# Patient Record
Sex: FEMALE | Race: WHITE | NOT HISPANIC OR LATINO | Employment: OTHER | URBAN - METROPOLITAN AREA
[De-identification: names, ages, dates, MRNs, and addresses within clinical notes are randomized per-mention and may not be internally consistent; named-entity substitution may affect disease eponyms.]

---

## 2019-07-08 ENCOUNTER — APPOINTMENT (OUTPATIENT)
Dept: CT IMAGING | Facility: HOSPITAL | Age: 54
End: 2019-07-08

## 2019-07-08 ENCOUNTER — HOSPITAL ENCOUNTER (EMERGENCY)
Facility: HOSPITAL | Age: 54
Discharge: HOME | End: 2019-07-08
Admitting: EMERGENCY MEDICINE

## 2019-07-08 VITALS
RESPIRATION RATE: 18 BRPM | HEART RATE: 72 BPM | TEMPERATURE: 97.9 F | DIASTOLIC BLOOD PRESSURE: 67 MMHG | BODY MASS INDEX: 53.92 KG/M2 | WEIGHT: 293 LBS | OXYGEN SATURATION: 100 % | SYSTOLIC BLOOD PRESSURE: 140 MMHG | HEIGHT: 62 IN

## 2019-07-08 DIAGNOSIS — N39.0 URINARY TRACT INFECTION WITHOUT HEMATURIA, SITE UNSPECIFIED: Primary | ICD-10-CM

## 2019-07-08 DIAGNOSIS — N28.9 ACUTE RENAL INSUFFICIENCY: ICD-10-CM

## 2019-07-08 DIAGNOSIS — K80.20 CALCULUS OF GALLBLADDER WITHOUT CHOLECYSTITIS WITHOUT OBSTRUCTION: ICD-10-CM

## 2019-07-08 DIAGNOSIS — E86.0 DEHYDRATION: ICD-10-CM

## 2019-07-08 LAB
ALBUMIN SERPL-MCNC: 3.2 G/DL (ref 3.5–4.8)
ALBUMIN/GLOB SERPL: 0.8 G/DL (ref 1–1.7)
ALP SERPL-CCNC: 83 U/L (ref 32–91)
ALT SERPL W P-5'-P-CCNC: 40 U/L (ref 14–54)
ANION GAP SERPL CALCULATED.3IONS-SCNC: 14.7 MMOL/L (ref 10–20)
AST SERPL-CCNC: 52 U/L (ref 15–41)
BACTERIA UR QL AUTO: ABNORMAL /HPF
BILIRUB SERPL-MCNC: 2.1 MG/DL (ref 0.3–1.2)
BILIRUB UR QL STRIP: ABNORMAL
BUN BLD-MCNC: 27 MG/DL (ref 8–20)
BUN/CREAT SERPL: 15 (ref 5.4–26.2)
CALCIUM SPEC-SCNC: 8.4 MG/DL (ref 8.9–10.3)
CHLORIDE SERPL-SCNC: 95 MMOL/L (ref 101–111)
CLARITY UR: ABNORMAL
CO2 SERPL-SCNC: 25 MMOL/L (ref 22–32)
COLOR UR: ABNORMAL
CREAT BLD-MCNC: 1.8 MG/DL (ref 0.4–1)
DEPRECATED RDW RBC AUTO: 52.9 FL (ref 37–54)
ERYTHROCYTE [DISTWIDTH] IN BLOOD BY AUTOMATED COUNT: 16.8 % (ref 12.3–15.4)
GFR SERPL CREATININE-BSD FRML MDRD: 30 ML/MIN/1.73
GIANT PLATELETS: ABNORMAL
GLOBULIN UR ELPH-MCNC: 4.2 GM/DL (ref 2.5–3.8)
GLUCOSE BLD-MCNC: 140 MG/DL (ref 65–99)
GLUCOSE UR STRIP-MCNC: NEGATIVE MG/DL
GRAN CASTS URNS QL MICRO: ABNORMAL /LPF
HCT VFR BLD AUTO: 27.7 % (ref 34–46.6)
HGB BLD-MCNC: 9.6 G/DL (ref 12–15.9)
HGB UR QL STRIP.AUTO: ABNORMAL
HYALINE CASTS UR QL AUTO: ABNORMAL /LPF
KETONES UR QL STRIP: ABNORMAL
LEUKOCYTE ESTERASE UR QL STRIP.AUTO: ABNORMAL
LIPASE SERPL-CCNC: 40 U/L (ref 22–51)
LYMPHOCYTES # BLD MANUAL: 1.04 10*3/MM3 (ref 0.7–3.1)
LYMPHOCYTES NFR BLD MANUAL: 28 % (ref 19.6–45.3)
LYMPHOCYTES NFR BLD MANUAL: 6 % (ref 5–12)
MCH RBC QN AUTO: 31.1 PG (ref 26.6–33)
MCHC RBC AUTO-ENTMCNC: 34.6 G/DL (ref 31.5–35.7)
MCV RBC AUTO: 90 FL (ref 79–97)
METAMYELOCYTES NFR BLD MANUAL: 2 % (ref 0–0)
MONOCYTES # BLD AUTO: 0.22 10*3/MM3 (ref 0.1–0.9)
MUCOUS THREADS URNS QL MICRO: ABNORMAL /HPF
MYELOCYTES NFR BLD MANUAL: 1 % (ref 0–0)
NEUTROPHILS # BLD AUTO: 2.33 10*3/MM3 (ref 1.7–7)
NEUTROPHILS NFR BLD MANUAL: 39 % (ref 42.7–76)
NEUTS BAND NFR BLD MANUAL: 24 % (ref 0–5)
NITRITE UR QL STRIP: POSITIVE
PH UR STRIP.AUTO: 5.5 [PH] (ref 5–8)
PLATELET # BLD AUTO: 140 10*3/MM3 (ref 140–450)
PMV BLD AUTO: 8.6 FL (ref 6–12)
POTASSIUM BLD-SCNC: 3.7 MMOL/L (ref 3.6–5.1)
PROT SERPL-MCNC: 7.4 G/DL (ref 6.1–7.9)
PROT UR QL STRIP: ABNORMAL
RBC # BLD AUTO: 3.08 10*6/MM3 (ref 3.77–5.28)
RBC # UR: ABNORMAL /HPF
RBC MORPH BLD: NORMAL
REF LAB TEST METHOD: ABNORMAL
SCAN SLIDE: NORMAL
SODIUM BLD-SCNC: 131 MMOL/L (ref 136–144)
SP GR UR STRIP: 1.02 (ref 1–1.03)
SQUAMOUS #/AREA URNS HPF: ABNORMAL /HPF
UROBILINOGEN UR QL STRIP: ABNORMAL
WBC MORPH BLD: NORMAL
WBC NRBC COR # BLD: 3.7 10*3/MM3 (ref 3.4–10.8)
WBC UR QL AUTO: ABNORMAL /HPF

## 2019-07-08 PROCEDURE — 25010000002 ONDANSETRON PER 1 MG: Performed by: NURSE PRACTITIONER

## 2019-07-08 PROCEDURE — 81001 URINALYSIS AUTO W/SCOPE: CPT | Performed by: NURSE PRACTITIONER

## 2019-07-08 PROCEDURE — 83690 ASSAY OF LIPASE: CPT | Performed by: NURSE PRACTITIONER

## 2019-07-08 PROCEDURE — 96374 THER/PROPH/DIAG INJ IV PUSH: CPT

## 2019-07-08 PROCEDURE — 80053 COMPREHEN METABOLIC PANEL: CPT | Performed by: NURSE PRACTITIONER

## 2019-07-08 PROCEDURE — 85007 BL SMEAR W/DIFF WBC COUNT: CPT | Performed by: NURSE PRACTITIONER

## 2019-07-08 PROCEDURE — 99284 EMERGENCY DEPT VISIT MOD MDM: CPT

## 2019-07-08 PROCEDURE — 87086 URINE CULTURE/COLONY COUNT: CPT | Performed by: NURSE PRACTITIONER

## 2019-07-08 PROCEDURE — 87186 SC STD MICRODIL/AGAR DIL: CPT | Performed by: NURSE PRACTITIONER

## 2019-07-08 PROCEDURE — 85025 COMPLETE CBC W/AUTO DIFF WBC: CPT | Performed by: NURSE PRACTITIONER

## 2019-07-08 PROCEDURE — 87088 URINE BACTERIA CULTURE: CPT | Performed by: NURSE PRACTITIONER

## 2019-07-08 PROCEDURE — 74176 CT ABD & PELVIS W/O CONTRAST: CPT

## 2019-07-08 RX ORDER — ONDANSETRON HYDROCHLORIDE 8 MG/1
8 TABLET, FILM COATED ORAL EVERY 8 HOURS PRN
Qty: 12 TABLET | Refills: 0 | Status: SHIPPED | OUTPATIENT
Start: 2019-07-08 | End: 2020-03-04

## 2019-07-08 RX ORDER — ONDANSETRON 2 MG/ML
4 INJECTION INTRAMUSCULAR; INTRAVENOUS ONCE
Status: COMPLETED | OUTPATIENT
Start: 2019-07-08 | End: 2019-07-08

## 2019-07-08 RX ORDER — NITROFURANTOIN 25; 75 MG/1; MG/1
100 CAPSULE ORAL 2 TIMES DAILY
Qty: 10 CAPSULE | Refills: 0 | Status: SHIPPED | OUTPATIENT
Start: 2019-07-08 | End: 2020-03-04

## 2019-07-08 RX ORDER — SODIUM CHLORIDE 0.9 % (FLUSH) 0.9 %
10 SYRINGE (ML) INJECTION AS NEEDED
Status: DISCONTINUED | OUTPATIENT
Start: 2019-07-08 | End: 2019-07-08 | Stop reason: HOSPADM

## 2019-07-08 RX ADMIN — ONDANSETRON 4 MG: 2 INJECTION INTRAMUSCULAR; INTRAVENOUS at 11:40

## 2019-07-08 RX ADMIN — SODIUM CHLORIDE 1000 ML: 900 INJECTION, SOLUTION INTRAVENOUS at 15:21

## 2019-07-08 RX ADMIN — SODIUM CHLORIDE 500 ML: 900 INJECTION, SOLUTION INTRAVENOUS at 11:40

## 2019-07-08 NOTE — ED NOTES
Lightheaded on and off, vomiting x 2-3 in the last 24 hrs. BM 2 days ago. Reports vomiting bile and not actual food.      Bindu Perez RN  07/08/19 1122

## 2019-07-08 NOTE — ED PROVIDER NOTES
Subjective   Chief complaint: vomiting      Context: patient is a 53 yof with s/o with c/o vomiting and epigastric pain. Occasionally feels light headed without unilateral focal deficits weakness confusion ataxia or lethargy. No bm x 2 days but reports decreased oral intake. Denies diarrhea, recent abx use, fever, recent travel or surgery.     Duration: 5 days    Timing: waxes and wanes    Severity: denies    Associated symptoms: denies          PCP:rex              Review of Systems   Constitutional: Negative.    HENT: Negative.    Respiratory: Negative.    Cardiovascular: Negative.    Gastrointestinal: Positive for nausea and vomiting.   Genitourinary: Negative.    Musculoskeletal: Negative.    Skin: Negative.    Neurological: Positive for light-headedness. Negative for dizziness, tremors, seizures, syncope, facial asymmetry, speech difficulty, weakness, numbness and headaches.   Hematological: Negative.        Past Medical History:   Diagnosis Date   • Hypertension        No Known Allergies    History reviewed. No pertinent surgical history.    History reviewed. No pertinent family history.    Social History     Socioeconomic History   • Marital status: Single     Spouse name: Not on file   • Number of children: Not on file   • Years of education: Not on file   • Highest education level: Not on file   Tobacco Use   • Smoking status: Never Smoker   Substance and Sexual Activity   • Alcohol use: Yes     Alcohol/week: 0.6 oz     Types: 1 Cans of beer per week   • Sexual activity: Yes     Partners: Male           Objective   Physical Exam    Vital signs and triage nurse note reviewed.  Morbidly obese.  Family at bedside constitutional: Awake, alert; well-developed and well-nourished. No acute distress is noted.   HEENT: Normocephalic, atraumatic; pupils are PERRL with intact EOM; oropharynx is pink and moist without exudate or erythema.   Neck: Supple, full range of motion without pain; no cervical  lymphadenopathy.   Cardiovascular: Regular rate and rhythm, normal S1-S2.   Pulmonary: Respiratory effort regular nonlabored, breath sounds clear to auscultation all fields.   Abdomen: Soft, nontender nondistended with normoactive bowel sounds; no rebound or guarding. Negative Cox McBurney.  No CVAT  Musculoskeletal: Independent range of motion of all extremities with no palpable tenderness or edema.   Neuro: Alert oriented x3, speech is clear and appropriate, GCS 15   Skin:  Fleshtone warm, dry, intact; no erythematous or petechial rash or lesion       Procedures           ED Course        Labs Reviewed   COMPREHENSIVE METABOLIC PANEL - Abnormal; Notable for the following components:       Result Value    Glucose 140 (*)     BUN 27 (*)     Creatinine 1.80 (*)     Sodium 131 (*)     Chloride 95 (*)     Calcium 8.4 (*)     Albumin 3.20 (*)     AST (SGOT) 52 (*)     Total Bilirubin 2.1 (*)     eGFR Non African Amer 30 (*)     Globulin 4.2 (*)     A/G Ratio 0.8 (*)     All other components within normal limits   URINALYSIS W/ CULTURE IF INDICATED - Abnormal; Notable for the following components:    Color, UA Orange (*)     Appearance, UA Turbid (*)     Ketones, UA Trace (*)     Bilirubin, UA Small (1+) (*)     Blood, UA Trace (*)     Protein,  mg/dL (2+) (*)     Leuk Esterase, UA Moderate (2+) (*)     Nitrite, UA Positive (*)     All other components within normal limits   CBC WITH AUTO DIFFERENTIAL - Abnormal; Notable for the following components:    RBC 3.08 (*)     Hemoglobin 9.6 (*)     Hematocrit 27.7 (*)     RDW 16.8 (*)     All other components within normal limits    Narrative:     The previously reported component NRBC is no longer being reported.   URINALYSIS, MICROSCOPIC ONLY - Abnormal; Notable for the following components:    RBC, UA 6-12 (*)     WBC, UA Too Numerous to Count (*)     Bacteria, UA 4+ (*)     Squamous Epithelial Cells, UA 13-20 (*)     All other components within normal limits    MANUAL DIFFERENTIAL - Abnormal; Notable for the following components:    Neutrophil % 39.0 (*)     Bands %  24.0 (*)     Metamyelocyte % 2.0 (*)     Myelocyte % 1.0 (*)     All other components within normal limits   LIPASE - Normal   URINE CULTURE   SCAN SLIDE   CBC AND DIFFERENTIAL    Narrative:     The following orders were created for panel order CBC & Differential.  Procedure                               Abnormality         Status                     ---------                               -----------         ------                     Scan Slide[[id number]]                                       Final result               CBC Auto Differential[[id number]]        Abnormal            Final result                 Please view results for these tests on the individual orders.     Medications   sodium chloride 0.9 % flush 10 mL (not administered)   sodium chloride 0.9 % bolus 1,000 mL (not administered)   sodium chloride 0.9 % bolus 500 mL (0 mL Intravenous Stopped 7/8/19 1516)   ondansetron (ZOFRAN) injection 4 mg (4 mg Intravenous Given 7/8/19 1140)     Ct Abdomen Pelvis Without Contrast    Result Date: 7/8/2019   1. Hepatic steatosis. 2. Moderate splenomegaly. 3. Small hiatal hernia. 4. Cholelithiasis without evidence of acute cholecystitis.  Electronically Signed By-Jon Lynch On:7/8/2019 2:26 PM This report was finalized on [id number] by  Jon Lynch, .              MDM  Number of Diagnoses or Management Options  Acute renal insufficiency:   Calculus of gallbladder without cholecystitis without obstruction:   Dehydration:   Urinary tract infection without hematuria, site unspecified:   Diagnosis management comments: Medical decision  Comorbidities: Obesity  Differentials: Dehydration electrolyte abnormalities UTI cholelithiasis only tried abnormalities  Radiology interpretation: ct  reviewed by me and interpreted by radiologist, cholelithiasis  Lab interpretation:  Labs viewed by me significant for,  David 2.1, creatinine 1.8, UTI    Patient had IV established was given fluid bolus Zofran.  Labs CT was obtained.  Patient has had no vomiting while in the ER.    i discussed findings with patient who voices understanding of discharge instructions, signs and symptoms requiring return to ED; discharged improved and in stable condition with follow up for re-evaluation.  Patient will be discharged home with Macrobid and Zofran       Amount and/or Complexity of Data Reviewed  Clinical lab tests: reviewed  Tests in the radiology section of CPT®: reviewed    Patient Progress  Patient progress: stable        Final diagnoses:   Urinary tract infection without hematuria, site unspecified   Dehydration   Calculus of gallbladder without cholecystitis without obstruction   Acute renal insufficiency            Ratna Pearson, APRN  07/08/19 1523

## 2019-07-08 NOTE — DISCHARGE INSTRUCTIONS
Avoid any greasy fried fatty spicy foods chocolate peppermint alcohol or dairy.  Small frequent meals.  Increase fluid intake.  Follow-up with your family doctor, will need labs rechecked.  Follow-up with surgeon for cholelithiasis.

## 2019-07-10 LAB — BACTERIA SPEC AEROBE CULT: ABNORMAL

## 2019-07-10 NOTE — PHARMACY RECOMMENDATION
Pt seen for s/sx of likely UTI. Given rx for macrobid at d/c. C/s report shows E.coli susceptible to macrobid. No further pharmacy intervention needed at this time. Esvin Joyner, Pharmacy Intern

## 2019-07-15 ENCOUNTER — OFFICE VISIT (OUTPATIENT)
Dept: SURGERY | Facility: CLINIC | Age: 54
End: 2019-07-15

## 2019-07-15 VITALS
TEMPERATURE: 97.2 F | WEIGHT: 293 LBS | HEART RATE: 63 BPM | HEIGHT: 62 IN | SYSTOLIC BLOOD PRESSURE: 116 MMHG | DIASTOLIC BLOOD PRESSURE: 61 MMHG | OXYGEN SATURATION: 97 % | BODY MASS INDEX: 53.92 KG/M2

## 2019-07-15 DIAGNOSIS — K44.9 GASTROESOPHAGEAL REFLUX DISEASE WITH HIATAL HERNIA: Primary | ICD-10-CM

## 2019-07-15 DIAGNOSIS — K80.20 GALLSTONES: ICD-10-CM

## 2019-07-15 DIAGNOSIS — K21.9 GASTROESOPHAGEAL REFLUX DISEASE WITH HIATAL HERNIA: Primary | ICD-10-CM

## 2019-07-15 DIAGNOSIS — E66.01 MORBID OBESITY WITH BMI OF 60.0-69.9, ADULT (HCC): ICD-10-CM

## 2019-07-15 PROBLEM — E55.9 VITAMIN D DEFICIENCY: Status: ACTIVE | Noted: 2019-07-15

## 2019-07-15 PROCEDURE — 99204 OFFICE O/P NEW MOD 45 MIN: CPT | Performed by: SURGERY

## 2019-07-15 RX ORDER — FERROUS SULFATE 325(65) MG
1 TABLET ORAL
Refills: 3 | Status: ON HOLD | COMMUNITY
Start: 2019-04-26 | End: 2021-05-06

## 2019-07-15 RX ORDER — CYCLOBENZAPRINE HCL 5 MG
TABLET ORAL
COMMUNITY
Start: 2019-07-12 | End: 2020-03-04

## 2019-07-15 RX ORDER — PANTOPRAZOLE SODIUM 40 MG/1
40 TABLET, DELAYED RELEASE ORAL DAILY
Qty: 30 TABLET | Refills: 1 | Status: SHIPPED | OUTPATIENT
Start: 2019-07-15 | End: 2019-07-17 | Stop reason: CLARIF

## 2019-07-15 RX ORDER — METOPROLOL TARTRATE 50 MG/1
50 TABLET, FILM COATED ORAL 2 TIMES DAILY
Refills: 11 | COMMUNITY
Start: 2019-04-27 | End: 2021-05-13 | Stop reason: HOSPADM

## 2019-07-15 RX ORDER — ERGOCALCIFEROL 1.25 MG/1
50000 CAPSULE ORAL
Refills: 3 | Status: ON HOLD | COMMUNITY
Start: 2019-05-03 | End: 2021-05-06

## 2019-07-15 RX ORDER — IRBESARTAN AND HYDROCHLOROTHIAZIDE 300; 12.5 MG/1; MG/1
1 TABLET, FILM COATED ORAL DAILY
Refills: 11 | Status: ON HOLD | COMMUNITY
Start: 2019-05-17 | End: 2021-05-06

## 2019-07-15 RX ORDER — ESOMEPRAZOLE MAGNESIUM 40 MG/1
40 CAPSULE, DELAYED RELEASE ORAL
COMMUNITY
Start: 2016-07-25 | End: 2019-07-15 | Stop reason: ALTCHOICE

## 2019-07-15 RX ORDER — VENLAFAXINE HYDROCHLORIDE 150 MG/1
150 CAPSULE, EXTENDED RELEASE ORAL DAILY
Refills: 3 | COMMUNITY
Start: 2019-04-27

## 2019-07-15 RX ORDER — AMLODIPINE BESYLATE 5 MG/1
5 TABLET ORAL DAILY
Refills: 11 | COMMUNITY
Start: 2019-04-27 | End: 2021-05-13 | Stop reason: HOSPADM

## 2019-07-15 NOTE — PROGRESS NOTES
GENERAL SURGERY CONSULTATION NOTE    Consult requested by: MD Micky    Patient Care Team:  eLsley Hernandez MD as PCP - General    Reason for consult: Gallstones    Subjective     Patient is a 53 y.o. female presented after she was seen in the emergency room for vomiting and epigastric abdominal pain on 7/8/2019.  The patient reports that she had epigastric abdominal pain, and substernal chest pain which radiated into her lower back which prompted her to go into the emergency room.  At that time she had decreased oral intake and had been vomiting and was feeling lightheaded.  A CT scan of the abdomen pelvis was performed in the emergency room and demonstrated hepatic steatosis, moderate splenomegaly, a small hiatal hernia, and cholelithiasis without evidence of acute cholecystitis.  Patient was found to have a UTI as well as dehydration and acute kidney injury.  She was started on antibiotics and discharged home with instructions to follow-up with a general surgeon for her gallstones.  Since that time the patient complains of persistent vomiting.  She states that she vomits 2-3 times every day, most commonly in the morning when she first wakes up.  She also complains of substernal burning chest pain, as well as feeling of food getting stuck at the bottom of her chest.  Her vomiting and abdominal pain do not seem to correlate with eating food, but do appear to change with positions.  She has previously taken Nexium, but stopped taking that as her insurance stopped covering it.    Review of Systems   Constitutional: Positive for appetite change. Negative for chills and fever.   HENT: Positive for sore throat. Negative for congestion.    Respiratory: Positive for shortness of breath. Negative for cough.    Cardiovascular: Positive for chest pain. Negative for palpitations.   Gastrointestinal: Positive for abdominal pain. Negative for constipation, diarrhea, nausea, vomiting and GERD.   Genitourinary: Negative  for difficulty urinating, dysuria and frequency.   Musculoskeletal: Negative for arthralgias and back pain.   Skin: Negative for rash and skin lesions.   Neurological: Negative for dizziness, seizures and memory problem.   Hematological: Negative for adenopathy. Does not bruise/bleed easily.   Psychiatric/Behavioral: Negative for sleep disturbance and depressed mood.        History  Past Medical History:   Diagnosis Date   • Hypertension      History reviewed. No pertinent surgical history.  Family History   Problem Relation Age of Onset   • Kidney disease Mother      Social History     Tobacco Use   • Smoking status: Never Smoker   • Smokeless tobacco: Never Used   Substance Use Topics   • Alcohol use: Yes     Alcohol/week: 0.6 oz     Types: 1 Cans of beer per week   • Drug use: Not on file       (Not in a hospital admission)  Allergies:  Patient has no known allergies.    Objective     Vital Signs  Temp:  [97.2 °F (36.2 °C)] 97.2 °F (36.2 °C)  Heart Rate:  [63] 63  BP: (116)/(61) 116/61    Physical Exam   Constitutional: She is oriented to person, place, and time. Vital signs are normal.  Non-toxic appearance. She does not have a sickly appearance.   Patient is morbidly obese with a BMI of 65.6   HENT:   Head: Normocephalic and atraumatic.   Eyes: Pupils are equal, round, and reactive to light.   Neck: Normal range of motion.   Cardiovascular: Normal rate and regular rhythm.   Pulmonary/Chest: Effort normal and breath sounds normal.   Abdominal: Soft. She exhibits no distension. There is no tenderness. No hernia.   Musculoskeletal: Normal range of motion. She exhibits no edema.   Lymphadenopathy:     She has no cervical adenopathy.     She has no axillary adenopathy.   Neurological: She is alert and oriented to person, place, and time.   Skin: Skin is warm and dry. No rash noted.   Psychiatric: She has a normal mood and affect.       Results Review:   Lab Results (last 24 hours)     ** No results found for the  last 24 hours. **        No radiology results for the last day      I reviewed the patient's new imaging results and agree with the interpretation.  I reviewed the patient's other test results and agree with the interpretation    Assessment/Plan     Active Problems:  1. Morbid obesity with BMI of 65  2. Gallstones  3. Hiatal hernia with gastroesophageal reflux disease.    With regards to the patient's symptoms, I am not entirely convinced that these are caused by the patient's gallbladder.  She has a known history of gastroesophageal reflux disease for which she was being treated with Nexium which has lapsed.  She also has evidence of hiatal hernia on CT scan.  Her symptoms are not typical of gallbladder dysfunction, but rather may be related to severe gastroesophageal reflux disease.  Her cough, nausea, vomiting first thing in the morning and recent sore throats certainly seem to appear that way.  Therefore, I would like for the patient to be evaluated by GI with a EGD to rule out peptic ulcer disease or other underlying gastroesophageal reflux.  I have started the patient on Protonix twice daily to see if this improves her symptoms as well.  I also discussed the patient her diagnosis of super morbid obesity.  The patient states that she has attempted weight watchers in the past and did have some success but the weight returned.  She went through the process of getting herself approved at [facility] for a lap band many years ago, but did not end up going through and having the procedure performed.  The patient is interested in potentially pursuing bariatric surgery.  If the patient is found to have a gastroesophageal reflux disease on EGD, bariatric surgery may improve her symptoms significantly.  Therefore I will refer the patient to Dr. Eboni Gilliam, bariatric surgeon at [facility] for potential evaluation for bariatric surgery.  Either myself or Dr. Gilliam could remove the patient's  gallbladder if it is felt as though her gallbladder is truly the problem.    I discussed the patients findings and my recommendations with the patient and her friend.     Yang Cameron MD  07/15/19  5:06 PM

## 2019-07-17 DIAGNOSIS — K44.9 GASTROESOPHAGEAL REFLUX DISEASE WITH HIATAL HERNIA: Primary | ICD-10-CM

## 2019-07-17 DIAGNOSIS — K21.9 GASTROESOPHAGEAL REFLUX DISEASE WITH HIATAL HERNIA: Primary | ICD-10-CM

## 2019-07-17 RX ORDER — OMEPRAZOLE 40 MG/1
40 CAPSULE, DELAYED RELEASE ORAL DAILY
Qty: 30 CAPSULE | Refills: 0 | Status: SHIPPED | OUTPATIENT
Start: 2019-07-17 | End: 2021-04-19

## 2019-09-04 DIAGNOSIS — K44.9 GASTROESOPHAGEAL REFLUX DISEASE WITH HIATAL HERNIA: ICD-10-CM

## 2019-09-04 DIAGNOSIS — K21.9 GASTROESOPHAGEAL REFLUX DISEASE WITH HIATAL HERNIA: ICD-10-CM

## 2019-09-09 RX ORDER — PANTOPRAZOLE SODIUM 40 MG/1
TABLET, DELAYED RELEASE ORAL
Qty: 30 TABLET | Refills: 1 | Status: SHIPPED | OUTPATIENT
Start: 2019-09-09 | End: 2019-10-02 | Stop reason: SDUPTHER

## 2019-10-02 DIAGNOSIS — K44.9 GASTROESOPHAGEAL REFLUX DISEASE WITH HIATAL HERNIA: ICD-10-CM

## 2019-10-02 DIAGNOSIS — K21.9 GASTROESOPHAGEAL REFLUX DISEASE WITH HIATAL HERNIA: ICD-10-CM

## 2019-10-03 RX ORDER — PANTOPRAZOLE SODIUM 40 MG/1
TABLET, DELAYED RELEASE ORAL
Qty: 30 TABLET | Refills: 1 | Status: SHIPPED | OUTPATIENT
Start: 2019-10-03 | End: 2019-10-29 | Stop reason: SDUPTHER

## 2019-10-29 DIAGNOSIS — K21.9 GASTROESOPHAGEAL REFLUX DISEASE WITH HIATAL HERNIA: ICD-10-CM

## 2019-10-29 DIAGNOSIS — K44.9 GASTROESOPHAGEAL REFLUX DISEASE WITH HIATAL HERNIA: ICD-10-CM

## 2019-10-31 RX ORDER — PANTOPRAZOLE SODIUM 40 MG/1
TABLET, DELAYED RELEASE ORAL
Qty: 30 TABLET | Refills: 1 | Status: SHIPPED | OUTPATIENT
Start: 2019-10-31 | End: 2019-11-26 | Stop reason: SDUPTHER

## 2019-11-26 DIAGNOSIS — K21.9 GASTROESOPHAGEAL REFLUX DISEASE WITH HIATAL HERNIA: ICD-10-CM

## 2019-11-26 DIAGNOSIS — K44.9 GASTROESOPHAGEAL REFLUX DISEASE WITH HIATAL HERNIA: ICD-10-CM

## 2019-11-27 RX ORDER — PANTOPRAZOLE SODIUM 40 MG/1
TABLET, DELAYED RELEASE ORAL
Qty: 30 TABLET | Refills: 1 | Status: SHIPPED | OUTPATIENT
Start: 2019-11-27 | End: 2019-12-24

## 2019-12-22 DIAGNOSIS — K21.9 GASTROESOPHAGEAL REFLUX DISEASE WITH HIATAL HERNIA: ICD-10-CM

## 2019-12-22 DIAGNOSIS — K44.9 GASTROESOPHAGEAL REFLUX DISEASE WITH HIATAL HERNIA: ICD-10-CM

## 2019-12-24 RX ORDER — PANTOPRAZOLE SODIUM 40 MG/1
TABLET, DELAYED RELEASE ORAL
Qty: 30 TABLET | Refills: 1 | Status: SHIPPED | OUTPATIENT
Start: 2019-12-24 | End: 2021-10-28

## 2020-01-18 DIAGNOSIS — K44.9 GASTROESOPHAGEAL REFLUX DISEASE WITH HIATAL HERNIA: ICD-10-CM

## 2020-01-18 DIAGNOSIS — K21.9 GASTROESOPHAGEAL REFLUX DISEASE WITH HIATAL HERNIA: ICD-10-CM

## 2020-01-24 RX ORDER — PANTOPRAZOLE SODIUM 40 MG/1
TABLET, DELAYED RELEASE ORAL
Qty: 30 TABLET | Refills: 1 | OUTPATIENT
Start: 2020-01-24

## 2020-05-29 DIAGNOSIS — K21.9 GASTROESOPHAGEAL REFLUX DISEASE WITH HIATAL HERNIA: ICD-10-CM

## 2020-05-29 DIAGNOSIS — K44.9 GASTROESOPHAGEAL REFLUX DISEASE WITH HIATAL HERNIA: ICD-10-CM

## 2020-06-01 RX ORDER — PANTOPRAZOLE SODIUM 40 MG/1
TABLET, DELAYED RELEASE ORAL
Qty: 30 TABLET | Refills: 1 | OUTPATIENT
Start: 2020-06-01

## 2021-04-18 ENCOUNTER — HOSPITAL ENCOUNTER (OUTPATIENT)
Facility: HOSPITAL | Age: 56
Discharge: HOME | End: 2021-04-19
Attending: EMERGENCY MEDICINE | Admitting: INTERNAL MEDICINE

## 2021-04-18 DIAGNOSIS — G93.89 BRAIN MASS: ICD-10-CM

## 2021-04-18 DIAGNOSIS — R56.9 SEIZURE: Primary | ICD-10-CM

## 2021-04-18 PROCEDURE — 99284 EMERGENCY DEPT VISIT MOD MDM: CPT

## 2021-04-19 ENCOUNTER — READMISSION MANAGEMENT (OUTPATIENT)
Dept: CALL CENTER | Facility: HOSPITAL | Age: 56
End: 2021-04-19

## 2021-04-19 ENCOUNTER — APPOINTMENT (OUTPATIENT)
Dept: CT IMAGING | Facility: HOSPITAL | Age: 56
End: 2021-04-19

## 2021-04-19 ENCOUNTER — APPOINTMENT (OUTPATIENT)
Dept: MRI IMAGING | Facility: HOSPITAL | Age: 56
End: 2021-04-19

## 2021-04-19 VITALS
RESPIRATION RATE: 20 BRPM | HEIGHT: 62 IN | TEMPERATURE: 97.9 F | DIASTOLIC BLOOD PRESSURE: 76 MMHG | HEART RATE: 79 BPM | OXYGEN SATURATION: 94 % | WEIGHT: 293 LBS | SYSTOLIC BLOOD PRESSURE: 129 MMHG | BODY MASS INDEX: 53.92 KG/M2

## 2021-04-19 PROBLEM — G93.89 BRAIN MASS: Status: ACTIVE | Noted: 2021-04-19

## 2021-04-19 LAB
ALBUMIN SERPL-MCNC: 3.8 G/DL (ref 3.5–5.2)
ALBUMIN/GLOB SERPL: 1.3 G/DL
ALP SERPL-CCNC: 81 U/L (ref 39–117)
ALT SERPL W P-5'-P-CCNC: 23 U/L (ref 1–33)
AMPHET+METHAMPHET UR QL: NEGATIVE
ANION GAP SERPL CALCULATED.3IONS-SCNC: 13 MMOL/L (ref 5–15)
AST SERPL-CCNC: 19 U/L (ref 1–32)
BARBITURATES UR QL SCN: NEGATIVE
BASOPHILS # BLD AUTO: 0 10*3/MM3 (ref 0–0.2)
BASOPHILS NFR BLD AUTO: 0.6 % (ref 0–1.5)
BENZODIAZ UR QL SCN: NEGATIVE
BILIRUB SERPL-MCNC: 0.5 MG/DL (ref 0–1.2)
BILIRUB UR QL STRIP: NEGATIVE
BUN SERPL-MCNC: 23 MG/DL (ref 6–20)
BUN/CREAT SERPL: 23.2 (ref 7–25)
CALCIUM SPEC-SCNC: 9.4 MG/DL (ref 8.6–10.5)
CANNABINOIDS SERPL QL: NEGATIVE
CHLORIDE SERPL-SCNC: 102 MMOL/L (ref 98–107)
CLARITY UR: CLEAR
CO2 SERPL-SCNC: 26 MMOL/L (ref 22–29)
COCAINE UR QL: NEGATIVE
COLOR UR: YELLOW
CREAT SERPL-MCNC: 0.99 MG/DL (ref 0.57–1)
DEPRECATED RDW RBC AUTO: 52.5 FL (ref 37–54)
EOSINOPHIL # BLD AUTO: 0.3 10*3/MM3 (ref 0–0.4)
EOSINOPHIL NFR BLD AUTO: 3.9 % (ref 0.3–6.2)
ERYTHROCYTE [DISTWIDTH] IN BLOOD BY AUTOMATED COUNT: 16.6 % (ref 12.3–15.4)
ETHANOL UR QL: <0.01 %
GFR SERPL CREATININE-BSD FRML MDRD: 59 ML/MIN/1.73
GLOBULIN UR ELPH-MCNC: 3 GM/DL
GLUCOSE SERPL-MCNC: 185 MG/DL (ref 65–99)
GLUCOSE UR STRIP-MCNC: NEGATIVE MG/DL
HCT VFR BLD AUTO: 30.1 % (ref 34–46.6)
HGB BLD-MCNC: 10.4 G/DL (ref 12–15.9)
HGB UR QL STRIP.AUTO: NEGATIVE
IRON 24H UR-MRATE: 51 MCG/DL (ref 37–145)
IRON SATN MFR SERPL: 19 % (ref 20–50)
KETONES UR QL STRIP: NEGATIVE
LEUKOCYTE ESTERASE UR QL STRIP.AUTO: NEGATIVE
LYMPHOCYTES # BLD AUTO: 1.6 10*3/MM3 (ref 0.7–3.1)
LYMPHOCYTES NFR BLD AUTO: 23.3 % (ref 19.6–45.3)
MAGNESIUM SERPL-MCNC: 1.6 MG/DL (ref 1.6–2.6)
MCH RBC QN AUTO: 30.8 PG (ref 26.6–33)
MCHC RBC AUTO-ENTMCNC: 34.5 G/DL (ref 31.5–35.7)
MCV RBC AUTO: 89.2 FL (ref 79–97)
METHADONE UR QL SCN: NEGATIVE
MONOCYTES # BLD AUTO: 0.5 10*3/MM3 (ref 0.1–0.9)
MONOCYTES NFR BLD AUTO: 6.8 % (ref 5–12)
NEUTROPHILS NFR BLD AUTO: 4.4 10*3/MM3 (ref 1.7–7)
NEUTROPHILS NFR BLD AUTO: 65.4 % (ref 42.7–76)
NITRITE UR QL STRIP: NEGATIVE
NRBC BLD AUTO-RTO: 0.1 /100 WBC (ref 0–0.2)
OPIATES UR QL: NEGATIVE
OXYCODONE UR QL SCN: NEGATIVE
PH UR STRIP.AUTO: 6 [PH] (ref 5–8)
PLATELET # BLD AUTO: 242 10*3/MM3 (ref 140–450)
PMV BLD AUTO: 6.7 FL (ref 6–12)
POTASSIUM SERPL-SCNC: 4 MMOL/L (ref 3.5–5.2)
POTASSIUM SERPL-SCNC: 4 MMOL/L (ref 3.5–5.2)
PROT SERPL-MCNC: 6.8 G/DL (ref 6–8.5)
PROT UR QL STRIP: NEGATIVE
RBC # BLD AUTO: 3.37 10*6/MM3 (ref 3.77–5.28)
SALICYLATES SERPL-MCNC: <0.3 MG/DL
SARS-COV-2 ORF1AB RESP QL NAA+PROBE: DETECTED
SODIUM SERPL-SCNC: 141 MMOL/L (ref 136–145)
SP GR UR STRIP: 1.02 (ref 1–1.03)
TIBC SERPL-MCNC: 274 MCG/DL (ref 298–536)
TRANSFERRIN SERPL-MCNC: 184 MG/DL (ref 200–360)
TROPONIN T SERPL-MCNC: <0.01 NG/ML (ref 0–0.03)
UROBILINOGEN UR QL STRIP: NORMAL
WBC # BLD AUTO: 6.7 10*3/MM3 (ref 3.4–10.8)
WHOLE BLOOD HOLD SPECIMEN: NORMAL

## 2021-04-19 PROCEDURE — 82077 ASSAY SPEC XCP UR&BREATH IA: CPT | Performed by: EMERGENCY MEDICINE

## 2021-04-19 PROCEDURE — 25010000002 LEVETIRACETAM IN NACL 0.82% 500 MG/100ML SOLUTION: Performed by: PHYSICIAN ASSISTANT

## 2021-04-19 PROCEDURE — A9579 GAD-BASE MR CONTRAST NOS,1ML: HCPCS | Performed by: INTERNAL MEDICINE

## 2021-04-19 PROCEDURE — 84132 ASSAY OF SERUM POTASSIUM: CPT | Performed by: PHYSICIAN ASSISTANT

## 2021-04-19 PROCEDURE — 25010000002 GADOTERIDOL PER 1 ML: Performed by: INTERNAL MEDICINE

## 2021-04-19 PROCEDURE — 81003 URINALYSIS AUTO W/O SCOPE: CPT | Performed by: EMERGENCY MEDICINE

## 2021-04-19 PROCEDURE — 80307 DRUG TEST PRSMV CHEM ANLYZR: CPT | Performed by: EMERGENCY MEDICINE

## 2021-04-19 PROCEDURE — 93005 ELECTROCARDIOGRAM TRACING: CPT | Performed by: EMERGENCY MEDICINE

## 2021-04-19 PROCEDURE — 83735 ASSAY OF MAGNESIUM: CPT | Performed by: EMERGENCY MEDICINE

## 2021-04-19 PROCEDURE — 99236 HOSP IP/OBS SAME DATE HI 85: CPT | Performed by: INTERNAL MEDICINE

## 2021-04-19 PROCEDURE — 96374 THER/PROPH/DIAG INJ IV PUSH: CPT

## 2021-04-19 PROCEDURE — 99221 1ST HOSP IP/OBS SF/LOW 40: CPT | Performed by: SURGERY

## 2021-04-19 PROCEDURE — 84484 ASSAY OF TROPONIN QUANT: CPT | Performed by: EMERGENCY MEDICINE

## 2021-04-19 PROCEDURE — 70553 MRI BRAIN STEM W/O & W/DYE: CPT

## 2021-04-19 PROCEDURE — 85025 COMPLETE CBC W/AUTO DIFF WBC: CPT | Performed by: EMERGENCY MEDICINE

## 2021-04-19 PROCEDURE — U0004 COV-19 TEST NON-CDC HGH THRU: HCPCS | Performed by: EMERGENCY MEDICINE

## 2021-04-19 PROCEDURE — 80179 DRUG ASSAY SALICYLATE: CPT | Performed by: EMERGENCY MEDICINE

## 2021-04-19 PROCEDURE — 0 LEVETIRACETAM IN NACL 0.75% 1000 MG/100ML SOLUTION: Performed by: EMERGENCY MEDICINE

## 2021-04-19 PROCEDURE — 80053 COMPREHEN METABOLIC PANEL: CPT | Performed by: EMERGENCY MEDICINE

## 2021-04-19 PROCEDURE — 83540 ASSAY OF IRON: CPT | Performed by: PHYSICIAN ASSISTANT

## 2021-04-19 PROCEDURE — 25010000003 LEVETIRACETAM IN NACL 0.75% 1000 MG/100ML SOLUTION: Performed by: EMERGENCY MEDICINE

## 2021-04-19 PROCEDURE — 84466 ASSAY OF TRANSFERRIN: CPT | Performed by: PHYSICIAN ASSISTANT

## 2021-04-19 PROCEDURE — 70450 CT HEAD/BRAIN W/O DYE: CPT

## 2021-04-19 RX ORDER — ACETAMINOPHEN 325 MG/1
650 TABLET ORAL EVERY 4 HOURS PRN
Status: DISCONTINUED | OUTPATIENT
Start: 2021-04-19 | End: 2021-04-19 | Stop reason: HOSPADM

## 2021-04-19 RX ORDER — DEXAMETHASONE 4 MG/1
4 TABLET ORAL 2 TIMES DAILY WITH MEALS
Qty: 28 TABLET | Refills: 0 | Status: SHIPPED | OUTPATIENT
Start: 2021-04-19 | End: 2021-05-03

## 2021-04-19 RX ORDER — MAGNESIUM SULFATE HEPTAHYDRATE 40 MG/ML
2 INJECTION, SOLUTION INTRAVENOUS AS NEEDED
Status: DISCONTINUED | OUTPATIENT
Start: 2021-04-19 | End: 2021-04-19 | Stop reason: HOSPADM

## 2021-04-19 RX ORDER — CALCIUM GLUCONATE 20 MG/ML
2 INJECTION, SOLUTION INTRAVENOUS AS NEEDED
Status: DISCONTINUED | OUTPATIENT
Start: 2021-04-19 | End: 2021-04-19 | Stop reason: HOSPADM

## 2021-04-19 RX ORDER — FERROUS SULFATE TAB EC 324 MG (65 MG FE EQUIVALENT) 324 (65 FE) MG
324 TABLET DELAYED RESPONSE ORAL
Status: DISCONTINUED | OUTPATIENT
Start: 2021-04-19 | End: 2021-04-19 | Stop reason: HOSPADM

## 2021-04-19 RX ORDER — LORAZEPAM 2 MG/ML
2 INJECTION INTRAMUSCULAR EVERY 4 HOURS PRN
Status: DISCONTINUED | OUTPATIENT
Start: 2021-04-19 | End: 2021-04-19 | Stop reason: HOSPADM

## 2021-04-19 RX ORDER — POTASSIUM CHLORIDE 20 MEQ/1
40 TABLET, EXTENDED RELEASE ORAL AS NEEDED
Status: DISCONTINUED | OUTPATIENT
Start: 2021-04-19 | End: 2021-04-19 | Stop reason: HOSPADM

## 2021-04-19 RX ORDER — AMLODIPINE BESYLATE 5 MG/1
5 TABLET ORAL DAILY
Status: DISCONTINUED | OUTPATIENT
Start: 2021-04-19 | End: 2021-04-19 | Stop reason: HOSPADM

## 2021-04-19 RX ORDER — SODIUM CHLORIDE 0.9 % (FLUSH) 0.9 %
10 SYRINGE (ML) INJECTION AS NEEDED
Status: DISCONTINUED | OUTPATIENT
Start: 2021-04-19 | End: 2021-04-19 | Stop reason: HOSPADM

## 2021-04-19 RX ORDER — MAGNESIUM SULFATE HEPTAHYDRATE 40 MG/ML
4 INJECTION, SOLUTION INTRAVENOUS AS NEEDED
Status: DISCONTINUED | OUTPATIENT
Start: 2021-04-19 | End: 2021-04-19 | Stop reason: HOSPADM

## 2021-04-19 RX ORDER — CALCIUM GLUCONATE 20 MG/ML
1 INJECTION, SOLUTION INTRAVENOUS AS NEEDED
Status: DISCONTINUED | OUTPATIENT
Start: 2021-04-19 | End: 2021-04-19 | Stop reason: HOSPADM

## 2021-04-19 RX ORDER — METOPROLOL TARTRATE 50 MG/1
50 TABLET, FILM COATED ORAL 2 TIMES DAILY
Status: DISCONTINUED | OUTPATIENT
Start: 2021-04-19 | End: 2021-04-19 | Stop reason: HOSPADM

## 2021-04-19 RX ORDER — CHOLECALCIFEROL (VITAMIN D3) 125 MCG
5 CAPSULE ORAL NIGHTLY PRN
Status: DISCONTINUED | OUTPATIENT
Start: 2021-04-19 | End: 2021-04-19 | Stop reason: HOSPADM

## 2021-04-19 RX ORDER — POTASSIUM CHLORIDE 1.5 G/1.77G
40 POWDER, FOR SOLUTION ORAL AS NEEDED
Status: DISCONTINUED | OUTPATIENT
Start: 2021-04-19 | End: 2021-04-19 | Stop reason: HOSPADM

## 2021-04-19 RX ORDER — LEVETIRACETAM 10 MG/ML
1000 INJECTION INTRAVASCULAR ONCE
Status: COMPLETED | OUTPATIENT
Start: 2021-04-19 | End: 2021-04-19

## 2021-04-19 RX ORDER — PANTOPRAZOLE SODIUM 40 MG/1
40 TABLET, DELAYED RELEASE ORAL
Status: DISCONTINUED | OUTPATIENT
Start: 2021-04-19 | End: 2021-04-19 | Stop reason: HOSPADM

## 2021-04-19 RX ORDER — LEVETIRACETAM 500 MG/1
500 TABLET ORAL 2 TIMES DAILY
Qty: 60 TABLET | Refills: 0 | Status: SHIPPED | OUTPATIENT
Start: 2021-04-19 | End: 2021-05-13 | Stop reason: HOSPADM

## 2021-04-19 RX ORDER — SODIUM CHLORIDE 0.9 % (FLUSH) 0.9 %
10 SYRINGE (ML) INJECTION EVERY 12 HOURS SCHEDULED
Status: DISCONTINUED | OUTPATIENT
Start: 2021-04-19 | End: 2021-04-19 | Stop reason: HOSPADM

## 2021-04-19 RX ORDER — ONDANSETRON 4 MG/1
4 TABLET, FILM COATED ORAL EVERY 6 HOURS PRN
Status: DISCONTINUED | OUTPATIENT
Start: 2021-04-19 | End: 2021-04-19 | Stop reason: HOSPADM

## 2021-04-19 RX ORDER — ACETAMINOPHEN 160 MG/5ML
650 SOLUTION ORAL EVERY 4 HOURS PRN
Status: DISCONTINUED | OUTPATIENT
Start: 2021-04-19 | End: 2021-04-19 | Stop reason: HOSPADM

## 2021-04-19 RX ORDER — VENLAFAXINE HYDROCHLORIDE 75 MG/1
75 CAPSULE, EXTENDED RELEASE ORAL DAILY
Status: DISCONTINUED | OUTPATIENT
Start: 2021-04-19 | End: 2021-04-19 | Stop reason: HOSPADM

## 2021-04-19 RX ORDER — ACETAMINOPHEN 650 MG/1
650 SUPPOSITORY RECTAL EVERY 4 HOURS PRN
Status: DISCONTINUED | OUTPATIENT
Start: 2021-04-19 | End: 2021-04-19 | Stop reason: HOSPADM

## 2021-04-19 RX ORDER — LEVETIRACETAM 5 MG/ML
500 INJECTION INTRAVASCULAR EVERY 12 HOURS SCHEDULED
Status: DISCONTINUED | OUTPATIENT
Start: 2021-04-19 | End: 2021-04-19 | Stop reason: HOSPADM

## 2021-04-19 RX ORDER — MELOXICAM 15 MG/1
15 TABLET ORAL DAILY
Status: ON HOLD | COMMUNITY
End: 2021-05-06

## 2021-04-19 RX ORDER — ONDANSETRON 2 MG/ML
4 INJECTION INTRAMUSCULAR; INTRAVENOUS EVERY 6 HOURS PRN
Status: DISCONTINUED | OUTPATIENT
Start: 2021-04-19 | End: 2021-04-19 | Stop reason: HOSPADM

## 2021-04-19 RX ORDER — NITROGLYCERIN 0.4 MG/1
0.4 TABLET SUBLINGUAL
Status: DISCONTINUED | OUTPATIENT
Start: 2021-04-19 | End: 2021-04-19 | Stop reason: HOSPADM

## 2021-04-19 RX ADMIN — HYDROCHLOROTHIAZIDE: 12.5 TABLET ORAL at 08:39

## 2021-04-19 RX ADMIN — GADOTERIDOL 20 ML: 279.3 INJECTION, SOLUTION INTRAVENOUS at 07:31

## 2021-04-19 RX ADMIN — VENLAFAXINE HYDROCHLORIDE 75 MG: 75 CAPSULE, EXTENDED RELEASE ORAL at 08:39

## 2021-04-19 RX ADMIN — PANTOPRAZOLE SODIUM 40 MG: 40 TABLET, DELAYED RELEASE ORAL at 08:39

## 2021-04-19 RX ADMIN — METOPROLOL TARTRATE 50 MG: 50 TABLET, FILM COATED ORAL at 08:39

## 2021-04-19 RX ADMIN — LEVETIRACETAM 1000 MG: 10 INJECTION INTRAVENOUS at 00:25

## 2021-04-19 RX ADMIN — FERROUS SULFATE TAB EC 324 MG (65 MG FE EQUIVALENT) 324 MG: 324 (65 FE) TABLET DELAYED RESPONSE at 08:39

## 2021-04-19 RX ADMIN — AMLODIPINE BESYLATE 5 MG: 5 TABLET ORAL at 08:39

## 2021-04-19 RX ADMIN — LEVETIRACETAM 500 MG: 5 INJECTION INTRAVENOUS at 08:45

## 2021-04-19 RX ADMIN — Medication 10 ML: at 08:40

## 2021-04-20 NOTE — OUTREACH NOTE
Prep Survey      Responses   [facility] facility patient discharged from?  [facility]   Is LACE score < 7 ?  Yes   Emergency Room discharge w/ pulse ox?  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  Other   Does the patient have Home health ordered?  No   Is there a DME ordered?  No   Prep survey completed?  Yes          Francine Valdez RN

## 2021-04-21 DIAGNOSIS — G93.89 BRAIN MASS: Primary | ICD-10-CM

## 2021-04-21 LAB — QT INTERVAL: 434 MS

## 2021-05-03 ENCOUNTER — PREP FOR SURGERY (OUTPATIENT)
Dept: OTHER | Facility: HOSPITAL | Age: 56
End: 2021-05-03

## 2021-05-03 ENCOUNTER — OFFICE VISIT (OUTPATIENT)
Dept: NEUROSURGERY | Facility: CLINIC | Age: 56
End: 2021-05-03

## 2021-05-03 VITALS
HEART RATE: 56 BPM | SYSTOLIC BLOOD PRESSURE: 136 MMHG | WEIGHT: 293 LBS | DIASTOLIC BLOOD PRESSURE: 72 MMHG | BODY MASS INDEX: 53.92 KG/M2 | HEIGHT: 62 IN

## 2021-05-03 DIAGNOSIS — D49.6 BRAIN TUMOR (HCC): Primary | ICD-10-CM

## 2021-05-03 DIAGNOSIS — C71.9 BRAIN TUMOR, ASTROCYTOMA (HCC): Primary | ICD-10-CM

## 2021-05-03 PROCEDURE — 99215 OFFICE O/P EST HI 40 MIN: CPT | Performed by: SURGERY

## 2021-05-03 NOTE — PROGRESS NOTES
"Subjective   History of Present Illness: Cindy Cortes is a 55 y.o. female [facility] for the first time on 5/3/2021.  She is seen in follow-up from hospital consultation.  In summary, patient is a 55 y.o. female admitted 4/18/2021 with history of recent onset of seizures.  Patient apparently in her usual state of health he had developed reports of seizure activity  evening before admission. Patient describes having a seizure-like episode 2 years ago where she became confused unaware with abnormal movement of her head.  Apparently this single event lasted seconds to a minute or 2 and afterwards she had residual cloudiness of thought and fatigue.  Apparently a similar episode occurred about 2 months ago.  Yesterday similar event occurred again where she became confuse had a \" weird look\", was unaware poorly responsive for some abnormal movement of her head.  Apparently disease episode occurred perhaps 7 times since last night and with her arrival to and at the ER, prior to her most recent admission..  Apparent abnormal behavioral activity stopped after the administration of Keppra.  Patient does not have a history of alcohol abuse  History of Present Illness    The following portions of the patient's history were reviewed and updated as appropriate: allergies, current medications, past family history, past medical history, past social history, past surgical history and problem list.    Review of Systems   Constitutional: Positive for fatigue.   HENT: Negative.    Eyes: Negative.    Respiratory: Negative.    Cardiovascular: Negative.    Gastrointestinal: Negative.    Musculoskeletal: Negative.    Neurological: Positive for seizures.       Objective     ./72 (BP Location: Left arm, Patient Position: Sitting, Cuff Size: Large Adult)   Pulse 56   Ht 157.5 cm (62\")   Wt (!) 158 kg (349 lb)   LMP  (LMP Unknown)   BMI 63.83 kg/m²    Body mass index is 63.83 kg/m².      Physical Exam  Neurologic " Exam    Physical Exam:                General Appearance:    Alert, cooperative, in no acute distress   Head:    Normocephalic, without obvious abnormality, atraumatic   Eyes:            Lids and lashes normal, PERRLA   Ears:    Ears appear intact with no abnormalities noted   Neck:   No adenopathy, supple, trachea midline   Back:     Range of motion normal   Lungs:     Respirations regular and unlabored   Chest Wall:    No abnormalities observed   Abdomen:     No masses, soft, non-tender, non-distended, no guarding   Extremities:   Moves all extremities well, no edema, no cyanosis, no   redness   Pulses:   Pulses palpable and equal bilaterally   Skin:   No bleeding, bruising or rash            Neurological examination:  Higher Integrative  Function:        Oriented to time, place and person. Normal registration, recall, attention span and concentration. Normal language including comprehension, spontaneous speech, repetition, reading, writing, naming and vocabulary. No neglect with normal visual-spatial function and construction. Normal fund of knowledge and higher integrative function.  CN II:      CN III IV VI:     Extraocular movements are full without nystagmus.   CN V:  Normal facial sensation and strength of muscles of mastication.  CN VII:            Facial movements are symmetric. No weakness.  CN VIII:                                   Auditory acuity is normal.  CN IX & X:                              Symmetric palatal movement.  CN XI: Sternocleidomastoid and trapezius are normal.  No weakness.  CN XII:                                    The tongue is midline.  No atrophy or fasciculations.  Motor: Normal muscle strength, bulk and tone in upper and lower extremities.  No fasciculations, rigidity, spasticity, or abnormal movements.  Reflexes:        1+ in the upper and lower extremities. Plantar responses are flexor.  Sensation:      Normal to light touch, pinprick, and no extinction on  DSS.          Assessment/Plan   Independent Review of Radiographic Studies:      I personally reviewed the images from the following studies.    MRI brain 4/19/2021:  MRI of the brain with and without contrast and CT of the head both obtained on 4/19/2021 have been reviewed.  Large nonenhancing axial/parenchymal lesion infiltrates the majority of the right frontal lobe measuring about 8 x 4 x 4 cm, with eccentric cystic component. Mass effect with 3 mm of shift.  The tumor mass is nonenhancing.     Both DTI imaging and functional MRI have been completed, with expected activations noted in precentral gyrus at the expected finger, tone and toe motor areas with which on the right that finger tapping activation was noted to be approximately 1 gyrus with from lesion.      Medical Decision Making:       EMR and imaging studies have been reviewed.  The patient been seen and examined.  Clinical summary and recommendations have been discussed with patient.       54-year-old female with new onset seizures.  By history the seizures semiology appears consistent with partial complex seizures, due to the presence of the large infiltrative brain mass right frontal region.  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  Surgical resection/excision will be required, with cortical and subcortical mapping and image guidance..  Due to the tumor mass in its vicinity with eloquent cortex, DTI imaging and functional MRI will be required for surgical planning.  Both DTI imaging and functional MRI have been completed, with expected activations noted in precentral gyrus at the expected finger, tone and toe motor areas with which on the right that finger tapping activation was noted to be approximately 1 gyrus with from lesion.    Proposed operation: Image guided craniotomy with right frontal lobe resection with cortical and subcortical mapping with intraoperative utilization of DTI and functional MRI.  Presumed diagnosis is  low-grade glioma.  Recommended for surgical gross total resection for best outcome.  Continued observation will result in worsening seizures and progression of tumor mass potential for transformation to high-grade.  If lower grade glioma potential for most effective surgical benefit would be now.  Risks include failure to cure, reoperation, brain damage, falling seizure, stroke, hemorrhage and infection and death.  Patient freely consents     Procedures     Image guided craniotomy with cortical and subcortical mapping and neuro monitoring, with stereotactic rection of tumor mass.      Surgery scheduled for Thursday 5/6/2021.    This patient was examined wearing appropriate personal protective equipment.            Jluis Felix Jr., MD FAANS, FACS, FICS    Jluis Felix MD  05/03/21  12:51 EDT

## 2021-05-04 ENCOUNTER — LAB (OUTPATIENT)
Dept: LAB | Facility: HOSPITAL | Age: 56
End: 2021-05-04

## 2021-05-04 DIAGNOSIS — D49.6 BRAIN TUMOR (HCC): ICD-10-CM

## 2021-05-04 LAB
ABO GROUP BLD: NORMAL
ANION GAP SERPL CALCULATED.3IONS-SCNC: 9.6 MMOL/L (ref 5–15)
APTT PPP: 20.7 SECONDS (ref 24–31)
BILIRUB UR QL STRIP: NEGATIVE
BLD GP AB SCN SERPL QL: NEGATIVE
BUN SERPL-MCNC: 39 MG/DL (ref 6–20)
BUN/CREAT SERPL: 44.3 (ref 7–25)
CALCIUM SPEC-SCNC: 9.2 MG/DL (ref 8.6–10.5)
CHLORIDE SERPL-SCNC: 95 MMOL/L (ref 98–107)
CLARITY UR: CLEAR
CO2 SERPL-SCNC: 25.4 MMOL/L (ref 22–29)
COLOR UR: YELLOW
CREAT SERPL-MCNC: 0.88 MG/DL (ref 0.57–1)
DEPRECATED RDW RBC AUTO: 47.4 FL (ref 37–54)
ERYTHROCYTE [DISTWIDTH] IN BLOOD BY AUTOMATED COUNT: 14.5 % (ref 12.3–15.4)
GFR SERPL CREATININE-BSD FRML MDRD: 67 ML/MIN/1.73
GLUCOSE SERPL-MCNC: 339 MG/DL (ref 65–99)
GLUCOSE UR STRIP-MCNC: ABNORMAL MG/DL
HCT VFR BLD AUTO: 37.9 % (ref 34–46.6)
HGB BLD-MCNC: 13 G/DL (ref 12–15.9)
HGB UR QL STRIP.AUTO: NEGATIVE
INR PPP: 0.96 (ref 0.93–1.1)
KETONES UR QL STRIP: NEGATIVE
LEUKOCYTE ESTERASE UR QL STRIP.AUTO: NEGATIVE
MCH RBC QN AUTO: 31 PG (ref 26.6–33)
MCHC RBC AUTO-ENTMCNC: 34.3 G/DL (ref 31.5–35.7)
MCV RBC AUTO: 90.2 FL (ref 79–97)
MRSA DNA SPEC QL NAA+PROBE: NORMAL
NITRITE UR QL STRIP: NEGATIVE
PH UR STRIP.AUTO: 6.5 [PH] (ref 5–8)
PLATELET # BLD AUTO: 219 10*3/MM3 (ref 140–450)
PMV BLD AUTO: 10 FL (ref 6–12)
POTASSIUM SERPL-SCNC: 4.6 MMOL/L (ref 3.5–5.2)
PROT UR QL STRIP: NEGATIVE
PROTHROMBIN TIME: 10.6 SECONDS (ref 9.6–11.7)
RBC # BLD AUTO: 4.2 10*6/MM3 (ref 3.77–5.28)
RH BLD: POSITIVE
SODIUM SERPL-SCNC: 130 MMOL/L (ref 136–145)
SP GR UR STRIP: 1.03 (ref 1–1.03)
T&S EXPIRATION DATE: NORMAL
UROBILINOGEN UR QL STRIP: ABNORMAL
WBC # BLD AUTO: 13.75 10*3/MM3 (ref 3.4–10.8)

## 2021-05-04 PROCEDURE — 85730 THROMBOPLASTIN TIME PARTIAL: CPT

## 2021-05-04 PROCEDURE — 86900 BLOOD TYPING SEROLOGIC ABO: CPT

## 2021-05-04 PROCEDURE — 86850 RBC ANTIBODY SCREEN: CPT

## 2021-05-04 PROCEDURE — 81003 URINALYSIS AUTO W/O SCOPE: CPT

## 2021-05-04 PROCEDURE — 86901 BLOOD TYPING SEROLOGIC RH(D): CPT

## 2021-05-04 PROCEDURE — 85027 COMPLETE CBC AUTOMATED: CPT

## 2021-05-04 PROCEDURE — 85610 PROTHROMBIN TIME: CPT

## 2021-05-04 PROCEDURE — 87641 MR-STAPH DNA AMP PROBE: CPT

## 2021-05-04 PROCEDURE — 80048 BASIC METABOLIC PNL TOTAL CA: CPT

## 2021-05-04 PROCEDURE — 36415 COLL VENOUS BLD VENIPUNCTURE: CPT

## 2021-05-05 ENCOUNTER — ANESTHESIA EVENT (OUTPATIENT)
Dept: PERIOP | Facility: HOSPITAL | Age: 56
End: 2021-05-05

## 2021-05-05 NOTE — ANESTHESIA PREPROCEDURE EVALUATION
Anesthesia Evaluation     Patient summary reviewed and Nursing notes reviewed   no history of anesthetic complications:  NPO Solid Status: > 8 hours  NPO Liquid Status: > 8 hours           Airway   Dental      Pulmonary    Cardiovascular     ECG reviewed  Patient on routine beta blocker    (+) hypertension, hyperlipidemia,       Neuro/Psych  (+) seizures, psychiatric history Depression,     GI/Hepatic/Renal/Endo    (+) morbid obesity, hiatal hernia, GERD,  diabetes mellitus,     Musculoskeletal     Abdominal    Substance History      OB/GYN          Other   arthritis, blood dyscrasia anemia,     ROS/Med Hx Other: Hyponatremia, hyperglycemia, pre-DM, brain tumor, low vit D, allergies                Anesthesia Plan    ASA 4     general   total IV anesthesia(Patient identified; pre-operative vital signs, all relevant labs/studies, complete medical/surgical/anesthetic history, full medication list, full allergy list, and NPO status obtained/reviewed; physical assessment performed; anesthetic options, side effects, potential complications, risks, and benefits discussed; questions answered; written anesthesia consent obtained; patient cleared for procedure; anesthesia machine and equipment checked and functioning)  intravenous induction     Anesthetic plan, all risks, benefits, and alternatives have been provided, discussed and informed consent has been obtained with: patient.    Plan discussed with CRNA and CAA.

## 2021-05-06 ENCOUNTER — ANESTHESIA (OUTPATIENT)
Dept: PERIOP | Facility: HOSPITAL | Age: 56
End: 2021-05-06

## 2021-05-06 ENCOUNTER — APPOINTMENT (OUTPATIENT)
Dept: GENERAL RADIOLOGY | Facility: HOSPITAL | Age: 56
End: 2021-05-06

## 2021-05-06 ENCOUNTER — HOSPITAL ENCOUNTER (INPATIENT)
Facility: HOSPITAL | Age: 56
LOS: 7 days | Discharge: HOME HEALTH | End: 2021-05-13
Attending: SURGERY | Admitting: STUDENT IN AN ORGANIZED HEALTH CARE EDUCATION/TRAINING PROGRAM

## 2021-05-06 ENCOUNTER — HOSPITAL ENCOUNTER (INPATIENT)
Dept: GENERAL RADIOLOGY | Facility: HOSPITAL | Age: 56
Discharge: HOME | End: 2021-05-06

## 2021-05-06 DIAGNOSIS — R52 PAIN: ICD-10-CM

## 2021-05-06 DIAGNOSIS — D49.6 BRAIN TUMOR (HCC): ICD-10-CM

## 2021-05-06 DIAGNOSIS — G93.89 BRAIN MASS: Primary | ICD-10-CM

## 2021-05-06 PROBLEM — R73.9 HYPERGLYCEMIA: Status: ACTIVE | Noted: 2021-05-06

## 2021-05-06 PROBLEM — K21.9 GASTROESOPHAGEAL REFLUX DISEASE WITH HIATAL HERNIA: Status: ACTIVE | Noted: 2021-05-06

## 2021-05-06 PROBLEM — E55.9 VITAMIN D DEFICIENCY: Status: ACTIVE | Noted: 2021-05-06

## 2021-05-06 LAB
ANION GAP SERPL CALCULATED.3IONS-SCNC: 11 MMOL/L (ref 5–15)
ANION GAP SERPL CALCULATED.3IONS-SCNC: 13 MMOL/L (ref 5–15)
BASE DEFICIT: ABNORMAL
BASE DEFICIT: ABNORMAL
BASE EXCESS BLDA CALC-SCNC: 0 MMOL/L (ref 0–3)
BASE EXCESS BLDA CALC-SCNC: 1 MMOL/L (ref 0–3)
BASOPHILS # BLD AUTO: 0 10*3/MM3 (ref 0–0.2)
BASOPHILS # BLD AUTO: 0 10*3/MM3 (ref 0–0.2)
BASOPHILS NFR BLD AUTO: 0 % (ref 0–1.5)
BASOPHILS NFR BLD AUTO: 0.2 % (ref 0–1.5)
BUN SERPL-MCNC: 24 MG/DL (ref 6–20)
BUN SERPL-MCNC: 26 MG/DL (ref 6–20)
BUN/CREAT SERPL: 23.5 (ref 7–25)
BUN/CREAT SERPL: 32.1 (ref 7–25)
CA-I BLDA-SCNC: 1.17 MMOL/L (ref 1.12–1.32)
CA-I BLDA-SCNC: 1.19 MMOL/L (ref 1.12–1.32)
CALCIUM SPEC-SCNC: 8.7 MG/DL (ref 8.6–10.5)
CALCIUM SPEC-SCNC: 9 MG/DL (ref 8.6–10.5)
CHLORIDE SERPL-SCNC: 99 MMOL/L (ref 98–107)
CHLORIDE SERPL-SCNC: 99 MMOL/L (ref 98–107)
CO2 BLDA-SCNC: 26 MMOL/L (ref 23–27)
CO2 BLDA-SCNC: 27 MMOL/L (ref 23–27)
CO2 SERPL-SCNC: 21 MMOL/L (ref 22–29)
CO2 SERPL-SCNC: 25 MMOL/L (ref 22–29)
CREAT SERPL-MCNC: 0.81 MG/DL (ref 0.57–1)
CREAT SERPL-MCNC: 1.02 MG/DL (ref 0.57–1)
DEPRECATED RDW RBC AUTO: 51.6 FL (ref 37–54)
DEPRECATED RDW RBC AUTO: 52.5 FL (ref 37–54)
EOSINOPHIL # BLD AUTO: 0 10*3/MM3 (ref 0–0.4)
EOSINOPHIL # BLD AUTO: 0 10*3/MM3 (ref 0–0.4)
EOSINOPHIL NFR BLD AUTO: 0.1 % (ref 0.3–6.2)
EOSINOPHIL NFR BLD AUTO: 0.1 % (ref 0.3–6.2)
ERYTHROCYTE [DISTWIDTH] IN BLOOD BY AUTOMATED COUNT: 16.2 % (ref 12.3–15.4)
ERYTHROCYTE [DISTWIDTH] IN BLOOD BY AUTOMATED COUNT: 16.4 % (ref 12.3–15.4)
GFR SERPL CREATININE-BSD FRML MDRD: 57 ML/MIN/1.73
GFR SERPL CREATININE-BSD FRML MDRD: 74 ML/MIN/1.73
GLUCOSE BLDC GLUCOMTR-MCNC: 137 MG/DL (ref 70–105)
GLUCOSE BLDC GLUCOMTR-MCNC: 256 MG/DL (ref 70–105)
GLUCOSE BLDC GLUCOMTR-MCNC: 270 MG/DL (ref 70–105)
GLUCOSE BLDC GLUCOMTR-MCNC: 273 MG/DL (ref 70–105)
GLUCOSE BLDC GLUCOMTR-MCNC: 294 MG/DL (ref 70–105)
GLUCOSE BLDC GLUCOMTR-MCNC: 317 MG/DL (ref 70–105)
GLUCOSE SERPL-MCNC: 270 MG/DL (ref 65–99)
GLUCOSE SERPL-MCNC: 313 MG/DL (ref 65–99)
HCO3 BLDA-SCNC: 24.5 MMOL/L (ref 22–26)
HCO3 BLDA-SCNC: 26 MMOL/L (ref 22–26)
HCT VFR BLD AUTO: 27.6 % (ref 34–46.6)
HCT VFR BLD AUTO: 31.9 % (ref 34–46.6)
HCT VFR BLDA CALC: 29 % (ref 38–51)
HCT VFR BLDA CALC: 33 % (ref 38–51)
HGB BLD-MCNC: 11 G/DL (ref 12–15.9)
HGB BLD-MCNC: 9.4 G/DL (ref 12–15.9)
HGB BLDA-MCNC: 11.2 G/DL (ref 12–17)
HGB BLDA-MCNC: 9.9 G/DL (ref 12–17)
LYMPHOCYTES # BLD AUTO: 0.5 10*3/MM3 (ref 0.7–3.1)
LYMPHOCYTES # BLD AUTO: 0.5 10*3/MM3 (ref 0.7–3.1)
LYMPHOCYTES NFR BLD AUTO: 4.2 % (ref 19.6–45.3)
LYMPHOCYTES NFR BLD AUTO: 5 % (ref 19.6–45.3)
MCH RBC QN AUTO: 30.5 PG (ref 26.6–33)
MCH RBC QN AUTO: 31.6 PG (ref 26.6–33)
MCHC RBC AUTO-ENTMCNC: 33.9 G/DL (ref 31.5–35.7)
MCHC RBC AUTO-ENTMCNC: 34.5 G/DL (ref 31.5–35.7)
MCV RBC AUTO: 90.2 FL (ref 79–97)
MCV RBC AUTO: 91.5 FL (ref 79–97)
MONOCYTES # BLD AUTO: 0.2 10*3/MM3 (ref 0.1–0.9)
MONOCYTES # BLD AUTO: 0.2 10*3/MM3 (ref 0.1–0.9)
MONOCYTES NFR BLD AUTO: 1.6 % (ref 5–12)
MONOCYTES NFR BLD AUTO: 1.9 % (ref 5–12)
NEUTROPHILS NFR BLD AUTO: 10.2 10*3/MM3 (ref 1.7–7)
NEUTROPHILS NFR BLD AUTO: 11.5 10*3/MM3 (ref 1.7–7)
NEUTROPHILS NFR BLD AUTO: 93 % (ref 42.7–76)
NEUTROPHILS NFR BLD AUTO: 93.9 % (ref 42.7–76)
NRBC BLD AUTO-RTO: 0 /100 WBC (ref 0–0.2)
NRBC BLD AUTO-RTO: 0.1 /100 WBC (ref 0–0.2)
PCO2 BLDA: 40.4 MM HG (ref 35–45)
PCO2 BLDA: 41 MM HG (ref 35–45)
PH BLDA: 7.39 PH UNITS (ref 7.35–7.45)
PH BLDA: 7.41 PH UNITS (ref 7.35–7.45)
PLATELET # BLD AUTO: 151 10*3/MM3 (ref 140–450)
PLATELET # BLD AUTO: 161 10*3/MM3 (ref 140–450)
PMV BLD AUTO: 7.2 FL (ref 6–12)
PMV BLD AUTO: 7.6 FL (ref 6–12)
PO2 BLDA: 274 MM HG (ref 80–105)
PO2 BLDA: 313 MM HG (ref 80–105)
POTASSIUM BLDA-SCNC: 3.6 MMOL/L (ref 3.5–4.9)
POTASSIUM BLDA-SCNC: 3.7 MMOL/L (ref 3.5–4.9)
POTASSIUM SERPL-SCNC: 3.8 MMOL/L (ref 3.5–5.2)
POTASSIUM SERPL-SCNC: 4.5 MMOL/L (ref 3.5–5.2)
RBC # BLD AUTO: 3.06 10*6/MM3 (ref 3.77–5.28)
RBC # BLD AUTO: 3.48 10*6/MM3 (ref 3.77–5.28)
SAO2 % BLDCOA: 100 % (ref 95–98)
SAO2 % BLDCOA: 100 % (ref 95–98)
SODIUM BLD-SCNC: 130 MMOL/L (ref 138–146)
SODIUM BLD-SCNC: 131 MMOL/L (ref 138–146)
SODIUM SERPL-SCNC: 133 MMOL/L (ref 136–145)
SODIUM SERPL-SCNC: 135 MMOL/L (ref 136–145)
WBC # BLD AUTO: 11 10*3/MM3 (ref 3.4–10.8)
WBC # BLD AUTO: 12.2 10*3/MM3 (ref 3.4–10.8)

## 2021-05-06 PROCEDURE — 00B00ZZ EXCISION OF BRAIN, OPEN APPROACH: ICD-10-PCS | Performed by: SURGERY

## 2021-05-06 PROCEDURE — 83036 HEMOGLOBIN GLYCOSYLATED A1C: CPT | Performed by: NURSE PRACTITIONER

## 2021-05-06 PROCEDURE — 25010000002 HEPARIN (PORCINE) 1000-0.9 UT/500ML-% SOLUTION: Performed by: ANESTHESIOLOGY

## 2021-05-06 PROCEDURE — 63710000001 INSULIN LISPRO (HUMAN) PER 5 UNITS: Performed by: ANESTHESIOLOGY

## 2021-05-06 PROCEDURE — 82330 ASSAY OF CALCIUM: CPT

## 2021-05-06 PROCEDURE — C1889 IMPLANT/INSERT DEVICE, NOC: HCPCS | Performed by: SURGERY

## 2021-05-06 PROCEDURE — 25010000002 DEXAMETHASONE PER 1 MG: Performed by: SURGERY

## 2021-05-06 PROCEDURE — 63710000001 INSULIN REGULAR HUMAN PER 5 UNITS: Performed by: ANESTHESIOLOGIST ASSISTANT

## 2021-05-06 PROCEDURE — 82803 BLOOD GASES ANY COMBINATION: CPT

## 2021-05-06 PROCEDURE — 25810000003 SODIUM CHLORIDE 0.9 % WITH KCL 20 MEQ 20-0.9 MEQ/L-% SOLUTION: Performed by: SURGERY

## 2021-05-06 PROCEDURE — 61781 SCAN PROC CRANIAL INTRA: CPT | Performed by: SURGERY

## 2021-05-06 PROCEDURE — 25010000002 ONDANSETRON PER 1 MG: Performed by: ANESTHESIOLOGY

## 2021-05-06 PROCEDURE — 25010000002 PHENYLEPHRINE 10 MG/ML SOLUTION 5 ML VIAL: Performed by: ANESTHESIOLOGIST ASSISTANT

## 2021-05-06 PROCEDURE — 25010000002 LEVETRIRACETAM PER 10 MG: Performed by: SURGERY

## 2021-05-06 PROCEDURE — 80048 BASIC METABOLIC PNL TOTAL CA: CPT | Performed by: SURGERY

## 2021-05-06 PROCEDURE — 25010000002 MORPHINE PER 10 MG: Performed by: SURGERY

## 2021-05-06 PROCEDURE — 84132 ASSAY OF SERUM POTASSIUM: CPT

## 2021-05-06 PROCEDURE — 25010000002 DEXAMETHASONE PER 1 MG: Performed by: ANESTHESIOLOGIST ASSISTANT

## 2021-05-06 PROCEDURE — 25010000002 VANCOMYCIN 1 G RECONSTITUTED SOLUTION 1 EACH VIAL: Performed by: SURGERY

## 2021-05-06 PROCEDURE — 70250 X-RAY EXAM OF SKULL: CPT

## 2021-05-06 PROCEDURE — 69990 MICROSURGERY ADD-ON: CPT | Performed by: SURGERY

## 2021-05-06 PROCEDURE — 61510 CRNEC TREPH EXC BRN TUM STTL: CPT | Performed by: SURGERY

## 2021-05-06 PROCEDURE — C1713 ANCHOR/SCREW BN/BN,TIS/BN: HCPCS | Performed by: SURGERY

## 2021-05-06 PROCEDURE — 63710000001 INSULIN LISPRO (HUMAN) PER 5 UNITS: Performed by: SURGERY

## 2021-05-06 PROCEDURE — 25010000003 LEVETIRACETAM IN NACL 0.54% 1500 MG/100ML SOLUTION: Performed by: ANESTHESIOLOGIST ASSISTANT

## 2021-05-06 PROCEDURE — 85025 COMPLETE CBC W/AUTO DIFF WBC: CPT | Performed by: SURGERY

## 2021-05-06 PROCEDURE — 25010000002 NEOSTIGMINE 5 MG/5ML SOLUTION: Performed by: ANESTHESIOLOGY

## 2021-05-06 PROCEDURE — 84295 ASSAY OF SERUM SODIUM: CPT

## 2021-05-06 PROCEDURE — P9041 ALBUMIN (HUMAN),5%, 50ML: HCPCS | Performed by: ANESTHESIOLOGIST ASSISTANT

## 2021-05-06 PROCEDURE — 25010000002 ALBUMIN HUMAN 5% PER 50 ML: Performed by: ANESTHESIOLOGIST ASSISTANT

## 2021-05-06 PROCEDURE — 80048 BASIC METABOLIC PNL TOTAL CA: CPT | Performed by: ANESTHESIOLOGY

## 2021-05-06 PROCEDURE — 88307 TISSUE EXAM BY PATHOLOGIST: CPT | Performed by: SURGERY

## 2021-05-06 PROCEDURE — 82962 GLUCOSE BLOOD TEST: CPT

## 2021-05-06 PROCEDURE — 25010000002 VANCOMYCIN 1 G RECONSTITUTED SOLUTION: Performed by: SURGERY

## 2021-05-06 PROCEDURE — 25010000002 FENTANYL CITRATE (PF) 100 MCG/2ML SOLUTION: Performed by: ANESTHESIOLOGIST ASSISTANT

## 2021-05-06 PROCEDURE — 82947 ASSAY GLUCOSE BLOOD QUANT: CPT

## 2021-05-06 PROCEDURE — 25010000002 PROPOFOL 200 MG/20ML EMULSION: Performed by: ANESTHESIOLOGIST ASSISTANT

## 2021-05-06 PROCEDURE — 85014 HEMATOCRIT: CPT

## 2021-05-06 PROCEDURE — 25010000002 HYDROMORPHONE PER 4 MG: Performed by: ANESTHESIOLOGIST ASSISTANT

## 2021-05-06 PROCEDURE — 88331 PATH CONSLTJ SURG 1 BLK 1SPC: CPT | Performed by: SURGERY

## 2021-05-06 PROCEDURE — 25010000002 ONDANSETRON PER 1 MG: Performed by: SURGERY

## 2021-05-06 PROCEDURE — 25010000002 PROPOFOL 10 MG/ML EMULSION: Performed by: ANESTHESIOLOGIST ASSISTANT

## 2021-05-06 DEVICE — GELFOAM PLUS IS AN IMPLANTABLE HEMOSTAT SUPPLIED AS A READY TO USE MEDICAL DEVICE KIT CONTAINING GELFOAM® STERILE SPONGE, THROMBIN HUMAN LYOPHILIZED POWDER, TWO 10 ML PREFILLED SALINE SYRINGES 0.9 PERCENT SODIUM CHLORIDE INJECTION USP, AND A VIAL ACCESS DEVICE
Type: IMPLANTABLE DEVICE | Site: CRANIAL | Status: FUNCTIONAL
Brand: GELFOAM PLUS

## 2021-05-06 DEVICE — DURAGEN® SUTURABLE DURAL REGENERATION MATRIX, 4 IN X 5 IN (10 CM X 12.5 CM)
Type: IMPLANTABLE DEVICE | Site: CRANIAL | Status: FUNCTIONAL
Brand: DURAGEN® SUTURABLE

## 2021-05-06 DEVICE — BURR HOLE COVER, WITH TAB, 10MM
Type: IMPLANTABLE DEVICE | Site: CRANIAL | Status: FUNCTIONAL
Brand: UNIVERSAL NEURO 3

## 2021-05-06 DEVICE — SCREW, AXS, SELF-DRILLING
Type: IMPLANTABLE DEVICE | Site: CRANIAL | Status: FUNCTIONAL
Brand: UNIVERSAL NEURO 3

## 2021-05-06 DEVICE — ABSORBABLE HEMOSTAT (OXIDIZED REGENERATED CELLULOSE, U.S.P.)
Type: IMPLANTABLE DEVICE | Site: CRANIAL | Status: FUNCTIONAL
Brand: SURGICEL

## 2021-05-06 DEVICE — HEMOST ABS SURGIFOAM SZ100 8X12 10MM: Type: IMPLANTABLE DEVICE | Site: CRANIAL | Status: FUNCTIONAL

## 2021-05-06 DEVICE — FLOSEAL HEMOSTATIC MATRIX, 10ML
Type: IMPLANTABLE DEVICE | Site: CRANIAL | Status: FUNCTIONAL
Brand: FLOSEAL HEMOSTATIC MATRIX

## 2021-05-06 RX ORDER — CEFAZOLIN SODIUM IN 0.9 % NACL 3 G/100 ML
3 INTRAVENOUS SOLUTION, PIGGYBACK (ML) INTRAVENOUS ONCE
Status: COMPLETED | OUTPATIENT
Start: 2021-05-06 | End: 2021-05-06

## 2021-05-06 RX ORDER — MORPHINE SULFATE 4 MG/ML
2 INJECTION, SOLUTION INTRAMUSCULAR; INTRAVENOUS EVERY 4 HOURS PRN
Status: DISCONTINUED | OUTPATIENT
Start: 2021-05-06 | End: 2021-05-13 | Stop reason: HOSPADM

## 2021-05-06 RX ORDER — HYDROMORPHONE HCL 110MG/55ML
0.5 PATIENT CONTROLLED ANALGESIA SYRINGE INTRAVENOUS
Status: DISCONTINUED | OUTPATIENT
Start: 2021-05-06 | End: 2021-05-06 | Stop reason: HOSPADM

## 2021-05-06 RX ORDER — HEPARIN SODIUM 200 [USP'U]/100ML
INJECTION, SOLUTION INTRAVENOUS
Status: COMPLETED | OUTPATIENT
Start: 2021-05-06 | End: 2021-05-06

## 2021-05-06 RX ORDER — LABETALOL HYDROCHLORIDE 5 MG/ML
5 INJECTION, SOLUTION INTRAVENOUS
Status: DISCONTINUED | OUTPATIENT
Start: 2021-05-06 | End: 2021-05-06 | Stop reason: HOSPADM

## 2021-05-06 RX ORDER — SODIUM CHLORIDE 0.9 % (FLUSH) 0.9 %
10 SYRINGE (ML) INJECTION AS NEEDED
Status: DISCONTINUED | OUTPATIENT
Start: 2021-05-06 | End: 2021-05-13 | Stop reason: HOSPADM

## 2021-05-06 RX ORDER — SODIUM CHLORIDE, SODIUM LACTATE, POTASSIUM CHLORIDE, CALCIUM CHLORIDE 600; 310; 30; 20 MG/100ML; MG/100ML; MG/100ML; MG/100ML
INJECTION, SOLUTION INTRAVENOUS CONTINUOUS PRN
Status: DISCONTINUED | OUTPATIENT
Start: 2021-05-06 | End: 2021-05-06 | Stop reason: SURG

## 2021-05-06 RX ORDER — INSULIN LISPRO 100 [IU]/ML
0-9 INJECTION, SOLUTION INTRAVENOUS; SUBCUTANEOUS AS NEEDED
Status: DISCONTINUED | OUTPATIENT
Start: 2021-05-06 | End: 2021-05-07

## 2021-05-06 RX ORDER — NICARDIPINE HYDROCHLORIDE 2.5 MG/ML
INJECTION INTRAVENOUS AS NEEDED
Status: DISCONTINUED | OUTPATIENT
Start: 2021-05-06 | End: 2021-05-06 | Stop reason: SURG

## 2021-05-06 RX ORDER — AMLODIPINE BESYLATE 5 MG/1
5 TABLET ORAL DAILY
Status: DISCONTINUED | OUTPATIENT
Start: 2021-05-07 | End: 2021-05-07

## 2021-05-06 RX ORDER — PHENYLEPHRINE HCL IN 0.9% NACL 1 MG/10 ML
SYRINGE (ML) INTRAVENOUS AS NEEDED
Status: DISCONTINUED | OUTPATIENT
Start: 2021-05-06 | End: 2021-05-06 | Stop reason: SURG

## 2021-05-06 RX ORDER — DEXTROSE MONOHYDRATE 25 G/50ML
25 INJECTION, SOLUTION INTRAVENOUS
Status: DISCONTINUED | OUTPATIENT
Start: 2021-05-06 | End: 2021-05-13 | Stop reason: HOSPADM

## 2021-05-06 RX ORDER — SODIUM CHLORIDE 9 MG/ML
INJECTION, SOLUTION INTRAVENOUS CONTINUOUS PRN
Status: DISCONTINUED | OUTPATIENT
Start: 2021-05-06 | End: 2021-05-06 | Stop reason: SURG

## 2021-05-06 RX ORDER — LEVETIRACETAM 15 MG/ML
1500 INJECTION INTRAVASCULAR ONCE
Status: COMPLETED | OUTPATIENT
Start: 2021-05-06 | End: 2021-05-06

## 2021-05-06 RX ORDER — IPRATROPIUM BROMIDE AND ALBUTEROL SULFATE 2.5; .5 MG/3ML; MG/3ML
3 SOLUTION RESPIRATORY (INHALATION) ONCE AS NEEDED
Status: DISCONTINUED | OUTPATIENT
Start: 2021-05-06 | End: 2021-05-06 | Stop reason: HOSPADM

## 2021-05-06 RX ORDER — FENTANYL CITRATE 50 UG/ML
INJECTION, SOLUTION INTRAMUSCULAR; INTRAVENOUS AS NEEDED
Status: DISCONTINUED | OUTPATIENT
Start: 2021-05-06 | End: 2021-05-06 | Stop reason: SURG

## 2021-05-06 RX ORDER — ALBUMIN, HUMAN INJ 5% 5 %
SOLUTION INTRAVENOUS CONTINUOUS PRN
Status: DISCONTINUED | OUTPATIENT
Start: 2021-05-06 | End: 2021-05-06 | Stop reason: SURG

## 2021-05-06 RX ORDER — PROPOFOL 10 MG/ML
INJECTION, EMULSION INTRAVENOUS AS NEEDED
Status: DISCONTINUED | OUTPATIENT
Start: 2021-05-06 | End: 2021-05-06 | Stop reason: SURG

## 2021-05-06 RX ORDER — FLUMAZENIL 0.1 MG/ML
0.1 INJECTION INTRAVENOUS AS NEEDED
Status: DISCONTINUED | OUTPATIENT
Start: 2021-05-06 | End: 2021-05-06 | Stop reason: HOSPADM

## 2021-05-06 RX ORDER — EPHEDRINE SULFATE 50 MG/ML
5 INJECTION, SOLUTION INTRAVENOUS ONCE AS NEEDED
Status: DISCONTINUED | OUTPATIENT
Start: 2021-05-06 | End: 2021-05-06 | Stop reason: HOSPADM

## 2021-05-06 RX ORDER — SODIUM CHLORIDE AND POTASSIUM CHLORIDE 150; 900 MG/100ML; MG/100ML
75 INJECTION, SOLUTION INTRAVENOUS CONTINUOUS
Status: DISCONTINUED | OUTPATIENT
Start: 2021-05-06 | End: 2021-05-13 | Stop reason: HOSPADM

## 2021-05-06 RX ORDER — DROPERIDOL 2.5 MG/ML
0.62 INJECTION, SOLUTION INTRAMUSCULAR; INTRAVENOUS ONCE AS NEEDED
Status: DISCONTINUED | OUTPATIENT
Start: 2021-05-06 | End: 2021-05-06 | Stop reason: HOSPADM

## 2021-05-06 RX ORDER — ONDANSETRON 2 MG/ML
4 INJECTION INTRAMUSCULAR; INTRAVENOUS EVERY 6 HOURS PRN
Status: DISCONTINUED | OUTPATIENT
Start: 2021-05-06 | End: 2021-05-13 | Stop reason: HOSPADM

## 2021-05-06 RX ORDER — MIDAZOLAM HYDROCHLORIDE 1 MG/ML
1 INJECTION INTRAMUSCULAR; INTRAVENOUS
Status: DISCONTINUED | OUTPATIENT
Start: 2021-05-06 | End: 2021-05-06 | Stop reason: HOSPADM

## 2021-05-06 RX ORDER — NEOSTIGMINE METHYLSULFATE 5 MG/5 ML
SYRINGE (ML) INTRAVENOUS AS NEEDED
Status: DISCONTINUED | OUTPATIENT
Start: 2021-05-06 | End: 2021-05-06 | Stop reason: SURG

## 2021-05-06 RX ORDER — PANTOPRAZOLE SODIUM 40 MG/1
40 TABLET, DELAYED RELEASE ORAL DAILY
Status: DISCONTINUED | OUTPATIENT
Start: 2021-05-07 | End: 2021-05-13 | Stop reason: HOSPADM

## 2021-05-06 RX ORDER — INSULIN LISPRO 100 [IU]/ML
0-7 INJECTION, SOLUTION INTRAVENOUS; SUBCUTANEOUS AS NEEDED
Status: DISCONTINUED | OUTPATIENT
Start: 2021-05-06 | End: 2021-05-06 | Stop reason: HOSPADM

## 2021-05-06 RX ORDER — NALOXONE HCL 0.4 MG/ML
0.4 VIAL (ML) INJECTION AS NEEDED
Status: DISCONTINUED | OUTPATIENT
Start: 2021-05-06 | End: 2021-05-06 | Stop reason: HOSPADM

## 2021-05-06 RX ORDER — ONDANSETRON 4 MG/1
4 TABLET, FILM COATED ORAL EVERY 6 HOURS PRN
Status: DISCONTINUED | OUTPATIENT
Start: 2021-05-06 | End: 2021-05-13 | Stop reason: HOSPADM

## 2021-05-06 RX ORDER — INSULIN LISPRO 100 [IU]/ML
0-9 INJECTION, SOLUTION INTRAVENOUS; SUBCUTANEOUS AS NEEDED
Status: DISCONTINUED | OUTPATIENT
Start: 2021-05-06 | End: 2021-05-06

## 2021-05-06 RX ORDER — SODIUM CHLORIDE, SODIUM LACTATE, POTASSIUM CHLORIDE, CALCIUM CHLORIDE 600; 310; 30; 20 MG/100ML; MG/100ML; MG/100ML; MG/100ML
100 INJECTION, SOLUTION INTRAVENOUS CONTINUOUS
Status: DISCONTINUED | OUTPATIENT
Start: 2021-05-06 | End: 2021-05-06

## 2021-05-06 RX ORDER — NICOTINE POLACRILEX 4 MG
15 LOZENGE BUCCAL
Status: DISCONTINUED | OUTPATIENT
Start: 2021-05-06 | End: 2021-05-10

## 2021-05-06 RX ORDER — DEXTROSE MONOHYDRATE 25 G/50ML
25 INJECTION, SOLUTION INTRAVENOUS
Status: DISCONTINUED | OUTPATIENT
Start: 2021-05-06 | End: 2021-05-06 | Stop reason: HOSPADM

## 2021-05-06 RX ORDER — ONDANSETRON 2 MG/ML
INJECTION INTRAMUSCULAR; INTRAVENOUS AS NEEDED
Status: DISCONTINUED | OUTPATIENT
Start: 2021-05-06 | End: 2021-05-06 | Stop reason: SURG

## 2021-05-06 RX ORDER — LIDOCAINE HYDROCHLORIDE AND EPINEPHRINE 10; 10 MG/ML; UG/ML
INJECTION, SOLUTION INFILTRATION; PERINEURAL AS NEEDED
Status: DISCONTINUED | OUTPATIENT
Start: 2021-05-06 | End: 2021-05-06 | Stop reason: HOSPADM

## 2021-05-06 RX ORDER — VANCOMYCIN HYDROCHLORIDE 1 G/20ML
INJECTION, POWDER, LYOPHILIZED, FOR SOLUTION INTRAVENOUS AS NEEDED
Status: DISCONTINUED | OUTPATIENT
Start: 2021-05-06 | End: 2021-05-06 | Stop reason: HOSPADM

## 2021-05-06 RX ORDER — ACETAMINOPHEN 650 MG/1
650 SUPPOSITORY RECTAL EVERY 4 HOURS PRN
Status: DISCONTINUED | OUTPATIENT
Start: 2021-05-06 | End: 2021-05-13 | Stop reason: HOSPADM

## 2021-05-06 RX ORDER — INSULIN LISPRO 100 [IU]/ML
0-9 INJECTION, SOLUTION INTRAVENOUS; SUBCUTANEOUS EVERY 6 HOURS SCHEDULED
Status: DISCONTINUED | OUTPATIENT
Start: 2021-05-07 | End: 2021-05-07

## 2021-05-06 RX ORDER — DEXAMETHASONE SODIUM PHOSPHATE 4 MG/ML
4 INJECTION, SOLUTION INTRA-ARTICULAR; INTRALESIONAL; INTRAMUSCULAR; INTRAVENOUS; SOFT TISSUE EVERY 6 HOURS
Status: DISCONTINUED | OUTPATIENT
Start: 2021-05-06 | End: 2021-05-07

## 2021-05-06 RX ORDER — MANNITOL 20 G/100ML
INJECTION, SOLUTION INTRAVENOUS CONTINUOUS PRN
Status: DISCONTINUED | OUTPATIENT
Start: 2021-05-06 | End: 2021-05-06 | Stop reason: SURG

## 2021-05-06 RX ORDER — MEPERIDINE HYDROCHLORIDE 25 MG/ML
12.5 INJECTION INTRAMUSCULAR; INTRAVENOUS; SUBCUTANEOUS
Status: DISCONTINUED | OUTPATIENT
Start: 2021-05-06 | End: 2021-05-06 | Stop reason: HOSPADM

## 2021-05-06 RX ORDER — HYDROCODONE BITARTRATE AND ACETAMINOPHEN 7.5; 325 MG/1; MG/1
1 TABLET ORAL EVERY 4 HOURS PRN
Status: DISCONTINUED | OUTPATIENT
Start: 2021-05-06 | End: 2021-05-13 | Stop reason: HOSPADM

## 2021-05-06 RX ORDER — ACETAMINOPHEN 325 MG/1
650 TABLET ORAL EVERY 4 HOURS PRN
Status: DISCONTINUED | OUTPATIENT
Start: 2021-05-06 | End: 2021-05-06 | Stop reason: SDUPTHER

## 2021-05-06 RX ORDER — MAGNESIUM HYDROXIDE 1200 MG/15ML
LIQUID ORAL AS NEEDED
Status: DISCONTINUED | OUTPATIENT
Start: 2021-05-06 | End: 2021-05-06 | Stop reason: HOSPADM

## 2021-05-06 RX ORDER — NITROGLYCERIN 0.4 MG/1
0.4 TABLET SUBLINGUAL
Status: DISCONTINUED | OUTPATIENT
Start: 2021-05-06 | End: 2021-05-13 | Stop reason: HOSPADM

## 2021-05-06 RX ORDER — GLYCOPYRROLATE 1 MG/5 ML
SYRINGE (ML) INTRAVENOUS AS NEEDED
Status: DISCONTINUED | OUTPATIENT
Start: 2021-05-06 | End: 2021-05-06 | Stop reason: SURG

## 2021-05-06 RX ORDER — EPHEDRINE SULFATE 50 MG/ML
INJECTION INTRAVENOUS AS NEEDED
Status: DISCONTINUED | OUTPATIENT
Start: 2021-05-06 | End: 2021-05-06 | Stop reason: SURG

## 2021-05-06 RX ORDER — IPRATROPIUM BROMIDE AND ALBUTEROL SULFATE 2.5; .5 MG/3ML; MG/3ML
3 SOLUTION RESPIRATORY (INHALATION)
Status: DISCONTINUED | OUTPATIENT
Start: 2021-05-06 | End: 2021-05-13 | Stop reason: HOSPADM

## 2021-05-06 RX ORDER — INSULIN LISPRO 100 [IU]/ML
0-7 INJECTION, SOLUTION INTRAVENOUS; SUBCUTANEOUS AS NEEDED
Status: DISCONTINUED | OUTPATIENT
Start: 2021-05-06 | End: 2021-05-06 | Stop reason: SDUPTHER

## 2021-05-06 RX ORDER — VENLAFAXINE HYDROCHLORIDE 75 MG/1
75 CAPSULE, EXTENDED RELEASE ORAL DAILY
Status: DISCONTINUED | OUTPATIENT
Start: 2021-05-07 | End: 2021-05-13 | Stop reason: HOSPADM

## 2021-05-06 RX ORDER — NICOTINE POLACRILEX 4 MG
15 LOZENGE BUCCAL
Status: DISCONTINUED | OUTPATIENT
Start: 2021-05-06 | End: 2021-05-06 | Stop reason: HOSPADM

## 2021-05-06 RX ORDER — INSULIN LISPRO 100 [IU]/ML
0-9 INJECTION, SOLUTION INTRAVENOUS; SUBCUTANEOUS
Status: DISCONTINUED | OUTPATIENT
Start: 2021-05-06 | End: 2021-05-06

## 2021-05-06 RX ORDER — NALOXONE HCL 0.4 MG/ML
0.4 VIAL (ML) INJECTION
Status: DISCONTINUED | OUTPATIENT
Start: 2021-05-06 | End: 2021-05-13 | Stop reason: HOSPADM

## 2021-05-06 RX ORDER — ACETAMINOPHEN 325 MG/1
650 TABLET ORAL ONCE AS NEEDED
Status: DISCONTINUED | OUTPATIENT
Start: 2021-05-06 | End: 2021-05-06 | Stop reason: HOSPADM

## 2021-05-06 RX ORDER — INSULIN LISPRO 100 [IU]/ML
0-7 INJECTION, SOLUTION INTRAVENOUS; SUBCUTANEOUS ONCE
Status: COMPLETED | OUTPATIENT
Start: 2021-05-06 | End: 2021-05-06

## 2021-05-06 RX ORDER — ROCURONIUM BROMIDE 10 MG/ML
INJECTION, SOLUTION INTRAVENOUS AS NEEDED
Status: DISCONTINUED | OUTPATIENT
Start: 2021-05-06 | End: 2021-05-06 | Stop reason: SURG

## 2021-05-06 RX ORDER — METOPROLOL TARTRATE 5 MG/5ML
5 INJECTION INTRAVENOUS EVERY 6 HOURS SCHEDULED
Status: DISCONTINUED | OUTPATIENT
Start: 2021-05-06 | End: 2021-05-08

## 2021-05-06 RX ORDER — SODIUM CHLORIDE 0.9 % (FLUSH) 0.9 %
10 SYRINGE (ML) INJECTION EVERY 12 HOURS SCHEDULED
Status: DISCONTINUED | OUTPATIENT
Start: 2021-05-06 | End: 2021-05-13 | Stop reason: HOSPADM

## 2021-05-06 RX ORDER — ALUMINA, MAGNESIA, AND SIMETHICONE 2400; 2400; 240 MG/30ML; MG/30ML; MG/30ML
15 SUSPENSION ORAL EVERY 6 HOURS PRN
Status: DISCONTINUED | OUTPATIENT
Start: 2021-05-06 | End: 2021-05-13 | Stop reason: HOSPADM

## 2021-05-06 RX ORDER — ACETAMINOPHEN 650 MG/1
650 SUPPOSITORY RECTAL ONCE AS NEEDED
Status: DISCONTINUED | OUTPATIENT
Start: 2021-05-06 | End: 2021-05-06 | Stop reason: HOSPADM

## 2021-05-06 RX ORDER — PHENYLEPHRINE HCL IN 0.9% NACL 0.5 MG/5ML
.5-3 SYRINGE (ML) INTRAVENOUS CONTINUOUS PRN
Status: DISCONTINUED | OUTPATIENT
Start: 2021-05-06 | End: 2021-05-06 | Stop reason: HOSPADM

## 2021-05-06 RX ORDER — ONDANSETRON 2 MG/ML
4 INJECTION INTRAMUSCULAR; INTRAVENOUS EVERY 6 HOURS PRN
Status: DISCONTINUED | OUTPATIENT
Start: 2021-05-06 | End: 2021-05-06 | Stop reason: SDUPTHER

## 2021-05-06 RX ORDER — DEXAMETHASONE SODIUM PHOSPHATE 4 MG/ML
INJECTION, SOLUTION INTRA-ARTICULAR; INTRALESIONAL; INTRAMUSCULAR; INTRAVENOUS; SOFT TISSUE AS NEEDED
Status: DISCONTINUED | OUTPATIENT
Start: 2021-05-06 | End: 2021-05-06 | Stop reason: SURG

## 2021-05-06 RX ORDER — DOCUSATE SODIUM 100 MG/1
100 CAPSULE, LIQUID FILLED ORAL 2 TIMES DAILY PRN
Status: DISCONTINUED | OUTPATIENT
Start: 2021-05-06 | End: 2021-05-13 | Stop reason: HOSPADM

## 2021-05-06 RX ORDER — DIPHENHYDRAMINE HYDROCHLORIDE 50 MG/ML
12.5 INJECTION INTRAMUSCULAR; INTRAVENOUS
Status: DISCONTINUED | OUTPATIENT
Start: 2021-05-06 | End: 2021-05-06 | Stop reason: HOSPADM

## 2021-05-06 RX ORDER — HYDROMORPHONE HCL 110MG/55ML
0.25 PATIENT CONTROLLED ANALGESIA SYRINGE INTRAVENOUS
Status: DISCONTINUED | OUTPATIENT
Start: 2021-05-06 | End: 2021-05-06 | Stop reason: HOSPADM

## 2021-05-06 RX ORDER — ACETAMINOPHEN 650 MG/1
650 SUPPOSITORY RECTAL EVERY 4 HOURS PRN
Status: DISCONTINUED | OUTPATIENT
Start: 2021-05-06 | End: 2021-05-06 | Stop reason: SDUPTHER

## 2021-05-06 RX ORDER — INSULIN LISPRO 100 [IU]/ML
0-7 INJECTION, SOLUTION INTRAVENOUS; SUBCUTANEOUS EVERY 6 HOURS SCHEDULED
Status: DISCONTINUED | OUTPATIENT
Start: 2021-05-07 | End: 2021-05-06 | Stop reason: SDUPTHER

## 2021-05-06 RX ORDER — ACETAMINOPHEN 325 MG/1
650 TABLET ORAL EVERY 4 HOURS PRN
Status: DISCONTINUED | OUTPATIENT
Start: 2021-05-06 | End: 2021-05-13 | Stop reason: HOSPADM

## 2021-05-06 RX ORDER — LEVETIRACETAM 10 MG/ML
1000 INJECTION INTRAVASCULAR ONCE
Status: DISCONTINUED | OUTPATIENT
Start: 2021-05-06 | End: 2021-05-06

## 2021-05-06 RX ADMIN — INSULIN HUMAN 6 UNITS: 100 INJECTION, SOLUTION PARENTERAL at 11:48

## 2021-05-06 RX ADMIN — NICARDIPINE HYDROCHLORIDE 0.25 MG: 2.5 INJECTION INTRAVENOUS at 17:31

## 2021-05-06 RX ADMIN — Medication 200 MCG: at 08:32

## 2021-05-06 RX ADMIN — ROCURONIUM BROMIDE 50 MG: 10 INJECTION INTRAVENOUS at 08:24

## 2021-05-06 RX ADMIN — PHENYLEPHRINE HYDROCHLORIDE 0.5 MCG/KG/MIN: 10 INJECTION INTRAVENOUS at 09:17

## 2021-05-06 RX ADMIN — SODIUM CHLORIDE, SODIUM LACTATE, POTASSIUM CHLORIDE, AND CALCIUM CHLORIDE: .6; .31; .03; .02 INJECTION, SOLUTION INTRAVENOUS at 13:15

## 2021-05-06 RX ADMIN — PROPOFOL 50 MG: 10 INJECTION, EMULSION INTRAVENOUS at 12:55

## 2021-05-06 RX ADMIN — CEFAZOLIN SODIUM 2 G: 1 INJECTION, POWDER, FOR SOLUTION INTRAMUSCULAR; INTRAVENOUS at 12:26

## 2021-05-06 RX ADMIN — INSULIN LISPRO 4 UNITS: 100 INJECTION, SOLUTION INTRAVENOUS; SUBCUTANEOUS at 07:20

## 2021-05-06 RX ADMIN — ONDANSETRON 4 MG: 2 INJECTION INTRAMUSCULAR; INTRAVENOUS at 16:17

## 2021-05-06 RX ADMIN — ALBUMIN HUMAN: 0.05 INJECTION, SOLUTION INTRAVENOUS at 14:42

## 2021-05-06 RX ADMIN — Medication 200 MCG: at 08:58

## 2021-05-06 RX ADMIN — LEVETIRACETAM 750 MG: 100 INJECTION, SOLUTION INTRAVENOUS at 22:35

## 2021-05-06 RX ADMIN — PROPOFOL 120 MCG/KG/MIN: 10 INJECTION, EMULSION INTRAVENOUS at 11:10

## 2021-05-06 RX ADMIN — SODIUM CHLORIDE 10 MG/HR: 9 INJECTION, SOLUTION INTRAVENOUS at 21:36

## 2021-05-06 RX ADMIN — PROPOFOL 50 MG: 10 INJECTION, EMULSION INTRAVENOUS at 09:49

## 2021-05-06 RX ADMIN — SODIUM CHLORIDE 5 MG/HR: 9 INJECTION, SOLUTION INTRAVENOUS at 17:53

## 2021-05-06 RX ADMIN — HYDROMORPHONE HYDROCHLORIDE 0.5 MG: 2 INJECTION, SOLUTION INTRAMUSCULAR; INTRAVENOUS; SUBCUTANEOUS at 17:48

## 2021-05-06 RX ADMIN — EPHEDRINE SULFATE 15 MG: 50 INJECTION INTRAVENOUS at 09:09

## 2021-05-06 RX ADMIN — PROPOFOL 200 MG: 10 INJECTION, EMULSION INTRAVENOUS at 08:24

## 2021-05-06 RX ADMIN — MORPHINE SULFATE 2 MG: 4 INJECTION INTRAVENOUS at 19:29

## 2021-05-06 RX ADMIN — SODIUM CHLORIDE 15 MG/HR: 9 INJECTION, SOLUTION INTRAVENOUS at 23:53

## 2021-05-06 RX ADMIN — NICARDIPINE HYDROCHLORIDE 0.25 MG: 2.5 INJECTION INTRAVENOUS at 17:24

## 2021-05-06 RX ADMIN — SODIUM CHLORIDE, SODIUM LACTATE, POTASSIUM CHLORIDE, AND CALCIUM CHLORIDE: .6; .31; .03; .02 INJECTION, SOLUTION INTRAVENOUS at 13:07

## 2021-05-06 RX ADMIN — INSULIN HUMAN 10 UNITS: 100 INJECTION, SOLUTION PARENTERAL at 15:29

## 2021-05-06 RX ADMIN — FENTANYL CITRATE 100 MCG: 50 INJECTION, SOLUTION INTRAMUSCULAR; INTRAVENOUS at 08:22

## 2021-05-06 RX ADMIN — PROPOFOL 110 MCG/KG/MIN: 10 INJECTION, EMULSION INTRAVENOUS at 15:30

## 2021-05-06 RX ADMIN — NICARDIPINE HYDROCHLORIDE 1 MG: 2.5 INJECTION INTRAVENOUS at 16:53

## 2021-05-06 RX ADMIN — PROPOFOL 120 MCG/KG/MIN: 10 INJECTION, EMULSION INTRAVENOUS at 14:00

## 2021-05-06 RX ADMIN — DEXAMETHASONE SODIUM PHOSPHATE 4 MG: 4 INJECTION, SOLUTION INTRAMUSCULAR; INTRAVENOUS at 20:35

## 2021-05-06 RX ADMIN — NICARDIPINE HYDROCHLORIDE 1 MG: 2.5 INJECTION INTRAVENOUS at 16:50

## 2021-05-06 RX ADMIN — Medication 5 MG: at 16:39

## 2021-05-06 RX ADMIN — HEPARIN SODIUM IN SODIUM CHLORIDE 1000 UNITS: 200 INJECTION INTRAVENOUS at 09:36

## 2021-05-06 RX ADMIN — PROPOFOL 100 MG: 10 INJECTION, EMULSION INTRAVENOUS at 09:19

## 2021-05-06 RX ADMIN — REMIFENTANIL HYDROCHLORIDE 0.16 MCG/KG/MIN: 1 INJECTION, POWDER, LYOPHILIZED, FOR SOLUTION INTRAVENOUS at 08:24

## 2021-05-06 RX ADMIN — CEFAZOLIN 3 G: 1 INJECTION, POWDER, FOR SOLUTION INTRAMUSCULAR; INTRAVENOUS; PARENTERAL at 08:26

## 2021-05-06 RX ADMIN — Medication 200 MCG: at 09:04

## 2021-05-06 RX ADMIN — Medication 200 MCG: at 08:37

## 2021-05-06 RX ADMIN — ALBUMIN HUMAN: 0.05 INJECTION, SOLUTION INTRAVENOUS at 14:08

## 2021-05-06 RX ADMIN — MANNITOL: 20 INJECTION, SOLUTION INTRAVENOUS at 12:08

## 2021-05-06 RX ADMIN — SODIUM CHLORIDE: 0.9 INJECTION, SOLUTION INTRAVENOUS at 09:15

## 2021-05-06 RX ADMIN — METOPROLOL TARTRATE 5 MG: 5 INJECTION INTRAVENOUS at 22:34

## 2021-05-06 RX ADMIN — Medication 200 MCG: at 08:43

## 2021-05-06 RX ADMIN — PROPOFOL 120 MCG/KG/MIN: 10 INJECTION, EMULSION INTRAVENOUS at 08:24

## 2021-05-06 RX ADMIN — SODIUM CHLORIDE, SODIUM LACTATE, POTASSIUM CHLORIDE, AND CALCIUM CHLORIDE: .6; .31; .03; .02 INJECTION, SOLUTION INTRAVENOUS at 08:16

## 2021-05-06 RX ADMIN — CEFAZOLIN SODIUM 2 G: 1 INJECTION, POWDER, FOR SOLUTION INTRAMUSCULAR; INTRAVENOUS at 16:34

## 2021-05-06 RX ADMIN — Medication 0.6 MG: at 16:39

## 2021-05-06 RX ADMIN — NICARDIPINE HYDROCHLORIDE 0.25 MG: 2.5 INJECTION INTRAVENOUS at 17:42

## 2021-05-06 RX ADMIN — MORPHINE SULFATE 2 MG: 4 INJECTION INTRAVENOUS at 23:40

## 2021-05-06 RX ADMIN — DEXAMETHASONE SODIUM PHOSPHATE 10 MG: 4 INJECTION, SOLUTION INTRAMUSCULAR; INTRAVENOUS at 09:41

## 2021-05-06 RX ADMIN — LEVETIRACETAM 1500 MG: 15 INJECTION INTRAVENOUS at 10:12

## 2021-05-06 RX ADMIN — ONDANSETRON 4 MG: 2 INJECTION INTRAMUSCULAR; INTRAVENOUS at 19:29

## 2021-05-06 RX ADMIN — INSULIN LISPRO 5 UNITS: 100 INJECTION, SOLUTION INTRAVENOUS; SUBCUTANEOUS at 20:41

## 2021-05-06 RX ADMIN — Medication 200 MCG: at 08:48

## 2021-05-06 RX ADMIN — EPHEDRINE SULFATE 10 MG: 50 INJECTION INTRAVENOUS at 09:05

## 2021-05-06 RX ADMIN — INSULIN HUMAN 10 UNITS: 100 INJECTION, SOLUTION PARENTERAL at 13:25

## 2021-05-06 RX ADMIN — EPHEDRINE SULFATE 15 MG: 50 INJECTION INTRAVENOUS at 08:58

## 2021-05-06 RX ADMIN — SODIUM CHLORIDE, PRESERVATIVE FREE 10 ML: 5 INJECTION INTRAVENOUS at 21:37

## 2021-05-06 RX ADMIN — HYDROCODONE BITARTRATE AND ACETAMINOPHEN 1 TABLET: 7.5; 325 TABLET ORAL at 20:35

## 2021-05-06 RX ADMIN — POTASSIUM CHLORIDE AND SODIUM CHLORIDE 75 ML/HR: 900; 150 INJECTION, SOLUTION INTRAVENOUS at 20:34

## 2021-05-06 RX ADMIN — Medication 200 MCG: at 08:28

## 2021-05-06 NOTE — ANESTHESIA POSTPROCEDURE EVALUATION
Patient: Cindy Cortes    Procedure Summary     Date: 05/06/21 Room / Location: [facility] OR  / [facility] MAIN OR    Anesthesia Start: 0816 Anesthesia Stop: 1704    Procedure: CRANIOTOMY FOR TUMOR RESECTION WITH STEREOTACTIC (Right Head) Diagnosis:       Brain tumor (CMS/HCC)      (Brain tumor (CMS/HCC) [D49.6])    Surgeons: Jluis Felix MD Provider: New Grimaldo MD    Anesthesia Type: general ASA Status: 4          Anesthesia Type: general    Vitals  Vitals Value Taken Time   /63 05/06/21 1707   Temp     Pulse 75 05/06/21 1708   Resp     SpO2 94 % 05/06/21 1708   Vitals shown include unvalidated device data.        Post Anesthesia Care and Evaluation    Patient location during evaluation: PACU  Patient participation: complete - patient participated  Level of consciousness: awake  Pain scale: See nurse's notes for pain score.  Pain management: adequate  Airway patency: patent  Anesthetic complications: No anesthetic complications  PONV Status: none  Cardiovascular status: acceptable  Respiratory status: acceptable  Hydration status: acceptable    Comments: Patient seen and examined postoperatively; vital signs stable; SpO2 greater than or equal to 90%; cardiopulmonary status stable; nausea/vomiting adequately controlled; pain adequately controlled; no apparent anesthesia complications; patient discharged from anesthesia care when discharge criteria were met

## 2021-05-06 NOTE — ANESTHESIA PROCEDURE NOTES
Arterial Line      Patient reassessed immediately prior to procedure    Patient location during procedure: OR   Line placed for hemodynamic monitoring and ABGs/Labs/ISTAT.  Performed By   Anesthesiologist: New Grimaldo MD  Preanesthetic Checklist  Completed: patient identified, IV checked, site marked, risks and benefits discussed, surgical consent, monitors and equipment checked, pre-op evaluation and timeout performed  Arterial Line Prep   Sterile Tech: cap, gloves and mask  Prep: ChloraPrep  Patient monitoring: blood pressure monitoring, continuous pulse oximetry and EKG  Arterial Line Procedure   Laterality:left  Location:  radial artery  Catheter size: 22 G   Guidance: ultrasound guided, landmark technique and palpation technique  Successful placement: yes  Heparin (Porcine) in NaCl 1000-0.9 UT/500ML-% for arterial line pressure bag, 1,000 Units  Post Assessment   Dressing Type: occlusive dressing applied, secured with tape and wrist guard applied.   Complications no  Circ/Move/Sens Assessment: unchanged.   Patient Tolerance: patient tolerated the procedure well with no apparent complications  Additional Notes  Pre-procedure:  Patient identified; pre-procedure vital signs, all relevant labs/studies, complete medical/surgical/anesthetic history, full medication list, full allergy list, and NPO status obtained/reviewed; physical assessment performed; anesthetic options, side effects, potential complications, risks, and benefits discussed; questions answered; written anesthesia procedure consent obtained; patient cleared for procedure; IV access in situ    Procedure:  Arterial line placed after induction of general anesthesia; ASA monitor placed; patient positioned; hand hygiene performed; sterile technique maintained throughout the procedure; sterile prep and drape applied; insertion site determined by anatomical landmarks, palpation, and ultrasound imaging; live ultrasound guidance throughout the  procedure; target artery identified on live ultrasound; target artery is patent with flow; micro puncture needle placed into the skin and advanced into the target artery with correct placement confirmed by return of arterial blood; realtime needle entry into the target artery witnessed on live ultrasound; wire slide into the target artery; needle removed; arterial catheter threaded into the target artery over the wire; wire removed; correct catheter placement confirmed by transducing systemic arterial blood pressure; catheter secured with occlusive dressing and tape    Post-procedure:  Arterial catheter placed successfully; no apparent complications; vital signs stable throughout; minimal estimated blood loss

## 2021-05-06 NOTE — ANESTHESIA PROCEDURE NOTES
Airway  Date/Time: 5/6/2021 8:26 AM  Airway not difficult    General Information and Staff    Patient location during procedure: OR  Anesthesiologist: New Grimaldo MD  CRNA: Luke Lam AA    Indications and Patient Condition  Indications for airway management: airway protection    Preoxygenated: yes  Mask difficulty assessment: 1 - vent by mask    Final Airway Details  Final airway type: endotracheal airway      Successful airway: ETT  Cuffed: yes   Successful intubation technique: direct laryngoscopy  Endotracheal tube insertion site: oral  Blade: Flaco  Blade size: 3  ETT size (mm): 7.5  Cormack-Lehane Classification: grade I - full view of glottis  Placement verified by: chest auscultation and capnometry   Measured from: gums  ETT/EBT to gums (cm): 22  Number of attempts at approach: 1  Assessment: lips, teeth, and gum same as pre-op and atraumatic intubation

## 2021-05-07 ENCOUNTER — APPOINTMENT (OUTPATIENT)
Dept: MRI IMAGING | Facility: HOSPITAL | Age: 56
End: 2021-05-07

## 2021-05-07 LAB
ABO GROUP BLD: NORMAL
ALBUMIN SERPL-MCNC: 2.8 G/DL (ref 3.5–5.2)
ALBUMIN/GLOB SERPL: 1.9 G/DL
ALP SERPL-CCNC: 81 U/L (ref 39–117)
ALT SERPL W P-5'-P-CCNC: 166 U/L (ref 1–33)
ANION GAP SERPL CALCULATED.3IONS-SCNC: 15 MMOL/L (ref 5–15)
AST SERPL-CCNC: 92 U/L (ref 1–32)
BASOPHILS # BLD AUTO: 0 10*3/MM3 (ref 0–0.2)
BASOPHILS NFR BLD AUTO: 0 % (ref 0–1.5)
BILIRUB SERPL-MCNC: 0.4 MG/DL (ref 0–1.2)
BLD GP AB SCN SERPL QL: NEGATIVE
BUN SERPL-MCNC: 18 MG/DL (ref 6–20)
BUN/CREAT SERPL: 26.5 (ref 7–25)
CALCIUM SPEC-SCNC: 7 MG/DL (ref 8.6–10.5)
CHLORIDE SERPL-SCNC: 108 MMOL/L (ref 98–107)
CO2 SERPL-SCNC: 20 MMOL/L (ref 22–29)
CREAT SERPL-MCNC: 0.68 MG/DL (ref 0.57–1)
DEPRECATED RDW RBC AUTO: 51.2 FL (ref 37–54)
EOSINOPHIL # BLD AUTO: 0 10*3/MM3 (ref 0–0.4)
EOSINOPHIL NFR BLD AUTO: 0 % (ref 0.3–6.2)
ERYTHROCYTE [DISTWIDTH] IN BLOOD BY AUTOMATED COUNT: 16.1 % (ref 12.3–15.4)
GFR SERPL CREATININE-BSD FRML MDRD: 91 ML/MIN/1.73
GLOBULIN UR ELPH-MCNC: 1.5 GM/DL
GLUCOSE BLDC GLUCOMTR-MCNC: 173 MG/DL (ref 70–105)
GLUCOSE BLDC GLUCOMTR-MCNC: 234 MG/DL (ref 70–105)
GLUCOSE BLDC GLUCOMTR-MCNC: 239 MG/DL (ref 70–105)
GLUCOSE BLDC GLUCOMTR-MCNC: 240 MG/DL (ref 70–105)
GLUCOSE BLDC GLUCOMTR-MCNC: 241 MG/DL (ref 70–105)
GLUCOSE BLDC GLUCOMTR-MCNC: 270 MG/DL (ref 70–105)
GLUCOSE BLDC GLUCOMTR-MCNC: 295 MG/DL (ref 70–105)
GLUCOSE BLDC GLUCOMTR-MCNC: 304 MG/DL (ref 70–105)
GLUCOSE BLDC GLUCOMTR-MCNC: 315 MG/DL (ref 70–105)
GLUCOSE BLDC GLUCOMTR-MCNC: 373 MG/DL (ref 70–105)
GLUCOSE BLDC GLUCOMTR-MCNC: 397 MG/DL (ref 70–105)
GLUCOSE BLDC GLUCOMTR-MCNC: 434 MG/DL (ref 70–105)
GLUCOSE SERPL-MCNC: 262 MG/DL (ref 65–99)
HBA1C MFR BLD: 7.5 % (ref 3.5–5.6)
HCT VFR BLD AUTO: 21.5 % (ref 34–46.6)
HCT VFR BLD AUTO: 28.7 % (ref 34–46.6)
HGB BLD-MCNC: 7.5 G/DL (ref 12–15.9)
HGB BLD-MCNC: 9.8 G/DL (ref 12–15.9)
LYMPHOCYTES # BLD AUTO: 0.4 10*3/MM3 (ref 0.7–3.1)
LYMPHOCYTES NFR BLD AUTO: 5 % (ref 19.6–45.3)
MAGNESIUM SERPL-MCNC: 1.3 MG/DL (ref 1.6–2.6)
MCH RBC QN AUTO: 31.5 PG (ref 26.6–33)
MCHC RBC AUTO-ENTMCNC: 34.8 G/DL (ref 31.5–35.7)
MCV RBC AUTO: 90.5 FL (ref 79–97)
MONOCYTES # BLD AUTO: 0.2 10*3/MM3 (ref 0.1–0.9)
MONOCYTES NFR BLD AUTO: 1.9 % (ref 5–12)
NEUTROPHILS NFR BLD AUTO: 7.9 10*3/MM3 (ref 1.7–7)
NEUTROPHILS NFR BLD AUTO: 93.1 % (ref 42.7–76)
NRBC BLD AUTO-RTO: 0 /100 WBC (ref 0–0.2)
PHOSPHATE SERPL-MCNC: 11.1 MG/DL (ref 2.5–4.5)
PLATELET # BLD AUTO: 112 10*3/MM3 (ref 140–450)
PMV BLD AUTO: 6.9 FL (ref 6–12)
POTASSIUM SERPL-SCNC: 3.7 MMOL/L (ref 3.5–5.2)
PROT SERPL-MCNC: 4.3 G/DL (ref 6–8.5)
RBC # BLD AUTO: 2.38 10*6/MM3 (ref 3.77–5.28)
RH BLD: POSITIVE
SODIUM SERPL-SCNC: 143 MMOL/L (ref 136–145)
T&S EXPIRATION DATE: NORMAL
WBC # BLD AUTO: 8.5 10*3/MM3 (ref 3.4–10.8)

## 2021-05-07 PROCEDURE — A9579 GAD-BASE MR CONTRAST NOS,1ML: HCPCS | Performed by: INTERNAL MEDICINE

## 2021-05-07 PROCEDURE — 25010000002 HYDRALAZINE PER 20 MG: Performed by: NURSE PRACTITIONER

## 2021-05-07 PROCEDURE — 25010000002 MORPHINE PER 10 MG: Performed by: INTERNAL MEDICINE

## 2021-05-07 PROCEDURE — 25810000003 SODIUM CHLORIDE 0.9 % WITH KCL 20 MEQ 20-0.9 MEQ/L-% SOLUTION: Performed by: SURGERY

## 2021-05-07 PROCEDURE — 82962 GLUCOSE BLOOD TEST: CPT

## 2021-05-07 PROCEDURE — 86850 RBC ANTIBODY SCREEN: CPT | Performed by: SURGERY

## 2021-05-07 PROCEDURE — 85014 HEMATOCRIT: CPT | Performed by: SURGERY

## 2021-05-07 PROCEDURE — P9016 RBC LEUKOCYTES REDUCED: HCPCS

## 2021-05-07 PROCEDURE — 86923 COMPATIBILITY TEST ELECTRIC: CPT

## 2021-05-07 PROCEDURE — 86900 BLOOD TYPING SEROLOGIC ABO: CPT | Performed by: SURGERY

## 2021-05-07 PROCEDURE — 83735 ASSAY OF MAGNESIUM: CPT | Performed by: NURSE PRACTITIONER

## 2021-05-07 PROCEDURE — 86901 BLOOD TYPING SEROLOGIC RH(D): CPT | Performed by: SURGERY

## 2021-05-07 PROCEDURE — 80053 COMPREHEN METABOLIC PANEL: CPT | Performed by: NURSE PRACTITIONER

## 2021-05-07 PROCEDURE — 25010000002 DEXAMETHASONE PER 1 MG: Performed by: SURGERY

## 2021-05-07 PROCEDURE — 70553 MRI BRAIN STEM W/O & W/DYE: CPT

## 2021-05-07 PROCEDURE — 84100 ASSAY OF PHOSPHORUS: CPT | Performed by: NURSE PRACTITIONER

## 2021-05-07 PROCEDURE — 85018 HEMOGLOBIN: CPT | Performed by: SURGERY

## 2021-05-07 PROCEDURE — 63710000001 INSULIN LISPRO (HUMAN) PER 5 UNITS: Performed by: SURGERY

## 2021-05-07 PROCEDURE — 25010000002 MORPHINE PER 10 MG: Performed by: SURGERY

## 2021-05-07 PROCEDURE — 25010000002 CEFAZOLIN PER 500 MG: Performed by: SURGERY

## 2021-05-07 PROCEDURE — 86900 BLOOD TYPING SEROLOGIC ABO: CPT

## 2021-05-07 PROCEDURE — 63710000001 INSULIN LISPRO (HUMAN) PER 5 UNITS: Performed by: INTERNAL MEDICINE

## 2021-05-07 PROCEDURE — 36430 TRANSFUSION BLD/BLD COMPNT: CPT

## 2021-05-07 PROCEDURE — 25010000002 GADOTERIDOL PER 1 ML: Performed by: INTERNAL MEDICINE

## 2021-05-07 PROCEDURE — 63710000001 INSULIN REGULAR HUMAN PER 5 UNITS: Performed by: NURSE PRACTITIONER

## 2021-05-07 PROCEDURE — 99024 POSTOP FOLLOW-UP VISIT: CPT | Performed by: SURGERY

## 2021-05-07 PROCEDURE — 85025 COMPLETE CBC W/AUTO DIFF WBC: CPT | Performed by: SURGERY

## 2021-05-07 PROCEDURE — 63710000001 DEXAMETHASONE PER 0.25 MG: Performed by: SURGERY

## 2021-05-07 PROCEDURE — 25010000002 MAGNESIUM SULFATE 2 GM/50ML SOLUTION: Performed by: NURSE PRACTITIONER

## 2021-05-07 RX ORDER — POTASSIUM CHLORIDE 20 MEQ/1
40 TABLET, EXTENDED RELEASE ORAL AS NEEDED
Status: DISCONTINUED | OUTPATIENT
Start: 2021-05-07 | End: 2021-05-13 | Stop reason: HOSPADM

## 2021-05-07 RX ORDER — DEXTROSE MONOHYDRATE 25 G/50ML
25-50 INJECTION, SOLUTION INTRAVENOUS
Status: DISCONTINUED | OUTPATIENT
Start: 2021-05-07 | End: 2021-05-07 | Stop reason: SDUPTHER

## 2021-05-07 RX ORDER — LABETALOL HYDROCHLORIDE 5 MG/ML
INJECTION, SOLUTION INTRAVENOUS
Status: COMPLETED
Start: 2021-05-07 | End: 2021-05-07

## 2021-05-07 RX ORDER — POTASSIUM CHLORIDE 7.45 MG/ML
10 INJECTION INTRAVENOUS
Status: DISCONTINUED | OUTPATIENT
Start: 2021-05-07 | End: 2021-05-13 | Stop reason: HOSPADM

## 2021-05-07 RX ORDER — MAGNESIUM SULFATE HEPTAHYDRATE 40 MG/ML
2 INJECTION, SOLUTION INTRAVENOUS AS NEEDED
Status: DISCONTINUED | OUTPATIENT
Start: 2021-05-07 | End: 2021-05-13 | Stop reason: HOSPADM

## 2021-05-07 RX ORDER — AMLODIPINE BESYLATE 5 MG/1
10 TABLET ORAL DAILY
Status: DISCONTINUED | OUTPATIENT
Start: 2021-05-08 | End: 2021-05-13 | Stop reason: HOSPADM

## 2021-05-07 RX ORDER — MORPHINE SULFATE 4 MG/ML
2 INJECTION, SOLUTION INTRAMUSCULAR; INTRAVENOUS ONCE
Status: COMPLETED | OUTPATIENT
Start: 2021-05-07 | End: 2021-05-07

## 2021-05-07 RX ORDER — DEXAMETHASONE 4 MG/1
4 TABLET ORAL EVERY 6 HOURS
Status: DISCONTINUED | OUTPATIENT
Start: 2021-05-07 | End: 2021-05-12

## 2021-05-07 RX ORDER — HYDRALAZINE HYDROCHLORIDE 20 MG/ML
10 INJECTION INTRAMUSCULAR; INTRAVENOUS EVERY 6 HOURS PRN
Status: DISCONTINUED | OUTPATIENT
Start: 2021-05-07 | End: 2021-05-13 | Stop reason: HOSPADM

## 2021-05-07 RX ORDER — INSULIN LISPRO 100 [IU]/ML
0-24 INJECTION, SOLUTION INTRAVENOUS; SUBCUTANEOUS AS NEEDED
Status: DISCONTINUED | OUTPATIENT
Start: 2021-05-07 | End: 2021-05-07

## 2021-05-07 RX ORDER — POTASSIUM CHLORIDE 1.5 G/1.77G
40 POWDER, FOR SOLUTION ORAL AS NEEDED
Status: DISCONTINUED | OUTPATIENT
Start: 2021-05-07 | End: 2021-05-13 | Stop reason: HOSPADM

## 2021-05-07 RX ORDER — INSULIN LISPRO 100 [IU]/ML
0-24 INJECTION, SOLUTION INTRAVENOUS; SUBCUTANEOUS
Status: DISCONTINUED | OUTPATIENT
Start: 2021-05-07 | End: 2021-05-07

## 2021-05-07 RX ORDER — LABETALOL HYDROCHLORIDE 5 MG/ML
10 INJECTION, SOLUTION INTRAVENOUS ONCE
Status: COMPLETED | OUTPATIENT
Start: 2021-05-07 | End: 2021-05-07

## 2021-05-07 RX ORDER — AMLODIPINE BESYLATE 5 MG/1
5 TABLET ORAL ONCE
Status: COMPLETED | OUTPATIENT
Start: 2021-05-07 | End: 2021-05-07

## 2021-05-07 RX ORDER — DEXTROSE MONOHYDRATE 25 G/50ML
25 INJECTION, SOLUTION INTRAVENOUS
Status: DISCONTINUED | OUTPATIENT
Start: 2021-05-07 | End: 2021-05-07

## 2021-05-07 RX ORDER — MAGNESIUM SULFATE 1 G/100ML
1 INJECTION INTRAVENOUS AS NEEDED
Status: DISCONTINUED | OUTPATIENT
Start: 2021-05-07 | End: 2021-05-13 | Stop reason: HOSPADM

## 2021-05-07 RX ADMIN — LABETALOL 20 MG/4 ML (5 MG/ML) INTRAVENOUS SYRINGE 10 MG: at 06:26

## 2021-05-07 RX ADMIN — SODIUM CHLORIDE 15 MG/HR: 0.9 INJECTION, SOLUTION INTRAVENOUS at 12:42

## 2021-05-07 RX ADMIN — MORPHINE SULFATE 2 MG: 4 INJECTION INTRAVENOUS at 03:21

## 2021-05-07 RX ADMIN — PANTOPRAZOLE SODIUM 40 MG: 40 TABLET, DELAYED RELEASE ORAL at 08:06

## 2021-05-07 RX ADMIN — AMLODIPINE BESYLATE 5 MG: 5 TABLET ORAL at 11:27

## 2021-05-07 RX ADMIN — SODIUM CHLORIDE 15 MG/HR: 0.9 INJECTION, SOLUTION INTRAVENOUS at 15:30

## 2021-05-07 RX ADMIN — MORPHINE SULFATE 2 MG: 4 INJECTION INTRAVENOUS at 15:12

## 2021-05-07 RX ADMIN — DEXAMETHASONE SODIUM PHOSPHATE 4 MG: 4 INJECTION, SOLUTION INTRAMUSCULAR; INTRAVENOUS at 02:19

## 2021-05-07 RX ADMIN — MORPHINE SULFATE 2 MG: 4 INJECTION INTRAVENOUS at 11:27

## 2021-05-07 RX ADMIN — DEXAMETHASONE 4 MG: 4 TABLET ORAL at 08:06

## 2021-05-07 RX ADMIN — HYDRALAZINE HYDROCHLORIDE 10 MG: 20 INJECTION INTRAMUSCULAR; INTRAVENOUS at 02:19

## 2021-05-07 RX ADMIN — METOPROLOL TARTRATE 5 MG: 5 INJECTION INTRAVENOUS at 18:50

## 2021-05-07 RX ADMIN — CEFAZOLIN SODIUM 2 G: 10 INJECTION, POWDER, FOR SOLUTION INTRAVENOUS at 07:58

## 2021-05-07 RX ADMIN — INSULIN LISPRO 7 UNITS: 100 INJECTION, SOLUTION INTRAVENOUS; SUBCUTANEOUS at 01:08

## 2021-05-07 RX ADMIN — METOPROLOL TARTRATE 5 MG: 5 INJECTION INTRAVENOUS at 05:43

## 2021-05-07 RX ADMIN — METOPROLOL TARTRATE 5 MG: 5 INJECTION INTRAVENOUS at 11:27

## 2021-05-07 RX ADMIN — HYDRALAZINE HYDROCHLORIDE 10 MG: 20 INJECTION INTRAMUSCULAR; INTRAVENOUS at 08:41

## 2021-05-07 RX ADMIN — CEFAZOLIN SODIUM 2 G: 10 INJECTION, POWDER, FOR SOLUTION INTRAVENOUS at 15:12

## 2021-05-07 RX ADMIN — MAGNESIUM SULFATE HEPTAHYDRATE 2 G: 40 INJECTION, SOLUTION INTRAVENOUS at 07:58

## 2021-05-07 RX ADMIN — DEXAMETHASONE 4 MG: 4 TABLET ORAL at 20:04

## 2021-05-07 RX ADMIN — GADOTERIDOL 20 ML: 279.3 INJECTION, SOLUTION INTRAVENOUS at 17:35

## 2021-05-07 RX ADMIN — SODIUM CHLORIDE, PRESERVATIVE FREE 10 ML: 5 INJECTION INTRAVENOUS at 08:06

## 2021-05-07 RX ADMIN — SODIUM CHLORIDE 15 MG/HR: 0.9 INJECTION, SOLUTION INTRAVENOUS at 00:54

## 2021-05-07 RX ADMIN — SODIUM CHLORIDE 15 MG/HR: 0.9 INJECTION, SOLUTION INTRAVENOUS at 19:59

## 2021-05-07 RX ADMIN — INSULIN LISPRO 6 UNITS: 100 INJECTION, SOLUTION INTRAVENOUS; SUBCUTANEOUS at 05:43

## 2021-05-07 RX ADMIN — SODIUM CHLORIDE 4 UNITS/HR: 9 INJECTION, SOLUTION INTRAVENOUS at 12:07

## 2021-05-07 RX ADMIN — LEVETIRACETAM 750 MG: 500 TABLET ORAL at 20:04

## 2021-05-07 RX ADMIN — SODIUM CHLORIDE 15 MG/HR: 0.9 INJECTION, SOLUTION INTRAVENOUS at 08:39

## 2021-05-07 RX ADMIN — VENLAFAXINE HYDROCHLORIDE 75 MG: 75 CAPSULE, EXTENDED RELEASE ORAL at 08:05

## 2021-05-07 RX ADMIN — INSULIN LISPRO 24 UNITS: 100 INJECTION, SOLUTION INTRAVENOUS; SUBCUTANEOUS at 11:39

## 2021-05-07 RX ADMIN — LABETALOL HYDROCHLORIDE 10 MG: 5 INJECTION, SOLUTION INTRAVENOUS at 06:26

## 2021-05-07 RX ADMIN — POTASSIUM CHLORIDE AND SODIUM CHLORIDE 75 ML/HR: 900; 150 INJECTION, SOLUTION INTRAVENOUS at 11:40

## 2021-05-07 RX ADMIN — DEXAMETHASONE 4 MG: 4 TABLET ORAL at 15:12

## 2021-05-07 RX ADMIN — SODIUM CHLORIDE, PRESERVATIVE FREE 10 ML: 5 INJECTION INTRAVENOUS at 20:04

## 2021-05-07 RX ADMIN — AMLODIPINE BESYLATE 5 MG: 5 TABLET ORAL at 08:05

## 2021-05-07 RX ADMIN — CEFAZOLIN SODIUM 2 G: 10 INJECTION, POWDER, FOR SOLUTION INTRAVENOUS at 00:55

## 2021-05-07 RX ADMIN — LEVETIRACETAM 750 MG: 500 TABLET ORAL at 08:05

## 2021-05-08 LAB
ALBUMIN SERPL-MCNC: 3.4 G/DL (ref 3.5–5.2)
ALBUMIN/GLOB SERPL: 1.5 G/DL
ALP SERPL-CCNC: 77 U/L (ref 39–117)
ALT SERPL W P-5'-P-CCNC: 97 U/L (ref 1–33)
ANION GAP SERPL CALCULATED.3IONS-SCNC: 9 MMOL/L (ref 5–15)
AST SERPL-CCNC: 32 U/L (ref 1–32)
BASOPHILS # BLD AUTO: 0 10*3/MM3 (ref 0–0.2)
BASOPHILS NFR BLD AUTO: 0.3 % (ref 0–1.5)
BH BB BLOOD EXPIRATION DATE: NORMAL
BH BB BLOOD TYPE BARCODE: 5100
BH BB DISPENSE STATUS: NORMAL
BH BB PRODUCT CODE: NORMAL
BH BB UNIT NUMBER: NORMAL
BILIRUB SERPL-MCNC: 0.5 MG/DL (ref 0–1.2)
BUN SERPL-MCNC: 20 MG/DL (ref 6–20)
BUN/CREAT SERPL: 28.2 (ref 7–25)
CALCIUM SPEC-SCNC: 8.7 MG/DL (ref 8.6–10.5)
CHLORIDE SERPL-SCNC: 106 MMOL/L (ref 98–107)
CO2 SERPL-SCNC: 23 MMOL/L (ref 22–29)
CREAT SERPL-MCNC: 0.71 MG/DL (ref 0.57–1)
CROSSMATCH INTERPRETATION: NORMAL
DEPRECATED RDW RBC AUTO: 52.9 FL (ref 37–54)
EOSINOPHIL # BLD AUTO: 0 10*3/MM3 (ref 0–0.4)
EOSINOPHIL NFR BLD AUTO: 0.4 % (ref 0.3–6.2)
ERYTHROCYTE [DISTWIDTH] IN BLOOD BY AUTOMATED COUNT: 16.5 % (ref 12.3–15.4)
GFR SERPL CREATININE-BSD FRML MDRD: 86 ML/MIN/1.73
GLOBULIN UR ELPH-MCNC: 2.3 GM/DL
GLUCOSE BLDC GLUCOMTR-MCNC: 106 MG/DL (ref 70–105)
GLUCOSE BLDC GLUCOMTR-MCNC: 113 MG/DL (ref 70–105)
GLUCOSE BLDC GLUCOMTR-MCNC: 116 MG/DL (ref 70–105)
GLUCOSE BLDC GLUCOMTR-MCNC: 124 MG/DL (ref 70–105)
GLUCOSE BLDC GLUCOMTR-MCNC: 135 MG/DL (ref 70–105)
GLUCOSE BLDC GLUCOMTR-MCNC: 142 MG/DL (ref 70–105)
GLUCOSE BLDC GLUCOMTR-MCNC: 149 MG/DL (ref 70–105)
GLUCOSE BLDC GLUCOMTR-MCNC: 150 MG/DL (ref 70–105)
GLUCOSE BLDC GLUCOMTR-MCNC: 159 MG/DL (ref 70–105)
GLUCOSE BLDC GLUCOMTR-MCNC: 192 MG/DL (ref 70–105)
GLUCOSE BLDC GLUCOMTR-MCNC: 215 MG/DL (ref 70–105)
GLUCOSE BLDC GLUCOMTR-MCNC: 243 MG/DL (ref 70–105)
GLUCOSE BLDC GLUCOMTR-MCNC: 252 MG/DL (ref 70–105)
GLUCOSE BLDC GLUCOMTR-MCNC: 255 MG/DL (ref 70–105)
GLUCOSE BLDC GLUCOMTR-MCNC: 256 MG/DL (ref 70–105)
GLUCOSE BLDC GLUCOMTR-MCNC: 282 MG/DL (ref 70–105)
GLUCOSE BLDC GLUCOMTR-MCNC: 348 MG/DL (ref 70–105)
GLUCOSE BLDC GLUCOMTR-MCNC: 356 MG/DL (ref 70–105)
GLUCOSE BLDC GLUCOMTR-MCNC: 80 MG/DL (ref 70–105)
GLUCOSE BLDC GLUCOMTR-MCNC: 88 MG/DL (ref 70–105)
GLUCOSE SERPL-MCNC: 139 MG/DL (ref 65–99)
HCT VFR BLD AUTO: 29.3 % (ref 34–46.6)
HGB BLD-MCNC: 10.1 G/DL (ref 12–15.9)
LYMPHOCYTES # BLD AUTO: 0.6 10*3/MM3 (ref 0.7–3.1)
LYMPHOCYTES NFR BLD AUTO: 5.6 % (ref 19.6–45.3)
MAGNESIUM SERPL-MCNC: 2 MG/DL (ref 1.6–2.6)
MCH RBC QN AUTO: 31.5 PG (ref 26.6–33)
MCHC RBC AUTO-ENTMCNC: 34.6 G/DL (ref 31.5–35.7)
MCV RBC AUTO: 91.2 FL (ref 79–97)
MONOCYTES # BLD AUTO: 0.1 10*3/MM3 (ref 0.1–0.9)
MONOCYTES NFR BLD AUTO: 1.4 % (ref 5–12)
NEUTROPHILS NFR BLD AUTO: 9.8 10*3/MM3 (ref 1.7–7)
NEUTROPHILS NFR BLD AUTO: 92.3 % (ref 42.7–76)
NRBC BLD AUTO-RTO: 0.1 /100 WBC (ref 0–0.2)
PHOSPHATE SERPL-MCNC: 2.5 MG/DL (ref 2.5–4.5)
PLATELET # BLD AUTO: 150 10*3/MM3 (ref 140–450)
PMV BLD AUTO: 7.7 FL (ref 6–12)
POTASSIUM SERPL-SCNC: 4.5 MMOL/L (ref 3.5–5.2)
PROT SERPL-MCNC: 5.7 G/DL (ref 6–8.5)
RBC # BLD AUTO: 3.21 10*6/MM3 (ref 3.77–5.28)
SODIUM SERPL-SCNC: 138 MMOL/L (ref 136–145)
UNIT  ABO: NORMAL
UNIT  RH: NORMAL
WBC # BLD AUTO: 10.6 10*3/MM3 (ref 3.4–10.8)

## 2021-05-08 PROCEDURE — 25810000003 SODIUM CHLORIDE 0.9 % WITH KCL 20 MEQ 20-0.9 MEQ/L-% SOLUTION: Performed by: SURGERY

## 2021-05-08 PROCEDURE — 99024 POSTOP FOLLOW-UP VISIT: CPT | Performed by: SURGERY

## 2021-05-08 PROCEDURE — 85025 COMPLETE CBC W/AUTO DIFF WBC: CPT | Performed by: SURGERY

## 2021-05-08 PROCEDURE — 25010000002 CEFAZOLIN PER 500 MG: Performed by: SURGERY

## 2021-05-08 PROCEDURE — 83735 ASSAY OF MAGNESIUM: CPT | Performed by: NURSE PRACTITIONER

## 2021-05-08 PROCEDURE — 63710000001 DEXAMETHASONE PER 0.25 MG: Performed by: SURGERY

## 2021-05-08 PROCEDURE — 97162 PT EVAL MOD COMPLEX 30 MIN: CPT

## 2021-05-08 PROCEDURE — 82962 GLUCOSE BLOOD TEST: CPT

## 2021-05-08 PROCEDURE — 84100 ASSAY OF PHOSPHORUS: CPT | Performed by: NURSE PRACTITIONER

## 2021-05-08 PROCEDURE — 63710000001 INSULIN REGULAR HUMAN PER 5 UNITS: Performed by: NURSE PRACTITIONER

## 2021-05-08 PROCEDURE — 80053 COMPREHEN METABOLIC PANEL: CPT | Performed by: NURSE PRACTITIONER

## 2021-05-08 RX ORDER — METOPROLOL TARTRATE 5 MG/5ML
5 INJECTION INTRAVENOUS EVERY 6 HOURS PRN
Status: DISCONTINUED | OUTPATIENT
Start: 2021-05-08 | End: 2021-05-13 | Stop reason: HOSPADM

## 2021-05-08 RX ORDER — ACETAZOLAMIDE 250 MG/1
125 TABLET ORAL 3 TIMES DAILY
Status: DISCONTINUED | OUTPATIENT
Start: 2021-05-08 | End: 2021-05-13 | Stop reason: HOSPADM

## 2021-05-08 RX ADMIN — CEFAZOLIN SODIUM 2 G: 10 INJECTION, POWDER, FOR SOLUTION INTRAVENOUS at 23:12

## 2021-05-08 RX ADMIN — SODIUM CHLORIDE 7.5 MG/HR: 0.9 INJECTION, SOLUTION INTRAVENOUS at 11:07

## 2021-05-08 RX ADMIN — METOPROLOL TARTRATE 5 MG: 5 INJECTION INTRAVENOUS at 00:01

## 2021-05-08 RX ADMIN — ACETAMINOPHEN 650 MG: 325 TABLET, FILM COATED ORAL at 05:47

## 2021-05-08 RX ADMIN — PANTOPRAZOLE SODIUM 40 MG: 40 TABLET, DELAYED RELEASE ORAL at 08:08

## 2021-05-08 RX ADMIN — AMLODIPINE BESYLATE 10 MG: 5 TABLET ORAL at 08:08

## 2021-05-08 RX ADMIN — CEFAZOLIN SODIUM 2 G: 10 INJECTION, POWDER, FOR SOLUTION INTRAVENOUS at 16:01

## 2021-05-08 RX ADMIN — SODIUM CHLORIDE 11 MG/HR: 0.9 INJECTION, SOLUTION INTRAVENOUS at 06:03

## 2021-05-08 RX ADMIN — METOPROLOL TARTRATE 5 MG: 5 INJECTION INTRAVENOUS at 12:07

## 2021-05-08 RX ADMIN — DEXAMETHASONE 4 MG: 4 TABLET ORAL at 08:08

## 2021-05-08 RX ADMIN — DEXAMETHASONE 4 MG: 4 TABLET ORAL at 03:26

## 2021-05-08 RX ADMIN — SODIUM CHLORIDE 15 MG/HR: 0.9 INJECTION, SOLUTION INTRAVENOUS at 03:24

## 2021-05-08 RX ADMIN — LEVETIRACETAM 750 MG: 500 TABLET ORAL at 21:19

## 2021-05-08 RX ADMIN — SODIUM CHLORIDE 15 MG/HR: 0.9 INJECTION, SOLUTION INTRAVENOUS at 21:17

## 2021-05-08 RX ADMIN — SODIUM CHLORIDE 15 MG/HR: 0.9 INJECTION, SOLUTION INTRAVENOUS at 00:01

## 2021-05-08 RX ADMIN — SODIUM CHLORIDE 4 UNITS/HR: 9 INJECTION, SOLUTION INTRAVENOUS at 01:29

## 2021-05-08 RX ADMIN — SODIUM CHLORIDE, PRESERVATIVE FREE 10 ML: 5 INJECTION INTRAVENOUS at 21:19

## 2021-05-08 RX ADMIN — METOPROLOL TARTRATE 12.5 MG: 25 TABLET, FILM COATED ORAL at 23:12

## 2021-05-08 RX ADMIN — ACETAZOLAMIDE 125 MG: 250 TABLET ORAL at 17:24

## 2021-05-08 RX ADMIN — ACETAMINOPHEN 650 MG: 325 TABLET, FILM COATED ORAL at 21:20

## 2021-05-08 RX ADMIN — CEFAZOLIN SODIUM 2 G: 10 INJECTION, POWDER, FOR SOLUTION INTRAVENOUS at 00:01

## 2021-05-08 RX ADMIN — LEVETIRACETAM 750 MG: 500 TABLET ORAL at 08:08

## 2021-05-08 RX ADMIN — DEXAMETHASONE 4 MG: 4 TABLET ORAL at 23:09

## 2021-05-08 RX ADMIN — POTASSIUM CHLORIDE AND SODIUM CHLORIDE 75 ML/HR: 900; 150 INJECTION, SOLUTION INTRAVENOUS at 01:29

## 2021-05-08 RX ADMIN — SODIUM CHLORIDE 11 UNITS/HR: 9 INJECTION, SOLUTION INTRAVENOUS at 16:13

## 2021-05-08 RX ADMIN — SODIUM CHLORIDE, PRESERVATIVE FREE 10 ML: 5 INJECTION INTRAVENOUS at 08:09

## 2021-05-08 RX ADMIN — ACETAZOLAMIDE 125 MG: 250 TABLET ORAL at 21:18

## 2021-05-08 RX ADMIN — VENLAFAXINE HYDROCHLORIDE 75 MG: 75 CAPSULE, EXTENDED RELEASE ORAL at 08:08

## 2021-05-08 RX ADMIN — CEFAZOLIN SODIUM 2 G: 10 INJECTION, POWDER, FOR SOLUTION INTRAVENOUS at 08:08

## 2021-05-08 RX ADMIN — SODIUM CHLORIDE 13.5 MG/HR: 0.9 INJECTION, SOLUTION INTRAVENOUS at 17:20

## 2021-05-08 RX ADMIN — HYDROCODONE BITARTRATE AND ACETAMINOPHEN 1 TABLET: 7.5; 325 TABLET ORAL at 16:01

## 2021-05-08 RX ADMIN — METOPROLOL TARTRATE 5 MG: 5 INJECTION INTRAVENOUS at 05:39

## 2021-05-08 RX ADMIN — DEXAMETHASONE 4 MG: 4 TABLET ORAL at 14:18

## 2021-05-09 LAB
ALBUMIN SERPL-MCNC: 3.3 G/DL (ref 3.5–5.2)
ALBUMIN/GLOB SERPL: 1.4 G/DL
ALP SERPL-CCNC: 77 U/L (ref 39–117)
ALT SERPL W P-5'-P-CCNC: 65 U/L (ref 1–33)
ANION GAP SERPL CALCULATED.3IONS-SCNC: 10 MMOL/L (ref 5–15)
ANION GAP SERPL CALCULATED.3IONS-SCNC: 11 MMOL/L (ref 5–15)
AST SERPL-CCNC: 21 U/L (ref 1–32)
BASOPHILS # BLD AUTO: 0 10*3/MM3 (ref 0–0.2)
BASOPHILS NFR BLD AUTO: 0.2 % (ref 0–1.5)
BILIRUB SERPL-MCNC: 0.3 MG/DL (ref 0–1.2)
BUN SERPL-MCNC: 28 MG/DL (ref 6–20)
BUN SERPL-MCNC: 29 MG/DL (ref 6–20)
BUN/CREAT SERPL: 33.7 (ref 7–25)
BUN/CREAT SERPL: 36.3 (ref 7–25)
CALCIUM SPEC-SCNC: 9.1 MG/DL (ref 8.6–10.5)
CALCIUM SPEC-SCNC: 9.4 MG/DL (ref 8.6–10.5)
CHLORIDE SERPL-SCNC: 105 MMOL/L (ref 98–107)
CHLORIDE SERPL-SCNC: 106 MMOL/L (ref 98–107)
CO2 SERPL-SCNC: 22 MMOL/L (ref 22–29)
CO2 SERPL-SCNC: 22 MMOL/L (ref 22–29)
CREAT SERPL-MCNC: 0.8 MG/DL (ref 0.57–1)
CREAT SERPL-MCNC: 0.83 MG/DL (ref 0.57–1)
DEPRECATED RDW RBC AUTO: 51.2 FL (ref 37–54)
EOSINOPHIL # BLD AUTO: 0 10*3/MM3 (ref 0–0.4)
EOSINOPHIL NFR BLD AUTO: 0 % (ref 0.3–6.2)
ERYTHROCYTE [DISTWIDTH] IN BLOOD BY AUTOMATED COUNT: 16.4 % (ref 12.3–15.4)
GFR SERPL CREATININE-BSD FRML MDRD: 72 ML/MIN/1.73
GFR SERPL CREATININE-BSD FRML MDRD: 75 ML/MIN/1.73
GLOBULIN UR ELPH-MCNC: 2.3 GM/DL
GLUCOSE BLDC GLUCOMTR-MCNC: 108 MG/DL (ref 70–105)
GLUCOSE BLDC GLUCOMTR-MCNC: 131 MG/DL (ref 70–105)
GLUCOSE BLDC GLUCOMTR-MCNC: 147 MG/DL (ref 70–105)
GLUCOSE BLDC GLUCOMTR-MCNC: 182 MG/DL (ref 70–105)
GLUCOSE BLDC GLUCOMTR-MCNC: 206 MG/DL (ref 70–105)
GLUCOSE BLDC GLUCOMTR-MCNC: 233 MG/DL (ref 70–105)
GLUCOSE BLDC GLUCOMTR-MCNC: 250 MG/DL (ref 70–105)
GLUCOSE BLDC GLUCOMTR-MCNC: 287 MG/DL (ref 70–105)
GLUCOSE BLDC GLUCOMTR-MCNC: 299 MG/DL (ref 70–105)
GLUCOSE BLDC GLUCOMTR-MCNC: 320 MG/DL (ref 70–105)
GLUCOSE BLDC GLUCOMTR-MCNC: 336 MG/DL (ref 70–105)
GLUCOSE BLDC GLUCOMTR-MCNC: 347 MG/DL (ref 70–105)
GLUCOSE BLDC GLUCOMTR-MCNC: 362 MG/DL (ref 70–105)
GLUCOSE BLDC GLUCOMTR-MCNC: 61 MG/DL (ref 70–105)
GLUCOSE BLDC GLUCOMTR-MCNC: 81 MG/DL (ref 70–105)
GLUCOSE SERPL-MCNC: 127 MG/DL (ref 65–99)
GLUCOSE SERPL-MCNC: 136 MG/DL (ref 65–99)
HCT VFR BLD AUTO: 29.4 % (ref 34–46.6)
HGB BLD-MCNC: 10 G/DL (ref 12–15.9)
LYMPHOCYTES # BLD AUTO: 0.5 10*3/MM3 (ref 0.7–3.1)
LYMPHOCYTES NFR BLD AUTO: 4.4 % (ref 19.6–45.3)
MAGNESIUM SERPL-MCNC: 2 MG/DL (ref 1.6–2.6)
MCH RBC QN AUTO: 30.6 PG (ref 26.6–33)
MCHC RBC AUTO-ENTMCNC: 33.9 G/DL (ref 31.5–35.7)
MCV RBC AUTO: 90.1 FL (ref 79–97)
MONOCYTES # BLD AUTO: 0.1 10*3/MM3 (ref 0.1–0.9)
MONOCYTES NFR BLD AUTO: 0.9 % (ref 5–12)
NEUTROPHILS NFR BLD AUTO: 11.4 10*3/MM3 (ref 1.7–7)
NEUTROPHILS NFR BLD AUTO: 94.5 % (ref 42.7–76)
NRBC BLD AUTO-RTO: 0.1 /100 WBC (ref 0–0.2)
PHOSPHATE SERPL-MCNC: 2.8 MG/DL (ref 2.5–4.5)
PLATELET # BLD AUTO: 153 10*3/MM3 (ref 140–450)
PMV BLD AUTO: 7.6 FL (ref 6–12)
POTASSIUM SERPL-SCNC: 4.4 MMOL/L (ref 3.5–5.2)
POTASSIUM SERPL-SCNC: 4.5 MMOL/L (ref 3.5–5.2)
PROT SERPL-MCNC: 5.6 G/DL (ref 6–8.5)
RBC # BLD AUTO: 3.26 10*6/MM3 (ref 3.77–5.28)
SODIUM SERPL-SCNC: 138 MMOL/L (ref 136–145)
SODIUM SERPL-SCNC: 138 MMOL/L (ref 136–145)
WBC # BLD AUTO: 12.1 10*3/MM3 (ref 3.4–10.8)

## 2021-05-09 PROCEDURE — 63710000001 INSULIN LISPRO (HUMAN) PER 5 UNITS: Performed by: NURSE PRACTITIONER

## 2021-05-09 PROCEDURE — 84100 ASSAY OF PHOSPHORUS: CPT | Performed by: NURSE PRACTITIONER

## 2021-05-09 PROCEDURE — 63710000001 INSULIN REGULAR HUMAN PER 5 UNITS: Performed by: NURSE PRACTITIONER

## 2021-05-09 PROCEDURE — 99024 POSTOP FOLLOW-UP VISIT: CPT | Performed by: SURGERY

## 2021-05-09 PROCEDURE — 25010000002 CEFAZOLIN PER 500 MG: Performed by: SURGERY

## 2021-05-09 PROCEDURE — 82962 GLUCOSE BLOOD TEST: CPT

## 2021-05-09 PROCEDURE — 80048 BASIC METABOLIC PNL TOTAL CA: CPT | Performed by: NURSE PRACTITIONER

## 2021-05-09 PROCEDURE — 25010000002 HYDRALAZINE PER 20 MG: Performed by: NURSE PRACTITIONER

## 2021-05-09 PROCEDURE — 85025 COMPLETE CBC W/AUTO DIFF WBC: CPT | Performed by: SURGERY

## 2021-05-09 PROCEDURE — 80053 COMPREHEN METABOLIC PANEL: CPT | Performed by: NURSE PRACTITIONER

## 2021-05-09 PROCEDURE — 83735 ASSAY OF MAGNESIUM: CPT | Performed by: NURSE PRACTITIONER

## 2021-05-09 PROCEDURE — 63710000001 DEXAMETHASONE PER 0.25 MG: Performed by: SURGERY

## 2021-05-09 RX ORDER — INSULIN LISPRO 100 [IU]/ML
0-9 INJECTION, SOLUTION INTRAVENOUS; SUBCUTANEOUS AS NEEDED
Status: DISCONTINUED | OUTPATIENT
Start: 2021-05-09 | End: 2021-05-09

## 2021-05-09 RX ORDER — INSULIN LISPRO 100 [IU]/ML
0-9 INJECTION, SOLUTION INTRAVENOUS; SUBCUTANEOUS
Status: DISCONTINUED | OUTPATIENT
Start: 2021-05-09 | End: 2021-05-09

## 2021-05-09 RX ADMIN — CEFAZOLIN SODIUM 2 G: 10 INJECTION, POWDER, FOR SOLUTION INTRAVENOUS at 16:29

## 2021-05-09 RX ADMIN — HYDROCODONE BITARTRATE AND ACETAMINOPHEN 1 TABLET: 7.5; 325 TABLET ORAL at 07:47

## 2021-05-09 RX ADMIN — DEXAMETHASONE 4 MG: 4 TABLET ORAL at 03:24

## 2021-05-09 RX ADMIN — SODIUM CHLORIDE, PRESERVATIVE FREE 10 ML: 5 INJECTION INTRAVENOUS at 08:37

## 2021-05-09 RX ADMIN — HYDRALAZINE HYDROCHLORIDE 10 MG: 20 INJECTION INTRAMUSCULAR; INTRAVENOUS at 02:20

## 2021-05-09 RX ADMIN — DEXAMETHASONE 4 MG: 4 TABLET ORAL at 14:53

## 2021-05-09 RX ADMIN — SODIUM CHLORIDE 20 UNITS/HR: 9 INJECTION, SOLUTION INTRAVENOUS at 03:24

## 2021-05-09 RX ADMIN — DEXTROSE MONOHYDRATE 12.5 G: 500 INJECTION PARENTERAL at 07:47

## 2021-05-09 RX ADMIN — LEVETIRACETAM 750 MG: 500 TABLET ORAL at 22:05

## 2021-05-09 RX ADMIN — DEXAMETHASONE 4 MG: 4 TABLET ORAL at 22:05

## 2021-05-09 RX ADMIN — CEFAZOLIN SODIUM 2 G: 10 INJECTION, POWDER, FOR SOLUTION INTRAVENOUS at 07:48

## 2021-05-09 RX ADMIN — INSULIN LISPRO 4 UNITS: 100 INJECTION, SOLUTION INTRAVENOUS; SUBCUTANEOUS at 12:16

## 2021-05-09 RX ADMIN — METOPROLOL TARTRATE 5 MG: 5 INJECTION INTRAVENOUS at 03:54

## 2021-05-09 RX ADMIN — AMLODIPINE BESYLATE 10 MG: 5 TABLET ORAL at 08:37

## 2021-05-09 RX ADMIN — HYDROCODONE BITARTRATE AND ACETAMINOPHEN 1 TABLET: 7.5; 325 TABLET ORAL at 16:29

## 2021-05-09 RX ADMIN — VENLAFAXINE HYDROCHLORIDE 75 MG: 75 CAPSULE, EXTENDED RELEASE ORAL at 08:37

## 2021-05-09 RX ADMIN — ACETAZOLAMIDE 125 MG: 250 TABLET ORAL at 08:37

## 2021-05-09 RX ADMIN — DEXAMETHASONE 4 MG: 4 TABLET ORAL at 08:37

## 2021-05-09 RX ADMIN — LEVETIRACETAM 750 MG: 500 TABLET ORAL at 08:37

## 2021-05-09 RX ADMIN — SODIUM CHLORIDE, PRESERVATIVE FREE 10 ML: 5 INJECTION INTRAVENOUS at 22:06

## 2021-05-09 RX ADMIN — METOPROLOL TARTRATE 12.5 MG: 25 TABLET, FILM COATED ORAL at 14:53

## 2021-05-09 RX ADMIN — ACETAZOLAMIDE 125 MG: 250 TABLET ORAL at 16:28

## 2021-05-09 RX ADMIN — METOPROLOL TARTRATE 12.5 MG: 25 TABLET, FILM COATED ORAL at 22:07

## 2021-05-09 RX ADMIN — ACETAZOLAMIDE 125 MG: 250 TABLET ORAL at 22:05

## 2021-05-09 RX ADMIN — HYDROCODONE BITARTRATE AND ACETAMINOPHEN 1 TABLET: 7.5; 325 TABLET ORAL at 22:54

## 2021-05-09 RX ADMIN — PANTOPRAZOLE SODIUM 40 MG: 40 TABLET, DELAYED RELEASE ORAL at 08:37

## 2021-05-10 LAB
ALBUMIN SERPL-MCNC: 3.2 G/DL (ref 3.5–5.2)
ALBUMIN/GLOB SERPL: 1.5 G/DL
ALP SERPL-CCNC: 70 U/L (ref 39–117)
ALT SERPL W P-5'-P-CCNC: 43 U/L (ref 1–33)
ANION GAP SERPL CALCULATED.3IONS-SCNC: 10 MMOL/L (ref 5–15)
ANISOCYTOSIS BLD QL: ABNORMAL
AST SERPL-CCNC: 19 U/L (ref 1–32)
BILIRUB SERPL-MCNC: 0.3 MG/DL (ref 0–1.2)
BUN SERPL-MCNC: 36 MG/DL (ref 6–20)
BUN/CREAT SERPL: 42.4 (ref 7–25)
CALCIUM SPEC-SCNC: 9 MG/DL (ref 8.6–10.5)
CHLORIDE SERPL-SCNC: 103 MMOL/L (ref 98–107)
CO2 SERPL-SCNC: 22 MMOL/L (ref 22–29)
CREAT SERPL-MCNC: 0.85 MG/DL (ref 0.57–1)
DEPRECATED RDW RBC AUTO: 51.6 FL (ref 37–54)
ERYTHROCYTE [DISTWIDTH] IN BLOOD BY AUTOMATED COUNT: 16.2 % (ref 12.3–15.4)
GFR SERPL CREATININE-BSD FRML MDRD: 70 ML/MIN/1.73
GLOBULIN UR ELPH-MCNC: 2.1 GM/DL
GLUCOSE BLDC GLUCOMTR-MCNC: 112 MG/DL (ref 70–105)
GLUCOSE BLDC GLUCOMTR-MCNC: 128 MG/DL (ref 70–105)
GLUCOSE BLDC GLUCOMTR-MCNC: 131 MG/DL (ref 70–105)
GLUCOSE BLDC GLUCOMTR-MCNC: 144 MG/DL (ref 70–105)
GLUCOSE BLDC GLUCOMTR-MCNC: 151 MG/DL (ref 70–105)
GLUCOSE BLDC GLUCOMTR-MCNC: 169 MG/DL (ref 70–105)
GLUCOSE BLDC GLUCOMTR-MCNC: 181 MG/DL (ref 70–105)
GLUCOSE BLDC GLUCOMTR-MCNC: 183 MG/DL (ref 70–105)
GLUCOSE BLDC GLUCOMTR-MCNC: 191 MG/DL (ref 70–105)
GLUCOSE BLDC GLUCOMTR-MCNC: 372 MG/DL (ref 70–105)
GLUCOSE BLDC GLUCOMTR-MCNC: 390 MG/DL (ref 70–105)
GLUCOSE BLDC GLUCOMTR-MCNC: 79 MG/DL (ref 70–105)
GLUCOSE BLDC GLUCOMTR-MCNC: 85 MG/DL (ref 70–105)
GLUCOSE SERPL-MCNC: 183 MG/DL (ref 65–99)
HCT VFR BLD AUTO: 30.8 % (ref 34–46.6)
HGB BLD-MCNC: 10.4 G/DL (ref 12–15.9)
LYMPHOCYTES # BLD MANUAL: 0.7 10*3/MM3 (ref 0.7–3.1)
LYMPHOCYTES NFR BLD MANUAL: 2 % (ref 5–12)
LYMPHOCYTES NFR BLD MANUAL: 8 % (ref 19.6–45.3)
MAGNESIUM SERPL-MCNC: 1.9 MG/DL (ref 1.6–2.6)
MCH RBC QN AUTO: 30.5 PG (ref 26.6–33)
MCHC RBC AUTO-ENTMCNC: 33.9 G/DL (ref 31.5–35.7)
MCV RBC AUTO: 89.9 FL (ref 79–97)
MONOCYTES # BLD AUTO: 0.18 10*3/MM3 (ref 0.1–0.9)
NEUTROPHILS # BLD AUTO: 7.92 10*3/MM3 (ref 1.7–7)
NEUTROPHILS NFR BLD MANUAL: 86 % (ref 42.7–76)
NEUTS BAND NFR BLD MANUAL: 4 % (ref 0–5)
PHOSPHATE SERPL-MCNC: 3.5 MG/DL (ref 2.5–4.5)
PLATELET # BLD AUTO: 74 10*3/MM3 (ref 140–450)
PMV BLD AUTO: 7.7 FL (ref 6–12)
POTASSIUM SERPL-SCNC: 4.7 MMOL/L (ref 3.5–5.2)
PROT SERPL-MCNC: 5.3 G/DL (ref 6–8.5)
RBC # BLD AUTO: 3.42 10*6/MM3 (ref 3.77–5.28)
SCAN SLIDE: NORMAL
SMALL PLATELETS BLD QL SMEAR: ABNORMAL
SODIUM SERPL-SCNC: 135 MMOL/L (ref 136–145)
WBC # BLD AUTO: 8.8 10*3/MM3 (ref 3.4–10.8)
WBC MORPH BLD: NORMAL

## 2021-05-10 PROCEDURE — 83735 ASSAY OF MAGNESIUM: CPT | Performed by: NURSE PRACTITIONER

## 2021-05-10 PROCEDURE — 85025 COMPLETE CBC W/AUTO DIFF WBC: CPT | Performed by: SURGERY

## 2021-05-10 PROCEDURE — 63710000001 INSULIN REGULAR HUMAN PER 5 UNITS: Performed by: NURSE PRACTITIONER

## 2021-05-10 PROCEDURE — 99222 1ST HOSP IP/OBS MODERATE 55: CPT | Performed by: INTERNAL MEDICINE

## 2021-05-10 PROCEDURE — 25010000002 CEFAZOLIN PER 500 MG: Performed by: SURGERY

## 2021-05-10 PROCEDURE — 84100 ASSAY OF PHOSPHORUS: CPT | Performed by: NURSE PRACTITIONER

## 2021-05-10 PROCEDURE — 97530 THERAPEUTIC ACTIVITIES: CPT

## 2021-05-10 PROCEDURE — 82962 GLUCOSE BLOOD TEST: CPT

## 2021-05-10 PROCEDURE — 85007 BL SMEAR W/DIFF WBC COUNT: CPT | Performed by: SURGERY

## 2021-05-10 PROCEDURE — 80053 COMPREHEN METABOLIC PANEL: CPT | Performed by: NURSE PRACTITIONER

## 2021-05-10 PROCEDURE — 63710000001 DEXAMETHASONE PER 0.25 MG: Performed by: SURGERY

## 2021-05-10 PROCEDURE — 97116 GAIT TRAINING THERAPY: CPT

## 2021-05-10 PROCEDURE — 63710000001 INSULIN LISPRO (HUMAN) PER 5 UNITS: Performed by: INTERNAL MEDICINE

## 2021-05-10 PROCEDURE — 63710000001 INSULIN GLARGINE PER 5 UNITS: Performed by: INTERNAL MEDICINE

## 2021-05-10 PROCEDURE — 63710000001 INSULIN LISPRO (HUMAN) PER 5 UNITS: Performed by: STUDENT IN AN ORGANIZED HEALTH CARE EDUCATION/TRAINING PROGRAM

## 2021-05-10 RX ORDER — INSULIN LISPRO 100 [IU]/ML
0-14 INJECTION, SOLUTION INTRAVENOUS; SUBCUTANEOUS
Status: DISCONTINUED | OUTPATIENT
Start: 2021-05-10 | End: 2021-05-10

## 2021-05-10 RX ORDER — INSULIN GLARGINE 100 [IU]/ML
20 INJECTION, SOLUTION SUBCUTANEOUS DAILY
Status: DISCONTINUED | OUTPATIENT
Start: 2021-05-10 | End: 2021-05-11

## 2021-05-10 RX ORDER — INSULIN LISPRO 100 [IU]/ML
0-14 INJECTION, SOLUTION INTRAVENOUS; SUBCUTANEOUS
Status: DISCONTINUED | OUTPATIENT
Start: 2021-05-10 | End: 2021-05-13 | Stop reason: HOSPADM

## 2021-05-10 RX ORDER — NICOTINE POLACRILEX 4 MG
15 LOZENGE BUCCAL
Status: DISCONTINUED | OUTPATIENT
Start: 2021-05-10 | End: 2021-05-13 | Stop reason: HOSPADM

## 2021-05-10 RX ORDER — INSULIN LISPRO 100 [IU]/ML
0-14 INJECTION, SOLUTION INTRAVENOUS; SUBCUTANEOUS AS NEEDED
Status: DISCONTINUED | OUTPATIENT
Start: 2021-05-10 | End: 2021-05-10

## 2021-05-10 RX ORDER — DEXTROSE MONOHYDRATE 25 G/50ML
25 INJECTION, SOLUTION INTRAVENOUS
Status: DISCONTINUED | OUTPATIENT
Start: 2021-05-10 | End: 2021-05-13 | Stop reason: HOSPADM

## 2021-05-10 RX ADMIN — DEXAMETHASONE 4 MG: 4 TABLET ORAL at 08:02

## 2021-05-10 RX ADMIN — METOPROLOL TARTRATE 12.5 MG: 25 TABLET, FILM COATED ORAL at 13:37

## 2021-05-10 RX ADMIN — CEFAZOLIN SODIUM 2 G: 10 INJECTION, POWDER, FOR SOLUTION INTRAVENOUS at 00:16

## 2021-05-10 RX ADMIN — INSULIN LISPRO 12 UNITS: 100 INJECTION, SOLUTION INTRAVENOUS; SUBCUTANEOUS at 18:29

## 2021-05-10 RX ADMIN — DEXAMETHASONE 4 MG: 4 TABLET ORAL at 16:02

## 2021-05-10 RX ADMIN — METOPROLOL TARTRATE 5 MG: 5 INJECTION INTRAVENOUS at 02:33

## 2021-05-10 RX ADMIN — ACETAZOLAMIDE 125 MG: 250 TABLET ORAL at 16:02

## 2021-05-10 RX ADMIN — SODIUM CHLORIDE, PRESERVATIVE FREE 10 ML: 5 INJECTION INTRAVENOUS at 21:35

## 2021-05-10 RX ADMIN — HYDROCODONE BITARTRATE AND ACETAMINOPHEN 1 TABLET: 7.5; 325 TABLET ORAL at 13:37

## 2021-05-10 RX ADMIN — VENLAFAXINE HYDROCHLORIDE 75 MG: 75 CAPSULE, EXTENDED RELEASE ORAL at 08:02

## 2021-05-10 RX ADMIN — SODIUM CHLORIDE 7 UNITS/HR: 9 INJECTION, SOLUTION INTRAVENOUS at 00:13

## 2021-05-10 RX ADMIN — PANTOPRAZOLE SODIUM 40 MG: 40 TABLET, DELAYED RELEASE ORAL at 08:02

## 2021-05-10 RX ADMIN — ACETAZOLAMIDE 125 MG: 250 TABLET ORAL at 08:01

## 2021-05-10 RX ADMIN — ACETAZOLAMIDE 125 MG: 250 TABLET ORAL at 21:36

## 2021-05-10 RX ADMIN — INSULIN LISPRO 10 UNITS: 100 INJECTION, SOLUTION INTRAVENOUS; SUBCUTANEOUS at 21:34

## 2021-05-10 RX ADMIN — CEFAZOLIN SODIUM 2 G: 10 INJECTION, POWDER, FOR SOLUTION INTRAVENOUS at 16:02

## 2021-05-10 RX ADMIN — INSULIN GLARGINE 20 UNITS: 100 INJECTION, SOLUTION SUBCUTANEOUS at 10:00

## 2021-05-10 RX ADMIN — CEFAZOLIN SODIUM 2 G: 10 INJECTION, POWDER, FOR SOLUTION INTRAVENOUS at 07:55

## 2021-05-10 RX ADMIN — METOPROLOL TARTRATE 12.5 MG: 25 TABLET, FILM COATED ORAL at 06:40

## 2021-05-10 RX ADMIN — LEVETIRACETAM 750 MG: 500 TABLET ORAL at 21:34

## 2021-05-10 RX ADMIN — AMLODIPINE BESYLATE 10 MG: 5 TABLET ORAL at 08:02

## 2021-05-10 RX ADMIN — LEVETIRACETAM 750 MG: 500 TABLET ORAL at 08:02

## 2021-05-10 RX ADMIN — SODIUM CHLORIDE, PRESERVATIVE FREE 10 ML: 5 INJECTION INTRAVENOUS at 08:02

## 2021-05-10 RX ADMIN — DEXAMETHASONE 4 MG: 4 TABLET ORAL at 21:35

## 2021-05-10 RX ADMIN — METOPROLOL TARTRATE 12.5 MG: 25 TABLET, FILM COATED ORAL at 21:34

## 2021-05-10 RX ADMIN — SODIUM CHLORIDE 5 MG/HR: 0.9 INJECTION, SOLUTION INTRAVENOUS at 04:07

## 2021-05-10 RX ADMIN — DEXAMETHASONE 4 MG: 4 TABLET ORAL at 03:36

## 2021-05-11 LAB
ALBUMIN SERPL-MCNC: 3.3 G/DL (ref 3.5–5.2)
ALBUMIN/GLOB SERPL: 1.7 G/DL
ALP SERPL-CCNC: 69 U/L (ref 39–117)
ALT SERPL W P-5'-P-CCNC: 37 U/L (ref 1–33)
ANION GAP SERPL CALCULATED.3IONS-SCNC: 9 MMOL/L (ref 5–15)
ANISOCYTOSIS BLD QL: ABNORMAL
AST SERPL-CCNC: 18 U/L (ref 1–32)
BILIRUB SERPL-MCNC: 0.3 MG/DL (ref 0–1.2)
BUN SERPL-MCNC: 35 MG/DL (ref 6–20)
BUN/CREAT SERPL: 42.2 (ref 7–25)
CALCIUM SPEC-SCNC: 9.4 MG/DL (ref 8.6–10.5)
CHLORIDE SERPL-SCNC: 102 MMOL/L (ref 98–107)
CO2 SERPL-SCNC: 22 MMOL/L (ref 22–29)
CREAT SERPL-MCNC: 0.83 MG/DL (ref 0.57–1)
DEPRECATED RDW RBC AUTO: 50.8 FL (ref 37–54)
ERYTHROCYTE [DISTWIDTH] IN BLOOD BY AUTOMATED COUNT: 16.1 % (ref 12.3–15.4)
GFR SERPL CREATININE-BSD FRML MDRD: 72 ML/MIN/1.73
GLOBULIN UR ELPH-MCNC: 2 GM/DL
GLUCOSE BLDC GLUCOMTR-MCNC: 262 MG/DL (ref 70–105)
GLUCOSE BLDC GLUCOMTR-MCNC: 290 MG/DL (ref 70–105)
GLUCOSE BLDC GLUCOMTR-MCNC: 315 MG/DL (ref 70–105)
GLUCOSE BLDC GLUCOMTR-MCNC: 325 MG/DL (ref 70–105)
GLUCOSE BLDC GLUCOMTR-MCNC: 329 MG/DL (ref 70–105)
GLUCOSE BLDC GLUCOMTR-MCNC: 342 MG/DL (ref 70–105)
GLUCOSE SERPL-MCNC: 305 MG/DL (ref 65–99)
HCT VFR BLD AUTO: 30.3 % (ref 34–46.6)
HGB BLD-MCNC: 10.3 G/DL (ref 12–15.9)
LYMPHOCYTES # BLD MANUAL: 0.43 10*3/MM3 (ref 0.7–3.1)
LYMPHOCYTES NFR BLD MANUAL: 2 % (ref 5–12)
LYMPHOCYTES NFR BLD MANUAL: 5 % (ref 19.6–45.3)
MAGNESIUM SERPL-MCNC: 2 MG/DL (ref 1.6–2.6)
MCH RBC QN AUTO: 30.9 PG (ref 26.6–33)
MCHC RBC AUTO-ENTMCNC: 34 G/DL (ref 31.5–35.7)
MCV RBC AUTO: 90.9 FL (ref 79–97)
MONOCYTES # BLD AUTO: 0.17 10*3/MM3 (ref 0.1–0.9)
NEUTROPHILS # BLD AUTO: 8 10*3/MM3 (ref 1.7–7)
NEUTROPHILS NFR BLD MANUAL: 90 % (ref 42.7–76)
NEUTS BAND NFR BLD MANUAL: 3 % (ref 0–5)
PHOSPHATE SERPL-MCNC: 3.6 MG/DL (ref 2.5–4.5)
PLAT MORPH BLD: NORMAL
PLATELET # BLD AUTO: 166 10*3/MM3 (ref 140–450)
PMV BLD AUTO: 7.5 FL (ref 6–12)
POLYCHROMASIA BLD QL SMEAR: ABNORMAL
POTASSIUM SERPL-SCNC: 4.6 MMOL/L (ref 3.5–5.2)
PROT SERPL-MCNC: 5.3 G/DL (ref 6–8.5)
RBC # BLD AUTO: 3.34 10*6/MM3 (ref 3.77–5.28)
SCAN SLIDE: NORMAL
SODIUM SERPL-SCNC: 133 MMOL/L (ref 136–145)
WBC # BLD AUTO: 8.6 10*3/MM3 (ref 3.4–10.8)
WBC MORPH BLD: NORMAL

## 2021-05-11 PROCEDURE — 97530 THERAPEUTIC ACTIVITIES: CPT

## 2021-05-11 PROCEDURE — 84100 ASSAY OF PHOSPHORUS: CPT | Performed by: NURSE PRACTITIONER

## 2021-05-11 PROCEDURE — 25010000002 CEFAZOLIN PER 500 MG: Performed by: SURGERY

## 2021-05-11 PROCEDURE — 63710000001 INSULIN GLARGINE PER 5 UNITS: Performed by: NURSE PRACTITIONER

## 2021-05-11 PROCEDURE — 85025 COMPLETE CBC W/AUTO DIFF WBC: CPT | Performed by: SURGERY

## 2021-05-11 PROCEDURE — 99223 1ST HOSP IP/OBS HIGH 75: CPT | Performed by: NURSE PRACTITIONER

## 2021-05-11 PROCEDURE — 83735 ASSAY OF MAGNESIUM: CPT | Performed by: NURSE PRACTITIONER

## 2021-05-11 PROCEDURE — 63710000001 INSULIN GLARGINE PER 5 UNITS: Performed by: INTERNAL MEDICINE

## 2021-05-11 PROCEDURE — 63710000001 DEXAMETHASONE PER 0.25 MG: Performed by: SURGERY

## 2021-05-11 PROCEDURE — 97116 GAIT TRAINING THERAPY: CPT

## 2021-05-11 PROCEDURE — 82962 GLUCOSE BLOOD TEST: CPT

## 2021-05-11 PROCEDURE — 99231 SBSQ HOSP IP/OBS SF/LOW 25: CPT | Performed by: INTERNAL MEDICINE

## 2021-05-11 PROCEDURE — 63710000001 INSULIN REGULAR HUMAN PER 5 UNITS: Performed by: INTERNAL MEDICINE

## 2021-05-11 PROCEDURE — 25010000002 HYDRALAZINE PER 20 MG: Performed by: NURSE PRACTITIONER

## 2021-05-11 PROCEDURE — 85007 BL SMEAR W/DIFF WBC COUNT: CPT | Performed by: SURGERY

## 2021-05-11 PROCEDURE — 80053 COMPREHEN METABOLIC PANEL: CPT | Performed by: NURSE PRACTITIONER

## 2021-05-11 PROCEDURE — 63710000001 INSULIN LISPRO (HUMAN) PER 5 UNITS: Performed by: STUDENT IN AN ORGANIZED HEALTH CARE EDUCATION/TRAINING PROGRAM

## 2021-05-11 RX ORDER — INSULIN GLARGINE 100 [IU]/ML
15 INJECTION, SOLUTION SUBCUTANEOUS EVERY 12 HOURS SCHEDULED
Status: DISCONTINUED | OUTPATIENT
Start: 2021-05-11 | End: 2021-05-13 | Stop reason: HOSPADM

## 2021-05-11 RX ADMIN — CEFAZOLIN SODIUM 2 G: 10 INJECTION, POWDER, FOR SOLUTION INTRAVENOUS at 08:38

## 2021-05-11 RX ADMIN — ACETAZOLAMIDE 125 MG: 250 TABLET ORAL at 08:37

## 2021-05-11 RX ADMIN — SODIUM CHLORIDE, PRESERVATIVE FREE 10 ML: 5 INJECTION INTRAVENOUS at 20:41

## 2021-05-11 RX ADMIN — METOPROLOL TARTRATE 5 MG: 5 INJECTION INTRAVENOUS at 00:00

## 2021-05-11 RX ADMIN — DEXAMETHASONE 4 MG: 4 TABLET ORAL at 08:37

## 2021-05-11 RX ADMIN — ACETAZOLAMIDE 125 MG: 250 TABLET ORAL at 15:03

## 2021-05-11 RX ADMIN — DEXAMETHASONE 4 MG: 4 TABLET ORAL at 15:03

## 2021-05-11 RX ADMIN — PANTOPRAZOLE SODIUM 40 MG: 40 TABLET, DELAYED RELEASE ORAL at 08:39

## 2021-05-11 RX ADMIN — DEXAMETHASONE 4 MG: 4 TABLET ORAL at 21:21

## 2021-05-11 RX ADMIN — METOPROLOL TARTRATE 12.5 MG: 25 TABLET, FILM COATED ORAL at 21:21

## 2021-05-11 RX ADMIN — INSULIN LISPRO 12 UNITS: 100 INJECTION, SOLUTION INTRAVENOUS; SUBCUTANEOUS at 08:38

## 2021-05-11 RX ADMIN — INSULIN LISPRO 10 UNITS: 100 INJECTION, SOLUTION INTRAVENOUS; SUBCUTANEOUS at 18:02

## 2021-05-11 RX ADMIN — INSULIN GLARGINE 15 UNITS: 100 INJECTION, SOLUTION SUBCUTANEOUS at 22:30

## 2021-05-11 RX ADMIN — DEXAMETHASONE 4 MG: 4 TABLET ORAL at 03:38

## 2021-05-11 RX ADMIN — METOPROLOL TARTRATE 12.5 MG: 25 TABLET, FILM COATED ORAL at 15:03

## 2021-05-11 RX ADMIN — AMLODIPINE BESYLATE 10 MG: 5 TABLET ORAL at 08:38

## 2021-05-11 RX ADMIN — INSULIN HUMAN 4 UNITS: 100 INJECTION, SOLUTION PARENTERAL at 15:03

## 2021-05-11 RX ADMIN — LEVETIRACETAM 750 MG: 500 TABLET ORAL at 08:37

## 2021-05-11 RX ADMIN — INSULIN LISPRO 12 UNITS: 100 INJECTION, SOLUTION INTRAVENOUS; SUBCUTANEOUS at 12:09

## 2021-05-11 RX ADMIN — INSULIN HUMAN 4 UNITS: 100 INJECTION, SOLUTION PARENTERAL at 20:41

## 2021-05-11 RX ADMIN — LEVETIRACETAM 750 MG: 500 TABLET ORAL at 20:41

## 2021-05-11 RX ADMIN — METOPROLOL TARTRATE 5 MG: 5 INJECTION INTRAVENOUS at 06:43

## 2021-05-11 RX ADMIN — SODIUM CHLORIDE, PRESERVATIVE FREE 10 ML: 5 INJECTION INTRAVENOUS at 08:38

## 2021-05-11 RX ADMIN — ACETAZOLAMIDE 125 MG: 250 TABLET ORAL at 20:44

## 2021-05-11 RX ADMIN — INSULIN GLARGINE 20 UNITS: 100 INJECTION, SOLUTION SUBCUTANEOUS at 08:40

## 2021-05-11 RX ADMIN — CEFAZOLIN SODIUM 2 G: 10 INJECTION, POWDER, FOR SOLUTION INTRAVENOUS at 00:00

## 2021-05-11 RX ADMIN — HYDRALAZINE HYDROCHLORIDE 10 MG: 20 INJECTION INTRAMUSCULAR; INTRAVENOUS at 01:57

## 2021-05-11 RX ADMIN — VENLAFAXINE HYDROCHLORIDE 75 MG: 75 CAPSULE, EXTENDED RELEASE ORAL at 08:38

## 2021-05-11 RX ADMIN — HYDROCODONE BITARTRATE AND ACETAMINOPHEN 1 TABLET: 7.5; 325 TABLET ORAL at 12:11

## 2021-05-11 RX ADMIN — METOPROLOL TARTRATE 12.5 MG: 25 TABLET, FILM COATED ORAL at 06:00

## 2021-05-11 RX ADMIN — INSULIN LISPRO 10 UNITS: 100 INJECTION, SOLUTION INTRAVENOUS; SUBCUTANEOUS at 20:40

## 2021-05-11 RX ADMIN — HYDROCODONE BITARTRATE AND ACETAMINOPHEN 1 TABLET: 7.5; 325 TABLET ORAL at 22:42

## 2021-05-12 LAB
ALBUMIN SERPL-MCNC: 3.2 G/DL (ref 3.5–5.2)
ALBUMIN/GLOB SERPL: 1.3 G/DL
ALP SERPL-CCNC: 70 U/L (ref 39–117)
ALT SERPL W P-5'-P-CCNC: 44 U/L (ref 1–33)
ANION GAP SERPL CALCULATED.3IONS-SCNC: 11 MMOL/L (ref 5–15)
ANISOCYTOSIS BLD QL: ABNORMAL
AST SERPL-CCNC: 28 U/L (ref 1–32)
BILIRUB SERPL-MCNC: 0.4 MG/DL (ref 0–1.2)
BUN SERPL-MCNC: 33 MG/DL (ref 6–20)
BUN/CREAT SERPL: 40.7 (ref 7–25)
CALCIUM SPEC-SCNC: 9.2 MG/DL (ref 8.6–10.5)
CHLORIDE SERPL-SCNC: 103 MMOL/L (ref 98–107)
CO2 SERPL-SCNC: 19 MMOL/L (ref 22–29)
CREAT SERPL-MCNC: 0.81 MG/DL (ref 0.57–1)
DEPRECATED RDW RBC AUTO: 51.6 FL (ref 37–54)
ERYTHROCYTE [DISTWIDTH] IN BLOOD BY AUTOMATED COUNT: 16.2 % (ref 12.3–15.4)
GFR SERPL CREATININE-BSD FRML MDRD: 74 ML/MIN/1.73
GLOBULIN UR ELPH-MCNC: 2.4 GM/DL
GLUCOSE BLDC GLUCOMTR-MCNC: 178 MG/DL (ref 70–105)
GLUCOSE BLDC GLUCOMTR-MCNC: 178 MG/DL (ref 70–105)
GLUCOSE BLDC GLUCOMTR-MCNC: 217 MG/DL (ref 70–105)
GLUCOSE BLDC GLUCOMTR-MCNC: 281 MG/DL (ref 70–105)
GLUCOSE BLDC GLUCOMTR-MCNC: 315 MG/DL (ref 70–105)
GLUCOSE SERPL-MCNC: 185 MG/DL (ref 65–99)
HCT VFR BLD AUTO: 29.8 % (ref 34–46.6)
HGB BLD-MCNC: 10.2 G/DL (ref 12–15.9)
LYMPHOCYTES # BLD MANUAL: 0.99 10*3/MM3 (ref 0.7–3.1)
LYMPHOCYTES NFR BLD MANUAL: 10 % (ref 19.6–45.3)
LYMPHOCYTES NFR BLD MANUAL: 2 % (ref 5–12)
MAGNESIUM SERPL-MCNC: 2 MG/DL (ref 1.6–2.6)
MCH RBC QN AUTO: 31 PG (ref 26.6–33)
MCHC RBC AUTO-ENTMCNC: 34.3 G/DL (ref 31.5–35.7)
MCV RBC AUTO: 90.3 FL (ref 79–97)
METAMYELOCYTES NFR BLD MANUAL: 5 % (ref 0–0)
MONOCYTES # BLD AUTO: 0.2 10*3/MM3 (ref 0.1–0.9)
MYELOCYTES NFR BLD MANUAL: 2 % (ref 0–0)
NEUTROPHILS # BLD AUTO: 8.02 10*3/MM3 (ref 1.7–7)
NEUTROPHILS NFR BLD MANUAL: 66 % (ref 42.7–76)
NEUTS BAND NFR BLD MANUAL: 15 % (ref 0–5)
PHOSPHATE SERPL-MCNC: 3.5 MG/DL (ref 2.5–4.5)
PLAT MORPH BLD: NORMAL
PLATELET # BLD AUTO: 188 10*3/MM3 (ref 140–450)
PMV BLD AUTO: 7.2 FL (ref 6–12)
POTASSIUM SERPL-SCNC: 4.7 MMOL/L (ref 3.5–5.2)
PROT SERPL-MCNC: 5.6 G/DL (ref 6–8.5)
RBC # BLD AUTO: 3.3 10*6/MM3 (ref 3.77–5.28)
SCAN SLIDE: NORMAL
SODIUM SERPL-SCNC: 133 MMOL/L (ref 136–145)
WBC # BLD AUTO: 9.9 10*3/MM3 (ref 3.4–10.8)
WBC MORPH BLD: NORMAL

## 2021-05-12 PROCEDURE — 83735 ASSAY OF MAGNESIUM: CPT | Performed by: NURSE PRACTITIONER

## 2021-05-12 PROCEDURE — 63710000001 INSULIN REGULAR HUMAN PER 5 UNITS: Performed by: INTERNAL MEDICINE

## 2021-05-12 PROCEDURE — G0108 DIAB MANAGE TRN  PER INDIV: HCPCS

## 2021-05-12 PROCEDURE — 99233 SBSQ HOSP IP/OBS HIGH 50: CPT | Performed by: STUDENT IN AN ORGANIZED HEALTH CARE EDUCATION/TRAINING PROGRAM

## 2021-05-12 PROCEDURE — 85007 BL SMEAR W/DIFF WBC COUNT: CPT | Performed by: SURGERY

## 2021-05-12 PROCEDURE — 84100 ASSAY OF PHOSPHORUS: CPT | Performed by: NURSE PRACTITIONER

## 2021-05-12 PROCEDURE — 63710000001 INSULIN GLARGINE PER 5 UNITS: Performed by: NURSE PRACTITIONER

## 2021-05-12 PROCEDURE — 63710000001 DEXAMETHASONE PER 0.25 MG: Performed by: SURGERY

## 2021-05-12 PROCEDURE — 63710000001 INSULIN LISPRO (HUMAN) PER 5 UNITS: Performed by: STUDENT IN AN ORGANIZED HEALTH CARE EDUCATION/TRAINING PROGRAM

## 2021-05-12 PROCEDURE — 99231 SBSQ HOSP IP/OBS SF/LOW 25: CPT | Performed by: INTERNAL MEDICINE

## 2021-05-12 PROCEDURE — 25010000002 HYDRALAZINE PER 20 MG: Performed by: NURSE PRACTITIONER

## 2021-05-12 PROCEDURE — 85025 COMPLETE CBC W/AUTO DIFF WBC: CPT | Performed by: SURGERY

## 2021-05-12 PROCEDURE — 99024 POSTOP FOLLOW-UP VISIT: CPT | Performed by: SURGERY

## 2021-05-12 PROCEDURE — 80053 COMPREHEN METABOLIC PANEL: CPT | Performed by: NURSE PRACTITIONER

## 2021-05-12 PROCEDURE — 82962 GLUCOSE BLOOD TEST: CPT

## 2021-05-12 RX ORDER — DEXAMETHASONE 0.5 MG/1
2 TABLET ORAL EVERY 6 HOURS
Status: DISCONTINUED | OUTPATIENT
Start: 2021-05-12 | End: 2021-05-13 | Stop reason: HOSPADM

## 2021-05-12 RX ADMIN — HYDROCODONE BITARTRATE AND ACETAMINOPHEN 1 TABLET: 7.5; 325 TABLET ORAL at 14:42

## 2021-05-12 RX ADMIN — INSULIN HUMAN 4 UNITS: 100 INJECTION, SOLUTION PARENTERAL at 04:14

## 2021-05-12 RX ADMIN — INSULIN HUMAN 4 UNITS: 100 INJECTION, SOLUTION PARENTERAL at 08:32

## 2021-05-12 RX ADMIN — INSULIN LISPRO 8 UNITS: 100 INJECTION, SOLUTION INTRAVENOUS; SUBCUTANEOUS at 22:18

## 2021-05-12 RX ADMIN — DEXAMETHASONE 4 MG: 4 TABLET ORAL at 09:28

## 2021-05-12 RX ADMIN — ACETAZOLAMIDE 125 MG: 250 TABLET ORAL at 09:29

## 2021-05-12 RX ADMIN — HYDROCODONE BITARTRATE AND ACETAMINOPHEN 1 TABLET: 7.5; 325 TABLET ORAL at 08:33

## 2021-05-12 RX ADMIN — LEVETIRACETAM 750 MG: 500 TABLET ORAL at 22:16

## 2021-05-12 RX ADMIN — INSULIN GLARGINE 15 UNITS: 100 INJECTION, SOLUTION SUBCUTANEOUS at 22:18

## 2021-05-12 RX ADMIN — INSULIN GLARGINE 15 UNITS: 100 INJECTION, SOLUTION SUBCUTANEOUS at 08:42

## 2021-05-12 RX ADMIN — HYDRALAZINE HYDROCHLORIDE 10 MG: 20 INJECTION INTRAMUSCULAR; INTRAVENOUS at 18:17

## 2021-05-12 RX ADMIN — INSULIN LISPRO 5 UNITS: 100 INJECTION, SOLUTION INTRAVENOUS; SUBCUTANEOUS at 08:32

## 2021-05-12 RX ADMIN — METOPROLOL TARTRATE 12.5 MG: 25 TABLET, FILM COATED ORAL at 22:17

## 2021-05-12 RX ADMIN — DEXAMETHASONE 2 MG: 0.5 TABLET ORAL at 14:43

## 2021-05-12 RX ADMIN — INSULIN HUMAN 4 UNITS: 100 INJECTION, SOLUTION PARENTERAL at 22:18

## 2021-05-12 RX ADMIN — VENLAFAXINE HYDROCHLORIDE 75 MG: 75 CAPSULE, EXTENDED RELEASE ORAL at 08:33

## 2021-05-12 RX ADMIN — INSULIN HUMAN 4 UNITS: 100 INJECTION, SOLUTION PARENTERAL at 14:42

## 2021-05-12 RX ADMIN — SODIUM CHLORIDE, PRESERVATIVE FREE 10 ML: 5 INJECTION INTRAVENOUS at 22:21

## 2021-05-12 RX ADMIN — INSULIN LISPRO 3 UNITS: 100 INJECTION, SOLUTION INTRAVENOUS; SUBCUTANEOUS at 12:14

## 2021-05-12 RX ADMIN — ACETAZOLAMIDE 125 MG: 250 TABLET ORAL at 15:24

## 2021-05-12 RX ADMIN — DEXAMETHASONE 2 MG: 0.5 TABLET ORAL at 22:17

## 2021-05-12 RX ADMIN — INSULIN LISPRO 3 UNITS: 100 INJECTION, SOLUTION INTRAVENOUS; SUBCUTANEOUS at 18:05

## 2021-05-12 RX ADMIN — LEVETIRACETAM 750 MG: 500 TABLET ORAL at 08:33

## 2021-05-12 RX ADMIN — AMLODIPINE BESYLATE 10 MG: 5 TABLET ORAL at 08:33

## 2021-05-12 RX ADMIN — PANTOPRAZOLE SODIUM 40 MG: 40 TABLET, DELAYED RELEASE ORAL at 08:33

## 2021-05-12 RX ADMIN — DEXAMETHASONE 4 MG: 4 TABLET ORAL at 04:14

## 2021-05-12 RX ADMIN — METOPROLOL TARTRATE 12.5 MG: 25 TABLET, FILM COATED ORAL at 14:43

## 2021-05-12 RX ADMIN — HYDROCODONE BITARTRATE AND ACETAMINOPHEN 1 TABLET: 7.5; 325 TABLET ORAL at 22:18

## 2021-05-12 RX ADMIN — SODIUM CHLORIDE, PRESERVATIVE FREE 10 ML: 5 INJECTION INTRAVENOUS at 09:29

## 2021-05-12 RX ADMIN — ACETAZOLAMIDE 125 MG: 250 TABLET ORAL at 22:17

## 2021-05-13 ENCOUNTER — READMISSION MANAGEMENT (OUTPATIENT)
Dept: CALL CENTER | Facility: HOSPITAL | Age: 56
End: 2021-05-13

## 2021-05-13 VITALS
OXYGEN SATURATION: 99 % | BODY MASS INDEX: 53.92 KG/M2 | HEART RATE: 64 BPM | TEMPERATURE: 97.6 F | DIASTOLIC BLOOD PRESSURE: 57 MMHG | WEIGHT: 293 LBS | HEIGHT: 62 IN | SYSTOLIC BLOOD PRESSURE: 137 MMHG | RESPIRATION RATE: 14 BRPM

## 2021-05-13 LAB
ALBUMIN SERPL-MCNC: 3 G/DL (ref 3.5–5.2)
ALBUMIN/GLOB SERPL: 1.2 G/DL
ALP SERPL-CCNC: 67 U/L (ref 39–117)
ALT SERPL W P-5'-P-CCNC: 42 U/L (ref 1–33)
ANION GAP SERPL CALCULATED.3IONS-SCNC: 11 MMOL/L (ref 5–15)
ANISOCYTOSIS BLD QL: ABNORMAL
AST SERPL-CCNC: 22 U/L (ref 1–32)
BILIRUB SERPL-MCNC: 0.3 MG/DL (ref 0–1.2)
BUN SERPL-MCNC: 34 MG/DL (ref 6–20)
BUN/CREAT SERPL: 41.5 (ref 7–25)
CALCIUM SPEC-SCNC: 9.4 MG/DL (ref 8.6–10.5)
CHLORIDE SERPL-SCNC: 104 MMOL/L (ref 98–107)
CO2 SERPL-SCNC: 19 MMOL/L (ref 22–29)
CREAT SERPL-MCNC: 0.82 MG/DL (ref 0.57–1)
DEPRECATED RDW RBC AUTO: 52.9 FL (ref 37–54)
ERYTHROCYTE [DISTWIDTH] IN BLOOD BY AUTOMATED COUNT: 16.2 % (ref 12.3–15.4)
GFR SERPL CREATININE-BSD FRML MDRD: 73 ML/MIN/1.73
GLOBULIN UR ELPH-MCNC: 2.5 GM/DL
GLUCOSE BLDC GLUCOMTR-MCNC: 205 MG/DL (ref 70–105)
GLUCOSE BLDC GLUCOMTR-MCNC: 264 MG/DL (ref 70–105)
GLUCOSE BLDC GLUCOMTR-MCNC: 281 MG/DL (ref 70–105)
GLUCOSE SERPL-MCNC: 250 MG/DL (ref 65–99)
HCT VFR BLD AUTO: 33 % (ref 34–46.6)
HGB BLD-MCNC: 10.9 G/DL (ref 12–15.9)
LYMPHOCYTES # BLD MANUAL: 0.87 10*3/MM3 (ref 0.7–3.1)
LYMPHOCYTES NFR BLD MANUAL: 5 % (ref 5–12)
LYMPHOCYTES NFR BLD MANUAL: 7 % (ref 19.6–45.3)
MAGNESIUM SERPL-MCNC: 1.9 MG/DL (ref 1.6–2.6)
MCH RBC QN AUTO: 30.4 PG (ref 26.6–33)
MCHC RBC AUTO-ENTMCNC: 32.9 G/DL (ref 31.5–35.7)
MCV RBC AUTO: 92.3 FL (ref 79–97)
MONOCYTES # BLD AUTO: 0.62 10*3/MM3 (ref 0.1–0.9)
MYELOCYTES NFR BLD MANUAL: 2 % (ref 0–0)
NEUTROPHILS # BLD AUTO: 10.66 10*3/MM3 (ref 1.7–7)
NEUTROPHILS NFR BLD MANUAL: 85 % (ref 42.7–76)
NEUTS BAND NFR BLD MANUAL: 1 % (ref 0–5)
PHOSPHATE SERPL-MCNC: 3 MG/DL (ref 2.5–4.5)
PLAT MORPH BLD: NORMAL
PLATELET # BLD AUTO: 181 10*3/MM3 (ref 140–450)
PMV BLD AUTO: 7.4 FL (ref 6–12)
POTASSIUM SERPL-SCNC: 4.6 MMOL/L (ref 3.5–5.2)
PROT SERPL-MCNC: 5.5 G/DL (ref 6–8.5)
RBC # BLD AUTO: 3.58 10*6/MM3 (ref 3.77–5.28)
SCAN SLIDE: NORMAL
SODIUM SERPL-SCNC: 134 MMOL/L (ref 136–145)
WBC # BLD AUTO: 12.4 10*3/MM3 (ref 3.4–10.8)
WBC MORPH BLD: NORMAL

## 2021-05-13 PROCEDURE — 83735 ASSAY OF MAGNESIUM: CPT | Performed by: NURSE PRACTITIONER

## 2021-05-13 PROCEDURE — 63710000001 INSULIN GLARGINE PER 5 UNITS: Performed by: NURSE PRACTITIONER

## 2021-05-13 PROCEDURE — 63710000001 INSULIN LISPRO (HUMAN) PER 5 UNITS: Performed by: INTERNAL MEDICINE

## 2021-05-13 PROCEDURE — 97116 GAIT TRAINING THERAPY: CPT

## 2021-05-13 PROCEDURE — 85025 COMPLETE CBC W/AUTO DIFF WBC: CPT | Performed by: SURGERY

## 2021-05-13 PROCEDURE — 63710000001 INSULIN LISPRO (HUMAN) PER 5 UNITS: Performed by: STUDENT IN AN ORGANIZED HEALTH CARE EDUCATION/TRAINING PROGRAM

## 2021-05-13 PROCEDURE — 99239 HOSP IP/OBS DSCHRG MGMT >30: CPT | Performed by: STUDENT IN AN ORGANIZED HEALTH CARE EDUCATION/TRAINING PROGRAM

## 2021-05-13 PROCEDURE — 80053 COMPREHEN METABOLIC PANEL: CPT | Performed by: NURSE PRACTITIONER

## 2021-05-13 PROCEDURE — 99231 SBSQ HOSP IP/OBS SF/LOW 25: CPT | Performed by: INTERNAL MEDICINE

## 2021-05-13 PROCEDURE — 82962 GLUCOSE BLOOD TEST: CPT

## 2021-05-13 PROCEDURE — 84100 ASSAY OF PHOSPHORUS: CPT | Performed by: NURSE PRACTITIONER

## 2021-05-13 PROCEDURE — 63710000001 INSULIN REGULAR HUMAN PER 5 UNITS: Performed by: INTERNAL MEDICINE

## 2021-05-13 PROCEDURE — 85007 BL SMEAR W/DIFF WBC COUNT: CPT | Performed by: SURGERY

## 2021-05-13 RX ORDER — BLOOD SUGAR DIAGNOSTIC
1 STRIP MISCELLANEOUS
Qty: 200 EACH | Refills: 1 | Status: ON HOLD | OUTPATIENT
Start: 2021-05-13 | End: 2022-05-31

## 2021-05-13 RX ORDER — LANCETS 30 GAUGE
1 EACH MISCELLANEOUS
Qty: 200 EACH | Refills: 1 | Status: SHIPPED | OUTPATIENT
Start: 2021-05-13 | End: 2021-12-07

## 2021-05-13 RX ORDER — ISOPROPYL ALCOHOL 700 MG/ML
1 CLOTH TOPICAL
Qty: 200 EACH | Refills: 1 | Status: SHIPPED | OUTPATIENT
Start: 2021-05-13 | End: 2021-12-07

## 2021-05-13 RX ORDER — INSULIN ASPART 100 [IU]/ML
5 INJECTION, SOLUTION INTRAVENOUS; SUBCUTANEOUS
Qty: 2 PEN | Refills: 2 | Status: SHIPPED | OUTPATIENT
Start: 2021-05-13 | End: 2021-12-07

## 2021-05-13 RX ORDER — INSULIN DETEMIR 100 [IU]/ML
15 INJECTION, SOLUTION SUBCUTANEOUS 2 TIMES DAILY
Qty: 3 PEN | Refills: 2 | Status: SHIPPED | OUTPATIENT
Start: 2021-05-13 | End: 2021-12-07

## 2021-05-13 RX ORDER — LEVETIRACETAM 750 MG/1
750 TABLET ORAL 2 TIMES DAILY
Qty: 60 TABLET | Refills: 2 | Status: SHIPPED | OUTPATIENT
Start: 2021-05-13

## 2021-05-13 RX ORDER — DEXAMETHASONE 2 MG/1
TABLET ORAL
Qty: 15 TABLET | Refills: 0 | Status: SHIPPED | OUTPATIENT
Start: 2021-05-13 | End: 2021-05-23

## 2021-05-13 RX ORDER — AMLODIPINE BESYLATE 10 MG/1
10 TABLET ORAL DAILY
Qty: 30 TABLET | Refills: 2 | Status: SHIPPED | OUTPATIENT
Start: 2021-05-14 | End: 2022-06-04 | Stop reason: HOSPADM

## 2021-05-13 RX ORDER — INSULIN LISPRO 100 [IU]/ML
4 INJECTION, SOLUTION INTRAVENOUS; SUBCUTANEOUS
Status: DISCONTINUED | OUTPATIENT
Start: 2021-05-13 | End: 2021-05-13 | Stop reason: HOSPADM

## 2021-05-13 RX ORDER — ACETAZOLAMIDE 125 MG/1
125 TABLET ORAL 3 TIMES DAILY
Qty: 90 TABLET | Refills: 2 | Status: SHIPPED | OUTPATIENT
Start: 2021-05-13 | End: 2021-09-07

## 2021-05-13 RX ADMIN — SODIUM CHLORIDE, PRESERVATIVE FREE 10 ML: 5 INJECTION INTRAVENOUS at 08:54

## 2021-05-13 RX ADMIN — INSULIN LISPRO 4 UNITS: 100 INJECTION, SOLUTION INTRAVENOUS; SUBCUTANEOUS at 12:09

## 2021-05-13 RX ADMIN — INSULIN LISPRO 8 UNITS: 100 INJECTION, SOLUTION INTRAVENOUS; SUBCUTANEOUS at 12:08

## 2021-05-13 RX ADMIN — INSULIN LISPRO 3 UNITS: 100 INJECTION, SOLUTION INTRAVENOUS; SUBCUTANEOUS at 08:54

## 2021-05-13 RX ADMIN — INSULIN HUMAN 4 UNITS: 100 INJECTION, SOLUTION PARENTERAL at 03:36

## 2021-05-13 RX ADMIN — INSULIN HUMAN 4 UNITS: 100 INJECTION, SOLUTION PARENTERAL at 08:53

## 2021-05-13 RX ADMIN — METOPROLOL TARTRATE 12.5 MG: 25 TABLET, FILM COATED ORAL at 08:53

## 2021-05-13 RX ADMIN — INSULIN GLARGINE 15 UNITS: 100 INJECTION, SOLUTION SUBCUTANEOUS at 08:54

## 2021-05-13 RX ADMIN — LEVETIRACETAM 750 MG: 500 TABLET ORAL at 08:52

## 2021-05-13 RX ADMIN — PANTOPRAZOLE SODIUM 40 MG: 40 TABLET, DELAYED RELEASE ORAL at 08:53

## 2021-05-13 RX ADMIN — VENLAFAXINE HYDROCHLORIDE 75 MG: 75 CAPSULE, EXTENDED RELEASE ORAL at 08:53

## 2021-05-13 RX ADMIN — DEXAMETHASONE 2 MG: 0.5 TABLET ORAL at 03:36

## 2021-05-13 RX ADMIN — AMLODIPINE BESYLATE 10 MG: 5 TABLET ORAL at 08:53

## 2021-05-13 RX ADMIN — ACETAZOLAMIDE 125 MG: 250 TABLET ORAL at 08:53

## 2021-05-13 NOTE — OUTREACH NOTE
Prep Survey      Responses   [facility] patient [facility]?  [facility]   Is LACE score < 7 ?  No   Emergency Room discharge w/ pulse ox?  No   Eligibility  Readm Mgmt   Discharge diagnosis  CRANIOTOMY FOR TUMOR  covid   Does the patient have one of the following disease processes/diagnoses(primary or secondary)?  COVID-19   Does the patient have Home health ordered?  Yes   What is the Home health agency?   [facility]   Is there a DME ordered?  No   Prep survey completed?  Yes          Yesica Peterson RN

## 2021-05-14 ENCOUNTER — READMISSION MANAGEMENT (OUTPATIENT)
Dept: CALL CENTER | Facility: HOSPITAL | Age: 56
End: 2021-05-14

## 2021-05-14 NOTE — OUTREACH NOTE
COVID-19 Week 1 Survey      Responses   [facility] patient discharged from?  [facility]   Does the patient have one of the following disease processes/diagnoses(primary or secondary)?  COVID-19   COVID-19 underlying condition?  Surgery   Call Number  Call 1   Week 1 Call successful?  Yes   Call start time  0816   Call end time  0835   Is patient permission given to speak with other caregiver?  Yes   List who call center can speak with  Kvng-s/o or Lili Cook-sister   Meds reviewed with patient/caregiver?  Yes   Is the patient having any side effects they believe may be caused by any medication additions or changes?  No   Does the patient have all medications ordered at discharge?  No   What is keeping the patient from filling the prescriptions?  -- [States pharmacy did not have dexamethasone or diamox yesterday, but will  today.]   Nursing Interventions  No intervention needed   Is the patient taking all medications as directed (includes completed medication regime)?  No   What is preventing the patient from taking all medications as directed?  Other   Nursing Interventions  Nurse provided patient education   Medication comments  States will  dexamethasone and diamox this morning.   Does the patient have a primary care provider?   Yes   Does the patient have an appointment with their PCP or specialist within 7 days of discharge?  No   What is preventing the patient from scheduling follow up appointments within 7 days of discharge?  Haven't had time   Nursing Interventions  Advised patient to make appointment, Educated patient on importance of making appointment   Has the patient kept scheduled appointments due by today?  N/A   Has home health visited the patient within 72 hours of discharge?  No   Home health interventions  Called Home Health agency   Home health comments  Call to [place] at [facility]-states a nurse will be visiting today.   Psychosocial issues?  No  "  Psychosocial comments  S/o and sister are helping her.   Did the patient receive a copy of their discharge instructions?  Yes   Did the patient receive a copy of COVID-19 specific instructions?  Yes   Nursing interventions  Reviewed instructions with patient   What is the patient's perception of their health status since discharge?  Improving   Does the patient have any of the following symptoms?  None [S/o states covid is an \"old infection from February\"-has no s/s-has had both covid vaccines.]   Nursing Interventions  Nurse provided patient education   Pulse Ox monitoring  None   Is the patient/caregiver able to teach back steps to recovery at home?  Set small, achievable goals for return to baseline health, Eat a well-balance diet, Rest and rebuild strength, gradually increase activity   If the patient is a current smoker, are they able to teach back resources for cessation?  Not a smoker   Is the patient/caregiver able to teach back the hierarchy of who to call/visit for symptoms/problems? PCP, Specialist, Home health nurse, Urgent Care, ED, 911  Yes   Is the patient /caregiver able to teach back basic post-op care?  Practice 'cough and deep breath', Drive as instructed by MD in discharge instructions, No tub bath, swimming, or hot tub until instructed by MD, Do not remove steri-strips, Take showers only when approved by MD-sponge bathe until then, Keep incision areas clean,dry and protected, Lifting as instructed by MD in discharge instructions   Is the patient/caregiver able to teach back signs and symptoms of incisional infection?  Increased redness, swelling or pain at the incisonal site, Incisional warmth, Fever, Increased drainage or bleeding, Pus or odor from incision   COVID-19 call completed?  Yes   Wrap up additional comments  S/O states patient has had some pain in incisional area during the night and this morning. States thought patient had pain medication while in hospital, but none ordered for " home. Advised to notify surgeon immediately for evaluation. States believes patient is overall slightly better-walking without difficulty. States no covid s/s-positive covid test from past infection 02/21. States has not checked BS yet today. Denies any needs at this time. States will contact surgeon regarding pain.          Blaire Cantu RN

## 2021-05-15 ENCOUNTER — READMISSION MANAGEMENT (OUTPATIENT)
Dept: CALL CENTER | Facility: HOSPITAL | Age: 56
End: 2021-05-15

## 2021-05-15 NOTE — OUTREACH NOTE
COVID-19 Week 1 Survey      Responses   [facility] patient discharged from?  [facility]   Does the patient have one of the following disease processes/diagnoses(primary or secondary)?  COVID-19   COVID-19 underlying condition?  Surgery   Call Number  Call 2   Week 1 Call successful?  Yes   Call start time  1546   Call end time  1555   Discharge diagnosis  CRANIOTOMY FOR TUMOR,   covid   Meds reviewed with patient/caregiver?  Yes   Is the patient having any side effects they believe may be caused by any medication additions or changes?  No   Does the patient have all medications ordered at discharge?  Yes   Is the patient taking all medications as directed (includes completed medication regime)?  Yes   Does the patient have a primary care provider?   Yes   Does the patient have an appointment with their PCP or specialist within 7 days of discharge?  Greater than 7 days   What is preventing the patient from scheduling follow up appointments within 7 days of discharge?  Earlier appointment not available   Nursing Interventions  Verified appointment date/time/provider   Has the patient kept scheduled appointments due by today?  N/A   Comments  pt getting  on 5/22/21, is hopeful facial swelling and bruising will resolve   Did the patient receive a copy of their discharge instructions?  Yes   Did the patient receive a copy of COVID-19 specific instructions?  Yes   Nursing interventions  Reviewed instructions with patient   What is the patient's perception of their health status since discharge?  Improving   Does the patient have any of the following symptoms?  None   Nursing Interventions  Nurse provided patient education   Pulse Ox monitoring  None   Is the patient/caregiver able to teach back steps to recovery at home?  Set small, achievable goals for return to baseline health, Rest and rebuild strength, gradually increase activity, Eat a well-balance diet   Is the patient/caregiver able to teach back the  hierarchy of who to call/visit for symptoms/problems? PCP, Specialist, Home health nurse, Urgent Care, ED, 911  Yes   Is the patient /caregiver able to teach back basic post-op care?  Drive as instructed by MD in discharge instructions, Take showers only when approved by MD-sponge bathe until then, No tub bath, swimming, or hot tub until instructed by MD, Keep incision areas clean,dry and protected, Do not remove steri-strips, Lifting as instructed by MD in discharge instructions, Continue use of incentive spirometry at least 1 week post discharge, Practice 'cough and deep breath' [states still has some facial swelling and bruising]   Is the patient/caregiver able to teach back signs and symptoms of incisional infection?  Increased redness, swelling or pain at the incisonal site, Incisional warmth, Fever, Increased drainage or bleeding, Pus or odor from incision   COVID-19 call completed?  Yes          Felicita Mojica RN

## 2021-05-17 ENCOUNTER — READMISSION MANAGEMENT (OUTPATIENT)
Dept: CALL CENTER | Facility: HOSPITAL | Age: 56
End: 2021-05-17

## 2021-05-17 NOTE — OUTREACH NOTE
COVID-19 Week 1 Survey      Responses   [facility] patient discharged from?  [facility]   Does the patient have one of the following disease processes/diagnoses(primary or secondary)?  COVID-19   COVID-19 underlying condition?  Surgery   Call Number  Call 3   Week 1 Call successful?  Yes   Call start time  0814   Call end time  0825   Meds reviewed with patient/caregiver?  Yes   Is the patient having any side effects they believe may be caused by any medication additions or changes?  No   Does the patient have all medications ordered at discharge?  Yes   Is the patient taking all medications as directed (includes completed medication regime)?  Yes   Comments regarding appointments  Surgeon appt 06/07/21-states will check back with PCP this week.   Does the patient have a primary care provider?   Yes   Has the patient kept scheduled appointments due by today?  N/A   Has home health visited the patient within 72 hours of discharge?  Yes   Psychosocial issues?  No   Psychosocial comments  Lives with brother and sister as well as future .   Did the patient receive a copy of their discharge instructions?  Yes   Did the patient receive a copy of COVID-19 specific instructions?  Yes   Nursing interventions  Reviewed instructions with patient   What is the patient's perception of their health status since discharge?  Improving   Does the patient have any of the following symptoms?  None   Nursing Interventions  Nurse provided patient education   Pulse Ox monitoring  None   Is the patient/caregiver able to teach back the hierarchy of who to call/visit for symptoms/problems? PCP, Specialist, Home health nurse, Urgent Care, ED, 911  Yes   COVID-19 call completed?  Yes   Wrap up additional comments  Patient states is improving. States still bruising around both eyes from surgery-slight swelling. Denies any s/s of infection. Reports  this morning. States still has occasional headaches. States has hx of urine  leakage, and worse since taking diuretic. States will discuss with PCP-discussed kegel exercises and pelvic floor trainers. Denies any needs today. Looking forward to her wedding on 05/23/21, and planning trip to [place].           Blaire Cantu RN

## 2021-05-18 ENCOUNTER — HOSPITAL ENCOUNTER (EMERGENCY)
Facility: HOSPITAL | Age: 56
Discharge: HOME | End: 2021-05-18
Admitting: EMERGENCY MEDICINE

## 2021-05-18 VITALS
BODY MASS INDEX: 53.92 KG/M2 | RESPIRATION RATE: 16 BRPM | DIASTOLIC BLOOD PRESSURE: 78 MMHG | SYSTOLIC BLOOD PRESSURE: 126 MMHG | TEMPERATURE: 97.8 F | HEIGHT: 62 IN | OXYGEN SATURATION: 100 % | WEIGHT: 293 LBS | HEART RATE: 62 BPM

## 2021-05-18 DIAGNOSIS — N39.0 URINARY TRACT INFECTION WITHOUT HEMATURIA, SITE UNSPECIFIED: Primary | ICD-10-CM

## 2021-05-18 DIAGNOSIS — R32 URINARY INCONTINENCE, UNSPECIFIED TYPE: ICD-10-CM

## 2021-05-18 LAB
BACTERIA UR QL AUTO: ABNORMAL /HPF
BILIRUB UR QL STRIP: NEGATIVE
CLARITY UR: CLEAR
COLOR UR: YELLOW
GLUCOSE UR STRIP-MCNC: ABNORMAL MG/DL
HGB UR QL STRIP.AUTO: ABNORMAL
HYALINE CASTS UR QL AUTO: ABNORMAL /LPF
KETONES UR QL STRIP: NEGATIVE
LEUKOCYTE ESTERASE UR QL STRIP.AUTO: ABNORMAL
NITRITE UR QL STRIP: NEGATIVE
PH UR STRIP.AUTO: 7 [PH] (ref 5–8)
PROT UR QL STRIP: ABNORMAL
RBC # UR: ABNORMAL /HPF
REF LAB TEST METHOD: ABNORMAL
SP GR UR STRIP: >=1.03 (ref 1–1.03)
SQUAMOUS #/AREA URNS HPF: ABNORMAL /HPF
UROBILINOGEN UR QL STRIP: ABNORMAL
WBC UR QL AUTO: ABNORMAL /HPF

## 2021-05-18 PROCEDURE — 81001 URINALYSIS AUTO W/SCOPE: CPT | Performed by: NURSE PRACTITIONER

## 2021-05-18 PROCEDURE — 99283 EMERGENCY DEPT VISIT LOW MDM: CPT

## 2021-05-18 RX ORDER — CEFDINIR 300 MG/1
300 CAPSULE ORAL 2 TIMES DAILY
Qty: 14 CAPSULE | Refills: 0 | Status: SHIPPED | OUTPATIENT
Start: 2021-05-18 | End: 2021-06-10

## 2021-05-19 ENCOUNTER — TELEPHONE (OUTPATIENT)
Dept: NEUROSURGERY | Facility: CLINIC | Age: 56
End: 2021-05-19

## 2021-05-21 ENCOUNTER — READMISSION MANAGEMENT (OUTPATIENT)
Dept: CALL CENTER | Facility: HOSPITAL | Age: 56
End: 2021-05-21

## 2021-05-21 NOTE — OUTREACH NOTE
COVID-19 Week 2 Survey      Responses   [facility] patient discharged from?  [facility]   Does the patient have one of the following disease processes/diagnoses(primary or secondary)?  COVID-19   COVID-19 underlying condition?  Surgery   Call Number  Call 1   COVID-19 Week 2: Call 1 attempt successful?  Yes   Call start time  1133   Call end time  1137   Discharge diagnosis  CRANIOTOMY FOR TUMOR,   covid   Person spoke with today (if not patient) and relationship  Tere-sister    Meds reviewed with patient/caregiver?  Yes   Is the patient taking all medications as directed (includes completed medication regime)?  Yes   Does the patient have a primary care provider?   Yes   Comments regarding PCP  5/20/20   Has the patient kept scheduled appointments due by today?  Yes   Psychosocial comments  Pt is getting  on 5/22/21   What is the patient's perception of their health status since discharge?  Improving   Does the patient have any of the following symptoms?  None   Nursing Interventions  Nurse provided patient education   Pulse Ox monitoring  None   Is the patient/caregiver able to teach back steps to recovery at home?  Rest and rebuild strength, gradually increase activity, Eat a well-balance diet   Is the patient/caregiver able to teach back signs and symptoms of incisional infection?  Fever   COVID-19 call completed?  Yes          Estephania Lopez RN

## 2021-05-28 LAB
LAB AP CASE REPORT: NORMAL
LAB AP DIAGNOSIS COMMENT: NORMAL
LAB AP OUTSIDE REPORT, ADDENDUM: NORMAL
Lab: NORMAL
PATH REPORT.FINAL DX SPEC: NORMAL
PATH REPORT.GROSS SPEC: NORMAL

## 2021-06-03 ENCOUNTER — READMISSION MANAGEMENT (OUTPATIENT)
Dept: CALL CENTER | Facility: HOSPITAL | Age: 56
End: 2021-06-03

## 2021-06-03 NOTE — OUTREACH NOTE
"COVID-19 Week 3 Survey      Responses   [facility] patient discharged from?  [facility]   Does the patient have one of the following disease processes/diagnoses(primary or secondary)?  COVID-19   COVID-19 underlying condition?  Surgery   Call Number  Call 1   COVID-19 Week 3: Call 1 attempt successful?  Yes   Call start time  1236   Call end time  1238   Discharge diagnosis  CRANIOTOMY FOR TUMOR,   covid   Meds reviewed with patient/caregiver?  Yes   Is the patient taking all medications as directed (includes completed medication regime)?  Yes   Comments regarding appointments  Appt with surgeon is on 6/7/21   Has the patient kept scheduled appointments due by today?  Yes   What is the patient's perception of their health status since discharge?  Improving   Does the patient have any of the following symptoms?  None   Pulse Ox monitoring  None   Is the patient/caregiver able to teach back steps to recovery at home?  Rest and rebuild strength, gradually increase activity, Eat a well-balance diet   COVID-19 call completed?  Yes   Revoked  No further contact(revokes)-requires comment   Is the patient interested in additional calls from an ambulatory ?  NOTE:  applies to high risk patients requiring additional follow-up.  No   Graduated/Revoked comments  Pt states, \"I think I'm doing ok\" when asked if another call would be helpful.           Estephania Lopez RN  "

## 2021-06-07 ENCOUNTER — OFFICE VISIT (OUTPATIENT)
Dept: NEUROSURGERY | Facility: CLINIC | Age: 56
End: 2021-06-07

## 2021-06-07 ENCOUNTER — TELEPHONE (OUTPATIENT)
Dept: NEUROSURGERY | Facility: CLINIC | Age: 56
End: 2021-06-07

## 2021-06-07 VITALS
TEMPERATURE: 98 F | HEART RATE: 60 BPM | DIASTOLIC BLOOD PRESSURE: 76 MMHG | BODY MASS INDEX: 53.92 KG/M2 | SYSTOLIC BLOOD PRESSURE: 121 MMHG | HEIGHT: 62 IN | WEIGHT: 293 LBS

## 2021-06-07 DIAGNOSIS — C71.9 OLIGODENDROGLIOMA (HCC): Primary | ICD-10-CM

## 2021-06-07 PROCEDURE — 99024 POSTOP FOLLOW-UP VISIT: CPT | Performed by: NEUROLOGICAL SURGERY

## 2021-06-07 RX ORDER — ISOPROPYL ALCOHOL 0.7 ML/1
1 SWAB TOPICAL 4 TIMES DAILY
Status: ON HOLD | COMMUNITY
Start: 2021-05-13 | End: 2022-05-31

## 2021-06-07 RX ORDER — OXYBUTYNIN CHLORIDE 5 MG/1
5 TABLET, EXTENDED RELEASE ORAL DAILY
COMMUNITY
Start: 2021-05-20 | End: 2021-12-07

## 2021-06-07 NOTE — PROGRESS NOTES
"Subjective   Patient ID: Cindy Cortes is a 55 y.o. female is here today for 5 week POST OP follow-up. Patient had a CRANIOTOMY FOR TUMOR RESECTION WITH STEREOTACTIC on 05.06.2021 with Dr. Jluis Felix. Patient had an MRI Brain on 06.05.2021.    Today patient states she does get mild HA's, and bladder incontinence. Patient states that she has L leg weakness, N.T. Patient also states B/L feet swelling    Patient, Provider, and MA are all wearing a mask in our office today.     History of Present Illness    This patient had a frontal craniotomy on 6 May.  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  Her postoperative MRI done the next day showed reduced midline shift with no obvious enhancement.    The following portions of the patient's history were reviewed and updated as appropriate: allergies, current medications, past family history, past medical history, past social history, past surgical history and problem list.    Review of Systems   Constitutional: Negative for chills and fever.   HENT: Negative for congestion.    Eyes: Negative for photophobia and visual disturbance.   Genitourinary: Negative for dysuria.   Musculoskeletal: Negative for back pain, neck pain and neck stiffness.   Neurological: Positive for weakness, numbness and headaches. Negative for dizziness and light-headedness.        L leg N/T, weakness       I have reviewed the review of systems as documented by my MA.      Objective     Vitals:    06/07/21 1207   BP: 121/76   Cuff Size: Adult   Pulse: 60   Temp: 98 °F (36.7 °C)   Weight: (!) 157 kg (346 lb 12.5 oz)   Height: 157.5 cm (62\")     Body mass index is 63.43 kg/m².      Physical Exam  Neurological:      Mental Status: She is alert and oriented to person, place, and time.       Neurologic Exam     Mental Status   Oriented to person, place, and time.     Her incision is well-healed.      Assessment/Plan   Independent Review of Radiographic Studies:      I personally reviewed the " images from the following studies.    I reviewed her postoperative MRI which shows no evidence of residual tumor.    Medical Decision Making:      I told the patient and her  that from my point of view I thought she was doing well.  I told her that we would scan her again at the beginning of August which is about 3 months after her last scan.  In the meantime we will get her into see both radiation and medical oncology since the tumor was grade 2.  Diagnoses and all orders for this visit:    1. Oligodendroglioma (CMS/HCC) (Primary)  -     Ambulatory Referral to Oncology  -     Ambulatory Referral to Radiation Oncology  -     MRI Brain With & Without Contrast; Future      Return in about 2 months (around 8/12/2021).

## 2021-06-07 NOTE — TELEPHONE ENCOUNTER
My understanding from the chart is that she had a seizure.  Therefore by law she cannot drive for 90 days.  She can start washing her hair now but do not rub the incision hard.  I would wait another month before getting into a pool.  I do not think the bladder incontinence has anything to do with the brain tumor.  I would tend to check in with her PCP but if she would prefer we can certainly get her in a referral to a urologist.

## 2021-06-07 NOTE — TELEPHONE ENCOUNTER
"S/W Mrs. Cortes and informed her that per Dr. More     \"My understanding from the chart is that she had a seizure.  Therefore by law she cannot drive for 90 days.  She can start washing her hair now but do not rub the incision hard.  I would wait another month before getting into a pool.  I do not think the bladder incontinence has anything to do with the brain tumor.  I would tend to check in with her PCP but if she would prefer we can certainly get her in a referral to a urologist\"  "

## 2021-06-07 NOTE — TELEPHONE ENCOUNTER
PATIENT HAD AN POST OP APPT TODAY WITH DR PATE AND HAS ADDITIONAL QUESTIONS SHE NEEDS ANSWERED. PLEASE GIVE HER A CALL TO ADDRESS HER CONCERNS    [phone]

## 2021-06-09 ENCOUNTER — TELEPHONE (OUTPATIENT)
Dept: RADIATION ONCOLOGY | Facility: HOSPITAL | Age: 56
End: 2021-06-09

## 2021-06-10 ENCOUNTER — CONSULT (OUTPATIENT)
Dept: RADIATION ONCOLOGY | Facility: HOSPITAL | Age: 56
End: 2021-06-10

## 2021-06-10 VITALS
WEIGHT: 293 LBS | SYSTOLIC BLOOD PRESSURE: 142 MMHG | RESPIRATION RATE: 18 BRPM | HEART RATE: 66 BPM | TEMPERATURE: 98.2 F | BODY MASS INDEX: 53.92 KG/M2 | OXYGEN SATURATION: 99 % | HEIGHT: 62 IN | DIASTOLIC BLOOD PRESSURE: 85 MMHG

## 2021-06-10 DIAGNOSIS — C71.9 OLIGODENDROGLIOMA (HCC): Primary | ICD-10-CM

## 2021-06-10 PROCEDURE — 99205 OFFICE O/P NEW HI 60 MIN: CPT | Performed by: RADIOLOGY

## 2021-06-10 RX ORDER — DEXAMETHASONE 2 MG/1
2 TABLET ORAL
COMMUNITY
End: 2021-07-07

## 2021-06-10 NOTE — PROGRESS NOTES
"Radiation Oncology Consult Note    Name: Cindy Cortes  YOB: 1965  MRN #: [id number]  Date of Service: 6/10/2021  Referring Provider: Jessee More MD  [address] [place] [address]  Primary Care Provider: Annamarie Monroy APRN      DIAGNOSIS: WHO grade II, oligodendroglioma, IDH2 mutant, 1p19Q codeleted CNS malignancy  Encounter Diagnosis   Name Primary?   • Oligodendroglioma (CMS/HCC) Yes       REASON FOR CONSULTATION/CHIEF COMPLAINT:  \"brain tumor\"  I was asked to see the patient at the request of the referring provider noted below for advice and recommendations regarding this diagnosis and the role of radiation therapy.                              REQUESTING PHYSICIAN:  Jessee More Md  [address] [place] [address]    RECORDS OBTAINED:  Records of the patients history including those obtained from the referring provider were reviewed and summarized in detail.    HISTORY OF PRESENT ILLNESS:  Cindy Cortes is a 55 y.o. female right handed patient, who presented after confusion and then falling out of the bed.  Prior to this, she had a few episodes of confusion.  She was trying to get out of bed to urinate and she couldn't figure out how to get up and then fell to the floor.  She was reported to have \"seizure like movements\".  In the ED, she had about 8 \"blank/absence like\" seizures; fell on 04/18/2021 and then had progressive symptoms as noted above.  She experience seizure like movements and work-up was started.      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  IU noted that the mitotic rate is low, < 1/10 HPFs, the Ki-67 index is low at 3%.    She is on Keppra and Decadron at this time.  She does note spontaneous \"bladder release\", urinary incontinence multiple times a day; wears depends.  She has talked to Annamarie Monroy and is increasing her oxybutynin.    She is on steroids and it appears she can " be weaned off.  She has appt tomorrow.    The following portions of the patient's history were reviewed and updated as appropriate: allergies, current medications, past family history, past medical history, past social history, past surgical history and problem list. Reviewed with the patient and remain unchanged.    PAST MEDICAL HISTORY:  she  has a past medical history of Arthritis, GERD (gastroesophageal reflux disease), Hypertension, Oligodendroglioma (CMS/[facility]), Seizure (CMS/[facility]), and Type 2 diabetes mellitus with hyperglycemia, without long-term current use of insulin (CMS/[facility]) (5/12/2021).  MEDICATIONS:   Current Outpatient Medications:   •  acetaZOLAMIDE (DIAMOX) 125 MG tablet, Take 1 tablet by mouth 3 (Three) Times a Day., Disp: 90 tablet, Rfl: 2  •  Alcohol Swabs (Alcohol Wipes) 70 % pads, Apply 1 each topically to the appropriate area as directed 4 (Four) Times a Day., Disp: , Rfl:   •  amLODIPine (NORVASC) 10 MG tablet, Take 1 tablet by mouth Daily., Disp: 30 tablet, Rfl: 2  •  glucose blood (OneTouch Verio) test strip, 1 each by Other route 4 (Four) Times a Day Before Meals & at Bedtime. Use as instructed, Disp: 200 each, Rfl: 1  •  insulin aspart (NovoLOG FlexPen) 100 UNIT/ML solution pen-injector sc pen, Inject 5 Units under the skin into the appropriate area as directed 3 (Three) Times a Day With Meals. Inject 1u lispro SC for every 50 over 150, Disp: 2 pen, Rfl: 2  •  insulin detemir (Levemir FlexTouch) 100 UNIT/ML injection, Inject 15 Units under the skin into the appropriate area as directed 2 (Two) Times a Day., Disp: 3 pen, Rfl: 2  •  Insulin Pen Needle 32G X 4 MM misc, 1 each 5 (Five) Times a Day., Disp: 200 each, Rfl: 1  •  Isopropyl Alcohol (Alcohol Wipes) 70 % misc, Apply 1 each topically 4 (Four) Times a Day Before Meals & at Bedtime., Disp: 200 each, Rfl: 1  •  Lancets (OneTouch Delica Plus Usxezx20J) misc, 1 each 4 (Four) Times a Day Before Meals & at Bedtime., Disp: 200 each, Rfl: 1  •   "levETIRAcetam (KEPPRA) 750 MG tablet, Take 1 tablet by mouth 2 (Two) Times a Day., Disp: 60 tablet, Rfl: 2  •  metoprolol tartrate (LOPRESSOR) 25 MG tablet, Take 0.5 tablets by mouth Every 8 (Eight) Hours., Disp: 45 tablet, Rfl: 2  •  oxybutynin XL (DITROPAN-XL) 5 MG 24 hr tablet, Take 5 mg by mouth Daily., Disp: , Rfl:   •  pantoprazole (PROTONIX) 40 MG EC tablet, TAKE 1 TABLET BY MOUTH EVERY DAY (Patient taking differently: Take 40 mg by mouth Daily. Take DOS), Disp: 30 tablet, Rfl: 1  •  venlafaxine XR (EFFEXOR-XR) 75 MG 24 hr capsule, Take 75 mg by mouth Daily. Take DOS, Disp: , Rfl: 3  ALLERGIES: No Known Allergies  PAST SURGICAL HISTORY: she has a past surgical history that includes Craniotomy for Tumor (Right, 2021).  PREVIOUS RADIOTHERAPY OR CHEMOTHERAPY: no; no previous cancer diagnosis.  No connective tissue disorders  FAMILY HISTORY: her family history includes Breast cancer in her cousin, maternal aunt, and niece; Colon cancer in her maternal aunt; Kidney disease in her mother; Prostate cancer in her brother.  SOCIAL HISTORY: she  reports that she has never smoked. She has never used smokeless tobacco. She reports previous alcohol use of about 1.0 standard drinks of alcohol per week. She reports that she does not use drugs. Lives in [place], [place].  Had worked at [employer] until 2021 with presentation.  PAIN AND PAIN MANAGEMENT: no pain.  Vitals:    06/10/21 1445   BP: 142/85   Pulse: 66   Resp: 18   Temp: 98.2 °F (36.8 °C)   TempSrc: Oral   SpO2: 99%   Weight: (!) 158 kg (349 lb)   Height: 157.5 cm (62\")   PainSc: 0-No pain     NUTRITIONAL STATUS:  no issues  KPS: 70-80  ECO       PHQ-9 Total Score:negative distress screening tool     Review of Systems:    General: No fevers, chills, weight change, or drenching night sweats. Skin: No rashes or jaundice.  HEENT: No change in vision or hearing, no headaches.  Neck: No dysphagia or masses.  Heme/Lymph: No easy bruising or bleeding.  Respiratory " "System: No shortness of breath or cough.  Cardiovascular: No chest pain, palpitations, or dyspnea on exertion.  - Pacemaker. GI: No nausea, vomiting, diarrhea, melena, or hematochezia.  : No dysuria or hematuria.  Endocrine: No heat or cold intolerance. Musculoskeletal: No myalgias or arthralgias.  Neuro: as noted above, slight left sided LE weakness, no numbness, syncope, or any further seizures since procedure. Psych: No mood changes or depression. Ext: Denies swelling.        Objective     Vitals:  Vitals:    06/10/21 1445   BP: 142/85   Pulse: 66   Resp: 18   Temp: 98.2 °F (36.8 °C)   TempSrc: Oral   SpO2: 99%   Weight: (!) 158 kg (349 lb)   Height: 157.5 cm (62\")   PainSc: 0-No pain         PHYSICAL EXAM:  GENERAL: in no apparent distress, sitting comfortably in room.    HEENT: normocephalic, atraumatic. Pupils are equal, round, reactive to light. Sclera anicteric. Conjunctiva not injected. Oropharynx without erythema, ulcerations or thrush.   NECK: Supple with no masses. Well healed surgical   LYMPHATIC: no cervical, supraclavicular or axillary adenopathy appreciated bilaterally.   CARDIOVASCULAR: S1 & S2 detected; no murmurs, rubs or gallops.  CHEST: clear to auscultation bilaterally; no wheezes, crackles or rubs. Work of breathing normal.  ABDOMEN: bowel sounds present. Abdomen is soft, nontender, nondistended.   MUSCULOSKELETAL: no tenderness to palpation along the spine or scapulae. Normal range of motion.  EXTREMITIES: no clubbing, cyanosis, edema.  SKIN: no erythema, rashes, ulcerations noted.   NEUROLOGIC: cranial nerves II-XII grossly intact bilaterally. No focal neurologic deficits.  PSYCHIATRIC:  alert, aware, and appropriate.    PERTINENT IMAGING/PATHOLOGY/LABS (Medical Decision Making):     COORDINATION OF CARE: A copy of this note is sent to the referring provider.    PATHOLOGY (Reviewed): Reviewed, discussed IU review and IDH/1p19Q status with patient.    IMAGING (Reviewed): Reviewed pre and " post-op MRIs    LABS (Reviewed):  Hematology WBC   Date Value Ref Range Status   05/13/2021 12.40 (H) 3.40 - 10.80 10*3/mm3 Final   03/31/2020 5.63 4.5 - 11.0 10*3/uL Final     RBC   Date Value Ref Range Status   05/13/2021 3.58 (L) 3.77 - 5.28 10*6/mm3 Final   03/31/2020 3.86 (L) 4.0 - 5.2 10*6/uL Final     Hemoglobin   Date Value Ref Range Status   05/13/2021 10.9 (L) 12.0 - 15.9 g/dL Final   03/31/2020 11.5 (L) 12.0 - 16.0 g/dL Final     Hematocrit   Date Value Ref Range Status   05/13/2021 33.0 (L) 34.0 - 46.6 % Final   03/31/2020 36.8 36.0 - 46.0 % Final     Platelets   Date Value Ref Range Status   05/13/2021 181 140 - 450 10*3/mm3 Final   03/31/2020 266 140 - 440 10*3/uL Final      Chemistry   Lab Results   Component Value Date    GLUCOSE 250 (H) 05/13/2021    BUN 34 (H) 05/13/2021    CREATININE 0.82 05/13/2021    EGFRIFNONA 73 05/13/2021    BCR 41.5 (H) 05/13/2021    K 4.6 05/13/2021    CO2 19.0 (L) 05/13/2021    CALCIUM 9.4 05/13/2021    ALBUMIN 3.00 (L) 05/13/2021    LABIL2 1.4 03/30/2021    AST 22 05/13/2021    ALT 42 (H) 05/13/2021       Assessment/Plan     ASSESSMENT AND PLAN:  1. Oligodendroglioma (CMS/HCC)        WHO grade II, oligodendroglioma, IDH2 mutant, 1p19Q codeleted CNS    -GTR noted  -High risk finding of age > 40  -appears size may have been greater than 4 cm total.  Will need path reviewed at tumor board and size discussion there.    KPS is improving. No further seizures.    Patient has seen [facility] neurosurgery over the interval and reviewed here for discussion with myself and seeing Dr. Workman tomorrow.    I did discuss the NCCN Guidelines with her and presented her to radiation oncology peer review meeting.  She would be candidate for category I  Recommendation of XRT + PCV chemo or observation (category 2a).    She does have some favor characteristics of low mitoses/ki-67.  I will have her evaluated by med/onc and then shared discussion and decision making. Will need tumor board input.  XRT  and logistics of therapy discussed, including acute XRT toxicities and potential late toxicities of therapy.  She does seem motivated to be aggressive at this point but wants to bring her significant other with her to the appt tomorrow with med/onc to help her in this decision making process.    All questions were answered.    This assessment comes from my review of the imaging, pathology, physician notes and other pertinent information as mentioned.    DISPOSITION: pending med/onc eval and tumor board discussion.          TIME SPENT WITH PATIENT:   I spent 65 minutes .         CC: Jessee More MD Golden, Laura, APRN Amotji Gill, MD John A Cox, MD  6/10/2021  2:54 PM EDT

## 2021-06-10 NOTE — PROGRESS NOTES
HEMATOLOGY ONCOLOGY OUTPATIENT CONSULTATION       Patient name: Cindy Cortes  : 1965  MRN: [id number]  Primary Care Physician: Annamarie Monroy APRN  Referring Physician: Jessee More MD  Reason For Consult:     Chief Complaint   Patient presents with   • Appointment     Oligodendroglioma         HPI:   History of Present Illness:  Cindy Cortes is 55 y.o. female who presented to our office on 06/10/21 for consultation regarding Oligodendroglioma    Patient is a 55-year-old female who was referred to us for treatment options regarding recent diagnosis of grade 2 oligodendroglioma.  This was IDH [id number] mutated, 1P 19 Q codeleted.  Patient initially presented with seizures and a CT head was obtained that showed vasogenic edema and a right frontal lobe mass MRI was obtained after this.  2021 -MRI of the brain shows 2.1 x 4.4 x 3.3 cm right frontal lobe mass with eccentric cystic or necrotic component with surrounding vasogenic edema and a focal 3 mm right to left midline shift.  Patient was started on Keppra, steroids plan was made for elective craniotomy and surgical resection.    2021 craniotomy of the right frontal lobe, microscopic resection of tumor mass.  Pathology results oligodendroglioma WHO grade 2, IDH1 R132H mutation by immunohistochemistry, 1p19q codeletion by FISH.    2021 -status post resection of the right frontal lobe mass.  Expected postop blood product. resolution of midline shift.    Postop patient started on taper of dexamethasone, symptomatic improvement afterwards no significant neurological deficit.      Subjective:  • 06/10/21.  Patient presents for initial consultation.  She complains of some left lower extremity numbness intermittently no other major symptoms she has had polyuria and was started on oxybutynin which helped her symptoms.    The following portions of the patient's history were reviewed and updated as appropriate: allergies,  current medications, past family history, past medical history, past social history, past surgical history and problem list.    Past Medical History:   Diagnosis Date   • Arthritis    • GERD (gastroesophageal reflux disease)    • Hypertension    • Oligodendroglioma (CMS/HCC)    • Seizure (CMS/HCC)    • Type 2 diabetes mellitus with hyperglycemia, without long-term current use of insulin (CMS/HCC) 5/12/2021       Past Surgical History:   Procedure Laterality Date   • CRANIOTOMY FOR TUMOR Right 5/6/2021    Procedure: CRANIOTOMY FOR TUMOR RESECTION WITH STEREOTACTIC;  Surgeon: Jluis Felix MD;  Location: [facility] OR;  Service: Neurosurgery;  Laterality: Right;         Current Outpatient Medications:   •  acetaZOLAMIDE (DIAMOX) 125 MG tablet, Take 1 tablet by mouth 3 (Three) Times a Day., Disp: 90 tablet, Rfl: 2  •  Alcohol Swabs (Alcohol Wipes) 70 % pads, Apply 1 each topically to the appropriate area as directed 4 (Four) Times a Day., Disp: , Rfl:   •  amLODIPine (NORVASC) 10 MG tablet, Take 1 tablet by mouth Daily., Disp: 30 tablet, Rfl: 2  •  dexamethasone (DECADRON) 2 MG tablet, Take 2 mg by mouth Daily With Breakfast., Disp: , Rfl:   •  glucose blood (OneTouch Verio) test strip, 1 each by Other route 4 (Four) Times a Day Before Meals & at Bedtime. Use as instructed, Disp: 200 each, Rfl: 1  •  insulin aspart (NovoLOG FlexPen) 100 UNIT/ML solution pen-injector sc pen, Inject 5 Units under the skin into the appropriate area as directed 3 (Three) Times a Day With Meals. Inject 1u lispro SC for every 50 over 150, Disp: 2 pen, Rfl: 2  •  insulin detemir (Levemir FlexTouch) 100 UNIT/ML injection, Inject 15 Units under the skin into the appropriate area as directed 2 (Two) Times a Day., Disp: 3 pen, Rfl: 2  •  Insulin Pen Needle 32G X 4 MM misc, 1 each 5 (Five) Times a Day., Disp: 200 each, Rfl: 1  •  Isopropyl Alcohol (Alcohol Wipes) 70 % misc, Apply 1 each topically 4 (Four) Times a Day Before Meals & at  Bedtime., Disp: 200 each, Rfl: 1  •  Lancets (OneTouch Delica Plus [id number]) misc, 1 each 4 (Four) Times a Day Before Meals & at Bedtime., Disp: 200 each, Rfl: 1  •  levETIRAcetam (KEPPRA) 750 MG tablet, Take 1 tablet by mouth 2 (Two) Times a Day., Disp: 60 tablet, Rfl: 2  •  metoprolol tartrate (LOPRESSOR) 25 MG tablet, Take 0.5 tablets by mouth Every 8 (Eight) Hours., Disp: 45 tablet, Rfl: 2  •  oxybutynin XL (DITROPAN-XL) 5 MG 24 hr tablet, Take 5 mg by mouth Daily., Disp: , Rfl:   •  pantoprazole (PROTONIX) 40 MG EC tablet, TAKE 1 TABLET BY MOUTH EVERY DAY (Patient taking differently: Take 40 mg by mouth Daily. Take DOS), Disp: 30 tablet, Rfl: 1  •  venlafaxine XR (EFFEXOR-XR) 75 MG 24 hr capsule, Take 75 mg by mouth Daily. Take DOS, Disp: , Rfl: 3    Current outpatient and discharge medications have been reconciled for the patient.  Reviewed by: Emilie Workman MD    No Known Allergies    Family History   Problem Relation Age of Onset   • Kidney disease Mother    • Prostate cancer Brother    • Breast cancer Maternal Aunt    • Colon cancer Maternal Aunt    • Breast cancer Niece    • Breast cancer Cousin        Cancer-related family history includes Breast cancer in her cousin, maternal aunt, and niece; Colon cancer in her maternal aunt; Prostate cancer in her brother.    Social History     Tobacco Use   • Smoking status: Never Smoker   • Smokeless tobacco: Never Used   Vaping Use   • Vaping Use: Never used   Substance Use Topics   • Alcohol use: Not Currently     Alcohol/week: 1.0 standard drinks     Types: 1 Cans of beer per week     Comment: socially   • Drug use: Never     Social History     Social History Narrative   • Not on file        ROS:     Review of Systems   Constitutional: Negative for fatigue and fever.   HENT: Negative for congestion and nosebleeds.    Eyes: Negative for pain.   Respiratory: Negative for cough and shortness of breath.    Cardiovascular: Negative for chest pain.   Gastrointestinal:  "Negative for abdominal pain, blood in stool, diarrhea, nausea and vomiting.   Endocrine: Negative for cold intolerance and heat intolerance.   Genitourinary: Negative for difficulty urinating.   Musculoskeletal: Negative for arthralgias.   Skin: Negative for rash.   Neurological: Positive for weakness. Negative for dizziness, seizures and headaches.   Hematological: Does not bruise/bleed easily.   Psychiatric/Behavioral: Negative for behavioral problems.       Objective:    Vitals:    06/11/21 1040   BP: 150/80   Pulse: 82   Resp: 18   Temp: 97.8 °F (36.6 °C)   Weight: (!) 159 kg (351 lb 6.4 oz)   Height: 157.5 cm (62\")   PainSc: 0-No pain     Body mass index is 64.27 kg/m².  ECOG  (0) Fully active, able to carry on all predisease performance without restriction    Physical Exam:     Physical Exam  Constitutional:       Appearance: Normal appearance.   HENT:      Head: Normocephalic and atraumatic.   Eyes:      Pupils: Pupils are equal, round, and reactive to light.   Cardiovascular:      Rate and Rhythm: Normal rate and regular rhythm.      Pulses: Normal pulses.      Heart sounds: No murmur heard.     Pulmonary:      Effort: Pulmonary effort is normal.      Breath sounds: Normal breath sounds.   Abdominal:      General: There is no distension.      Palpations: Abdomen is soft. There is no mass.      Tenderness: There is no abdominal tenderness.   Musculoskeletal:         General: Normal range of motion.      Cervical back: Normal range of motion.   Skin:     General: Skin is warm.   Neurological:      General: No focal deficit present.      Mental Status: She is alert.   Psychiatric:         Mood and Affect: Mood normal.           Lab Results - Last 18 Months   Lab Units 06/11/21  1037 05/13/21  0306 05/12/21  0907   WBC 10*3/mm3 5.40 12.40* 9.90   HEMOGLOBIN g/dL 10.8* 10.9* 10.2*   HEMATOCRIT % 33.0* 33.0* 29.8*   PLATELETS 10*3/mm3 185 181 188   MCV fL 92.2 92.3 90.3     Lab Results - Last 18 Months   Lab Units " 05/13/21  0306 05/12/21  0316 05/11/21  0634   SODIUM mmol/L 134* 133* 133*   POTASSIUM mmol/L 4.6 4.7 4.6   CHLORIDE mmol/L 104 103 102   CO2 mmol/L 19.0* 19.0* 22.0   BUN mg/dL 34* 33* 35*   CREATININE mg/dL 0.82 0.81 0.83   CALCIUM mg/dL 9.4 9.2 9.4   BILIRUBIN mg/dL 0.3 0.4 0.3   ALK PHOS U/L 67 70 69   ALT (SGPT) U/L 42* 44* 37*   AST (SGOT) U/L 22 28 18   GLUCOSE mg/dL 250* 185* 305*       Lab Results   Component Value Date    GLUCOSE 250 (H) 05/13/2021    BUN 34 (H) 05/13/2021    CREATININE 0.82 05/13/2021    EGFRIFNONA 73 05/13/2021    BCR 41.5 (H) 05/13/2021    K 4.6 05/13/2021    CO2 19.0 (L) 05/13/2021    CALCIUM 9.4 05/13/2021    ALBUMIN 3.00 (L) 05/13/2021    LABIL2 1.4 03/30/2021    AST 22 05/13/2021    ALT 42 (H) 05/13/2021       Lab Results - Last 18 Months   Lab Units 05/04/21  0922   INR  0.96   APTT seconds 20.7*       Lab Results   Component Value Date    IRON 51 04/19/2021    TIBC 274 (L) 04/19/2021    FERRITIN 145.0 01/04/2018       Lab Results   Component Value Date    WUQOEUCO07 340 01/04/2018       Lab Results   Component Value Date    PTT 20.7 (L) 05/04/2021    INR 0.96 05/04/2021     CT Head Without Contrast    Result Date: 4/19/2021  1. An area of vasogenic edema in the right frontal lobe measures approximately 6.3 x 3.1 x 4.0 cm. This is likely secondary to an underlying neoplastic mass or possibly infectious/inflammatory process. MRI of the brain without and with contrast is recommended. 2. No acute hemorrhage. No herniation. Jose Neely M.D. Neuroradiologist Electronically signed by:  Jose Neely M.D.  4/18/2021 11:55 PM    MRI Brain With & Without Contrast    Result Date: 5/8/2021  1. Status post resection of right frontal lobe mass. There is expected postoperative blood products and susceptibility within the resection cavity. There is been resolution of previously demonstrated midline shift. 2. New postoperative left hemispheric subdural hematoma measuring only 3 mm in  "thickness. No resultant mass effect or midline shift. 3. No definite pathologic intracranial contrast enhancement. Electronically Signed: Efrain Neely MD 5/8/2021 8:41 EDT    MRI Brain With & Without Contrast    Result Date: 4/19/2021   1. 2.1 x 4.4 x 3.3 cm high right frontal lobe mass with eccentric cystic or necrotic component, with surrounding vasogenic edema, and focal 3 mm right to left midline shift. Neurosurgical consultation is advised.   Electronically Signed By-Fern Sin MD On:4/19/2021 8:11 AM This report was finalized on [id number] by  Fern Sin MD.    XR Skull Lateral    Result Date: 5/6/2021  IMPRESSION :  1. No retained radiopaque foreign body conforming to the appearance of a surgical needle is seen within the included field-of-view, with limitations as described above. I notified the operating room earlier today, prior to the time of this dictation.  Electronically Signed By-Fern Sin MD On:5/6/2021 4:59 PM This report was finalized on [id number] by  Fern Sin MD.    Pathology results  Outside Report, Addendum   Integrated Diagnosis: Oligodendroglioma WHO Grade II  IDH1 R132H mutation by Immunohistochemistry; 1p19q co-deletion by FISH  See attached report from [facility]   Addendum electronically signed by Cecilia Chaudhary on 5/28/2021 at 0824   Final Diagnosis   Specimen #1 (\"Brain tumor right frontal lobe,\" biopsy):    Diffuse glioma (WHO grade II)    See comment     Specimen #2 (\"Brain tumor right frontal lobe,\" biopsy):    Diffuse glioma (WHO grade II)    See comment     Specimen #3 (Brain tumor right frontal lobe, resection):    Diffuse glioma, IDH-mutant (WHO grade II)    See attached report from [facility]         Assessment/Plan     Patient is a 55-year-old female with recently diagnosed oligodendroglioma grade 2 status post gross total resection    Oligodendroglioma  I had an extensive " discussion with the patient about treatment options, prognosis.  I discussed with her that there are findings from RTOG 9802 clinical trial which showed survival advantage with radiation followed by chemotherapy after surgical resection of grade 2 oligodendrogliomas.  Chemotherapy with the PCV regimen in this trial conferred a survival advantage over radiotherapy alone with a median overall survival of 13.3 versus 7.8 years.  In subset analysis benefit was more significant  Histologic oligodendroglioma is as compared to other low-grade gliomas.  Molecular analysis post talk also showed survival advantage in molecularly confirmed IDH mutant, 1p19q codeleted oligodendrogliomas.    PCV can be a toxic regimen however survival advantage is only been shown in randomized control trial using this particular regimen.  The other option would be temozolomide which has advantages over PCV with ease of administration, better tolerance and efficacy in combination with radiation therapy and other types of CNS tumors and gliomas.    The other option I discussed with her is clinical trial with a CODEL study which is comparing radiation alone versus radiation with Temodar versus radiation with PCV in this patient population.  Patient however is not very interested in enrolling in a clinical trial.  She is wanting to pursue the most aggressive treatment option for her to reduce the chances of recurrence of her tumor.    Lastly also discussed with her the option of surveillance and observation with serial MRIs however also discussed that in above studies the progression free survival is around 50% for 5 years.     Based on her above discussion patient decided to pursue aggressive treatment with radiation followed by chemotherapy.  We would use the RTOG 9802 regimen with radiation followed by chemotherapy with PCV.  We have clear evidence that vincristine does not cross the blood-brain barrier significantly and hence if there is  toxicity we can choose to omit the drug.  We will discuss her case further at tumor board conference.  We will see her back for initiation of chemo after she completes radiation.        Thank you very much for providing the opportunity to participate in this patient’s care. Please do not hesitate to call if there are any other questions    I have reviewed and confirmed the accuracy of the patient's history: Chief complaint, HPI, ROS, Subjective and Past Family Social History as entered by the MA/LPN/RN.  Time spent on encounter: 70 minutes   Emilie Workman MD 06/11/21

## 2021-06-11 ENCOUNTER — TELEPHONE (OUTPATIENT)
Dept: DIABETES SERVICES | Facility: HOSPITAL | Age: 56
End: 2021-06-11

## 2021-06-11 ENCOUNTER — HOSPITAL ENCOUNTER (OUTPATIENT)
Dept: RADIATION ONCOLOGY | Facility: HOSPITAL | Age: 56
Setting detail: RADIATION/ONCOLOGY SERIES
End: 2021-06-11

## 2021-06-11 ENCOUNTER — CONSULT (OUTPATIENT)
Dept: ONCOLOGY | Facility: CLINIC | Age: 56
End: 2021-06-11

## 2021-06-11 ENCOUNTER — LAB (OUTPATIENT)
Dept: LAB | Facility: HOSPITAL | Age: 56
End: 2021-06-11

## 2021-06-11 VITALS
TEMPERATURE: 97.8 F | HEIGHT: 62 IN | RESPIRATION RATE: 18 BRPM | SYSTOLIC BLOOD PRESSURE: 150 MMHG | DIASTOLIC BLOOD PRESSURE: 80 MMHG | WEIGHT: 293 LBS | HEART RATE: 82 BPM | BODY MASS INDEX: 53.92 KG/M2

## 2021-06-11 DIAGNOSIS — C71.9 OLIGODENDROGLIOMA (HCC): Primary | ICD-10-CM

## 2021-06-11 LAB
BASOPHILS # BLD AUTO: 0.03 10*3/MM3 (ref 0–0.2)
BASOPHILS NFR BLD AUTO: 0.6 % (ref 0–1.5)
DEPRECATED RDW RBC AUTO: 54.1 FL (ref 37–54)
EOSINOPHIL # BLD AUTO: 0.05 10*3/MM3 (ref 0–0.4)
EOSINOPHIL NFR BLD AUTO: 0.9 % (ref 0.3–6.2)
ERYTHROCYTE [DISTWIDTH] IN BLOOD BY AUTOMATED COUNT: 16.9 % (ref 12.3–15.4)
HCT VFR BLD AUTO: 33 % (ref 34–46.6)
HGB BLD-MCNC: 10.8 G/DL (ref 12–15.9)
LYMPHOCYTES # BLD AUTO: 1.52 10*3/MM3 (ref 0.7–3.1)
LYMPHOCYTES NFR BLD AUTO: 28.1 % (ref 19.6–45.3)
MCH RBC QN AUTO: 30.2 PG (ref 26.6–33)
MCHC RBC AUTO-ENTMCNC: 32.7 G/DL (ref 31.5–35.7)
MCV RBC AUTO: 92.2 FL (ref 79–97)
MONOCYTES # BLD AUTO: 0.52 10*3/MM3 (ref 0.1–0.9)
MONOCYTES NFR BLD AUTO: 9.6 % (ref 5–12)
NEUTROPHILS NFR BLD AUTO: 3.28 10*3/MM3 (ref 1.7–7)
NEUTROPHILS NFR BLD AUTO: 60.8 % (ref 42.7–76)
PLATELET # BLD AUTO: 185 10*3/MM3 (ref 140–450)
PMV BLD AUTO: 9.4 FL (ref 6–12)
RBC # BLD AUTO: 3.58 10*6/MM3 (ref 3.77–5.28)
WBC # BLD AUTO: 5.4 10*3/MM3 (ref 3.4–10.8)

## 2021-06-11 PROCEDURE — 85025 COMPLETE CBC W/AUTO DIFF WBC: CPT | Performed by: INTERNAL MEDICINE

## 2021-06-11 PROCEDURE — 99205 OFFICE O/P NEW HI 60 MIN: CPT | Performed by: INTERNAL MEDICINE

## 2021-06-11 NOTE — TELEPHONE ENCOUNTER
Follow-up call to patient to see how doing with checking blood sugars and giving insulin shots.  Patient stated that she hasn't been checking her blood sugars like she is supposed to and that one day that she checked it was high.  Patient stated that her sister has diabetes and helps her out when needed. Patient stated she feels comfortable giving her insulin shots. Patient has no further questions or concerns related to diabetes at this time.

## 2021-06-15 ENCOUNTER — MDT ASSESSMENT (OUTPATIENT)
Dept: RADIATION ONCOLOGY | Facility: HOSPITAL | Age: 56
End: 2021-06-15

## 2021-06-16 ENCOUNTER — HOSPITAL ENCOUNTER (OUTPATIENT)
Dept: RADIATION ONCOLOGY | Facility: HOSPITAL | Age: 56
Discharge: HOME | End: 2021-06-16

## 2021-06-16 ENCOUNTER — TRANSCRIBE ORDERS (OUTPATIENT)
Dept: RESPIRATORY THERAPY | Facility: HOSPITAL | Age: 56
End: 2021-06-16

## 2021-06-16 DIAGNOSIS — Z01.818 OTHER SPECIFIED PRE-OPERATIVE EXAMINATION: Primary | ICD-10-CM

## 2021-06-16 PROCEDURE — 77334 RADIATION TREATMENT AID(S): CPT | Performed by: RADIOLOGY

## 2021-06-17 ENCOUNTER — TELEPHONE (OUTPATIENT)
Dept: ONCOLOGY | Facility: HOSPITAL | Age: 56
End: 2021-06-17

## 2021-06-17 NOTE — TELEPHONE ENCOUNTER
Distress Screening   Patient Name: Cindy Cortes  YOB: 1965  MRN #: [id number]    Patient completed distress screenin/10 and 21   Distress Level: 10 both days  Problems indicated: physical complaints - both days     OSW called and spoke with Cindy who is alert and oriented to person, place and time. She is pleasant. She spoke about the distress since her diagnosis and feels she has good support both in emotional and practical support. She said her  of four weeks, Kvng is supportive as is her brother-in-law and sister with whom they live. She said she has been off work since  and her brother-in-law and sister have helped with basic needs. She does have access to ST / LT disability through her employer. She has insurance that she carries, and has been able to pay most of her medical bills as they have come in. Discussed with her the financial assistance application and she is open to completing this. The application was mailed to her and she is agreeable to meet with OSW on  when she comes in for her next appointment.     Referrals: Financial assistance     Electronically signed by:   Sheila Venegas LCSW, OSW-C  21, 15:51 EDT

## 2021-06-18 PROCEDURE — 77263 THER RADIOLOGY TX PLNG CPLX: CPT | Performed by: RADIOLOGY

## 2021-06-21 ENCOUNTER — OFFICE VISIT (OUTPATIENT)
Dept: NEUROSURGERY | Facility: CLINIC | Age: 56
End: 2021-06-21

## 2021-06-21 VITALS
HEIGHT: 62 IN | DIASTOLIC BLOOD PRESSURE: 83 MMHG | BODY MASS INDEX: 53.92 KG/M2 | SYSTOLIC BLOOD PRESSURE: 138 MMHG | HEART RATE: 71 BPM | WEIGHT: 293 LBS

## 2021-06-21 DIAGNOSIS — C71.9 OLIGODENDROGLIOMA (HCC): Primary | ICD-10-CM

## 2021-06-21 PROCEDURE — 99024 POSTOP FOLLOW-UP VISIT: CPT | Performed by: SPECIALIST

## 2021-06-24 PROCEDURE — 77301 RADIOTHERAPY DOSE PLAN IMRT: CPT | Performed by: RADIOLOGY

## 2021-06-24 PROCEDURE — 77300 RADIATION THERAPY DOSE PLAN: CPT | Performed by: RADIOLOGY

## 2021-06-24 PROCEDURE — 77338 DESIGN MLC DEVICE FOR IMRT: CPT | Performed by: RADIOLOGY

## 2021-06-28 ENCOUNTER — HOSPITAL ENCOUNTER (OUTPATIENT)
Dept: RADIATION ONCOLOGY | Facility: HOSPITAL | Age: 56
Discharge: HOME | End: 2021-06-28

## 2021-06-28 PROCEDURE — 77014 CHG CT GUIDANCE RADIATION THERAPY FLDS PLACEMENT: CPT | Performed by: RADIOLOGY

## 2021-06-28 PROCEDURE — 77386: CPT | Performed by: RADIOLOGY

## 2021-06-29 ENCOUNTER — HOSPITAL ENCOUNTER (OUTPATIENT)
Dept: RADIATION ONCOLOGY | Facility: HOSPITAL | Age: 56
Discharge: HOME | End: 2021-06-29

## 2021-06-29 PROCEDURE — 77014 CHG CT GUIDANCE RADIATION THERAPY FLDS PLACEMENT: CPT | Performed by: RADIOLOGY

## 2021-06-29 PROCEDURE — 77386: CPT | Performed by: RADIOLOGY

## 2021-06-30 ENCOUNTER — LAB (OUTPATIENT)
Dept: LAB | Facility: HOSPITAL | Age: 56
End: 2021-06-30

## 2021-06-30 ENCOUNTER — HOSPITAL ENCOUNTER (OUTPATIENT)
Dept: RADIATION ONCOLOGY | Facility: HOSPITAL | Age: 56
Discharge: HOME | End: 2021-06-30

## 2021-06-30 ENCOUNTER — RADIATION ONCOLOGY WEEKLY ASSESSMENT (OUTPATIENT)
Dept: RADIATION ONCOLOGY | Facility: HOSPITAL | Age: 56
End: 2021-06-30

## 2021-06-30 VITALS
SYSTOLIC BLOOD PRESSURE: 167 MMHG | HEART RATE: 90 BPM | OXYGEN SATURATION: 98 % | BODY MASS INDEX: 53.92 KG/M2 | RESPIRATION RATE: 20 BRPM | TEMPERATURE: 98.2 F | WEIGHT: 293 LBS | DIASTOLIC BLOOD PRESSURE: 106 MMHG | HEIGHT: 62 IN

## 2021-06-30 DIAGNOSIS — C71.9 OLIGODENDROGLIOMA (HCC): Primary | ICD-10-CM

## 2021-06-30 DIAGNOSIS — Z01.818 OTHER SPECIFIED PRE-OPERATIVE EXAMINATION: ICD-10-CM

## 2021-06-30 LAB — SARS-COV-2 ORF1AB RESP QL NAA+PROBE: NOT DETECTED

## 2021-06-30 PROCEDURE — C9803 HOPD COVID-19 SPEC COLLECT: HCPCS

## 2021-06-30 PROCEDURE — 77386: CPT | Performed by: RADIOLOGY

## 2021-06-30 PROCEDURE — 77427 RADIATION TX MANAGEMENT X5: CPT | Performed by: RADIOLOGY

## 2021-06-30 PROCEDURE — U0004 COV-19 TEST NON-CDC HGH THRU: HCPCS

## 2021-06-30 PROCEDURE — 77014 CHG CT GUIDANCE RADIATION THERAPY FLDS PLACEMENT: CPT | Performed by: RADIOLOGY

## 2021-06-30 PROCEDURE — 77336 RADIATION PHYSICS CONSULT: CPT | Performed by: RADIOLOGY

## 2021-06-30 NOTE — PROGRESS NOTES
"Patient Name: Cindy Cortes Date: 2021       : 1965        MRN #: [id number] Diagnosis:   Encounter Diagnosis   Name Primary?   • Oligodendroglioma (CMS/HCC) Yes                 RADIATION ON TREATMENT VISIT NOTE - BRAIN    Treatment Summary    Treatment Site Ref. ID Energy Dose/Fx (cGy) #Fx Dose Correction (cGy) Total Dose (cGy) Start Date End Date Elapsed Days   RtFrontalLobe RtFrontalLobe 6X 180 3 / 30 0 540 2021 2     WHO grade II, oligodendroglioma, IDH2 mutant, 1p19Q codeleted CNS malignancy  General:     Plan: Radiation followed by PCV; intent is adjuvant       Review of Systems      [x] No new complaints [] Headache   []AM [] PM [] Seizure activity  [] Memory loss [] Gait disturbance [] Nausea  [] Vomiting [] Speech changes [] Visual changes  [] Weakness [] Skin itching [] Hair loss  [] Skin soreness with pain  [] Fatigue,  severity: ----------------  [] Pain,  severity: ----------------, location:   Steroid regimen:   Anti-seizure regimen:   Pain regimen:    Skin regimen: NONE     Comments/Notes:  Doing well  No issues with therapy to date    KPS: 80%       Vital Signs: BP (!) 167/106   Pulse 90   Temp 98.2 °F (36.8 °C) (Oral)   Resp 20   Ht 157.5 cm (62\")   Wt (!) 159 kg (351 lb)   LMP  (LMP Unknown)   SpO2 98%   BMI 64.20 kg/m²     Weight:   Wt Readings from Last 3 Encounters:   21 (!) 159 kg (351 lb)   21 (!) 159 kg (351 lb)   21 (!) 159 kg (351 lb 6.4 oz)       Medication:   Current Outpatient Medications:   •  acetaZOLAMIDE (DIAMOX) 125 MG tablet, Take 1 tablet by mouth 3 (Three) Times a Day., Disp: 90 tablet, Rfl: 2  •  Alcohol Swabs (Alcohol Wipes) 70 % pads, Apply 1 each topically to the appropriate area as directed 4 (Four) Times a Day., Disp: , Rfl:   •  amLODIPine (NORVASC) 10 MG tablet, Take 1 tablet by mouth Daily., Disp: 30 tablet, Rfl: 2  •  dexamethasone (DECADRON) 2 MG tablet, Take 2 mg by mouth Daily With Breakfast., Disp: , Rfl:   •  " glucose blood (OneTouch Verio) test strip, 1 each by Other route 4 (Four) Times a Day Before Meals & at Bedtime. Use as instructed, Disp: 200 each, Rfl: 1  •  insulin aspart (NovoLOG FlexPen) 100 UNIT/ML solution pen-injector sc pen, Inject 5 Units under the skin into the appropriate area as directed 3 (Three) Times a Day With Meals. Inject 1u lispro SC for every 50 over 150, Disp: 2 pen, Rfl: 2  •  insulin detemir (Levemir FlexTouch) 100 UNIT/ML injection, Inject 15 Units under the skin into the appropriate area as directed 2 (Two) Times a Day., Disp: 3 pen, Rfl: 2  •  Insulin Pen Needle 32G X 4 MM misc, 1 each 5 (Five) Times a Day., Disp: 200 each, Rfl: 1  •  Isopropyl Alcohol (Alcohol Wipes) 70 % misc, Apply 1 each topically 4 (Four) Times a Day Before Meals & at Bedtime., Disp: 200 each, Rfl: 1  •  Lancets (OneTouch Delica Plus [id number]) misc, 1 each 4 (Four) Times a Day Before Meals & at Bedtime., Disp: 200 each, Rfl: 1  •  levETIRAcetam (KEPPRA) 750 MG tablet, Take 1 tablet by mouth 2 (Two) Times a Day., Disp: 60 tablet, Rfl: 2  •  metoprolol tartrate (LOPRESSOR) 25 MG tablet, Take 0.5 tablets by mouth Every 8 (Eight) Hours., Disp: 45 tablet, Rfl: 2  •  oxybutynin XL (DITROPAN-XL) 5 MG 24 hr tablet, Take 5 mg by mouth Daily., Disp: , Rfl:   •  pantoprazole (PROTONIX) 40 MG EC tablet, TAKE 1 TABLET BY MOUTH EVERY DAY (Patient taking differently: Take 40 mg by mouth Daily. Take DOS), Disp: 30 tablet, Rfl: 1  •  venlafaxine XR (EFFEXOR-XR) 75 MG 24 hr capsule, Take 75 mg by mouth Daily. Take DOS, Disp: , Rfl: 3       LABS (Reviewed):  Hematology WBC   Date Value Ref Range Status   06/11/2021 5.40 3.40 - 10.80 10*3/mm3 Final   03/31/2020 5.63 4.5 - 11.0 10*3/uL Final     RBC   Date Value Ref Range Status   06/11/2021 3.58 (L) 3.77 - 5.28 10*6/mm3 Final   03/31/2020 3.86 (L) 4.0 - 5.2 10*6/uL Final     Hemoglobin   Date Value Ref Range Status   06/11/2021 10.8 (L) 12.0 - 15.9 g/dL Final   03/31/2020 11.5 (L) 12.0  - 16.0 g/dL Final     Hematocrit   Date Value Ref Range Status   06/11/2021 33.0 (L) 34.0 - 46.6 % Final   03/31/2020 36.8 36.0 - 46.0 % Final     Platelets   Date Value Ref Range Status   06/11/2021 185 140 - 450 10*3/mm3 Final   03/31/2020 266 140 - 440 10*3/uL Final      Chemistry   Lab Results   Component Value Date    GLUCOSE 250 (H) 05/13/2021    BUN 34 (H) 05/13/2021    CREATININE 0.82 05/13/2021    EGFRIFNONA 73 05/13/2021    BCR 41.5 (H) 05/13/2021    K 4.6 05/13/2021    CO2 19.0 (L) 05/13/2021    CALCIUM 9.4 05/13/2021    ALBUMIN 3.00 (L) 05/13/2021    LABIL2 1.4 03/30/2021    AST 22 05/13/2021    ALT 42 (H) 05/13/2021         Physical Exam:         Neurology: [x] CNII-XII grossly intact    [x] Strength:intact     [] Reflexes:     [] Encephalopathy:     [] Memory impairment:     [] Neuropathy: motor/sensory:   Auditory/Ear: [] Otitis, external ear (non-infectious):   Ocular/Visual: [] PERRLA/EOMI:   Skin:  stGstrstastdstest:st st1st Comments/Notes: ctab  RRR    [x] Review of labs, images, dosimetry, dose delivered, & treatment parameters.    Comments:     [x] Patient treatment setup reviewed.    Comments:     Recommendations: continue XRT and supportive measures  No acute issues; doing well.    [x] Continue RT  [] Change RT Plan [] Hold RT, length:        Approved Electronically By:  Yang Cruz MD, 6/30/2021, 12:57 EDT          Confidentiality of this medical record shall be maintained except when use or disclosure is required or permitted by law, regulation or written authorization by the patient.

## 2021-07-01 ENCOUNTER — HOSPITAL ENCOUNTER (OUTPATIENT)
Dept: RADIATION ONCOLOGY | Facility: HOSPITAL | Age: 56
Setting detail: RADIATION/ONCOLOGY SERIES
End: 2021-07-01

## 2021-07-01 ENCOUNTER — HOSPITAL ENCOUNTER (OUTPATIENT)
Dept: RADIATION ONCOLOGY | Facility: HOSPITAL | Age: 56
Discharge: HOME | End: 2021-07-01

## 2021-07-01 PROCEDURE — 77014 CHG CT GUIDANCE RADIATION THERAPY FLDS PLACEMENT: CPT | Performed by: RADIOLOGY

## 2021-07-01 PROCEDURE — 77386: CPT | Performed by: RADIOLOGY

## 2021-07-02 ENCOUNTER — HOSPITAL ENCOUNTER (OUTPATIENT)
Dept: RADIATION ONCOLOGY | Facility: HOSPITAL | Age: 56
Discharge: HOME | End: 2021-07-02

## 2021-07-02 ENCOUNTER — HOSPITAL ENCOUNTER (OUTPATIENT)
Dept: RESPIRATORY THERAPY | Facility: HOSPITAL | Age: 56
Discharge: HOME | End: 2021-07-02
Admitting: INTERNAL MEDICINE

## 2021-07-02 DIAGNOSIS — C71.9 OLIGODENDROGLIOMA (HCC): ICD-10-CM

## 2021-07-02 PROCEDURE — 94729 DIFFUSING CAPACITY: CPT

## 2021-07-02 PROCEDURE — 94010 BREATHING CAPACITY TEST: CPT

## 2021-07-02 PROCEDURE — 94726 PLETHYSMOGRAPHY LUNG VOLUMES: CPT

## 2021-07-02 PROCEDURE — 77386: CPT | Performed by: RADIOLOGY

## 2021-07-02 PROCEDURE — 77014 CHG CT GUIDANCE RADIATION THERAPY FLDS PLACEMENT: CPT | Performed by: RADIOLOGY

## 2021-07-06 ENCOUNTER — HOSPITAL ENCOUNTER (OUTPATIENT)
Dept: RADIATION ONCOLOGY | Facility: HOSPITAL | Age: 56
Discharge: HOME | End: 2021-07-06

## 2021-07-06 PROCEDURE — 77014 CHG CT GUIDANCE RADIATION THERAPY FLDS PLACEMENT: CPT | Performed by: RADIOLOGY

## 2021-07-06 PROCEDURE — 77386: CPT | Performed by: RADIOLOGY

## 2021-07-07 ENCOUNTER — RADIATION ONCOLOGY WEEKLY ASSESSMENT (OUTPATIENT)
Dept: RADIATION ONCOLOGY | Facility: HOSPITAL | Age: 56
End: 2021-07-07

## 2021-07-07 ENCOUNTER — HOSPITAL ENCOUNTER (OUTPATIENT)
Dept: RADIATION ONCOLOGY | Facility: HOSPITAL | Age: 56
Discharge: HOME | End: 2021-07-07

## 2021-07-07 VITALS
WEIGHT: 293 LBS | BODY MASS INDEX: 53.92 KG/M2 | OXYGEN SATURATION: 97 % | HEIGHT: 62 IN | DIASTOLIC BLOOD PRESSURE: 79 MMHG | TEMPERATURE: 97.9 F | HEART RATE: 84 BPM | SYSTOLIC BLOOD PRESSURE: 143 MMHG

## 2021-07-07 DIAGNOSIS — C71.9 OLIGODENDROGLIOMA (HCC): Primary | ICD-10-CM

## 2021-07-07 PROCEDURE — 77014 CHG CT GUIDANCE RADIATION THERAPY FLDS PLACEMENT: CPT | Performed by: RADIOLOGY

## 2021-07-07 PROCEDURE — 77386: CPT | Performed by: RADIOLOGY

## 2021-07-07 NOTE — PROGRESS NOTES
"Patient Name: Cindy Cortes Date: 2021       : 1965        MRN #: [id number] Diagnosis:   Encounter Diagnosis   Name Primary?   • Oligodendroglioma (CMS/HCC) Yes                  RADIATION ON TREATMENT VISIT NOTE - BRAIN    Treatment Summary    Treatment Site Ref. ID Energy Dose/Fx (cGy) #Fx Dose Correction (cGy) Total Dose (cGy) Start Date End Date Elapsed Days   RtFrontalLobe RtFrontalLobe 6X 180  0 1,260 2021 9     WHO grade II, oligodendroglioma, IDH2 mutant, 1p19Q codeleted CNS malignancy    Plan: Adjuvant radiation followed by PCV; adjuvant therapy as part of curative intent    General:           Review of Systems      [] No new complaints [x] Headache   []AM [] PM [] Seizure activity  [] Memory loss [] Gait disturbance [x] Nausea  [] Vomiting [] Speech changes [] Visual changes  [] Weakness [] Skin itching [] Hair loss  [x] Skin soreness with pain  [x] Fatigue,  severity: 1 Relief w/ Rest  [x] Pain,  severity: 2, location: Scalp  Steroid regimen:  Anti-seizure regimen:  Keppra 750 mg BID   Pain regimen:    Skin regimen: Other: OTC shea butter cream    Comments/Notes:  Headaches are mild-ibuprofen resolves  Scalp tenderness since surgery but now a bit more tender as noted. Ibuprofen helps as well.    KPS: 80%       Vital Signs: /79   Pulse 84   Temp 97.9 °F (36.6 °C)   Ht 157.5 cm (62\")   Wt (!) 156 kg (345 lb)   LMP  (LMP Unknown)   SpO2 97%   BMI 63.10 kg/m²     Weight:   Wt Readings from Last 3 Encounters:   21 (!) 156 kg (345 lb)   21 (!) 159 kg (351 lb)   21 (!) 159 kg (351 lb)       Medication:   Current Outpatient Medications:   •  acetaZOLAMIDE (DIAMOX) 125 MG tablet, Take 1 tablet by mouth 3 (Three) Times a Day., Disp: 90 tablet, Rfl: 2  •  Alcohol Swabs (Alcohol Wipes) 70 % pads, Apply 1 each topically to the appropriate area as directed 4 (Four) Times a Day., Disp: , Rfl:   •  amLODIPine (NORVASC) 10 MG tablet, Take 1 tablet by mouth " Daily., Disp: 30 tablet, Rfl: 2  •  glucose blood (OneTouch Verio) test strip, 1 each by Other route 4 (Four) Times a Day Before Meals & at Bedtime. Use as instructed, Disp: 200 each, Rfl: 1  •  insulin aspart (NovoLOG FlexPen) 100 UNIT/ML solution pen-injector sc pen, Inject 5 Units under the skin into the appropriate area as directed 3 (Three) Times a Day With Meals. Inject 1u lispro SC for every 50 over 150, Disp: 2 pen, Rfl: 2  •  insulin detemir (Levemir FlexTouch) 100 UNIT/ML injection, Inject 15 Units under the skin into the appropriate area as directed 2 (Two) Times a Day., Disp: 3 pen, Rfl: 2  •  Insulin Pen Needle 32G X 4 MM misc, 1 each 5 (Five) Times a Day., Disp: 200 each, Rfl: 1  •  Isopropyl Alcohol (Alcohol Wipes) 70 % misc, Apply 1 each topically 4 (Four) Times a Day Before Meals & at Bedtime., Disp: 200 each, Rfl: 1  •  Lancets (OneTouch Delica Plus [id number]) misc, 1 each 4 (Four) Times a Day Before Meals & at Bedtime., Disp: 200 each, Rfl: 1  •  levETIRAcetam (KEPPRA) 750 MG tablet, Take 1 tablet by mouth 2 (Two) Times a Day., Disp: 60 tablet, Rfl: 2  •  metoprolol tartrate (LOPRESSOR) 25 MG tablet, Take 0.5 tablets by mouth Every 8 (Eight) Hours., Disp: 45 tablet, Rfl: 2  •  oxybutynin XL (DITROPAN-XL) 5 MG 24 hr tablet, Take 5 mg by mouth Daily., Disp: , Rfl:   •  pantoprazole (PROTONIX) 40 MG EC tablet, TAKE 1 TABLET BY MOUTH EVERY DAY (Patient taking differently: Take 40 mg by mouth Daily. Take DOS), Disp: 30 tablet, Rfl: 1  •  venlafaxine XR (EFFEXOR-XR) 75 MG 24 hr capsule, Take 75 mg by mouth Daily. Take DOS, Disp: , Rfl: 3       LABS (Reviewed):  Hematology WBC   Date Value Ref Range Status   06/11/2021 5.40 3.40 - 10.80 10*3/mm3 Final   03/31/2020 5.63 4.5 - 11.0 10*3/uL Final     RBC   Date Value Ref Range Status   06/11/2021 3.58 (L) 3.77 - 5.28 10*6/mm3 Final   03/31/2020 3.86 (L) 4.0 - 5.2 10*6/uL Final     Hemoglobin   Date Value Ref Range Status   06/11/2021 10.8 (L) 12.0 - 15.9  g/dL Final   03/31/2020 11.5 (L) 12.0 - 16.0 g/dL Final     Hematocrit   Date Value Ref Range Status   06/11/2021 33.0 (L) 34.0 - 46.6 % Final   03/31/2020 36.8 36.0 - 46.0 % Final     Platelets   Date Value Ref Range Status   06/11/2021 185 140 - 450 10*3/mm3 Final   03/31/2020 266 140 - 440 10*3/uL Final      Chemistry   Lab Results   Component Value Date    GLUCOSE 250 (H) 05/13/2021    BUN 34 (H) 05/13/2021    CREATININE 0.82 05/13/2021    EGFRIFNONA 73 05/13/2021    BCR 41.5 (H) 05/13/2021    K 4.6 05/13/2021    CO2 19.0 (L) 05/13/2021    CALCIUM 9.4 05/13/2021    ALBUMIN 3.00 (L) 05/13/2021    LABIL2 1.4 03/30/2021    AST 22 05/13/2021    ALT 42 (H) 05/13/2021         Physical Exam:         Neurology: [x] CNII-XII grossly intact    [x] Strength:     [] Reflexes:     [] Encephalopathy:     [] Memory impairment:     [] Neuropathy: motor/sensory:   Auditory/Ear: [] Otitis, external ear (non-infectious):   Ocular/Visual: [] PERRLA/EOMI:   Skin:  stGstrstastdstest:st st1st Comments/Notes: CTAb  RRR    [x] Review of labs, images, dosimetry, dose delivered, & treatment parameters.    Comments:     [x] Patient treatment setup reviewed.    Comments:     Recommendations: doing well  Monitoring headaches  No role for steroids      [x] Continue RT  [] Change RT Plan [] Hold RT, length:        Approved Electronically By:  Yang Cruz MD, 7/7/2021, 15:27 EDT          Confidentiality of this medical record shall be maintained except when use or disclosure is required or permitted by law, regulation or written authorization by the patient.

## 2021-07-08 ENCOUNTER — HOSPITAL ENCOUNTER (OUTPATIENT)
Dept: RADIATION ONCOLOGY | Facility: HOSPITAL | Age: 56
Discharge: HOME | End: 2021-07-08

## 2021-07-08 PROCEDURE — 77336 RADIATION PHYSICS CONSULT: CPT | Performed by: RADIOLOGY

## 2021-07-08 PROCEDURE — 77386: CPT | Performed by: RADIOLOGY

## 2021-07-08 PROCEDURE — 77014 CHG CT GUIDANCE RADIATION THERAPY FLDS PLACEMENT: CPT | Performed by: RADIOLOGY

## 2021-07-09 ENCOUNTER — HOSPITAL ENCOUNTER (OUTPATIENT)
Dept: RADIATION ONCOLOGY | Facility: HOSPITAL | Age: 56
Discharge: HOME | End: 2021-07-09

## 2021-07-09 PROCEDURE — 77386: CPT | Performed by: RADIOLOGY

## 2021-07-09 PROCEDURE — 77014 CHG CT GUIDANCE RADIATION THERAPY FLDS PLACEMENT: CPT | Performed by: RADIOLOGY

## 2021-07-12 ENCOUNTER — HOSPITAL ENCOUNTER (OUTPATIENT)
Dept: RADIATION ONCOLOGY | Facility: HOSPITAL | Age: 56
Discharge: HOME | End: 2021-07-12

## 2021-07-12 PROCEDURE — 77014 CHG CT GUIDANCE RADIATION THERAPY FLDS PLACEMENT: CPT | Performed by: RADIOLOGY

## 2021-07-12 PROCEDURE — 77386: CPT | Performed by: RADIOLOGY

## 2021-07-12 PROCEDURE — 77427 RADIATION TX MANAGEMENT X5: CPT | Performed by: RADIOLOGY

## 2021-07-13 ENCOUNTER — HOSPITAL ENCOUNTER (OUTPATIENT)
Dept: RADIATION ONCOLOGY | Facility: HOSPITAL | Age: 56
Discharge: HOME | End: 2021-07-13

## 2021-07-13 DIAGNOSIS — D49.6 BRAIN TUMOR (HCC): Primary | ICD-10-CM

## 2021-07-13 PROCEDURE — 77014 CHG CT GUIDANCE RADIATION THERAPY FLDS PLACEMENT: CPT | Performed by: RADIOLOGY

## 2021-07-13 PROCEDURE — 77386: CPT | Performed by: RADIOLOGY

## 2021-07-13 PROCEDURE — 77427 RADIATION TX MANAGEMENT X5: CPT | Performed by: RADIOLOGY

## 2021-07-13 RX ORDER — ONDANSETRON HYDROCHLORIDE 8 MG/1
8 TABLET, FILM COATED ORAL EVERY 8 HOURS PRN
Qty: 30 TABLET | Refills: 3 | Status: SHIPPED | OUTPATIENT
Start: 2021-07-13 | End: 2021-08-23 | Stop reason: SDUPTHER

## 2021-07-13 NOTE — PROGRESS NOTES
Radiation Oncology Nurse Note    Called Zofran prescription into patient's pharmacy. Spoke with Razia and provided call back number.    Usha Vargas RN, BSN  Radiation Oncology Department  [facility]  [phone]  Office  [phone]  Fax

## 2021-07-14 ENCOUNTER — HOSPITAL ENCOUNTER (OUTPATIENT)
Dept: RADIATION ONCOLOGY | Facility: HOSPITAL | Age: 56
Discharge: HOME | End: 2021-07-14

## 2021-07-14 ENCOUNTER — RADIATION ONCOLOGY WEEKLY ASSESSMENT (OUTPATIENT)
Dept: RADIATION ONCOLOGY | Facility: HOSPITAL | Age: 56
End: 2021-07-14

## 2021-07-14 VITALS
RESPIRATION RATE: 20 BRPM | SYSTOLIC BLOOD PRESSURE: 130 MMHG | HEART RATE: 81 BPM | BODY MASS INDEX: 62.19 KG/M2 | TEMPERATURE: 98.1 F | WEIGHT: 293 LBS | OXYGEN SATURATION: 98 % | DIASTOLIC BLOOD PRESSURE: 79 MMHG

## 2021-07-14 DIAGNOSIS — C71.9 OLIGODENDROGLIOMA (HCC): Primary | ICD-10-CM

## 2021-07-14 PROCEDURE — FACE2FACE: Performed by: RADIOLOGY

## 2021-07-14 PROCEDURE — 77014 CHG CT GUIDANCE RADIATION THERAPY FLDS PLACEMENT: CPT | Performed by: RADIOLOGY

## 2021-07-14 PROCEDURE — 77386: CPT | Performed by: RADIOLOGY

## 2021-07-14 NOTE — PROGRESS NOTES
Patient Name: Cindy Cortes Date: 2021       : 1965        MRN #: [id number] Diagnosis:   Encounter Diagnosis   Name Primary?   • Oligodendroglioma (CMS/HCC) Yes             RADIATION ON TREATMENT VISIT NOTE - BRAIN    Treatment Summary    Treatment Site Ref. ID Energy Dose/Fx (cGy) #Fx Dose Correction (cGy) Total Dose (cGy) Start Date End Date Elapsed Days   RtFrontalLobe RtFrontalLobe 6X 180  0 2,160 2021 16     WHO grade II, oligodendroglioma, IDH2 mutant, 1p19Q codeleted CNS malignancy     Plan: Adjuvant radiation followed by PCV; adjuvant therapy as part of curative intent    General:           Review of Systems    [] No new complaints [] Headache   []AM [] PM [] Seizure activity  [] Memory loss [] Gait disturbance [x] Nausea  [] Vomiting [] Speech changes [] Visual changes  [] Weakness [] Skin itching [x] Hair loss  [] Skin soreness with pain  [x] Fatigue,  severity: 1 Relief w/ Rest  [] Pain,  severity: ----------------, location:   Steroid regimen:   Anti-seizure regimen:  Keppra 750mg BID  Pain regimen:   Ibuprofen PRN    Skin regimen: NONE     Comments/Notes:  Patient is no longer complaining of nausea since starting Zofran 8mg yesterday.  Feeling better    KPS: 80%       Vital Signs: /79   Pulse 81   Temp 98.1 °F (36.7 °C) (Oral)   Resp 20   Wt (!) 154 kg (340 lb)   LMP  (LMP Unknown)   SpO2 98%   BMI 62.19 kg/m²     Weight:   Wt Readings from Last 3 Encounters:   21 (!) 154 kg (340 lb)   21 (!) 156 kg (345 lb)   21 (!) 159 kg (351 lb)       Medication:   Current Outpatient Medications:   •  acetaZOLAMIDE (DIAMOX) 125 MG tablet, Take 1 tablet by mouth 3 (Three) Times a Day., Disp: 90 tablet, Rfl: 2  •  Alcohol Swabs (Alcohol Wipes) 70 % pads, Apply 1 each topically to the appropriate area as directed 4 (Four) Times a Day., Disp: , Rfl:   •  amLODIPine (NORVASC) 10 MG tablet, Take 1 tablet by mouth Daily., Disp: 30 tablet, Rfl: 2  •   glucose blood (OneTouch Verio) test strip, 1 each by Other route 4 (Four) Times a Day Before Meals & at Bedtime. Use as instructed, Disp: 200 each, Rfl: 1  •  insulin aspart (NovoLOG FlexPen) 100 UNIT/ML solution pen-injector sc pen, Inject 5 Units under the skin into the appropriate area as directed 3 (Three) Times a Day With Meals. Inject 1u lispro SC for every 50 over 150, Disp: 2 pen, Rfl: 2  •  insulin detemir (Levemir FlexTouch) 100 UNIT/ML injection, Inject 15 Units under the skin into the appropriate area as directed 2 (Two) Times a Day., Disp: 3 pen, Rfl: 2  •  Insulin Pen Needle 32G X 4 MM misc, 1 each 5 (Five) Times a Day., Disp: 200 each, Rfl: 1  •  Isopropyl Alcohol (Alcohol Wipes) 70 % misc, Apply 1 each topically 4 (Four) Times a Day Before Meals & at Bedtime., Disp: 200 each, Rfl: 1  •  Lancets (OneTouch Delica Plus [id number]) misc, 1 each 4 (Four) Times a Day Before Meals & at Bedtime., Disp: 200 each, Rfl: 1  •  levETIRAcetam (KEPPRA) 750 MG tablet, Take 1 tablet by mouth 2 (Two) Times a Day., Disp: 60 tablet, Rfl: 2  •  metoprolol tartrate (LOPRESSOR) 25 MG tablet, Take 0.5 tablets by mouth Every 8 (Eight) Hours., Disp: 45 tablet, Rfl: 2  •  ondansetron (Zofran) 8 MG tablet, Take 1 tablet by mouth Every 8 (Eight) Hours As Needed for Nausea or Vomiting., Disp: 30 tablet, Rfl: 3  •  oxybutynin XL (DITROPAN-XL) 5 MG 24 hr tablet, Take 5 mg by mouth Daily., Disp: , Rfl:   •  pantoprazole (PROTONIX) 40 MG EC tablet, TAKE 1 TABLET BY MOUTH EVERY DAY (Patient taking differently: Take 40 mg by mouth Daily. Take DOS), Disp: 30 tablet, Rfl: 1  •  venlafaxine XR (EFFEXOR-XR) 75 MG 24 hr capsule, Take 75 mg by mouth Daily. Take DOS, Disp: , Rfl: 3       LABS (Reviewed):  Hematology WBC   Date Value Ref Range Status   06/11/2021 5.40 3.40 - 10.80 10*3/mm3 Final   03/31/2020 5.63 4.5 - 11.0 10*3/uL Final     RBC   Date Value Ref Range Status   06/11/2021 3.58 (L) 3.77 - 5.28 10*6/mm3 Final   03/31/2020 3.86 (L)  4.0 - 5.2 10*6/uL Final     Hemoglobin   Date Value Ref Range Status   06/11/2021 10.8 (L) 12.0 - 15.9 g/dL Final   03/31/2020 11.5 (L) 12.0 - 16.0 g/dL Final     Hematocrit   Date Value Ref Range Status   06/11/2021 33.0 (L) 34.0 - 46.6 % Final   03/31/2020 36.8 36.0 - 46.0 % Final     Platelets   Date Value Ref Range Status   06/11/2021 185 140 - 450 10*3/mm3 Final   03/31/2020 266 140 - 440 10*3/uL Final      Chemistry   Lab Results   Component Value Date    GLUCOSE 250 (H) 05/13/2021    BUN 34 (H) 05/13/2021    CREATININE 0.82 05/13/2021    EGFRIFNONA 73 05/13/2021    BCR 41.5 (H) 05/13/2021    K 4.6 05/13/2021    CO2 19.0 (L) 05/13/2021    CALCIUM 9.4 05/13/2021    ALBUMIN 3.00 (L) 05/13/2021    LABIL2 1.4 03/30/2021    AST 22 05/13/2021    ALT 42 (H) 05/13/2021         Physical Exam:         Neurology: [x] CNII-XII grossly intact    [x] Strength:  intact    [] Reflexes:     [] Encephalopathy:     [] Memory impairment:     [] Neuropathy: motor/sensory:   Auditory/Ear: [] Otitis, external ear (non-infectious):   Ocular/Visual: [] PERRLA/EOMI:   Skin:  stGstrstastdstest:st st1st Comments/Notes:     [x] Review of labs, images, dosimetry, dose delivered, & treatment parameters.    Comments:     [x] Patient treatment setup reviewed.    Comments:     Recommendations: doing well  Nausea meds have helped  No role for steroids at this time.    [x] Continue RT  [] Change RT Plan [] Hold RT, length:        Approved Electronically By:  Yang Cruz MD, 7/14/2021, 15:45 EDT          Confidentiality of this medical record shall be maintained except when use or disclosure is required or permitted by law, regulation or written authorization by the patient.

## 2021-07-14 NOTE — PROGRESS NOTES
HEMATOLOGY ONCOLOGY OUTPATIENT FOLLOW UP      Patient name: Cindy Cortes  : 1965  MRN: [id number]  Primary Care Physician: Annamarie Monroy APRN  Referring Physician: Annamarie Monroy APRN  Reason For Consult:     Chief Complaint   Patient presents with   • Follow-up     Oligodendroglioma         HPI:   History of Present Illness:  Cindy Cortes is 55 y.o. female who presented to our office on 06/10/21 for consultation regarding Oligodendroglioma    Patient is a 55-year-old female who was referred to us for treatment options regarding recent diagnosis of grade 2 oligodendroglioma.  This was IDH [id number] mutated, 1P 19 Q codeleted.  Patient initially presented with seizures and a CT head was obtained that showed vasogenic edema and a right frontal lobe mass MRI was obtained after this.  2021 -MRI of the brain shows 2.1 x 4.4 x 3.3 cm right frontal lobe mass with eccentric cystic or necrotic component with surrounding vasogenic edema and a focal 3 mm right to left midline shift.  Patient was started on Keppra, steroids plan was made for elective craniotomy and surgical resection.    2021 craniotomy of the right frontal lobe, microscopic resection of tumor mass.  Pathology results oligodendroglioma WHO grade 2, IDH1 R132H mutation by immunohistochemistry, 1p19q codeletion by FISH.    2021 -status post resection of the right frontal lobe mass.  Expected postop blood product. resolution of midline shift.    2021 - Started radiation adjuvantly.    Subjective:  06/10/21.  Patient continues to get radiation treatment, she is tolerating it well, plan for 16 more treatments. She has had some nausea controlled by zofran. She has a sore spot on her right temple area.    The following portions of the patient's history were reviewed and updated as appropriate: allergies, current medications, past family history, past medical history, past social history, past surgical history and  problem list.    Past Medical History:   Diagnosis Date   • Arthritis    • GERD (gastroesophageal reflux disease)    • Hypertension    • Oligodendroglioma (CMS/HCC)    • Seizure (CMS/HCC)    • Type 2 diabetes mellitus with hyperglycemia, without long-term current use of insulin (CMS/HCC) 5/12/2021       Past Surgical History:   Procedure Laterality Date   • CRANIOTOMY FOR TUMOR Right 5/6/2021    Procedure: CRANIOTOMY FOR TUMOR RESECTION WITH STEREOTACTIC;  Surgeon: Jluis Felix MD;  Location: [facility] MAIN OR;  Service: Neurosurgery;  Laterality: Right;         Current Outpatient Medications:   •  acetaZOLAMIDE (DIAMOX) 125 MG tablet, Take 1 tablet by mouth 3 (Three) Times a Day., Disp: 90 tablet, Rfl: 2  •  Alcohol Swabs (Alcohol Wipes) 70 % pads, Apply 1 each topically to the appropriate area as directed 4 (Four) Times a Day., Disp: , Rfl:   •  amLODIPine (NORVASC) 10 MG tablet, Take 1 tablet by mouth Daily., Disp: 30 tablet, Rfl: 2  •  glucose blood (OneTouch Verio) test strip, 1 each by Other route 4 (Four) Times a Day Before Meals & at Bedtime. Use as instructed, Disp: 200 each, Rfl: 1  •  insulin aspart (NovoLOG FlexPen) 100 UNIT/ML solution pen-injector sc pen, Inject 5 Units under the skin into the appropriate area as directed 3 (Three) Times a Day With Meals. Inject 1u lispro SC for every 50 over 150, Disp: 2 pen, Rfl: 2  •  insulin detemir (Levemir FlexTouch) 100 UNIT/ML injection, Inject 15 Units under the skin into the appropriate area as directed 2 (Two) Times a Day., Disp: 3 pen, Rfl: 2  •  Insulin Pen Needle 32G X 4 MM misc, 1 each 5 (Five) Times a Day., Disp: 200 each, Rfl: 1  •  Isopropyl Alcohol (Alcohol Wipes) 70 % misc, Apply 1 each topically 4 (Four) Times a Day Before Meals & at Bedtime., Disp: 200 each, Rfl: 1  •  Lancets (OneTouch Delica Plus [id number]) misc, 1 each 4 (Four) Times a Day Before Meals & at Bedtime., Disp: 200 each, Rfl: 1  •  levETIRAcetam (KEPPRA) 750 MG tablet, Take 1  tablet by mouth 2 (Two) Times a Day., Disp: 60 tablet, Rfl: 2  •  metoprolol tartrate (LOPRESSOR) 25 MG tablet, Take 0.5 tablets by mouth Every 8 (Eight) Hours., Disp: 45 tablet, Rfl: 2  •  ondansetron (Zofran) 8 MG tablet, Take 1 tablet by mouth Every 8 (Eight) Hours As Needed for Nausea or Vomiting., Disp: 30 tablet, Rfl: 3  •  oxybutynin XL (DITROPAN-XL) 5 MG 24 hr tablet, Take 5 mg by mouth Daily., Disp: , Rfl:   •  pantoprazole (PROTONIX) 40 MG EC tablet, TAKE 1 TABLET BY MOUTH EVERY DAY (Patient taking differently: Take 40 mg by mouth Daily. Take DOS), Disp: 30 tablet, Rfl: 1  •  venlafaxine XR (EFFEXOR-XR) 75 MG 24 hr capsule, Take 75 mg by mouth Daily. Take DOS, Disp: , Rfl: 3    Current outpatient and discharge medications have been reconciled for the patient.  Reviewed by: Emilie Workman MD    No Known Allergies    Family History   Problem Relation Age of Onset   • Kidney disease Mother    • Prostate cancer Brother    • Breast cancer Maternal Aunt    • Colon cancer Maternal Aunt    • Breast cancer Niece    • Breast cancer Cousin        Cancer-related family history includes Breast cancer in her cousin, maternal aunt, and niece; Colon cancer in her maternal aunt; Prostate cancer in her brother.    Social History     Tobacco Use   • Smoking status: Never Smoker   • Smokeless tobacco: Never Used   Vaping Use   • Vaping Use: Never used   Substance Use Topics   • Alcohol use: Not Currently     Alcohol/week: 1.0 standard drinks     Types: 1 Cans of beer per week     Comment: socially   • Drug use: Never     Social History     Social History Narrative   • Not on file        ROS:     Review of Systems   Constitutional: Negative for fatigue and fever.   HENT: Negative for congestion and nosebleeds.    Eyes: Negative for pain.   Respiratory: Negative for cough and shortness of breath.    Cardiovascular: Negative for chest pain.   Gastrointestinal: Negative for abdominal pain, blood in stool, diarrhea, nausea and  "vomiting.   Endocrine: Negative for cold intolerance and heat intolerance.   Genitourinary: Negative for difficulty urinating.   Musculoskeletal: Negative for arthralgias.   Skin: Negative for rash.   Neurological: Positive for weakness. Negative for dizziness, seizures and headaches.   Hematological: Does not bruise/bleed easily.   Psychiatric/Behavioral: Negative for behavioral problems.       Objective:    Vitals:    07/16/21 1043   BP: 122/69   Pulse: 92   Resp: 18   Temp: 97.6 °F (36.4 °C)   Weight: (!) 155 kg (341 lb 3.2 oz)   Height: 157.5 cm (62\")   PainSc: 0-No pain     Body mass index is 62.41 kg/m².  ECOG  (0) Fully active, able to carry on all predisease performance without restriction    Physical Exam:     Physical Exam  Constitutional:       Appearance: Normal appearance.   HENT:      Head: Normocephalic and atraumatic.   Eyes:      Pupils: Pupils are equal, round, and reactive to light.   Cardiovascular:      Rate and Rhythm: Normal rate and regular rhythm.      Pulses: Normal pulses.      Heart sounds: No murmur heard.     Pulmonary:      Effort: Pulmonary effort is normal.      Breath sounds: Normal breath sounds.   Abdominal:      General: There is no distension.      Palpations: Abdomen is soft. There is no mass.      Tenderness: There is no abdominal tenderness.   Musculoskeletal:         General: Normal range of motion.      Cervical back: Normal range of motion.   Skin:     General: Skin is warm.   Neurological:      General: No focal deficit present.      Mental Status: She is alert.   Psychiatric:         Mood and Affect: Mood normal.           Lab Results - Last 18 Months   Lab Units 07/16/21  1040 06/11/21  1037 05/13/21  0306   WBC 10*3/mm3 6.25 5.40 12.40*   HEMOGLOBIN g/dL 9.6* 10.8* 10.9*   HEMATOCRIT % 31.2* 33.0* 33.0*   PLATELETS 10*3/mm3 271 185 181   MCV fL 94.0 92.2 92.3     Lab Results - Last 18 Months   Lab Units 05/13/21  0306 05/12/21  0316 05/11/21  0634   SODIUM mmol/L 134* " 133* 133*   POTASSIUM mmol/L 4.6 4.7 4.6   CHLORIDE mmol/L 104 103 102   CO2 mmol/L 19.0* 19.0* 22.0   BUN mg/dL 34* 33* 35*   CREATININE mg/dL 0.82 0.81 0.83   CALCIUM mg/dL 9.4 9.2 9.4   BILIRUBIN mg/dL 0.3 0.4 0.3   ALK PHOS U/L 67 70 69   ALT (SGPT) U/L 42* 44* 37*   AST (SGOT) U/L 22 28 18   GLUCOSE mg/dL 250* 185* 305*       Lab Results   Component Value Date    GLUCOSE 250 (H) 05/13/2021    BUN 34 (H) 05/13/2021    CREATININE 0.82 05/13/2021    EGFRIFNONA 73 05/13/2021    BCR 41.5 (H) 05/13/2021    K 4.6 05/13/2021    CO2 19.0 (L) 05/13/2021    CALCIUM 9.4 05/13/2021    ALBUMIN 3.00 (L) 05/13/2021    LABIL2 1.4 03/30/2021    AST 22 05/13/2021    ALT 42 (H) 05/13/2021       Lab Results - Last 18 Months   Lab Units 05/04/21  0922   INR  0.96   APTT seconds 20.7*       Lab Results   Component Value Date    IRON 51 04/19/2021    TIBC 274 (L) 04/19/2021    FERRITIN 145.0 01/04/2018       Lab Results   Component Value Date    RGNGOCPR51 340 01/04/2018       Lab Results   Component Value Date    PTT 20.7 (L) 05/04/2021    INR 0.96 05/04/2021     CT Head Without Contrast    Result Date: 4/19/2021  1. An area of vasogenic edema in the right frontal lobe measures approximately 6.3 x 3.1 x 4.0 cm. This is likely secondary to an underlying neoplastic mass or possibly infectious/inflammatory process. MRI of the brain without and with contrast is recommended. 2. No acute hemorrhage. No herniation. Jose Neely M.D. Neuroradiologist Electronically signed by:  Jose Neely M.D.  4/18/2021 11:55 PM    MRI Brain With & Without Contrast    Result Date: 5/8/2021  1. Status post resection of right frontal lobe mass. There is expected postoperative blood products and susceptibility within the resection cavity. There is been resolution of previously demonstrated midline shift. 2. New postoperative left hemispheric subdural hematoma measuring only 3 mm in thickness. No resultant mass effect or midline shift. 3. No definite  "pathologic intracranial contrast enhancement. Electronically Signed: Efrain Neely MD 5/8/2021 8:41 EDT    MRI Brain With & Without Contrast    Result Date: 4/19/2021   1. 2.1 x 4.4 x 3.3 cm high right frontal lobe mass with eccentric cystic or necrotic component, with surrounding vasogenic edema, and focal 3 mm right to left midline shift. Neurosurgical consultation is advised.   Electronically Signed By-Fern Sin MD On:4/19/2021 8:11 AM This report was finalized on [id number] by  Fern Sin MD.    XR Skull Lateral    Result Date: 5/6/2021  IMPRESSION :  1. No retained radiopaque foreign body conforming to the appearance of a surgical needle is seen within the included field-of-view, with limitations as described above. I notified the operating room earlier today, prior to the time of this dictation.  Electronically Signed By-Fern Sin MD On:5/6/2021 4:59 PM This report was finalized on [id number] by  Fern Sin MD.    Pathology results  Outside Report, Addendum   Integrated Diagnosis: Oligodendroglioma WHO Grade II  IDH1 R132H mutation by Immunohistochemistry; 1p19q co-deletion by FISH  See attached report from [facility]   Addendum electronically signed by Cecilia Chaudhary on 5/28/2021 at 0824   Final Diagnosis   Specimen #1 (\"Brain tumor right frontal lobe,\" biopsy):    Diffuse glioma (WHO grade II)    See comment     Specimen #2 (\"Brain tumor right frontal lobe,\" biopsy):    Diffuse glioma (WHO grade II)    See comment     Specimen #3 (Brain tumor right frontal lobe, resection):    Diffuse glioma, IDH-mutant (WHO grade II)    See attached report from [facility]         Assessment/Plan     Patient is a 55-year-old female with recently diagnosed oligodendroglioma grade 2 status post gross total resection    Oligodendroglioma  Previously I had an extensive discussion with the patient about treatment options, " prognosis.  I discussed with her that there are findings from RTOG 9802 clinical trial which showed survival advantage with radiation followed by chemotherapy after surgical resection of grade 2 oligodendrogliomas.  Chemotherapy with the PCV regimen in this trial conferred a survival advantage over radiotherapy alone with a median overall survival of 13.3 versus 7.8 years.  In subset analysis benefit was more significant  Histologic oligodendroglioma is as compared to other low-grade gliomas.  Molecular analysis post talk also showed survival advantage in molecularly confirmed IDH mutant, 1p19q codeleted oligodendrogliomas.    PCV can be a toxic regimen however survival advantage is only been shown in randomized control trial using this particular regimen.  The other option would be temozolomide which has advantages over PCV with ease of administration, better tolerance and efficacy in combination with radiation therapy and other types of CNS tumors and gliomas.    The other option I discussed with her is clinical trial with a CODEL study which is comparing radiation alone versus radiation with Temodar versus radiation with PCV in this patient population.  Patient however is not very interested in enrolling in a clinical trial.  She is wanting to pursue the most aggressive treatment option for her to reduce the chances of recurrence of her tumor.    Lastly also discussed with her the option of surveillance and observation with serial MRIs however also discussed that in above studies the progression free survival is around 50% for 5 years.     Based on her above discussion patient decided to pursue aggressive treatment with radiation followed by chemotherapy.  We would use the RTOG 9802 regimen with radiation followed by chemotherapy with PCV.  We have clear evidence that vincristine does not cross the blood-brain barrier significantly and hence if there is toxicity we can choose to omit the drug.  Case was discussed  with Dr. Cruz and started sequential radiation and chemo    Tolerating radiation well with some nausea controlled with zofran  Plan on starting PCV regimen after radiation completion    nausea  Continue zofran as needed    Anemia  Check iron studies , vitamin B12, folic acid.      F/u to start treatment.      Thank you very much for providing the opportunity to participate in this patient’s care. Please do not hesitate to call if there are any other questions    I have reviewed and confirmed the accuracy of the patient's history: Chief complaint, HPI, ROS, Subjective and Past Family Social History as entered by the MA/LPN/RN.     Emilie Workman MD 07/16/21

## 2021-07-15 ENCOUNTER — HOSPITAL ENCOUNTER (OUTPATIENT)
Dept: RADIATION ONCOLOGY | Facility: HOSPITAL | Age: 56
Discharge: HOME | End: 2021-07-15

## 2021-07-15 PROCEDURE — 77386: CPT | Performed by: RADIOLOGY

## 2021-07-15 PROCEDURE — 77014 CHG CT GUIDANCE RADIATION THERAPY FLDS PLACEMENT: CPT | Performed by: RADIOLOGY

## 2021-07-15 PROCEDURE — 77336 RADIATION PHYSICS CONSULT: CPT | Performed by: RADIOLOGY

## 2021-07-16 ENCOUNTER — HOSPITAL ENCOUNTER (OUTPATIENT)
Dept: RADIATION ONCOLOGY | Facility: HOSPITAL | Age: 56
Discharge: HOME | End: 2021-07-16

## 2021-07-16 ENCOUNTER — OFFICE VISIT (OUTPATIENT)
Dept: ONCOLOGY | Facility: CLINIC | Age: 56
End: 2021-07-16

## 2021-07-16 ENCOUNTER — APPOINTMENT (OUTPATIENT)
Dept: LAB | Facility: HOSPITAL | Age: 56
End: 2021-07-16

## 2021-07-16 VITALS
SYSTOLIC BLOOD PRESSURE: 122 MMHG | TEMPERATURE: 97.6 F | BODY MASS INDEX: 53.92 KG/M2 | RESPIRATION RATE: 18 BRPM | WEIGHT: 293 LBS | DIASTOLIC BLOOD PRESSURE: 69 MMHG | HEIGHT: 62 IN | HEART RATE: 92 BPM

## 2021-07-16 DIAGNOSIS — C71.9 OLIGODENDROGLIOMA (HCC): Primary | ICD-10-CM

## 2021-07-16 LAB
BASOPHILS # BLD AUTO: 0.02 10*3/MM3 (ref 0–0.2)
BASOPHILS NFR BLD AUTO: 0.3 % (ref 0–1.5)
DEPRECATED RDW RBC AUTO: 52.3 FL (ref 37–54)
EOSINOPHIL # BLD AUTO: 0.25 10*3/MM3 (ref 0–0.4)
EOSINOPHIL NFR BLD AUTO: 4 % (ref 0.3–6.2)
ERYTHROCYTE [DISTWIDTH] IN BLOOD BY AUTOMATED COUNT: 16.2 % (ref 12.3–15.4)
FERRITIN SERPL-MCNC: 464.1 NG/ML (ref 13–150)
FOLATE SERPL-MCNC: 2.15 NG/ML (ref 4.78–24.2)
HCT VFR BLD AUTO: 31.2 % (ref 34–46.6)
HGB BLD-MCNC: 9.6 G/DL (ref 12–15.9)
IRON 24H UR-MRATE: 75 MCG/DL (ref 37–145)
IRON SATN MFR SERPL: 25 % (ref 20–50)
LYMPHOCYTES # BLD AUTO: 0.88 10*3/MM3 (ref 0.7–3.1)
LYMPHOCYTES NFR BLD AUTO: 14.1 % (ref 19.6–45.3)
MCH RBC QN AUTO: 28.9 PG (ref 26.6–33)
MCHC RBC AUTO-ENTMCNC: 30.8 G/DL (ref 31.5–35.7)
MCV RBC AUTO: 94 FL (ref 79–97)
MONOCYTES # BLD AUTO: 0.33 10*3/MM3 (ref 0.1–0.9)
MONOCYTES NFR BLD AUTO: 5.3 % (ref 5–12)
NEUTROPHILS NFR BLD AUTO: 4.77 10*3/MM3 (ref 1.7–7)
NEUTROPHILS NFR BLD AUTO: 76.3 % (ref 42.7–76)
PLATELET # BLD AUTO: 271 10*3/MM3 (ref 140–450)
PMV BLD AUTO: 8.1 FL (ref 6–12)
RBC # BLD AUTO: 3.32 10*6/MM3 (ref 3.77–5.28)
TIBC SERPL-MCNC: 299 MCG/DL (ref 298–536)
TRANSFERRIN SERPL-MCNC: 201 MG/DL (ref 200–360)
VIT B12 BLD-MCNC: 365 PG/ML (ref 211–946)
WBC # BLD AUTO: 6.25 10*3/MM3 (ref 3.4–10.8)

## 2021-07-16 PROCEDURE — 77386: CPT | Performed by: RADIOLOGY

## 2021-07-16 PROCEDURE — 83540 ASSAY OF IRON: CPT | Performed by: INTERNAL MEDICINE

## 2021-07-16 PROCEDURE — 85025 COMPLETE CBC W/AUTO DIFF WBC: CPT | Performed by: INTERNAL MEDICINE

## 2021-07-16 PROCEDURE — 36415 COLL VENOUS BLD VENIPUNCTURE: CPT | Performed by: INTERNAL MEDICINE

## 2021-07-16 PROCEDURE — 77014 CHG CT GUIDANCE RADIATION THERAPY FLDS PLACEMENT: CPT | Performed by: RADIOLOGY

## 2021-07-16 PROCEDURE — 84466 ASSAY OF TRANSFERRIN: CPT | Performed by: INTERNAL MEDICINE

## 2021-07-16 PROCEDURE — 99214 OFFICE O/P EST MOD 30 MIN: CPT | Performed by: INTERNAL MEDICINE

## 2021-07-16 PROCEDURE — 82746 ASSAY OF FOLIC ACID SERUM: CPT | Performed by: INTERNAL MEDICINE

## 2021-07-16 PROCEDURE — 82607 VITAMIN B-12: CPT | Performed by: INTERNAL MEDICINE

## 2021-07-16 PROCEDURE — 82728 ASSAY OF FERRITIN: CPT | Performed by: INTERNAL MEDICINE

## 2021-07-19 ENCOUNTER — HOSPITAL ENCOUNTER (OUTPATIENT)
Dept: RADIATION ONCOLOGY | Facility: HOSPITAL | Age: 56
Discharge: HOME | End: 2021-07-19

## 2021-07-19 PROCEDURE — 77014 CHG CT GUIDANCE RADIATION THERAPY FLDS PLACEMENT: CPT | Performed by: RADIOLOGY

## 2021-07-19 PROCEDURE — FACE2FACE: Performed by: RADIOLOGY

## 2021-07-19 PROCEDURE — 77386: CPT | Performed by: RADIOLOGY

## 2021-07-19 RX ORDER — FOLIC ACID 1 MG/1
TABLET ORAL
Qty: 120 TABLET | Refills: 0 | Status: SHIPPED | OUTPATIENT
Start: 2021-07-19 | End: 2021-08-13

## 2021-07-19 NOTE — TELEPHONE ENCOUNTER
Contacted patient to let her know that we will be sending 4mg of daily Folic Acid. Verified pharmacy and she had no questions.

## 2021-07-20 ENCOUNTER — HOSPITAL ENCOUNTER (OUTPATIENT)
Dept: RADIATION ONCOLOGY | Facility: HOSPITAL | Age: 56
Discharge: HOME | End: 2021-07-20

## 2021-07-20 PROCEDURE — 77014 CHG CT GUIDANCE RADIATION THERAPY FLDS PLACEMENT: CPT | Performed by: RADIOLOGY

## 2021-07-20 PROCEDURE — 77386: CPT | Performed by: RADIOLOGY

## 2021-07-21 ENCOUNTER — HOSPITAL ENCOUNTER (OUTPATIENT)
Dept: RADIATION ONCOLOGY | Facility: HOSPITAL | Age: 56
Discharge: HOME | End: 2021-07-21

## 2021-07-21 ENCOUNTER — RADIATION ONCOLOGY WEEKLY ASSESSMENT (OUTPATIENT)
Dept: RADIATION ONCOLOGY | Facility: HOSPITAL | Age: 56
End: 2021-07-21

## 2021-07-21 VITALS
TEMPERATURE: 98.3 F | RESPIRATION RATE: 18 BRPM | DIASTOLIC BLOOD PRESSURE: 84 MMHG | SYSTOLIC BLOOD PRESSURE: 162 MMHG | HEART RATE: 106 BPM | OXYGEN SATURATION: 99 % | WEIGHT: 293 LBS | HEIGHT: 62 IN | BODY MASS INDEX: 53.92 KG/M2

## 2021-07-21 DIAGNOSIS — C71.9 OLIGODENDROGLIOMA (HCC): Primary | ICD-10-CM

## 2021-07-21 PROCEDURE — 77014 CHG CT GUIDANCE RADIATION THERAPY FLDS PLACEMENT: CPT | Performed by: RADIOLOGY

## 2021-07-21 PROCEDURE — 77427 RADIATION TX MANAGEMENT X5: CPT | Performed by: RADIOLOGY

## 2021-07-21 PROCEDURE — 77386: CPT | Performed by: RADIOLOGY

## 2021-07-21 NOTE — PROGRESS NOTES
"Patient Name: Cindy Cortes Order Date: 2021       : 1965 Physician: No primary care provider on file.       MRN #: [id number] Diagnosis:   Encounter Diagnosis   Name Primary?   • Oligodendroglioma (CMS/HCC) Yes                     RADIATION ON TREATMENT VISIT NOTE - BRAIN    Treatment Summary    [] Concurrent Chemo   Regimen:         General:           Review of Systems      [] No new complaints [] Headache   []AM [] PM [] Seizure activity  [] Memory loss [] Gait disturbance [] Nausea  [] Vomiting [] Speech changes [] Visual changes  [] Weakness [] Skin itching [x] Hair loss  [] Skin soreness with pain  [x] Fatigue,  severity: 1 Relief w/ Rest  [x] Pain,  severity: 5, location: Head  Steroid regimen:   Anti-seizure regimen:   Pain regimen:    Skin regimen: NONE     Comments/Notes:      KPS: Choose%       Vital Signs: /84   Pulse 106   Temp 98.3 °F (36.8 °C) (Oral)   Resp 18   Ht 157.5 cm (62\")   Wt (!) 154 kg (339 lb)   LMP  (LMP Unknown)   SpO2 99%   BMI 62.00 kg/m²     Weight:   Wt Readings from Last 3 Encounters:   21 (!) 154 kg (339 lb)   21 (!) 155 kg (341 lb 3.2 oz)   21 (!) 154 kg (340 lb)       Medication:   Current Outpatient Medications:   •  acetaZOLAMIDE (DIAMOX) 125 MG tablet, Take 1 tablet by mouth 3 (Three) Times a Day., Disp: 90 tablet, Rfl: 2  •  Alcohol Swabs (Alcohol Wipes) 70 % pads, Apply 1 each topically to the appropriate area as directed 4 (Four) Times a Day., Disp: , Rfl:   •  amLODIPine (NORVASC) 10 MG tablet, Take 1 tablet by mouth Daily., Disp: 30 tablet, Rfl: 2  •  folic acid (FOLVITE) 1 MG tablet, Take two tablets po Qam, then two tablets po Qpm. Four tablets daily., Disp: 120 tablet, Rfl: 0  •  glucose blood (OneTouch Verio) test strip, 1 each by Other route 4 (Four) Times a Day Before Meals & at Bedtime. Use as instructed, Disp: 200 each, Rfl: 1  •  insulin aspart (NovoLOG FlexPen) 100 UNIT/ML solution pen-injector sc pen, Inject 5 Units " under the skin into the appropriate area as directed 3 (Three) Times a Day With Meals. Inject 1u lispro SC for every 50 over 150, Disp: 2 pen, Rfl: 2  •  insulin detemir (Levemir FlexTouch) 100 UNIT/ML injection, Inject 15 Units under the skin into the appropriate area as directed 2 (Two) Times a Day., Disp: 3 pen, Rfl: 2  •  Insulin Pen Needle 32G X 4 MM misc, 1 each 5 (Five) Times a Day., Disp: 200 each, Rfl: 1  •  Isopropyl Alcohol (Alcohol Wipes) 70 % misc, Apply 1 each topically 4 (Four) Times a Day Before Meals & at Bedtime., Disp: 200 each, Rfl: 1  •  Lancets (OneTouch Delica Plus [id number]) misc, 1 each 4 (Four) Times a Day Before Meals & at Bedtime., Disp: 200 each, Rfl: 1  •  levETIRAcetam (KEPPRA) 750 MG tablet, Take 1 tablet by mouth 2 (Two) Times a Day., Disp: 60 tablet, Rfl: 2  •  metoprolol tartrate (LOPRESSOR) 25 MG tablet, Take 0.5 tablets by mouth Every 8 (Eight) Hours., Disp: 45 tablet, Rfl: 2  •  ondansetron (Zofran) 8 MG tablet, Take 1 tablet by mouth Every 8 (Eight) Hours As Needed for Nausea or Vomiting., Disp: 30 tablet, Rfl: 3  •  oxybutynin XL (DITROPAN-XL) 5 MG 24 hr tablet, Take 5 mg by mouth Daily., Disp: , Rfl:   •  pantoprazole (PROTONIX) 40 MG EC tablet, TAKE 1 TABLET BY MOUTH EVERY DAY (Patient taking differently: Take 40 mg by mouth Daily. Take DOS), Disp: 30 tablet, Rfl: 1  •  venlafaxine XR (EFFEXOR-XR) 75 MG 24 hr capsule, Take 75 mg by mouth Daily. Take DOS, Disp: , Rfl: 3       LABS (Reviewed):  Hematology WBC   Date Value Ref Range Status   07/16/2021 6.25 3.40 - 10.80 10*3/mm3 Final   03/31/2020 5.63 4.5 - 11.0 10*3/uL Final     RBC   Date Value Ref Range Status   07/16/2021 3.32 (L) 3.77 - 5.28 10*6/mm3 Final   03/31/2020 3.86 (L) 4.0 - 5.2 10*6/uL Final     Hemoglobin   Date Value Ref Range Status   07/16/2021 9.6 (L) 12.0 - 15.9 g/dL Final   03/31/2020 11.5 (L) 12.0 - 16.0 g/dL Final     Hematocrit   Date Value Ref Range Status   07/16/2021 31.2 (L) 34.0 - 46.6 % Final    03/31/2020 36.8 36.0 - 46.0 % Final     Platelets   Date Value Ref Range Status   07/16/2021 271 140 - 450 10*3/mm3 Final   03/31/2020 266 140 - 440 10*3/uL Final      Chemistry   Lab Results   Component Value Date    GLUCOSE 250 (H) 05/13/2021    BUN 34 (H) 05/13/2021    CREATININE 0.82 05/13/2021    EGFRIFNONA 73 05/13/2021    BCR 41.5 (H) 05/13/2021    K 4.6 05/13/2021    CO2 19.0 (L) 05/13/2021    CALCIUM 9.4 05/13/2021    ALBUMIN 3.00 (L) 05/13/2021    LABIL2 1.4 03/30/2021    AST 22 05/13/2021    ALT 42 (H) 05/13/2021         Physical Exam:         Neurology: [] CNII-XII grossly intact    [] Strength:     [] Reflexes:     [] Encephalopathy:     [] Memory impairment:     [] Neuropathy: motor/sensory:   Auditory/Ear: [] Otitis, external ear (non-infectious):   Ocular/Visual: [] PERRLA/EOMI:   Skin:  Grade: SELECT    Comments/Notes:     [] Review of labs, images, dosimetry, dose delivered, & treatment parameters.    Comments:     [] Patient treatment setup reviewed.    Comments:     Recommendations:    [] Continue RT  [] Change RT Plan [] Hold RT, length:        Approved Electronically By:  Yang Cruz MD, 7/21/2021, 15:38 EDT          Confidentiality of this medical record shall be maintained except when use or disclosure is required or permitted by law, regulation or written authorization by the patient.

## 2021-07-22 ENCOUNTER — HOSPITAL ENCOUNTER (OUTPATIENT)
Dept: RADIATION ONCOLOGY | Facility: HOSPITAL | Age: 56
Discharge: HOME | End: 2021-07-22

## 2021-07-22 PROCEDURE — 77386: CPT | Performed by: RADIOLOGY

## 2021-07-22 PROCEDURE — 77014 CHG CT GUIDANCE RADIATION THERAPY FLDS PLACEMENT: CPT | Performed by: RADIOLOGY

## 2021-07-23 ENCOUNTER — HOSPITAL ENCOUNTER (OUTPATIENT)
Dept: RADIATION ONCOLOGY | Facility: HOSPITAL | Age: 56
Discharge: HOME | End: 2021-07-23

## 2021-07-23 PROCEDURE — 77014 CHG CT GUIDANCE RADIATION THERAPY FLDS PLACEMENT: CPT | Performed by: RADIOLOGY

## 2021-07-23 PROCEDURE — 77336 RADIATION PHYSICS CONSULT: CPT | Performed by: RADIOLOGY

## 2021-07-23 PROCEDURE — 77386: CPT | Performed by: RADIOLOGY

## 2021-07-26 ENCOUNTER — HOSPITAL ENCOUNTER (OUTPATIENT)
Dept: RADIATION ONCOLOGY | Facility: HOSPITAL | Age: 56
Discharge: HOME | End: 2021-07-26

## 2021-07-26 PROCEDURE — 77014 CHG CT GUIDANCE RADIATION THERAPY FLDS PLACEMENT: CPT | Performed by: RADIOLOGY

## 2021-07-26 PROCEDURE — 77386: CPT | Performed by: RADIOLOGY

## 2021-07-27 PROCEDURE — 77386: CPT | Performed by: RADIOLOGY

## 2021-07-27 PROCEDURE — 77014 CHG CT GUIDANCE RADIATION THERAPY FLDS PLACEMENT: CPT | Performed by: RADIOLOGY

## 2021-07-28 PROCEDURE — 77386: CPT | Performed by: RADIOLOGY

## 2021-07-28 PROCEDURE — 77014 CHG CT GUIDANCE RADIATION THERAPY FLDS PLACEMENT: CPT | Performed by: RADIOLOGY

## 2021-07-29 PROCEDURE — 77014 CHG CT GUIDANCE RADIATION THERAPY FLDS PLACEMENT: CPT | Performed by: RADIOLOGY

## 2021-07-29 PROCEDURE — 77336 RADIATION PHYSICS CONSULT: CPT | Performed by: RADIOLOGY

## 2021-07-29 PROCEDURE — 77386: CPT | Performed by: RADIOLOGY

## 2021-07-30 ENCOUNTER — HOSPITAL ENCOUNTER (OUTPATIENT)
Dept: RADIATION ONCOLOGY | Facility: HOSPITAL | Age: 56
Discharge: HOME | End: 2021-07-30

## 2021-07-30 PROCEDURE — 77014 CHG CT GUIDANCE RADIATION THERAPY FLDS PLACEMENT: CPT | Performed by: RADIOLOGY

## 2021-07-30 PROCEDURE — 77386: CPT | Performed by: RADIOLOGY

## 2021-08-02 ENCOUNTER — HOSPITAL ENCOUNTER (OUTPATIENT)
Dept: RADIATION ONCOLOGY | Facility: HOSPITAL | Age: 56
Setting detail: RADIATION/ONCOLOGY SERIES
End: 2021-08-02

## 2021-08-02 ENCOUNTER — HOSPITAL ENCOUNTER (OUTPATIENT)
Dept: RADIATION ONCOLOGY | Facility: HOSPITAL | Age: 56
Discharge: HOME | End: 2021-08-02

## 2021-08-02 PROCEDURE — 77014 CHG CT GUIDANCE RADIATION THERAPY FLDS PLACEMENT: CPT | Performed by: RADIOLOGY

## 2021-08-02 PROCEDURE — 77386: CPT | Performed by: RADIOLOGY

## 2021-08-02 PROCEDURE — 77427 RADIATION TX MANAGEMENT X5: CPT | Performed by: RADIOLOGY

## 2021-08-03 ENCOUNTER — HOSPITAL ENCOUNTER (OUTPATIENT)
Dept: RADIATION ONCOLOGY | Facility: HOSPITAL | Age: 56
Discharge: HOME | End: 2021-08-03

## 2021-08-03 PROCEDURE — 77014 CHG CT GUIDANCE RADIATION THERAPY FLDS PLACEMENT: CPT | Performed by: RADIOLOGY

## 2021-08-03 PROCEDURE — 77386: CPT | Performed by: RADIOLOGY

## 2021-08-04 ENCOUNTER — HOSPITAL ENCOUNTER (OUTPATIENT)
Dept: RADIATION ONCOLOGY | Facility: HOSPITAL | Age: 56
Discharge: HOME | End: 2021-08-04

## 2021-08-04 ENCOUNTER — RADIATION ONCOLOGY WEEKLY ASSESSMENT (OUTPATIENT)
Dept: RADIATION ONCOLOGY | Facility: HOSPITAL | Age: 56
End: 2021-08-04

## 2021-08-04 DIAGNOSIS — C71.9 OLIGODENDROGLIOMA (HCC): Primary | ICD-10-CM

## 2021-08-04 DIAGNOSIS — D49.6 BRAIN TUMOR (HCC): ICD-10-CM

## 2021-08-04 PROCEDURE — FACE2FACE: Performed by: RADIOLOGY

## 2021-08-04 PROCEDURE — 77014 CHG CT GUIDANCE RADIATION THERAPY FLDS PLACEMENT: CPT | Performed by: RADIOLOGY

## 2021-08-04 PROCEDURE — 77386: CPT | Performed by: RADIOLOGY

## 2021-08-04 NOTE — PROGRESS NOTES
Patient Name: Cindy Cortes Date: 2021       : 1965        MRN #: [id number] Diagnosis:   Encounter Diagnoses   Name Primary?   • Oligodendroglioma (CMS/HCC) Yes   • Brain tumor (CMS/HCC)                      RADIATION ON TREATMENT VISIT NOTE - BRAIN    Treatment Summary     Treatment Site Ref. ID Energy Dose/Fx (cGy) #Fx Dose Correction (cGy) Total Dose (cGy) Start Date End Date Elapsed Days   RtFrontalLobe RtFrontalLobe 6X 180  0 4,860 2021 37     WHO grade II, oligodendroglioma, IDH2 mutant, 1p19Q codeleted CNS malignancy     Plan: Adjuvant radiation followed by PCV; adjuvant therapy as part of curative intent   General:           Review of Systems      [x] No new complaints [] Headache   []AM [] PM [] Seizure activity  [] Memory loss [] Gait disturbance [] Nausea  [] Vomiting [] Speech changes [] Visual changes  [] Weakness [] Skin itching [] Hair loss  [] Skin soreness with pain  [x] Fatigue,  severity: 1 Relief w/ Rest  [] Pain,  severity: ----------------, location:   Steroid regimen:   Anti-seizure regimen:   Pain regimen:    Skin regimen: NONE     Comments/Notes:  Dizziness with laying down for treatment; not experiencing otherwise  No Headaches  She thinks off Diamox--will confirm    KPS: 80%       Vital Signs: LMP  (LMP Unknown)     Weight:   Wt Readings from Last 3 Encounters:   21 (!) 159 kg (351 lb)   21 (!) 154 kg (339 lb)   21 (!) 155 kg (341 lb 3.2 oz)       Medication:   Current Outpatient Medications:   •  acetaZOLAMIDE (DIAMOX) 125 MG tablet, Take 1 tablet by mouth 3 (Three) Times a Day., Disp: 90 tablet, Rfl: 2  •  Alcohol Swabs (Alcohol Wipes) 70 % pads, Apply 1 each topically to the appropriate area as directed 4 (Four) Times a Day., Disp: , Rfl:   •  amLODIPine (NORVASC) 10 MG tablet, Take 1 tablet by mouth Daily., Disp: 30 tablet, Rfl: 2  •  amLODIPine (NORVASC) 5 MG tablet, Take 5 mg by mouth Daily., Disp: , Rfl:   •  folic acid (FOLVITE)  1 MG tablet, Take two tablets po Qam, then two tablets po Qpm. Four tablets daily., Disp: 120 tablet, Rfl: 0  •  glucose blood (OneTouch Verio) test strip, 1 each by Other route 4 (Four) Times a Day Before Meals & at Bedtime. Use as instructed, Disp: 200 each, Rfl: 1  •  insulin aspart (NovoLOG FlexPen) 100 UNIT/ML solution pen-injector sc pen, Inject 5 Units under the skin into the appropriate area as directed 3 (Three) Times a Day With Meals. Inject 1u lispro SC for every 50 over 150, Disp: 2 pen, Rfl: 2  •  insulin detemir (Levemir FlexTouch) 100 UNIT/ML injection, Inject 15 Units under the skin into the appropriate area as directed 2 (Two) Times a Day., Disp: 3 pen, Rfl: 2  •  Insulin Pen Needle 32G X 4 MM misc, 1 each 5 (Five) Times a Day., Disp: 200 each, Rfl: 1  •  Isopropyl Alcohol (Alcohol Wipes) 70 % misc, Apply 1 each topically 4 (Four) Times a Day Before Meals & at Bedtime., Disp: 200 each, Rfl: 1  •  Lancets (OneTouch Delica Plus [id number]) misc, 1 each 4 (Four) Times a Day Before Meals & at Bedtime., Disp: 200 each, Rfl: 1  •  levETIRAcetam (KEPPRA) 750 MG tablet, Take 1 tablet by mouth 2 (Two) Times a Day., Disp: 60 tablet, Rfl: 2  •  metoprolol tartrate (LOPRESSOR) 25 MG tablet, Take 0.5 tablets by mouth Every 8 (Eight) Hours., Disp: 45 tablet, Rfl: 2  •  ondansetron (Zofran) 8 MG tablet, Take 1 tablet by mouth Every 8 (Eight) Hours As Needed for Nausea or Vomiting., Disp: 30 tablet, Rfl: 3  •  oxybutynin XL (DITROPAN-XL) 5 MG 24 hr tablet, Take 5 mg by mouth Daily., Disp: , Rfl:   •  pantoprazole (PROTONIX) 40 MG EC tablet, TAKE 1 TABLET BY MOUTH EVERY DAY (Patient taking differently: Take 40 mg by mouth Daily. Take DOS), Disp: 30 tablet, Rfl: 1  •  venlafaxine XR (EFFEXOR-XR) 75 MG 24 hr capsule, Take 75 mg by mouth Daily. Take DOS, Disp: , Rfl: 3       LABS (Reviewed):  Hematology WBC   Date Value Ref Range Status   07/16/2021 6.25 3.40 - 10.80 10*3/mm3 Final   03/31/2020 5.63 4.5 - 11.0 10*3/uL  Final     RBC   Date Value Ref Range Status   07/16/2021 3.32 (L) 3.77 - 5.28 10*6/mm3 Final   03/31/2020 3.86 (L) 4.0 - 5.2 10*6/uL Final     Hemoglobin   Date Value Ref Range Status   07/16/2021 9.6 (L) 12.0 - 15.9 g/dL Final   03/31/2020 11.5 (L) 12.0 - 16.0 g/dL Final     Hematocrit   Date Value Ref Range Status   07/16/2021 31.2 (L) 34.0 - 46.6 % Final   03/31/2020 36.8 36.0 - 46.0 % Final     Platelets   Date Value Ref Range Status   07/16/2021 271 140 - 450 10*3/mm3 Final   03/31/2020 266 140 - 440 10*3/uL Final      Chemistry   Lab Results   Component Value Date    GLUCOSE 250 (H) 05/13/2021    BUN 34 (H) 05/13/2021    CREATININE 0.82 05/13/2021    EGFRIFNONA 73 05/13/2021    BCR 41.5 (H) 05/13/2021    K 4.6 05/13/2021    CO2 19.0 (L) 05/13/2021    CALCIUM 9.4 05/13/2021    ALBUMIN 3.00 (L) 05/13/2021    LABIL2 1.4 03/30/2021    AST 22 05/13/2021    ALT 42 (H) 05/13/2021         Physical Exam:         Neurology: [x] CNII-XII grossly intact    [x] Strength: intact    [] Reflexes:     [] Encephalopathy:     [] Memory impairment:     [] Neuropathy: motor/sensory:   Auditory/Ear: [] Otitis, external ear (non-infectious):   Ocular/Visual: [] PERRLA/EOMI:   Skin:  Grade: 1very faint scalp changes    Comments/Notes: previous vertigo history; states laying back for treatment she gets dizzy but no other symptoms.    [x] Review of labs, images, dosimetry, dose delivered, & treatment parameters.    Comments:     [x] Patient treatment setup reviewed.    Comments:     Recommendations: Patient thinks off diamox, she is going to confirm tomorrow  Going to continue XRT and supportive measures    [] Continue RT  [] Change RT Plan [] Hold RT, length:        Approved Electronically By:  Yang Cruz MD, 8/4/2021, 21:29 EDT          Confidentiality of this medical record shall be maintained except when use or disclosure is required or permitted by law, regulation or written authorization by the patient.

## 2021-08-05 PROCEDURE — 77336 RADIATION PHYSICS CONSULT: CPT | Performed by: RADIOLOGY

## 2021-08-05 PROCEDURE — 77386: CPT | Performed by: RADIOLOGY

## 2021-08-05 PROCEDURE — 77014 CHG CT GUIDANCE RADIATION THERAPY FLDS PLACEMENT: CPT | Performed by: RADIOLOGY

## 2021-08-06 ENCOUNTER — HOSPITAL ENCOUNTER (OUTPATIENT)
Dept: MRI IMAGING | Facility: HOSPITAL | Age: 56
Discharge: HOME | End: 2021-08-06
Admitting: NEUROLOGICAL SURGERY

## 2021-08-06 DIAGNOSIS — C71.9 OLIGODENDROGLIOMA (HCC): ICD-10-CM

## 2021-08-06 LAB — CREAT BLDA-MCNC: 1.1 MG/DL (ref 0.6–1.3)

## 2021-08-06 PROCEDURE — 25010000002 GADOTERIDOL PER 1 ML: Performed by: NEUROLOGICAL SURGERY

## 2021-08-06 PROCEDURE — A9579 GAD-BASE MR CONTRAST NOS,1ML: HCPCS | Performed by: NEUROLOGICAL SURGERY

## 2021-08-06 PROCEDURE — 82565 ASSAY OF CREATININE: CPT

## 2021-08-06 PROCEDURE — 77014 CHG CT GUIDANCE RADIATION THERAPY FLDS PLACEMENT: CPT | Performed by: RADIOLOGY

## 2021-08-06 PROCEDURE — 70553 MRI BRAIN STEM W/O & W/DYE: CPT

## 2021-08-06 PROCEDURE — 77386: CPT | Performed by: RADIOLOGY

## 2021-08-06 RX ADMIN — GADOTERIDOL 20 ML: 279.3 INJECTION, SOLUTION INTRAVENOUS at 13:05

## 2021-08-09 ENCOUNTER — HOSPITAL ENCOUNTER (OUTPATIENT)
Dept: RADIATION ONCOLOGY | Facility: HOSPITAL | Age: 56
Discharge: HOME | End: 2021-08-09

## 2021-08-09 ENCOUNTER — RADIATION ONCOLOGY WEEKLY ASSESSMENT (OUTPATIENT)
Dept: RADIATION ONCOLOGY | Facility: HOSPITAL | Age: 56
End: 2021-08-09

## 2021-08-09 DIAGNOSIS — C71.9 OLIGODENDROGLIOMA (HCC): Primary | ICD-10-CM

## 2021-08-09 DIAGNOSIS — D49.6 BRAIN TUMOR (HCC): ICD-10-CM

## 2021-08-09 PROCEDURE — 77014 CHG CT GUIDANCE RADIATION THERAPY FLDS PLACEMENT: CPT | Performed by: RADIOLOGY

## 2021-08-09 PROCEDURE — 77427 RADIATION TX MANAGEMENT X5: CPT | Performed by: RADIOLOGY

## 2021-08-09 PROCEDURE — FACE2FACE: Performed by: RADIOLOGY

## 2021-08-09 PROCEDURE — 77386: CPT | Performed by: RADIOLOGY

## 2021-08-09 NOTE — PROGRESS NOTES
RADIATION THERAPY DISCHARGE INSTRUCTIONS    Cindy Cortes completed 30/30 radiation treatments for treatment of oligodendroglioma. The following instructions were provided to the patient and/or family in their printed after visit summary (AVS) as well as discussed in-person with the radiation oncology nurse. The patient and/or family member had the opportunity to ask questions and verbalized their questions were adequately answered. Patient is scheduled for one-month follow-up appointment with Dr. Yang Cruz on 9/7/2021 at 11:30PM.     DIET  [x]  Eat a regular, well balanced diet that is high in protein such as meat, eggs, cheese, and nut butters  [x]  Drink 48 to 64 ounces of fluid daily  []  Drink lots of fluids to help decrease thick oral secretions  [x]  Use nutritional supplements if you are not able to eat full meals  [x]  Monitor your weight and report continued weight loss to your doctor    MEDICATIONS  [x]  Use Tylenol as needed to decrease discomfort and irritation to treatment area  []  Use Tylenol as needed to decrease breast discomfort and irritation due to swelling and skin reaction  []  Take pain medications only as prescribed  []  Take a laxative or stool softener as needed to prevent constipation due to pain medications  []  Use a synthetic saliva product (such as Salivart®, Glandosane®, or MouthKote®) as needed to alleviate dry mouth or throat  []  Use MuGard® or other oral pain relief medication before meals and before taking medications as needed to ease the discomfort of swallowing  []  Use an over-the-counter decongestant (such as Sudafed®) if your ears feel plugged  []  Use Imodium (up to 8 pills per day) as needed for loose stools    SKIN CARE  [x]  Wash treated skin gently with your hands using a mild, non-drying soap such as Dove® or Aveeno® until skin returns to normal  [x]  Pat skin dry - do not rub  [x]  Keep treated skin moist with frequent applications of Aquaphor® or unscented  lotion (such as Eucerin)®  [x]  Always protect your treated skin when outdoors by wearing protective clothing and applying sunscreen SPF 15 or higher at least 30 minutes before going outdoors and reapply frequently  []  Resume use of deodorant to the armpit of the affected arm when skin reactions improve    ORAL CARE  []  Continue oral rinses as directed until mouth soreness goes away  []  Avoid using commercial mouthwash that contains alcohol  []  Rinse mouth with salt and soda solution: 2 teaspoons salt and 2 teaspoons baking soda dissolved in 8 ounces warm water  []  Perform oral care twice daily and after each meal using a soft toothbrush  []  Continue regular visits to your dentist and follow guidelines to care for your teeth    ACTIVITY  [x]  Fatigue is a normal side effect of radiation therapy and should improve over time  [x]  Alternate rest and activity  [x]  Exercise such as walking may help to improve your fatigue    FOLLOW-UP  []  Continue follow-up appointments with all other doctors as necessary  []  Continue to have regular mammograms as ordered by your physician  []  Ensure you have a PSA level drawn at [facility] laboratory (no appointment necessary) at least two days prior to your one month follow-up appointment with Dr. Cruz  []  Call your radiation oncology doctor if you are concerned with any side effects you are experiencing: [phone]  []  Call your radiation oncology doctor if you are concerned with any side effects you are experiencing, such as increased shortness of breath, fevers or chills, night sweats, increased coughing or new cough, blood in your sputum, or difficulty swallowing: [phone]    SPECIAL INSTRUCTIONS  []  Continue all range of motion and mobility exercise as instructed (if applicable)  []  Never allow blood draws or blood pressures to be taken on your affected arm unless in an emergency situation (if applicable)  []  Practice careful nail care and avoid  open skin to your affected arm and hand  []  Call your physician if you notice swelling of your affected arm or hand that is unusual or doesn't go away  []  Do not stop taking your steroid medication suddenly; your medical or radiation oncologist will slowly decrease your dose to prevent any adverse effects.  []  Do not drive until your doctor has said it's okay to do so  []  Reintroduce fiber into your diet slowly so you will know which foods cause loose stools or cramps  []  Use sitz baths as directed  []  Side effects should subside in a couple weeks; tell your doctor if you have increased burning, frequency, or blood in your urine or stool  []  Notify your medical or radiation oncologist if you notice any white patches inside your mouth or on your tongue, with or without foul taste. This could be a yeast infection and prompt treatment is important.  _______________________________________________________________________    Completed by: Usha Vargas RN BSN, Radiation Oncology Nurse on 08/09/2021  at 11:47 EDT

## 2021-08-09 NOTE — PATIENT INSTRUCTIONS
RADIATION THERAPY DISCHARGE INSTRUCTIONS  Brain    CONGRATULATIONS! You completed 29 radiation treatments for treatment of your brain cancer. These discharge instructions are important for you to follow until your one-month follow up appointment with Dr. Yang Cruz. Please make sure to review these instructions and call the Radiation Oncology Department if you have any questions or concerns with symptoms you may experience. Thank you for trusting us with your cancer treatment!    DIET  • Eat a regular, well balanced diet that is high in protein such as meat, eggs, cheese, and nut butters  • Drink 48 to 64 ounces of fluid daily  • Use nutritional supplements if you are not able to eat full meals  • Monitor your weight and report continued weight loss to your doctor    MEDICATIONS  • Use Tylenol as needed to decrease discomfort and irritation to treatment area  • Use an over-the-counter decongestant (such as Sudafed®) if your ears feel plugged    SKIN CARE  • Wash treated skin gently with your hands using a mild, non-drying soap such as Dove® or Aveeno® until skin returns to normal  • Pat skin dry - do not rub  • Keep treated skin moist with frequent applications of Aquaphor® or unscented lotion (such as Eucerin)®  • Always protect your treated skin when outdoors by wearing protective clothing and applying sunscreen SPF 15 or higher at least 30 minutes before going outdoors and reapply frequently    ACTIVITY  Fatigue is a normal side effect of radiation therapy and should improve over time  • Alternate rest and activity  • Exercise such as walking may help to improve your fatigue    FOLLOW-UP  • Continue follow-up appointments with all other doctors as necessary  • Call your radiation oncology doctor if you are concerned with any side effects you are experiencing: [phone]    SPECIAL INSTRUCTIONS  • Do not stop taking your steroid medication suddenly; your medical or radiation oncologist will slowly decrease  your dose to prevent any adverse effects.  • Do not drive until your doctor has said it's okay to do so    _______________________________________________________________________    Completed by: Usha Vargas RN BSN, Radiation Oncology Nurse on 08/09/2021  at 11:33 EDT

## 2021-08-11 ENCOUNTER — TELEPHONE (OUTPATIENT)
Dept: ONCOLOGY | Facility: CLINIC | Age: 56
End: 2021-08-11

## 2021-08-11 NOTE — TELEPHONE ENCOUNTER
[facility]LL[facility] CALLING FROM [facility],      FOR LONG TERM DISABILITY       CALLING TO REQUEST MEDICAL RECORDS    BEST CALLBACK NUMBER [phone]        [facility] LINE [phone]    [facility] FAX: [phone]      OFFERED TO WARM TRANSFER [facility] WILL CALL TO CHECK STATUS AFTER SENDS FAX.

## 2021-08-12 NOTE — PROGRESS NOTES
HEMATOLOGY ONCOLOGY OUTPATIENT FOLLOW UP      Patient name: Cindy Cortes  : 1965  MRN: [id number]  Primary Care Physician: Annamarie Monroy APRN  Referring Physician: Annamarie Monroy APRN  Reason For Consult:     Chief Complaint   Patient presents with   • Follow-up     Oligodendroglioma         HPI:   History of Present Illness:  Cindy Cortes is 55 y.o. female who presented to our office on 06/10/21 for consultation regarding Oligodendroglioma    Patient is a 55-year-old female who was referred to us for treatment options regarding recent diagnosis of grade 2 oligodendroglioma.  This was IDH [id number] mutated, 1P 19 Q codeleted.  Patient initially presented with seizures and a CT head was obtained that showed vasogenic edema and a right frontal lobe mass MRI was obtained after this.  2021 -MRI of the brain shows 2.1 x 4.4 x 3.3 cm right frontal lobe mass with eccentric cystic or necrotic component with surrounding vasogenic edema and a focal 3 mm right to left midline shift.  Patient was started on Keppra, steroids plan was made for elective craniotomy and surgical resection.    2021 craniotomy of the right frontal lobe, microscopic resection of tumor mass.  Pathology results oligodendroglioma WHO grade 2, IDH1 R132H mutation by immunohistochemistry, 1p19q codeletion by FISH.    2021 -status post resection of the right frontal lobe mass.  Expected postop blood product. resolution of midline shift.    2021 - Started radiation adjuvantly.    2021 - last day of radiation.      Subjective:  06/10/21. Patient completed treatment with radiation. Has had ongoing tenderness of the scalp. Complains of increasingly feeling cold. Has fatigue.    The following portions of the patient's history were reviewed and updated as appropriate: allergies, current medications, past family history, past medical history, past social history, past surgical history and problem  list.    Past Medical History:   Diagnosis Date   • Arthritis    • GERD (gastroesophageal reflux disease)    • Hypertension    • Oligodendroglioma (CMS/HCC)    • Seizure (CMS/HCC)    • Type 2 diabetes mellitus with hyperglycemia, without long-term current use of insulin (CMS/HCC) 5/12/2021       Past Surgical History:   Procedure Laterality Date   • CRANIOTOMY FOR TUMOR Right 5/6/2021    Procedure: CRANIOTOMY FOR TUMOR RESECTION WITH STEREOTACTIC;  Surgeon: Jluis Felix MD;  Location: [facility];  Service: Neurosurgery;  Laterality: Right;         Current Outpatient Medications:   •  acetaZOLAMIDE (DIAMOX) 125 MG tablet, Take 1 tablet by mouth 3 (Three) Times a Day., Disp: 90 tablet, Rfl: 2  •  Alcohol Swabs (Alcohol Wipes) 70 % pads, Apply 1 each topically to the appropriate area as directed 4 (Four) Times a Day., Disp: , Rfl:   •  amLODIPine (NORVASC) 10 MG tablet, Take 1 tablet by mouth Daily., Disp: 30 tablet, Rfl: 2  •  ferrous sulfate 325 (65 FE) MG tablet, Take 1 tablet by mouth Daily With Breakfast., Disp: , Rfl:   •  folic acid (FOLVITE) 1 MG tablet, TAKE TWO TABLETS BY MOUTH EVERY MORNING, THEN TWO TABLETS EVERY EVENING, Disp: 120 tablet, Rfl: 0  •  glucose blood (OneTouch Verio) test strip, 1 each by Other route 4 (Four) Times a Day Before Meals & at Bedtime. Use as instructed, Disp: 200 each, Rfl: 1  •  levETIRAcetam (KEPPRA) 750 MG tablet, Take 1 tablet by mouth 2 (Two) Times a Day., Disp: 60 tablet, Rfl: 2  •  metoprolol tartrate (LOPRESSOR) 25 MG tablet, Take 0.5 tablets by mouth Every 8 (Eight) Hours., Disp: 45 tablet, Rfl: 2  •  ondansetron (Zofran) 8 MG tablet, Take 1 tablet by mouth Every 8 (Eight) Hours As Needed for Nausea or Vomiting., Disp: 30 tablet, Rfl: 3  •  oxybutynin XL (DITROPAN-XL) 5 MG 24 hr tablet, Take 5 mg by mouth Daily., Disp: , Rfl:   •  pantoprazole (PROTONIX) 40 MG EC tablet, TAKE 1 TABLET BY MOUTH EVERY DAY (Patient taking differently: Take 40 mg by mouth Daily. Take  DOS), Disp: 30 tablet, Rfl: 1  •  venlafaxine XR (EFFEXOR-XR) 75 MG 24 hr capsule, Take 75 mg by mouth Daily. Take DOS, Disp: , Rfl: 3  •  insulin aspart (NovoLOG FlexPen) 100 UNIT/ML solution pen-injector sc pen, Inject 5 Units under the skin into the appropriate area as directed 3 (Three) Times a Day With Meals. Inject 1u lispro SC for every 50 over 150, Disp: 2 pen, Rfl: 2  •  insulin detemir (Levemir FlexTouch) 100 UNIT/ML injection, Inject 15 Units under the skin into the appropriate area as directed 2 (Two) Times a Day., Disp: 3 pen, Rfl: 2  •  Insulin Pen Needle 32G X 4 MM misc, 1 each 5 (Five) Times a Day., Disp: 200 each, Rfl: 1  •  Isopropyl Alcohol (Alcohol Wipes) 70 % misc, Apply 1 each topically 4 (Four) Times a Day Before Meals & at Bedtime., Disp: 200 each, Rfl: 1  •  Lancets (OneTouch Delica Plus Apdeib19O) misc, 1 each 4 (Four) Times a Day Before Meals & at Bedtime., Disp: 200 each, Rfl: 1    Current outpatient and discharge medications have been reconciled for the patient.  Reviewed by: Emilie Workman MD    No Known Allergies    Family History   Problem Relation Age of Onset   • Kidney disease Mother    • Prostate cancer Brother    • Breast cancer Maternal Aunt    • Colon cancer Maternal Aunt    • Breast cancer Niece    • Breast cancer Cousin        Cancer-related family history includes Breast cancer in her cousin, maternal aunt, and niece; Colon cancer in her maternal aunt; Prostate cancer in her brother.    Social History     Tobacco Use   • Smoking status: Never Smoker   • Smokeless tobacco: Never Used   Vaping Use   • Vaping Use: Never used   Substance Use Topics   • Alcohol use: Not Currently     Alcohol/week: 1.0 standard drinks     Types: 1 Cans of beer per week     Comment: socially   • Drug use: Never     Social History     Social History Narrative   • Not on file        ROS:     Review of Systems   Constitutional: Positive for fatigue. Negative for fever.   HENT: Negative for congestion  "and nosebleeds.    Eyes: Negative for pain.   Respiratory: Negative for cough and shortness of breath.    Cardiovascular: Negative for chest pain.   Gastrointestinal: Negative for abdominal pain, blood in stool, diarrhea, nausea and vomiting.   Endocrine: Negative for cold intolerance and heat intolerance.   Genitourinary: Negative for difficulty urinating.   Musculoskeletal: Negative for arthralgias.   Skin: Negative for rash.   Neurological: Positive for weakness. Negative for dizziness, seizures and headaches.   Hematological: Does not bruise/bleed easily.   Psychiatric/Behavioral: Negative for behavioral problems.   no change in review of systems.    Objective:    Vitals:    08/17/21 1004   BP: 139/74   Pulse: 70   Resp: 16   Temp: 97.6 °F (36.4 °C)   Weight: (!) 155 kg (342 lb 9.6 oz)   Height: 157.5 cm (62\")   PainSc: 0-No pain     Body mass index is 62.66 kg/m².  ECOG  (0) Fully active, able to carry on all predisease performance without restriction    Physical Exam:     Physical Exam  Constitutional:       Appearance: Normal appearance. She is obese.   HENT:      Head: Normocephalic and atraumatic.      Comments: Radiation changes scalp, surgical scar  Eyes:      Pupils: Pupils are equal, round, and reactive to light.   Cardiovascular:      Rate and Rhythm: Normal rate and regular rhythm.      Pulses: Normal pulses.      Heart sounds: No murmur heard.     Pulmonary:      Effort: Pulmonary effort is normal.      Breath sounds: Normal breath sounds.   Abdominal:      General: There is no distension.      Palpations: Abdomen is soft. There is no mass.      Tenderness: There is no abdominal tenderness.   Musculoskeletal:         General: Normal range of motion.      Cervical back: Normal range of motion.   Skin:     General: Skin is warm.   Neurological:      General: No focal deficit present.      Mental Status: She is alert.   Psychiatric:         Mood and Affect: Mood normal.           Lab Results - Last 18 " Months   Lab Units 08/17/21  0958 07/16/21  1040 06/11/21  1037   WBC 10*3/mm3 7.06 6.25 5.40   HEMOGLOBIN g/dL 10.9* 9.6* 10.8*   HEMATOCRIT % 34.9 31.2* 33.0*   PLATELETS 10*3/mm3 237 271 185   MCV fL 94.1 94.0 92.2     Lab Results - Last 18 Months   Lab Units 08/06/21  1142 05/13/21  0306 05/12/21  0316 05/11/21  0634 05/11/21  0634   SODIUM mmol/L  --  134* 133*  --  133*   POTASSIUM mmol/L  --  4.6 4.7  --  4.6   CHLORIDE mmol/L  --  104 103  --  102   CO2 mmol/L  --  19.0* 19.0*  --  22.0   BUN mg/dL  --  34* 33*  --  35*   CREATININE mg/dL 1.10 0.82 0.81   < > 0.83   CALCIUM mg/dL  --  9.4 9.2  --  9.4   BILIRUBIN mg/dL  --  0.3 0.4  --  0.3   ALK PHOS U/L  --  67 70  --  69   ALT (SGPT) U/L  --  42* 44*  --  37*   AST (SGOT) U/L  --  22 28  --  18   GLUCOSE mg/dL  --  250* 185*  --  305*    < > = values in this interval not displayed.       Lab Results   Component Value Date    GLUCOSE 250 (H) 05/13/2021    BUN 34 (H) 05/13/2021    CREATININE 1.10 08/06/2021    EGFRIFNONA 73 05/13/2021    BCR 41.5 (H) 05/13/2021    K 4.6 05/13/2021    CO2 19.0 (L) 05/13/2021    CALCIUM 9.4 05/13/2021    ALBUMIN 3.00 (L) 05/13/2021    LABIL2 1.4 03/30/2021    AST 22 05/13/2021    ALT 42 (H) 05/13/2021       Lab Results - Last 18 Months   Lab Units 05/04/21  0922   INR  0.96   APTT seconds 20.7*       Lab Results   Component Value Date    IRON 75 07/16/2021    TIBC 299 07/16/2021    FERRITIN 464.10 (H) 07/16/2021       Lab Results   Component Value Date    EIVAIXGO00 365 07/16/2021       Lab Results   Component Value Date    PTT 20.7 (L) 05/04/2021    INR 0.96 05/04/2021     MRI Brain With & Without Contrast    Result Date: 8/6/2021  1.Post-surgical changes along right frontal lobe. There is a rind of FLAIR hyperintensity involving the medial and posterior right frontal lobe likely representing residual tumor. There is new white matter FLAIR hyperintensity involving the anterior right frontal lobe that is more nonspecific, and  "could represent post-operative changes. 2.Mucosal disease within maxillary sinuses. Electronically Signed: Naveed Hubbard MD 8/6/2021 14:36 EDT    Pathology results  Outside Report, Addendum   Integrated Diagnosis: Oligodendroglioma WHO Grade II  IDH1 R132H mutation by Immunohistochemistry; 1p19q co-deletion by FISH  See attached report from [facility]   Addendum electronically signed by Cecilia Chaudhary on 5/28/2021 at 0824   Final Diagnosis   Specimen #1 (\"Brain tumor right frontal lobe,\" biopsy):    Diffuse glioma (WHO grade II)    See comment     Specimen #2 (\"Brain tumor right frontal lobe,\" biopsy):    Diffuse glioma (WHO grade II)    See comment     Specimen #3 (Brain tumor right frontal lobe, resection):    Diffuse glioma, IDH-mutant (WHO grade II)    See attached report from [facility]         Assessment/Plan     Patient is a 55-year-old female with recently diagnosed oligodendroglioma grade 2 status post gross total resection    Oligodendroglioma  Previously I had an extensive discussion with the patient about treatment options, prognosis.  I discussed with her that there are findings from RTOG 9802 clinical trial which showed survival advantage with radiation followed by chemotherapy after surgical resection of grade 2 oligodendrogliomas.  Chemotherapy with the PCV regimen in this trial conferred a survival advantage over radiotherapy alone with a median overall survival of 13.3 versus 7.8 years.  In subset analysis benefit was more significant  Histologic oligodendroglioma is as compared to other low-grade gliomas.  Molecular analysis post talk also showed survival advantage in molecularly confirmed IDH mutant, 1p19q codeleted oligodendrogliomas.    PCV can be a toxic regimen however survival advantage is only been shown in randomized control trial using this particular regimen.  The other option would be temozolomide which has " advantages over PCV with ease of administration, better tolerance and efficacy in combination with radiation therapy and other types of CNS tumors and gliomas.    The other option I had discussed with her was clinical trial with a CODEL study which is comparing radiation alone versus radiation with Temodar versus radiation with PCV in this patient population.  Patient however is not very interested in enrolling in a clinical trial.  She is wanting to pursue the most aggressive treatment option for her to reduce the chances of recurrence of her tumor.    Lastly also discussed with her the option of surveillance and observation with serial MRIs however also discussed that in above studies the progression free survival is around 50% for 5 years.     Based on her above discussion patient decided to pursue aggressive treatment with radiation followed by chemotherapy.  We would use the RTOG 9802 regimen with radiation followed by chemotherapy with PCV.  We have evidence that vincristine does not cross the blood-brain barrier significantly and hence if there is toxicity we can choose to omit the drug.  Case was discussed with Dr. Cruz and started sequential radiation and chemo.    Patient has since completed radiation treatment with only some side effects of scalp tenderness, fatigue no other major side effect.    Discussed chemotherapy with PCV regimen discussed in detail side effects associate with this regimen.  In particular discussed neuropathy with vincristine, discussed stomatitis, CNS effects with procarbazine and lomustine, discussed potential hepatic and renal side effects, risk of hematological issues down the line which could be permanent and sometimes can lead to myelodysplastic syndrome or leukemia in years down th eline.  Discussed myelosuppression and infection risk, bleeding risk.  Patient will have a chemo teach session to go over side effects in detail.    nausea  Continue zofran as needed      Anemia  Check iron studies ,   Previous B12 low normal  Low folic acid previously, on supplementation.      F/u to start treatment.    Thank you very much for providing the opportunity to participate in this patient’s care. Please do not hesitate to call if there are any other questions    I have reviewed and confirmed the accuracy of the patient's history: Chief complaint, HPI, ROS, Subjective and Past Family Social History as entered by the MA/WILLIAMSN/RN.     Emilie Workman MD 08/17/21

## 2021-08-13 RX ORDER — FOLIC ACID 1 MG/1
TABLET ORAL
Qty: 120 TABLET | Refills: 0 | Status: SHIPPED | OUTPATIENT
Start: 2021-08-13 | End: 2021-09-29 | Stop reason: SDUPTHER

## 2021-08-17 ENCOUNTER — OFFICE VISIT (OUTPATIENT)
Dept: ONCOLOGY | Facility: CLINIC | Age: 56
End: 2021-08-17

## 2021-08-17 ENCOUNTER — APPOINTMENT (OUTPATIENT)
Dept: LAB | Facility: HOSPITAL | Age: 56
End: 2021-08-17

## 2021-08-17 VITALS
HEIGHT: 62 IN | HEART RATE: 70 BPM | TEMPERATURE: 97.6 F | BODY MASS INDEX: 53.92 KG/M2 | DIASTOLIC BLOOD PRESSURE: 74 MMHG | SYSTOLIC BLOOD PRESSURE: 139 MMHG | RESPIRATION RATE: 16 BRPM | WEIGHT: 293 LBS

## 2021-08-17 DIAGNOSIS — C71.9 OLIGODENDROGLIOMA (HCC): Primary | ICD-10-CM

## 2021-08-17 LAB
BASOPHILS # BLD AUTO: 0.04 10*3/MM3 (ref 0–0.2)
BASOPHILS NFR BLD AUTO: 0.6 % (ref 0–1.5)
DEPRECATED RDW RBC AUTO: 53.6 FL (ref 37–54)
EOSINOPHIL # BLD AUTO: 0.26 10*3/MM3 (ref 0–0.4)
EOSINOPHIL NFR BLD AUTO: 3.7 % (ref 0.3–6.2)
ERYTHROCYTE [DISTWIDTH] IN BLOOD BY AUTOMATED COUNT: 16.3 % (ref 12.3–15.4)
FERRITIN SERPL-MCNC: 348.1 NG/ML (ref 13–150)
HCT VFR BLD AUTO: 34.9 % (ref 34–46.6)
HGB BLD-MCNC: 10.9 G/DL (ref 12–15.9)
IRON 24H UR-MRATE: 66 MCG/DL (ref 37–145)
IRON SATN MFR SERPL: 21 % (ref 20–50)
LYMPHOCYTES # BLD AUTO: 1.12 10*3/MM3 (ref 0.7–3.1)
LYMPHOCYTES NFR BLD AUTO: 15.9 % (ref 19.6–45.3)
MCH RBC QN AUTO: 29.4 PG (ref 26.6–33)
MCHC RBC AUTO-ENTMCNC: 31.2 G/DL (ref 31.5–35.7)
MCV RBC AUTO: 94.1 FL (ref 79–97)
MONOCYTES # BLD AUTO: 0.5 10*3/MM3 (ref 0.1–0.9)
MONOCYTES NFR BLD AUTO: 7.1 % (ref 5–12)
NEUTROPHILS NFR BLD AUTO: 5.14 10*3/MM3 (ref 1.7–7)
NEUTROPHILS NFR BLD AUTO: 72.7 % (ref 42.7–76)
PLATELET # BLD AUTO: 237 10*3/MM3 (ref 140–450)
PMV BLD AUTO: 8.3 FL (ref 6–12)
RBC # BLD AUTO: 3.71 10*6/MM3 (ref 3.77–5.28)
TIBC SERPL-MCNC: 308 MCG/DL (ref 298–536)
TRANSFERRIN SERPL-MCNC: 207 MG/DL (ref 200–360)
WBC # BLD AUTO: 7.06 10*3/MM3 (ref 3.4–10.8)

## 2021-08-17 PROCEDURE — 82728 ASSAY OF FERRITIN: CPT | Performed by: INTERNAL MEDICINE

## 2021-08-17 PROCEDURE — 83540 ASSAY OF IRON: CPT | Performed by: INTERNAL MEDICINE

## 2021-08-17 PROCEDURE — 36415 COLL VENOUS BLD VENIPUNCTURE: CPT | Performed by: INTERNAL MEDICINE

## 2021-08-17 PROCEDURE — 85025 COMPLETE CBC W/AUTO DIFF WBC: CPT | Performed by: INTERNAL MEDICINE

## 2021-08-17 PROCEDURE — 99214 OFFICE O/P EST MOD 30 MIN: CPT | Performed by: INTERNAL MEDICINE

## 2021-08-17 PROCEDURE — 84466 ASSAY OF TRANSFERRIN: CPT | Performed by: INTERNAL MEDICINE

## 2021-08-17 RX ORDER — FERROUS SULFATE 325(65) MG
1 TABLET ORAL WEEKLY
COMMUNITY
Start: 2021-07-23

## 2021-08-17 NOTE — PROGRESS NOTES
Patient Name: Cindy Cortes   Date: 2021  : 1965       MRN: [id number]     Referring Physician: MD Emilie Yeboah MD  DX:   Encounter Diagnoses   Name Primary?   • Oligodendroglioma (CMS/HCC) Yes   • Brain tumor (CMS/HCC)       COMPLETION NOTE    Primary Radiation Oncologist: Yang Cruz MD    PRIMARY SITE AND HISTOPATHOLOGY:   Oligodendroglioma IDH2 mutant, 1p19Q codeleted CNS maligancy      STAGE:WHO grade II      SIGNIFICANT LAB AND X-RAY FINDINGS:Path report 2021---WHO grade II, oligodendroglioma, IDH2 mutant, 1p19Q co-deletion by FISH.  IU noted that the mitotic rate is low, < 1/10 HPFs, the Ki-67 index is low at 3%.  GTR noted  -High risk finding of age > 40  -appears size may have been greater than 4 cm total.    Case discussed at tumor board and adjuvant XRT followed by chemotherapy recommended.      PLAN OF TREATMENT: Adjuvant as part of curative intent therapy.   Treatment Site Ref. ID Energy Dose/Fx (cGy) #Fx Dose Correction (cGy) Total Dose (cGy) Start Date End Date Elapsed Days   RtFrontalLobe RtFrontalLobe 6X 180  0 5,400 2021 42        DATE STARTED: 2021   DATE COMPLETED:  2021      TOTAL DOSE: 54 Gy   DOSE/FRACTION: 1.8 Gy per fraction  ENERGY: 6X   STATUS OF TUMOR/PATIENT AT COMPLETION OF RADIOTHERAPY: No unexpected treatment breaks or toxicites      TOLERANCE: Grade I fatigue, radiation hyperpigmentation and grade I dermatitis. No CNS side effects noted.      DISPOSITION:   FU with Dr. Workman on 2021 to discuss adjuvant chemotherapy.      Current Outpatient Medications:   •  acetaZOLAMIDE (DIAMOX) 125 MG tablet, Take 1 tablet by mouth 3 (Three) Times a Day., Disp: 90 tablet, Rfl: 2  •  Alcohol Swabs (Alcohol Wipes) 70 % pads, Apply 1 each topically to the appropriate area as directed 4 (Four) Times a Day., Disp: , Rfl:   •  amLODIPine (NORVASC) 10 MG tablet, Take 1 tablet by mouth Daily., Disp: 30 tablet, Rfl: 2  •   ferrous sulfate 325 (65 FE) MG tablet, Take 1 tablet by mouth Daily With Breakfast., Disp: , Rfl:   •  folic acid (FOLVITE) 1 MG tablet, TAKE TWO TABLETS BY MOUTH EVERY MORNING, THEN TWO TABLETS EVERY EVENING, Disp: 120 tablet, Rfl: 0  •  glucose blood (OneTouch Verio) test strip, 1 each by Other route 4 (Four) Times a Day Before Meals & at Bedtime. Use as instructed, Disp: 200 each, Rfl: 1  •  insulin aspart (NovoLOG FlexPen) 100 UNIT/ML solution pen-injector sc pen, Inject 5 Units under the skin into the appropriate area as directed 3 (Three) Times a Day With Meals. Inject 1u lispro SC for every 50 over 150, Disp: 2 pen, Rfl: 2  •  insulin detemir (Levemir FlexTouch) 100 UNIT/ML injection, Inject 15 Units under the skin into the appropriate area as directed 2 (Two) Times a Day., Disp: 3 pen, Rfl: 2  •  Insulin Pen Needle 32G X 4 MM misc, 1 each 5 (Five) Times a Day., Disp: 200 each, Rfl: 1  •  Isopropyl Alcohol (Alcohol Wipes) 70 % misc, Apply 1 each topically 4 (Four) Times a Day Before Meals & at Bedtime., Disp: 200 each, Rfl: 1  •  Lancets (OneTouch Delica Plus [id number]) misc, 1 each 4 (Four) Times a Day Before Meals & at Bedtime., Disp: 200 each, Rfl: 1  •  levETIRAcetam (KEPPRA) 750 MG tablet, Take 1 tablet by mouth 2 (Two) Times a Day., Disp: 60 tablet, Rfl: 2  •  metoprolol tartrate (LOPRESSOR) 25 MG tablet, Take 0.5 tablets by mouth Every 8 (Eight) Hours., Disp: 45 tablet, Rfl: 2  •  ondansetron (Zofran) 8 MG tablet, Take 1 tablet by mouth Every 8 (Eight) Hours As Needed for Nausea or Vomiting., Disp: 30 tablet, Rfl: 3  •  oxybutynin XL (DITROPAN-XL) 5 MG 24 hr tablet, Take 5 mg by mouth Daily., Disp: , Rfl:   •  pantoprazole (PROTONIX) 40 MG EC tablet, TAKE 1 TABLET BY MOUTH EVERY DAY (Patient taking differently: Take 40 mg by mouth Daily. Take DOS), Disp: 30 tablet, Rfl: 1  •  venlafaxine XR (EFFEXOR-XR) 75 MG 24 hr capsule, Take 75 mg by mouth Daily. Take DOS, Disp: , Rfl: 3    KPS: 80:  Normal  activity with effort; some signs or symptoms    REVIEW OF SYSTEMS:         cc: MD Javier Sahu MD     This document was entered by: Yang Cruz MD     Approved Electronically By:  Yang Cruz MD, 8/17/2021, 10:40 EDT                              Confidentiality of this medical record shall be maintained except when use or disclosure is required or permitted by law, regulation or written authorization by the patient.

## 2021-08-18 ENCOUNTER — MEDICATION THERAPY MANAGEMENT (OUTPATIENT)
Dept: PHARMACY | Facility: HOSPITAL | Age: 56
End: 2021-08-18

## 2021-08-20 LAB
Lab: NORMAL
METHYLMALONATE SERPL-SCNC: 328 NMOL/L (ref 0–378)

## 2021-08-23 ENCOUNTER — TELEPHONE (OUTPATIENT)
Dept: ONCOLOGY | Facility: HOSPITAL | Age: 56
End: 2021-08-23

## 2021-08-23 ENCOUNTER — OFFICE VISIT (OUTPATIENT)
Dept: ONCOLOGY | Facility: CLINIC | Age: 56
End: 2021-08-23

## 2021-08-23 ENCOUNTER — DOCUMENTATION (OUTPATIENT)
Dept: ONCOLOGY | Facility: HOSPITAL | Age: 56
End: 2021-08-23

## 2021-08-23 VITALS
RESPIRATION RATE: 22 BRPM | BODY MASS INDEX: 53.92 KG/M2 | SYSTOLIC BLOOD PRESSURE: 117 MMHG | HEIGHT: 62 IN | TEMPERATURE: 96.1 F | WEIGHT: 293 LBS | DIASTOLIC BLOOD PRESSURE: 71 MMHG | HEART RATE: 72 BPM

## 2021-08-23 DIAGNOSIS — D49.6 BRAIN TUMOR (HCC): ICD-10-CM

## 2021-08-23 DIAGNOSIS — C71.9 OLIGODENDROGLIOMA (HCC): Primary | ICD-10-CM

## 2021-08-23 PROCEDURE — 99215 OFFICE O/P EST HI 40 MIN: CPT | Performed by: INTERNAL MEDICINE

## 2021-08-23 RX ORDER — ONDANSETRON HYDROCHLORIDE 8 MG/1
8 TABLET, FILM COATED ORAL EVERY 8 HOURS PRN
Qty: 30 TABLET | Refills: 3 | Status: SHIPPED | OUTPATIENT
Start: 2021-08-23

## 2021-08-23 NOTE — PROGRESS NOTES
Education for Administration of Chemotherapy and/or Biotherapy     NAME: Cindy Cortes  : 1965  MRN: [id number]  DATE OF SERVICE: 2021  REASON FOR VISIT: PATIENT EDUCATION    Mrs. Cindy Cortes is here today for education on her upcoming chemotherapy and/or biotherapy recommended for treatment of her disease. The patient was accompanied by her self.     I reviewed treatment options, obtained signed consent, and answered any questions she had regarding the administration of Procarbazine, Lomustine, and Vincristine.     Cindy Cortes has already consulted Dr.Amitjo Workman for the treatment of brain cancer. The patient's oncologist has discussed the same treatment options with the patient and answered her questions prior to today's visit on 2021.   TREATMENT GOALS:  The goal of the treatment is to:    [x] Curative intent - intent is cure; cure implies patient survival will not be restricted by current cancer diagnosis   [] Control  - intent is to extend survival but not long enough to meet definition of cure for patient with that diagnosis   [] Palliative - means treatment given in a non-curative setting to optimize symptom control, improve quality of life, and improve survival    This treatment has been explained to Cindy Cortes. Alternative methods of treatment, if any, have been explained to Cindy Cortes, as have the benefits and risks of each. Based on the physician's explanation of the benefits and risks of this treatment and any alternatives available, the patient agrees the potential benefits outweighs the potential risks involved. I have explained to the patient the most likely complications which might occur from this treatment. The patient understands along with the treatment additional medications may be necessary to lessen the side effects.     SIDE EFFECTS:  Possible side effects may include but are not limited to, any of the following, or a combination of the  following:    [x]  Abdominal pain  [x]  Hypersensitivity reaction [x]  Rash   [x]  Allergic Reaction [x]  Hypertension [x]  Secondary malignancies   [x]  Anemia [x]  Hypertensive crisis  [x]  Sexual side effects    [x]  Anxiety [x]  Hypertriglyceridemia [x]  Shortness of breath   [x]  Back pain [x]  Hypoalbuminemia [x]  Skin changes   [x]  Body pain [x]  Hyponatremia  [x]  Somnolence   [x]  Blood clots (DVT/PE) [x]  Immune-mediated reaction [x]  Sore throat   [x]  Bleeding [x]  Infection  [x]  Swelling   [x]  Bone pain [x]  Infusion reaction  [x]  Taste changes   [x]  Bruising [x]  Injection site reaction  [x]  Temperature sensitivity   [x]  Cardiovascular events  [x]  Injection site ulceration [x]  Thrombocytopenia   [x]  Central neurotoxicity [x]  Insomnia [x]  Thyroid changes   [x]  Chest pain [x]  Itching [x]  Tinnitus   [x]  Chills [x]  Joint pain [x]  Upper respiratory tract infection    [x]  Confusion [x]  Kidney damage [x]  Visual changes   [x]  Congestive heart failure [x]  Leukopenia [x]  Vitlligo   [x]  Constipation [x]  LFT imbalances [x]  Vomiting   [x]  Cough [x]  Liver damage [x]  Watery eyes   [x]  Depression [x]  Loss of appetite [x]  Weakness   [x]  Diarrhea [x]  Low blood pressure [x]  Weight gain   [x]  Dizziness [x]  Lung damage [x]  Weight loss   [x]  Dry skin [x]  Menopausal symptoms [x]  Wound healing complication   [x]  Ecchymosis [x]  Menstrual irregularities []  Other   [x]  Electrolyte imbalances [x]  Metallic taste  []  Other   [x]  Elevated LDH [x]  Mood changes []  Other   [x]  Eye irritation [x]  Mouth sores []  Other   [x]  Fatigue [x]  Muscle aches  []  Other   [x]  Fertility effects  [x]  Nephrotic syndrome []  Other   [x]  Fevers [x]  Nail changes []  Other   [x]  Fistula formation [x]  Nausea  []  Other   [x]  Flu-like symptoms [x]  Neck pain  []  Other   [x]  Fluid retention [x]  Neutropenia []  Other   [x]  Forgetfulness [x]  Nosebleeds []  Other   [x]  Gastrointestinal  perforation [x]  Pain in arms/legs []  Other   [x]  Hand foot syndrome [x]  Pericardial effusion  []  Other   [x]  Hair loss/discoloration [x]  Peripheral neuropathy []  Other   [x]  Headaches [x]  Petechiae []  Other   [x]  Hearing loss/change [x]  Pharyngitis  []  Other   [x]  Heart damage [x]  Photosensitivity  []  Other   [x]  Hematuria [x]  Pleural effusion  []  Other   [x]  Hemorrhage [x]  Proteinuria  []  Other     VASCULAR ACCESS:  The patient was educated on the possible need for vascular access/port placement.  The patient was advised although uncommon, leakage of an infused medication from the vein or venous access device (port) may lead to skin breakdown and/or other tissue damage.  The patient was advised she may have pain, bleeding, and/or bruising from the insertion of a needle in their vein or venous access device (port).  The patient was further advised despite proper technique, infection with redness and irritation may rarely occur at the site where the needle was inserted.  The patient was advised if complications occur, additional medical treatment is available.    BLOOD COUNT MONITORING:  While receiving treatment, it has been explained to the patient blood counts will be monitored.  This may include but is not limited to a complete blood count (CBC). The patient may develop neutropenia, anemia, or thrombocytopenia. This has been explained and a handout was provided to the patient.     NUTRITION:   It was explained to the patient about nutrition and its importance while undergoing chemotherapy and/or biotherapy. Certain medications will be prescribed during the treatment which may change the way foods taste or smell. These changes may cause poor or no appetite. The patient was advised food is fuel for the body, and if it does not get the fuel it needs, she may become malnourished, which can lead to severe fatigue. It was discussed with the patient about calories and how to add high-calorie foods  to her diet.  Protein was also mentioned in regards to how it will help make new cells for the body. Information was given to Cindy Cortes regarding good protein sources.   It was also discussed with the patient the importance of  eating and drinking every 2-3 hours while awake. We discussed fluid intake of at least 6 to 8 ounce glasses of liquids per day to stay hydrated. Examples are listed below:   Water  Juice (fruit or vegetable)  Soda Sport Drinks Soup   Milk  Ensure, Boost, Glucerna Ice Cream Popsicles Jello   Milkshakes Pudding  Gatorade Sherbert Yogurt     REPRODUCTION:  Reproductive risks were discussed, including appropriate use of birth control, and protection during sexual relations. The risks of becoming pregnant while receiving chemotherapy and/or biotherapy were reviewed for females.  Males were instructed to use appropriate birth control to prevent conception during treatment.  We also discussed the importance of using reliable barrier methods while participating in intimate activities as this may expose their partners to a potentially harmful drug. The importance of pregnancy prevention was emphasized due to risks of increased chance of birth defects and miscarriages.     Cindysam Cortes was provided handouts on:   1. Home instructions  2. Complete blood counts and terminology  3. Nutrition during cancer therapy   4. Fluids and dehydration  5. Mouth care  6. Cancer-related fatigue  7. Management of constipation    8. Management of diarrhea   9. Handouts from [employer] on specific drugs   10. Handouts from [employer] on specific drugs if applicable   11. A signed copy of chemotherapy/biotherapy consent     TOPICS EDUCATION PROVIDED EDUCATION REINFORCED COMMENTS   ANEMIA:  role of RBC, cause, s/s, ways to manage, role of transfusion [x] [x]    THROMBOCYTOPENIA:  role of platelet, cause, s/s, ways to prevent bleeding, things to avoid, when to seek help [x] [x]    NEUTROPENIA:  role of WBC,  cause, infection precautions, s/s of infection, when to call MD [x] [x]    NUTRITION & APPETITE CHANGES:  importance of maintaining healthy diet & weight, ways to manage to improve intake, dietary consult, exercise regimen [x] [x]    DIARRHEA:  causes, s/s of dehydration, ways to manage, dietary changes, when to call MD [x] [x]    CONSTIPATION:  causes, ways to manage, dietary changes, when to call MD [x] [x]    NAUSEA & VOMITING:  causes, use of antiemetics, dietary changes, when to call MD [x] [x]    MOUTH SORES:  causes, oral care, ways to manage [x] [x]    ALOPECIA:  causes, ways to manage, resources [x] [x]    INFERTILITY & SEXUALITY:  causes, fertility preservation options, sexuality changes, ways to manage, importance of birth control [x] [x]    NERVOUS SYSTEM CHANGES:  causes, s/s, neuropathies, cognitive changes, ways to manage [x] [x]    PAIN:  causes, ways to manage [x] [x]    SKIN & NAIL CHANGES:  cause, s/s, ways to manage [x] [x]    ORGAN TOXICITIES:  cause, s/s, need for diagnostic tests, labs, when to notify MD [x] [x]    SURVIVORSHIP:  distress, distress assessment, secondary malignancies, early/late effects, follow-up, social issues, social support [x] [x]    HOME CARE:  use of spill kits, storing of PO chemo, how to manage bodily fluids [x] [x]    MISCELLANEOUS:  drug interactions, administration, vesicants  [x] [x]      PAST MEDICAL HISTORY:  Past Medical History:   Diagnosis Date   • Arthritis    • GERD (gastroesophageal reflux disease)    • Hypertension    • Oligodendroglioma (CMS/HCC)    • Seizure (CMS/HCC)    • Type 2 diabetes mellitus with hyperglycemia, without long-term current use of insulin (CMS/HCC) 5/12/2021       PAST SURGICAL HISTORY:  Past Surgical History:   Procedure Laterality Date   • CRANIOTOMY FOR TUMOR Right 5/6/2021    Procedure: CRANIOTOMY FOR TUMOR RESECTION WITH STEREOTACTIC;  Surgeon: Jluis Felix MD;  Location: [facility] MAIN OR;  Service: Neurosurgery;  Laterality:  Right;       CURRENT MEDICATIONS:    Current Outpatient Medications:   •  acetaZOLAMIDE (DIAMOX) 125 MG tablet, Take 1 tablet by mouth 3 (Three) Times a Day., Disp: 90 tablet, Rfl: 2  •  Alcohol Swabs (Alcohol Wipes) 70 % pads, Apply 1 each topically to the appropriate area as directed 4 (Four) Times a Day., Disp: , Rfl:   •  amLODIPine (NORVASC) 10 MG tablet, Take 1 tablet by mouth Daily., Disp: 30 tablet, Rfl: 2  •  ferrous sulfate 325 (65 FE) MG tablet, Take 1 tablet by mouth Daily With Breakfast., Disp: , Rfl:   •  folic acid (FOLVITE) 1 MG tablet, TAKE TWO TABLETS BY MOUTH EVERY MORNING, THEN TWO TABLETS EVERY EVENING, Disp: 120 tablet, Rfl: 0  •  glucose blood (OneTouch Verio) test strip, 1 each by Other route 4 (Four) Times a Day Before Meals & at Bedtime. Use as instructed, Disp: 200 each, Rfl: 1  •  insulin aspart (NovoLOG FlexPen) 100 UNIT/ML solution pen-injector sc pen, Inject 5 Units under the skin into the appropriate area as directed 3 (Three) Times a Day With Meals. Inject 1u lispro SC for every 50 over 150, Disp: 2 pen, Rfl: 2  •  insulin detemir (Levemir FlexTouch) 100 UNIT/ML injection, Inject 15 Units under the skin into the appropriate area as directed 2 (Two) Times a Day., Disp: 3 pen, Rfl: 2  •  Insulin Pen Needle 32G X 4 MM misc, 1 each 5 (Five) Times a Day., Disp: 200 each, Rfl: 1  •  Isopropyl Alcohol (Alcohol Wipes) 70 % misc, Apply 1 each topically 4 (Four) Times a Day Before Meals & at Bedtime., Disp: 200 each, Rfl: 1  •  Lancets (OneTouch Delica Plus [id number]) misc, 1 each 4 (Four) Times a Day Before Meals & at Bedtime., Disp: 200 each, Rfl: 1  •  levETIRAcetam (KEPPRA) 750 MG tablet, Take 1 tablet by mouth 2 (Two) Times a Day., Disp: 60 tablet, Rfl: 2  •  metoprolol tartrate (LOPRESSOR) 25 MG tablet, Take 0.5 tablets by mouth Every 8 (Eight) Hours., Disp: 45 tablet, Rfl: 2  •  ondansetron (Zofran) 8 MG tablet, Take 1 tablet by mouth Every 8 (Eight) Hours As Needed for Nausea or  Vomiting., Disp: 30 tablet, Rfl: 3  •  oxybutynin XL (DITROPAN-XL) 5 MG 24 hr tablet, Take 5 mg by mouth Daily., Disp: , Rfl:   •  pantoprazole (PROTONIX) 40 MG EC tablet, TAKE 1 TABLET BY MOUTH EVERY DAY (Patient taking differently: Take 40 mg by mouth Daily. Take DOS), Disp: 30 tablet, Rfl: 1  •  venlafaxine XR (EFFEXOR-XR) 75 MG 24 hr capsule, Take 75 mg by mouth Daily. Take DOS, Disp: , Rfl: 3    ALLERGIES:  No Known Allergies    FAMILY HISTORY:  Family History   Problem Relation Age of Onset   • Kidney disease Mother    • Prostate cancer Brother    • Breast cancer Maternal Aunt    • Colon cancer Maternal Aunt    • Breast cancer Niece    • Breast cancer Cousin        ONCOLOGIC FAMILY HISTORY:  Cancer-related family history includes Breast cancer in her cousin, maternal aunt, and niece; Colon cancer in her maternal aunt; Prostate cancer in her brother.    SOCIAL HISTORY:  Social History     Tobacco Use   • Smoking status: Never Smoker   • Smokeless tobacco: Never Used   Vaping Use   • Vaping Use: Never used   Substance Use Topics   • Alcohol use: Not Currently     Alcohol/week: 1.0 standard drinks     Types: 1 Cans of beer per week     Comment: socially   • Drug use: Never       HPI, ROS and PFSH have been reviewed and confirmed on 8/23/2021.     REVIEW OF SYSTEMS:  Review of Systems   Constitutional: Negative for activity change, appetite change, chills, diaphoresis, fatigue, fever and unexpected weight change.   HENT: Negative for congestion, dental problem, ear discharge, ear pain, facial swelling, hearing loss, mouth sores, nosebleeds, sore throat, tinnitus, trouble swallowing and voice change.    Eyes: Negative for photophobia and visual disturbance.   Respiratory: Negative for cough, choking, chest tightness, shortness of breath and wheezing.    Cardiovascular: Negative for chest pain, palpitations and leg swelling.   Gastrointestinal: Negative for abdominal distention, abdominal pain, constipation,  diarrhea, nausea and vomiting.   Endocrine: Negative.    Genitourinary: Negative for decreased urine volume, difficulty urinating, dysuria, flank pain, frequency, hematuria and urgency.   Musculoskeletal: Negative for back pain, gait problem, joint swelling, myalgias, neck pain and neck stiffness.   Skin: Negative for color change, rash and wound.   Neurological: Positive for weakness. Negative for dizziness, tremors, syncope, speech difficulty, numbness and headaches.   Hematological: Negative.    Psychiatric/Behavioral: Negative.        PHYSICAL EXAMINATION:  Physical Exam  Vitals and nursing note reviewed. Exam conducted with a chaperone present.   Constitutional:       General: She is not in acute distress.     Appearance: She is obese. She is not ill-appearing, toxic-appearing or diaphoretic.   HENT:      Head: Normocephalic and atraumatic.      Right Ear: Tympanic membrane, ear canal and external ear normal.      Left Ear: Tympanic membrane, ear canal and external ear normal.      Nose: Nose normal. No congestion or rhinorrhea.      Mouth/Throat:      Mouth: Mucous membranes are moist.      Pharynx: Oropharynx is clear.   Eyes:      General:         Right eye: No discharge.         Left eye: No discharge.      Extraocular Movements: Extraocular movements intact.      Conjunctiva/sclera: Conjunctivae normal.      Pupils: Pupils are equal, round, and reactive to light.   Neck:      Vascular: No carotid bruit.   Cardiovascular:      Rate and Rhythm: Normal rate and regular rhythm.      Pulses: Normal pulses.      Heart sounds: Normal heart sounds. No murmur heard.   No friction rub. No gallop.    Pulmonary:      Effort: Pulmonary effort is normal. No respiratory distress.      Breath sounds: Normal breath sounds. No stridor. No wheezing, rhonchi or rales.   Chest:      Chest wall: No tenderness.   Abdominal:      General: Abdomen is flat. Bowel sounds are normal. There is no distension.      Palpations: Abdomen  is soft. There is no mass.      Tenderness: There is no abdominal tenderness. There is no guarding or rebound.      Hernia: No hernia is present.   Genitourinary:     Rectum: Normal.   Musculoskeletal:         General: No swelling, tenderness or signs of injury. Normal range of motion.      Cervical back: Normal range of motion and neck supple. No rigidity or tenderness.      Right lower leg: Edema present.      Left lower leg: Edema present.   Lymphadenopathy:      Cervical: No cervical adenopathy.   Skin:     General: Skin is warm and dry.      Capillary Refill: Capillary refill takes less than 2 seconds.      Coloration: Skin is not jaundiced.      Findings: No erythema or rash.   Neurological:      General: No focal deficit present.      Mental Status: She is alert and oriented to person, place, and time. Mental status is at baseline.      Cranial Nerves: No cranial nerve deficit.      Sensory: No sensory deficit.      Motor: No weakness.      Coordination: Coordination normal.      Gait: Gait normal.      Deep Tendon Reflexes: Reflexes normal.   Psychiatric:         Mood and Affect: Mood normal.         Behavior: Behavior normal.         Thought Content: Thought content normal.         Judgment: Judgment normal.         LABS:  WBC   Date Value Ref Range Status   08/17/2021 7.06 3.40 - 10.80 10*3/mm3 Final   03/31/2020 5.63 4.5 - 11.0 10*3/uL Final     RBC   Date Value Ref Range Status   08/17/2021 3.71 (L) 3.77 - 5.28 10*6/mm3 Final   03/31/2020 3.86 (L) 4.0 - 5.2 10*6/uL Final     Hemoglobin   Date Value Ref Range Status   08/17/2021 10.9 (L) 12.0 - 15.9 g/dL Final   03/31/2020 11.5 (L) 12.0 - 16.0 g/dL Final     Hematocrit   Date Value Ref Range Status   08/17/2021 34.9 34.0 - 46.6 % Final   03/31/2020 36.8 36.0 - 46.0 % Final     MCV   Date Value Ref Range Status   08/17/2021 94.1 79.0 - 97.0 fL Final   03/31/2020 95.3 80.0 - 100.0 fL Final     MCH   Date Value Ref Range Status   08/17/2021 29.4 26.6 - 33.0  pg Final   03/31/2020 29.8 26.0 - 34.0 pg Final     MCHC   Date Value Ref Range Status   08/17/2021 31.2 (L) 31.5 - 35.7 g/dL Final   03/31/2020 31.3 31.0 - 37.0 g/dL Final     RDW   Date Value Ref Range Status   08/17/2021 16.3 (H) 12.3 - 15.4 % Final   03/31/2020 15.1 12.0 - 16.8 % Final     RDW-SD   Date Value Ref Range Status   08/17/2021 53.6 37.0 - 54.0 fl Final     MPV   Date Value Ref Range Status   08/17/2021 8.3 6.0 - 12.0 fL Final   03/31/2020 9.7 6.7 - 10.8 fL Final     Platelets   Date Value Ref Range Status   08/17/2021 237 140 - 450 10*3/mm3 Final   03/31/2020 266 140 - 440 10*3/uL Final     Neutrophil Rel %   Date Value Ref Range Status   03/31/2020 65.8 45 - 80 % Final     Neutrophil %   Date Value Ref Range Status   08/17/2021 72.7 42.7 - 76.0 % Final     Lymphocyte Rel %   Date Value Ref Range Status   03/31/2020 24.9 15 - 50 % Final     Lymphocyte %   Date Value Ref Range Status   08/17/2021 15.9 (L) 19.6 - 45.3 % Final     Monocyte Rel %   Date Value Ref Range Status   03/31/2020 5.7 0 - 15 % Final     Monocyte %   Date Value Ref Range Status   08/17/2021 7.1 5.0 - 12.0 % Final     Eosinophil %   Date Value Ref Range Status   08/17/2021 3.7 0.3 - 6.2 % Final   03/31/2020 3.0 0 - 7 % Final     Basophil Rel %   Date Value Ref Range Status   03/31/2020 0.2 0 - 2 % Final     Basophil %   Date Value Ref Range Status   08/17/2021 0.6 0.0 - 1.5 % Final     Immature Grans %   Date Value Ref Range Status   03/31/2020 0.4 (H) 0 % Final     Neutrophils Absolute   Date Value Ref Range Status   03/31/2020 3.71 2.0 - 8.8 10*3/uL Final     Neutrophils, Absolute   Date Value Ref Range Status   08/17/2021 5.14 1.70 - 7.00 10*3/mm3 Final     Lymphocytes Absolute   Date Value Ref Range Status   03/31/2020 1.40 0.7 - 5.5 10*3/uL Final     Lymphocytes, Absolute   Date Value Ref Range Status   08/17/2021 1.12 0.70 - 3.10 10*3/mm3 Final     Monocytes Absolute   Date Value Ref Range Status   03/31/2020 0.32 0.0 - 1.7  10*3/uL Final     Monocytes, Absolute   Date Value Ref Range Status   08/17/2021 0.50 0.10 - 0.90 10*3/mm3 Final     Eosinophils Absolute   Date Value Ref Range Status   03/31/2020 0.17 0.0 - 0.8 10*3/uL Final     Eosinophils, Absolute   Date Value Ref Range Status   08/17/2021 0.26 0.00 - 0.40 10*3/mm3 Final     Basophils Absolute   Date Value Ref Range Status   03/31/2020 0.01 0.0 - 0.2 10*3/uL Final     Basophils, Absolute   Date Value Ref Range Status   08/17/2021 0.04 0.00 - 0.20 10*3/mm3 Final     Immature Grans, Absolute   Date Value Ref Range Status   03/31/2020 0.02 <1 10*3/uL Final     nRBC   Date Value Ref Range Status   05/09/2021 0.1 0.0 - 0.2 /100 WBC Final     Lab Results   Component Value Date    GLUCOSE 250 (H) 05/13/2021    BUN 34 (H) 05/13/2021    CREATININE 1.10 08/06/2021    EGFRIFNONA 73 05/13/2021    BCR 41.5 (H) 05/13/2021    K 4.6 05/13/2021    CO2 19.0 (L) 05/13/2021    CALCIUM 9.4 05/13/2021    ALBUMIN 3.00 (L) 05/13/2021    LABIL2 1.4 03/30/2021    AST 22 05/13/2021    ALT 42 (H) 05/13/2021       Assessment/Plan   There are no diagnoses linked to this encounter.  ASSESSMENT   1. Encounter for medication management and education of chemotherapy/biotherapy    2. Encounter for Vincristine teaching and education  3. Encounter for Procarbazine teaching and education  4. Encounter for Lomustine teaching and education    PLAN  • Start 9/2/21 Day 1: Lomustine.  Start day for Vincristine and Procarbazine Day 8: 9/9/21.    • Notified , financial counselor, and dietician patient starting new treatment   • RTC 2 weeks after starting chemotherapy with Dr.Amitjo Workman    I have reviewed labs results, imaging, vitals, and medications with the patient today.    A total of 40 minutes were spent with the patient, with greater then 50% of time spent in education and counseling.     Electronically signed by BJ Archuleta, 08/23/21, 12:38 PM EDT.  .

## 2021-08-23 NOTE — TELEPHONE ENCOUNTER
Received patient call asking about her oral chemo I advised that the pharmacists are working on that and will call her when it is available as it will be shipped to our office. She VU

## 2021-08-23 NOTE — PROGRESS NOTES
Case Management/ Note    Patient Name: Cindy Cortes  YOB: 1965  MRN #: [id number]    OSW met with patient who is pleasant, alert and oriented to person, place and time. She has a history of depression which began when she became menopausal. The medication the doctor has her on is helpful. She denies SI / HI.     She is  to her  of four months, Kvng. They live with her sister and brother (not brother-in-law as stated in 6/17/21 note). Basic needs are met. She is on ST Disability and will proceed with LT disability. She has not yet filed for SSD and will let OSW know if she needs assistance with this. She has good support from friends and family. She is able to do her own ADLs / IADLs. She denies any falls. She has her advanced directives and was asked to bring them in so we can have them on file. Her sister is named as her durable POA and this was done prior to her marriage. She is open to her  speaking for her as well as her sister.  Discussed the importance of speaking with her siblings and  about her healthcare preferences and spoke with her about ACP. She is open to having an ACP meeting.     She was given a blanket, port pillow, ACS PHM, Oncology, community and transportation resources. She was told of OSW, dietician, financial counselor and massage therapy services. OSW will remain available.     Electronically signed by:   Sheila Venegas LCSW, TING  08/23/21, 13:13 EDT

## 2021-08-24 ENCOUNTER — TELEPHONE (OUTPATIENT)
Dept: ONCOLOGY | Facility: CLINIC | Age: 56
End: 2021-08-24

## 2021-08-24 NOTE — TELEPHONE ENCOUNTER
LAILA WITH [facility] [place]       CALLING TO CHECK ON SOME RECORDS REQUEST FAXED OVER      [facility] [place] LINE [phone]    AD[facility] OF [phone]        WARM TRANSFERRED WITH VLAD IN [place] TO FURTHER ASSIST.

## 2021-08-25 ENCOUNTER — OFFICE VISIT (OUTPATIENT)
Dept: NEUROSURGERY | Facility: CLINIC | Age: 56
End: 2021-08-25

## 2021-08-25 VITALS
WEIGHT: 293 LBS | DIASTOLIC BLOOD PRESSURE: 83 MMHG | OXYGEN SATURATION: 96 % | SYSTOLIC BLOOD PRESSURE: 165 MMHG | HEART RATE: 59 BPM | BODY MASS INDEX: 53.92 KG/M2 | HEIGHT: 62 IN | RESPIRATION RATE: 18 BRPM

## 2021-08-25 DIAGNOSIS — C71.9 OLIGODENDROGLIOMA (HCC): Primary | ICD-10-CM

## 2021-08-25 PROCEDURE — 99213 OFFICE O/P EST LOW 20 MIN: CPT | Performed by: SPECIALIST

## 2021-08-25 NOTE — PROGRESS NOTES
Subjective   History of Present Illness: Cindy Cortes is a 55 y.o. female who is seen for follow-up today of a grade 2 oligodendroglioma.    History Of Present Illness: Alok had undergone a right frontal craniotomy for resection of an oligodendroglioma with pathologic grade of tumor.  She had a seizure back in April.  Her surgery however was in May.  She has been very successful with this and that she has no neurologic deficits currently.  She has not had any further seizures and has continued on her Keppra at this point.  She has undergone radiation therapy with whole brain radiation.  She is scheduled to start chemotherapy next week.    Recent MRI scan of the brain shows postoperative changes.  There is a slight area of hyperintensity on flair images some uptake of dye along the resection bed.  None these most likely represent postoperative changes although it may represent some residual tumor.      The following portions of the patient's history were reviewed and updated as appropriate: allergies, current medications, past family history, past medical history, past social history, past surgical history, and problem list.    Past Medical History:   Diagnosis Date   • Arthritis    • GERD (gastroesophageal reflux disease)    • Hypertension    • Oligodendroglioma (CMS/HCC)    • Seizure (CMS/HCC)    • Type 2 diabetes mellitus with hyperglycemia, without long-term current use of insulin (CMS/HCC) 5/12/2021        Past Surgical History:   Procedure Laterality Date   • CRANIOTOMY FOR TUMOR Right 5/6/2021    Procedure: CRANIOTOMY FOR TUMOR RESECTION WITH STEREOTACTIC;  Surgeon: Jluis Felix MD;  Location: [facility];  Service: Neurosurgery;  Laterality: Right;          Current Outpatient Medications:   •  acetaZOLAMIDE (DIAMOX) 125 MG tablet, Take 1 tablet by mouth 3 (Three) Times a Day., Disp: 90 tablet, Rfl: 2  •  Alcohol Swabs (Alcohol Wipes) 70 % pads, Apply 1 each topically to the appropriate area as  directed 4 (Four) Times a Day., Disp: , Rfl:   •  amLODIPine (NORVASC) 10 MG tablet, Take 1 tablet by mouth Daily., Disp: 30 tablet, Rfl: 2  •  ferrous sulfate 325 (65 FE) MG tablet, Take 1 tablet by mouth Daily With Breakfast., Disp: , Rfl:   •  folic acid (FOLVITE) 1 MG tablet, TAKE TWO TABLETS BY MOUTH EVERY MORNING, THEN TWO TABLETS EVERY EVENING, Disp: 120 tablet, Rfl: 0  •  glucose blood (OneTouch Verio) test strip, 1 each by Other route 4 (Four) Times a Day Before Meals & at Bedtime. Use as instructed, Disp: 200 each, Rfl: 1  •  insulin aspart (NovoLOG FlexPen) 100 UNIT/ML solution pen-injector sc pen, Inject 5 Units under the skin into the appropriate area as directed 3 (Three) Times a Day With Meals. Inject 1u lispro SC for every 50 over 150, Disp: 2 pen, Rfl: 2  •  insulin detemir (Levemir FlexTouch) 100 UNIT/ML injection, Inject 15 Units under the skin into the appropriate area as directed 2 (Two) Times a Day., Disp: 3 pen, Rfl: 2  •  Insulin Pen Needle 32G X 4 MM misc, 1 each 5 (Five) Times a Day., Disp: 200 each, Rfl: 1  •  Isopropyl Alcohol (Alcohol Wipes) 70 % misc, Apply 1 each topically 4 (Four) Times a Day Before Meals & at Bedtime., Disp: 200 each, Rfl: 1  •  Lancets (OneTouch Delica Plus [id number]) misc, 1 each 4 (Four) Times a Day Before Meals & at Bedtime., Disp: 200 each, Rfl: 1  •  levETIRAcetam (KEPPRA) 750 MG tablet, Take 1 tablet by mouth 2 (Two) Times a Day., Disp: 60 tablet, Rfl: 2  •  metoprolol tartrate (LOPRESSOR) 25 MG tablet, Take 0.5 tablets by mouth Every 8 (Eight) Hours., Disp: 45 tablet, Rfl: 2  •  ondansetron (Zofran) 8 MG tablet, Take 1 tablet by mouth Every 8 (Eight) Hours As Needed for Nausea or Vomiting., Disp: 30 tablet, Rfl: 3  •  oxybutynin XL (DITROPAN-XL) 5 MG 24 hr tablet, Take 5 mg by mouth Daily., Disp: , Rfl:   •  pantoprazole (PROTONIX) 40 MG EC tablet, TAKE 1 TABLET BY MOUTH EVERY DAY (Patient taking differently: Take 40 mg by mouth Daily. Take DOS), Disp: 30  "tablet, Rfl: 1  •  venlafaxine XR (EFFEXOR-XR) 75 MG 24 hr capsule, Take 75 mg by mouth Daily. Take DOS, Disp: , Rfl: 3     Social History     Socioeconomic History   • Marital status: Single     Spouse name: Not on file   • Number of children: Not on file   • Years of education: Not on file   • Highest education level: Not on file   Tobacco Use   • Smoking status: Never Smoker   • Smokeless tobacco: Never Used   Vaping Use   • Vaping Use: Never used   Substance and Sexual Activity   • Alcohol use: Not Currently     Alcohol/week: 1.0 standard drinks     Types: 1 Cans of beer per week     Comment: socially   • Drug use: Never   • Sexual activity: Yes     Partners: Male        Family History   Problem Relation Age of Onset   • Kidney disease Mother    • Prostate cancer Brother    • Breast cancer Maternal Aunt    • Colon cancer Maternal Aunt    • Breast cancer Niece    • Breast cancer Cousin         Review of Systems otherwise noncontributory.  Objective     Vitals:    08/25/21 1346   BP: 165/83   BP Location: Right arm   Patient Position: Sitting   Cuff Size: Large Adult   Pulse: 59   Resp: 18   SpO2: 96%   Weight: (!) 154 kg (339 lb 12.8 oz)   Height: 157.5 cm (62\")     Body mass index is 62.15 kg/m².      Physical Exam  Neurologic Exam  Patient is alert and oriented all modalities.  Her speech is clear and appropriate.  Her incision is intact.  Her neck is supple.    Cranial nerves II through XII are grossly intact.  She does not have any frontal release signs of.  Upper extremity reflexes are 1/1.  Knee and ankle jerks are absent.    Her motor strength is 5/5 in the upper lower extremities.  She has no pronator drift and finger-nose testing and heel-knee-shin testing is normal.  Her gait is somewhat wide-based primarily because of her body habitus.    Assessment/Plan   Independent Review of Radiographic Studies:      I personally reviewed the images from the following studies.    MRI scan of the brain done in early " August    Medical Decision Making:      Alok is doing well at this time.  I recommended she continue follow-up with us and the oncology physicians.  She will return in 3 months for follow-up.  She also knows to call if she has any questions or problems.    She is still on Keppra and will continue on this at this time.  We did discuss stopping this however the medicine does not bother her and she is at high risk for seizure activity therefore I recommended she stay on it at this point.  Diagnoses and all orders for this visit:    1. Oligodendroglioma (CMS/HCC) (Primary)      Return in about 3 months (around 11/25/2021) for Recheck.

## 2021-08-27 ENCOUNTER — HOSPITAL ENCOUNTER (OUTPATIENT)
Dept: INTERVENTIONAL RADIOLOGY/VASCULAR | Facility: HOSPITAL | Age: 56
Discharge: HOME | End: 2021-08-27
Admitting: INTERNAL MEDICINE

## 2021-08-27 VITALS
HEART RATE: 59 BPM | RESPIRATION RATE: 17 BRPM | BODY MASS INDEX: 53.92 KG/M2 | OXYGEN SATURATION: 97 % | WEIGHT: 293 LBS | SYSTOLIC BLOOD PRESSURE: 177 MMHG | HEIGHT: 62 IN | TEMPERATURE: 98.4 F | DIASTOLIC BLOOD PRESSURE: 86 MMHG

## 2021-08-27 DIAGNOSIS — C71.9 OLIGODENDROGLIOMA (HCC): ICD-10-CM

## 2021-08-27 LAB
APTT PPP: 25.6 SECONDS (ref 24–31)
BASOPHILS # BLD AUTO: 0 10*3/MM3 (ref 0–0.2)
BASOPHILS NFR BLD AUTO: 0.5 % (ref 0–1.5)
DEPRECATED RDW RBC AUTO: 51.2 FL (ref 37–54)
EOSINOPHIL # BLD AUTO: 0.2 10*3/MM3 (ref 0–0.4)
EOSINOPHIL NFR BLD AUTO: 3 % (ref 0.3–6.2)
ERYTHROCYTE [DISTWIDTH] IN BLOOD BY AUTOMATED COUNT: 17 % (ref 12.3–15.4)
HCT VFR BLD AUTO: 33.6 % (ref 34–46.6)
HGB BLD-MCNC: 11.3 G/DL (ref 12–15.9)
INR PPP: 0.96 (ref 0.93–1.1)
LYMPHOCYTES # BLD AUTO: 0.9 10*3/MM3 (ref 0.7–3.1)
LYMPHOCYTES NFR BLD AUTO: 14.3 % (ref 19.6–45.3)
MCH RBC QN AUTO: 28.6 PG (ref 26.6–33)
MCHC RBC AUTO-ENTMCNC: 33.6 G/DL (ref 31.5–35.7)
MCV RBC AUTO: 85.2 FL (ref 79–97)
MONOCYTES # BLD AUTO: 0.3 10*3/MM3 (ref 0.1–0.9)
MONOCYTES NFR BLD AUTO: 4.6 % (ref 5–12)
MRSA DNA SPEC QL NAA+PROBE: NORMAL
NEUTROPHILS NFR BLD AUTO: 5.2 10*3/MM3 (ref 1.7–7)
NEUTROPHILS NFR BLD AUTO: 77.6 % (ref 42.7–76)
NRBC BLD AUTO-RTO: 0.1 /100 WBC (ref 0–0.2)
PLATELET # BLD AUTO: 259 10*3/MM3 (ref 140–450)
PMV BLD AUTO: 6.5 FL (ref 6–12)
PROTHROMBIN TIME: 10.7 SECONDS (ref 9.6–11.7)
RBC # BLD AUTO: 3.94 10*6/MM3 (ref 3.77–5.28)
WBC # BLD AUTO: 6.6 10*3/MM3 (ref 3.4–10.8)

## 2021-08-27 PROCEDURE — 25010000002 ONDANSETRON PER 1 MG: Performed by: RADIOLOGY

## 2021-08-27 PROCEDURE — 36561 INSERT TUNNELED CV CATH: CPT

## 2021-08-27 PROCEDURE — 99153 MOD SED SAME PHYS/QHP EA: CPT

## 2021-08-27 PROCEDURE — C1788 PORT, INDWELLING, IMP: HCPCS

## 2021-08-27 PROCEDURE — C1894 INTRO/SHEATH, NON-LASER: HCPCS

## 2021-08-27 PROCEDURE — 77001 FLUOROGUIDE FOR VEIN DEVICE: CPT

## 2021-08-27 PROCEDURE — 25010000002 MIDAZOLAM PER 1 MG: Performed by: RADIOLOGY

## 2021-08-27 PROCEDURE — 25010000002 HEPARIN LOCK FLUSH PER 10 UNITS: Performed by: RADIOLOGY

## 2021-08-27 PROCEDURE — 85730 THROMBOPLASTIN TIME PARTIAL: CPT | Performed by: RADIOLOGY

## 2021-08-27 PROCEDURE — 76937 US GUIDE VASCULAR ACCESS: CPT

## 2021-08-27 PROCEDURE — 87641 MR-STAPH DNA AMP PROBE: CPT | Performed by: INTERNAL MEDICINE

## 2021-08-27 PROCEDURE — 99152 MOD SED SAME PHYS/QHP 5/>YRS: CPT

## 2021-08-27 PROCEDURE — 85610 PROTHROMBIN TIME: CPT | Performed by: RADIOLOGY

## 2021-08-27 PROCEDURE — 85025 COMPLETE CBC W/AUTO DIFF WBC: CPT | Performed by: RADIOLOGY

## 2021-08-27 PROCEDURE — 25010000002 FENTANYL CITRATE (PF) 50 MCG/ML SOLUTION: Performed by: RADIOLOGY

## 2021-08-27 RX ORDER — SODIUM CHLORIDE 9 MG/ML
50 INJECTION, SOLUTION INTRAVENOUS CONTINUOUS
Status: DISPENSED | OUTPATIENT
Start: 2021-08-27 | End: 2021-08-27

## 2021-08-27 RX ORDER — ONDANSETRON 2 MG/ML
INJECTION INTRAMUSCULAR; INTRAVENOUS
Status: COMPLETED | OUTPATIENT
Start: 2021-08-27 | End: 2021-08-27

## 2021-08-27 RX ORDER — SODIUM CHLORIDE 0.9 % (FLUSH) 0.9 %
10 SYRINGE (ML) INJECTION AS NEEDED
Status: DISCONTINUED | OUTPATIENT
Start: 2021-08-27 | End: 2021-08-28 | Stop reason: HOSPADM

## 2021-08-27 RX ORDER — FENTANYL CITRATE 50 UG/ML
INJECTION, SOLUTION INTRAMUSCULAR; INTRAVENOUS
Status: COMPLETED | OUTPATIENT
Start: 2021-08-27 | End: 2021-08-27

## 2021-08-27 RX ORDER — LIDOCAINE HYDROCHLORIDE AND EPINEPHRINE 10; 10 MG/ML; UG/ML
INJECTION, SOLUTION INFILTRATION; PERINEURAL
Status: COMPLETED | OUTPATIENT
Start: 2021-08-27 | End: 2021-08-27

## 2021-08-27 RX ORDER — LIDOCAINE HYDROCHLORIDE 10 MG/ML
INJECTION, SOLUTION EPIDURAL; INFILTRATION; INTRACAUDAL; PERINEURAL
Status: COMPLETED | OUTPATIENT
Start: 2021-08-27 | End: 2021-08-27

## 2021-08-27 RX ORDER — HEPARIN SODIUM (PORCINE) LOCK FLUSH IV SOLN 100 UNIT/ML 100 UNIT/ML
SOLUTION INTRAVENOUS
Status: COMPLETED | OUTPATIENT
Start: 2021-08-27 | End: 2021-08-27

## 2021-08-27 RX ORDER — MIDAZOLAM HYDROCHLORIDE 1 MG/ML
INJECTION INTRAMUSCULAR; INTRAVENOUS
Status: COMPLETED | OUTPATIENT
Start: 2021-08-27 | End: 2021-08-27

## 2021-08-27 RX ADMIN — SODIUM CHLORIDE 50 ML/HR: 0.9 INJECTION, SOLUTION INTRAVENOUS at 08:35

## 2021-08-27 RX ADMIN — FENTANYL CITRATE 50 MCG: 50 INJECTION, SOLUTION INTRAMUSCULAR; INTRAVENOUS at 10:27

## 2021-08-27 RX ADMIN — Medication 10 ML: at 08:35

## 2021-08-27 RX ADMIN — CEFAZOLIN SODIUM 2 G: 1 INJECTION, POWDER, FOR SOLUTION INTRAMUSCULAR; INTRAVENOUS at 09:46

## 2021-08-27 RX ADMIN — FENTANYL CITRATE 50 MCG: 50 INJECTION, SOLUTION INTRAMUSCULAR; INTRAVENOUS at 10:32

## 2021-08-27 RX ADMIN — MIDAZOLAM 0.5 MG: 1 INJECTION INTRAMUSCULAR; INTRAVENOUS at 10:13

## 2021-08-27 RX ADMIN — HEPARIN SODIUM (PORCINE) LOCK FLUSH IV SOLN 100 UNIT/ML 500 UNITS: 100 SOLUTION at 10:55

## 2021-08-27 RX ADMIN — LIDOCAINE HYDROCHLORIDE 5 ML: 10 INJECTION, SOLUTION EPIDURAL; INFILTRATION; INTRACAUDAL; PERINEURAL at 10:24

## 2021-08-27 RX ADMIN — ONDANSETRON 4 MG: 2 INJECTION INTRAMUSCULAR; INTRAVENOUS at 09:55

## 2021-08-27 RX ADMIN — FENTANYL CITRATE 50 MCG: 50 INJECTION, SOLUTION INTRAMUSCULAR; INTRAVENOUS at 10:11

## 2021-08-27 RX ADMIN — MIDAZOLAM 1 MG: 1 INJECTION INTRAMUSCULAR; INTRAVENOUS at 10:21

## 2021-08-27 RX ADMIN — LIDOCAINE HYDROCHLORIDE,EPINEPHRINE BITARTRATE 10 ML: 10; .01 INJECTION, SOLUTION INFILTRATION; PERINEURAL at 10:28

## 2021-08-27 RX ADMIN — MIDAZOLAM 0.5 MG: 1 INJECTION INTRAMUSCULAR; INTRAVENOUS at 10:11

## 2021-08-27 RX ADMIN — FENTANYL CITRATE 50 MCG: 50 INJECTION, SOLUTION INTRAMUSCULAR; INTRAVENOUS at 10:13

## 2021-08-27 NOTE — DISCHARGE INSTRUCTIONS
You may shower tomorrow but do not scrub neck puncture site or right chest incision site. Do not pick at medical glue on both those sites; let it fall off at will. (will usually take a couple weeks). Watch for signs and symptoms of infection (see below instructions) and see your physician if any arise. No heavy lifting greater than 10 lbs until incision is completely healed. No strenuous activity that will put any stress on incision until it is fully healed. Otherwise, activity as tolerated. Follow up with your physician as usual. If port is not being used regularly, it will need to be flushed and heparinized every 30 days at your physician's office in order to keep it in working order. Any questions, feel free to call the Interventional Radiology dept at [phone].      Implanted Port Insertion, Care After  This sheet gives you information about how to care for yourself after your procedure. Your health care provider may also give you more specific instructions. If you have problems or questions, contact your health care provider.  What can I expect after the procedure?  After the procedure, it is common to have:  · Discomfort at the port insertion site.  · Bruising on the skin over the port. This should improve over 3-4 days.  Follow these instructions at home:  Port care  · After your port is placed, you will get a 's information card. The card has information about your port. Keep this card with you at all times.  · Take care of the port as told by your health care provider. Ask your health care provider if you or a family member can get training for taking care of the port at home. A home health care nurse may also take care of the port.  · Make sure to remember what type of port you have.  Incision care         · Follow instructions from your health care provider about how to take care of your port insertion site. Make sure you:  ? Wash your hands with soap and water before and after you  change your bandage (dressing). If soap and water are not available, use hand .  ? Change your dressing as told by your health care provider.  ? Leave stitches (sutures), skin glue, or adhesive strips in place. These skin closures may need to stay in place for 2 weeks or longer. If adhesive strip edges start to loosen and curl up, you may trim the loose edges. Do not remove adhesive strips completely unless your health care provider tells you to do that.  · Check your port insertion site every day for signs of infection. Check for:  ? Redness, swelling, or pain.  ? Fluid or blood.  ? Warmth.  ? Pus or a bad smell.  Activity  · Return to your normal activities as told by your health care provider. Ask your health care provider what activities are safe for you.  · Do not lift anything that is heavier than 10 lb (4.5 kg), or the limit that you are told, until your health care provider says that it is safe.  General instructions  · Take over-the-counter and prescription medicines only as told by your health care provider.  · Do not take baths, swim, or use a hot tub until your health care provider approves. Ask your health care provider if you may take showers. You may only be allowed to take sponge baths.  · Do not drive for 24 hours if you were given a sedative during your procedure.  · Wear a medical alert bracelet in case of an emergency. This will tell any health care providers that you have a port.  · Keep all follow-up visits as told by your health care provider. This is important.  Contact a health care provider if:  · You cannot flush your port with saline as directed, or you cannot draw blood from the port.  · You have a fever or chills.  · You have redness, swelling, or pain around your port insertion site.  · You have fluid or blood coming from your port insertion site.  · Your port insertion site feels warm to the touch.  · You have pus or a bad smell coming from the port insertion site.  Get help  right away if:  · You have chest pain or shortness of breath.  · You have bleeding from your port that you cannot control.  Summary  · Take care of the port as told by your health care provider. Keep the 's information card with you at all times.  · Change your dressing as told by your health care provider.  · Contact a health care provider if you have a fever or chills or if you have redness, swelling, or pain around your port insertion site.  · Keep all follow-up visits as told by your health care provider.  This information is not intended to replace advice given to you by your health care provider. Make sure you discuss any questions you have with your health care provider.  Document Revised: 07/16/2019 Document Reviewed: 07/16/2019  [employer] Patient Education © 2021 [employer]  Implanted Port Home Guide  An implanted port is a device that is placed under the skin. It is usually placed in the chest. The device can be used to give IV medicine, to take blood, or for dialysis. You may have an implanted port if:  · You need IV medicine that would be irritating to the small veins in your hands or arms.  · You need IV medicines, such as antibiotics, for a long period of time.  · You need IV nutrition for a long period of time.  · You need dialysis.  Having a port means that your health care provider will not need to use the veins in your arms for these procedures. You may have fewer limitations when using a port than you would if you used other types of long-term IVs, and you will likely be able to return to normal activities after your incision heals.  An implanted port has two main parts:  · [place]. The reservoir is the part where a needle is inserted to give medicines or draw blood. The reservoir is round. After it is placed, it appears as a small, raised area under your skin.  · Catheter. The catheter is a thin, flexible tube that connects the reservoir to a vein. Medicine that is inserted into  the reservoir goes into the catheter and then into the vein.  How is my port accessed?  To access your port:  · A numbing cream may be placed on the skin over the port site.  · Your health care provider will put on a mask and sterile gloves.  · The skin over your port will be cleaned carefully with a germ-killing soap and allowed to dry.  · Your health care provider will gently pinch the port and insert a needle into it.  · Your health care provider will check for a blood return to make sure the port is in the vein and is not clogged.  · If your port needs to remain accessed to get medicine continuously (constant infusion), your health care provider will place a clear bandage (dressing) over the needle site. The dressing and needle will need to be changed every week, or as told by your health care provider.  What is flushing?  Flushing helps keep the port from getting clogged. Follow instructions from your health care provider about how and when to flush the port. Ports are usually flushed with saline solution or a medicine called heparin. The need for flushing will depend on how the port is used:  · If the port is only used from time to time to give medicines or draw blood, the port may need to be flushed:  ? Before and after medicines have been given.  ? Before and after blood has been drawn.  ? As part of routine maintenance. Flushing may be recommended every 4-6 weeks.  · If a constant infusion is running, the port may not need to be flushed.  · Throw away any syringes in a disposal container that is meant for sharp items (sharps container). You can buy a sharps container from a pharmacy, or you can make one by using an empty hard plastic bottle with a cover.  How long will my port stay implanted?  The port can stay in for as long as your health care provider thinks it is needed. When it is time for the port to come out, a surgery will be done to remove it. The surgery will be similar to the procedure that was  done to put the port in.  Follow these instructions at home:    · Flush your port as told by your health care provider.  · If you need an infusion over several days, follow instructions from your health care provider about how to take care of your port site. Make sure you:  ? Wash your hands with soap and water before you change your dressing. If soap and water are not available, use alcohol-based hand .  ? Change your dressing as told by your health care provider.  ? Place any used dressings or infusion bags into a plastic bag. Throw that bag in the trash.  ? Keep the dressing that covers the needle clean and dry. Do not get it wet.  ? Do not use scissors or sharp objects near the tube.  ? Keep the tube clamped, unless it is being used.  · Check your port site every day for signs of infection. Check for:  ? Redness, swelling, or pain.  ? Fluid or blood.  ? Pus or a bad smell.  · Protect the skin around the port site.  ? Avoid wearing bra straps that rub or irritate the site.  ? Protect the skin around your port from seat belts. Place a soft pad over your chest if needed.  · Bathe or shower as told by your health care provider. The site may get wet as long as you are not actively receiving an infusion.  · Return to your normal activities as told by your health care provider. Ask your health care provider what activities are safe for you.  · Carry a medical alert card or wear a medical alert bracelet at all times. This will let health care providers know that you have an implanted port in case of an emergency.  Get help right away if:  · You have redness, swelling, or pain at the port site.  · You have fluid or blood coming from your port site.  · You have pus or a bad smell coming from the port site.  · You have a fever.  Summary  · Implanted ports are usually placed in the chest for long-term IV access.  · Follow instructions from your health care provider about flushing the port and changing bandages  (dressings).  · Take care of the area around your port by avoiding clothing that puts pressure on the area, and by watching for signs of infection.  · Protect the skin around your port from seat belts. Place a soft pad over your chest if needed.  · Get help right away if you have a fever or you have redness, swelling, pain, drainage, or a bad smell at the port site.  This information is not intended to replace advice given to you by your health care provider. Make sure you discuss any questions you have with your health care provider.  Document Revised: 04/10/2020 Document Reviewed: 01/20/2018  [employer] Patient Education © 2020 [employer]

## 2021-08-27 NOTE — NURSING NOTE
"Pt was educated on discharge instructions and provided paper copy to take home with her today. Pt voiced understanding of all instructions. Pt is alert and oriented x3. Dermabond to right neck and right chest remains dry and intact. Pt changed back into her clothing independently. Pt left IR recovery area via wheelchair and was taken to her brother's vehicle, who will be providing her transportation home today. Pt discharged home in stable condition on room air. Pt denies any pain at procedure site and reports just \"a bit of discomfort \"at neck site.   "

## 2021-08-30 ENCOUNTER — HOSPITAL ENCOUNTER (OUTPATIENT)
Dept: INFUSION THERAPY | Facility: HOSPITAL | Age: 56
Setting detail: INFUSION SERIES
Discharge: HOME | End: 2021-08-30

## 2021-08-30 ENCOUNTER — APPOINTMENT (OUTPATIENT)
Dept: ONCOLOGY | Facility: HOSPITAL | Age: 56
End: 2021-08-30

## 2021-09-01 ENCOUNTER — MEDICATION THERAPY MANAGEMENT (OUTPATIENT)
Dept: ONCOLOGY | Facility: HOSPITAL | Age: 56
End: 2021-09-01

## 2021-09-01 ENCOUNTER — APPOINTMENT (OUTPATIENT)
Dept: INFUSION THERAPY | Facility: HOSPITAL | Age: 56
End: 2021-09-01

## 2021-09-01 DIAGNOSIS — C71.9 OLIGODENDROGLIOMA (HCC): ICD-10-CM

## 2021-09-01 DIAGNOSIS — C71.1 OLIGOASTROCYTOMA OF FRONTAL LOBE (HCC): Primary | ICD-10-CM

## 2021-09-01 RX ORDER — SODIUM CHLORIDE 9 MG/ML
250 INJECTION, SOLUTION INTRAVENOUS ONCE
Status: CANCELLED | OUTPATIENT
Start: 2021-09-16

## 2021-09-01 RX ORDER — SODIUM CHLORIDE 9 MG/ML
250 INJECTION, SOLUTION INTRAVENOUS ONCE
Status: CANCELLED | OUTPATIENT
Start: 2021-10-07

## 2021-09-01 RX ORDER — ONDANSETRON HYDROCHLORIDE 8 MG/1
8 TABLET, FILM COATED ORAL 3 TIMES DAILY PRN
Qty: 30 TABLET | Refills: 5 | Status: SHIPPED | OUTPATIENT
Start: 2021-09-01 | End: 2021-09-07

## 2021-09-01 NOTE — PROGRESS NOTES
MTM Note: PCV start      Called Cindy and let her know that her medication has not arrived to start tomorrow.  Dr. Workman is aware and said that she could start next week.  Pharmacy will plan to meet with her next Wednesday when she sees Dr. Workman and will call her with update once available.  Thanks,    Chapis Goff, PharmD

## 2021-09-02 ENCOUNTER — HOSPITAL ENCOUNTER (OUTPATIENT)
Dept: RADIATION ONCOLOGY | Facility: HOSPITAL | Age: 56
Setting detail: RADIATION/ONCOLOGY SERIES
End: 2021-09-02

## 2021-09-02 DIAGNOSIS — C71.1 OLIGOASTROCYTOMA OF FRONTAL LOBE (HCC): Primary | ICD-10-CM

## 2021-09-02 DIAGNOSIS — C71.9 OLIGODENDROGLIOMA (HCC): ICD-10-CM

## 2021-09-03 ENCOUNTER — HOSPITAL ENCOUNTER (OUTPATIENT)
Dept: INTERVENTIONAL RADIOLOGY/VASCULAR | Facility: HOSPITAL | Age: 56
Discharge: HOME | End: 2021-09-03
Admitting: RADIOLOGY

## 2021-09-03 ENCOUNTER — APPOINTMENT (OUTPATIENT)
Dept: INFUSION THERAPY | Facility: HOSPITAL | Age: 56
End: 2021-09-03

## 2021-09-03 DIAGNOSIS — Z45.2 ENCOUNTER FOR CARE RELATED TO PORT-A-CATH: ICD-10-CM

## 2021-09-03 PROCEDURE — G0463 HOSPITAL OUTPT CLINIC VISIT: HCPCS

## 2021-09-03 NOTE — NURSING NOTE
Port site inspected.  Site is free of redness, swelling, discharge.  Patient reports some discomfort at neck puncture when she turns her head.  This nurse believes that skin is being pulled tight from dermabond and that it should resolve as dermabond breaks down.Patient denies pain, irritation or other complication.  This nurse reinforced importance of not picking or scratching at incision or puncture, and to report pain, swelling, discharge, etc.

## 2021-09-07 ENCOUNTER — OFFICE VISIT (OUTPATIENT)
Dept: RADIATION ONCOLOGY | Facility: HOSPITAL | Age: 56
End: 2021-09-07

## 2021-09-07 VITALS
SYSTOLIC BLOOD PRESSURE: 153 MMHG | HEART RATE: 60 BPM | BODY MASS INDEX: 53.92 KG/M2 | WEIGHT: 293 LBS | OXYGEN SATURATION: 99 % | DIASTOLIC BLOOD PRESSURE: 77 MMHG | HEIGHT: 62 IN | RESPIRATION RATE: 18 BRPM | TEMPERATURE: 97.5 F

## 2021-09-07 DIAGNOSIS — C71.9 OLIGODENDROGLIOMA (HCC): Primary | ICD-10-CM

## 2021-09-07 DIAGNOSIS — D49.6 BRAIN TUMOR (HCC): ICD-10-CM

## 2021-09-07 PROCEDURE — FACE2FACE: Performed by: RADIOLOGY

## 2021-09-07 PROCEDURE — G0463 HOSPITAL OUTPT CLINIC VISIT: HCPCS

## 2021-09-07 RX ORDER — LOMUSTINE 100 MG/1
CAPSULE, GELATIN COATED ORAL
COMMUNITY
Start: 2021-09-02 | End: 2021-11-23 | Stop reason: DRUGHIGH

## 2021-09-07 RX ORDER — LOMUSTINE 40 MG/1
CAPSULE, GELATIN COATED ORAL
COMMUNITY
Start: 2021-09-02 | End: 2021-11-23 | Stop reason: DRUGHIGH

## 2021-09-07 NOTE — PROGRESS NOTES
FOLLOW-UP NOTE    Name: Cindy Cortes  YOB: 1965  MRN #: [id number]  Date of Service: 9/16/2021  Primary Care Provider: Annamarie Monroy APRN    DIAGNOSIS: Oligodendroglioma IDH2 mutant, 1p19Q codeleted CNS malignancy, WHO grade II  1. Oligodendroglioma (CMS/HCC)    2. Brain tumor (CMS/HCC)      REASON FOR VISIT: 1 month f/u    RADIATION TREATMENT COURSE: IMRT/IGRT, 54 Gy in 30 fractions, completed 08/09/2021.    HISTORY OF PRESENT ILLNESS: The patient is a 56 y.o. year old female who is now one month removed from XRT and is scheduled to start chemo on 09/09/2021 (Gleostine/Lomustine); f/u with Dr. Workman on 09/08/2021.  Last MRI brain was on 08/06/2021.    She has continued to improve over the month and denies any headaches or acute issues.    The following portions of the patient's history were reviewed and updated as appropriate: allergies, current medications, past family history, past medical history, past social history, past surgical history and problem list. Reviewed with the patient and remain unchanged.    PAST MEDICAL HISTORY:  she  has a past medical history of Arthritis, GERD (gastroesophageal reflux disease), Hypertension, Oligodendroglioma (CMS/HCC), Seizure (CMS/HCC), and Type 2 diabetes mellitus with hyperglycemia, without long-term current use of insulin (CMS/HCC) (5/12/2021).  MEDICATIONS:   Current Outpatient Medications:   •  Alcohol Swabs (Alcohol Wipes) 70 % pads, Apply 1 each topically to the appropriate area as directed 4 (Four) Times a Day., Disp: , Rfl:   •  amLODIPine (NORVASC) 10 MG tablet, Take 1 tablet by mouth Daily., Disp: 30 tablet, Rfl: 2  •  ferrous sulfate 325 (65 FE) MG tablet, Take 1 tablet by mouth Daily With Breakfast., Disp: , Rfl:   •  folic acid (FOLVITE) 1 MG tablet, TAKE TWO TABLETS BY MOUTH EVERY MORNING, THEN TWO TABLETS EVERY EVENING, Disp: 120 tablet, Rfl: 0  •  Gleostine 100 MG chemo capsule, , Disp: , Rfl:   •  Gleostine 40 MG chemo capsule, , Disp: ,  Rfl:   •  glucose blood (OneTouch Verio) test strip, 1 each by Other route 4 (Four) Times a Day Before Meals & at Bedtime. Use as instructed, Disp: 200 each, Rfl: 1  •  insulin aspart (NovoLOG FlexPen) 100 UNIT/ML solution pen-injector sc pen, Inject 5 Units under the skin into the appropriate area as directed 3 (Three) Times a Day With Meals. Inject 1u lispro SC for every 50 over 150, Disp: 2 pen, Rfl: 2  •  insulin detemir (Levemir FlexTouch) 100 UNIT/ML injection, Inject 15 Units under the skin into the appropriate area as directed 2 (Two) Times a Day., Disp: 3 pen, Rfl: 2  •  Insulin Pen Needle 32G X 4 MM misc, 1 each 5 (Five) Times a Day., Disp: 200 each, Rfl: 1  •  Isopropyl Alcohol (Alcohol Wipes) 70 % misc, Apply 1 each topically 4 (Four) Times a Day Before Meals & at Bedtime., Disp: 200 each, Rfl: 1  •  Lancets (OneTouch Delica Plus [id number]) misc, 1 each 4 (Four) Times a Day Before Meals & at Bedtime., Disp: 200 each, Rfl: 1  •  levETIRAcetam (KEPPRA) 750 MG tablet, Take 1 tablet by mouth 2 (Two) Times a Day., Disp: 60 tablet, Rfl: 2  •  metoprolol tartrate (LOPRESSOR) 25 MG tablet, Take 0.5 tablets by mouth Every 8 (Eight) Hours., Disp: 45 tablet, Rfl: 2  •  ondansetron (Zofran) 8 MG tablet, Take 1 tablet by mouth Every 8 (Eight) Hours As Needed for Nausea or Vomiting., Disp: 30 tablet, Rfl: 3  •  oxybutynin XL (DITROPAN-XL) 5 MG 24 hr tablet, Take 5 mg by mouth Daily., Disp: , Rfl:   •  pantoprazole (PROTONIX) 40 MG EC tablet, TAKE 1 TABLET BY MOUTH EVERY DAY (Patient taking differently: Take 40 mg by mouth Daily. Take DOS), Disp: 30 tablet, Rfl: 1  •  venlafaxine XR (EFFEXOR-XR) 75 MG 24 hr capsule, Take 75 mg by mouth Daily. Take DOS, Disp: , Rfl: 3  •  lidocaine-prilocaine (EMLA) 2.5-2.5 % cream, Apply  topically to the appropriate area as directed Every 2 (Two) Hours As Needed for Mild Pain ., Disp: 5 g, Rfl: 0  •  procarbazine (MATULANE) 50 MG chemo capsule, Take 3 capsules by mouth Every Night for  "14 days. Take on days 8 - 21 only., Disp: 42 capsule, Rfl: 3  No current facility-administered medications for this visit.  ALLERGIES: No Known Allergies  PAST SURGICAL HISTORY: she has a past surgical history that includes Craniotomy for Tumor (Right, 5/6/2021).  PREVIOUS RADIOTHERAPY OR CHEMOTHERAPY: yes  FAMILY HISTORY: her family history includes Breast cancer in her cousin, maternal aunt, and niece; Colon cancer in her maternal aunt; Kidney disease in her mother; Prostate cancer in her brother.  SOCIAL HISTORY: she  reports that she has never smoked. She has never used smokeless tobacco. She reports previous alcohol use of about 1.0 standard drinks of alcohol per week. She reports that she does not use drugs.  PAIN AND PAIN MANAGEMENT: no pain.  Vitals:    09/07/21 1138   BP: 153/77   Pulse: 60   Resp: 18   Temp: 97.5 °F (36.4 °C)   TempSrc: Oral   SpO2: 99%   Weight: (!) 156 kg (343 lb)   Height: 157.5 cm (62\")   PainSc: 0-No pain     NUTRITIONAL STATUS:   no issues  KPS: 80:  Normal activity with effort; some signs or symptoms      Review of Systems:   General: No fevers, chills, weight change, or drenching night sweats. Skin: No rashes or jaundice.  HEENT: No change in vision or hearing, no headaches.  Neck: No dysphagia or masses.  Heme/Lymph: No easy bruising or bleeding.  Respiratory System: No shortness of breath or cough.  Cardiovascular: No chest pain, palpitations, or dyspnea on exertion.  - Pacemaker. GI: No nausea, vomiting, diarrhea, melena, or hematochezia.  : No dysuria or hematuria.  Endocrine: No heat or cold intolerance. Musculoskeletal: No myalgias or arthralgias.  Neuro: No weakness, numbness, syncope, or seizures. Psych: No mood changes or depression. Ext: Denies swelling.        Objective     Vitals:  Vitals:    09/07/21 1138   BP: 153/77   Pulse: 60   Resp: 18   Temp: 97.5 °F (36.4 °C)   TempSrc: Oral   SpO2: 99%   Weight: (!) 156 kg (343 lb)   Height: 157.5 cm (62\")   PainSc: 0-No pain "       PHYSICAL EXAM:  GENERAL: in no apparent distress, sitting comfortably in room.    HEENT: normocephalic, atraumatic. Pupils are equal, round, reactive to light. Sclera anicteric. Conjunctiva not injected. Oropharynx without erythema, ulcerations or thrush.   NECK: Supple with no masses.  LYMPHATIC: no cervical, supraclavicular or axillary adenopathy appreciated bilaterally.   CARDIOVASCULAR: S1 & S2 detected; no murmurs, rubs or gallops.  CHEST: clear to auscultation bilaterally; no wheezes, crackles or rubs. Work of breathing normal.  ABDOMEN: bowel sounds present. Abdomen is soft, nontender, nondistended.   MUSCULOSKELETAL: no tenderness to palpation along the spine or scapulae. Normal range of motion.  EXTREMITIES: no clubbing, cyanosis, edema.  SKIN: no erythema, rashes, ulcerations noted.   NEUROLOGIC: cranial nerves II-XII grossly intact bilaterally. No focal neurologic deficits.  PSYCHIATRIC:  alert, aware, and appropriate.      PERTINENT IMAGING/PATHOLOGY/LABS (Medical Decision Making):     COORDINATION OF CARE: A copy of this note is sent to the referring provider.    PATHOLOGY (Reviewed):     IMAGING (Reviewed):     LABS (Reviewed):  Hematology WBC   Date Value Ref Range Status   09/16/2021 5.67 3.40 - 10.80 10*3/mm3 Final   03/31/2020 5.63 4.5 - 11.0 10*3/uL Final     RBC   Date Value Ref Range Status   09/16/2021 3.40 (L) 3.77 - 5.28 10*6/mm3 Final   03/31/2020 3.86 (L) 4.0 - 5.2 10*6/uL Final     Hemoglobin   Date Value Ref Range Status   09/16/2021 9.9 (L) 12.0 - 15.9 g/dL Final   03/31/2020 11.5 (L) 12.0 - 16.0 g/dL Final     Hematocrit   Date Value Ref Range Status   09/16/2021 31.3 (L) 34.0 - 46.6 % Final   03/31/2020 36.8 36.0 - 46.0 % Final     Platelets   Date Value Ref Range Status   09/16/2021 237 140 - 450 10*3/mm3 Final   03/31/2020 266 140 - 440 10*3/uL Final      Chemistry   Lab Results   Component Value Date    GLUCOSE 171 (H) 09/16/2021    BUN 18 09/16/2021    CREATININE 0.89  09/16/2021    EGFRIFNONA 66 09/16/2021    BCR 20.2 09/16/2021    K 3.9 09/16/2021    CO2 26.0 09/16/2021    CALCIUM 8.8 09/16/2021    ALBUMIN 3.70 09/16/2021    LABIL2 1.4 03/30/2021    AST 15 09/16/2021    ALT 15 09/16/2021         Assessment/Plan     ASSESSMENT AND PLAN:    1. Oligodendroglioma (CMS/HCC)    2. Brain tumor (CMS/HCC)       -Starting adjuvant chemotherapy.  Interval improvement, resolution of XRT toxicities.    This assessment comes from my review of the imaging, pathology, physician notes and other pertinent information as mentioned.    DISPOSITION: 3 month f/u here; imaging per Dr. Workman.        CC: BJ Rolon MD John A Cox, MD  9/16/2021  11:56 AM EDT

## 2021-09-07 NOTE — PROGRESS NOTES
HEMATOLOGY ONCOLOGY OUTPATIENT FOLLOW UP      Patient name: Cindy Cortes  : 1965  MRN: [id number]  Primary Care Physician: Annamarie Monroy APRN  Referring Physician: Annamarie Monroy APRN  Reason For Consult:     Chief Complaint   Patient presents with   • Follow-up     Oligodendroglioma         HPI:   History of Present Illness:  Cindy Cortes is 56 y.o. female who presented to our office on 06/10/21 for consultation regarding Oligodendroglioma    Patient is a 55-year-old female who was referred to us for treatment options regarding recent diagnosis of grade 2 oligodendroglioma.  This was IDH [id number] mutated, 1P 19 Q codeleted.  Patient initially presented with seizures and a CT head was obtained that showed vasogenic edema and a right frontal lobe mass MRI was obtained after this.  2021 -MRI of the brain shows 2.1 x 4.4 x 3.3 cm right frontal lobe mass with eccentric cystic or necrotic component with surrounding vasogenic edema and a focal 3 mm right to left midline shift.  Patient was started on Keppra, steroids plan was made for elective craniotomy and surgical resection.    2021 craniotomy of the right frontal lobe, microscopic resection of tumor mass.  Pathology results oligodendroglioma WHO grade 2, IDH1 R132H mutation by immunohistochemistry, 1p19q codeletion by FISH.    2021 -status post resection of the right frontal lobe mass.  Expected postop blood product. resolution of midline shift.    2021 - Started radiation adjuvantly.    2021 - last day of radiation.          Subjective:  06/10/21. Patient is here to start adjuvant PCV    The following portions of the patient's history were reviewed and updated as appropriate: allergies, current medications, past family history, past medical history, past social history, past surgical history and problem list.    Past Medical History:   Diagnosis Date   • Arthritis    • GERD (gastroesophageal reflux disease)     • Hypertension    • Oligodendroglioma (CMS/HCC)    • Seizure (CMS/HCC)    • Type 2 diabetes mellitus with hyperglycemia, without long-term current use of insulin (CMS/HCC) 5/12/2021       Past Surgical History:   Procedure Laterality Date   • CRANIOTOMY FOR TUMOR Right 5/6/2021    Procedure: CRANIOTOMY FOR TUMOR RESECTION WITH STEREOTACTIC;  Surgeon: Jluis Felix MD;  Location: [facility] MAIN OR;  Service: Neurosurgery;  Laterality: Right;         Current Outpatient Medications:   •  Alcohol Swabs (Alcohol Wipes) 70 % pads, Apply 1 each topically to the appropriate area as directed 4 (Four) Times a Day., Disp: , Rfl:   •  amLODIPine (NORVASC) 10 MG tablet, Take 1 tablet by mouth Daily., Disp: 30 tablet, Rfl: 2  •  ferrous sulfate 325 (65 FE) MG tablet, Take 1 tablet by mouth Daily With Breakfast., Disp: , Rfl:   •  folic acid (FOLVITE) 1 MG tablet, TAKE TWO TABLETS BY MOUTH EVERY MORNING, THEN TWO TABLETS EVERY EVENING, Disp: 120 tablet, Rfl: 0  •  Gleostine 100 MG chemo capsule, , Disp: , Rfl:   •  Gleostine 40 MG chemo capsule, , Disp: , Rfl:   •  glucose blood (OneTouch Verio) test strip, 1 each by Other route 4 (Four) Times a Day Before Meals & at Bedtime. Use as instructed, Disp: 200 each, Rfl: 1  •  insulin aspart (NovoLOG FlexPen) 100 UNIT/ML solution pen-injector sc pen, Inject 5 Units under the skin into the appropriate area as directed 3 (Three) Times a Day With Meals. Inject 1u lispro SC for every 50 over 150, Disp: 2 pen, Rfl: 2  •  insulin detemir (Levemir FlexTouch) 100 UNIT/ML injection, Inject 15 Units under the skin into the appropriate area as directed 2 (Two) Times a Day., Disp: 3 pen, Rfl: 2  •  Insulin Pen Needle 32G X 4 MM misc, 1 each 5 (Five) Times a Day., Disp: 200 each, Rfl: 1  •  Isopropyl Alcohol (Alcohol Wipes) 70 % misc, Apply 1 each topically 4 (Four) Times a Day Before Meals & at Bedtime., Disp: 200 each, Rfl: 1  •  Lancets (OneTouch Delica Plus [id number]) misc, 1 each 4 (Four)  Times a Day Before Meals & at Bedtime., Disp: 200 each, Rfl: 1  •  levETIRAcetam (KEPPRA) 750 MG tablet, Take 1 tablet by mouth 2 (Two) Times a Day., Disp: 60 tablet, Rfl: 2  •  metoprolol tartrate (LOPRESSOR) 25 MG tablet, Take 0.5 tablets by mouth Every 8 (Eight) Hours., Disp: 45 tablet, Rfl: 2  •  ondansetron (Zofran) 8 MG tablet, Take 1 tablet by mouth Every 8 (Eight) Hours As Needed for Nausea or Vomiting., Disp: 30 tablet, Rfl: 3  •  oxybutynin XL (DITROPAN-XL) 5 MG 24 hr tablet, Take 5 mg by mouth Daily., Disp: , Rfl:   •  pantoprazole (PROTONIX) 40 MG EC tablet, TAKE 1 TABLET BY MOUTH EVERY DAY (Patient taking differently: Take 40 mg by mouth Daily. Take DOS), Disp: 30 tablet, Rfl: 1  •  [START ON 9/9/2021] procarbazine (MATULANE) 50 MG chemo capsule, Take 3 capsules by mouth Every Night for 14 days. Take on days 8 - 21 only., Disp: 42 capsule, Rfl: 3  •  venlafaxine XR (EFFEXOR-XR) 75 MG 24 hr capsule, Take 75 mg by mouth Daily. Take DOS, Disp: , Rfl: 3    Current outpatient and discharge medications have been reconciled for the patient.  Reviewed by: Emilie Workman MD    No Known Allergies    Family History   Problem Relation Age of Onset   • Kidney disease Mother    • Prostate cancer Brother    • Breast cancer Maternal Aunt    • Colon cancer Maternal Aunt    • Breast cancer Niece    • Breast cancer Cousin        Cancer-related family history includes Breast cancer in her cousin, maternal aunt, and niece; Colon cancer in her maternal aunt; Prostate cancer in her brother.    Social History     Tobacco Use   • Smoking status: Never Smoker   • Smokeless tobacco: Never Used   Vaping Use   • Vaping Use: Never used   Substance Use Topics   • Alcohol use: Not Currently     Alcohol/week: 1.0 standard drinks     Types: 1 Cans of beer per week     Comment: socially   • Drug use: Never     Social History     Social History Narrative   • Not on file        ROS:     Review of Systems   Constitutional: Positive for  "fatigue. Negative for fever.   HENT: Negative for congestion and nosebleeds.    Eyes: Negative for pain and discharge.   Respiratory: Negative for cough and shortness of breath.    Cardiovascular: Negative for chest pain.   Gastrointestinal: Negative for abdominal pain, blood in stool, diarrhea, nausea and vomiting.   Endocrine: Negative for cold intolerance and heat intolerance.   Genitourinary: Negative for difficulty urinating.   Musculoskeletal: Negative for arthralgias.   Skin: Negative for rash.   Neurological: Positive for weakness. Negative for dizziness, seizures and headaches.   Hematological: Does not bruise/bleed easily.   Psychiatric/Behavioral: Negative for behavioral problems.   no change in review of systems.    Objective:    Vitals:    09/08/21 1126   BP: 139/73   Pulse: 78   Resp: 18   Temp: 96.9 °F (36.1 °C)   Weight: (!) 154 kg (339 lb)   Height: 157.5 cm (62\")   PainSc: 0-No pain     Body mass index is 62 kg/m².  ECOG  (0) Fully active, able to carry on all predisease performance without restriction    Physical Exam:     Physical Exam  Constitutional:       Appearance: Normal appearance. She is obese.   HENT:      Head: Normocephalic and atraumatic.      Comments: Radiation changes scalp, surgical scar  Eyes:      Pupils: Pupils are equal, round, and reactive to light.   Cardiovascular:      Rate and Rhythm: Normal rate and regular rhythm.      Pulses: Normal pulses.      Heart sounds: No murmur heard.     Pulmonary:      Effort: Pulmonary effort is normal.      Breath sounds: Normal breath sounds.   Abdominal:      General: There is no distension.      Palpations: Abdomen is soft. There is no mass.      Tenderness: There is no abdominal tenderness.   Musculoskeletal:         General: Normal range of motion.      Cervical back: Normal range of motion and neck supple.   Skin:     General: Skin is warm.   Neurological:      General: No focal deficit present.      Mental Status: She is alert. "   Psychiatric:         Mood and Affect: Mood normal.           Lab Results - Last 18 Months   Lab Units 09/08/21  1119 08/27/21  0738 08/17/21  0958   WBC 10*3/mm3 7.39 6.60 7.06   HEMOGLOBIN g/dL 11.2* 11.3* 10.9*   HEMATOCRIT % 34.9 33.6* 34.9   PLATELETS 10*3/mm3 239 259 237   MCV fL 90.9 85.2 94.1     Lab Results - Last 18 Months   Lab Units 08/06/21  1142 05/13/21  0306 05/12/21  0316 05/11/21  0634 05/11/21  0634   SODIUM mmol/L  --  134* 133*  --  133*   POTASSIUM mmol/L  --  4.6 4.7  --  4.6   CHLORIDE mmol/L  --  104 103  --  102   CO2 mmol/L  --  19.0* 19.0*  --  22.0   BUN mg/dL  --  34* 33*  --  35*   CREATININE mg/dL 1.10 0.82 0.81   < > 0.83   CALCIUM mg/dL  --  9.4 9.2  --  9.4   BILIRUBIN mg/dL  --  0.3 0.4  --  0.3   ALK PHOS U/L  --  67 70  --  69   ALT (SGPT) U/L  --  42* 44*  --  37*   AST (SGOT) U/L  --  22 28  --  18   GLUCOSE mg/dL  --  250* 185*  --  305*    < > = values in this interval not displayed.       Lab Results   Component Value Date    GLUCOSE 250 (H) 05/13/2021    BUN 34 (H) 05/13/2021    CREATININE 1.10 08/06/2021    EGFRIFNONA 73 05/13/2021    BCR 41.5 (H) 05/13/2021    K 4.6 05/13/2021    CO2 19.0 (L) 05/13/2021    CALCIUM 9.4 05/13/2021    ALBUMIN 3.00 (L) 05/13/2021    LABIL2 1.4 03/30/2021    AST 22 05/13/2021    ALT 42 (H) 05/13/2021       Lab Results - Last 18 Months   Lab Units 08/27/21  0738 05/04/21  0922   INR  0.96 0.96   APTT seconds 25.6 20.7*       Lab Results   Component Value Date    IRON 66 08/17/2021    TIBC 308 08/17/2021    FERRITIN 348.10 (H) 08/17/2021       Lab Results   Component Value Date    IYLMEHOZ96 365 07/16/2021       Lab Results   Component Value Date    PTT 25.6 08/27/2021    INR 0.96 08/27/2021     MRI Brain With & Without Contrast    Result Date: 8/6/2021  1.Post-surgical changes along right frontal lobe. There is a rind of FLAIR hyperintensity involving the medial and posterior right frontal lobe likely representing residual tumor. There is new  "white matter FLAIR hyperintensity involving the anterior right frontal lobe that is more nonspecific, and could represent post-operative changes. 2.Mucosal disease within maxillary sinuses. Electronically Signed: Naveed Hubbard MD 8/6/2021 14:36 EDT    IR Port Placement    Result Date: 8/27/2021  Technically successful placement, 8 Setswana single lumen power injectable Olvzaf-d-Hmlg, using the right internal jugular vein approach as well as both sonographic and fluoroscopic control.  Electronically Signed By-Onur Ellis MD On:8/27/2021 3:52 PM This report was finalized on [id number] by  Onur Ellis MD.    Pathology results  Outside Report, Addendum   Integrated Diagnosis: Oligodendroglioma WHO Grade II  IDH1 R132H mutation by Immunohistochemistry; 1p19q co-deletion by FISH  See attached report from [facility]   Addendum electronically signed by Cecilia Chaudhary on 5/28/2021 at 0824   Final Diagnosis   Specimen #1 (\"Brain tumor right frontal lobe,\" biopsy):    Diffuse glioma (WHO grade II)    See comment     Specimen #2 (\"Brain tumor right frontal lobe,\" biopsy):    Diffuse glioma (WHO grade II)    See comment     Specimen #3 (Brain tumor right frontal lobe, resection):    Diffuse glioma, IDH-mutant (WHO grade II)    See attached report from [facility]         Assessment/Plan     Patient is a 55-year-old female with recently diagnosed oligodendroglioma grade 2 status post gross total resection    Oligodendroglioma  Previously I had an extensive discussion with the patient about treatment options, prognosis.  We had an extensive discussion at that time with several options.  This is summarized here below    I discussed with her that there are findings from RTOG 9802 clinical trial which showed survival advantage with radiation followed by chemotherapy after surgical resection of grade 2 oligodendrogliomas.  Chemotherapy with the PCV " regimen in this trial conferred a survival advantage over radiotherapy alone with a median overall survival of 13.3 versus 7.8 years.  In subset analysis benefit was more significant  Histologic oligodendroglioma is as compared to other low-grade gliomas.  Molecular analysis post talk also showed survival advantage in molecularly confirmed IDH mutant, 1p19q codeleted oligodendrogliomas.    PCV can be a toxic regimen however survival advantage is only been shown in randomized control trial using this particular regimen.  The other option would be temozolomide which has advantages over PCV with ease of administration, better tolerance and efficacy in combination with radiation therapy and other types of CNS tumors and gliomas.    The other option I had discussed with her was clinical trial with a CODEL study which is comparing radiation alone versus radiation with Temodar versus radiation with PCV in this patient population.  Patient however is not very interested in enrolling in a clinical trial.  She is wanting to pursue the most aggressive treatment option for her to reduce the chances of recurrence of her tumor.    Lastly also discussed with her the option of surveillance and observation with serial MRIs however also discussed that in above studies the progression free survival is around 50% for 5 years.     Based on her above discussion patient decided to pursue aggressive treatment with radiation followed by chemotherapy.  We would use the RTOG 9802 regimen with radiation followed by chemotherapy with PCV.  We have evidence that vincristine does not cross the blood-brain barrier significantly and hence if there is toxicity we can choose to omit the drug.  Case was discussed with Dr. Cruz and started sequential radiation and chemo.    Based on above patient decided to proceed with adjuvant PCV.  She is here for her first dose of oral lomustine.  She will get vincristine IV in a week  I will see her back in 3  weeks.  Labs okay to pursue treatment.    nausea  Continue zofran as needed     Anemia  Previous B12 low normal  Low folic acid previously, on supplementation.  Continue to monitor.      Thank you very much for providing the opportunity to participate in this patient’s care. Please do not hesitate to call if there are any other questions    I have reviewed and confirmed the accuracy of the patient's history: Chief complaint, HPI, ROS, Subjective and Past Family Social History as entered by the MA/LPN/RN.     Emilie Workman MD 09/08/21

## 2021-09-08 ENCOUNTER — SPECIALTY PHARMACY (OUTPATIENT)
Dept: PHARMACY | Facility: HOSPITAL | Age: 56
End: 2021-09-08

## 2021-09-08 ENCOUNTER — OFFICE VISIT (OUTPATIENT)
Dept: ONCOLOGY | Facility: CLINIC | Age: 56
End: 2021-09-08

## 2021-09-08 ENCOUNTER — LAB (OUTPATIENT)
Dept: LAB | Facility: HOSPITAL | Age: 56
End: 2021-09-08

## 2021-09-08 VITALS
HEART RATE: 78 BPM | SYSTOLIC BLOOD PRESSURE: 139 MMHG | BODY MASS INDEX: 53.92 KG/M2 | DIASTOLIC BLOOD PRESSURE: 73 MMHG | HEIGHT: 62 IN | WEIGHT: 293 LBS | RESPIRATION RATE: 18 BRPM | TEMPERATURE: 96.9 F

## 2021-09-08 DIAGNOSIS — C71.1 OLIGOASTROCYTOMA OF FRONTAL LOBE (HCC): ICD-10-CM

## 2021-09-08 DIAGNOSIS — C71.1 OLIGOASTROCYTOMA OF FRONTAL LOBE (HCC): Primary | ICD-10-CM

## 2021-09-08 DIAGNOSIS — C71.9 OLIGODENDROGLIOMA (HCC): ICD-10-CM

## 2021-09-08 LAB
ALBUMIN SERPL-MCNC: 4 G/DL (ref 3.5–5.2)
ALBUMIN/GLOB SERPL: 1.3 G/DL
ALP SERPL-CCNC: 102 U/L (ref 39–117)
ALT SERPL W P-5'-P-CCNC: 20 U/L (ref 1–33)
ANION GAP SERPL CALCULATED.3IONS-SCNC: 10 MMOL/L (ref 5–15)
AST SERPL-CCNC: 19 U/L (ref 1–32)
BASOPHILS # BLD AUTO: 0.02 10*3/MM3 (ref 0–0.2)
BASOPHILS NFR BLD AUTO: 0.3 % (ref 0–1.5)
BILIRUB SERPL-MCNC: 0.6 MG/DL (ref 0–1.2)
BUN SERPL-MCNC: 12 MG/DL (ref 6–20)
BUN/CREAT SERPL: 13 (ref 7–25)
CALCIUM SPEC-SCNC: 9.1 MG/DL (ref 8.6–10.5)
CHLORIDE SERPL-SCNC: 102 MMOL/L (ref 98–107)
CO2 SERPL-SCNC: 30 MMOL/L (ref 22–29)
CREAT SERPL-MCNC: 0.92 MG/DL (ref 0.57–1)
DEPRECATED RDW RBC AUTO: 50.7 FL (ref 37–54)
EOSINOPHIL # BLD AUTO: 0.19 10*3/MM3 (ref 0–0.4)
EOSINOPHIL NFR BLD AUTO: 2.6 % (ref 0.3–6.2)
ERYTHROCYTE [DISTWIDTH] IN BLOOD BY AUTOMATED COUNT: 15.6 % (ref 12.3–15.4)
GFR SERPL CREATININE-BSD FRML MDRD: 64 ML/MIN/1.73
GLOBULIN UR ELPH-MCNC: 3 GM/DL
GLUCOSE SERPL-MCNC: 126 MG/DL (ref 65–99)
HCT VFR BLD AUTO: 34.9 % (ref 34–46.6)
HGB BLD-MCNC: 11.2 G/DL (ref 12–15.9)
LYMPHOCYTES # BLD AUTO: 1.07 10*3/MM3 (ref 0.7–3.1)
LYMPHOCYTES NFR BLD AUTO: 14.5 % (ref 19.6–45.3)
MCH RBC QN AUTO: 29.2 PG (ref 26.6–33)
MCHC RBC AUTO-ENTMCNC: 32.1 G/DL (ref 31.5–35.7)
MCV RBC AUTO: 90.9 FL (ref 79–97)
MONOCYTES # BLD AUTO: 0.37 10*3/MM3 (ref 0.1–0.9)
MONOCYTES NFR BLD AUTO: 5 % (ref 5–12)
NEUTROPHILS NFR BLD AUTO: 5.74 10*3/MM3 (ref 1.7–7)
NEUTROPHILS NFR BLD AUTO: 77.6 % (ref 42.7–76)
PLATELET # BLD AUTO: 239 10*3/MM3 (ref 140–450)
PMV BLD AUTO: 8.3 FL (ref 6–12)
POTASSIUM SERPL-SCNC: 4.6 MMOL/L (ref 3.5–5.2)
PROT SERPL-MCNC: 7 G/DL (ref 6–8.5)
RBC # BLD AUTO: 3.84 10*6/MM3 (ref 3.77–5.28)
SODIUM SERPL-SCNC: 142 MMOL/L (ref 136–145)
WBC # BLD AUTO: 7.39 10*3/MM3 (ref 3.4–10.8)

## 2021-09-08 PROCEDURE — 36415 COLL VENOUS BLD VENIPUNCTURE: CPT

## 2021-09-08 PROCEDURE — 99214 OFFICE O/P EST MOD 30 MIN: CPT | Performed by: INTERNAL MEDICINE

## 2021-09-08 PROCEDURE — 80053 COMPREHEN METABOLIC PANEL: CPT

## 2021-09-08 PROCEDURE — 85025 COMPLETE CBC W/AUTO DIFF WBC: CPT

## 2021-09-08 RX ORDER — LIDOCAINE AND PRILOCAINE 25; 25 MG/G; MG/G
CREAM TOPICAL
Qty: 5 G | Refills: 0 | Status: SHIPPED | OUTPATIENT
Start: 2021-09-08 | End: 2022-01-06 | Stop reason: SDUPTHER

## 2021-09-08 NOTE — PROGRESS NOTES
Oral Chemotherapy Teaching      Patient Name/:  Cindy Cortes   1965  Oral Chemotherapy Regimen: PCV  Lomustine & Procarbazine  Date Started Medication: 2021    Initial Teaching Follow Up Comments     Safety     Storage instructions (away from children; away from heat/cold, sunlight, or moisture), handling - use of gloves (caregivers), washing hands after touching pills, managing waste     “How are you storing your medications?”, reminders on storage, proper handling (caregivers using gloves, washing hands, away from children, managing waste, etc.), disposal of medication with D/C or dosage change    Will store in original containers - she is the only one to handle her medicines.  Will return unused medications here.     Adherence      patient and/or caregiver on how to take medication, take with/without food, assess their adherence potential, stress importance of adherence, ways to manage adherence (pill boxes, phone reminders, calendars), what to do if miss a dose   “How are you taking your medication?” “How are you remembering to take your medication?”, “How many doses have you missed?”, determine reasons for non-adherence (not remembering, side effects, etc), ways to improve, overadherence? Remind patient of ways to improve/maintain adherence   Advised to take only one Lomustine dose per 42 day cycle.  Will take 100 mg + 4 x 40 mg for her dose.  Procarbazine to start on day 8 and to take 3 capsules by mouth at night on days 8-21 only.  I gave her a calendar with all this information.     Side Effects/Adverse Reactions      patient on potential side effects, s/s, ways to manage, when to call MD/seek help     Determine if patient experiencing side effects, ways to manage  Side effects reviewed for low white & red blood cell counbts, low platelets counts.     Miscellaneous     Food interactions, DDIs, financial issues Determine if patient started any new medications since being placed on  oral chemo (analyze for DDI) Went over the potential for Monoamine oxidase inhibitor interaction with procarbazine.  She was advised to avoid wine, ripe cheese, yogurt, banana.  She stated that avoiance of these foods would not cause a problem     Additional Notes:  Will get infusions of Vincristine on day 8 and 21 of a 42 day cycle  Sean Lechuga, OpalD BCPS

## 2021-09-08 NOTE — ADDENDUM NOTE
Addended by: UNIQUE MARRERO on: 9/8/2021 03:11 PM     Modules accepted: Orders    
anxiety producing

## 2021-09-09 ENCOUNTER — HOSPITAL ENCOUNTER (OUTPATIENT)
Dept: ONCOLOGY | Facility: HOSPITAL | Age: 56
Setting detail: INFUSION SERIES
End: 2021-09-09

## 2021-09-16 ENCOUNTER — HOSPITAL ENCOUNTER (OUTPATIENT)
Dept: ONCOLOGY | Facility: HOSPITAL | Age: 56
Setting detail: INFUSION SERIES
Discharge: HOME | End: 2021-09-16

## 2021-09-16 ENCOUNTER — MEDICATION THERAPY MANAGEMENT (OUTPATIENT)
Dept: PHARMACY | Facility: HOSPITAL | Age: 56
End: 2021-09-16

## 2021-09-16 VITALS
HEART RATE: 60 BPM | WEIGHT: 293 LBS | TEMPERATURE: 96.8 F | RESPIRATION RATE: 18 BRPM | HEIGHT: 62 IN | DIASTOLIC BLOOD PRESSURE: 81 MMHG | SYSTOLIC BLOOD PRESSURE: 159 MMHG | BODY MASS INDEX: 53.92 KG/M2

## 2021-09-16 DIAGNOSIS — C71.9 OLIGODENDROGLIOMA (HCC): Primary | ICD-10-CM

## 2021-09-16 DIAGNOSIS — C71.1 OLIGOASTROCYTOMA OF FRONTAL LOBE (HCC): ICD-10-CM

## 2021-09-16 LAB
ALBUMIN SERPL-MCNC: 3.7 G/DL (ref 3.5–5.2)
ALBUMIN/GLOB SERPL: 1.3 G/DL
ALP SERPL-CCNC: 99 U/L (ref 39–117)
ALT SERPL W P-5'-P-CCNC: 15 U/L (ref 1–33)
ANION GAP SERPL CALCULATED.3IONS-SCNC: 10 MMOL/L (ref 5–15)
AST SERPL-CCNC: 15 U/L (ref 1–32)
BASOPHILS # BLD AUTO: 0.02 10*3/MM3 (ref 0–0.2)
BASOPHILS NFR BLD AUTO: 0.4 % (ref 0–1.5)
BILIRUB SERPL-MCNC: 0.4 MG/DL (ref 0–1.2)
BUN SERPL-MCNC: 18 MG/DL (ref 6–20)
BUN/CREAT SERPL: 20.2 (ref 7–25)
CALCIUM SPEC-SCNC: 8.8 MG/DL (ref 8.6–10.5)
CHLORIDE SERPL-SCNC: 105 MMOL/L (ref 98–107)
CO2 SERPL-SCNC: 26 MMOL/L (ref 22–29)
CREAT SERPL-MCNC: 0.89 MG/DL (ref 0.57–1)
DEPRECATED RDW RBC AUTO: 50.6 FL (ref 37–54)
EOSINOPHIL # BLD AUTO: 0.1 10*3/MM3 (ref 0–0.4)
EOSINOPHIL NFR BLD AUTO: 1.8 % (ref 0.3–6.2)
ERYTHROCYTE [DISTWIDTH] IN BLOOD BY AUTOMATED COUNT: 16 % (ref 12.3–15.4)
GFR SERPL CREATININE-BSD FRML MDRD: 66 ML/MIN/1.73
GLOBULIN UR ELPH-MCNC: 2.9 GM/DL
GLUCOSE SERPL-MCNC: 171 MG/DL (ref 65–99)
HCT VFR BLD AUTO: 31.3 % (ref 34–46.6)
HGB BLD-MCNC: 9.9 G/DL (ref 12–15.9)
LYMPHOCYTES # BLD AUTO: 0.77 10*3/MM3 (ref 0.7–3.1)
LYMPHOCYTES NFR BLD AUTO: 13.6 % (ref 19.6–45.3)
MCH RBC QN AUTO: 29.1 PG (ref 26.6–33)
MCHC RBC AUTO-ENTMCNC: 31.6 G/DL (ref 31.5–35.7)
MCV RBC AUTO: 92.1 FL (ref 79–97)
MONOCYTES # BLD AUTO: 0.21 10*3/MM3 (ref 0.1–0.9)
MONOCYTES NFR BLD AUTO: 3.7 % (ref 5–12)
NEUTROPHILS NFR BLD AUTO: 4.57 10*3/MM3 (ref 1.7–7)
NEUTROPHILS NFR BLD AUTO: 80.5 % (ref 42.7–76)
PLATELET # BLD AUTO: 237 10*3/MM3 (ref 140–450)
PMV BLD AUTO: 8.9 FL (ref 6–12)
POTASSIUM SERPL-SCNC: 3.9 MMOL/L (ref 3.5–5.2)
PROT SERPL-MCNC: 6.6 G/DL (ref 6–8.5)
RBC # BLD AUTO: 3.4 10*6/MM3 (ref 3.77–5.28)
SODIUM SERPL-SCNC: 141 MMOL/L (ref 136–145)
WBC # BLD AUTO: 5.67 10*3/MM3 (ref 3.4–10.8)

## 2021-09-16 PROCEDURE — 80053 COMPREHEN METABOLIC PANEL: CPT | Performed by: INTERNAL MEDICINE

## 2021-09-16 PROCEDURE — 36591 DRAW BLOOD OFF VENOUS DEVICE: CPT

## 2021-09-16 PROCEDURE — 85025 COMPLETE CBC W/AUTO DIFF WBC: CPT | Performed by: INTERNAL MEDICINE

## 2021-09-16 PROCEDURE — 25010000002 VINCRISTINE PER 1 MG: Performed by: INTERNAL MEDICINE

## 2021-09-16 PROCEDURE — 96413 CHEMO IV INFUSION 1 HR: CPT

## 2021-09-16 RX ORDER — SODIUM CHLORIDE 9 MG/ML
250 INJECTION, SOLUTION INTRAVENOUS ONCE
Status: COMPLETED | OUTPATIENT
Start: 2021-09-16 | End: 2021-09-16

## 2021-09-16 RX ADMIN — VINCRISTINE SULFATE 2 MG: 1 INJECTION, SOLUTION INTRAVENOUS at 10:43

## 2021-09-16 RX ADMIN — SODIUM CHLORIDE 250 ML: 9 INJECTION, SOLUTION INTRAVENOUS at 10:43

## 2021-09-16 NOTE — PROGRESS NOTES
MT review: Lomustine / Procarbazine      9/16/2021  Day 8   WBC 3.40 - 10.80 10*3/mm3 5.67   Neutrophils Absolute 1.70 - 7.00 10*3/mm3 4.57   Hemoglobin 12.0 - 15.9 g/dL 9.9 (A)   Hematocrit 34.0 - 46.6 % 31.3 (A)   Platelets 140 - 450 10*3/mm3 237   I met with Cindy today prior to her first infusion of Vincristine.  She took Lomustine x 1 last week as directed; she reports insomnia after taking, but I do not see that listed as a know adverse effect.  She will start Procarbazine tonight and is on time according to her calendar.  Will follow up with her next week to assess Procarbazine tolerance.  Sean Lechuga, OpalD BCPS

## 2021-09-23 ENCOUNTER — MEDICATION THERAPY MANAGEMENT (OUTPATIENT)
Dept: PHARMACY | Facility: HOSPITAL | Age: 56
End: 2021-09-23

## 2021-09-23 NOTE — PROGRESS NOTES
MTM Note: PCV    Called Cindy to check adherence and side effects.  She is currently taking her procarbazine portion of her cycle.  She is utilizing ondansetron q8h and is not having any nausea while using medication.  She otherwise feels okay.  She has not missed any doses.  She is currently having sinus pressure and said radiology advised her to use pseudoephedrine for relief and she was interested if there was DDI with her cancer meds.  No interaction noted in Lexicomp, however, patient is on several BP meds.  She does have a BP monitor at home but rarely uses it, she said.  Advised her to check BP after dose to ensure her BP is not elevated, patient said she would do this if she needs it.  She had no other concerns at this time.  Pharmacy will continue to follow.  Thanks,    Chapis Goff, PharmD

## 2021-09-27 NOTE — PROGRESS NOTES
HEMATOLOGY ONCOLOGY OUTPATIENT FOLLOW UP      Patient name: Cindy Cortes  : 1965  MRN: [id number]  Primary Care Physician: Annamarie Monroy APRN  Referring Physician: Annamarie Monroy APRN  Reason For Consult:     Chief Complaint   Patient presents with   • Follow-up     Oligoastrocytoma of frontal lobe         HPI:   History of Present Illness:  Cindy Cortes is 56 y.o. female who presented to our office on 06/10/21 for consultation regarding Oligodendroglioma    Patient is a 55-year-old female who was referred to us for treatment options regarding recent diagnosis of grade 2 oligodendroglioma.  This was IDH [id number] mutated, 1P 19 Q codeleted.  Patient initially presented with seizures and a CT head was obtained that showed vasogenic edema and a right frontal lobe mass MRI was obtained after this.  2021 -MRI of the brain shows 2.1 x 4.4 x 3.3 cm right frontal lobe mass with eccentric cystic or necrotic component with surrounding vasogenic edema and a focal 3 mm right to left midline shift.  Patient was started on Keppra, steroids plan was made for elective craniotomy and surgical resection.    2021 craniotomy of the right frontal lobe, microscopic resection of tumor mass.  Pathology results oligodendroglioma WHO grade 2, IDH1 R132H mutation by immunohistochemistry, 1p19q codeletion by FISH.    2021 -status post resection of the right frontal lobe mass.  Expected postop blood product. resolution of midline shift.    2021 - Started radiation adjuvantly.    2021 - last day of radiation.    2021 - C1 D8 with vincristin started procarbazine      Subjective:  06/10/21. Patient currently on treatment tolerating well. Has some dry mouth.    The following portions of the patient's history were reviewed and updated as appropriate: allergies, current medications, past family history, past medical history, past social history, past surgical history and problem  list.    Past Medical History:   Diagnosis Date   • Arthritis    • GERD (gastroesophageal reflux disease)    • Hypertension    • Oligodendroglioma (CMS/HCC)    • Seizure (CMS/HCC)    • Type 2 diabetes mellitus with hyperglycemia, without long-term current use of insulin (CMS/HCC) 5/12/2021       Past Surgical History:   Procedure Laterality Date   • CRANIOTOMY FOR TUMOR Right 5/6/2021    Procedure: CRANIOTOMY FOR TUMOR RESECTION WITH STEREOTACTIC;  Surgeon: Jluis Felix MD;  Location: [facility] MAIN OR;  Service: Neurosurgery;  Laterality: Right;         Current Outpatient Medications:   •  Alcohol Swabs (Alcohol Wipes) 70 % pads, Apply 1 each topically to the appropriate area as directed 4 (Four) Times a Day., Disp: , Rfl:   •  amLODIPine (NORVASC) 10 MG tablet, Take 1 tablet by mouth Daily., Disp: 30 tablet, Rfl: 2  •  ferrous sulfate 325 (65 FE) MG tablet, Take 1 tablet by mouth Daily With Breakfast., Disp: , Rfl:   •  folic acid (FOLVITE) 1 MG tablet, TAKE TWO TABLETS BY MOUTH EVERY MORNING, THEN TWO TABLETS EVERY EVENING, Disp: 120 tablet, Rfl: 2  •  Gleostine 100 MG chemo capsule, , Disp: , Rfl:   •  Gleostine 40 MG chemo capsule, , Disp: , Rfl:   •  glucose blood (OneTouch Verio) test strip, 1 each by Other route 4 (Four) Times a Day Before Meals & at Bedtime. Use as instructed, Disp: 200 each, Rfl: 1  •  insulin aspart (NovoLOG FlexPen) 100 UNIT/ML solution pen-injector sc pen, Inject 5 Units under the skin into the appropriate area as directed 3 (Three) Times a Day With Meals. Inject 1u lispro SC for every 50 over 150, Disp: 2 pen, Rfl: 2  •  insulin detemir (Levemir FlexTouch) 100 UNIT/ML injection, Inject 15 Units under the skin into the appropriate area as directed 2 (Two) Times a Day., Disp: 3 pen, Rfl: 2  •  Insulin Pen Needle 32G X 4 MM misc, 1 each 5 (Five) Times a Day., Disp: 200 each, Rfl: 1  •  Isopropyl Alcohol (Alcohol Wipes) 70 % misc, Apply 1 each topically 4 (Four) Times a Day Before Meals  & at Bedtime., Disp: 200 each, Rfl: 1  •  Lancets (OneTouch Delica Plus [id number]) misc, 1 each 4 (Four) Times a Day Before Meals & at Bedtime., Disp: 200 each, Rfl: 1  •  levETIRAcetam (KEPPRA) 750 MG tablet, Take 1 tablet by mouth 2 (Two) Times a Day., Disp: 60 tablet, Rfl: 2  •  lidocaine-prilocaine (EMLA) 2.5-2.5 % cream, Apply  topically to the appropriate area as directed Every 2 (Two) Hours As Needed for Mild Pain ., Disp: 5 g, Rfl: 0  •  metoprolol tartrate (LOPRESSOR) 25 MG tablet, Take 0.5 tablets by mouth Every 8 (Eight) Hours., Disp: 45 tablet, Rfl: 2  •  ondansetron (Zofran) 8 MG tablet, Take 1 tablet by mouth Every 8 (Eight) Hours As Needed for Nausea or Vomiting., Disp: 30 tablet, Rfl: 3  •  oxybutynin XL (DITROPAN-XL) 5 MG 24 hr tablet, Take 5 mg by mouth Daily., Disp: , Rfl:   •  pantoprazole (PROTONIX) 40 MG EC tablet, TAKE 1 TABLET BY MOUTH EVERY DAY (Patient taking differently: Take 40 mg by mouth Daily. Take DOS), Disp: 30 tablet, Rfl: 1  •  venlafaxine XR (EFFEXOR-XR) 75 MG 24 hr capsule, Take 75 mg by mouth Daily. Take DOS, Disp: , Rfl: 3  •  vitamin D (ERGOCALCIFEROL) 1.25 MG (02734 UT) capsule capsule, Take 50,000 Units by mouth Every 7 (Seven) Days.  , Disp: , Rfl:     Current outpatient and discharge medications have been reconciled for the patient.  Reviewed by: Emilie Workman MD    No Known Allergies    Family History   Problem Relation Age of Onset   • Kidney disease Mother    • Prostate cancer Brother    • Breast cancer Maternal Aunt    • Colon cancer Maternal Aunt    • Breast cancer Niece    • Breast cancer Cousin        Cancer-related family history includes Breast cancer in her cousin, maternal aunt, and niece; Colon cancer in her maternal aunt; Prostate cancer in her brother.    Social History     Tobacco Use   • Smoking status: Never Smoker   • Smokeless tobacco: Never Used   Vaping Use   • Vaping Use: Never used   Substance Use Topics   • Alcohol use: Not Currently      "Alcohol/week: 1.0 standard drinks     Types: 1 Cans of beer per week     Comment: socially   • Drug use: Never     Social History     Social History Narrative   • Not on file            Objective:    Vitals:    09/29/21 1104   BP: 110/68   Pulse: 72   Resp: 18   Temp: 97.3 °F (36.3 °C)   Weight: (!) 154 kg (340 lb)   Height: 157.5 cm (62\")   PainSc: 0-No pain     Body mass index is 62.19 kg/m².  ECOG  (0) Fully active, able to carry on all predisease performance without restriction    Physical Exam:     Physical Exam  Constitutional:       Appearance: Normal appearance. She is obese.   HENT:      Head: Normocephalic and atraumatic.      Comments: Radiation changes scalp, surgical scar  Eyes:      Pupils: Pupils are equal, round, and reactive to light.   Cardiovascular:      Rate and Rhythm: Normal rate and regular rhythm.      Pulses: Normal pulses.      Heart sounds: No murmur heard.     Pulmonary:      Effort: Pulmonary effort is normal.      Breath sounds: Normal breath sounds.   Abdominal:      General: There is no distension.      Palpations: Abdomen is soft. There is no mass.      Tenderness: There is no abdominal tenderness.   Musculoskeletal:         General: Normal range of motion.      Cervical back: Normal range of motion and neck supple.   Skin:     General: Skin is warm.   Neurological:      General: No focal deficit present.      Mental Status: She is alert.   Psychiatric:         Mood and Affect: Mood normal.           Lab Results - Last 18 Months   Lab Units 09/29/21  1059 09/16/21  0935 09/08/21  1119   WBC 10*3/mm3 4.92 5.67 7.39   HEMOGLOBIN g/dL 10.2* 9.9* 11.2*   HEMATOCRIT % 32.1* 31.3* 34.9   PLATELETS 10*3/mm3 67* 237 239   MCV fL 93.3 92.1 90.9     Lab Results - Last 18 Months   Lab Units 09/16/21  0935 09/08/21  1119 08/06/21  1142 05/13/21  0306   SODIUM mmol/L 141 142  --  134*   POTASSIUM mmol/L 3.9 4.6  --  4.6   CHLORIDE mmol/L 105 102  --  104   CO2 mmol/L 26.0 30.0*  --  19.0*   BUN " mg/dL 18 12  --  34*   CREATININE mg/dL 0.89 0.92 1.10 0.82   CALCIUM mg/dL 8.8 9.1  --  9.4   BILIRUBIN mg/dL 0.4 0.6  --  0.3   ALK PHOS U/L 99 102  --  67   ALT (SGPT) U/L 15 20  --  42*   AST (SGOT) U/L 15 19  --  22   GLUCOSE mg/dL 171* 126*  --  250*       Lab Results   Component Value Date    GLUCOSE 171 (H) 09/16/2021    BUN 18 09/16/2021    CREATININE 0.89 09/16/2021    EGFRIFNONA 66 09/16/2021    BCR 20.2 09/16/2021    K 3.9 09/16/2021    CO2 26.0 09/16/2021    CALCIUM 8.8 09/16/2021    ALBUMIN 3.70 09/16/2021    LABIL2 1.4 03/30/2021    AST 15 09/16/2021    ALT 15 09/16/2021       Lab Results - Last 18 Months   Lab Units 08/27/21  0738 05/04/21  0922   INR  0.96 0.96   APTT seconds 25.6 20.7*       Lab Results   Component Value Date    IRON 66 08/17/2021    TIBC 308 08/17/2021    FERRITIN 348.10 (H) 08/17/2021       Lab Results   Component Value Date    MOXBJFNP80 365 07/16/2021       Lab Results   Component Value Date    PTT 25.6 08/27/2021    INR 0.96 08/27/2021     MRI Brain With & Without Contrast    Result Date: 8/6/2021  1.Post-surgical changes along right frontal lobe. There is a rind of FLAIR hyperintensity involving the medial and posterior right frontal lobe likely representing residual tumor. There is new white matter FLAIR hyperintensity involving the anterior right frontal lobe that is more nonspecific, and could represent post-operative changes. 2.Mucosal disease within maxillary sinuses. Electronically Signed: Naveed Hubbard MD 8/6/2021 14:36 EDT    IR Port Placement    Result Date: 8/27/2021  Technically successful placement, 8 [place] single lumen power injectable [id number], using the right internal jugular vein approach as well as both sonographic and fluoroscopic control.  Electronically Signed By-Onur Ellis MD On:8/27/2021 3:52 PM This report was finalized on [id number] by  Onur Ellis MD.    Pathology results  Outside Report, Addendum   Integrated Diagnosis:  "Oligodendroglioma WHO Grade II  IDH1 R132H mutation by Immunohistochemistry; 1p19q co-deletion by FISH  See attached report from [facility]   Addendum electronically signed by Cecilia Chaudhary on 5/28/2021 at 0824   Final Diagnosis   Specimen #1 (\"Brain tumor right frontal lobe,\" biopsy):    Diffuse glioma (WHO grade II)    See comment     Specimen #2 (\"Brain tumor right frontal lobe,\" biopsy):    Diffuse glioma (WHO grade II)    See comment     Specimen #3 (Brain tumor right frontal lobe, resection):    Diffuse glioma, IDH-mutant (WHO grade II)    See attached report from [facility]         Assessment/Plan     Patient is a 55-year-old female with recently diagnosed oligodendroglioma grade 2 status post gross total resection    Oligodendroglioma  Previously I had an extensive discussion with the patient about treatment options, prognosis.  We had an extensive discussion at that time with several options.  This is summarized here below    I previously discussed with her that there are findings from RTOG 9802 clinical trial which showed survival advantage with radiation followed by chemotherapy after surgical resection of grade 2 oligodendrogliomas.  Chemotherapy with the PCV regimen in this trial conferred a survival advantage over radiotherapy alone with a median overall survival of 13.3 versus 7.8 years.  In subset analysis benefit was more significant  Histologic oligodendroglioma is as compared to other low-grade gliomas.  Molecular analysis post talk also showed survival advantage in molecularly confirmed IDH mutant, 1p19q codeleted oligodendrogliomas.    PCV can be a toxic regimen however survival advantage is only been shown in randomized control trial using this particular regimen.  The other option would be temozolomide which has advantages over PCV with ease of administration, better tolerance and efficacy in combination with radiation " therapy and other types of CNS tumors and gliomas.    The other option I had discussed with her was clinical trial with a CODEL study which is comparing radiation alone versus radiation with Temodar versus radiation with PCV in this patient population.  Patient however is not very interested in enrolling in a clinical trial.  She is wanting to pursue the most aggressive treatment option for her to reduce the chances of recurrence of her tumor.    Lastly also discussed with her the option of surveillance and observation with serial MRIs however also discussed that in above studies the progression free survival is around 50% for 5 years.     Based on her above discussion patient decided to pursue aggressive treatment with radiation followed by chemotherapy.  We would use the RTOG 9802 regimen with radiation followed by chemotherapy with PCV.  We have evidence that vincristine does not cross the blood-brain barrier significantly and hence if there is toxicity we can choose to omit the drug.  Case was discussed with Dr. Cruz and started sequential radiation and chemo.    Based on above patient decided to proceed with adjuvant PCV.  She is continuing treatment with no major toxicity. Dry mouth could be related to treatment.  Platelet count is down to 67 no bleeding concerns. To complete procarbazine tomorrow for this cycle, will get vincristine in a week.    nausea  Continue zofran as needed     Anemia  Previous B12 low normal  Low folic acid previously, on supplementation.  Continue to monitor.      Thank you very much for providing the opportunity to participate in this patient’s care. Please do not hesitate to call if there are any other questions    I have reviewed and confirmed the accuracy of the patient's history: Chief complaint, HPI, ROS, Subjective and Past Family Social History as entered by the MA/LPN/RN.     Emilie Workman MD 09/29/21

## 2021-09-29 ENCOUNTER — MEDICATION THERAPY MANAGEMENT (OUTPATIENT)
Dept: PHARMACY | Facility: HOSPITAL | Age: 56
End: 2021-09-29

## 2021-09-29 ENCOUNTER — APPOINTMENT (OUTPATIENT)
Dept: LAB | Facility: HOSPITAL | Age: 56
End: 2021-09-29

## 2021-09-29 ENCOUNTER — OFFICE VISIT (OUTPATIENT)
Dept: ONCOLOGY | Facility: CLINIC | Age: 56
End: 2021-09-29

## 2021-09-29 VITALS
HEART RATE: 72 BPM | RESPIRATION RATE: 18 BRPM | TEMPERATURE: 97.3 F | HEIGHT: 62 IN | DIASTOLIC BLOOD PRESSURE: 68 MMHG | WEIGHT: 293 LBS | BODY MASS INDEX: 53.92 KG/M2 | SYSTOLIC BLOOD PRESSURE: 110 MMHG

## 2021-09-29 DIAGNOSIS — C71.9 OLIGODENDROGLIOMA (HCC): Primary | ICD-10-CM

## 2021-09-29 DIAGNOSIS — C71.1 OLIGOASTROCYTOMA OF FRONTAL LOBE (HCC): ICD-10-CM

## 2021-09-29 LAB
BASOPHILS # BLD AUTO: 0.02 10*3/MM3 (ref 0–0.2)
BASOPHILS NFR BLD AUTO: 0.4 % (ref 0–1.5)
DEPRECATED RDW RBC AUTO: 56.8 FL (ref 37–54)
EOSINOPHIL # BLD AUTO: 0.05 10*3/MM3 (ref 0–0.4)
EOSINOPHIL NFR BLD AUTO: 1 % (ref 0.3–6.2)
ERYTHROCYTE [DISTWIDTH] IN BLOOD BY AUTOMATED COUNT: 18 % (ref 12.3–15.4)
HCT VFR BLD AUTO: 32.1 % (ref 34–46.6)
HGB BLD-MCNC: 10.2 G/DL (ref 12–15.9)
LYMPHOCYTES # BLD AUTO: 1.07 10*3/MM3 (ref 0.7–3.1)
LYMPHOCYTES NFR BLD AUTO: 21.7 % (ref 19.6–45.3)
MCH RBC QN AUTO: 29.7 PG (ref 26.6–33)
MCHC RBC AUTO-ENTMCNC: 31.8 G/DL (ref 31.5–35.7)
MCV RBC AUTO: 93.3 FL (ref 79–97)
MONOCYTES # BLD AUTO: 0.42 10*3/MM3 (ref 0.1–0.9)
MONOCYTES NFR BLD AUTO: 8.5 % (ref 5–12)
NEUTROPHILS NFR BLD AUTO: 3.36 10*3/MM3 (ref 1.7–7)
NEUTROPHILS NFR BLD AUTO: 68.4 % (ref 42.7–76)
PLATELET # BLD AUTO: 67 10*3/MM3 (ref 140–450)
PMV BLD AUTO: 8.8 FL (ref 6–12)
RBC # BLD AUTO: 3.44 10*6/MM3 (ref 3.77–5.28)
WBC # BLD AUTO: 4.92 10*3/MM3 (ref 3.4–10.8)

## 2021-09-29 PROCEDURE — 85025 COMPLETE CBC W/AUTO DIFF WBC: CPT | Performed by: INTERNAL MEDICINE

## 2021-09-29 PROCEDURE — 36415 COLL VENOUS BLD VENIPUNCTURE: CPT | Performed by: INTERNAL MEDICINE

## 2021-09-29 PROCEDURE — 99214 OFFICE O/P EST MOD 30 MIN: CPT | Performed by: INTERNAL MEDICINE

## 2021-09-29 RX ORDER — ERGOCALCIFEROL 1.25 MG/1
50000 CAPSULE ORAL
Status: ON HOLD | COMMUNITY
Start: 2021-09-13 | End: 2022-05-31

## 2021-09-29 RX ORDER — FOLIC ACID 1 MG/1
TABLET ORAL
Qty: 120 TABLET | Refills: 2 | Status: SHIPPED | OUTPATIENT
Start: 2021-09-29 | End: 2021-12-15

## 2021-09-29 RX ORDER — FOLIC ACID 1 MG/1
TABLET ORAL
Qty: 120 TABLET | Refills: 2 | Status: SHIPPED | OUTPATIENT
Start: 2021-09-29 | End: 2021-09-29 | Stop reason: SDUPTHER

## 2021-09-29 NOTE — PROGRESS NOTES
MTM Note: PCV    Met with Cindy in the office prior to appointment with Dr. Workman. She reports compliance with the procarbazine, and was taking it in the evening.  She thought she was getting her vincristine today but it is not due until day 29, so reviewed treatment dates with her.  She said she does have the calendar provided at initial patient education but has not utilized it because she had so many papers to look through.  She will get it out to help with confusion on when she is treated with what medication.  Patient reports that she is having some dry mouth and a productive cough.  No fevers and cough is producing clear phlegm only.  She also has clear drainage from her nose.  She is having some dry skin on her forehead and upper arms.  Overall, she reports feeling okay.  Labs today as follow:        9/29/2021   WBC 3.40 - 10.80 10*3/mm3 4.92   Neutrophils Absolute 1.70 - 7.00 10*3/mm3 3.36   Hemoglobin 12.0 - 15.9 g/dL 10.2 (A)   Hematocrit 34.0 - 46.6 % 32.1 (A)   Platelets 140 - 450 10*3/mm3 67 (A)     Will monitor cbc. Pharmacy will continue to follow.  Thanks,    Chapis Goff, PharmD

## 2021-10-07 ENCOUNTER — HOSPITAL ENCOUNTER (OUTPATIENT)
Dept: ONCOLOGY | Facility: HOSPITAL | Age: 56
Setting detail: INFUSION SERIES
Discharge: HOME | End: 2021-10-07

## 2021-10-07 VITALS
SYSTOLIC BLOOD PRESSURE: 154 MMHG | RESPIRATION RATE: 16 BRPM | DIASTOLIC BLOOD PRESSURE: 67 MMHG | OXYGEN SATURATION: 97 % | WEIGHT: 293 LBS | BODY MASS INDEX: 61.02 KG/M2 | TEMPERATURE: 97.1 F | HEART RATE: 58 BPM

## 2021-10-07 DIAGNOSIS — Z95.828 PORT-A-CATH IN PLACE: ICD-10-CM

## 2021-10-07 DIAGNOSIS — C71.1 OLIGOASTROCYTOMA OF FRONTAL LOBE (HCC): ICD-10-CM

## 2021-10-07 DIAGNOSIS — C71.9 OLIGODENDROGLIOMA (HCC): Primary | ICD-10-CM

## 2021-10-07 LAB
ALBUMIN SERPL-MCNC: 3.8 G/DL (ref 3.5–5.2)
ALBUMIN/GLOB SERPL: 1.4 G/DL
ALP SERPL-CCNC: 100 U/L (ref 39–117)
ALT SERPL W P-5'-P-CCNC: 13 U/L (ref 1–33)
ANION GAP SERPL CALCULATED.3IONS-SCNC: 12 MMOL/L (ref 5–15)
AST SERPL-CCNC: 17 U/L (ref 1–32)
BASOPHILS # BLD AUTO: 0.01 10*3/MM3 (ref 0–0.2)
BASOPHILS NFR BLD AUTO: 0.2 % (ref 0–1.5)
BILIRUB SERPL-MCNC: 0.6 MG/DL (ref 0–1.2)
BUN SERPL-MCNC: 16 MG/DL (ref 6–20)
BUN/CREAT SERPL: 16.7 (ref 7–25)
CALCIUM SPEC-SCNC: 8.9 MG/DL (ref 8.6–10.5)
CHLORIDE SERPL-SCNC: 103 MMOL/L (ref 98–107)
CO2 SERPL-SCNC: 27 MMOL/L (ref 22–29)
CREAT SERPL-MCNC: 0.96 MG/DL (ref 0.57–1)
DEPRECATED RDW RBC AUTO: 63.9 FL (ref 37–54)
EOSINOPHIL # BLD AUTO: 0.07 10*3/MM3 (ref 0–0.4)
EOSINOPHIL NFR BLD AUTO: 1.5 % (ref 0.3–6.2)
ERYTHROCYTE [DISTWIDTH] IN BLOOD BY AUTOMATED COUNT: 19.2 % (ref 12.3–15.4)
GFR SERPL CREATININE-BSD FRML MDRD: 61 ML/MIN/1.73
GLOBULIN UR ELPH-MCNC: 2.7 GM/DL
GLUCOSE SERPL-MCNC: 144 MG/DL (ref 65–99)
HCT VFR BLD AUTO: 28.7 % (ref 34–46.6)
HGB BLD-MCNC: 9.2 G/DL (ref 12–15.9)
LYMPHOCYTES # BLD AUTO: 0.71 10*3/MM3 (ref 0.7–3.1)
LYMPHOCYTES NFR BLD AUTO: 15.3 % (ref 19.6–45.3)
MCH RBC QN AUTO: 30.5 PG (ref 26.6–33)
MCHC RBC AUTO-ENTMCNC: 32.1 G/DL (ref 31.5–35.7)
MCV RBC AUTO: 95 FL (ref 79–97)
MONOCYTES # BLD AUTO: 0.16 10*3/MM3 (ref 0.1–0.9)
MONOCYTES NFR BLD AUTO: 3.4 % (ref 5–12)
NEUTROPHILS NFR BLD AUTO: 3.7 10*3/MM3 (ref 1.7–7)
NEUTROPHILS NFR BLD AUTO: 79.6 % (ref 42.7–76)
PLATELET # BLD AUTO: 89 10*3/MM3 (ref 140–450)
PMV BLD AUTO: 9.1 FL (ref 6–12)
POTASSIUM SERPL-SCNC: 4.3 MMOL/L (ref 3.5–5.2)
PROT SERPL-MCNC: 6.5 G/DL (ref 6–8.5)
RBC # BLD AUTO: 3.02 10*6/MM3 (ref 3.77–5.28)
SODIUM SERPL-SCNC: 142 MMOL/L (ref 136–145)
WBC # BLD AUTO: 4.65 10*3/MM3 (ref 3.4–10.8)

## 2021-10-07 PROCEDURE — 96413 CHEMO IV INFUSION 1 HR: CPT

## 2021-10-07 PROCEDURE — 25010000002 VINCRISTINE PER 1 MG: Performed by: INTERNAL MEDICINE

## 2021-10-07 PROCEDURE — 96409 CHEMO IV PUSH SNGL DRUG: CPT

## 2021-10-07 PROCEDURE — 80053 COMPREHEN METABOLIC PANEL: CPT | Performed by: INTERNAL MEDICINE

## 2021-10-07 PROCEDURE — 36591 DRAW BLOOD OFF VENOUS DEVICE: CPT

## 2021-10-07 PROCEDURE — 85025 COMPLETE CBC W/AUTO DIFF WBC: CPT | Performed by: INTERNAL MEDICINE

## 2021-10-07 PROCEDURE — 25010000002 HEPARIN LOCK FLUSH PER 10 UNITS: Performed by: INTERNAL MEDICINE

## 2021-10-07 RX ORDER — SODIUM CHLORIDE 0.9 % (FLUSH) 0.9 %
10 SYRINGE (ML) INJECTION AS NEEDED
Status: CANCELLED | OUTPATIENT
Start: 2021-10-07

## 2021-10-07 RX ORDER — HEPARIN SODIUM (PORCINE) LOCK FLUSH IV SOLN 100 UNIT/ML 100 UNIT/ML
500 SOLUTION INTRAVENOUS AS NEEDED
Status: CANCELLED | OUTPATIENT
Start: 2021-10-07

## 2021-10-07 RX ORDER — SODIUM CHLORIDE 9 MG/ML
250 INJECTION, SOLUTION INTRAVENOUS ONCE
Status: COMPLETED | OUTPATIENT
Start: 2021-10-07 | End: 2021-10-07

## 2021-10-07 RX ORDER — SODIUM CHLORIDE 0.9 % (FLUSH) 0.9 %
10 SYRINGE (ML) INJECTION AS NEEDED
Status: DISCONTINUED | OUTPATIENT
Start: 2021-10-07 | End: 2021-10-08 | Stop reason: HOSPADM

## 2021-10-07 RX ORDER — HEPARIN SODIUM (PORCINE) LOCK FLUSH IV SOLN 100 UNIT/ML 100 UNIT/ML
500 SOLUTION INTRAVENOUS AS NEEDED
Status: DISCONTINUED | OUTPATIENT
Start: 2021-10-07 | End: 2021-10-08 | Stop reason: HOSPADM

## 2021-10-07 RX ADMIN — VINCRISTINE SULFATE 2 MG: 1 INJECTION, SOLUTION INTRAVENOUS at 09:55

## 2021-10-07 RX ADMIN — HEPARIN 500 UNITS: 100 SYRINGE at 10:08

## 2021-10-07 RX ADMIN — SODIUM CHLORIDE 250 ML: 9 INJECTION, SOLUTION INTRAVENOUS at 09:55

## 2021-10-07 RX ADMIN — Medication 10 ML: at 10:08

## 2021-10-07 NOTE — PROGRESS NOTES
Patient came in today for vincristine without complaints. Dr. Workman states okay to treat with platelets of 89. There was some confusion on the treatment plan, clarified with Luke in pharmacy that Vincristine should be given on days 8 and 29 of the cycle. Day one is just the start of oral. Patient denies further needs today, next appts given.

## 2021-10-08 ENCOUNTER — MEDICATION THERAPY MANAGEMENT (OUTPATIENT)
Dept: PHARMACY | Facility: HOSPITAL | Age: 56
End: 2021-10-08

## 2021-10-08 NOTE — PROGRESS NOTES
MTM Note: PCV        10/7/2021  Day 29   WBC 3.40 - 10.80 10*3/mm3 4.65   Neutrophils Absolute 1.70 - 7.00 10*3/mm3 3.70   Hemoglobin 12.0 - 15.9 g/dL 9.2 (A)   Hematocrit 34.0 - 46.6 % 28.7 (A)   Platelets 140 - 450 10*3/mm3 89 (A)   Creatinine 0.57 - 1.00 mg/dL 0.96   eGFR Non African Am >60 mL/min/1.73 61   BUN 6 - 20 mg/dL 16   Sodium 136 - 145 mmol/L 142   Potassium 3.5 - 5.2 mmol/L 4.3   Glucose 65 - 99 mg/dL 144 (A)   Calcium 8.6 - 10.5 mg/dL 8.9   Albumin 3.50 - 5.20 g/dL 3.80   Total Protein 6.0 - 8.5 g/dL 6.5   AST (SGOT) 1 - 32 U/L 17   ALT (SGPT) 1 - 33 U/L 13   Alkaline Phosphatase 39 - 117 U/L 100   Total Bilirubin 0.0 - 1.2 mg/dL 0.6     Pharmacy will continue to follow.  Thanks,    Chapis Goff, PharmD

## 2021-10-11 DIAGNOSIS — C71.9 OLIGODENDROGLIOMA (HCC): ICD-10-CM

## 2021-10-11 DIAGNOSIS — C71.1 OLIGOASTROCYTOMA OF FRONTAL LOBE (HCC): Primary | ICD-10-CM

## 2021-10-11 RX ORDER — SODIUM CHLORIDE 9 MG/ML
250 INJECTION, SOLUTION INTRAVENOUS ONCE
Status: CANCELLED | OUTPATIENT
Start: 2021-12-06

## 2021-10-11 RX ORDER — SODIUM CHLORIDE 9 MG/ML
250 INJECTION, SOLUTION INTRAVENOUS ONCE
Status: CANCELLED | OUTPATIENT
Start: 2021-10-28

## 2021-10-12 DIAGNOSIS — C71.9 OLIGODENDROGLIOMA (HCC): ICD-10-CM

## 2021-10-12 DIAGNOSIS — C71.1 OLIGOASTROCYTOMA OF FRONTAL LOBE (HCC): Primary | ICD-10-CM

## 2021-10-13 DIAGNOSIS — C71.9 OLIGODENDROGLIOMA (HCC): ICD-10-CM

## 2021-10-13 DIAGNOSIS — C71.1 OLIGOASTROCYTOMA OF FRONTAL LOBE (HCC): Primary | ICD-10-CM

## 2021-10-26 NOTE — PROGRESS NOTES
HEMATOLOGY ONCOLOGY OUTPATIENT FOLLOW UP      Patient name: Cindy Cortes  : 1965  MRN: [id number]  Primary Care Physician: Annamarie Monroy APRN  Referring Physician: Annamarie Monroy APRN  Reason For Consult:     Chief Complaint   Patient presents with   • Follow-up     Oligodendroglioma         HPI:   History of Present Illness:  Cindy Cortes is 56 y.o. female who presented to our office on 06/10/21 for consultation regarding Oligodendroglioma    Patient is a 55-year-old female who was referred to us for treatment options regarding recent diagnosis of grade 2 oligodendroglioma.  This was IDH [id number] mutated, 1P 19 Q codeleted.  Patient initially presented with seizures and a CT head was obtained that showed vasogenic edema and a right frontal lobe mass MRI was obtained after this.  2021 -MRI of the brain shows 2.1 x 4.4 x 3.3 cm right frontal lobe mass with eccentric cystic or necrotic component with surrounding vasogenic edema and a focal 3 mm right to left midline shift.  Patient was started on Keppra, steroids plan was made for elective craniotomy and surgical resection.    2021 craniotomy of the right frontal lobe, microscopic resection of tumor mass.  Pathology results oligodendroglioma WHO grade 2, IDH1 R132H mutation by immunohistochemistry, 1p19q codeletion by FISH.    2021 -status post resection of the right frontal lobe mass.  Expected postop blood product. resolution of midline shift.    2021 - Started radiation adjuvantly.    2021 - last day of radiation.    2021 - C1 D8 with vincristin started procarbazine  10/7/2021 - C1 D29 Vincristine    10/21/2021 - C2D1 PCV      Subjective:    Patient took her first dose of the second cycle 1 week ago.  She will get her vincristine tomorrow.  Tolerating treatment well except for some myelosuppression.    The following portions of the patient's history were reviewed and updated as appropriate:  allergies, current medications, past family history, past medical history, past social history, past surgical history and problem list.    Past Medical History:   Diagnosis Date   • Arthritis    • GERD (gastroesophageal reflux disease)    • Hypertension    • Oligodendroglioma (HCC)    • Seizure (HCC)    • Type 2 diabetes mellitus with hyperglycemia, without long-term current use of insulin (HCC) 5/12/2021       Past Surgical History:   Procedure Laterality Date   • CRANIOTOMY FOR TUMOR Right 5/6/2021    Procedure: CRANIOTOMY FOR TUMOR RESECTION WITH STEREOTACTIC;  Surgeon: Jluis Felix MD;  Location: [facility] OR;  Service: Neurosurgery;  Laterality: Right;         Current Outpatient Medications:   •  Alcohol Swabs (Alcohol Wipes) 70 % pads, Apply 1 each topically to the appropriate area as directed 4 (Four) Times a Day., Disp: , Rfl:   •  amLODIPine (NORVASC) 10 MG tablet, Take 1 tablet by mouth Daily., Disp: 30 tablet, Rfl: 2  •  ferrous sulfate 325 (65 FE) MG tablet, Take 1 tablet by mouth Daily With Breakfast., Disp: , Rfl:   •  folic acid (FOLVITE) 1 MG tablet, TAKE TWO TABLETS BY MOUTH EVERY MORNING, THEN TWO TABLETS EVERY EVENING, Disp: 120 tablet, Rfl: 2  •  Gleostine 100 MG chemo capsule, , Disp: , Rfl:   •  Gleostine 40 MG chemo capsule, , Disp: , Rfl:   •  glucose blood (OneTouch Verio) test strip, 1 each by Other route 4 (Four) Times a Day Before Meals & at Bedtime. Use as instructed, Disp: 200 each, Rfl: 1  •  insulin aspart (NovoLOG FlexPen) 100 UNIT/ML solution pen-injector sc pen, Inject 5 Units under the skin into the appropriate area as directed 3 (Three) Times a Day With Meals. Inject 1u lispro SC for every 50 over 150, Disp: 2 pen, Rfl: 2  •  insulin detemir (Levemir FlexTouch) 100 UNIT/ML injection, Inject 15 Units under the skin into the appropriate area as directed 2 (Two) Times a Day., Disp: 3 pen, Rfl: 2  •  Insulin Pen Needle 32G X 4 MM misc, 1 each 5 (Five) Times a Day., Disp: 200  each, Rfl: 1  •  Isopropyl Alcohol (Alcohol Wipes) 70 % misc, Apply 1 each topically 4 (Four) Times a Day Before Meals & at Bedtime., Disp: 200 each, Rfl: 1  •  Lancets (OneTouch Delica Plus [id number]) misc, 1 each 4 (Four) Times a Day Before Meals & at Bedtime., Disp: 200 each, Rfl: 1  •  levETIRAcetam (KEPPRA) 750 MG tablet, Take 1 tablet by mouth 2 (Two) Times a Day., Disp: 60 tablet, Rfl: 2  •  lidocaine-prilocaine (EMLA) 2.5-2.5 % cream, Apply  topically to the appropriate area as directed Every 2 (Two) Hours As Needed for Mild Pain ., Disp: 5 g, Rfl: 0  •  metoprolol tartrate (LOPRESSOR) 25 MG tablet, Take 0.5 tablets by mouth Every 8 (Eight) Hours., Disp: 45 tablet, Rfl: 2  •  ondansetron (Zofran) 8 MG tablet, Take 1 tablet by mouth Every 8 (Eight) Hours As Needed for Nausea or Vomiting., Disp: 30 tablet, Rfl: 3  •  oxybutynin (DITROPAN) 5 MG tablet, , Disp: , Rfl:   •  oxybutynin XL (DITROPAN-XL) 5 MG 24 hr tablet, Take 5 mg by mouth Daily., Disp: , Rfl:   •  pantoprazole (PROTONIX) 40 MG EC tablet, TAKE 1 TABLET BY MOUTH EVERY DAY (Patient taking differently: Take 40 mg by mouth Daily. Take DOS), Disp: 30 tablet, Rfl: 1  •  procarbazine (MATULANE) 50 MG chemo capsule, Take 3 capsules by mouth Every Night for 14 days. Take on days 8 through 21 each cycle., Disp: 42 capsule, Rfl: 2  •  venlafaxine XR (EFFEXOR-XR) 75 MG 24 hr capsule, Take 75 mg by mouth Daily. Take DOS, Disp: , Rfl: 3  •  vitamin D (ERGOCALCIFEROL) 1.25 MG (89781 UT) capsule capsule, Take 50,000 Units by mouth Every 7 (Seven) Days.  , Disp: , Rfl:     Current outpatient and discharge medications have been reconciled for the patient.  Reviewed by: Emilie Workman MD    No Known Allergies    Family History   Problem Relation Age of Onset   • Kidney disease Mother    • Prostate cancer Brother    • Breast cancer Maternal Aunt    • Colon cancer Maternal Aunt    • Breast cancer Niece    • Breast cancer Cousin        Cancer-related family history  includes Breast cancer in her cousin, maternal aunt, and niece; Colon cancer in her maternal aunt; Prostate cancer in her brother.    Social History     Tobacco Use   • Smoking status: Never Smoker   • Smokeless tobacco: Never Used   Vaping Use   • Vaping Use: Never used   Substance Use Topics   • Alcohol use: Not Currently     Alcohol/week: 1.0 standard drink     Types: 1 Cans of beer per week     Comment: socially   • Drug use: Never     Social History     Social History Narrative   • Not on file            Objective:    Vitals:    10/27/21 1120   BP: 112/57   Pulse: 79   Temp: 97.1 °F (36.2 °C)   Weight: (!) 152 kg (334 lb 6.4 oz)   PainSc: 0-No pain     Body mass index is 61.16 kg/m².  ECOG  (0) Fully active, able to carry on all predisease performance without restriction    Physical Exam:     Physical Exam  Constitutional:       Appearance: Normal appearance. She is obese.   HENT:      Head: Normocephalic and atraumatic.      Comments: Radiation changes scalp, surgical scar  Eyes:      Pupils: Pupils are equal, round, and reactive to light.   Cardiovascular:      Rate and Rhythm: Normal rate and regular rhythm.      Pulses: Normal pulses.      Heart sounds: No murmur heard.      Pulmonary:      Effort: Pulmonary effort is normal.      Breath sounds: Normal breath sounds.   Abdominal:      General: There is no distension.      Palpations: Abdomen is soft. There is no mass.      Tenderness: There is no abdominal tenderness.   Musculoskeletal:         General: Normal range of motion.      Cervical back: Normal range of motion.   Skin:     General: Skin is warm.   Neurological:      General: No focal deficit present.      Mental Status: She is alert.   Psychiatric:         Mood and Affect: Mood normal.           Lab Results - Last 18 Months   Lab Units 10/27/21  1116 10/07/21  0910 09/29/21  1059   WBC 10*3/mm3 5.24 4.65 4.92   HEMOGLOBIN g/dL 8.3* 9.2* 10.2*   HEMATOCRIT % 26.2* 28.7* 32.1*   PLATELETS 10*3/mm3  214 89* 67*   MCV fL 97.0 95.0 93.3     Lab Results - Last 18 Months   Lab Units 10/07/21  0910 09/16/21  0935 09/08/21  1119   SODIUM mmol/L 142 141 142   POTASSIUM mmol/L 4.3 3.9 4.6   CHLORIDE mmol/L 103 105 102   CO2 mmol/L 27.0 26.0 30.0*   BUN mg/dL 16 18 12   CREATININE mg/dL 0.96 0.89 0.92   CALCIUM mg/dL 8.9 8.8 9.1   BILIRUBIN mg/dL 0.6 0.4 0.6   ALK PHOS U/L 100 99 102   ALT (SGPT) U/L 13 15 20   AST (SGOT) U/L 17 15 19   GLUCOSE mg/dL 144* 171* 126*       Lab Results   Component Value Date    GLUCOSE 144 (H) 10/07/2021    BUN 16 10/07/2021    CREATININE 0.96 10/07/2021    EGFRIFNONA 61 10/07/2021    BCR 16.7 10/07/2021    K 4.3 10/07/2021    CO2 27.0 10/07/2021    CALCIUM 8.9 10/07/2021    ALBUMIN 3.80 10/07/2021    LABIL2 1.4 03/30/2021    AST 17 10/07/2021    ALT 13 10/07/2021       Lab Results - Last 18 Months   Lab Units 08/27/21  0738 05/04/21  0922   INR  0.96 0.96   APTT seconds 25.6 20.7*       Lab Results   Component Value Date    IRON 66 08/17/2021    TIBC 308 08/17/2021    FERRITIN 348.10 (H) 08/17/2021       Lab Results   Component Value Date    GGSHMFLX22 365 07/16/2021       Lab Results   Component Value Date    PTT 25.6 08/27/2021    INR 0.96 08/27/2021     MRI Brain With & Without Contrast    Result Date: 8/6/2021  1.Post-surgical changes along right frontal lobe. There is a rind of FLAIR hyperintensity involving the medial and posterior right frontal lobe likely representing residual tumor. There is new white matter FLAIR hyperintensity involving the anterior right frontal lobe that is more nonspecific, and could represent post-operative changes. 2.Mucosal disease within maxillary sinuses. Electronically Signed: Naveed Hubbard MD 8/6/2021 14:36 EDT    IR Port Placement    Result Date: 8/27/2021  Technically successful placement, 8 Croatian single lumen power injectable [id number], using the right internal jugular vein approach as well as both sonographic and fluoroscopic control.   "Electronically Signed By-Onur Ellis MD On:8/27/2021 3:52 PM This report was finalized on [id number] by  Onur Ellis MD.    Pathology results  Outside Report, Addendum   Integrated Diagnosis: Oligodendroglioma WHO Grade II  IDH1 R132H mutation by Immunohistochemistry; 1p19q co-deletion by FISH  See attached report from [facility]   Addendum electronically signed by Cecilia Chaudhary on 5/28/2021 at 0824   Final Diagnosis   Specimen #1 (\"Brain tumor right frontal lobe,\" biopsy):    Diffuse glioma (WHO grade II)    See comment     Specimen #2 (\"Brain tumor right frontal lobe,\" biopsy):    Diffuse glioma (WHO grade II)    See comment     Specimen #3 (Brain tumor right frontal lobe, resection):    Diffuse glioma, IDH-mutant (WHO grade II)    See attached report from [facility]         Assessment/Plan     Patient is a 55-year-old female with recently diagnosed oligodendroglioma grade 2 status post gross total resection    Oligodendroglioma  Previously I had an extensive discussion with the patient about treatment options, prognosis.  We had an extensive discussion at that time with several options.  This is summarized here below    I previously discussed with her that there are findings from RTOG 9802 clinical trial which showed survival advantage with radiation followed by chemotherapy after surgical resection of grade 2 oligodendrogliomas.  Chemotherapy with the PCV regimen in this trial conferred a survival advantage over radiotherapy alone with a median overall survival of 13.3 versus 7.8 years.  In subset analysis benefit was more significant  Histologic oligodendroglioma is as compared to other low-grade gliomas.  Molecular analysis post talk also showed survival advantage in molecularly confirmed IDH mutant, 1p19q codeleted oligodendrogliomas.    PCV can be a toxic regimen however survival advantage is only been shown in randomized " control trial using this particular regimen.  The other option would be temozolomide which has advantages over PCV with ease of administration, better tolerance and efficacy in combination with radiation therapy and other types of CNS tumors and gliomas.    The other option I had discussed with her was clinical trial with a CODEL study which is comparing radiation alone versus radiation with Temodar versus radiation with PCV in this patient population.  Patient however is not very interested in enrolling in a clinical trial.  She is wanting to pursue the most aggressive treatment option for her to reduce the chances of recurrence of her tumor.    Lastly also discussed with her the option of surveillance and observation with serial MRIs however also discussed that in above studies the progression free survival is around 50% for 5 years.     Based on her above discussion patient decided to pursue aggressive treatment with radiation followed by chemotherapy.  We would use the RTOG 9802 regimen with radiation followed by chemotherapy with PCV.  We have evidence that vincristine does not cross the blood-brain barrier significantly and hence if there is toxicity we can choose to omit the drug.  Case was discussed with Dr. Cruz and started sequential radiation and chemo.    Based on above patient decided to proceed with adjuvant PCV.  She is continuing treatment with no major toxicity. Dry mouth could be related to treatment.  This is overall improved.    Patient has now started back on 2.  Tolerating it well except for myelosuppression we will continue monitor.    nausea  Continue zofran as needed this is stable.    Anemia  With worsening anemia this could be related to chemotherapy however I would like to check repeat iron studies, vitamin B12 and folic acid levels.    Follow-up in a month the interim we will follow her CBC for myelosuppression.    I have reviewed and confirmed the accuracy of the patient's history: Chief  complaint, HPI, ROS, Subjective and Past Family Social History as entered by the MA/LPN/RN.     Emilie Workman MD 10/27/21

## 2021-10-27 ENCOUNTER — LAB (OUTPATIENT)
Dept: LAB | Facility: HOSPITAL | Age: 56
End: 2021-10-27

## 2021-10-27 ENCOUNTER — OFFICE VISIT (OUTPATIENT)
Dept: ONCOLOGY | Facility: CLINIC | Age: 56
End: 2021-10-27

## 2021-10-27 VITALS
WEIGHT: 293 LBS | DIASTOLIC BLOOD PRESSURE: 57 MMHG | BODY MASS INDEX: 61.16 KG/M2 | HEART RATE: 79 BPM | TEMPERATURE: 97.1 F | SYSTOLIC BLOOD PRESSURE: 112 MMHG

## 2021-10-27 DIAGNOSIS — C71.1 OLIGOASTROCYTOMA OF FRONTAL LOBE (HCC): Primary | ICD-10-CM

## 2021-10-27 DIAGNOSIS — C71.9 OLIGODENDROGLIOMA (HCC): Primary | ICD-10-CM

## 2021-10-27 LAB
ALBUMIN SERPL-MCNC: 3.9 G/DL (ref 3.5–5.2)
ALBUMIN/GLOB SERPL: 1.4 G/DL
ALP SERPL-CCNC: 90 U/L (ref 39–117)
ALT SERPL W P-5'-P-CCNC: 16 U/L (ref 1–33)
ANION GAP SERPL CALCULATED.3IONS-SCNC: 11 MMOL/L (ref 5–15)
AST SERPL-CCNC: 21 U/L (ref 1–32)
BASOPHILS # BLD AUTO: 0.02 10*3/MM3 (ref 0–0.2)
BASOPHILS NFR BLD AUTO: 0.4 % (ref 0–1.5)
BILIRUB SERPL-MCNC: 0.7 MG/DL (ref 0–1.2)
BUN SERPL-MCNC: 16 MG/DL (ref 6–20)
BUN/CREAT SERPL: 18.2 (ref 7–25)
CALCIUM SPEC-SCNC: 9 MG/DL (ref 8.6–10.5)
CHLORIDE SERPL-SCNC: 102 MMOL/L (ref 98–107)
CO2 SERPL-SCNC: 27 MMOL/L (ref 22–29)
CREAT SERPL-MCNC: 0.88 MG/DL (ref 0.57–1)
DEPRECATED RDW RBC AUTO: 66.8 FL (ref 37–54)
EOSINOPHIL # BLD AUTO: 0.02 10*3/MM3 (ref 0–0.4)
EOSINOPHIL NFR BLD AUTO: 0.4 % (ref 0.3–6.2)
ERYTHROCYTE [DISTWIDTH] IN BLOOD BY AUTOMATED COUNT: 20.8 % (ref 12.3–15.4)
FERRITIN SERPL-MCNC: 672 NG/ML (ref 13–150)
GFR SERPL CREATININE-BSD FRML MDRD: 67 ML/MIN/1.73
GLOBULIN UR ELPH-MCNC: 2.7 GM/DL
GLUCOSE SERPL-MCNC: 134 MG/DL (ref 65–99)
HCT VFR BLD AUTO: 26.2 % (ref 34–46.6)
HGB BLD-MCNC: 8.3 G/DL (ref 12–15.9)
HOLD SPECIMEN: NORMAL
IRON 24H UR-MRATE: 140 MCG/DL (ref 37–145)
IRON SATN MFR SERPL: 42 % (ref 20–50)
LYMPHOCYTES # BLD AUTO: 0.74 10*3/MM3 (ref 0.7–3.1)
LYMPHOCYTES NFR BLD AUTO: 14.1 % (ref 19.6–45.3)
MCH RBC QN AUTO: 30.7 PG (ref 26.6–33)
MCHC RBC AUTO-ENTMCNC: 31.7 G/DL (ref 31.5–35.7)
MCV RBC AUTO: 97 FL (ref 79–97)
MONOCYTES # BLD AUTO: 0.21 10*3/MM3 (ref 0.1–0.9)
MONOCYTES NFR BLD AUTO: 4 % (ref 5–12)
NEUTROPHILS NFR BLD AUTO: 4.25 10*3/MM3 (ref 1.7–7)
NEUTROPHILS NFR BLD AUTO: 81.1 % (ref 42.7–76)
PLATELET # BLD AUTO: 214 10*3/MM3 (ref 140–450)
PMV BLD AUTO: 8.2 FL (ref 6–12)
POTASSIUM SERPL-SCNC: 4.6 MMOL/L (ref 3.5–5.2)
PROT SERPL-MCNC: 6.6 G/DL (ref 6–8.5)
RBC # BLD AUTO: 2.7 10*6/MM3 (ref 3.77–5.28)
SODIUM SERPL-SCNC: 140 MMOL/L (ref 136–145)
TIBC SERPL-MCNC: 335 MCG/DL (ref 298–536)
TRANSFERRIN SERPL-MCNC: 225 MG/DL (ref 200–360)
WBC # BLD AUTO: 5.24 10*3/MM3 (ref 3.4–10.8)

## 2021-10-27 PROCEDURE — 84466 ASSAY OF TRANSFERRIN: CPT | Performed by: INTERNAL MEDICINE

## 2021-10-27 PROCEDURE — 82746 ASSAY OF FOLIC ACID SERUM: CPT | Performed by: INTERNAL MEDICINE

## 2021-10-27 PROCEDURE — 83540 ASSAY OF IRON: CPT | Performed by: INTERNAL MEDICINE

## 2021-10-27 PROCEDURE — 36415 COLL VENOUS BLD VENIPUNCTURE: CPT

## 2021-10-27 PROCEDURE — 99214 OFFICE O/P EST MOD 30 MIN: CPT | Performed by: INTERNAL MEDICINE

## 2021-10-27 PROCEDURE — 80053 COMPREHEN METABOLIC PANEL: CPT

## 2021-10-27 PROCEDURE — 82728 ASSAY OF FERRITIN: CPT | Performed by: INTERNAL MEDICINE

## 2021-10-27 PROCEDURE — 85025 COMPLETE CBC W/AUTO DIFF WBC: CPT

## 2021-10-27 PROCEDURE — 82607 VITAMIN B-12: CPT | Performed by: INTERNAL MEDICINE

## 2021-10-27 RX ORDER — OXYBUTYNIN CHLORIDE 5 MG/1
15 TABLET ORAL DAILY
COMMUNITY
Start: 2021-10-05

## 2021-10-28 ENCOUNTER — MEDICATION THERAPY MANAGEMENT (OUTPATIENT)
Dept: PHARMACY | Facility: HOSPITAL | Age: 56
End: 2021-10-28

## 2021-10-28 ENCOUNTER — HOSPITAL ENCOUNTER (OUTPATIENT)
Dept: ONCOLOGY | Facility: HOSPITAL | Age: 56
Setting detail: INFUSION SERIES
Discharge: HOME | End: 2021-10-28

## 2021-10-28 VITALS
SYSTOLIC BLOOD PRESSURE: 161 MMHG | RESPIRATION RATE: 18 BRPM | WEIGHT: 293 LBS | HEART RATE: 121 BPM | TEMPERATURE: 96.9 F | BODY MASS INDEX: 53.92 KG/M2 | DIASTOLIC BLOOD PRESSURE: 76 MMHG | HEIGHT: 62 IN

## 2021-10-28 DIAGNOSIS — C71.9 OLIGODENDROGLIOMA (HCC): ICD-10-CM

## 2021-10-28 DIAGNOSIS — C71.1 OLIGOASTROCYTOMA OF FRONTAL LOBE (HCC): Primary | ICD-10-CM

## 2021-10-28 DIAGNOSIS — Z95.828 PORT-A-CATH IN PLACE: ICD-10-CM

## 2021-10-28 LAB
FOLATE SERPL-MCNC: >20 NG/ML (ref 4.78–24.2)
VIT B12 BLD-MCNC: 347 PG/ML (ref 211–946)

## 2021-10-28 PROCEDURE — 96409 CHEMO IV PUSH SNGL DRUG: CPT

## 2021-10-28 PROCEDURE — 96413 CHEMO IV INFUSION 1 HR: CPT

## 2021-10-28 PROCEDURE — 25010000002 VINCRISTINE PER 1 MG: Performed by: INTERNAL MEDICINE

## 2021-10-28 PROCEDURE — 25010000002 HEPARIN LOCK FLUSH PER 10 UNITS: Performed by: INTERNAL MEDICINE

## 2021-10-28 RX ORDER — SODIUM CHLORIDE 9 MG/ML
250 INJECTION, SOLUTION INTRAVENOUS ONCE
Status: COMPLETED | OUTPATIENT
Start: 2021-10-28 | End: 2021-10-28

## 2021-10-28 RX ORDER — HEPARIN SODIUM (PORCINE) LOCK FLUSH IV SOLN 100 UNIT/ML 100 UNIT/ML
500 SOLUTION INTRAVENOUS AS NEEDED
Status: CANCELLED | OUTPATIENT
Start: 2021-10-28

## 2021-10-28 RX ORDER — HEPARIN SODIUM (PORCINE) LOCK FLUSH IV SOLN 100 UNIT/ML 100 UNIT/ML
500 SOLUTION INTRAVENOUS AS NEEDED
Status: DISCONTINUED | OUTPATIENT
Start: 2021-10-28 | End: 2021-10-29 | Stop reason: HOSPADM

## 2021-10-28 RX ORDER — SODIUM CHLORIDE 0.9 % (FLUSH) 0.9 %
10 SYRINGE (ML) INJECTION AS NEEDED
Status: DISCONTINUED | OUTPATIENT
Start: 2021-10-28 | End: 2021-10-29 | Stop reason: HOSPADM

## 2021-10-28 RX ORDER — SODIUM CHLORIDE 0.9 % (FLUSH) 0.9 %
10 SYRINGE (ML) INJECTION AS NEEDED
Status: CANCELLED | OUTPATIENT
Start: 2021-10-28

## 2021-10-28 RX ADMIN — Medication 10 ML: at 09:54

## 2021-10-28 RX ADMIN — SODIUM CHLORIDE 250 ML: 9 INJECTION, SOLUTION INTRAVENOUS at 09:39

## 2021-10-28 RX ADMIN — HEPARIN 500 UNITS: 100 SYRINGE at 09:54

## 2021-10-28 RX ADMIN — VINCRISTINE SULFATE 2 MG: 1 INJECTION, SOLUTION INTRAVENOUS at 09:39

## 2021-10-28 NOTE — PROGRESS NOTES
Patient here for Vincristine, patient has no complaints at this time. Patient saw MD on 10/27/21 no labs needed today.

## 2021-10-28 NOTE — PROGRESS NOTES
MTM Note PCV        10/27/2021   WBC 3.40 - 10.80 10*3/mm3 5.24   Neutrophils Absolute 1.70 - 7.00 10*3/mm3 4.25   Hemoglobin 12.0 - 15.9 g/dL 8.3 (A)   Hematocrit 34.0 - 46.6 % 26.2 (A)   Platelets 140 - 450 10*3/mm3 214   Creatinine 0.57 - 1.00 mg/dL 0.88   eGFR Non African Am >60 mL/min/1.73 67   BUN 6 - 20 mg/dL 16   Sodium 136 - 145 mmol/L 140   Potassium 3.5 - 5.2 mmol/L 4.6   Glucose 65 - 99 mg/dL 134 (A)   Calcium 8.6 - 10.5 mg/dL 9.0   Albumin 3.50 - 5.20 g/dL 3.90   Total Protein 6.0 - 8.5 g/dL 6.6   AST (SGOT) 1 - 32 U/L 21   ALT (SGPT) 1 - 33 U/L 16   Alkaline Phosphatase 39 - 117 U/L 90   Total Bilirubin 0.0 - 1.2 mg/dL 0.7     Per MD dictation 10/27, continue treatment. Patient is on day 8 of cycle 2 today and starts procarbazine.  Pharmacy to follow.  Thanks,    Chapis Goff, PharmD

## 2021-10-29 ENCOUNTER — TELEPHONE (OUTPATIENT)
Dept: ONCOLOGY | Facility: CLINIC | Age: 56
End: 2021-10-29

## 2021-10-29 NOTE — TELEPHONE ENCOUNTER
Pt called in reporting vomiting, chills, stomach pain and headaches. The pt stated she left as if she had a fever but did not check. I called the pt back after speaking with Dr. Workman and let her know to hold her chemo pills until she felt better and if she started to feel worse to go to the ER. The pt verbalized understanding and had no other questions.

## 2021-11-02 ENCOUNTER — MEDICATION THERAPY MANAGEMENT (OUTPATIENT)
Dept: PHARMACY | Facility: HOSPITAL | Age: 56
End: 2021-11-02

## 2021-11-02 NOTE — PROGRESS NOTES
MTM note: Procarbazine  I called Cindy today to see how she was feeling.  She took Procarbazine and Vincristine as directed on 10/28.  She became very ill with nausea / vomiting the next morning and missed the next 4 doses of Procarbazine.  Today she feels better and will restart the  Procarbazine.  Sean Lechuga, PharmD BCPS

## 2021-11-13 ENCOUNTER — APPOINTMENT (OUTPATIENT)
Dept: GENERAL RADIOLOGY | Facility: HOSPITAL | Age: 56
End: 2021-11-13

## 2021-11-13 ENCOUNTER — APPOINTMENT (OUTPATIENT)
Dept: CT IMAGING | Facility: HOSPITAL | Age: 56
End: 2021-11-13

## 2021-11-13 ENCOUNTER — HOSPITAL ENCOUNTER (OUTPATIENT)
Facility: HOSPITAL | Age: 56
Setting detail: OBSERVATION
Discharge: HOME | End: 2021-11-15
Attending: STUDENT IN AN ORGANIZED HEALTH CARE EDUCATION/TRAINING PROGRAM | Admitting: HOSPITALIST

## 2021-11-13 DIAGNOSIS — R05.9 COUGH: ICD-10-CM

## 2021-11-13 DIAGNOSIS — D64.9 SYMPTOMATIC ANEMIA: ICD-10-CM

## 2021-11-13 DIAGNOSIS — R19.5 GUAIAC POSITIVE STOOLS: ICD-10-CM

## 2021-11-13 DIAGNOSIS — R06.02 SHORTNESS OF BREATH: Primary | ICD-10-CM

## 2021-11-13 LAB
ALBUMIN SERPL-MCNC: 3.6 G/DL (ref 3.5–5.2)
ALBUMIN/GLOB SERPL: 1.4 G/DL
ALP SERPL-CCNC: 80 U/L (ref 39–117)
ALT SERPL W P-5'-P-CCNC: 15 U/L (ref 1–33)
ANION GAP SERPL CALCULATED.3IONS-SCNC: 13 MMOL/L (ref 5–15)
ANISOCYTOSIS BLD QL: ABNORMAL
AST SERPL-CCNC: 20 U/L (ref 1–32)
B PARAPERT DNA SPEC QL NAA+PROBE: NOT DETECTED
B PERT DNA SPEC QL NAA+PROBE: NOT DETECTED
BILIRUB SERPL-MCNC: 0.6 MG/DL (ref 0–1.2)
BUN SERPL-MCNC: 13 MG/DL (ref 6–20)
BUN/CREAT SERPL: 12.6 (ref 7–25)
C PNEUM DNA NPH QL NAA+NON-PROBE: NOT DETECTED
CALCIUM SPEC-SCNC: 8.3 MG/DL (ref 8.6–10.5)
CHLORIDE SERPL-SCNC: 104 MMOL/L (ref 98–107)
CO2 SERPL-SCNC: 24 MMOL/L (ref 22–29)
CREAT SERPL-MCNC: 1.03 MG/DL (ref 0.57–1)
DEPRECATED RDW RBC AUTO: 91 FL (ref 37–54)
ERYTHROCYTE [DISTWIDTH] IN BLOOD BY AUTOMATED COUNT: 27.6 % (ref 12.3–15.4)
FLUAV SUBTYP SPEC NAA+PROBE: NOT DETECTED
FLUBV RNA ISLT QL NAA+PROBE: NOT DETECTED
GFR SERPL CREATININE-BSD FRML MDRD: 56 ML/MIN/1.73
GLOBULIN UR ELPH-MCNC: 2.5 GM/DL
GLUCOSE SERPL-MCNC: 111 MG/DL (ref 65–99)
HADV DNA SPEC NAA+PROBE: NOT DETECTED
HCOV 229E RNA SPEC QL NAA+PROBE: NOT DETECTED
HCOV HKU1 RNA SPEC QL NAA+PROBE: NOT DETECTED
HCOV NL63 RNA SPEC QL NAA+PROBE: NOT DETECTED
HCOV OC43 RNA SPEC QL NAA+PROBE: NOT DETECTED
HCT VFR BLD AUTO: 16.5 % (ref 34–46.6)
HGB BLD-MCNC: 5.6 G/DL (ref 12–15.9)
HMPV RNA NPH QL NAA+NON-PROBE: NOT DETECTED
HPIV1 RNA SPEC QL NAA+PROBE: NOT DETECTED
HPIV2 RNA SPEC QL NAA+PROBE: NOT DETECTED
HPIV3 RNA NPH QL NAA+PROBE: NOT DETECTED
HPIV4 P GENE NPH QL NAA+PROBE: NOT DETECTED
LYMPHOCYTES # BLD MANUAL: 0.53 10*3/MM3 (ref 0.7–3.1)
LYMPHOCYTES NFR BLD MANUAL: 11 % (ref 5–12)
LYMPHOCYTES NFR BLD MANUAL: 15 % (ref 19.6–45.3)
M PNEUMO IGG SER IA-ACNC: NOT DETECTED
MACROCYTES BLD QL SMEAR: ABNORMAL
MCH RBC QN AUTO: 34.1 PG (ref 26.6–33)
MCHC RBC AUTO-ENTMCNC: 34.1 G/DL (ref 31.5–35.7)
MCV RBC AUTO: 100 FL (ref 79–97)
MONOCYTES # BLD AUTO: 0.39 10*3/MM3 (ref 0.1–0.9)
MYELOCYTES NFR BLD MANUAL: 1 % (ref 0–0)
NEUTROPHILS # BLD AUTO: 2.56 10*3/MM3 (ref 1.7–7)
NEUTROPHILS NFR BLD MANUAL: 56 % (ref 42.7–76)
NEUTS BAND NFR BLD MANUAL: 17 % (ref 0–5)
NT-PROBNP SERPL-MCNC: 355 PG/ML (ref 0–900)
PLATELET # BLD AUTO: 26 10*3/MM3 (ref 140–450)
PMV BLD AUTO: 7.5 FL (ref 6–12)
POIKILOCYTOSIS BLD QL SMEAR: ABNORMAL
POTASSIUM SERPL-SCNC: 4.1 MMOL/L (ref 3.5–5.2)
PROT SERPL-MCNC: 6.1 G/DL (ref 6–8.5)
RBC # BLD AUTO: 1.65 10*6/MM3 (ref 3.77–5.28)
RHINOVIRUS RNA SPEC NAA+PROBE: DETECTED
RSV RNA NPH QL NAA+NON-PROBE: NOT DETECTED
SARS-COV-2 RNA NPH QL NAA+NON-PROBE: NOT DETECTED
SCAN SLIDE: NORMAL
SMALL PLATELETS BLD QL SMEAR: ABNORMAL
SODIUM SERPL-SCNC: 141 MMOL/L (ref 136–145)
TROPONIN T SERPL-MCNC: <0.01 NG/ML (ref 0–0.03)
WBC # BLD AUTO: 3.5 10*3/MM3 (ref 3.4–10.8)
WBC MORPH BLD: NORMAL

## 2021-11-13 PROCEDURE — 86900 BLOOD TYPING SEROLOGIC ABO: CPT

## 2021-11-13 PROCEDURE — 71275 CT ANGIOGRAPHY CHEST: CPT

## 2021-11-13 PROCEDURE — 86850 RBC ANTIBODY SCREEN: CPT | Performed by: PHYSICIAN ASSISTANT

## 2021-11-13 PROCEDURE — 83880 ASSAY OF NATRIURETIC PEPTIDE: CPT | Performed by: PHYSICIAN ASSISTANT

## 2021-11-13 PROCEDURE — 71045 X-RAY EXAM CHEST 1 VIEW: CPT

## 2021-11-13 PROCEDURE — 80053 COMPREHEN METABOLIC PANEL: CPT | Performed by: PHYSICIAN ASSISTANT

## 2021-11-13 PROCEDURE — 93005 ELECTROCARDIOGRAM TRACING: CPT

## 2021-11-13 PROCEDURE — 84484 ASSAY OF TROPONIN QUANT: CPT | Performed by: PHYSICIAN ASSISTANT

## 2021-11-13 PROCEDURE — 86901 BLOOD TYPING SEROLOGIC RH(D): CPT | Performed by: PHYSICIAN ASSISTANT

## 2021-11-13 PROCEDURE — 86922 COMPATIBILITY TEST ANTIGLOB: CPT

## 2021-11-13 PROCEDURE — 85025 COMPLETE CBC W/AUTO DIFF WBC: CPT | Performed by: PHYSICIAN ASSISTANT

## 2021-11-13 PROCEDURE — 0 IOPAMIDOL PER 1 ML: Performed by: PHYSICIAN ASSISTANT

## 2021-11-13 PROCEDURE — 0202U NFCT DS 22 TRGT SARS-COV-2: CPT | Performed by: PHYSICIAN ASSISTANT

## 2021-11-13 PROCEDURE — 93005 ELECTROCARDIOGRAM TRACING: CPT | Performed by: STUDENT IN AN ORGANIZED HEALTH CARE EDUCATION/TRAINING PROGRAM

## 2021-11-13 PROCEDURE — 86901 BLOOD TYPING SEROLOGIC RH(D): CPT

## 2021-11-13 PROCEDURE — 86905 BLOOD TYPING RBC ANTIGENS: CPT | Performed by: PHYSICIAN ASSISTANT

## 2021-11-13 PROCEDURE — 36430 TRANSFUSION BLD/BLD COMPNT: CPT

## 2021-11-13 PROCEDURE — 85007 BL SMEAR W/DIFF WBC COUNT: CPT | Performed by: PHYSICIAN ASSISTANT

## 2021-11-13 PROCEDURE — 86870 RBC ANTIBODY IDENTIFICATION: CPT | Performed by: PHYSICIAN ASSISTANT

## 2021-11-13 PROCEDURE — 86900 BLOOD TYPING SEROLOGIC ABO: CPT | Performed by: PHYSICIAN ASSISTANT

## 2021-11-13 PROCEDURE — P9100 PATHOGEN TEST FOR PLATELETS: HCPCS

## 2021-11-13 PROCEDURE — 36415 COLL VENOUS BLD VENIPUNCTURE: CPT | Performed by: PHYSICIAN ASSISTANT

## 2021-11-13 PROCEDURE — P9035 PLATELET PHERES LEUKOREDUCED: HCPCS

## 2021-11-13 PROCEDURE — 99285 EMERGENCY DEPT VISIT HI MDM: CPT

## 2021-11-13 RX ORDER — SODIUM CHLORIDE 0.9 % (FLUSH) 0.9 %
10 SYRINGE (ML) INJECTION AS NEEDED
Status: DISCONTINUED | OUTPATIENT
Start: 2021-11-13 | End: 2021-11-15 | Stop reason: HOSPADM

## 2021-11-13 RX ORDER — DIPHENHYDRAMINE HCL 25 MG
25 CAPSULE ORAL ONCE
Status: COMPLETED | OUTPATIENT
Start: 2021-11-13 | End: 2021-11-14

## 2021-11-13 RX ORDER — ACETAMINOPHEN 650 MG/1
650 SUPPOSITORY RECTAL ONCE
Status: COMPLETED | OUTPATIENT
Start: 2021-11-13 | End: 2021-11-14

## 2021-11-13 RX ORDER — DIPHENHYDRAMINE HYDROCHLORIDE 50 MG/ML
25 INJECTION INTRAMUSCULAR; INTRAVENOUS ONCE
Status: COMPLETED | OUTPATIENT
Start: 2021-11-13 | End: 2021-11-14

## 2021-11-13 RX ORDER — ACETAMINOPHEN 160 MG/5ML
650 SOLUTION ORAL ONCE
Status: COMPLETED | OUTPATIENT
Start: 2021-11-13 | End: 2021-11-14

## 2021-11-13 RX ORDER — ACETAMINOPHEN 325 MG/1
650 TABLET ORAL ONCE
Status: COMPLETED | OUTPATIENT
Start: 2021-11-13 | End: 2021-11-14

## 2021-11-13 RX ADMIN — IOPAMIDOL 100 ML: 755 INJECTION, SOLUTION INTRAVENOUS at 22:58

## 2021-11-14 PROBLEM — D69.59 CHEMOTHERAPY-INDUCED THROMBOCYTOPENIA: Status: ACTIVE | Noted: 2021-11-14

## 2021-11-14 LAB
ABO GROUP BLD: NORMAL
ANION GAP SERPL CALCULATED.3IONS-SCNC: 10 MMOL/L (ref 5–15)
ANISOCYTOSIS BLD QL: NORMAL
ANTI-FYA: NORMAL
BASOPHILS # BLD AUTO: 0 10*3/MM3 (ref 0–0.2)
BASOPHILS # BLD AUTO: 0 10*3/MM3 (ref 0–0.2)
BASOPHILS NFR BLD AUTO: 0.3 % (ref 0–1.5)
BASOPHILS NFR BLD AUTO: 0.4 % (ref 0–1.5)
BLD GP AB SCN SERPL QL: POSITIVE
BUN SERPL-MCNC: 10 MG/DL (ref 6–20)
BUN/CREAT SERPL: 10.6 (ref 7–25)
CALCIUM SPEC-SCNC: 8.2 MG/DL (ref 8.6–10.5)
CHLORIDE SERPL-SCNC: 105 MMOL/L (ref 98–107)
CO2 SERPL-SCNC: 26 MMOL/L (ref 22–29)
CREAT SERPL-MCNC: 0.94 MG/DL (ref 0.57–1)
DAT POLY-SP REAG RBC QL: NEGATIVE
DEPRECATED RDW RBC AUTO: 63 FL (ref 37–54)
DEPRECATED RDW RBC AUTO: 75.7 FL (ref 37–54)
EOSINOPHIL # BLD AUTO: 0 10*3/MM3 (ref 0–0.4)
EOSINOPHIL # BLD AUTO: 0 10*3/MM3 (ref 0–0.4)
EOSINOPHIL NFR BLD AUTO: 0.2 % (ref 0.3–6.2)
EOSINOPHIL NFR BLD AUTO: 0.4 % (ref 0.3–6.2)
ERYTHROCYTE [DISTWIDTH] IN BLOOD BY AUTOMATED COUNT: 20 % (ref 12.3–15.4)
ERYTHROCYTE [DISTWIDTH] IN BLOOD BY AUTOMATED COUNT: 23.5 % (ref 12.3–15.4)
GFR SERPL CREATININE-BSD FRML MDRD: 62 ML/MIN/1.73
GLUCOSE BLDC GLUCOMTR-MCNC: 116 MG/DL (ref 70–105)
GLUCOSE BLDC GLUCOMTR-MCNC: 121 MG/DL (ref 70–105)
GLUCOSE BLDC GLUCOMTR-MCNC: 152 MG/DL (ref 70–105)
GLUCOSE BLDC GLUCOMTR-MCNC: 87 MG/DL (ref 70–105)
GLUCOSE SERPL-MCNC: 108 MG/DL (ref 65–99)
HCT VFR BLD AUTO: 19.7 % (ref 34–46.6)
HCT VFR BLD AUTO: 23.2 % (ref 34–46.6)
HCT VFR BLD AUTO: 24.2 % (ref 34–46.6)
HEMOCCULT STL QL IA: NEGATIVE
HGB BLD-MCNC: 6.9 G/DL (ref 12–15.9)
HGB BLD-MCNC: 8.1 G/DL (ref 12–15.9)
HGB BLD-MCNC: 8.5 G/DL (ref 12–15.9)
LDH SERPL-CCNC: 266 U/L (ref 135–214)
LYMPHOCYTES # BLD AUTO: 0.5 10*3/MM3 (ref 0.7–3.1)
LYMPHOCYTES # BLD AUTO: 0.5 10*3/MM3 (ref 0.7–3.1)
LYMPHOCYTES NFR BLD AUTO: 14.1 % (ref 19.6–45.3)
LYMPHOCYTES NFR BLD AUTO: 14.8 % (ref 19.6–45.3)
MCH RBC QN AUTO: 32.5 PG (ref 26.6–33)
MCH RBC QN AUTO: 33.2 PG (ref 26.6–33)
MCHC RBC AUTO-ENTMCNC: 34.7 G/DL (ref 31.5–35.7)
MCHC RBC AUTO-ENTMCNC: 35.1 G/DL (ref 31.5–35.7)
MCV RBC AUTO: 93.7 FL (ref 79–97)
MCV RBC AUTO: 94.8 FL (ref 79–97)
MONOCYTES # BLD AUTO: 0.2 10*3/MM3 (ref 0.1–0.9)
MONOCYTES # BLD AUTO: 0.3 10*3/MM3 (ref 0.1–0.9)
MONOCYTES NFR BLD AUTO: 7 % (ref 5–12)
MONOCYTES NFR BLD AUTO: 7.6 % (ref 5–12)
NEUTROPHILS NFR BLD AUTO: 2.6 10*3/MM3 (ref 1.7–7)
NEUTROPHILS NFR BLD AUTO: 2.9 10*3/MM3 (ref 1.7–7)
NEUTROPHILS NFR BLD AUTO: 77.6 % (ref 42.7–76)
NEUTROPHILS NFR BLD AUTO: 77.6 % (ref 42.7–76)
NRBC BLD AUTO-RTO: 0.1 /100 WBC (ref 0–0.2)
NRBC BLD AUTO-RTO: 0.1 /100 WBC (ref 0–0.2)
PLATELET # BLD AUTO: 37 10*3/MM3 (ref 140–450)
PLATELET # BLD AUTO: 39 10*3/MM3 (ref 140–450)
PMV BLD AUTO: 7.4 FL (ref 6–12)
PMV BLD AUTO: 7.5 FL (ref 6–12)
POTASSIUM SERPL-SCNC: 4.3 MMOL/L (ref 3.5–5.2)
RBC # BLD AUTO: 2.08 10*6/MM3 (ref 3.77–5.28)
RBC # BLD AUTO: 2.48 10*6/MM3 (ref 3.77–5.28)
RETICS # AUTO: 0.1 10*6/MM3 (ref 0.02–0.13)
RETICS/RBC NFR AUTO: 4.02 % (ref 0.7–1.9)
RH BLD: POSITIVE
SMALL PLATELETS BLD QL SMEAR: NORMAL
SODIUM SERPL-SCNC: 141 MMOL/L (ref 136–145)
T&S EXPIRATION DATE: NORMAL
WBC # BLD AUTO: 3.4 10*3/MM3 (ref 3.4–10.8)
WBC # BLD AUTO: 3.7 10*3/MM3 (ref 3.4–10.8)
WBC MORPH BLD: NORMAL

## 2021-11-14 PROCEDURE — 85025 COMPLETE CBC W/AUTO DIFF WBC: CPT | Performed by: STUDENT IN AN ORGANIZED HEALTH CARE EDUCATION/TRAINING PROGRAM

## 2021-11-14 PROCEDURE — 86902 BLOOD TYPE ANTIGEN DONOR EA: CPT

## 2021-11-14 PROCEDURE — 83615 LACTATE (LD) (LDH) ENZYME: CPT | Performed by: INTERNAL MEDICINE

## 2021-11-14 PROCEDURE — G0378 HOSPITAL OBSERVATION PER HR: HCPCS

## 2021-11-14 PROCEDURE — P9016 RBC LEUKOCYTES REDUCED: HCPCS

## 2021-11-14 PROCEDURE — 82274 ASSAY TEST FOR BLOOD FECAL: CPT | Performed by: INTERNAL MEDICINE

## 2021-11-14 PROCEDURE — 80048 BASIC METABOLIC PNL TOTAL CA: CPT | Performed by: NURSE PRACTITIONER

## 2021-11-14 PROCEDURE — 36430 TRANSFUSION BLD/BLD COMPNT: CPT

## 2021-11-14 PROCEDURE — 85045 AUTOMATED RETICULOCYTE COUNT: CPT | Performed by: INTERNAL MEDICINE

## 2021-11-14 PROCEDURE — 85025 COMPLETE CBC W/AUTO DIFF WBC: CPT | Performed by: NURSE PRACTITIONER

## 2021-11-14 PROCEDURE — 85018 HEMOGLOBIN: CPT | Performed by: NURSE PRACTITIONER

## 2021-11-14 PROCEDURE — 86880 COOMBS TEST DIRECT: CPT | Performed by: INTERNAL MEDICINE

## 2021-11-14 PROCEDURE — 85014 HEMATOCRIT: CPT | Performed by: NURSE PRACTITIONER

## 2021-11-14 PROCEDURE — 82962 GLUCOSE BLOOD TEST: CPT

## 2021-11-14 PROCEDURE — 63710000001 DIPHENHYDRAMINE PER 50 MG: Performed by: PHYSICIAN ASSISTANT

## 2021-11-14 PROCEDURE — 85007 BL SMEAR W/DIFF WBC COUNT: CPT | Performed by: STUDENT IN AN ORGANIZED HEALTH CARE EDUCATION/TRAINING PROGRAM

## 2021-11-14 PROCEDURE — 99215 OFFICE O/P EST HI 40 MIN: CPT | Performed by: INTERNAL MEDICINE

## 2021-11-14 PROCEDURE — 86900 BLOOD TYPING SEROLOGIC ABO: CPT

## 2021-11-14 PROCEDURE — 99220 PR INITIAL OBSERVATION CARE/DAY 70 MINUTES: CPT | Performed by: STUDENT IN AN ORGANIZED HEALTH CARE EDUCATION/TRAINING PROGRAM

## 2021-11-14 RX ORDER — NITROGLYCERIN 0.4 MG/1
0.4 TABLET SUBLINGUAL
Status: DISCONTINUED | OUTPATIENT
Start: 2021-11-14 | End: 2021-11-15 | Stop reason: HOSPADM

## 2021-11-14 RX ORDER — OXYBUTYNIN CHLORIDE 5 MG/1
5 TABLET, EXTENDED RELEASE ORAL DAILY
Status: DISCONTINUED | OUTPATIENT
Start: 2021-11-14 | End: 2021-11-15 | Stop reason: HOSPADM

## 2021-11-14 RX ORDER — INSULIN LISPRO 100 [IU]/ML
0-9 INJECTION, SOLUTION INTRAVENOUS; SUBCUTANEOUS
Status: DISCONTINUED | OUTPATIENT
Start: 2021-11-14 | End: 2021-11-15 | Stop reason: HOSPADM

## 2021-11-14 RX ORDER — DEXTROSE MONOHYDRATE 25 G/50ML
25 INJECTION, SOLUTION INTRAVENOUS
Status: DISCONTINUED | OUTPATIENT
Start: 2021-11-14 | End: 2021-11-15 | Stop reason: HOSPADM

## 2021-11-14 RX ORDER — VENLAFAXINE HYDROCHLORIDE 75 MG/1
75 CAPSULE, EXTENDED RELEASE ORAL DAILY
Status: DISCONTINUED | OUTPATIENT
Start: 2021-11-14 | End: 2021-11-15 | Stop reason: HOSPADM

## 2021-11-14 RX ORDER — NICOTINE POLACRILEX 4 MG
15 LOZENGE BUCCAL
Status: DISCONTINUED | OUTPATIENT
Start: 2021-11-14 | End: 2021-11-15 | Stop reason: HOSPADM

## 2021-11-14 RX ORDER — CHOLECALCIFEROL (VITAMIN D3) 125 MCG
5 CAPSULE ORAL NIGHTLY PRN
Status: DISCONTINUED | OUTPATIENT
Start: 2021-11-14 | End: 2021-11-15 | Stop reason: HOSPADM

## 2021-11-14 RX ORDER — ALUMINA, MAGNESIA, AND SIMETHICONE 2400; 2400; 240 MG/30ML; MG/30ML; MG/30ML
15 SUSPENSION ORAL EVERY 6 HOURS PRN
Status: DISCONTINUED | OUTPATIENT
Start: 2021-11-14 | End: 2021-11-15 | Stop reason: HOSPADM

## 2021-11-14 RX ORDER — INSULIN LISPRO 100 [IU]/ML
0-9 INJECTION, SOLUTION INTRAVENOUS; SUBCUTANEOUS AS NEEDED
Status: DISCONTINUED | OUTPATIENT
Start: 2021-11-14 | End: 2021-11-15 | Stop reason: HOSPADM

## 2021-11-14 RX ORDER — ACETAMINOPHEN 160 MG/5ML
650 SOLUTION ORAL EVERY 4 HOURS PRN
Status: DISCONTINUED | OUTPATIENT
Start: 2021-11-14 | End: 2021-11-15 | Stop reason: HOSPADM

## 2021-11-14 RX ORDER — ONDANSETRON 4 MG/1
4 TABLET, FILM COATED ORAL EVERY 6 HOURS PRN
Status: DISCONTINUED | OUTPATIENT
Start: 2021-11-14 | End: 2021-11-15 | Stop reason: HOSPADM

## 2021-11-14 RX ORDER — ONDANSETRON 2 MG/ML
4 INJECTION INTRAMUSCULAR; INTRAVENOUS EVERY 6 HOURS PRN
Status: DISCONTINUED | OUTPATIENT
Start: 2021-11-14 | End: 2021-11-15 | Stop reason: HOSPADM

## 2021-11-14 RX ORDER — SODIUM CHLORIDE 0.9 % (FLUSH) 0.9 %
10 SYRINGE (ML) INJECTION EVERY 12 HOURS SCHEDULED
Status: DISCONTINUED | OUTPATIENT
Start: 2021-11-14 | End: 2021-11-15 | Stop reason: HOSPADM

## 2021-11-14 RX ORDER — PANTOPRAZOLE SODIUM 40 MG/1
40 TABLET, DELAYED RELEASE ORAL
Status: DISCONTINUED | OUTPATIENT
Start: 2021-11-15 | End: 2021-11-15 | Stop reason: HOSPADM

## 2021-11-14 RX ORDER — SODIUM CHLORIDE 0.9 % (FLUSH) 0.9 %
10 SYRINGE (ML) INJECTION AS NEEDED
Status: DISCONTINUED | OUTPATIENT
Start: 2021-11-14 | End: 2021-11-15 | Stop reason: HOSPADM

## 2021-11-14 RX ORDER — PANTOPRAZOLE SODIUM 40 MG/10ML
40 INJECTION, POWDER, LYOPHILIZED, FOR SOLUTION INTRAVENOUS EVERY 12 HOURS SCHEDULED
Status: DISCONTINUED | OUTPATIENT
Start: 2021-11-14 | End: 2021-11-14

## 2021-11-14 RX ORDER — MAGNESIUM SULFATE HEPTAHYDRATE 40 MG/ML
2 INJECTION, SOLUTION INTRAVENOUS AS NEEDED
Status: DISCONTINUED | OUTPATIENT
Start: 2021-11-14 | End: 2021-11-15 | Stop reason: HOSPADM

## 2021-11-14 RX ORDER — MAGNESIUM SULFATE HEPTAHYDRATE 40 MG/ML
4 INJECTION, SOLUTION INTRAVENOUS AS NEEDED
Status: DISCONTINUED | OUTPATIENT
Start: 2021-11-14 | End: 2021-11-15 | Stop reason: HOSPADM

## 2021-11-14 RX ORDER — ONDANSETRON 4 MG/1
8 TABLET, FILM COATED ORAL EVERY 8 HOURS PRN
Status: DISCONTINUED | OUTPATIENT
Start: 2021-11-14 | End: 2021-11-15 | Stop reason: HOSPADM

## 2021-11-14 RX ORDER — POTASSIUM CHLORIDE 7.45 MG/ML
10 INJECTION INTRAVENOUS
Status: DISCONTINUED | OUTPATIENT
Start: 2021-11-14 | End: 2021-11-15 | Stop reason: HOSPADM

## 2021-11-14 RX ORDER — FOLIC ACID 1 MG/1
1 TABLET ORAL DAILY
Status: DISCONTINUED | OUTPATIENT
Start: 2021-11-14 | End: 2021-11-15 | Stop reason: HOSPADM

## 2021-11-14 RX ORDER — ACETAMINOPHEN 325 MG/1
650 TABLET ORAL EVERY 4 HOURS PRN
Status: DISCONTINUED | OUTPATIENT
Start: 2021-11-14 | End: 2021-11-15 | Stop reason: HOSPADM

## 2021-11-14 RX ORDER — ACETAMINOPHEN 650 MG/1
650 SUPPOSITORY RECTAL EVERY 4 HOURS PRN
Status: DISCONTINUED | OUTPATIENT
Start: 2021-11-14 | End: 2021-11-15 | Stop reason: HOSPADM

## 2021-11-14 RX ORDER — OLANZAPINE 10 MG/2ML
1 INJECTION, POWDER, LYOPHILIZED, FOR SOLUTION INTRAMUSCULAR AS NEEDED
Status: DISCONTINUED | OUTPATIENT
Start: 2021-11-14 | End: 2021-11-15 | Stop reason: HOSPADM

## 2021-11-14 RX ORDER — POLYETHYLENE GLYCOL 3350 17 G/17G
17 POWDER, FOR SOLUTION ORAL DAILY
Status: DISCONTINUED | OUTPATIENT
Start: 2021-11-14 | End: 2021-11-15 | Stop reason: HOSPADM

## 2021-11-14 RX ORDER — AMLODIPINE BESYLATE 5 MG/1
10 TABLET ORAL DAILY
Status: DISCONTINUED | OUTPATIENT
Start: 2021-11-14 | End: 2021-11-15 | Stop reason: HOSPADM

## 2021-11-14 RX ORDER — DOCUSATE SODIUM 100 MG/1
100 CAPSULE, LIQUID FILLED ORAL NIGHTLY
Status: DISCONTINUED | OUTPATIENT
Start: 2021-11-14 | End: 2021-11-15 | Stop reason: HOSPADM

## 2021-11-14 RX ADMIN — SODIUM CHLORIDE, PRESERVATIVE FREE 10 ML: 5 INJECTION INTRAVENOUS at 10:31

## 2021-11-14 RX ADMIN — METOPROLOL TARTRATE 12.5 MG: 25 TABLET, FILM COATED ORAL at 22:09

## 2021-11-14 RX ADMIN — SODIUM CHLORIDE, PRESERVATIVE FREE 10 ML: 5 INJECTION INTRAVENOUS at 22:12

## 2021-11-14 RX ADMIN — DOCUSATE SODIUM 100 MG: 100 CAPSULE, LIQUID FILLED ORAL at 22:09

## 2021-11-14 RX ADMIN — AMLODIPINE BESYLATE 10 MG: 5 TABLET ORAL at 10:31

## 2021-11-14 RX ADMIN — DIPHENHYDRAMINE HYDROCHLORIDE 25 MG: 25 CAPSULE ORAL at 01:20

## 2021-11-14 RX ADMIN — POLYETHYLENE GLYCOL 3350 17 G: 17 POWDER, FOR SOLUTION ORAL at 14:35

## 2021-11-14 RX ADMIN — LEVETIRACETAM 750 MG: 500 TABLET, FILM COATED ORAL at 10:30

## 2021-11-14 RX ADMIN — VENLAFAXINE HYDROCHLORIDE 75 MG: 75 CAPSULE, EXTENDED RELEASE ORAL at 10:30

## 2021-11-14 RX ADMIN — METOPROLOL TARTRATE 12.5 MG: 25 TABLET, FILM COATED ORAL at 14:36

## 2021-11-14 RX ADMIN — ACETAMINOPHEN 650 MG: 160 SUSPENSION ORAL at 01:20

## 2021-11-14 RX ADMIN — FOLIC ACID 1 MG: 1 TABLET ORAL at 10:30

## 2021-11-14 RX ADMIN — OXYBUTYNIN CHLORIDE 5 MG: 5 TABLET, EXTENDED RELEASE ORAL at 10:30

## 2021-11-14 RX ADMIN — LEVETIRACETAM 750 MG: 500 TABLET, FILM COATED ORAL at 22:09

## 2021-11-14 NOTE — H&P
[facility]      Patient Name: Cindy Cortes  : 1965  MRN: [id number]  Primary Care Physician:  Annamarie Monroy APRN  Date of admission: 2021      Subjective      Chief Complaint: Dyspnea    History of Present Illness:  Cindy Cortes is a 56 y.o. female w/PMH of HTN, GERD, seizures, DM, brain CA 2021, depression, dyslipidemia who presents to [facility] ED due to dyspnea, patient suddenly finished radiation and is currently undergoing chemotherapy. Patient states dyspnea has progressively worsened over the last 5 days, dyspnea is worse with exertion and relieved with rest. Patient admits to cough, fatigue, weakness. Patient denies melena, hematuria, chest pain, vomiting, diarrhea. As dyspnea did not improve she decided to go to [facility] ED.      Upon arrival to the ED vitals temp 97.7, HR 87, RR 16, /75, O2 sat 96% on room air.  Labs notable for troponin less than 0.010, proBNP 355, glucose 111, , K4.1, CO2 24, BUN 13, creatinine 1.03, ALT 15, AST 20, WBC 3.5, Hgb 5.6, platelets 26, neutrophils 56.  Positive for rhinovirus.  EKG shows sinus rhythm or ectopic atrial rhythm rate 79, probable anterior infarct, age indeterminate, nonspecific T abnormalities lateral leads.  Chest x-ray shows no acute cardiopulmonary finding.  CT PE negative, cholelithiasis.  Patient treated in the ED with 2 units PRBCs, 1 unit platelets.      Review of Systems   Constitutional: Positive for malaise/fatigue. Negative for chills and fever.   HENT: Negative.  Negative for congestion.    Eyes: Negative.  Negative for blurred vision and visual disturbance.   Cardiovascular: Positive for dyspnea on exertion. Negative for chest pain and orthopnea.   Respiratory: Positive for cough, shortness of breath and sleep disturbances due to breathing.    Endocrine: Negative.    Hematologic/Lymphatic: Negative.    Skin: Negative.    Musculoskeletal: Positive for  myalgias. Negative for falls.   Gastrointestinal: Negative.  Negative for bloating, abdominal pain, nausea and vomiting.   Genitourinary: Negative.  Negative for dysuria and pelvic pain.   Neurological: Positive for weakness. Negative for difficulty with concentration and light-headedness.   Psychiatric/Behavioral: Negative.    Allergic/Immunologic: Negative.    All other systems reviewed and are negative.       Personal History     Past Medical History:   Diagnosis Date   • Arthritis    • GERD (gastroesophageal reflux disease)    • Hypertension    • Oligodendroglioma (HCC)    • Seizure (HCC)    • Type 2 diabetes mellitus with hyperglycemia, without long-term current use of insulin (HCC) 5/12/2021       Past Surgical History:   Procedure Laterality Date   • CRANIOTOMY FOR TUMOR Right 5/6/2021    Procedure: CRANIOTOMY FOR TUMOR RESECTION WITH STEREOTACTIC;  Surgeon: Jluis Felix MD;  Location: [facility];  Service: Neurosurgery;  Laterality: Right;       Family History: family history includes Breast cancer in her cousin, maternal aunt, and niece; Colon cancer in her maternal aunt; Kidney disease in her mother; Prostate cancer in her brother. Otherwise pertinent FHx was reviewed and not pertinent to current issue.    Social History:  reports that she has never smoked. She has never used smokeless tobacco. She reports previous alcohol use of about 1.0 standard drink of alcohol per week. She reports that she does not use drugs.    Home Medications:  Prior to Admission Medications     Prescriptions Last Dose Informant Patient Reported? Taking?    Alcohol Swabs (Alcohol Wipes) 70 % pads   Yes No    Apply 1 each topically to the appropriate area as directed 4 (Four) Times a Day.    amLODIPine (NORVASC) 10 MG tablet   No No    Take 1 tablet by mouth Daily.    ferrous sulfate 325 (65 FE) MG tablet   Yes No    Take 1 tablet by mouth Daily With Breakfast.    folic acid (FOLVITE) 1 MG tablet   No No    TAKE TWO  TABLETS BY MOUTH EVERY MORNING, THEN TWO TABLETS EVERY EVENING    Gleostine 100 MG chemo capsule   Yes No    Gleostine 40 MG chemo capsule   Yes No    glucose blood (OneTouch Verio) test strip   No No    1 each by Other route 4 (Four) Times a Day Before Meals & at Bedtime. Use as instructed    insulin aspart (NovoLOG FlexPen) 100 UNIT/ML solution pen-injector sc pen   No No    Inject 5 Units under the skin into the appropriate area as directed 3 (Three) Times a Day With Meals. Inject 1u lispro SC for every 50 over 150    insulin detemir (Levemir FlexTouch) 100 UNIT/ML injection   No No    Inject 15 Units under the skin into the appropriate area as directed 2 (Two) Times a Day.    Insulin Pen Needle 32G X 4 MM misc   No No    1 each 5 (Five) Times a Day.    Isopropyl Alcohol (Alcohol Wipes) 70 % misc   No No    Apply 1 each topically 4 (Four) Times a Day Before Meals & at Bedtime.    Lancets (OneTouch Delica Plus [id number]) misc   No No    1 each 4 (Four) Times a Day Before Meals & at Bedtime.    levETIRAcetam (KEPPRA) 750 MG tablet   No No    Take 1 tablet by mouth 2 (Two) Times a Day.    lidocaine-prilocaine (EMLA) 2.5-2.5 % cream   No No    Apply  topically to the appropriate area as directed Every 2 (Two) Hours As Needed for Mild Pain .    metoprolol tartrate (LOPRESSOR) 25 MG tablet   No No    Take 0.5 tablets by mouth Every 8 (Eight) Hours.    ondansetron (Zofran) 8 MG tablet   No No    Take 1 tablet by mouth Every 8 (Eight) Hours As Needed for Nausea or Vomiting.    oxybutynin (DITROPAN) 5 MG tablet   Yes No    oxybutynin XL (DITROPAN-XL) 5 MG 24 hr tablet   Yes No    Take 5 mg by mouth Daily.    venlafaxine XR (EFFEXOR-XR) 75 MG 24 hr capsule   Yes No    Take 75 mg by mouth Daily. Take DOS    vitamin D (ERGOCALCIFEROL) 1.25 MG (37615 UT) capsule capsule   Yes No    Take 50,000 Units by mouth Every 7 (Seven) Days.              Allergies:  No Known Allergies    Objective      Vitals:   Temp:  [97.4 °F (36.3  °C)-98.6 °F (37 °C)] 97.8 °F (36.6 °C)  Heart Rate:  [73-95] 76  Resp:  [15-21] 17  BP: (111-161)/(47-75) 111/47    Physical Exam  Vitals and nursing note reviewed.   Constitutional:       Appearance: She is obese. She is ill-appearing.   HENT:      Right Ear: External ear normal.      Left Ear: External ear normal.      Nose: Nose normal.      Mouth/Throat:      Mouth: Mucous membranes are moist.   Eyes:      Pupils: Pupils are equal, round, and reactive to light.   Cardiovascular:      Rate and Rhythm: Normal rate and regular rhythm.      Pulses: Normal pulses.      Heart sounds: Normal heart sounds.   Pulmonary:      Effort: Pulmonary effort is normal.      Breath sounds: Decreased air movement present. Examination of the right-lower field reveals decreased breath sounds. Examination of the left-lower field reveals decreased breath sounds. Decreased breath sounds present.   Abdominal:      General: Abdomen is protuberant. Bowel sounds are decreased.      Palpations: Abdomen is soft.   Musculoskeletal:         General: Normal range of motion.      Cervical back: Normal range of motion.   Skin:     General: Skin is dry.      Capillary Refill: Capillary refill takes less than 2 seconds.      Coloration: Skin is pale and sallow.      Findings: Bruising, ecchymosis and petechiae present.   Neurological:      General: No focal deficit present.      Mental Status: She is alert and oriented to person, place, and time. Mental status is at baseline.   Psychiatric:         Attention and Perception: Attention and perception normal.         Mood and Affect: Mood and affect normal.         Speech: Speech normal.         Behavior: Behavior normal. Behavior is cooperative.         Thought Content: Thought content normal.         Cognition and Memory: Cognition and memory normal.         Judgment: Judgment normal.          Result Review    Result Review:  I have personally reviewed the results from the time of this admission to  11/14/2021 07:02 EST and agree with these findings:  [x]  Laboratory  [x]  Microbiology  [x]  Radiology  [x]  EKG/Telemetry   [x]  Cardiology/Vascular   []  Pathology  []  Old records  []  Other:        Assessment/Plan        Active Hospital Problems:  Active Hospital Problems    Diagnosis    • Symptomatic anemia    • Chemotherapy-induced thrombocytopenia    • Oligoastrocytoma of frontal lobe (HCC)    • Type 2 diabetes mellitus with hyperglycemia, without long-term current use of insulin (HCC)    • Seizure (HCC)    • Essential hypertension    • Gastroesophageal reflux disease with hiatal hernia    • Morbid obesity with BMI of 60.0-69.9, adult (HCC)    • Dyslipidemia    • Depression      Symptomatic anemia:   -Heme positive on exam in the ED, probable reaction due to thrombocytopenia  -Clear liquid diabetic diet until GI bleed is ruled out  -Oncology consulted will see in a.m.  -SCDs for VTE prophylaxis  -Trend H&H  -Continuous cardiac monitoring    Oligoastrocytoma of frontal lobe:  -Received 29 radiation treatments, last treatment August 2021  -Currently undergoing second round of chemotherapy  -Oncology consulted will see in a.m.    Chemotherapy induced thrombocytopenia:  -Upon arrival to the ED platelets 26, 1 unit transfused  -Monitor CBC    DM type II:  -Sliding scale insulin  -Hypoglycemia protocol  -Monitor Accu-Cheks  -Consistent carbohydrate diet  -Hold oral diabetic medication during admission for potential procedures    Seizure disorder:  -Home medication Keppra  -Seizure precautions    GERD:  -IV Protonix 40 mg every 12 hours    Dyslipidemia:  -Encourage lifestyle modifications  -Encourage dietary modifications    Essential hypertension:  -Encourage lifestyle modifications  -Low-sodium diet  -Home medication amlodipine, metoprolol    Depression:  -Home medication Effexor      DVT prophylaxis:  Mechanical DVT prophylaxis orders are present.      Home medications verified by nursing and/or pharmacy prior  to provider review      CODE STATUS:    Code Status (Patient has no pulse and is not breathing): CPR (Attempt to Resuscitate)  Medical Interventions (Patient has pulse or is breathing): Full Support    Admission Status:  I believe this patient meets observation status.      I discussed the patient's findings and my recommendations with the patient.      The patient was interviewed wearing appropriate personal protective equipment.      Signature: Electronically signed by BJ Davis, 11/14/21, 7:06 AM EST.

## 2021-11-14 NOTE — CONSULTS
Hematology/Oncology Inpatient Consultation    Patient name: Cindy Cortes  : 1965  MRN: [id number]  Referring Provider: Dr. Pereira  Reason for Consultation: Anemia and thrombocytopenia.      Chief complaint: Dyspnea of minimal exertion    History of present illness:    Cindy Cortes is a 56 y.o. female who presented to [facility] on 2021 with complaints of dyspnea that resulted from walking short distances that started shortly before the admission.   Ms. Cortes has a history of oligodendroglioma. She has undergone excision and this was followed by radiation. Early in 2021 she started adjuvant chemotherapy with PCV. As of last evaluation she seemed to tolerate the chemotherapy with myelosuppression as an expected effect.       21  Hematology/Oncology was consulted for anemia and thrombocytopenia that were identified at the time of her initial visit to the emergency room on 21. She has been feeling reasonably well until the beginning of her present illness. Since the time of her original diagnosis she has been without seizures. She has not had any headache and the surgical incision has healed completely. She eats well and has generally been free of nausea or vomiting. She denies chest pain or cough. No expectoration. She has not had any oral ulceration and denies dysphagia or odynophagia. No abdominal pain or diarrhea. She is free of dysuria. No melena, hematochezia or hematuria. She has remained free of skin rash.     He/She  has a past medical history of Arthritis, GERD (gastroesophageal reflux disease), Hypertension, Oligodendroglioma (HCC), Seizure (HCC), and Type 2 diabetes mellitus with hyperglycemia, without long-term current use of insulin (HCC) (2021).    PCP: Annamarie Monroy APRN    History:  Past Medical History:   Diagnosis Date   • Arthritis    • GERD (gastroesophageal reflux disease)    • Hypertension    • Oligodendroglioma (HCC)    •  Seizure (HCC)    • Type 2 diabetes mellitus with hyperglycemia, without long-term current use of insulin (HCC) 5/12/2021   ,   Past Surgical History:   Procedure Laterality Date   • CRANIOTOMY FOR TUMOR Right 5/6/2021    Procedure: CRANIOTOMY FOR TUMOR RESECTION WITH STEREOTACTIC;  Surgeon: Jluis Felix MD;  Location: [facility] MAIN OR;  Service: Neurosurgery;  Laterality: Right;   ,   Family History   Problem Relation Age of Onset   • Kidney disease Mother    • Prostate cancer Brother    • Breast cancer Maternal Aunt    • Colon cancer Maternal Aunt    • Breast cancer Niece    • Breast cancer Cousin    ,   Social History     Tobacco Use   • Smoking status: Never Smoker   • Smokeless tobacco: Never Used   Vaping Use   • Vaping Use: Never used   Substance Use Topics   • Alcohol use: Not Currently     Alcohol/week: 1.0 standard drink     Types: 1 Cans of beer per week     Comment: socially   • Drug use: Never   ,   Medications Prior to Admission   Medication Sig Dispense Refill Last Dose   • Alcohol Swabs (Alcohol Wipes) 70 % pads Apply 1 each topically to the appropriate area as directed 4 (Four) Times a Day.      • amLODIPine (NORVASC) 10 MG tablet Take 1 tablet by mouth Daily. 30 tablet 2    • ferrous sulfate 325 (65 FE) MG tablet Take 1 tablet by mouth Daily With Breakfast.      • folic acid (FOLVITE) 1 MG tablet TAKE TWO TABLETS BY MOUTH EVERY MORNING, THEN TWO TABLETS EVERY EVENING 120 tablet 2    • Gleostine 100 MG chemo capsule       • Gleostine 40 MG chemo capsule       • glucose blood (OneTouch Verio) test strip 1 each by Other route 4 (Four) Times a Day Before Meals & at Bedtime. Use as instructed 200 each 1    • insulin aspart (NovoLOG FlexPen) 100 UNIT/ML solution pen-injector sc pen Inject 5 Units under the skin into the appropriate area as directed 3 (Three) Times a Day With Meals. Inject 1u lispro SC for every 50 over 150 2 pen 2    • insulin detemir (Levemir FlexTouch) 100 UNIT/ML injection Inject 15  Units under the skin into the appropriate area as directed 2 (Two) Times a Day. 3 pen 2    • Insulin Pen Needle 32G X 4 MM misc 1 each 5 (Five) Times a Day. 200 each 1    • Isopropyl Alcohol (Alcohol Wipes) 70 % misc Apply 1 each topically 4 (Four) Times a Day Before Meals & at Bedtime. 200 each 1    • Lancets (OneTouch Delica Plus [id number]) misc 1 each 4 (Four) Times a Day Before Meals & at Bedtime. 200 each 1    • levETIRAcetam (KEPPRA) 750 MG tablet Take 1 tablet by mouth 2 (Two) Times a Day. 60 tablet 2    • lidocaine-prilocaine (EMLA) 2.5-2.5 % cream Apply  topically to the appropriate area as directed Every 2 (Two) Hours As Needed for Mild Pain . 5 g 0    • metoprolol tartrate (LOPRESSOR) 25 MG tablet Take 0.5 tablets by mouth Every 8 (Eight) Hours. 45 tablet 2    • ondansetron (Zofran) 8 MG tablet Take 1 tablet by mouth Every 8 (Eight) Hours As Needed for Nausea or Vomiting. 30 tablet 3    • oxybutynin (DITROPAN) 5 MG tablet       • oxybutynin XL (DITROPAN-XL) 5 MG 24 hr tablet Take 5 mg by mouth Daily.      • venlafaxine XR (EFFEXOR-XR) 75 MG 24 hr capsule Take 75 mg by mouth Daily. Take DOS  3    • vitamin D (ERGOCALCIFEROL) 1.25 MG (09474 UT) capsule capsule Take 50,000 Units by mouth Every 7 (Seven) Days.        , Scheduled Meds:  amLODIPine, 10 mg, Oral, Daily  folic acid, 1 mg, Oral, Daily  insulin lispro, 0-9 Units, Subcutaneous, TID AC  levETIRAcetam, 750 mg, Oral, BID  metoprolol tartrate, 12.5 mg, Oral, Q8H  oxybutynin XL, 5 mg, Oral, Daily  pantoprazole, 40 mg, Intravenous, Q12H  sodium chloride, 10 mL, Intravenous, Q12H  venlafaxine XR, 75 mg, Oral, Daily    , Continuous Infusions:   , PRN Meds:  •  acetaminophen **OR** acetaminophen **OR** acetaminophen  •  aluminum-magnesium hydroxide-simethicone  •  dextrose  •  dextrose  •  glucagon (human recombinant)  •  insulin lispro **AND** insulin lispro  •  magnesium sulfate **OR** magnesium sulfate **OR** magnesium sulfate  •  melatonin  •   nitroglycerin  •  ondansetron **OR** ondansetron  •  ondansetron  •  potassium chloride  •  sodium chloride  •  sodium chloride   Allergies:  Patient has no known allergies.    ROS:  Review of Systems   Constitutional: Negative for activity change, appetite change, chills, diaphoresis, fatigue, fever and unexpected weight change.   HENT: Negative for congestion, dental problem, drooling, ear discharge, ear pain, facial swelling, hearing loss, mouth sores, nosebleeds, postnasal drip, rhinorrhea, sinus pressure, sinus pain, sneezing, sore throat, tinnitus, trouble swallowing and voice change.    Eyes: Negative for photophobia, pain, discharge, redness, itching and visual disturbance.   Respiratory: Positive for shortness of breath (Exclusively with exertion but only minimal exertion resulted in significant discomfort. ). Negative for apnea, cough, choking, chest tightness, wheezing and stridor.    Cardiovascular: Negative for chest pain, palpitations and leg swelling.   Gastrointestinal: Negative for abdominal distention, abdominal pain, anal bleeding, blood in stool, constipation, diarrhea, nausea, rectal pain and vomiting.   Endocrine: Negative for cold intolerance, heat intolerance, polydipsia and polyuria.   Genitourinary: Negative for decreased urine volume, difficulty urinating, dysuria, flank pain, frequency, genital sores, hematuria and urgency.   Musculoskeletal: Negative for arthralgias, back pain, gait problem, joint swelling, myalgias, neck pain and neck stiffness.   Skin: Negative for color change, pallor and rash.   Neurological: Negative for dizziness, tremors, seizures, syncope, facial asymmetry, speech difficulty, weakness, light-headedness, numbness and headaches.   Hematological: Negative for adenopathy. Does not bruise/bleed easily.   Psychiatric/Behavioral: Negative for agitation, behavioral problems, confusion, decreased concentration, hallucinations, self-injury, sleep disturbance and suicidal  "ideas. The patient is not nervous/anxious.       Objective   Vital Signs:   /49   Pulse 76   Temp 98.1 °F (36.7 °C) (Oral)   Resp 15   Ht 157.5 cm (62\")   Wt (!) 157 kg (346 lb 12.5 oz)   LMP  (LMP Unknown)   SpO2 95%   Breastfeeding No   BMI 63.43 kg/m²     Physical Exam: (performed by MD)  Physical Exam  Constitutional:       General: She is not in acute distress.     Appearance: Normal appearance. She is not ill-appearing, toxic-appearing or diaphoretic.   HENT:      Head: Normocephalic and atraumatic.      Right Ear: External ear normal.      Left Ear: External ear normal.      Nose: Nose normal.      Mouth/Throat:      Mouth: Mucous membranes are moist.      Pharynx: Oropharynx is clear. No oropharyngeal exudate or posterior oropharyngeal erythema.   Eyes:      General: No scleral icterus.        Right eye: No discharge.         Left eye: No discharge.      Conjunctiva/sclera: Conjunctivae normal.      Pupils: Pupils are equal, round, and reactive to light.   Cardiovascular:      Rate and Rhythm: Normal rate and regular rhythm.      Pulses: Normal pulses.      Heart sounds: No murmur heard.  No friction rub. No gallop.    Pulmonary:      Effort: No respiratory distress.      Breath sounds: No stridor. No wheezing, rhonchi or rales.   Chest:      Chest wall: No mass, lacerations, deformity, swelling or tenderness.   Breasts:      Right: Absent. No swelling, bleeding, inverted nipple, mass, nipple discharge, skin change, tenderness, axillary adenopathy or supraclavicular adenopathy.      Left: Absent. No swelling, bleeding, inverted nipple, mass, nipple discharge, skin change, tenderness, axillary adenopathy or supraclavicular adenopathy.       Abdominal:      General: Abdomen is flat. Bowel sounds are normal. There is no distension.      Palpations: Abdomen is soft. There is no mass.      Tenderness: There is no abdominal tenderness. There is no right CVA tenderness, left CVA tenderness, guarding " or rebound.      Hernia: No hernia is present.   Musculoskeletal:         General: No swelling, tenderness, deformity or signs of injury.      Cervical back: No rigidity.      Right lower leg: No edema.      Left lower leg: No edema.   Lymphadenopathy:      Cervical: No cervical adenopathy.      Upper Body:      Right upper body: No supraclavicular, axillary or pectoral adenopathy.      Left upper body: No supraclavicular, axillary or pectoral adenopathy.   Skin:     Coloration: Skin is not jaundiced.      Findings: No bruising, lesion or rash.   Neurological:      General: No focal deficit present.      Mental Status: She is alert and oriented to person, place, and time.      Cranial Nerves: No cranial nerve deficit.      Motor: No weakness.      Gait: Gait normal.   Psychiatric:         Mood and Affect: Mood normal.         Behavior: Behavior normal.         Thought Content: Thought content normal.         Judgment: Judgment normal.       Results Review:  Lab Results (last 48 hours)     Procedure Component Value Units Date/Time    POC Glucose Once [[id number]]  (Abnormal) Collected: 11/14/21 0757    Specimen: Blood Updated: 11/14/21 0757     Glucose 116 mg/dL      Comment: Serial Number: [id number]:  [id number]       Basic Metabolic Panel [[id number]]  (Abnormal) Collected: 11/14/21 0717    Specimen: Blood Updated: 11/14/21 0745     Glucose 108 mg/dL      BUN 10 mg/dL      Creatinine 0.94 mg/dL      Sodium 141 mmol/L      Potassium 4.3 mmol/L      Chloride 105 mmol/L      CO2 26.0 mmol/L      Calcium 8.2 mg/dL      eGFR Non African Amer 62 mL/min/1.73      BUN/Creatinine Ratio 10.6     Anion Gap 10.0 mmol/L     Narrative:      GFR Normal >60  Chronic Kidney Disease <60  Kidney Failure <15      CBC Auto Differential [[id number]]  (Abnormal) Collected: 11/14/21 0717    Specimen: Blood Updated: 11/14/21 0735     WBC 3.40 10*3/mm3      RBC 2.08 10*6/mm3      Hemoglobin 6.9 g/dL      Hematocrit 19.7 %      MCV 94.8  fL      Comment: Result checked         MCH 33.2 pg      MCHC 35.1 g/dL      RDW 23.5 %      RDW-SD 75.7 fl      MPV 7.4 fL      Platelets 37 10*3/mm3      Comment: Parameter reviewed in the past 30 days on 11.13.2021.         Neutrophil % 77.6 %      Lymphocyte % 14.8 %      Monocyte % 7.0 %      Eosinophil % 0.2 %      Basophil % 0.4 %      Neutrophils, Absolute 2.60 10*3/mm3      Lymphocytes, Absolute 0.50 10*3/mm3      Monocytes, Absolute 0.20 10*3/mm3      Eosinophils, Absolute 0.00 10*3/mm3      Basophils, Absolute 0.00 10*3/mm3      nRBC 0.1 /100 WBC     Respiratory Panel PCR w/COVID-19(SARS-CoV-2) JONELLE/PAUL/SARITHA/PAD/COR/MAD/BELINDA In-House, NP Swab in UTM/VTM, 3-4 HR TAT - Swab, Nasopharynx [[id number]]  (Abnormal) Collected: 11/13/21 2043    Specimen: Swab from Nasopharynx Updated: 11/13/21 2205     ADENOVIRUS, PCR Not Detected     Coronavirus 229E Not Detected     Coronavirus HKU1 Not Detected     Coronavirus NL63 Not Detected     Coronavirus OC43 Not Detected     COVID19 Not Detected     Human Metapneumovirus Not Detected     Human Rhinovirus/Enterovirus Detected     Influenza A PCR Not Detected     Influenza B PCR Not Detected     Parainfluenza Virus 1 Not Detected     Parainfluenza Virus 2 Not Detected     Parainfluenza Virus 3 Not Detected     Parainfluenza Virus 4 Not Detected     RSV, PCR Not Detected     Bordetella pertussis pcr Not Detected     Bordetella parapertussis PCR Not Detected     Chlamydophila pneumoniae PCR Not Detected     Mycoplasma pneumo by PCR Not Detected    Narrative:      In the setting of a positive respiratory panel with a viral infection PLUS a negative procalcitonin without other underlying concern for bacterial infection, consider observing off antibiotics or discontinuation of antibiotics and continue supportive care. If the respiratory panel is positive for atypical bacterial infection (Bordetella pertussis, Chlamydophila pneumoniae, or Mycoplasma pneumoniae), consider antibiotic  de-escalation to target atypical bacterial infection.    Manual Differential [[id number]]  (Abnormal) Collected: 11/13/21 2058    Specimen: Blood Updated: 11/13/21 2204     Neutrophil % 56.0 %      Lymphocyte % 15.0 %      Monocyte % 11.0 %      Bands %  17.0 %      Myelocyte % 1.0 %      Neutrophils Absolute 2.56 10*3/mm3      Lymphocytes Absolute 0.53 10*3/mm3      Monocytes Absolute 0.39 10*3/mm3      Anisocytosis Slight/1+     Macrocytes Slight/1+     Poikilocytes Slight/1+     WBC Morphology Normal     Platelet Estimate Decreased    CBC & Differential [[id number]]  (Abnormal) Collected: 11/13/21 2058    Specimen: Blood Updated: 11/13/21 2204    Narrative:      The following orders were created for panel order CBC & Differential.  Procedure                               Abnormality         Status                     ---------                               -----------         ------                     CBC Auto Differential[[id number]]        Abnormal            Final result               Scan Slide[[id number]]                                       Final result                 Please view results for these tests on the individual orders.    CBC Auto Differential [[id number]]  (Abnormal) Collected: 11/13/21 2058    Specimen: Blood Updated: 11/13/21 2204     WBC 3.50 10*3/mm3      RBC 1.65 10*6/mm3      Hemoglobin 5.6 g/dL      Hematocrit 16.5 %      .0 fL      MCH 34.1 pg      MCHC 34.1 g/dL      RDW 27.6 %      RDW-SD 91.0 fl      MPV 7.5 fL      Platelets 26 10*3/mm3     Narrative:      The previously reported component NRBC is no longer being reported. Previous result was 0.2 /100 WBC (Reference Range: 0.0-0.2 /100 WBC) on 11/13/2021 at 2123 EST.    Scan Slide [[id number]] Collected: 11/13/21 2058    Specimen: Blood Updated: 11/13/21 2204     Scan Slide --     Comment: See Manual Differential Results       Comprehensive Metabolic Panel [[id number]]  (Abnormal) Collected: 11/13/21 2058    Specimen: Blood  Updated: 11/13/21 2131     Glucose 111 mg/dL      BUN 13 mg/dL      Creatinine 1.03 mg/dL      Sodium 141 mmol/L      Potassium 4.1 mmol/L      Chloride 104 mmol/L      CO2 24.0 mmol/L      Calcium 8.3 mg/dL      Total Protein 6.1 g/dL      Albumin 3.60 g/dL      ALT (SGPT) 15 U/L      AST (SGOT) 20 U/L      Alkaline Phosphatase 80 U/L      Total Bilirubin 0.6 mg/dL      eGFR Non African Amer 56 mL/min/1.73      Globulin 2.5 gm/dL      A/G Ratio 1.4 g/dL      BUN/Creatinine Ratio 12.6     Anion Gap 13.0 mmol/L     Narrative:      GFR Normal >60  Chronic Kidney Disease <60  Kidney Failure <15      Troponin [[id number]]  (Normal) Collected: 11/13/21 2058    Specimen: Blood Updated: 11/13/21 2131     Troponin T <0.010 ng/mL     Narrative:      Troponin T Reference Range:  <= 0.03 ng/mL-   Negative for AMI  >0.03 ng/mL-     Abnormal for myocardial necrosis.  Clinicians would have to utilize clinical acumen, EKG, Troponin and serial changes to determine if it is an Acute Myocardial Infarction or myocardial injury due to an underlying chronic condition.       Results may be falsely decreased if patient taking Biotin.      BNP [[id number]]  (Normal) Collected: 11/13/21 2058    Specimen: Blood Updated: 11/13/21 2128     proBNP 355.0 pg/mL     Narrative:      Among patients with dyspnea, NT-proBNP is highly sensitive for the detection of acute congestive heart failure. In addition NT-proBNP of <300 pg/ml effectively rules out acute congestive heart failure with 99% negative predictive value.    Results may be falsely decreased if patient taking Biotin.           Imaging Reviewed:   XR Chest 1 View    Result Date: 11/13/2021  1.  No acute cardiopulmonary finding. Electronically signed by:  Nicole Meier M.D.  11/13/2021 9:20 PM    CT Chest Pulmonary Embolism    Result Date: 11/13/2021  1.  No evidence of pulmonary embolus. 2.  Cholelithiasis. Electronically signed by:  Nicole Meier M.D.  11/13/2021 9:22  PM    Assessment/Plan   ASSESSMENT  1. Normocytic anemia. This is the cause of the symptoms that brought her in the hospital. It is clear that, following the recent red cell transfusions, she has been feeling much better and she no longer has a difficult time walking to the bathroom. This anemia is likely the result of myelosuppression due to the chemotherapy she is receiving. The regimen she receives is notorious for this. Given the somewhat sudden and severe anemia she has other possibilities need to be considered. Gastrointestinal bleeding needs to be considered given the thrombocytopenia. However, the lack of symptoms and the lack of a history of a predisposing factor make this unlikely. Procarbazine, one of the agents she is receiving, is known to result in hemolysis. Will investigate further.   2. Thrombocytopenia. This is obviously the result of the regimen she receives and I would expect it to resolve in the future. The myelosuppressive effect of both the lomustine and the procarbazine she receives tends to be persistent so that it will probably be a few days before improvement is noted.   3. Reviewed the chart, including the medical notes, blood counts, chemistries, reports of pathology and imaging studies. Discussed with her.     PLAN  1. Transfusion support.   2. Investigate hemolysis.   3. Dr. Mtz will be back on 11/15/21.     Electronically signed by Maulik Martell MD, 11/14/21, 10:38 AM EST.

## 2021-11-14 NOTE — ED PROVIDER NOTES
Subjective     Patient is a 56-year-old female comes in complaining of shortness of breath for the past 4 to 5 days as well as cough.  Patient states that her shortness of breath is worse with exertion and better with rest.  Patient denies any chest pain at all or associated chest pain with shortness of breath.  Patient states her cough is nonproductive and dry in nature.  Patient denies any fever, chills, nausea, vomiting, diarrhea, abdominal pain or urinary symptoms or swelling in her legs bilaterally.  Patient states that she is currently on chemo for brain cancer and follows with Dr. Workman with oncology and just finished a round of radiation with Dr. Cruz with radiation oncology.  Patient denies any pain at this time.  Patient does report that her spouse had a sinus infection earlier this week but otherwise no sick contacts.          Review of Systems   Constitutional: Negative for chills, fatigue and fever.   HENT: Negative for congestion, sore throat, tinnitus and trouble swallowing.    Eyes: Negative for photophobia, discharge and visual disturbance.   Respiratory: Positive for cough and shortness of breath. Negative for chest tightness and wheezing.    Cardiovascular: Negative for chest pain, palpitations and leg swelling.   Gastrointestinal: Negative for abdominal pain, blood in stool, diarrhea, nausea and vomiting.   Genitourinary: Negative for dysuria, flank pain, frequency and urgency.   Musculoskeletal: Negative for arthralgias and myalgias.   Skin: Negative for rash.   Neurological: Negative for dizziness, syncope, light-headedness and headaches.   Psychiatric/Behavioral: Negative for confusion.       Past Medical History:   Diagnosis Date   • Arthritis    • GERD (gastroesophageal reflux disease)    • Hypertension    • Oligodendroglioma (HCC)    • Seizure (HCC)    • Type 2 diabetes mellitus with hyperglycemia, without long-term current use of insulin (HCC) 5/12/2021       No Known Allergies    Past  Surgical History:   Procedure Laterality Date   • CRANIOTOMY FOR TUMOR Right 5/6/2021    Procedure: CRANIOTOMY FOR TUMOR RESECTION WITH STEREOTACTIC;  Surgeon: Jluis Felix MD;  Location: [facility] MAIN OR;  Service: Neurosurgery;  Laterality: Right;       Family History   Problem Relation Age of Onset   • Kidney disease Mother    • Prostate cancer Brother    • Breast cancer Maternal Aunt    • Colon cancer Maternal Aunt    • Breast cancer Niece    • Breast cancer Cousin        Social History     Socioeconomic History   • Marital status: Single   Tobacco Use   • Smoking status: Never Smoker   • Smokeless tobacco: Never Used   Vaping Use   • Vaping Use: Never used   Substance and Sexual Activity   • Alcohol use: Not Currently     Alcohol/week: 1.0 standard drink     Types: 1 Cans of beer per week     Comment: socially   • Drug use: Never   • Sexual activity: Yes     Partners: Male           Objective   Physical Exam  Vitals and nursing note reviewed.   Constitutional:       General: She is not in acute distress.     Appearance: She is well-developed. She is not diaphoretic.   HENT:      Head: Normocephalic and atraumatic.      Nose: Nose normal.      Mouth/Throat:      Pharynx: No oropharyngeal exudate.   Eyes:      Extraocular Movements: Extraocular movements intact.      Conjunctiva/sclera: Conjunctivae normal.      Pupils: Pupils are equal, round, and reactive to light.   Cardiovascular:      Rate and Rhythm: Normal rate and regular rhythm.      Heart sounds: Normal heart sounds.      Comments: S1, S2 audible.  Pulmonary:      Effort: Pulmonary effort is normal. No respiratory distress.      Breath sounds: Examination of the right-lower field reveals decreased breath sounds. Examination of the left-lower field reveals decreased breath sounds. Decreased breath sounds present. No wheezing, rhonchi or rales.      Comments: On room air.  Abdominal:      General: Bowel sounds are normal. There is no distension.       "Palpations: Abdomen is soft.      Tenderness: There is no abdominal tenderness.   Genitourinary:     Rectum: Guaiac result positive.      Comments: Rectal exam chaperoned by Viola Danielson RN.  No gross blood on exam however positive Hemoccult.  Musculoskeletal:         General: No tenderness or deformity. Normal range of motion.      Cervical back: Normal range of motion.      Right lower leg: No edema.      Left lower leg: No edema.   Skin:     General: Skin is warm.      Capillary Refill: Capillary refill takes less than 2 seconds.      Findings: No erythema or rash.   Neurological:      Mental Status: She is alert and oriented to person, place, and time.      Cranial Nerves: No cranial nerve deficit.   Psychiatric:         Mood and Affect: Mood normal.         Behavior: Behavior normal.         Procedures           ED Course      /47 (BP Location: Left arm, Patient Position: Lying)   Pulse 73   Temp 98.6 °F (37 °C) (Oral)   Resp 16   Ht 157.5 cm (62\")   Wt (!) 157 kg (346 lb 12.5 oz)   LMP  (LMP Unknown)   SpO2 94%   Breastfeeding No   BMI 63.43 kg/m²   Labs Reviewed   RESPIRATORY PANEL PCR W/ COVID-19 (SARS-COV-2) JONELLE/PAUL/SARITHA/PAD/COR/MAD/BELINDA IN-HOUSE, NP SWAB IN UTM/VTP, 3-4 HR TAT - Abnormal; Notable for the following components:       Result Value    Human Rhinovirus/Enterovirus Detected (*)     All other components within normal limits    Narrative:     In the setting of a positive respiratory panel with a viral infection PLUS a negative procalcitonin without other underlying concern for bacterial infection, consider observing off antibiotics or discontinuation of antibiotics and continue supportive care. If the respiratory panel is positive for atypical bacterial infection (Bordetella pertussis, Chlamydophila pneumoniae, or Mycoplasma pneumoniae), consider antibiotic de-escalation to target atypical bacterial infection.   COMPREHENSIVE METABOLIC PANEL - Abnormal; Notable for the following " components:    Glucose 111 (*)     Creatinine 1.03 (*)     Calcium 8.3 (*)     eGFR Non  Amer 56 (*)     All other components within normal limits    Narrative:     GFR Normal >60  Chronic Kidney Disease <60  Kidney Failure <15     CBC WITH AUTO DIFFERENTIAL - Abnormal; Notable for the following components:    RBC 1.65 (*)     Hemoglobin 5.6 (*)     Hematocrit 16.5 (*)     .0 (*)     MCH 34.1 (*)     RDW 27.6 (*)     RDW-SD 91.0 (*)     Platelets 26 (*)     All other components within normal limits    Narrative:     The previously reported component NRBC is no longer being reported. Previous result was 0.2 /100 WBC (Reference Range: 0.0-0.2 /100 WBC) on 11/13/2021 at 2123 EST.   MANUAL DIFFERENTIAL - Abnormal; Notable for the following components:    Lymphocyte % 15.0 (*)     Bands %  17.0 (*)     Myelocyte % 1.0 (*)     Lymphocytes Absolute 0.53 (*)     All other components within normal limits   BNP (IN-HOUSE) - Normal    Narrative:     Among patients with dyspnea, NT-proBNP is highly sensitive for the detection of acute congestive heart failure. In addition NT-proBNP of <300 pg/ml effectively rules out acute congestive heart failure with 99% negative predictive value.    Results may be falsely decreased if patient taking Biotin.     TROPONIN (IN-HOUSE) - Normal    Narrative:     Troponin T Reference Range:  <= 0.03 ng/mL-   Negative for AMI  >0.03 ng/mL-     Abnormal for myocardial necrosis.  Clinicians would have to utilize clinical acumen, EKG, Troponin and serial changes to determine if it is an Acute Myocardial Infarction or myocardial injury due to an underlying chronic condition.       Results may be falsely decreased if patient taking Biotin.     SCAN SLIDE   BASIC METABOLIC PANEL   CBC WITH AUTO DIFFERENTIAL   HEMOGLOBIN AND HEMATOCRIT, BLOOD   POCT GLUCOSE FINGERSTICK   POCT GLUCOSE FINGERSTICK   POCT GLUCOSE FINGERSTICK   POCT GLUCOSE FINGERSTICK   TYPE AND SCREEN   PREPARE RBC   BB  ARMBAND CHECK   PREPARE PLATELET PHERESIS   ANTIBODY IDENTIFICATION   PREPARE RBC   CBC AND DIFFERENTIAL    Narrative:     The following orders were created for panel order CBC & Differential.  Procedure                               Abnormality         Status                     ---------                               -----------         ------                     CBC Auto Differential[[id number]]        Abnormal            Final result               Scan Slide[[id number]]                                       Final result                 Please view results for these tests on the individual orders.     XR Chest 1 View    Result Date: 11/13/2021  1.  No acute cardiopulmonary finding. Electronically signed by:  Nicole Meier M.D.  11/13/2021 9:20 PM    CT Chest Pulmonary Embolism    Result Date: 11/13/2021  1.  No evidence of pulmonary embolus. 2.  Cholelithiasis. Electronically signed by:  Nicole Meier M.D.  11/13/2021 9:22 PM                                         MDM  Number of Diagnoses or Management Options     Amount and/or Complexity of Data Reviewed  Clinical lab tests: reviewed  Tests in the radiology section of CPT®: reviewed  Tests in the medicine section of CPT®: reviewed    Risk of Complications, Morbidity, and/or Mortality  Presenting problems: low  Diagnostic procedures: low  Management options: low    Patient Progress  Patient progress: stable       Chart review: History of brain cancer and is currently on chemo with Dr. Workman with oncology and has just recently finished radiation with Dr. Cruz.    EKG: EKG reviewed by myself interpreted by , shows sinus or ectopic atrial rhythm at 79 bpm, nonspecific T abnormalities in both lateral and inferior leads status change from previous study about 1 year ago.    Imaging:    CT Chest Pulmonary Embolism   Final Result      1.  No evidence of pulmonary embolus.   2.  Cholelithiasis.               Electronically signed by:  Nicole Meier M.D.      "11/13/2021 9:22 PM      XR Chest 1 View   Final Result      1.  No acute cardiopulmonary finding.               Electronically signed by:  Nicole Meier M.D.     11/13/2021 9:20 PM          Labs: Hemoglobin low at 5.6.  Platelets low at 26.  Increased bandemia.  Initial troponin negative, CMP largely unremarkable.  Patient typed and screened.  Rhinovirus positive.    Vitals:  /47 (BP Location: Left arm, Patient Position: Lying)   Pulse 73   Temp 98.6 °F (37 °C) (Oral)   Resp 16   Ht 157.5 cm (62\")   Wt (!) 157 kg (346 lb 12.5 oz)   LMP  (LMP Unknown)   SpO2 94%   Breastfeeding No   BMI 63.43 kg/m²     Medications given:    Medications   sodium chloride 0.9 % flush 10 mL (has no administration in time range)   nitroglycerin (NITROSTAT) SL tablet 0.4 mg (has no administration in time range)   sodium chloride 0.9 % flush 10 mL (10 mL Intravenous Not Given 11/14/21 0325)   sodium chloride 0.9 % flush 10 mL (has no administration in time range)   acetaminophen (TYLENOL) tablet 650 mg (has no administration in time range)     Or   acetaminophen (TYLENOL) 160 MG/5ML solution 650 mg (has no administration in time range)     Or   acetaminophen (TYLENOL) suppository 650 mg (has no administration in time range)   aluminum-magnesium hydroxide-simethicone (MAALOX MAX) 400-400-40 MG/5ML suspension 15 mL (has no administration in time range)   ondansetron (ZOFRAN) tablet 4 mg (has no administration in time range)     Or   ondansetron (ZOFRAN) injection 4 mg (has no administration in time range)   melatonin tablet 5 mg (has no administration in time range)   dextrose (GLUTOSE) oral gel 15 g (has no administration in time range)   dextrose (D50W) (25 g/50 mL) IV injection 25 g (has no administration in time range)   glucagon (human recombinant) (GLUCAGEN DIAGNOSTIC) 1 mg in sterile water (preservative free) 1 mL injection (has no administration in time range)   potassium chloride 10 mEq in 100 mL IVPB (has no " administration in time range)   Magnesium Sulfate 2 gram Bolus, followed by 8 gram infusion (total Mg dose 10 grams)- Mg less than or equal to 1mg/dL (has no administration in time range)     Or   Magnesium Sulfate 2 gram / 50mL Infusion (GIVE X 3 BAGS TO EQUAL 6GM TOTAL DOSE) - Mg 1.1 - 1.5 mg/dl (has no administration in time range)     Or   Magnesium Sulfate 4 gram infusion- Mg 1.6-1.9 mg/dL (has no administration in time range)   insulin lispro (ADMELOG) injection 0-9 Units (has no administration in time range)     And   insulin lispro (ADMELOG) injection 0-9 Units (has no administration in time range)   pantoprazole (PROTONIX) injection 40 mg (has no administration in time range)   iopamidol (ISOVUE-370) 76 % injection 100 mL (100 mL Intravenous Given 11/13/21 2258)   acetaminophen (TYLENOL) tablet 650 mg ( Oral Not Given:  See Alt 11/14/21 0120)     Or   acetaminophen (TYLENOL) 160 MG/5ML solution 650 mg (650 mg Oral Given 11/14/21 0120)     Or   acetaminophen (TYLENOL) suppository 650 mg ( Rectal Not Given:  See Alt 11/14/21 0120)   diphenhydrAMINE (BENADRYL) capsule 25 mg (25 mg Oral Given 11/14/21 0120)     Or   diphenhydrAMINE (BENADRYL) injection 25 mg ( Intravenous Not Given:  See Alt 11/14/21 0120)       Procedures:    MDM: Patient is a 56-year-old female comes in complaining of shortness of breath and cough.  Patient is positive for human rhinovirus.  CMP largely unremarkable.  Hemoglobin low at 5.6 which may be related to current chemotherapy for her brain tumor however patient is positive on rectal exam for guaiac.  Patient was typed and screened given 2 units of PRBCs.  Patient also given a unit of platelets as her platelets were 26 and has a positive stool occult.  I spoke with on-call hematology/oncology for patient's hematologist, Dr. Workman and spoke with Dr. Workman and recommended plan for 2 units of PRBCs and a unit of platelets.  This was ordered and started.  CT PE protocol was negative for  pulmonary embolism.  Initial troponin was negative however EKG showed nonspecific T wave abnormalities which are likely related to patient's anemia.  Spoke with [facility] who accepted patient behalf of hospitalist team for Dr. Reza for admission to hospital.  Plan discussed with patient and is agreeable with plan.    Final diagnoses:   Shortness of breath   Cough   Symptomatic anemia   Guaiac positive stools       ED Disposition  ED Disposition     ED Disposition Condition Comment    Decision to Admit  Level of Care: Telemetry [5]   Admitting Physician: TANISHA REZA [[id number]]   Attending Physician: TANISHA REAZ [[id number]]            No follow-up provider specified.       Medication List      No changes were made to your prescriptions during this visit.          Hector Bonner PA  11/14/21 0529

## 2021-11-14 NOTE — CONSULTS
"GI CONSULT  NOTE:    Referring Provider:    Dr. 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  Chemotherapy started on 9/16/2021; arthritis, GERD, hypertension, seizures, diabetes who presented to the ER on 11/13/2021 with cough, shortness of breath/dyspnea upon exertion.  Patient was diagnosed with human rhinovirus in the ER.  Hemoglobin was noted to be 5.6 with Hemoccult positive stool.  Patient's hemoglobin was 8.3 on 10/27/2021.  Patient received 2 units of packed red blood cells and 1 unit of platelets in the ER.  She is receiving her third unit of packed red blood cells.  Patient states she has been having brown stool but has been constipated lately due to taking Zofran routinely.  Patient states she has been having to push and strain to have bowel movements.  Patient denies any melena, hematochezia or bright red blood per rectum.  Patient's last solid bowel movement was yesterday.  Patient denies any abdominal pain, nausea or vomiting.  Patient has history of reflux and takes over-the-counter PPI twice a day.  Gallbladder is intact.    Iron studies showed a serum iron of 140 and iron saturation of 42% on 10/27/2021.    Endo History:  2019 EGD (Dr. Scanlon) grade a erosive esophagitis.  Esophageal biopsy showed moderate chronic inflammation.  2008 EGD and colonoscopy (Dr. Delatorre) medium hiatal hernia.  Grade B esophagitis.  Negative celiac sprue.  Hyperplastic sigmoid polyp.    Past Medical History:  Past Medical History:   Diagnosis Date   • Arthritis    • GERD (gastroesophageal reflux disease)    • Hypertension    • Oligodendroglioma (HCC)    • Seizure (HCC)    • Type 2 diabetes mellitus with hyperglycemia, " without long-term current use of insulin (HCC) 5/12/2021       Past Surgical History:  Past Surgical History:   Procedure Laterality Date   • CRANIOTOMY FOR TUMOR Right 5/6/2021    Procedure: CRANIOTOMY FOR TUMOR RESECTION WITH STEREOTACTIC;  Surgeon: Jluis Felix MD;  Location: [facility] MAIN OR;  Service: Neurosurgery;  Laterality: Right;       Social History:  Social History     Tobacco Use   • Smoking status: Never Smoker   • Smokeless tobacco: Never Used   Vaping Use   • Vaping Use: Never used   Substance Use Topics   • Alcohol use: Not Currently     Alcohol/week: 1.0 standard drink     Types: 1 Cans of beer per week     Comment: socially   • Drug use: Never       Family History:  Family History   Problem Relation Age of Onset   • Kidney disease Mother    • Prostate cancer Brother    • Breast cancer Maternal Aunt    • Colon cancer Maternal Aunt    • Breast cancer Niece    • Breast cancer Cousin        Medications:  Medications Prior to Admission   Medication Sig Dispense Refill Last Dose   • Alcohol Swabs (Alcohol Wipes) 70 % pads Apply 1 each topically to the appropriate area as directed 4 (Four) Times a Day.      • amLODIPine (NORVASC) 10 MG tablet Take 1 tablet by mouth Daily. 30 tablet 2    • ferrous sulfate 325 (65 FE) MG tablet Take 1 tablet by mouth Daily With Breakfast.      • folic acid (FOLVITE) 1 MG tablet TAKE TWO TABLETS BY MOUTH EVERY MORNING, THEN TWO TABLETS EVERY EVENING 120 tablet 2    • Gleostine 100 MG chemo capsule       • Gleostine 40 MG chemo capsule       • glucose blood (OneTouch Verio) test strip 1 each by Other route 4 (Four) Times a Day Before Meals & at Bedtime. Use as instructed 200 each 1    • insulin aspart (NovoLOG FlexPen) 100 UNIT/ML solution pen-injector sc pen Inject 5 Units under the skin into the appropriate area as directed 3 (Three) Times a Day With Meals. Inject 1u lispro SC for every 50 over 150 2 pen 2    • insulin detemir (Levemir FlexTouch) 100 UNIT/ML injection  Inject 15 Units under the skin into the appropriate area as directed 2 (Two) Times a Day. 3 pen 2    • Insulin Pen Needle 32G X 4 MM misc 1 each 5 (Five) Times a Day. 200 each 1    • Isopropyl Alcohol (Alcohol Wipes) 70 % misc Apply 1 each topically 4 (Four) Times a Day Before Meals & at Bedtime. 200 each 1    • Lancets (OneTouch Delica Plus [id number]) misc 1 each 4 (Four) Times a Day Before Meals & at Bedtime. 200 each 1    • levETIRAcetam (KEPPRA) 750 MG tablet Take 1 tablet by mouth 2 (Two) Times a Day. 60 tablet 2    • lidocaine-prilocaine (EMLA) 2.5-2.5 % cream Apply  topically to the appropriate area as directed Every 2 (Two) Hours As Needed for Mild Pain . 5 g 0    • metoprolol tartrate (LOPRESSOR) 25 MG tablet Take 0.5 tablets by mouth Every 8 (Eight) Hours. 45 tablet 2    • ondansetron (Zofran) 8 MG tablet Take 1 tablet by mouth Every 8 (Eight) Hours As Needed for Nausea or Vomiting. 30 tablet 3    • oxybutynin (DITROPAN) 5 MG tablet       • oxybutynin XL (DITROPAN-XL) 5 MG 24 hr tablet Take 5 mg by mouth Daily.      • venlafaxine XR (EFFEXOR-XR) 75 MG 24 hr capsule Take 75 mg by mouth Daily. Take DOS  3    • vitamin D (ERGOCALCIFEROL) 1.25 MG (42280 UT) capsule capsule Take 50,000 Units by mouth Every 7 (Seven) Days.            Scheduled Meds:insulin lispro, 0-9 Units, Subcutaneous, TID AC  pantoprazole, 40 mg, Intravenous, Q12H  sodium chloride, 10 mL, Intravenous, Q12H      Continuous Infusions:   PRN Meds:.•  acetaminophen **OR** acetaminophen **OR** acetaminophen  •  aluminum-magnesium hydroxide-simethicone  •  dextrose  •  dextrose  •  glucagon (human recombinant)  •  insulin lispro **AND** insulin lispro  •  magnesium sulfate **OR** magnesium sulfate **OR** magnesium sulfate  •  melatonin  •  nitroglycerin  •  ondansetron **OR** ondansetron  •  potassium chloride  •  sodium chloride  •  sodium chloride    ALLERGIES:  Patient has no known allergies.    ROS:  Review of Systems   Constitutional:  Negative for chills and fever.   Respiratory: Positive for cough and shortness of breath.        The following systems were reviewed and negative;   Constitution:  No fevers, chills, no unintentional weight loss  Skin: no rash, no jaundice  Eyes:  No blurry vision, no eye pain  HENT:  No change in hearing or smell  Resp:  No dyspnea or cough  CV:  No chest pain or palpitations  :  No dysuria, hematuria  Musculoskeletal:  No leg cramps or arthralgias  Neuro:  No tremor, no numbness  Psych:  No depression or confusion    Objective  Resting in hospital bed. Family at bedside, RM [id number]    Vital Signs:   Vitals:    11/14/21 0434 11/14/21 0644 11/14/21 0758 11/14/21 0834   BP: 111/47 151/59 157/56 139/54   BP Location: Left arm  Left arm    Patient Position: Lying  Lying    Pulse: 73 76 96 78   Resp: 16 17 13 15   Temp: 98.6 °F (37 °C) 97.8 °F (36.6 °C) 98.6 °F (37 °C) 98.1 °F (36.7 °C)   TempSrc: Oral Oral Oral Oral   SpO2: 94%  95% 95%   Weight:       Height:           Physical Exam:   General Appearance:    Awake and alert, in no acute distress.    Head:    Normocephalic, without obvious abnormality, atraumatic   Eyes:            Conjunctivae normal, anicteric sclerae, pupils equal   Ears:    Ears appear intact with no abnormalities noted   Throat:   No oral lesions, no thrush, oral mucosa moist   Neck:   Supple, no JVD   Lungs:     Clear to auscultation bilaterally, respirations regular, even and unlabored    Heart:    Regular rhythm and normal rate, normal S1 and S2, no            Murmur appreciated   Chest Wall:    No abnormalities observed   Abdomen:     Normal bowel sounds, soft, nontender, no rebound or guarding, nondistended, no hepatosplenomegaly   Rectal:     Deferred   Extremities:   Moves all extremities, no edema, no cyanosis   Pulses:   Pulses palpable and equal bilaterally   Skin:   No rash, no jaundice, normal palpation   Lymph nodes:   No cervical, supraclavicular or submandibular palpable adenopathy    Neurologic:   Cranial nerves 2 - 12 grossly intact, no asterixis       Results Review:   I reviewed the patient's labs and imaging.  Lab Results (last 24 hours)     Procedure Component Value Units Date/Time    POC Glucose Once [[id number]]  (Abnormal) Collected: 11/14/21 0757    Specimen: Blood Updated: 11/14/21 0757     Glucose 116 mg/dL      Comment: Serial Number: [id number]:  [id number]       Basic Metabolic Panel [[id number]]  (Abnormal) Collected: 11/14/21 0717    Specimen: Blood Updated: 11/14/21 0745     Glucose 108 mg/dL      BUN 10 mg/dL      Creatinine 0.94 mg/dL      Sodium 141 mmol/L      Potassium 4.3 mmol/L      Chloride 105 mmol/L      CO2 26.0 mmol/L      Calcium 8.2 mg/dL      eGFR Non African Amer 62 mL/min/1.73      BUN/Creatinine Ratio 10.6     Anion Gap 10.0 mmol/L     Narrative:      GFR Normal >60  Chronic Kidney Disease <60  Kidney Failure <15      CBC Auto Differential [[id number]]  (Abnormal) Collected: 11/14/21 0717    Specimen: Blood Updated: 11/14/21 0735     WBC 3.40 10*3/mm3      RBC 2.08 10*6/mm3      Hemoglobin 6.9 g/dL      Hematocrit 19.7 %      MCV 94.8 fL      Comment: Result checked         MCH 33.2 pg      MCHC 35.1 g/dL      RDW 23.5 %      RDW-SD 75.7 fl      MPV 7.4 fL      Platelets 37 10*3/mm3      Comment: Parameter reviewed in the past 30 days on 11.13.2021.         Neutrophil % 77.6 %      Lymphocyte % 14.8 %      Monocyte % 7.0 %      Eosinophil % 0.2 %      Basophil % 0.4 %      Neutrophils, Absolute 2.60 10*3/mm3      Lymphocytes, Absolute 0.50 10*3/mm3      Monocytes, Absolute 0.20 10*3/mm3      Eosinophils, Absolute 0.00 10*3/mm3      Basophils, Absolute 0.00 10*3/mm3      nRBC 0.1 /100 WBC     Respiratory Panel PCR w/COVID-19(SARS-CoV-2) JONELLE/PAUL/SARITHA/PAD/COR/MAD/BELINDA In-House, NP Swab in UTM/VTM, 3-4 HR TAT - Swab, Nasopharynx [[id number]]  (Abnormal) Collected: 11/13/21 2043    Specimen: Swab from Nasopharynx Updated: 11/13/21 2205     ADENOVIRUS, PCR Not  Detected     Coronavirus 229E Not Detected     Coronavirus HKU1 Not Detected     Coronavirus NL63 Not Detected     Coronavirus OC43 Not Detected     COVID19 Not Detected     Human Metapneumovirus Not Detected     Human Rhinovirus/Enterovirus Detected     Influenza A PCR Not Detected     Influenza B PCR Not Detected     Parainfluenza Virus 1 Not Detected     Parainfluenza Virus 2 Not Detected     Parainfluenza Virus 3 Not Detected     Parainfluenza Virus 4 Not Detected     RSV, PCR Not Detected     Bordetella pertussis pcr Not Detected     Bordetella parapertussis PCR Not Detected     Chlamydophila pneumoniae PCR Not Detected     Mycoplasma pneumo by PCR Not Detected    Narrative:      In the setting of a positive respiratory panel with a viral infection PLUS a negative procalcitonin without other underlying concern for bacterial infection, consider observing off antibiotics or discontinuation of antibiotics and continue supportive care. If the respiratory panel is positive for atypical bacterial infection (Bordetella pertussis, Chlamydophila pneumoniae, or Mycoplasma pneumoniae), consider antibiotic de-escalation to target atypical bacterial infection.    Manual Differential [[id number]]  (Abnormal) Collected: 11/13/21 2058    Specimen: Blood Updated: 11/13/21 2204     Neutrophil % 56.0 %      Lymphocyte % 15.0 %      Monocyte % 11.0 %      Bands %  17.0 %      Myelocyte % 1.0 %      Neutrophils Absolute 2.56 10*3/mm3      Lymphocytes Absolute 0.53 10*3/mm3      Monocytes Absolute 0.39 10*3/mm3      Anisocytosis Slight/1+     Macrocytes Slight/1+     Poikilocytes Slight/1+     WBC Morphology Normal     Platelet Estimate Decreased    CBC & Differential [[id number]]  (Abnormal) Collected: 11/13/21 2058    Specimen: Blood Updated: 11/13/21 2204    Narrative:      The following orders were created for panel order CBC & Differential.  Procedure                               Abnormality         Status                      ---------                               -----------         ------                     CBC Auto Differential[[id number]]        Abnormal            Final result               Scan Slide[[id number]]                                       Final result                 Please view results for these tests on the individual orders.    CBC Auto Differential [[id number]]  (Abnormal) Collected: 11/13/21 2058    Specimen: Blood Updated: 11/13/21 2204     WBC 3.50 10*3/mm3      RBC 1.65 10*6/mm3      Hemoglobin 5.6 g/dL      Hematocrit 16.5 %      .0 fL      MCH 34.1 pg      MCHC 34.1 g/dL      RDW 27.6 %      RDW-SD 91.0 fl      MPV 7.5 fL      Platelets 26 10*3/mm3     Narrative:      The previously reported component NRBC is no longer being reported. Previous result was 0.2 /100 WBC (Reference Range: 0.0-0.2 /100 WBC) on 11/13/2021 at 2123 EST.    Scan Slide [[id number]] Collected: 11/13/21 2058    Specimen: Blood Updated: 11/13/21 2204     Scan Slide --     Comment: See Manual Differential Results       Comprehensive Metabolic Panel [[id number]]  (Abnormal) Collected: 11/13/21 2058    Specimen: Blood Updated: 11/13/21 2131     Glucose 111 mg/dL      BUN 13 mg/dL      Creatinine 1.03 mg/dL      Sodium 141 mmol/L      Potassium 4.1 mmol/L      Chloride 104 mmol/L      CO2 24.0 mmol/L      Calcium 8.3 mg/dL      Total Protein 6.1 g/dL      Albumin 3.60 g/dL      ALT (SGPT) 15 U/L      AST (SGOT) 20 U/L      Alkaline Phosphatase 80 U/L      Total Bilirubin 0.6 mg/dL      eGFR Non African Amer 56 mL/min/1.73      Globulin 2.5 gm/dL      A/G Ratio 1.4 g/dL      BUN/Creatinine Ratio 12.6     Anion Gap 13.0 mmol/L     Narrative:      GFR Normal >60  Chronic Kidney Disease <60  Kidney Failure <15      Troponin [[id number]]  (Normal) Collected: 11/13/21 2058    Specimen: Blood Updated: 11/13/21 2131     Troponin T <0.010 ng/mL     Narrative:      Troponin T Reference Range:  <= 0.03 ng/mL-   Negative for AMI  >0.03 ng/mL-      Abnormal for myocardial necrosis.  Clinicians would have to utilize clinical acumen, EKG, Troponin and serial changes to determine if it is an Acute Myocardial Infarction or myocardial injury due to an underlying chronic condition.       Results may be falsely decreased if patient taking Biotin.      BNP [[id number]]  (Normal) Collected: 11/13/21 2058    Specimen: Blood Updated: 11/13/21 2128     proBNP 355.0 pg/mL     Narrative:      Among patients with dyspnea, NT-proBNP is highly sensitive for the detection of acute congestive heart failure. In addition NT-proBNP of <300 pg/ml effectively rules out acute congestive heart failure with 99% negative predictive value.    Results may be falsely decreased if patient taking Biotin.            Imaging Results (Last 24 Hours)     Procedure Component Value Units Date/Time    CT Chest Pulmonary Embolism [[id number]] Collected: 11/13/21 2320     Updated: 11/13/21 2323    Narrative:      EXAMINATION: CT CHEST PULMONARY EMBOLISM      DATE:  11/13/2021 10:42 PM    INDICATION: Dyspnea, PE suspected, low/intermediate probability, positive d-dimer. ;    COMPARISON: None.    PROCEDURE: Iodinated contrast material was administered intravenously during pulmonary arterial phase CT chest imaging.    3-D MIP images were performed under concurrent supervision not requiring an independent workstation, in order to better assess the vasculature.    CT dose lowering techniques were used, to include: automated exposure control, adjustment for patient size, and or use of iterative reconstruction.    FINDINGS:    Moderate respiratory motion artifact limited exam.    Pulmonary artery: Well opacified. No filling defect within limitations of motion artifact which obscures some distal subsegmental branches.    Cardiovascular:  Aorta and great vessels exhibit no aneurysm or dissection.    Mediastinum/Yamila:  No mediastinal or hilar mass or significant lymphadenopathy identified.    Lungs/Pleura:  Calcified granuloma right lower lobe. No infiltrates or masses are identified. No effusion, pleural mass, thickening or pneumothorax.    Chest wall and Axilla: Normal.    Bones:  Degenerative vertebral body osteophytes..    Upper abdomen: Gallstones..    3-D images corroborate 2-D findings.      Impression:        1.  No evidence of pulmonary embolus.  2.  Cholelithiasis.          Electronically signed by:  Nicole Meier M.D.    11/13/2021 9:22 PM    XR Chest 1 View [[id number]] Collected: 11/13/21 2319     Updated: 11/13/21 2321    Narrative:      EXAMINATION: XR CHEST 1 VW    DATE: 11/13/2021 8:34 PM    INDICATION:  Cough;    COMPARISON:  January 22, 2019    FINDINGS:  Right jugular Port-A-Cath tip in the mid SVC.    No focal consolidation, pleural effusion, or pneumothorax.    Cardiomediastinal silhouette  unchanged    No acute osseous abnormality.          Impression:        1.  No acute cardiopulmonary finding.          Electronically signed by:  Nicole Meier M.D.    11/13/2021 9:20 PM             ASSESSMENT AND PLAN:  Normocytic anemia consider related to recent chemotherapy  Thrombocytopenia  GERD  Constipation exacerbated by Zofran  Hypertension  Oligodendroglioma status post craniotomy on 5/6/2021, status post radiation therapy 6/2021-8/9/21, chemotherapy started on 9/16/2021  Seizures  Diabetes  Human rhinovirus    PLAN:  Continue to monitor H&H and transfuse for hemoglobin greater than 7  No overt signs of GI bleed.  She is having brown stools and is not having any pain nausea or vomiting.  Iron studies are normal.  Would hold on any endoscopies at this time.  Continue PPI for history of GERD  Continue folic acid  Will start bowel regimen for constipation    I discussed the patient's findings and my recommendations with the patient.  Beth Galvez, APRN  11/14/21  09:35 EST    Time:

## 2021-11-15 VITALS
TEMPERATURE: 98 F | OXYGEN SATURATION: 93 % | RESPIRATION RATE: 20 BRPM | BODY MASS INDEX: 53.92 KG/M2 | HEART RATE: 74 BPM | DIASTOLIC BLOOD PRESSURE: 53 MMHG | HEIGHT: 62 IN | SYSTOLIC BLOOD PRESSURE: 140 MMHG | WEIGHT: 293 LBS

## 2021-11-15 DIAGNOSIS — C71.1 OLIGOASTROCYTOMA OF FRONTAL LOBE (HCC): Primary | ICD-10-CM

## 2021-11-15 PROBLEM — D64.9 SYMPTOMATIC ANEMIA: Status: RESOLVED | Noted: 2021-11-14 | Resolved: 2021-11-15

## 2021-11-15 LAB
ANION GAP SERPL CALCULATED.3IONS-SCNC: 9 MMOL/L (ref 5–15)
BH BB BLOOD EXPIRATION DATE: NORMAL
BH BB BLOOD TYPE BARCODE: 5100
BH BB BLOOD TYPE BARCODE: 5100
BH BB BLOOD TYPE BARCODE: 7300
BH BB DISPENSE STATUS: NORMAL
BH BB PRODUCT CODE: NORMAL
BH BB UNIT NUMBER: NORMAL
BUN SERPL-MCNC: 11 MG/DL (ref 6–20)
BUN/CREAT SERPL: 12.1 (ref 7–25)
CALCIUM SPEC-SCNC: 8.9 MG/DL (ref 8.6–10.5)
CHLORIDE SERPL-SCNC: 105 MMOL/L (ref 98–107)
CO2 SERPL-SCNC: 26 MMOL/L (ref 22–29)
CREAT SERPL-MCNC: 0.91 MG/DL (ref 0.57–1)
CROSSMATCH INTERPRETATION: NORMAL
CROSSMATCH INTERPRETATION: NORMAL
DEPRECATED RDW RBC AUTO: 63.7 FL (ref 37–54)
ERYTHROCYTE [DISTWIDTH] IN BLOOD BY AUTOMATED COUNT: 21.2 % (ref 12.3–15.4)
GFR SERPL CREATININE-BSD FRML MDRD: 64 ML/MIN/1.73
GLUCOSE BLDC GLUCOMTR-MCNC: 116 MG/DL (ref 70–105)
GLUCOSE BLDC GLUCOMTR-MCNC: 145 MG/DL (ref 70–105)
GLUCOSE SERPL-MCNC: 118 MG/DL (ref 65–99)
HAPTOGLOB SERPL-MCNC: <10 MG/DL (ref 30–200)
HCT VFR BLD AUTO: 24 % (ref 34–46.6)
HCT VFR BLD AUTO: 24.7 % (ref 34–46.6)
HGB BLD-MCNC: 8.5 G/DL (ref 12–15.9)
HGB BLD-MCNC: 8.7 G/DL (ref 12–15.9)
MCH RBC QN AUTO: 33.3 PG (ref 26.6–33)
MCHC RBC AUTO-ENTMCNC: 35.4 G/DL (ref 31.5–35.7)
MCV RBC AUTO: 94.2 FL (ref 79–97)
PLATELET # BLD AUTO: 39 10*3/MM3 (ref 140–450)
PMV BLD AUTO: 7.6 FL (ref 6–12)
POTASSIUM SERPL-SCNC: 4.7 MMOL/L (ref 3.5–5.2)
RBC # BLD AUTO: 2.62 10*6/MM3 (ref 3.77–5.28)
SODIUM SERPL-SCNC: 140 MMOL/L (ref 136–145)
UNIT  ABO: NORMAL
UNIT  RH: NORMAL
WBC # BLD AUTO: 4.7 10*3/MM3 (ref 3.4–10.8)

## 2021-11-15 PROCEDURE — 85027 COMPLETE CBC AUTOMATED: CPT | Performed by: STUDENT IN AN ORGANIZED HEALTH CARE EDUCATION/TRAINING PROGRAM

## 2021-11-15 PROCEDURE — 82962 GLUCOSE BLOOD TEST: CPT

## 2021-11-15 PROCEDURE — 80048 BASIC METABOLIC PNL TOTAL CA: CPT | Performed by: STUDENT IN AN ORGANIZED HEALTH CARE EDUCATION/TRAINING PROGRAM

## 2021-11-15 PROCEDURE — 85018 HEMOGLOBIN: CPT | Performed by: NURSE PRACTITIONER

## 2021-11-15 PROCEDURE — 99217 PR OBSERVATION CARE DISCHARGE MANAGEMENT: CPT | Performed by: HOSPITALIST

## 2021-11-15 PROCEDURE — 99214 OFFICE O/P EST MOD 30 MIN: CPT | Performed by: INTERNAL MEDICINE

## 2021-11-15 PROCEDURE — 25010000002 HEPARIN LOCK FLUSH PER 10 UNITS: Performed by: HOSPITALIST

## 2021-11-15 PROCEDURE — 85014 HEMATOCRIT: CPT | Performed by: NURSE PRACTITIONER

## 2021-11-15 PROCEDURE — 83010 ASSAY OF HAPTOGLOBIN QUANT: CPT | Performed by: INTERNAL MEDICINE

## 2021-11-15 PROCEDURE — G0378 HOSPITAL OBSERVATION PER HR: HCPCS

## 2021-11-15 RX ORDER — PREDNISONE 50 MG/1
50 TABLET ORAL DAILY
Qty: 5 TABLET | Refills: 0 | Status: SHIPPED | OUTPATIENT
Start: 2021-11-15 | End: 2021-12-07

## 2021-11-15 RX ORDER — HEPARIN SODIUM (PORCINE) LOCK FLUSH IV SOLN 100 UNIT/ML 100 UNIT/ML
500 SOLUTION INTRAVENOUS ONCE
Status: COMPLETED | OUTPATIENT
Start: 2021-11-15 | End: 2021-11-15

## 2021-11-15 RX ADMIN — METOPROLOL TARTRATE 12.5 MG: 25 TABLET, FILM COATED ORAL at 05:55

## 2021-11-15 RX ADMIN — VENLAFAXINE HYDROCHLORIDE 75 MG: 75 CAPSULE, EXTENDED RELEASE ORAL at 08:08

## 2021-11-15 RX ADMIN — OXYBUTYNIN CHLORIDE 5 MG: 5 TABLET, EXTENDED RELEASE ORAL at 08:08

## 2021-11-15 RX ADMIN — PANTOPRAZOLE SODIUM 40 MG: 40 TABLET, DELAYED RELEASE ORAL at 05:55

## 2021-11-15 RX ADMIN — SODIUM CHLORIDE, PRESERVATIVE FREE 10 ML: 5 INJECTION INTRAVENOUS at 08:08

## 2021-11-15 RX ADMIN — AMLODIPINE BESYLATE 10 MG: 5 TABLET ORAL at 08:08

## 2021-11-15 RX ADMIN — Medication 500 UNITS: at 11:05

## 2021-11-15 RX ADMIN — FOLIC ACID 1 MG: 1 TABLET ORAL at 08:08

## 2021-11-15 RX ADMIN — LEVETIRACETAM 750 MG: 500 TABLET, FILM COATED ORAL at 08:08

## 2021-11-15 NOTE — DISCHARGE SUMMARY
[facility] Medicine Services        Patient Name: Cindy Cortes  : 1965  MRN: [id number]  Primary Care Physician:  Annamarie Monroy APRN  Date of admission: 2021   Date of discharge; 11/15/2021     Subjective       Chief Complaint: Dyspnea     History of Present Illness:  Cindy Cortes is a 56 y.o. female w/PMH of HTN, GERD, seizures, DM, brain CA 2021, depression, dyslipidemia who presents to [facility] ED due to dyspnea, patient suddenly finished radiation and is currently undergoing chemotherapy. Patient states dyspnea has progressively worsened over the last 5 days, dyspnea is worse with exertion and relieved with rest. Patient admits to cough, fatigue, weakness. Patient denies melena, hematuria, chest pain, vomiting, diarrhea. As dyspnea did not improve she decided to go to [facility] ED.        Upon arrival to the ED vitals temp 97.7, HR 87, RR 16, /75, O2 sat 96% on room air.  Labs notable for troponin less than 0.010, proBNP 355, glucose 111, , K4.1, CO2 24, BUN 13, creatinine 1.03, ALT 15, AST 20, WBC 3.5, Hgb 5.6, platelets 26, neutrophils 56.  Positive for rhinovirus.  EKG shows sinus rhythm or ectopic atrial rhythm rate 79, probable anterior infarct, age indeterminate, nonspecific T abnormalities lateral leads.  Chest x-ray shows no acute cardiopulmonary finding.  CT PE negative, cholelithiasis.  Patient treated in the ED with 2 units PRBCs, 1 unit platelets.      11/15/2021; hemodynamically stable patient feels improved on her symptoms, status post PRBC transfusion, feels better wants to go home, hematology/oncology okayed for discharge.  Review of Systems   Constitutional: Positive for malaise/fatigue. Negative for chills and fever.   HENT: Negative.  Negative for congestion.    Eyes: Negative.  Negative for blurred vision and visual disturbance.   Cardiovascular: Positive for dyspnea on exertion. Negative for chest  pain and orthopnea.   Respiratory: Positive for cough, shortness of breath and sleep disturbances due to breathing.    Endocrine: Negative.    Hematologic/Lymphatic: Negative.    Skin: Negative.    Musculoskeletal: Positive for myalgias. Negative for falls.   Gastrointestinal: Negative.  Negative for bloating, abdominal pain, nausea and vomiting.   Genitourinary: Negative.  Negative for dysuria and pelvic pain.   Neurological: Positive for weakness. Negative for difficulty with concentration and light-headedness.   Psychiatric/Behavioral: Negative.    Allergic/Immunologic: Negative.    All other systems reviewed and are negative.        Personal History      Medical History        Past Medical History:   Diagnosis Date   • Arthritis     • GERD (gastroesophageal reflux disease)     • Hypertension     • Oligodendroglioma (HCC)     • Seizure (HCC)     • Type 2 diabetes mellitus with hyperglycemia, without long-term current use of insulin (HCC) 5/12/2021            Surgical History         Past Surgical History:   Procedure Laterality Date   • CRANIOTOMY FOR TUMOR Right 5/6/2021     Procedure: CRANIOTOMY FOR TUMOR RESECTION WITH STEREOTACTIC;  Surgeon: Jluis Felix MD;  Location: [facility];  Service: Neurosurgery;  Laterality: Right;            Family History: family history includes Breast cancer in her cousin, maternal aunt, and niece; Colon cancer in her maternal aunt; Kidney disease in her mother; Prostate cancer in her brother. Otherwise pertinent FHx was reviewed and not pertinent to current issue.     Social History:  reports that she has never smoked. She has never used smokeless tobacco. She reports previous alcohol use of about 1.0 standard drink of alcohol per week. She reports that she does not use drugs.     Home Medications:           Prior to Admission Medications      Prescriptions Last Dose Informant Patient Reported? Taking?     Alcohol Swabs (Alcohol Wipes) 70 % pads     Yes No     Apply 1  each topically to the appropriate area as directed 4 (Four) Times a Day.     amLODIPine (NORVASC) 10 MG tablet     No No     Take 1 tablet by mouth Daily.     ferrous sulfate 325 (65 FE) MG tablet     Yes No     Take 1 tablet by mouth Daily With Breakfast.     folic acid (FOLVITE) 1 MG tablet     No No     TAKE TWO TABLETS BY MOUTH EVERY MORNING, THEN TWO TABLETS EVERY EVENING     Gleostine 100 MG chemo capsule     Yes No     Gleostine 40 MG chemo capsule     Yes No     glucose blood (OneTouch Verio) test strip     No No     1 each by Other route 4 (Four) Times a Day Before Meals & at Bedtime. Use as instructed     insulin aspart (NovoLOG FlexPen) 100 UNIT/ML solution pen-injector sc pen     No No     Inject 5 Units under the skin into the appropriate area as directed 3 (Three) Times a Day With Meals. Inject 1u lispro SC for every 50 over 150     insulin detemir (Levemir FlexTouch) 100 UNIT/ML injection     No No     Inject 15 Units under the skin into the appropriate area as directed 2 (Two) Times a Day.     Insulin Pen Needle 32G X 4 MM misc     No No     1 each 5 (Five) Times a Day.     Isopropyl Alcohol (Alcohol Wipes) 70 % misc     No No     Apply 1 each topically 4 (Four) Times a Day Before Meals & at Bedtime.     Lancets (OneTouch Delica Plus [id number]) misc     No No     1 each 4 (Four) Times a Day Before Meals & at Bedtime.     levETIRAcetam (KEPPRA) 750 MG tablet     No No     Take 1 tablet by mouth 2 (Two) Times a Day.     lidocaine-prilocaine (EMLA) 2.5-2.5 % cream     No No     Apply  topically to the appropriate area as directed Every 2 (Two) Hours As Needed for Mild Pain .     metoprolol tartrate (LOPRESSOR) 25 MG tablet     No No     Take 0.5 tablets by mouth Every 8 (Eight) Hours.     ondansetron (Zofran) 8 MG tablet     No No     Take 1 tablet by mouth Every 8 (Eight) Hours As Needed for Nausea or Vomiting.     oxybutynin (DITROPAN) 5 MG tablet     Yes No     oxybutynin XL (DITROPAN-XL) 5 MG 24 hr  "tablet     Yes No     Take 5 mg by mouth Daily.     venlafaxine XR (EFFEXOR-XR) 75 MG 24 hr capsule     Yes No     Take 75 mg by mouth Daily. Take DOS     vitamin D (ERGOCALCIFEROL) 1.25 MG (31091 UT) capsule capsule     Yes No     Take 50,000 Units by mouth Every 7 (Seven) Days.                  Allergies:  No Known Allergies     Objective       Vitals:   /53   Pulse 74   Temp 98 °F (36.7 °C) (Oral)   Resp 20   Ht 157.5 cm (62\")   Wt (!) 157 kg (346 lb 12.5 oz)   LMP  (LMP Unknown)   SpO2 93%   Breastfeeding No   BMI 63.43 kg/m²   Physical Exam  Vitals and nursing note reviewed.   Constitutional:       Appearance: She is MORBIDLY obese.  HENT:      Right Ear: External ear normal.      Left Ear: External ear normal.      Nose: Nose normal.      Mouth/Throat:      Mouth: Mucous membranes are moist.   Eyes:      Pupils: Pupils are equal, round, and reactive to light.   Cardiovascular:      Rate and Rhythm: Normal rate and regular rhythm.      Pulses: Normal pulses.      Heart sounds: Normal heart sounds.   Pulmonary:      Effort: Pulmonary effort is normal.      Breath sounds:NORMAL.  Abdominal:      General: Abdomen is protuberant. Bowel sounds are decreased.      Palpations: Abdomen is soft.   Musculoskeletal:         General: Normal range of motion.      Cervical back: Normal range of motion.   Skin:     General: Skin is dry.      Capillary Refill: Capillary refill takes less than 2 seconds.      Coloration: Skin is pale and sallow.      Findings: Bruising, ecchymosis and petechiae present.   Neurological:      General: No focal deficit present.      Mental Status: She is alert and oriented to person, place, and time. Mental status is at baseline.   Psychiatric:         Attention and Perception: Attention and perception normal.         Mood and Affect: Mood and affect normal.         Speech: Speech normal.         Behavior: Behavior normal. Behavior is cooperative.         Thought Content: Thought " content normal.         Cognition and Memory: Cognition and memory normal.         Judgment: Judgment normal.            Result Review    Result Review:  I have personally reviewed the results from the time of this admission to 11/14/2021 07:02 EST and agree with these findings:  [x]?  Laboratory  [x]?  Microbiology  [x]?  Radiology  [x]?  EKG/Telemetry   [x]?  Cardiology/Vascular   []?  Pathology  []?  Old records  []?  Other:     Lab Results   Component Value Date    GLUCOSE 118 (H) 11/15/2021    CALCIUM 8.9 11/15/2021     11/15/2021    K 4.7 11/15/2021    CO2 26.0 11/15/2021     11/15/2021    BUN 11 11/15/2021    CREATININE 0.91 11/15/2021    EGFRIFNONA 64 11/15/2021    BCR 12.1 11/15/2021    ANIONGAP 9.0 11/15/2021        Lab Results   Component Value Date    WBC 4.70 11/15/2021    HGB 8.5 (L) 11/15/2021    HCT 24.0 (L) 11/15/2021    MCV 94.2 11/15/2021    PLT 39 (C) 11/15/2021        Assessment/Plan          Active Hospital Problems:       Active Hospital Problems     Diagnosis     • Symptomatic anemia     • Chemotherapy-induced thrombocytopenia     • Oligoastrocytoma of frontal lobe (HCC)     • Type 2 diabetes mellitus with hyperglycemia, without long-term current use of insulin (HCC)     • Seizure (HCC)     • Essential hypertension     • Gastroesophageal reflux disease with hiatal hernia     • Morbid obesity with BMI of 60.0-69.9, adult (HCC)     • Dyslipidemia     • Depression        Symptomatic acute blood loss anemia:   -Heme positive on exam in the ED, probable reaction due to thrombocytopenia  -Oncology consulted will see in a.m.  -SCDs for VTE prophylaxis  -Trend H&H  -Continuous cardiac monitoring  GI consult appreciated, heme positive stool is not unusual with platelets of 30, and or likely secondary to   Hemorrhoids or other anorectal condition, no endoscopy was recommended in the absence of overt GI bleeding given her immunosuppressed state and pancytopenia status post packed red blood  cell transfusions  Oligoastrocytoma of frontal lobe:  -Received 29 radiation treatments, last treatment August 2021  -Currently undergoing second round of chemotherapy  -Oncology consult appreciated s/p PRBC transfusions currently hemoglobin stable at 8.5   Patient to follow-up with blood work couple of days from now with oncology office  Chemotherapy induced thrombocytopenia:  -Upon arrival to the ED platelets 26, 1 unit transfused  -Monitor CBC     DM type II:  -Sliding scale insulin  -Hypoglycemia protocol  -Monitor Accu-Cheks  -Consistent carbohydrate diet  -Hold oral diabetic medication during admission for potential procedures, resume home medications at the time of discharge     Seizure disorder:  -Home medication Keppra  -Seizure precautions     GERD:  -IV Protonix 40 mg every 12 hours     Dyslipidemia:  -Encourage lifestyle modifications  -Encourage dietary modifications     Essential hypertension:  -Encourage lifestyle modifications  -Low-sodium diet  -Home medication amlodipine, metoprolol     Depression:  -Home medication Effexor        DVT prophylaxis:  Mechanical DVT prophylaxis orders are present.        Home medications verified by nursing and/or pharmacy prior to provider review        CODE STATUS:    Code Status (Patient has no pulse and is not breathing): CPR (Attempt to Resuscitate)  Medical Interventions (Patient has pulse or is breathing): Full Support     Admission Status:  I believe this patient meets observation status.        I discussed the patient's findings and my recommendations with the patient.        The patient was interviewed wearing appropriate personal protective equipment.           Your medication list      CONTINUE taking these medications      Instructions Last Dose Given Next Dose Due   Alcohol Wipes 70 % misc      Apply 1 each topically 4 (Four) Times a Day Before Meals & at Bedtime.       Alcohol Wipes 70 % pads      Apply 1 each topically to the appropriate area as  directed 4 (Four) Times a Day.       amLODIPine 10 MG tablet  Commonly known as: NORVASC      Take 1 tablet by mouth Daily.       ferrous sulfate 325 (65 FE) MG tablet      Take 1 tablet by mouth Daily With Breakfast.       folic acid 1 MG tablet  Commonly known as: FOLVITE      TAKE TWO TABLETS BY MOUTH EVERY MORNING, THEN TWO TABLETS EVERY EVENING       Gleostine 40 MG chemo capsule  Generic drug: lomustine           Gleostine 100 MG chemo capsule  Generic drug: lomustine           Insulin Pen Needle 32G X 4 MM misc      1 each 5 (Five) Times a Day.       Levemir FlexTouch 100 UNIT/ML injection  Generic drug: insulin detemir      Inject 15 Units under the skin into the appropriate area as directed 2 (Two) Times a Day.       levETIRAcetam 750 MG tablet  Commonly known as: KEPPRA      Take 1 tablet by mouth 2 (Two) Times a Day.       lidocaine-prilocaine 2.5-2.5 % cream  Commonly known as: EMLA      Apply  topically to the appropriate area as directed Every 2 (Two) Hours As Needed for Mild Pain .       metoprolol tartrate 25 MG tablet  Commonly known as: LOPRESSOR      Take 0.5 tablets by mouth Every 8 (Eight) Hours.       NovoLOG FlexPen 100 UNIT/ML solution pen-injector sc pen  Generic drug: insulin aspart      Inject 5 Units under the skin into the appropriate area as directed 3 (Three) Times a Day With Meals. Inject 1u lispro SC for every 50 over 150       ondansetron 8 MG tablet  Commonly known as: Zofran      Take 1 tablet by mouth Every 8 (Eight) Hours As Needed for Nausea or Vomiting.       OneTouch Delica Plus [id number] misc      1 each 4 (Four) Times a Day Before Meals & at Bedtime.       OneTouch Verio test strip  Generic drug: glucose blood      1 each by Other route 4 (Four) Times a Day Before Meals & at Bedtime. Use as instructed       oxybutynin XL 5 MG 24 hr tablet  Commonly known as: DITROPAN-XL      Take 5 mg by mouth Daily.       oxybutynin 5 MG tablet  Commonly known as: DITROPAN            venlafaxine XR 75 MG 24 hr capsule  Commonly known as: EFFEXOR-XR      Take 75 mg by mouth Daily. Take DOS       vitamin D 1.25 MG (04889 UT) capsule capsule  Commonly known as: ERGOCALCIFEROL      Take 50,000 Units by mouth Every 7 (Seven) Days.              DISCHARGE Follow Up Recommendations for labs and diagnostics  Discharge condition; in stable condition to home  Discharge diet; diabetic diet as before  Discharge activity; as tolerated  Discharge total time; more than 33 minutes  Time: I spent more than 33 minutes on this discharge activity which included: face-to-face encounter with the patient, reviewing the data in the system, coordination of the care with the nursing staff as well as consultants, documentation, and entering orders.

## 2021-11-15 NOTE — PROGRESS NOTES
Hematology/Oncology Inpatient Progress Note    PATIENT NAME: Cindy Cortes  : 1965  MRN: [id number]    Chief complaint: Dyspnea of minimal exertion     History of present illness:    Cindy Cortes is a 56 y.o. female who presented to [facility] on 2021 with complaints of dyspnea that resulted from walking short distances that started shortly before the admission.   Ms. Cortes has a history of oligodendroglioma. She has undergone excision and this was followed by radiation. Early in 2021 she started adjuvant chemotherapy with PCV. As of last evaluation she seemed to tolerate the chemotherapy with myelosuppression as an expected effect.                             21  Hematology/Oncology was consulted for anemia and thrombocytopenia that were identified at the time of her initial visit to the emergency room on 21. She has been feeling reasonably well until the beginning of her present illness. Since the time of her original diagnosis she has been without seizures. She has not had any headache and the surgical incision has healed completely. She eats well and has generally been free of nausea or vomiting. She denies chest pain or cough. No expectoration. She has not had any oral ulceration and denies dysphagia or odynophagia. No abdominal pain or diarrhea. She is free of dysuria. No melena, hematochezia or hematuria. She has remained free of skin rash.      He/She  has a past medical history of Arthritis, GERD (gastroesophageal reflux disease), Hypertension, Oligodendroglioma (HCC), Seizure (HCC), and Type 2 diabetes mellitus with hyperglycemia, without long-term current use of insulin ([facility]) (2021).     PCP: Annamarie Monroy APRN    Subjective   Patient is feeling significantly better today.  Shortness of breath is improved.      ROS:  Review of Systems   Constitutional: Positive for fatigue. Negative for fever.   HENT: Negative for congestion and  "nosebleeds.    Eyes: Negative for pain.   Respiratory: Positive for shortness of breath. Negative for cough.    Cardiovascular: Negative for chest pain.   Gastrointestinal: Negative for abdominal pain, blood in stool, diarrhea, nausea and vomiting.   Endocrine: Negative for cold intolerance and heat intolerance.   Genitourinary: Negative for difficulty urinating.   Musculoskeletal: Negative for arthralgias.   Skin: Negative for rash.   Neurological: Negative for dizziness and headaches.   Hematological: Does not bruise/bleed easily.   Psychiatric/Behavioral: Negative for behavioral problems.        MEDICATIONS:    Scheduled Meds:     Continuous Infusions:  No current facility-administered medications for this encounter.     PRN Meds:       ALLERGIES:  No Known Allergies    Objective    VITALS:   /53   Pulse 74   Temp 98 °F (36.7 °C) (Oral)   Resp 20   Ht 157.5 cm (62\")   Wt (!) 157 kg (346 lb 12.5 oz)   LMP  (LMP Unknown)   SpO2 93%   Breastfeeding No   BMI 63.43 kg/m²     PHYSICAL EXAM: (performed by MD)  Physical Exam  Constitutional:       Appearance: Normal appearance. She is obese.   HENT:      Head: Normocephalic and atraumatic.   Eyes:      Pupils: Pupils are equal, round, and reactive to light.   Cardiovascular:      Rate and Rhythm: Normal rate and regular rhythm.      Pulses: Normal pulses.      Heart sounds: No murmur heard.      Pulmonary:      Effort: Pulmonary effort is normal.      Breath sounds: Normal breath sounds.   Abdominal:      General: There is no distension.      Palpations: Abdomen is soft. There is no mass.      Tenderness: There is no abdominal tenderness.   Musculoskeletal:         General: Normal range of motion.      Cervical back: Normal range of motion.   Skin:     General: Skin is warm.   Neurological:      General: No focal deficit present.      Mental Status: She is alert.   Psychiatric:         Mood and Affect: Mood normal.           RECENT LABS:  Lab Results (last " 24 hours)     Procedure Component Value Units Date/Time    POC Glucose Once [[id number]]  (Abnormal) Collected: 11/15/21 1110    Specimen: Blood Updated: 11/15/21 1111     Glucose 145 mg/dL      Comment: Serial Number: [id number]:  [id number]       POC Glucose Once [[id number]]  (Abnormal) Collected: 11/15/21 0745    Specimen: Blood Updated: 11/15/21 0957     Glucose 116 mg/dL      Comment: Serial Number: [id number]:  [id number]       Hemoglobin & Hematocrit, Blood [[id number]]  (Abnormal) Collected: 11/15/21 0811    Specimen: Blood Updated: 11/15/21 0823     Hemoglobin 8.5 g/dL      Hematocrit 24.0 %     Basic Metabolic Panel [[id number]]  (Abnormal) Collected: 11/15/21 0558    Specimen: Blood Updated: 11/15/21 0621     Glucose 118 mg/dL      BUN 11 mg/dL      Creatinine 0.91 mg/dL      Sodium 140 mmol/L      Potassium 4.7 mmol/L      Chloride 105 mmol/L      CO2 26.0 mmol/L      Calcium 8.9 mg/dL      eGFR Non African Amer 64 mL/min/1.73      BUN/Creatinine Ratio 12.1     Anion Gap 9.0 mmol/L     Narrative:      GFR Normal >60  Chronic Kidney Disease <60  Kidney Failure <15      CBC (No Diff) [[id number]]  (Abnormal) Collected: 11/15/21 0105    Specimen: Blood Updated: 11/15/21 0128     WBC 4.70 10*3/mm3      RBC 2.62 10*6/mm3      Hemoglobin 8.7 g/dL      Hematocrit 24.7 %      MCV 94.2 fL      MCH 33.3 pg      MCHC 35.4 g/dL      RDW 21.2 %      RDW-SD 63.7 fl      MPV 7.6 fL      Platelets 39 10*3/mm3     POC Glucose Once [[id number]]  (Abnormal) Collected: 11/14/21 2209    Specimen: Blood Updated: 11/14/21 2210     Glucose 152 mg/dL      Comment: Serial Number: [id number]:  [id number]       POC Glucose Once [[id number]]  (Abnormal) Collected: 11/14/21 1656    Specimen: Blood Updated: 11/14/21 1657     Glucose 121 mg/dL      Comment: Serial Number: [id number]:  [id number]       Lactate Dehydrogenase [[id number]]  (Abnormal) Collected: 11/14/21 1559    Specimen: Blood Updated: 11/14/21 5540       U/L     Hemoglobin & Hematocrit, Blood [[id number]]  (Abnormal) Collected: 11/14/21 1559    Specimen: Blood Updated: 11/14/21 1616     Hemoglobin 8.5 g/dL      Hematocrit 24.2 %     Occult Blood, Fecal By Immunoassay - Stool, Per Rectum [[id number]]  (Normal) Collected: 11/14/21 1605    Specimen: Stool from Per Rectum Updated: 11/14/21 1616     Occult Blood, Fecal by Immunoassay Negative    CBC & Differential [[id number]]  (Abnormal) Collected: 11/14/21 1204    Specimen: Blood Updated: 11/14/21 1316    Narrative:      The following orders were created for panel order CBC & Differential.  Procedure                               Abnormality         Status                     ---------                               -----------         ------                     CBC Auto Differential[[id number]]        Abnormal            Final result               Scan Slide[[id number]]                                       Final result                 Please view results for these tests on the individual orders.    Scan Slide [[id number]] Collected: 11/14/21 1204    Specimen: Blood Updated: 11/14/21 1316     Anisocytosis Slight/1+     WBC Morphology Normal     Platelet Estimate Decreased    CBC Auto Differential [[id number]]  (Abnormal) Collected: 11/14/21 1204    Specimen: Blood Updated: 11/14/21 1316     WBC 3.70 10*3/mm3      RBC 2.48 10*6/mm3      Hemoglobin 8.1 g/dL      Hematocrit 23.2 %      MCV 93.7 fL      MCH 32.5 pg      MCHC 34.7 g/dL      RDW 20.0 %      RDW-SD 63.0 fl      MPV 7.5 fL      Platelets 39 10*3/mm3      Neutrophil % 77.6 %      Lymphocyte % 14.1 %      Monocyte % 7.6 %      Eosinophil % 0.4 %      Basophil % 0.3 %      Neutrophils, Absolute 2.90 10*3/mm3      Lymphocytes, Absolute 0.50 10*3/mm3      Monocytes, Absolute 0.30 10*3/mm3      Eosinophils, Absolute 0.00 10*3/mm3      Basophils, Absolute 0.00 10*3/mm3      nRBC 0.1 /100 WBC     Narrative:      Appended report. These results have been appended to a  previously verified report.    Reticulocytes [[id number]]  (Abnormal) Collected: 11/14/21 1204    Specimen: Blood Updated: 11/14/21 1233     Reticulocyte % 4.02 %      Reticulocyte Absolute 0.0993 10*6/mm3     POC Glucose Once [[id number]]  (Normal) Collected: 11/14/21 1203    Specimen: Blood Updated: 11/14/21 1204     Glucose 87 mg/dL      Comment: Serial Number: [id number]:  [id number]             IMAGING REVIEWED:  XR Chest 1 View    Result Date: 11/13/2021  1.  No acute cardiopulmonary finding. Electronically signed by:  Nicole Meier M.D.  11/13/2021 9:20 PM    CT Chest Pulmonary Embolism    Result Date: 11/13/2021  1.  No evidence of pulmonary embolus. 2.  Cholelithiasis. Electronically signed by:  Nicole Meier M.D.  11/13/2021 9:22 PM      Assessment/Plan       Patient is a 55-year-old female with recently diagnosed oligodendroglioma grade 2 status post gross total resection on adjuvant treatment with PCV and sequential radiation now with worsening pancytopenia.     Oligodendroglioma  Previously I had an extensive discussion with the patient about treatment options, prognosis.  We had an extensive discussion at that time with several options.   Based on above patient decided to proceed with adjuvant PCV.  She is continuing treatment with no major toxicity. Dry mouth could be related to treatment.   Patient started back on cycle 2.  Tolerating it well except for myelosuppression we will continue monitor.  Now with clinically significant myelosuppression needing hospital admission we'll hold off on chemotherapy for the time being. I will see her back in clinic this week and assess for CBC and dose modification    Pancytopenia  Likely treatment related.  Has improved  Follow labs with CBC in clinic this week    Hemolytic anemia  This seems to be not immune hemolytic anemia could be related to chemotherapy use haptoglobin is low, reticulocyte count is elevated.  No significant schistocytes are seen.  I  will put her on a short course of prednisone and assess for response.    Nausea  Continue zofran as needed this is stable.       Follow-up in clinic this week with repeat CBC.

## 2021-11-15 NOTE — PAYOR COMM NOTE
"Clinicals per request RE:  Nikki Cortes  1965  Policy no. [id number]  Requeusting Obs auth admitted Observation status  - DC 11/15 to home    AUTHORIZATION PENDING  PLEASE FORWARD DETERMINATION TO FOLLOWING CONTACT:    LAVELL TAMY MCCAIN [facility]    [phone]      Nikki Cortes (56 y.o. Female)             Date of Birth Social Security Number Address Home Phone MRN    1965  [address] [address] [phone] [id number]    Mandaen Marital Status             None Single       Admission Date Admission Type Admitting Provider Attending Provider Department, Room/Bed    21 Emergency Femi Manzano MD Prakash, Shimoga, MD [facility] SURGICAL INPATIENT,     Discharge Date Discharge Disposition Discharge Destination           Home or Self Care              Attending Provider: Femi Manzano MD    Allergies: No Known Allergies    Isolation: Droplet   Infection: Rhinovirus  (21)   Code Status: CPR   Advance Care Planning Activity    Ht: 157.5 cm (62\")   Wt: 157 kg (346 lb 12.5 oz)    Admission Cmt: None   Principal Problem: Symptomatic anemia [D64.9]                 Active Insurance as of 2021     Primary Coverage     Payor Plan Insurance Group Employer/Plan Group    [facility] [id number]     Payor Plan Address Payor Plan Phone Number Payor Plan Fax Number Effective Dates    [address]   2019 - None Entered    [facility] [id number]       Subscriber Name Subscriber Birth Date Member ID       NIKKI CORTES 1965 [id number]                 Emergency Contacts      (Rel.) Home Phone Work Phone Mobile Phone    CRYSTAL BJ (Significant Other) -- -- [phone]    YOLISOZIEL (Sister) -- -- [phone]               History & Physical      Laverne Keating APRN at 21 0055     Attestation signed by Newton Pereira DO at 21 Bora White is a 56F with PMHx of HTN, GERD, " arthritis, seizures, and brain tumor s/p surgery (oligodendroglioma) and on chemo who presented to [facility] ED on 11/13/2021 due to dyspnea on going for the past 3-4 days that is worse with exertion and better with rest.  Denied f/c/n/v/d, cough, chest pain, abdominal pain.  She is on chemo and recently underwent radiation.    In the ED, vitals 98.1F, HR 78, RR 15, /54, 95 RA.  Labs notable for troponin < 0.01, proBNP 355, glucose 111, cr 1.03, Hgb 5.6, HCT 16.5, plt 26, white count 3.5.  RVP negative for covid but positive for Rhinovirus.  2u PRBC ordered and patient admitted for assessment.    PMHx: as below  PSHx: craniotomy  All: NKDA  Meds: as below  Social: denies drug or tobacco use    GENERAL: The patient is well developed, obese, NAD  HEENT: Nonicteric sclerae, PERRLA, EOMI. Oropharynx clear. Moist mucous membranes. Conjunctivae appear well perfused.  CHEST: Chest wall is nontender.  HEART: Regular rate and rhythm without murmurs. No MRG  LUNGS: Clear to auscultation bilaterally. No WRR  ABDOMEN: Soft, positive bowel sounds, nontender, no organomegaly.  SKIN: No rash, no bruising, patient is pale  NEUROLOGIC: Cranial nerves II-XII intact without motor/sensory deficit    #Anemia, acute on chronic  #Pancytopenia    - Hgb 5.6 on admission, baseline is 10.2    - no overt bleeding, but FOBT positive in ED    - Hgb was 6.9 after 2u prbc, will order another unit    - protonix IV BID to cover for bleed    - white count 3.5, , plt 26    - suspect bone marrow suppression and pancytopenia 2/2 chemotherapy    - hold chemo    - heme/onc consulted    - gi to establish care, no overt bleed    - maintain Hgb > 7.0    - maintain plt > 20,000    #Enterovirus    - RVP positive for enterovirus on 11/13    - vitals stable, on room air    - symptomatic care    #Brain tumor    - pathology showed glioma    - s/p surgery    - currently on chemo, will hold 2/2 bone marrow suppression    - recently received radiationn    -  heme/onc to follow    #Seizures    - suspect 2/2 brain tumor    - restart home keppra    #DM    - most recent a1c 5.7, hold ISS at this time    #GERD    - on protonix    #HTN    -restart home amlodipine    #DVT prophylaxis    - SCDs    - hold chemical ac 2/2 anemia                            [facility] Medicine Services      Patient Name: Cindy Cortes  : 1965  MRN: [id number]  Primary Care Physician:  Annamarie Monroy APRN  Date of admission: 2021      Subjective      Chief Complaint: Dyspnea    History of Present Illness:  Cindy Cortes is a 56 y.o. female w/PMH of HTN, GERD, seizures, DM, brain CA 2021, depression, dyslipidemia who presents to [facility] ED due to dyspnea, patient suddenly finished radiation and is currently undergoing chemotherapy. Patient states dyspnea has progressively worsened over the last 5 days, dyspnea is worse with exertion and relieved with rest. Patient admits to cough, fatigue, weakness. Patient denies melena, hematuria, chest pain, vomiting, diarrhea. As dyspnea did not improve she decided to go to [facility] ED.      Upon arrival to the ED vitals temp 97.7, HR 87, RR 16, /75, O2 sat 96% on room air.  Labs notable for troponin less than 0.010, proBNP 355, glucose 111, , K4.1, CO2 24, BUN 13, creatinine 1.03, ALT 15, AST 20, WBC 3.5, Hgb 5.6, platelets 26, neutrophils 56.  Positive for rhinovirus.  EKG shows sinus rhythm or ectopic atrial rhythm rate 79, probable anterior infarct, age indeterminate, nonspecific T abnormalities lateral leads.  Chest x-ray shows no acute cardiopulmonary finding.  CT PE negative, cholelithiasis.  Patient treated in the ED with 2 units PRBCs, 1 unit platelets.      Review of Systems   Constitutional: Positive for malaise/fatigue. Negative for chills and fever.   HENT: Negative.  Negative for congestion.    Eyes: Negative.  Negative for blurred vision and visual disturbance.    Cardiovascular: Positive for dyspnea on exertion. Negative for chest pain and orthopnea.   Respiratory: Positive for cough, shortness of breath and sleep disturbances due to breathing.    Endocrine: Negative.    Hematologic/Lymphatic: Negative.    Skin: Negative.    Musculoskeletal: Positive for myalgias. Negative for falls.   Gastrointestinal: Negative.  Negative for bloating, abdominal pain, nausea and vomiting.   Genitourinary: Negative.  Negative for dysuria and pelvic pain.   Neurological: Positive for weakness. Negative for difficulty with concentration and light-headedness.   Psychiatric/Behavioral: Negative.    Allergic/Immunologic: Negative.    All other systems reviewed and are negative.       Personal History     Past Medical History:   Diagnosis Date   • Arthritis    • GERD (gastroesophageal reflux disease)    • Hypertension    • Oligodendroglioma (HCC)    • Seizure (HCC)    • Type 2 diabetes mellitus with hyperglycemia, without long-term current use of insulin (HCC) 5/12/2021       Past Surgical History:   Procedure Laterality Date   • CRANIOTOMY FOR TUMOR Right 5/6/2021    Procedure: CRANIOTOMY FOR TUMOR RESECTION WITH STEREOTACTIC;  Surgeon: Jluis Felix MD;  Location: [facility];  Service: Neurosurgery;  Laterality: Right;       Family History: family history includes Breast cancer in her cousin, maternal aunt, and niece; Colon cancer in her maternal aunt; Kidney disease in her mother; Prostate cancer in her brother. Otherwise pertinent FHx was reviewed and not pertinent to current issue.    Social History:  reports that she has never smoked. She has never used smokeless tobacco. She reports previous alcohol use of about 1.0 standard drink of alcohol per week. She reports that she does not use drugs.    Home Medications:  Prior to Admission Medications     Prescriptions Last Dose Informant Patient Reported? Taking?    Alcohol Swabs (Alcohol Wipes) 70 % pads   Yes No    Apply 1 each  topically to the appropriate area as directed 4 (Four) Times a Day.    amLODIPine (NORVASC) 10 MG tablet   No No    Take 1 tablet by mouth Daily.    ferrous sulfate 325 (65 FE) MG tablet   Yes No    Take 1 tablet by mouth Daily With Breakfast.    folic acid (FOLVITE) 1 MG tablet   No No    TAKE TWO TABLETS BY MOUTH EVERY MORNING, THEN TWO TABLETS EVERY EVENING    Gleostine 100 MG chemo capsule   Yes No    Gleostine 40 MG chemo capsule   Yes No    glucose blood (OneTouch Verio) test strip   No No    1 each by Other route 4 (Four) Times a Day Before Meals & at Bedtime. Use as instructed    insulin aspart (NovoLOG FlexPen) 100 UNIT/ML solution pen-injector sc pen   No No    Inject 5 Units under the skin into the appropriate area as directed 3 (Three) Times a Day With Meals. Inject 1u lispro SC for every 50 over 150    insulin detemir (Levemir FlexTouch) 100 UNIT/ML injection   No No    Inject 15 Units under the skin into the appropriate area as directed 2 (Two) Times a Day.    Insulin Pen Needle 32G X 4 MM misc   No No    1 each 5 (Five) Times a Day.    Isopropyl Alcohol (Alcohol Wipes) 70 % misc   No No    Apply 1 each topically 4 (Four) Times a Day Before Meals & at Bedtime.    Lancets (OneTouch Delica Plus [id number]) misc   No No    1 each 4 (Four) Times a Day Before Meals & at Bedtime.    levETIRAcetam (KEPPRA) 750 MG tablet   No No    Take 1 tablet by mouth 2 (Two) Times a Day.    lidocaine-prilocaine (EMLA) 2.5-2.5 % cream   No No    Apply  topically to the appropriate area as directed Every 2 (Two) Hours As Needed for Mild Pain .    metoprolol tartrate (LOPRESSOR) 25 MG tablet   No No    Take 0.5 tablets by mouth Every 8 (Eight) Hours.    ondansetron (Zofran) 8 MG tablet   No No    Take 1 tablet by mouth Every 8 (Eight) Hours As Needed for Nausea or Vomiting.    oxybutynin (DITROPAN) 5 MG tablet   Yes No    oxybutynin XL (DITROPAN-XL) 5 MG 24 hr tablet   Yes No    Take 5 mg by mouth Daily.    venlafaxine XR  (EFFEXOR-XR) 75 MG 24 hr capsule   Yes No    Take 75 mg by mouth Daily. Take DOS    vitamin D (ERGOCALCIFEROL) 1.25 MG (78630 UT) capsule capsule   Yes No    Take 50,000 Units by mouth Every 7 (Seven) Days.              Allergies:  No Known Allergies    Objective      Vitals:   Temp:  [97.4 °F (36.3 °C)-98.6 °F (37 °C)] 97.8 °F (36.6 °C)  Heart Rate:  [73-95] 76  Resp:  [15-21] 17  BP: (111-161)/(47-75) 111/47    Physical Exam  Vitals and nursing note reviewed.   Constitutional:       Appearance: She is obese. She is ill-appearing.   HENT:      Right Ear: External ear normal.      Left Ear: External ear normal.      Nose: Nose normal.      Mouth/Throat:      Mouth: Mucous membranes are moist.   Eyes:      Pupils: Pupils are equal, round, and reactive to light.   Cardiovascular:      Rate and Rhythm: Normal rate and regular rhythm.      Pulses: Normal pulses.      Heart sounds: Normal heart sounds.   Pulmonary:      Effort: Pulmonary effort is normal.      Breath sounds: Decreased air movement present. Examination of the right-lower field reveals decreased breath sounds. Examination of the left-lower field reveals decreased breath sounds. Decreased breath sounds present.   Abdominal:      General: Abdomen is protuberant. Bowel sounds are decreased.      Palpations: Abdomen is soft.   Musculoskeletal:         General: Normal range of motion.      Cervical back: Normal range of motion.   Skin:     General: Skin is dry.      Capillary Refill: Capillary refill takes less than 2 seconds.      Coloration: Skin is pale and sallow.      Findings: Bruising, ecchymosis and petechiae present.   Neurological:      General: No focal deficit present.      Mental Status: She is alert and oriented to person, place, and time. Mental status is at baseline.   Psychiatric:         Attention and Perception: Attention and perception normal.         Mood and Affect: Mood and affect normal.         Speech: Speech normal.         Behavior:  Behavior normal. Behavior is cooperative.         Thought Content: Thought content normal.         Cognition and Memory: Cognition and memory normal.         Judgment: Judgment normal.          Result Review    Result Review:  I have personally reviewed the results from the time of this admission to 11/14/2021 07:02 EST and agree with these findings:  [x]  Laboratory  [x]  Microbiology  [x]  Radiology  [x]  EKG/Telemetry   [x]  Cardiology/Vascular   []  Pathology  []  Old records  []  Other:        Assessment/Plan        Active Hospital Problems:  Active Hospital Problems    Diagnosis    • Symptomatic anemia    • Chemotherapy-induced thrombocytopenia    • Oligoastrocytoma of frontal lobe (HCC)    • Type 2 diabetes mellitus with hyperglycemia, without long-term current use of insulin (HCC)    • Seizure (HCC)    • Essential hypertension    • Gastroesophageal reflux disease with hiatal hernia    • Morbid obesity with BMI of 60.0-69.9, adult (HCC)    • Dyslipidemia    • Depression      Symptomatic anemia:   -Heme positive on exam in the ED, probable reaction due to thrombocytopenia  -Clear liquid diabetic diet until GI bleed is ruled out  -Oncology consulted will see in a.m.  -SCDs for VTE prophylaxis  -Trend H&H  -Continuous cardiac monitoring    Oligoastrocytoma of frontal lobe:  -Received 29 radiation treatments, last treatment August 2021  -Currently undergoing second round of chemotherapy  -Oncology consulted will see in a.m.    Chemotherapy induced thrombocytopenia:  -Upon arrival to the ED platelets 26, 1 unit transfused  -Monitor CBC    DM type II:  -Sliding scale insulin  -Hypoglycemia protocol  -Monitor Accu-Cheks  -Consistent carbohydrate diet  -Hold oral diabetic medication during admission for potential procedures    Seizure disorder:  -Home medication Keppra  -Seizure precautions    GERD:  -IV Protonix 40 mg every 12 hours    Dyslipidemia:  -Encourage lifestyle modifications  -Encourage dietary  modifications    Essential hypertension:  -Encourage lifestyle modifications  -Low-sodium diet  -Home medication amlodipine, metoprolol    Depression:  -Home medication Effexor      DVT prophylaxis:  Mechanical DVT prophylaxis orders are present.      Home medications verified by nursing and/or pharmacy prior to provider review      CODE STATUS:    Code Status (Patient has no pulse and is not breathing): CPR (Attempt to Resuscitate)  Medical Interventions (Patient has pulse or is breathing): Full Support    Admission Status:  I believe this patient meets observation status.      I discussed the patient's findings and my recommendations with the patient.      The patient was interviewed wearing appropriate personal protective equipment.      Signature: Electronically signed by BJ Davis, 21, 7:06 AM EST.      Electronically signed by Newton Pereira DO at 21 1007         Physician Progress Notes (last 48 hours)  Notes from 21 0940 through 11/15/21 0940   No notes of this type exist for this encounter.          Consult Notes (last 48 hours)      Maulik Martell MD at 21 1037      Consult Orders    1. Hematology and Oncology (on-call MD unless specified) [[id number]] ordered by Hector Bonner PA at 21 2238                       Hematology/Oncology Inpatient Consultation    Patient name: Cindy Cortes  : 1965  MRN: [id number]  Referring Provider: Dr. Pereira  Reason for Consultation: Anemia and thrombocytopenia.      Chief complaint: Dyspnea of minimal exertion    History of present illness:    Cindy Cortes is a 56 y.o. female who presented to [facility] on 2021 with complaints of dyspnea that resulted from walking short distances that started shortly before the admission.   Ms. Cortes has a history of oligodendroglioma. She has undergone excision and this was followed by radiation. Early in 2021 she started adjuvant chemotherapy with PCV.  As of last evaluation she seemed to tolerate the chemotherapy with myelosuppression as an expected effect.       11/14/21  Hematology/Oncology was consulted for anemia and thrombocytopenia that were identified at the time of her initial visit to the emergency room on 11/13/21. She has been feeling reasonably well until the beginning of her present illness. Since the time of her original diagnosis she has been without seizures. She has not had any headache and the surgical incision has healed completely. She eats well and has generally been free of nausea or vomiting. She denies chest pain or cough. No expectoration. She has not had any oral ulceration and denies dysphagia or odynophagia. No abdominal pain or diarrhea. She is free of dysuria. No melena, hematochezia or hematuria. She has remained free of skin rash.     He/She  has a past medical history of Arthritis, GERD (gastroesophageal reflux disease), Hypertension, Oligodendroglioma (HCC), Seizure (HCC), and Type 2 diabetes mellitus with hyperglycemia, without long-term current use of insulin (HCC) (5/12/2021).    PCP: Annamarie Monroy APRN    History:  Past Medical History:   Diagnosis Date   • Arthritis    • GERD (gastroesophageal reflux disease)    • Hypertension    • Oligodendroglioma (HCC)    • Seizure (HCC)    • Type 2 diabetes mellitus with hyperglycemia, without long-term current use of insulin (HCC) 5/12/2021   ,   Past Surgical History:   Procedure Laterality Date   • CRANIOTOMY FOR TUMOR Right 5/6/2021    Procedure: CRANIOTOMY FOR TUMOR RESECTION WITH STEREOTACTIC;  Surgeon: Jluis Felix MD;  Location: [facility];  Service: Neurosurgery;  Laterality: Right;   ,   Family History   Problem Relation Age of Onset   • Kidney disease Mother    • Prostate cancer Brother    • Breast cancer Maternal Aunt    • Colon cancer Maternal Aunt    • Breast cancer Niece    • Breast cancer Cousin    ,   Social History     Tobacco Use   • Smoking status: Never  Smoker   • Smokeless tobacco: Never Used   Vaping Use   • Vaping Use: Never used   Substance Use Topics   • Alcohol use: Not Currently     Alcohol/week: 1.0 standard drink     Types: 1 Cans of beer per week     Comment: socially   • Drug use: Never   ,   Medications Prior to Admission   Medication Sig Dispense Refill Last Dose   • Alcohol Swabs (Alcohol Wipes) 70 % pads Apply 1 each topically to the appropriate area as directed 4 (Four) Times a Day.      • amLODIPine (NORVASC) 10 MG tablet Take 1 tablet by mouth Daily. 30 tablet 2    • ferrous sulfate 325 (65 FE) MG tablet Take 1 tablet by mouth Daily With Breakfast.      • folic acid (FOLVITE) 1 MG tablet TAKE TWO TABLETS BY MOUTH EVERY MORNING, THEN TWO TABLETS EVERY EVENING 120 tablet 2    • Gleostine 100 MG chemo capsule       • Gleostine 40 MG chemo capsule       • glucose blood (OneTouch Verio) test strip 1 each by Other route 4 (Four) Times a Day Before Meals & at Bedtime. Use as instructed 200 each 1    • insulin aspart (NovoLOG FlexPen) 100 UNIT/ML solution pen-injector sc pen Inject 5 Units under the skin into the appropriate area as directed 3 (Three) Times a Day With Meals. Inject 1u lispro SC for every 50 over 150 2 pen 2    • insulin detemir (Levemir FlexTouch) 100 UNIT/ML injection Inject 15 Units under the skin into the appropriate area as directed 2 (Two) Times a Day. 3 pen 2    • Insulin Pen Needle 32G X 4 MM misc 1 each 5 (Five) Times a Day. 200 each 1    • Isopropyl Alcohol (Alcohol Wipes) 70 % misc Apply 1 each topically 4 (Four) Times a Day Before Meals & at Bedtime. 200 each 1    • Lancets (OneTouch Delica Plus [id number]) misc 1 each 4 (Four) Times a Day Before Meals & at Bedtime. 200 each 1    • levETIRAcetam (KEPPRA) 750 MG tablet Take 1 tablet by mouth 2 (Two) Times a Day. 60 tablet 2    • lidocaine-prilocaine (EMLA) 2.5-2.5 % cream Apply  topically to the appropriate area as directed Every 2 (Two) Hours As Needed for Mild Pain . 5 g 0    •  metoprolol tartrate (LOPRESSOR) 25 MG tablet Take 0.5 tablets by mouth Every 8 (Eight) Hours. 45 tablet 2    • ondansetron (Zofran) 8 MG tablet Take 1 tablet by mouth Every 8 (Eight) Hours As Needed for Nausea or Vomiting. 30 tablet 3    • oxybutynin (DITROPAN) 5 MG tablet       • oxybutynin XL (DITROPAN-XL) 5 MG 24 hr tablet Take 5 mg by mouth Daily.      • venlafaxine XR (EFFEXOR-XR) 75 MG 24 hr capsule Take 75 mg by mouth Daily. Take DOS  3    • vitamin D (ERGOCALCIFEROL) 1.25 MG (73986 UT) capsule capsule Take 50,000 Units by mouth Every 7 (Seven) Days.        , Scheduled Meds:  amLODIPine, 10 mg, Oral, Daily  folic acid, 1 mg, Oral, Daily  insulin lispro, 0-9 Units, Subcutaneous, TID AC  levETIRAcetam, 750 mg, Oral, BID  metoprolol tartrate, 12.5 mg, Oral, Q8H  oxybutynin XL, 5 mg, Oral, Daily  pantoprazole, 40 mg, Intravenous, Q12H  sodium chloride, 10 mL, Intravenous, Q12H  venlafaxine XR, 75 mg, Oral, Daily    , Continuous Infusions:   , PRN Meds:  •  acetaminophen **OR** acetaminophen **OR** acetaminophen  •  aluminum-magnesium hydroxide-simethicone  •  dextrose  •  dextrose  •  glucagon (human recombinant)  •  insulin lispro **AND** insulin lispro  •  magnesium sulfate **OR** magnesium sulfate **OR** magnesium sulfate  •  melatonin  •  nitroglycerin  •  ondansetron **OR** ondansetron  •  ondansetron  •  potassium chloride  •  sodium chloride  •  sodium chloride   Allergies:  Patient has no known allergies.    ROS:  Review of Systems   Constitutional: Negative for activity change, appetite change, chills, diaphoresis, fatigue, fever and unexpected weight change.   HENT: Negative for congestion, dental problem, drooling, ear discharge, ear pain, facial swelling, hearing loss, mouth sores, nosebleeds, postnasal drip, rhinorrhea, sinus pressure, sinus pain, sneezing, sore throat, tinnitus, trouble swallowing and voice change.    Eyes: Negative for photophobia, pain, discharge, redness, itching and visual  "disturbance.   Respiratory: Positive for shortness of breath (Exclusively with exertion but only minimal exertion resulted in significant discomfort. ). Negative for apnea, cough, choking, chest tightness, wheezing and stridor.    Cardiovascular: Negative for chest pain, palpitations and leg swelling.   Gastrointestinal: Negative for abdominal distention, abdominal pain, anal bleeding, blood in stool, constipation, diarrhea, nausea, rectal pain and vomiting.   Endocrine: Negative for cold intolerance, heat intolerance, polydipsia and polyuria.   Genitourinary: Negative for decreased urine volume, difficulty urinating, dysuria, flank pain, frequency, genital sores, hematuria and urgency.   Musculoskeletal: Negative for arthralgias, back pain, gait problem, joint swelling, myalgias, neck pain and neck stiffness.   Skin: Negative for color change, pallor and rash.   Neurological: Negative for dizziness, tremors, seizures, syncope, facial asymmetry, speech difficulty, weakness, light-headedness, numbness and headaches.   Hematological: Negative for adenopathy. Does not bruise/bleed easily.   Psychiatric/Behavioral: Negative for agitation, behavioral problems, confusion, decreased concentration, hallucinations, self-injury, sleep disturbance and suicidal ideas. The patient is not nervous/anxious.       Objective   Vital Signs:   /49   Pulse 76   Temp 98.1 °F (36.7 °C) (Oral)   Resp 15   Ht 157.5 cm (62\")   Wt (!) 157 kg (346 lb 12.5 oz)   LMP  (LMP Unknown)   SpO2 95%   Breastfeeding No   BMI 63.43 kg/m²     Physical Exam: (performed by MD)  Physical Exam  Constitutional:       General: She is not in acute distress.     Appearance: Normal appearance. She is not ill-appearing, toxic-appearing or diaphoretic.   HENT:      Head: Normocephalic and atraumatic.      Right Ear: External ear normal.      Left Ear: External ear normal.      Nose: Nose normal.      Mouth/Throat:      Mouth: Mucous membranes are " moist.      Pharynx: Oropharynx is clear. No oropharyngeal exudate or posterior oropharyngeal erythema.   Eyes:      General: No scleral icterus.        Right eye: No discharge.         Left eye: No discharge.      Conjunctiva/sclera: Conjunctivae normal.      Pupils: Pupils are equal, round, and reactive to light.   Cardiovascular:      Rate and Rhythm: Normal rate and regular rhythm.      Pulses: Normal pulses.      Heart sounds: No murmur heard.  No friction rub. No gallop.    Pulmonary:      Effort: No respiratory distress.      Breath sounds: No stridor. No wheezing, rhonchi or rales.   Chest:      Chest wall: No mass, lacerations, deformity, swelling or tenderness.   Breasts:      Right: Absent. No swelling, bleeding, inverted nipple, mass, nipple discharge, skin change, tenderness, axillary adenopathy or supraclavicular adenopathy.      Left: Absent. No swelling, bleeding, inverted nipple, mass, nipple discharge, skin change, tenderness, axillary adenopathy or supraclavicular adenopathy.       Abdominal:      General: Abdomen is flat. Bowel sounds are normal. There is no distension.      Palpations: Abdomen is soft. There is no mass.      Tenderness: There is no abdominal tenderness. There is no right CVA tenderness, left CVA tenderness, guarding or rebound.      Hernia: No hernia is present.   Musculoskeletal:         General: No swelling, tenderness, deformity or signs of injury.      Cervical back: No rigidity.      Right lower leg: No edema.      Left lower leg: No edema.   Lymphadenopathy:      Cervical: No cervical adenopathy.      Upper Body:      Right upper body: No supraclavicular, axillary or pectoral adenopathy.      Left upper body: No supraclavicular, axillary or pectoral adenopathy.   Skin:     Coloration: Skin is not jaundiced.      Findings: No bruising, lesion or rash.   Neurological:      General: No focal deficit present.      Mental Status: She is alert and oriented to person, place, and  time.      Cranial Nerves: No cranial nerve deficit.      Motor: No weakness.      Gait: Gait normal.   Psychiatric:         Mood and Affect: Mood normal.         Behavior: Behavior normal.         Thought Content: Thought content normal.         Judgment: Judgment normal.       Results Review:  Lab Results (last 48 hours)     Procedure Component Value Units Date/Time    POC Glucose Once [[id number]]  (Abnormal) Collected: 11/14/21 0757    Specimen: Blood Updated: 11/14/21 0757     Glucose 116 mg/dL      Comment: Serial Number: [id number]:  [id number]       Basic Metabolic Panel [[id number]]  (Abnormal) Collected: 11/14/21 0717    Specimen: Blood Updated: 11/14/21 0745     Glucose 108 mg/dL      BUN 10 mg/dL      Creatinine 0.94 mg/dL      Sodium 141 mmol/L      Potassium 4.3 mmol/L      Chloride 105 mmol/L      CO2 26.0 mmol/L      Calcium 8.2 mg/dL      eGFR Non African Amer 62 mL/min/1.73      BUN/Creatinine Ratio 10.6     Anion Gap 10.0 mmol/L     Narrative:      GFR Normal >60  Chronic Kidney Disease <60  Kidney Failure <15      CBC Auto Differential [[id number]]  (Abnormal) Collected: 11/14/21 0717    Specimen: Blood Updated: 11/14/21 0735     WBC 3.40 10*3/mm3      RBC 2.08 10*6/mm3      Hemoglobin 6.9 g/dL      Hematocrit 19.7 %      MCV 94.8 fL      Comment: Result checked         MCH 33.2 pg      MCHC 35.1 g/dL      RDW 23.5 %      RDW-SD 75.7 fl      MPV 7.4 fL      Platelets 37 10*3/mm3      Comment: Parameter reviewed in the past 30 days on 11.13.2021.         Neutrophil % 77.6 %      Lymphocyte % 14.8 %      Monocyte % 7.0 %      Eosinophil % 0.2 %      Basophil % 0.4 %      Neutrophils, Absolute 2.60 10*3/mm3      Lymphocytes, Absolute 0.50 10*3/mm3      Monocytes, Absolute 0.20 10*3/mm3      Eosinophils, Absolute 0.00 10*3/mm3      Basophils, Absolute 0.00 10*3/mm3      nRBC 0.1 /100 WBC     Respiratory Panel PCR w/COVID-19(SARS-CoV-2) JONELLE/PAUL/SARITHA/PAD/COR/MAD/BELINDA In-House, NP Swab in UTM/VTM, 3-4  HR TAT - Swab, Nasopharynx [[id number]]  (Abnormal) Collected: 11/13/21 2043    Specimen: Swab from Nasopharynx Updated: 11/13/21 2205     ADENOVIRUS, PCR Not Detected     Coronavirus 229E Not Detected     Coronavirus HKU1 Not Detected     Coronavirus NL63 Not Detected     Coronavirus OC43 Not Detected     COVID19 Not Detected     Human Metapneumovirus Not Detected     Human Rhinovirus/Enterovirus Detected     Influenza A PCR Not Detected     Influenza B PCR Not Detected     Parainfluenza Virus 1 Not Detected     Parainfluenza Virus 2 Not Detected     Parainfluenza Virus 3 Not Detected     Parainfluenza Virus 4 Not Detected     RSV, PCR Not Detected     Bordetella pertussis pcr Not Detected     Bordetella parapertussis PCR Not Detected     Chlamydophila pneumoniae PCR Not Detected     Mycoplasma pneumo by PCR Not Detected    Narrative:      In the setting of a positive respiratory panel with a viral infection PLUS a negative procalcitonin without other underlying concern for bacterial infection, consider observing off antibiotics or discontinuation of antibiotics and continue supportive care. If the respiratory panel is positive for atypical bacterial infection (Bordetella pertussis, Chlamydophila pneumoniae, or Mycoplasma pneumoniae), consider antibiotic de-escalation to target atypical bacterial infection.    Manual Differential [[id number]]  (Abnormal) Collected: 11/13/21 2058    Specimen: Blood Updated: 11/13/21 2204     Neutrophil % 56.0 %      Lymphocyte % 15.0 %      Monocyte % 11.0 %      Bands %  17.0 %      Myelocyte % 1.0 %      Neutrophils Absolute 2.56 10*3/mm3      Lymphocytes Absolute 0.53 10*3/mm3      Monocytes Absolute 0.39 10*3/mm3      Anisocytosis Slight/1+     Macrocytes Slight/1+     Poikilocytes Slight/1+     WBC Morphology Normal     Platelet Estimate Decreased    CBC & Differential [[id number]]  (Abnormal) Collected: 11/13/21 2058    Specimen: Blood Updated: 11/13/21 2204    Narrative:       The following orders were created for panel order CBC & Differential.  Procedure                               Abnormality         Status                     ---------                               -----------         ------                     CBC Auto Differential[[id number]]        Abnormal            Final result               Scan Slide[[id number]]                                       Final result                 Please view results for these tests on the individual orders.    CBC Auto Differential [[id number]]  (Abnormal) Collected: 11/13/21 2058    Specimen: Blood Updated: 11/13/21 2204     WBC 3.50 10*3/mm3      RBC 1.65 10*6/mm3      Hemoglobin 5.6 g/dL      Hematocrit 16.5 %      .0 fL      MCH 34.1 pg      MCHC 34.1 g/dL      RDW 27.6 %      RDW-SD 91.0 fl      MPV 7.5 fL      Platelets 26 10*3/mm3     Narrative:      The previously reported component NRBC is no longer being reported. Previous result was 0.2 /100 WBC (Reference Range: 0.0-0.2 /100 WBC) on 11/13/2021 at 2123 EST.    Scan Slide [[id number]] Collected: 11/13/21 2058    Specimen: Blood Updated: 11/13/21 2204     Scan Slide --     Comment: See Manual Differential Results       Comprehensive Metabolic Panel [[id number]]  (Abnormal) Collected: 11/13/21 2058    Specimen: Blood Updated: 11/13/21 2131     Glucose 111 mg/dL      BUN 13 mg/dL      Creatinine 1.03 mg/dL      Sodium 141 mmol/L      Potassium 4.1 mmol/L      Chloride 104 mmol/L      CO2 24.0 mmol/L      Calcium 8.3 mg/dL      Total Protein 6.1 g/dL      Albumin 3.60 g/dL      ALT (SGPT) 15 U/L      AST (SGOT) 20 U/L      Alkaline Phosphatase 80 U/L      Total Bilirubin 0.6 mg/dL      eGFR Non African Amer 56 mL/min/1.73      Globulin 2.5 gm/dL      A/G Ratio 1.4 g/dL      BUN/Creatinine Ratio 12.6     Anion Gap 13.0 mmol/L     Narrative:      GFR Normal >60  Chronic Kidney Disease <60  Kidney Failure <15      Troponin [[id number]]  (Normal) Collected: 11/13/21 2058    Specimen: Blood  Updated: 11/13/21 2131     Troponin T <0.010 ng/mL     Narrative:      Troponin T Reference Range:  <= 0.03 ng/mL-   Negative for AMI  >0.03 ng/mL-     Abnormal for myocardial necrosis.  Clinicians would have to utilize clinical acumen, EKG, Troponin and serial changes to determine if it is an Acute Myocardial Infarction or myocardial injury due to an underlying chronic condition.       Results may be falsely decreased if patient taking Biotin.      BNP [[id number]]  (Normal) Collected: 11/13/21 2058    Specimen: Blood Updated: 11/13/21 2128     proBNP 355.0 pg/mL     Narrative:      Among patients with dyspnea, NT-proBNP is highly sensitive for the detection of acute congestive heart failure. In addition NT-proBNP of <300 pg/ml effectively rules out acute congestive heart failure with 99% negative predictive value.    Results may be falsely decreased if patient taking Biotin.           Imaging Reviewed:   XR Chest 1 View    Result Date: 11/13/2021  1.  No acute cardiopulmonary finding. Electronically signed by:  Nicole Meier M.D.  11/13/2021 9:20 PM    CT Chest Pulmonary Embolism    Result Date: 11/13/2021  1.  No evidence of pulmonary embolus. 2.  Cholelithiasis. Electronically signed by:  Nicole Meier M.D.  11/13/2021 9:22 PM    Assessment/Plan   ASSESSMENT  1. Normocytic anemia. This is the cause of the symptoms that brought her in the hospital. It is clear that, following the recent red cell transfusions, she has been feeling much better and she no longer has a difficult time walking to the bathroom. This anemia is likely the result of myelosuppression due to the chemotherapy she is receiving. The regimen she receives is notorious for this. Given the somewhat sudden and severe anemia she has other possibilities need to be considered. Gastrointestinal bleeding needs to be considered given the thrombocytopenia. However, the lack of symptoms and the lack of a history of a predisposing factor make this  unlikely. Procarbazine, one of the agents she is receiving, is known to result in hemolysis. Will investigate further.   2. Thrombocytopenia. This is obviously the result of the regimen she receives and I would expect it to resolve in the future. The myelosuppressive effect of both the lomustine and the procarbazine she receives tends to be persistent so that it will probably be a few days before improvement is noted.   3. Reviewed the chart, including the medical notes, blood counts, chemistries, reports of pathology and imaging studies. Discussed with her.     PLAN  1. Transfusion support.   2. Investigate hemolysis.   3. Dr. Mtz will be back on 11/15/21.     Electronically signed by Maulik Martell MD, 11/14/21, 10:38 AM EST.                  Electronically signed by Maulik Martell MD at 11/14/21 1111     Beth Galvez APRN at 11/14/21 0912     Attestation signed by Josue Davis MD at 11/14/21 1418    The patient was seen and examined with APRN, findings verified, and I agree with the assessment and plan.      56-year-old female with brain tumor status post surgery and radiation currently undergoing chemotherapy presented with shortness of breath and dyspnea on exertion was found of a hemoglobin of 5.6 with platelets of 30.  Recent hemoglobin was 8.3 approximately 2-1/2 weeks ago.  She denies overt GI bleeding melena or black tarry stools.  Stool is heme positive.  Her last colonoscopy was in 2008.  She had an EGD showing esophagitis in 2019.  She denies any digestive complaints at this time.  She feels much better after packed red blood cell transfusion.  Iron stores are normal.  On exam she is morbidly obese.  Abdomen is soft and nontender.  I am unable to palpate intra-abdominal organs.    Anemia is likely secondary to chemotherapy.  Heme positive stool is not unusual with platelets of 30 and are likely secondary to hemorrhoids or other anorectal condition.  I recommend against endoscopy  "in the absence of overt GI bleeding given her immunosuppressed state and pancytopenia.  I agree with packed red blood cell transfusion and supportive care.  Once she has finished chemotherapy and immunosuppression has been completed, I would recommend colon cancer screening if medically appropriate.  Continue PPI for history of GERD and GI prophylaxis.  GI will be available as needed, please call with questions    Electronically signed by Josue Davis MD, 11/14/21, 2:16 PM EST.                        GI CONSULT  NOTE:    Referring Provider:    Dr. 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  Chemotherapy started on 9/16/2021; arthritis, GERD, hypertension, seizures, diabetes who presented to the ER on 11/13/2021 with cough, shortness of breath/dyspnea upon exertion.  Patient was diagnosed with human rhinovirus in the ER.  Hemoglobin was noted to be 5.6 with Hemoccult positive stool.  Patient's hemoglobin was 8.3 on 10/27/2021.  Patient received 2 units of packed red blood cells and 1 unit of platelets in the ER.  She is receiving her third unit of packed red blood cells.  Patient states she has been having brown stool but has been constipated lately due to taking Zofran routinely.  Patient states she has been having to push and strain to have bowel movements.  Patient denies any melena, hematochezia or bright red blood per rectum.  Patient's last solid bowel movement was yesterday.  Patient denies any abdominal pain, nausea or vomiting.  Patient has history of reflux and takes over-the-counter PPI twice a day.  Gallbladder is intact.    Iron studies showed a serum iron of 140 and iron saturation of " 42% on 10/27/2021.    Endo History:  2019 EGD (Dr. Scanlon) grade a erosive esophagitis.  Esophageal biopsy showed moderate chronic inflammation.  2008 EGD and colonoscopy (Dr. Delatorre) medium hiatal hernia.  Grade B esophagitis.  Negative celiac sprue.  Hyperplastic sigmoid polyp.    Past Medical History:  Past Medical History:   Diagnosis Date   • Arthritis    • GERD (gastroesophageal reflux disease)    • Hypertension    • Oligodendroglioma (HCC)    • Seizure (HCC)    • Type 2 diabetes mellitus with hyperglycemia, without long-term current use of insulin (HCC) 5/12/2021       Past Surgical History:  Past Surgical History:   Procedure Laterality Date   • CRANIOTOMY FOR TUMOR Right 5/6/2021    Procedure: CRANIOTOMY FOR TUMOR RESECTION WITH STEREOTACTIC;  Surgeon: Jluis Felix MD;  Location: [facility] OR;  Service: Neurosurgery;  Laterality: Right;       Social History:  Social History     Tobacco Use   • Smoking status: Never Smoker   • Smokeless tobacco: Never Used   Vaping Use   • Vaping Use: Never used   Substance Use Topics   • Alcohol use: Not Currently     Alcohol/week: 1.0 standard drink     Types: 1 Cans of beer per week     Comment: socially   • Drug use: Never       Family History:  Family History   Problem Relation Age of Onset   • Kidney disease Mother    • Prostate cancer Brother    • Breast cancer Maternal Aunt    • Colon cancer Maternal Aunt    • Breast cancer Niece    • Breast cancer Cousin        Medications:  Medications Prior to Admission   Medication Sig Dispense Refill Last Dose   • Alcohol Swabs (Alcohol Wipes) 70 % pads Apply 1 each topically to the appropriate area as directed 4 (Four) Times a Day.      • amLODIPine (NORVASC) 10 MG tablet Take 1 tablet by mouth Daily. 30 tablet 2    • ferrous sulfate 325 (65 FE) MG tablet Take 1 tablet by mouth Daily With Breakfast.      • folic acid (FOLVITE) 1 MG tablet TAKE TWO TABLETS BY MOUTH EVERY MORNING, THEN TWO TABLETS EVERY EVENING 120 tablet  2    • Gleostine 100 MG chemo capsule       • Gleostine 40 MG chemo capsule       • glucose blood (OneTouch Verio) test strip 1 each by Other route 4 (Four) Times a Day Before Meals & at Bedtime. Use as instructed 200 each 1    • insulin aspart (NovoLOG FlexPen) 100 UNIT/ML solution pen-injector sc pen Inject 5 Units under the skin into the appropriate area as directed 3 (Three) Times a Day With Meals. Inject 1u lispro SC for every 50 over 150 2 pen 2    • insulin detemir (Levemir FlexTouch) 100 UNIT/ML injection Inject 15 Units under the skin into the appropriate area as directed 2 (Two) Times a Day. 3 pen 2    • Insulin Pen Needle 32G X 4 MM misc 1 each 5 (Five) Times a Day. 200 each 1    • Isopropyl Alcohol (Alcohol Wipes) 70 % misc Apply 1 each topically 4 (Four) Times a Day Before Meals & at Bedtime. 200 each 1    • Lancets (OneTouch Delica Plus Eqntdc76C) misc 1 each 4 (Four) Times a Day Before Meals & at Bedtime. 200 each 1    • levETIRAcetam (KEPPRA) 750 MG tablet Take 1 tablet by mouth 2 (Two) Times a Day. 60 tablet 2    • lidocaine-prilocaine (EMLA) 2.5-2.5 % cream Apply  topically to the appropriate area as directed Every 2 (Two) Hours As Needed for Mild Pain . 5 g 0    • metoprolol tartrate (LOPRESSOR) 25 MG tablet Take 0.5 tablets by mouth Every 8 (Eight) Hours. 45 tablet 2    • ondansetron (Zofran) 8 MG tablet Take 1 tablet by mouth Every 8 (Eight) Hours As Needed for Nausea or Vomiting. 30 tablet 3    • oxybutynin (DITROPAN) 5 MG tablet       • oxybutynin XL (DITROPAN-XL) 5 MG 24 hr tablet Take 5 mg by mouth Daily.      • venlafaxine XR (EFFEXOR-XR) 75 MG 24 hr capsule Take 75 mg by mouth Daily. Take DOS  3    • vitamin D (ERGOCALCIFEROL) 1.25 MG (36957 UT) capsule capsule Take 50,000 Units by mouth Every 7 (Seven) Days.            Scheduled Meds:insulin lispro, 0-9 Units, Subcutaneous, TID AC  pantoprazole, 40 mg, Intravenous, Q12H  sodium chloride, 10 mL, Intravenous, Q12H      Continuous Infusions:    PRN Meds:.•  acetaminophen **OR** acetaminophen **OR** acetaminophen  •  aluminum-magnesium hydroxide-simethicone  •  dextrose  •  dextrose  •  glucagon (human recombinant)  •  insulin lispro **AND** insulin lispro  •  magnesium sulfate **OR** magnesium sulfate **OR** magnesium sulfate  •  melatonin  •  nitroglycerin  •  ondansetron **OR** ondansetron  •  potassium chloride  •  sodium chloride  •  sodium chloride    ALLERGIES:  Patient has no known allergies.    ROS:  Review of Systems   Constitutional: Negative for chills and fever.   Respiratory: Positive for cough and shortness of breath.        The following systems were reviewed and negative;   Constitution:  No fevers, chills, no unintentional weight loss  Skin: no rash, no jaundice  Eyes:  No blurry vision, no eye pain  HENT:  No change in hearing or smell  Resp:  No dyspnea or cough  CV:  No chest pain or palpitations  :  No dysuria, hematuria  Musculoskeletal:  No leg cramps or arthralgias  Neuro:  No tremor, no numbness  Psych:  No depression or confusion    Objective  Resting in hospital bed. Family at bedside, RM [id number]    Vital Signs:   Vitals:    11/14/21 0434 11/14/21 0644 11/14/21 0758 11/14/21 0834   BP: 111/47 151/59 157/56 139/54   BP Location: Left arm  Left arm    Patient Position: Lying  Lying    Pulse: 73 76 96 78   Resp: 16 17 13 15   Temp: 98.6 °F (37 °C) 97.8 °F (36.6 °C) 98.6 °F (37 °C) 98.1 °F (36.7 °C)   TempSrc: Oral Oral Oral Oral   SpO2: 94%  95% 95%   Weight:       Height:           Physical Exam:   General Appearance:    Awake and alert, in no acute distress.    Head:    Normocephalic, without obvious abnormality, atraumatic   Eyes:            Conjunctivae normal, anicteric sclerae, pupils equal   Ears:    Ears appear intact with no abnormalities noted   Throat:   No oral lesions, no thrush, oral mucosa moist   Neck:   Supple, no JVD   Lungs:     Clear to auscultation bilaterally, respirations regular, even and unlabored    Heart:     Regular rhythm and normal rate, normal S1 and S2, no            Murmur appreciated   Chest Wall:    No abnormalities observed   Abdomen:     Normal bowel sounds, soft, nontender, no rebound or guarding, nondistended, no hepatosplenomegaly   Rectal:     Deferred   Extremities:   Moves all extremities, no edema, no cyanosis   Pulses:   Pulses palpable and equal bilaterally   Skin:   No rash, no jaundice, normal palpation   Lymph nodes:   No cervical, supraclavicular or submandibular palpable adenopathy   Neurologic:   Cranial nerves 2 - 12 grossly intact, no asterixis       Results Review:   I reviewed the patient's labs and imaging.  Lab Results (last 24 hours)     Procedure Component Value Units Date/Time    POC Glucose Once [[id number]]  (Abnormal) Collected: 11/14/21 0757    Specimen: Blood Updated: 11/14/21 0757     Glucose 116 mg/dL      Comment: Serial Number: [id number]:  [id number]       Basic Metabolic Panel [[id number]]  (Abnormal) Collected: 11/14/21 0717    Specimen: Blood Updated: 11/14/21 0745     Glucose 108 mg/dL      BUN 10 mg/dL      Creatinine 0.94 mg/dL      Sodium 141 mmol/L      Potassium 4.3 mmol/L      Chloride 105 mmol/L      CO2 26.0 mmol/L      Calcium 8.2 mg/dL      eGFR Non African Amer 62 mL/min/1.73      BUN/Creatinine Ratio 10.6     Anion Gap 10.0 mmol/L     Narrative:      GFR Normal >60  Chronic Kidney Disease <60  Kidney Failure <15      CBC Auto Differential [[id number]]  (Abnormal) Collected: 11/14/21 0717    Specimen: Blood Updated: 11/14/21 0735     WBC 3.40 10*3/mm3      RBC 2.08 10*6/mm3      Hemoglobin 6.9 g/dL      Hematocrit 19.7 %      MCV 94.8 fL      Comment: Result checked         MCH 33.2 pg      MCHC 35.1 g/dL      RDW 23.5 %      RDW-SD 75.7 fl      MPV 7.4 fL      Platelets 37 10*3/mm3      Comment: Parameter reviewed in the past 30 days on 11.13.2021.         Neutrophil % 77.6 %      Lymphocyte % 14.8 %      Monocyte % 7.0 %      Eosinophil % 0.2 %       Basophil % 0.4 %      Neutrophils, Absolute 2.60 10*3/mm3      Lymphocytes, Absolute 0.50 10*3/mm3      Monocytes, Absolute 0.20 10*3/mm3      Eosinophils, Absolute 0.00 10*3/mm3      Basophils, Absolute 0.00 10*3/mm3      nRBC 0.1 /100 WBC     Respiratory Panel PCR w/COVID-19(SARS-CoV-2) JONELLE/PAUL/SARITHA/PAD/COR/MAD/BELINDA In-House, NP Swab in UTM/VTM, 3-4 HR TAT - Swab, Nasopharynx [[id number]]  (Abnormal) Collected: 11/13/21 2043    Specimen: Swab from Nasopharynx Updated: 11/13/21 2205     ADENOVIRUS, PCR Not Detected     Coronavirus 229E Not Detected     Coronavirus HKU1 Not Detected     Coronavirus NL63 Not Detected     Coronavirus OC43 Not Detected     COVID19 Not Detected     Human Metapneumovirus Not Detected     Human Rhinovirus/Enterovirus Detected     Influenza A PCR Not Detected     Influenza B PCR Not Detected     Parainfluenza Virus 1 Not Detected     Parainfluenza Virus 2 Not Detected     Parainfluenza Virus 3 Not Detected     Parainfluenza Virus 4 Not Detected     RSV, PCR Not Detected     Bordetella pertussis pcr Not Detected     Bordetella parapertussis PCR Not Detected     Chlamydophila pneumoniae PCR Not Detected     Mycoplasma pneumo by PCR Not Detected    Narrative:      In the setting of a positive respiratory panel with a viral infection PLUS a negative procalcitonin without other underlying concern for bacterial infection, consider observing off antibiotics or discontinuation of antibiotics and continue supportive care. If the respiratory panel is positive for atypical bacterial infection (Bordetella pertussis, Chlamydophila pneumoniae, or Mycoplasma pneumoniae), consider antibiotic de-escalation to target atypical bacterial infection.    Manual Differential [[id number]]  (Abnormal) Collected: 11/13/21 2058    Specimen: Blood Updated: 11/13/21 2204     Neutrophil % 56.0 %      Lymphocyte % 15.0 %      Monocyte % 11.0 %      Bands %  17.0 %      Myelocyte % 1.0 %      Neutrophils Absolute 2.56  10*3/mm3      Lymphocytes Absolute 0.53 10*3/mm3      Monocytes Absolute 0.39 10*3/mm3      Anisocytosis Slight/1+     Macrocytes Slight/1+     Poikilocytes Slight/1+     WBC Morphology Normal     Platelet Estimate Decreased    CBC & Differential [[id number]]  (Abnormal) Collected: 11/13/21 2058    Specimen: Blood Updated: 11/13/21 2204    Narrative:      The following orders were created for panel order CBC & Differential.  Procedure                               Abnormality         Status                     ---------                               -----------         ------                     CBC Auto Differential[[id number]]        Abnormal            Final result               Scan Slide[[id number]]                                       Final result                 Please view results for these tests on the individual orders.    CBC Auto Differential [[id number]]  (Abnormal) Collected: 11/13/21 2058    Specimen: Blood Updated: 11/13/21 2204     WBC 3.50 10*3/mm3      RBC 1.65 10*6/mm3      Hemoglobin 5.6 g/dL      Hematocrit 16.5 %      .0 fL      MCH 34.1 pg      MCHC 34.1 g/dL      RDW 27.6 %      RDW-SD 91.0 fl      MPV 7.5 fL      Platelets 26 10*3/mm3     Narrative:      The previously reported component NRBC is no longer being reported. Previous result was 0.2 /100 WBC (Reference Range: 0.0-0.2 /100 WBC) on 11/13/2021 at 2123 EST.    Scan Slide [[id number]] Collected: 11/13/21 2058    Specimen: Blood Updated: 11/13/21 2204     Scan Slide --     Comment: See Manual Differential Results       Comprehensive Metabolic Panel [[id number]]  (Abnormal) Collected: 11/13/21 2058    Specimen: Blood Updated: 11/13/21 2131     Glucose 111 mg/dL      BUN 13 mg/dL      Creatinine 1.03 mg/dL      Sodium 141 mmol/L      Potassium 4.1 mmol/L      Chloride 104 mmol/L      CO2 24.0 mmol/L      Calcium 8.3 mg/dL      Total Protein 6.1 g/dL      Albumin 3.60 g/dL      ALT (SGPT) 15 U/L      AST (SGOT) 20 U/L      Alkaline  Phosphatase 80 U/L      Total Bilirubin 0.6 mg/dL      eGFR Non African Amer 56 mL/min/1.73      Globulin 2.5 gm/dL      A/G Ratio 1.4 g/dL      BUN/Creatinine Ratio 12.6     Anion Gap 13.0 mmol/L     Narrative:      GFR Normal >60  Chronic Kidney Disease <60  Kidney Failure <15      Troponin [[id number]]  (Normal) Collected: 11/13/21 2058    Specimen: Blood Updated: 11/13/21 2131     Troponin T <0.010 ng/mL     Narrative:      Troponin T Reference Range:  <= 0.03 ng/mL-   Negative for AMI  >0.03 ng/mL-     Abnormal for myocardial necrosis.  Clinicians would have to utilize clinical acumen, EKG, Troponin and serial changes to determine if it is an Acute Myocardial Infarction or myocardial injury due to an underlying chronic condition.       Results may be falsely decreased if patient taking Biotin.      BNP [[id number]]  (Normal) Collected: 11/13/21 2058    Specimen: Blood Updated: 11/13/21 2128     proBNP 355.0 pg/mL     Narrative:      Among patients with dyspnea, NT-proBNP is highly sensitive for the detection of acute congestive heart failure. In addition NT-proBNP of <300 pg/ml effectively rules out acute congestive heart failure with 99% negative predictive value.    Results may be falsely decreased if patient taking Biotin.            Imaging Results (Last 24 Hours)     Procedure Component Value Units Date/Time    CT Chest Pulmonary Embolism [[id number]] Collected: 11/13/21 2320     Updated: 11/13/21 2323    Narrative:      EXAMINATION: CT CHEST PULMONARY EMBOLISM      DATE:  11/13/2021 10:42 PM    INDICATION: Dyspnea, PE suspected, low/intermediate probability, positive d-dimer. ;    COMPARISON: None.    PROCEDURE: Iodinated contrast material was administered intravenously during pulmonary arterial phase CT chest imaging.    3-D MIP images were performed under concurrent supervision not requiring an independent workstation, in order to better assess the vasculature.    CT dose lowering techniques were used, to  include: automated exposure control, adjustment for patient size, and or use of iterative reconstruction.    FINDINGS:    Moderate respiratory motion artifact limited exam.    Pulmonary artery: Well opacified. No filling defect within limitations of motion artifact which obscures some distal subsegmental branches.    Cardiovascular:  Aorta and great vessels exhibit no aneurysm or dissection.    Mediastinum/Yamila:  No mediastinal or hilar mass or significant lymphadenopathy identified.    Lungs/Pleura: Calcified granuloma right lower lobe. No infiltrates or masses are identified. No effusion, pleural mass, thickening or pneumothorax.    Chest wall and Axilla: Normal.    Bones:  Degenerative vertebral body osteophytes..    Upper abdomen: Gallstones..    3-D images corroborate 2-D findings.      Impression:        1.  No evidence of pulmonary embolus.  2.  Cholelithiasis.          Electronically signed by:  Nicole Meier M.D.    11/13/2021 9:22 PM    XR Chest 1 View [[id number]] Collected: 11/13/21 2319     Updated: 11/13/21 2321    Narrative:      EXAMINATION: XR CHEST 1 VW    DATE: 11/13/2021 8:34 PM    INDICATION:  Cough;    COMPARISON:  January 22, 2019    FINDINGS:  Right jugular Port-A-Cath tip in the mid SVC.    No focal consolidation, pleural effusion, or pneumothorax.    Cardiomediastinal silhouette  unchanged    No acute osseous abnormality.          Impression:        1.  No acute cardiopulmonary finding.          Electronically signed by:  Nicole Meier M.D.    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  She is having brown stools and is not having any pain nausea or vomiting.  Iron studies are normal.  Would hold on any endoscopies at this time.  Continue PPI for history of GERD  Continue folic acid  Will start bowel regimen for constipation    I discussed the patient's findings and my recommendations with the patient.  Beth Galvez, APRN  11/14/21  09:35 EST    Time:         Electronically signed by Josue Davis MD at 11/14/21 7314

## 2021-11-16 ENCOUNTER — APPOINTMENT (OUTPATIENT)
Dept: LAB | Facility: HOSPITAL | Age: 56
End: 2021-11-16

## 2021-11-16 ENCOUNTER — OFFICE VISIT (OUTPATIENT)
Dept: ONCOLOGY | Facility: CLINIC | Age: 56
End: 2021-11-16

## 2021-11-16 VITALS
TEMPERATURE: 97.1 F | HEIGHT: 62 IN | WEIGHT: 293 LBS | RESPIRATION RATE: 18 BRPM | BODY MASS INDEX: 53.92 KG/M2 | DIASTOLIC BLOOD PRESSURE: 76 MMHG | OXYGEN SATURATION: 98 % | HEART RATE: 70 BPM | SYSTOLIC BLOOD PRESSURE: 136 MMHG

## 2021-11-16 DIAGNOSIS — C71.1 OLIGOASTROCYTOMA OF FRONTAL LOBE (HCC): Primary | ICD-10-CM

## 2021-11-16 DIAGNOSIS — C71.9 OLIGODENDROGLIOMA (HCC): ICD-10-CM

## 2021-11-16 LAB
BASOPHILS # BLD AUTO: 0.01 10*3/MM3 (ref 0–0.2)
BASOPHILS NFR BLD AUTO: 0.1 % (ref 0–1.5)
BH BB BLOOD EXPIRATION DATE: NORMAL
BH BB BLOOD TYPE BARCODE: 5100
BH BB DISPENSE STATUS: NORMAL
BH BB PRODUCT CODE: NORMAL
BH BB UNIT NUMBER: NORMAL
CROSSMATCH INTERPRETATION: NORMAL
DEPRECATED RDW RBC AUTO: 66 FL (ref 37–54)
DUFFY A ANTIGEN: NEGATIVE
EOSINOPHIL # BLD AUTO: 0.01 10*3/MM3 (ref 0–0.4)
EOSINOPHIL NFR BLD AUTO: 0.1 % (ref 0.3–6.2)
ERYTHROCYTE [DISTWIDTH] IN BLOOD BY AUTOMATED COUNT: 20 % (ref 12.3–15.4)
HCT VFR BLD AUTO: 30.2 % (ref 34–46.6)
HGB BLD-MCNC: 10 G/DL (ref 12–15.9)
LYMPHOCYTES # BLD AUTO: 0.69 10*3/MM3 (ref 0.7–3.1)
LYMPHOCYTES NFR BLD AUTO: 10.1 % (ref 19.6–45.3)
MCH RBC QN AUTO: 32.4 PG (ref 26.6–33)
MCHC RBC AUTO-ENTMCNC: 33.1 G/DL (ref 31.5–35.7)
MCV RBC AUTO: 97.7 FL (ref 79–97)
MONOCYTES # BLD AUTO: 0.25 10*3/MM3 (ref 0.1–0.9)
MONOCYTES NFR BLD AUTO: 3.7 % (ref 5–12)
NEUTROPHILS NFR BLD AUTO: 5.84 10*3/MM3 (ref 1.7–7)
NEUTROPHILS NFR BLD AUTO: 86 % (ref 42.7–76)
PLATELET # BLD AUTO: 29 10*3/MM3 (ref 140–450)
PMV BLD AUTO: 9.1 FL (ref 6–12)
QT INTERVAL: 354 MS
RBC # BLD AUTO: 3.09 10*6/MM3 (ref 3.77–5.28)
UNIT  ABO: NORMAL
UNIT  RH: NORMAL
WBC # BLD AUTO: 6.8 10*3/MM3 (ref 3.4–10.8)

## 2021-11-16 PROCEDURE — 99214 OFFICE O/P EST MOD 30 MIN: CPT | Performed by: INTERNAL MEDICINE

## 2021-11-16 PROCEDURE — 36415 COLL VENOUS BLD VENIPUNCTURE: CPT | Performed by: INTERNAL MEDICINE

## 2021-11-16 PROCEDURE — 85025 COMPLETE CBC W/AUTO DIFF WBC: CPT | Performed by: INTERNAL MEDICINE

## 2021-11-16 NOTE — PROGRESS NOTES
HEMATOLOGY ONCOLOGY OUTPATIENT FOLLOW UP      Patient name: Cindy Cortes  : 1965  MRN: [id number]  Primary Care Physician: Annamarie Monroy APRN  Referring Physician: Annamarie Monroy APRN  Reason For Consult:     Chief Complaint   Patient presents with   • Follow-up     Oligoastrocytoma of frontal lobe         HPI:   History of Present Illness:  Cindy Cortes is 56 y.o. female who presented to our office on 06/10/21 for consultation regarding Oligodendroglioma    Patient is a 55-year-old female who was referred to us for treatment options regarding recent diagnosis of grade 2 oligodendroglioma.  This was IDH [id number] mutated, 1P 19 Q codeleted.  Patient initially presented with seizures and a CT head was obtained that showed vasogenic edema and a right frontal lobe mass MRI was obtained after this.  2021 -MRI of the brain shows 2.1 x 4.4 x 3.3 cm right frontal lobe mass with eccentric cystic or necrotic component with surrounding vasogenic edema and a focal 3 mm right to left midline shift.  Patient was started on Keppra, steroids plan was made for elective craniotomy and surgical resection.    2021 craniotomy of the right frontal lobe, microscopic resection of tumor mass.  Pathology results oligodendroglioma WHO grade 2, IDH1 R132H mutation by immunohistochemistry, 1p19q codeletion by FISH.    2021 -status post resection of the right frontal lobe mass.  Expected postop blood product. resolution of midline shift.    2021 - Started radiation adjuvantly.    2021 - last day of radiation.    2021 - C1 D8 with vincristin started procarbazine  10/7/2021 - C1 D29 Vincristine    10/21/2021 - C2D1 PCV  10/28/2021 -cycle 2-day 8 with vincristine  Started procarbazine  Held procarbazine for a few days with nausea  2021 -patient in the hospital with significant pancytopenia needing blood transfusion.      Subjective:    Patient states that overall she is  improving.  Shortness of breath is improved.  Fatigue is persistent.    The following portions of the patient's history were reviewed and updated as appropriate: allergies, current medications, past family history, past medical history, past social history, past surgical history and problem list.    Past Medical History:   Diagnosis Date   • Arthritis    • GERD (gastroesophageal reflux disease)    • Hypertension    • Oligodendroglioma (HCC)    • Seizure (HCC)    • Type 2 diabetes mellitus with hyperglycemia, without long-term current use of insulin (HCC) 5/12/2021       Past Surgical History:   Procedure Laterality Date   • CRANIOTOMY FOR TUMOR Right 5/6/2021    Procedure: CRANIOTOMY FOR TUMOR RESECTION WITH STEREOTACTIC;  Surgeon: Jluis Felix MD;  Location: [facility] MAIN OR;  Service: Neurosurgery;  Laterality: Right;         Current Outpatient Medications:   •  Alcohol Swabs (Alcohol Wipes) 70 % pads, Apply 1 each topically to the appropriate area as directed 4 (Four) Times a Day., Disp: , Rfl:   •  amLODIPine (NORVASC) 10 MG tablet, Take 1 tablet by mouth Daily., Disp: 30 tablet, Rfl: 2  •  ferrous sulfate 325 (65 FE) MG tablet, Take 1 tablet by mouth Daily With Breakfast., Disp: , Rfl:   •  folic acid (FOLVITE) 1 MG tablet, TAKE TWO TABLETS BY MOUTH EVERY MORNING, THEN TWO TABLETS EVERY EVENING, Disp: 120 tablet, Rfl: 2  •  glucose blood (OneTouch Verio) test strip, 1 each by Other route 4 (Four) Times a Day Before Meals & at Bedtime. Use as instructed, Disp: 200 each, Rfl: 1  •  insulin aspart (NovoLOG FlexPen) 100 UNIT/ML solution pen-injector sc pen, Inject 5 Units under the skin into the appropriate area as directed 3 (Three) Times a Day With Meals. Inject 1u lispro SC for every 50 over 150, Disp: 2 pen, Rfl: 2  •  insulin detemir (Levemir FlexTouch) 100 UNIT/ML injection, Inject 15 Units under the skin into the appropriate area as directed 2 (Two) Times a Day., Disp: 3 pen, Rfl: 2  •  Insulin Pen Needle  32G X 4 MM misc, 1 each 5 (Five) Times a Day., Disp: 200 each, Rfl: 1  •  Isopropyl Alcohol (Alcohol Wipes) 70 % misc, Apply 1 each topically 4 (Four) Times a Day Before Meals & at Bedtime., Disp: 200 each, Rfl: 1  •  Lancets (OneTouch Delica Plus [id number]) misc, 1 each 4 (Four) Times a Day Before Meals & at Bedtime., Disp: 200 each, Rfl: 1  •  levETIRAcetam (KEPPRA) 750 MG tablet, Take 1 tablet by mouth 2 (Two) Times a Day., Disp: 60 tablet, Rfl: 2  •  lidocaine-prilocaine (EMLA) 2.5-2.5 % cream, Apply  topically to the appropriate area as directed Every 2 (Two) Hours As Needed for Mild Pain ., Disp: 5 g, Rfl: 0  •  metoprolol tartrate (LOPRESSOR) 25 MG tablet, Take 0.5 tablets by mouth Every 8 (Eight) Hours., Disp: 45 tablet, Rfl: 2  •  ondansetron (Zofran) 8 MG tablet, Take 1 tablet by mouth Every 8 (Eight) Hours As Needed for Nausea or Vomiting., Disp: 30 tablet, Rfl: 3  •  oxybutynin (DITROPAN) 5 MG tablet, , Disp: , Rfl:   •  oxybutynin XL (DITROPAN-XL) 5 MG 24 hr tablet, Take 5 mg by mouth Daily., Disp: , Rfl:   •  predniSONE (DELTASONE) 50 MG tablet, Take 1 tablet by mouth Daily., Disp: 5 tablet, Rfl: 0  •  venlafaxine XR (EFFEXOR-XR) 75 MG 24 hr capsule, Take 75 mg by mouth Daily. Take DOS, Disp: , Rfl: 3  •  vitamin D (ERGOCALCIFEROL) 1.25 MG (86189 UT) capsule capsule, Take 50,000 Units by mouth Every 7 (Seven) Days.  , Disp: , Rfl:   •  Gleostine 100 MG chemo capsule, , Disp: , Rfl:   •  Gleostine 40 MG chemo capsule, , Disp: , Rfl:     Current outpatient and discharge medications have been reconciled for the patient.  Reviewed by: Emilie Workman MD    No Known Allergies    Family History   Problem Relation Age of Onset   • Kidney disease Mother    • Prostate cancer Brother    • Breast cancer Maternal Aunt    • Colon cancer Maternal Aunt    • Breast cancer Niece    • Breast cancer Cousin        Cancer-related family history includes Breast cancer in her cousin, maternal aunt, and niece; Colon cancer in  "her maternal aunt; Prostate cancer in her brother.    Social History     Tobacco Use   • Smoking status: Never Smoker   • Smokeless tobacco: Never Used   Vaping Use   • Vaping Use: Never used   Substance Use Topics   • Alcohol use: Not Currently     Alcohol/week: 1.0 standard drink     Types: 1 Cans of beer per week     Comment: socially   • Drug use: Never     Social History     Social History Narrative   • Not on file            Objective:    Vitals:    11/16/21 1500   BP: 136/76   Pulse: 70   Resp: 18   Temp: 97.1 °F (36.2 °C)   SpO2: 98%   Weight: (!) 157 kg (346 lb)   Height: 157.5 cm (62\")   PainSc: 0-No pain     Body mass index is 63.28 kg/m².  ECOG  (0) Fully active, able to carry on all predisease performance without restriction    Physical Exam:     Physical Exam  Constitutional:       Appearance: Normal appearance. She is obese.   HENT:      Head: Normocephalic and atraumatic.      Comments: Radiation changes scalp, surgical scar  Eyes:      Pupils: Pupils are equal, round, and reactive to light.   Cardiovascular:      Rate and Rhythm: Normal rate and regular rhythm.      Pulses: Normal pulses.      Heart sounds: No murmur heard.      Pulmonary:      Effort: Pulmonary effort is normal.      Breath sounds: Normal breath sounds.   Abdominal:      General: There is no distension.      Palpations: Abdomen is soft. There is no mass.      Tenderness: There is no abdominal tenderness.   Musculoskeletal:         General: Normal range of motion.      Cervical back: Normal range of motion.   Skin:     General: Skin is warm.   Neurological:      General: No focal deficit present.      Mental Status: She is alert.   Psychiatric:         Mood and Affect: Mood normal.           Lab Results - Last 18 Months   Lab Units 11/16/21  1553 11/15/21  0811 11/15/21  0105 11/14/21  1559 11/14/21  1204   WBC 10*3/mm3 6.80  --  4.70  --  3.70   HEMOGLOBIN g/dL 10.0* 8.5* 8.7*   < > 8.1*   HEMATOCRIT % 30.2* 24.0* 24.7*   < > 23.2* "   PLATELETS 10*3/mm3 29*  --  39*  --  39*   MCV fL 97.7*  --  94.2  --  93.7    < > = values in this interval not displayed.     Lab Results - Last 18 Months   Lab Units 11/15/21  0558 11/14/21 0717 11/13/21  2058 10/27/21  1116 10/27/21  1116 10/07/21  0910 10/07/21  0910   SODIUM mmol/L 140 141 141   < > 140   < > 142   POTASSIUM mmol/L 4.7 4.3 4.1   < > 4.6   < > 4.3   CHLORIDE mmol/L 105 105 104   < > 102   < > 103   CO2 mmol/L 26.0 26.0 24.0   < > 27.0   < > 27.0   BUN mg/dL 11 10 13   < > 16   < > 16   CREATININE mg/dL 0.91 0.94 1.03*   < > 0.88   < > 0.96   CALCIUM mg/dL 8.9 8.2* 8.3*   < > 9.0   < > 8.9   BILIRUBIN mg/dL  --   --  0.6  --  0.7  --  0.6   ALK PHOS U/L  --   --  80  --  90  --  100   ALT (SGPT) U/L  --   --  15  --  16  --  13   AST (SGOT) U/L  --   --  20  --  21  --  17   GLUCOSE mg/dL 118* 108* 111*   < > 134*   < > 144*    < > = values in this interval not displayed.       Lab Results   Component Value Date    GLUCOSE 118 (H) 11/15/2021    BUN 11 11/15/2021    CREATININE 0.91 11/15/2021    EGFRIFNONA 64 11/15/2021    BCR 12.1 11/15/2021    K 4.7 11/15/2021    CO2 26.0 11/15/2021    CALCIUM 8.9 11/15/2021    ALBUMIN 3.60 11/13/2021    LABIL2 1.4 03/30/2021    AST 20 11/13/2021    ALT 15 11/13/2021       Lab Results - Last 18 Months   Lab Units 08/27/21  0738 05/04/21  0922   INR  0.96 0.96   APTT seconds 25.6 20.7*       Lab Results   Component Value Date    IRON 140 10/27/2021    TIBC 335 10/27/2021    FERRITIN 672.00 (H) 10/27/2021       Lab Results   Component Value Date    LEXZOYMS06 347 10/27/2021       Lab Results   Component Value Date    PTT 25.6 08/27/2021    INR 0.96 08/27/2021     XR Chest 1 View    Result Date: 11/13/2021  1.  No acute cardiopulmonary finding. Electronically signed by:  Nicole Meier M.D.  11/13/2021 9:20 PM    CT Chest Pulmonary Embolism    Result Date: 11/13/2021  1.  No evidence of pulmonary embolus. 2.  Cholelithiasis. Electronically signed by:  Nicole  "BOSSMAN Meier  11/13/2021 9:22 PM    IR Port Placement    Result Date: 8/27/2021  Technically successful placement, 8 [place] single lumen power injectable Afgioi-s-Pjix, using the right internal jugular vein approach as well as both sonographic and fluoroscopic control.  Electronically Signed By-Onur Ellis MD On:8/27/2021 3:52 PM This report was finalized on [id number] by  Onur Ellis MD.    Pathology results  Outside Report, Addendum   Integrated Diagnosis: Oligodendroglioma WHO Grade II  IDH1 R132H mutation by Immunohistochemistry; 1p19q co-deletion by FISH  See attached report from [facility]   Addendum electronically signed by Cecilia Chaudhary on 5/28/2021 at 0824   Final Diagnosis   Specimen #1 (\"Brain tumor right frontal lobe,\" biopsy):    Diffuse glioma (WHO grade II)    See comment     Specimen #2 (\"Brain tumor right frontal lobe,\" biopsy):    Diffuse glioma (WHO grade II)    See comment     Specimen #3 (Brain tumor right frontal lobe, resection):    Diffuse glioma, IDH-mutant (WHO grade II)    See attached report from [facility]         Assessment/Plan     Patient is a 56-year-old female with recently diagnosed oligodendroglioma grade 2 status post gross total resection    Oligodendroglioma  Previously I had an extensive discussion with the patient about treatment options, prognosis.  We had an extensive discussion at that time with several options.  This is summarized here below    I previously discussed with her that there are findings from RTOG 9802 clinical trial which showed survival advantage with radiation followed by chemotherapy after surgical resection of grade 2 oligodendrogliomas.  Chemotherapy with the PCV regimen in this trial conferred a survival advantage over radiotherapy alone with a median overall survival of 13.3 versus 7.8 years.  In subset analysis benefit was more significant  Histologic " oligodendroglioma is as compared to other low-grade gliomas.  Molecular analysis post talk also showed survival advantage in molecularly confirmed IDH mutant, 1p19q codeleted oligodendrogliomas.    PCV can be a toxic regimen however survival advantage is only been shown in randomized control trial using this particular regimen.  The other option would be temozolomide which has advantages over PCV with ease of administration, better tolerance and efficacy in combination with radiation therapy and other types of CNS tumors and gliomas.    The other option I had discussed with her was clinical trial with a CODEL study which is comparing radiation alone versus radiation with Temodar versus radiation with PCV in this patient population.  Patient however is not very interested in enrolling in a clinical trial.  She is wanting to pursue the most aggressive treatment option for her to reduce the chances of recurrence of her tumor.    Lastly also discussed with her the option of surveillance and observation with serial MRIs however also discussed that in above studies the progression free survival is around 50% for 5 years.     Based on her above discussion patient decided to pursue aggressive treatment with radiation followed by chemotherapy.  We would use the RTOG 9802 regimen with radiation followed by chemotherapy with PCV.  We have evidence that vincristine does not cross the blood-brain barrier significantly and hence if there is toxicity we can choose to omit the drug.  Case was discussed with Dr. Cruz and started sequential radiation and chemo.    Based on above patient decided to proceed with adjuvant PCV.   Now patient has had clinically significant pancytopenia needing blood transfusion hospital admission.  Repeat CBC today with platelet count low, I would reschedule her vincristine to next week.  For the following cycle we will do a dose reduction based on her CBC next week.  Most likely 20% dose reduction will be  needed for lomustine and procarbazine    nausea  Continue zofran as needed this is stable.    Anemia  There could be possibility of hemolysis nonimmune.  There have been case reports with her chemotherapy regimen.  Have started her on prednisone for a 5-day course.  Hemoglobin has improved significantly.  Recheck CBC next week.      Follow-up in 3 weeks    I have reviewed and confirmed the accuracy of the patient's history: Chief complaint, HPI, ROS, Subjective and Past Family Social History as entered by the MA/LPN/RN.     Emilie Workman MD 11/16/21

## 2021-11-18 ENCOUNTER — HOSPITAL ENCOUNTER (OUTPATIENT)
Dept: ONCOLOGY | Facility: HOSPITAL | Age: 56
Setting detail: INFUSION SERIES
End: 2021-11-18

## 2021-11-18 NOTE — PAYOR COMM NOTE
"-------DISCHARGE NOTICE-------    PATIENT DISCHARGED HOME 11/15/21  AUTH [id number]  PLEASE ADVISE IF ADDITIONAL INFORMATION IS REQUIRED TO FINALIZE THIS REQUEST.    Esperanza Denney  Utilization Review Coordinator  [address] [place]  [address]  Ph: [phone]  Fx: [phone]    Nikki Cortes (56 y.o. Female)             Date of Birth Social Security Number Address Home Phone MRN    1965  [address] [facility] [address] [phone] [id number]    Spiritism Marital Status             None Single       Admission Date Admission Type Admitting Provider Attending Provider Department, Room/Bed    21 Emergency Femi Manzano MD  [facility] SURGICAL INPATIENT,     Discharge Date Discharge Disposition Discharge Destination          11/15/2021 Home or Self Care              Attending Provider: (none)   Allergies: No Known Allergies    Isolation: None   Infection: Rhinovirus  (21)   Code Status: Prior   Advance Care Planning Activity    Ht: 157.5 cm (62\")   Wt: 157 kg (346 lb 12.5 oz)    Admission Cmt: None   Principal Problem: Symptomatic anemia [D64.9]               Active Insurance as of 2021     Primary Coverage     Payor Plan Insurance Group Employer/Plan Group    [facility] [id number]     Payor Plan Address Payor Plan Phone Number Payor Plan Fax Number Effective Dates    [address]   2019 - None Entered    [facility] [id number]       Subscriber Name Subscriber Birth Date Member ID       NIKKI CORTES 1965 [id number]                    Discharge Summary      Femi Manzano MD at 11/15/21 0923                [facility] Medicine Services        Patient Name: Nikki Cortes  : 1965  MRN: [id number]  Primary Care Physician:  Annamarie Monroy APRN  Date of admission: 2021   Date of discharge; 11/15/2021     Subjective       Chief Complaint: Dyspnea     History of " Present Illness:  Cindy Cortes is a 56 y.o. female w/PMH of HTN, GERD, seizures, DM, brain CA 6/2021, depression, dyslipidemia who presents to [facility] ED due to dyspnea, patient suddenly finished radiation and is currently undergoing chemotherapy. Patient states dyspnea has progressively worsened over the last 5 days, dyspnea is worse with exertion and relieved with rest. Patient admits to cough, fatigue, weakness. Patient denies melena, hematuria, chest pain, vomiting, diarrhea. As dyspnea did not improve she decided to go to [facility] ED.        Upon arrival to the ED vitals temp 97.7, HR 87, RR 16, /75, O2 sat 96% on room air.  Labs notable for troponin less than 0.010, proBNP 355, glucose 111, , K4.1, CO2 24, BUN 13, creatinine 1.03, ALT 15, AST 20, WBC 3.5, Hgb 5.6, platelets 26, neutrophils 56.  Positive for rhinovirus.  EKG shows sinus rhythm or ectopic atrial rhythm rate 79, probable anterior infarct, age indeterminate, nonspecific T abnormalities lateral leads.  Chest x-ray shows no acute cardiopulmonary finding.  CT PE negative, cholelithiasis.  Patient treated in the ED with 2 units PRBCs, 1 unit platelets.      11/15/2021; hemodynamically stable patient feels improved on her symptoms, status post PRBC transfusion, feels better wants to go home, hematology/oncology okayed for discharge.  Review of Systems   Constitutional: Positive for malaise/fatigue. Negative for chills and fever.   HENT: Negative.  Negative for congestion.    Eyes: Negative.  Negative for blurred vision and visual disturbance.   Cardiovascular: Positive for dyspnea on exertion. Negative for chest pain and orthopnea.   Respiratory: Positive for cough, shortness of breath and sleep disturbances due to breathing.    Endocrine: Negative.    Hematologic/Lymphatic: Negative.    Skin: Negative.    Musculoskeletal: Positive for myalgias. Negative for falls.   Gastrointestinal: Negative.  Negative for  bloating, abdominal pain, nausea and vomiting.   Genitourinary: Negative.  Negative for dysuria and pelvic pain.   Neurological: Positive for weakness. Negative for difficulty with concentration and light-headedness.   Psychiatric/Behavioral: Negative.    Allergic/Immunologic: Negative.    All other systems reviewed and are negative.        Personal History      Medical History        Past Medical History:   Diagnosis Date   • Arthritis     • GERD (gastroesophageal reflux disease)     • Hypertension     • Oligodendroglioma (HCC)     • Seizure (HCC)     • Type 2 diabetes mellitus with hyperglycemia, without long-term current use of insulin (HCC) 5/12/2021            Surgical History         Past Surgical History:   Procedure Laterality Date   • CRANIOTOMY FOR TUMOR Right 5/6/2021     Procedure: CRANIOTOMY FOR TUMOR RESECTION WITH STEREOTACTIC;  Surgeon: Jluis Felix MD;  Location: [facility];  Service: Neurosurgery;  Laterality: Right;            Family History: family history includes Breast cancer in her cousin, maternal aunt, and niece; Colon cancer in her maternal aunt; Kidney disease in her mother; Prostate cancer in her brother. Otherwise pertinent FHx was reviewed and not pertinent to current issue.     Social History:  reports that she has never smoked. She has never used smokeless tobacco. She reports previous alcohol use of about 1.0 standard drink of alcohol per week. She reports that she does not use drugs.     Home Medications:           Prior to Admission Medications      Prescriptions Last Dose Informant Patient Reported? Taking?     Alcohol Swabs (Alcohol Wipes) 70 % pads     Yes No     Apply 1 each topically to the appropriate area as directed 4 (Four) Times a Day.     amLODIPine (NORVASC) 10 MG tablet     No No     Take 1 tablet by mouth Daily.     ferrous sulfate 325 (65 FE) MG tablet     Yes No     Take 1 tablet by mouth Daily With Breakfast.     folic acid (FOLVITE) 1 MG tablet     No No      TAKE TWO TABLETS BY MOUTH EVERY MORNING, THEN TWO TABLETS EVERY EVENING     Gleostine 100 MG chemo capsule     Yes No     Gleostine 40 MG chemo capsule     Yes No     glucose blood (OneTouch Verio) test strip     No No     1 each by Other route 4 (Four) Times a Day Before Meals & at Bedtime. Use as instructed     insulin aspart (NovoLOG FlexPen) 100 UNIT/ML solution pen-injector sc pen     No No     Inject 5 Units under the skin into the appropriate area as directed 3 (Three) Times a Day With Meals. Inject 1u lispro SC for every 50 over 150     insulin detemir (Levemir FlexTouch) 100 UNIT/ML injection     No No     Inject 15 Units under the skin into the appropriate area as directed 2 (Two) Times a Day.     Insulin Pen Needle 32G X 4 MM misc     No No     1 each 5 (Five) Times a Day.     Isopropyl Alcohol (Alcohol Wipes) 70 % misc     No No     Apply 1 each topically 4 (Four) Times a Day Before Meals & at Bedtime.     Lancets (OneTouch Delica Plus [id number]) misc     No No     1 each 4 (Four) Times a Day Before Meals & at Bedtime.     levETIRAcetam (KEPPRA) 750 MG tablet     No No     Take 1 tablet by mouth 2 (Two) Times a Day.     lidocaine-prilocaine (EMLA) 2.5-2.5 % cream     No No     Apply  topically to the appropriate area as directed Every 2 (Two) Hours As Needed for Mild Pain .     metoprolol tartrate (LOPRESSOR) 25 MG tablet     No No     Take 0.5 tablets by mouth Every 8 (Eight) Hours.     ondansetron (Zofran) 8 MG tablet     No No     Take 1 tablet by mouth Every 8 (Eight) Hours As Needed for Nausea or Vomiting.     oxybutynin (DITROPAN) 5 MG tablet     Yes No     oxybutynin XL (DITROPAN-XL) 5 MG 24 hr tablet     Yes No     Take 5 mg by mouth Daily.     venlafaxine XR (EFFEXOR-XR) 75 MG 24 hr capsule     Yes No     Take 75 mg by mouth Daily. Take DOS     vitamin D (ERGOCALCIFEROL) 1.25 MG (21981 UT) capsule capsule     Yes No     Take 50,000 Units by mouth Every 7 (Seven) Days.                 "  Allergies:  No Known Allergies     Objective       Vitals:   /53   Pulse 74   Temp 98 °F (36.7 °C) (Oral)   Resp 20   Ht 157.5 cm (62\")   Wt (!) 157 kg (346 lb 12.5 oz)   LMP  (LMP Unknown)   SpO2 93%   Breastfeeding No   BMI 63.43 kg/m²   Physical Exam  Vitals and nursing note reviewed.   Constitutional:       Appearance: She is MORBIDLY obese.  HENT:      Right Ear: External ear normal.      Left Ear: External ear normal.      Nose: Nose normal.      Mouth/Throat:      Mouth: Mucous membranes are moist.   Eyes:      Pupils: Pupils are equal, round, and reactive to light.   Cardiovascular:      Rate and Rhythm: Normal rate and regular rhythm.      Pulses: Normal pulses.      Heart sounds: Normal heart sounds.   Pulmonary:      Effort: Pulmonary effort is normal.      Breath sounds:NORMAL.  Abdominal:      General: Abdomen is protuberant. Bowel sounds are decreased.      Palpations: Abdomen is soft.   Musculoskeletal:         General: Normal range of motion.      Cervical back: Normal range of motion.   Skin:     General: Skin is dry.      Capillary Refill: Capillary refill takes less than 2 seconds.      Coloration: Skin is pale and sallow.      Findings: Bruising, ecchymosis and petechiae present.   Neurological:      General: No focal deficit present.      Mental Status: She is alert and oriented to person, place, and time. Mental status is at baseline.   Psychiatric:         Attention and Perception: Attention and perception normal.         Mood and Affect: Mood and affect normal.         Speech: Speech normal.         Behavior: Behavior normal. Behavior is cooperative.         Thought Content: Thought content normal.         Cognition and Memory: Cognition and memory normal.         Judgment: Judgment normal.         Result Review    Result Review:  I have personally reviewed the results from the time of this admission to 11/14/2021 07:02 EST and agree with these findings:  [x]?  " Laboratory  [x]?  Microbiology  [x]?  Radiology  [x]?  EKG/Telemetry   [x]?  Cardiology/Vascular   []?  Pathology  []?  Old records  []?  Other:     Lab Results   Component Value Date    GLUCOSE 118 (H) 11/15/2021    CALCIUM 8.9 11/15/2021     11/15/2021    K 4.7 11/15/2021    CO2 26.0 11/15/2021     11/15/2021    BUN 11 11/15/2021    CREATININE 0.91 11/15/2021    EGFRIFNONA 64 11/15/2021    BCR 12.1 11/15/2021    ANIONGAP 9.0 11/15/2021        Lab Results   Component Value Date    WBC 4.70 11/15/2021    HGB 8.5 (L) 11/15/2021    HCT 24.0 (L) 11/15/2021    MCV 94.2 11/15/2021    PLT 39 (C) 11/15/2021      Assessment/Plan        Active Hospital Problems:       Active Hospital Problems     Diagnosis     • Symptomatic anemia     • Chemotherapy-induced thrombocytopenia     • Oligoastrocytoma of frontal lobe (HCC)     • Type 2 diabetes mellitus with hyperglycemia, without long-term current use of insulin (HCC)     • Seizure (HCC)     • Essential hypertension     • Gastroesophageal reflux disease with hiatal hernia     • Morbid obesity with BMI of 60.0-69.9, adult (HCC)     • Dyslipidemia     • Depression        Symptomatic acute blood loss anemia:   -Heme positive on exam in the ED, probable reaction due to thrombocytopenia  -Oncology consulted will see in a.m.  -SCDs for VTE prophylaxis  -Trend H&H  -Continuous cardiac monitoring  GI consult appreciated, heme positive stool is not unusual with platelets of 30, and or likely secondary to   Hemorrhoids or other anorectal condition, no endoscopy was recommended in the absence of overt GI bleeding given her immunosuppressed state and pancytopenia status post packed red blood cell transfusions  Oligoastrocytoma of frontal lobe:  -Received 29 radiation treatments, last treatment August 2021  -Currently undergoing second round of chemotherapy  -Oncology consult appreciated s/p PRBC transfusions currently hemoglobin stable at 8.5   Patient to follow-up with blood  work couple of days from now with oncology office  Chemotherapy induced thrombocytopenia:  -Upon arrival to the ED platelets 26, 1 unit transfused  -Monitor CBC     DM type II:  -Sliding scale insulin  -Hypoglycemia protocol  -Monitor Accu-Cheks  -Consistent carbohydrate diet  -Hold oral diabetic medication during admission for potential procedures, resume home medications at the time of discharge     Seizure disorder:  -Home medication Keppra  -Seizure precautions     GERD:  -IV Protonix 40 mg every 12 hours     Dyslipidemia:  -Encourage lifestyle modifications  -Encourage dietary modifications     Essential hypertension:  -Encourage lifestyle modifications  -Low-sodium diet  -Home medication amlodipine, metoprolol     Depression:  -Home medication Effexor      DVT prophylaxis:  Mechanical DVT prophylaxis orders are present.      Home medications verified by nursing and/or pharmacy prior to provider review      CODE STATUS:    Code Status (Patient has no pulse and is not breathing): CPR (Attempt to Resuscitate)  Medical Interventions (Patient has pulse or is breathing): Full Support     Admission Status:  I believe this patient meets observation status.      I discussed the patient's findings and my recommendations with the patient.      The patient was interviewed wearing appropriate personal protective equipment.           Your medication list      CONTINUE taking these medications      Instructions Last Dose Given Next Dose Due   Alcohol Wipes 70 % misc      Apply 1 each topically 4 (Four) Times a Day Before Meals & at Bedtime.       Alcohol Wipes 70 % pads      Apply 1 each topically to the appropriate area as directed 4 (Four) Times a Day.       amLODIPine 10 MG tablet  Commonly known as: NORVASC      Take 1 tablet by mouth Daily.       ferrous sulfate 325 (65 FE) MG tablet      Take 1 tablet by mouth Daily With Breakfast.       folic acid 1 MG tablet  Commonly known as: FOLVITE      TAKE TWO TABLETS BY MOUTH  EVERY MORNING, THEN TWO TABLETS EVERY EVENING       Gleostine 40 MG chemo capsule  Generic drug: lomustine           Gleostine 100 MG chemo capsule  Generic drug: lomustine           Insulin Pen Needle 32G X 4 MM misc      1 each 5 (Five) Times a Day.       Levemir FlexTouch 100 UNIT/ML injection  Generic drug: insulin detemir      Inject 15 Units under the skin into the appropriate area as directed 2 (Two) Times a Day.       levETIRAcetam 750 MG tablet  Commonly known as: KEPPRA      Take 1 tablet by mouth 2 (Two) Times a Day.       lidocaine-prilocaine 2.5-2.5 % cream  Commonly known as: EMLA      Apply  topically to the appropriate area as directed Every 2 (Two) Hours As Needed for Mild Pain .       metoprolol tartrate 25 MG tablet  Commonly known as: LOPRESSOR      Take 0.5 tablets by mouth Every 8 (Eight) Hours.       NovoLOG FlexPen 100 UNIT/ML solution pen-injector sc pen  Generic drug: insulin aspart      Inject 5 Units under the skin into the appropriate area as directed 3 (Three) Times a Day With Meals. Inject 1u lispro SC for every 50 over 150       ondansetron 8 MG tablet  Commonly known as: Zofran      Take 1 tablet by mouth Every 8 (Eight) Hours As Needed for Nausea or Vomiting.       OneTouch Delica Plus [id number] misc      1 each 4 (Four) Times a Day Before Meals & at Bedtime.       OneTouch Verio test strip  Generic drug: glucose blood      1 each by Other route 4 (Four) Times a Day Before Meals & at Bedtime. Use as instructed       oxybutynin XL 5 MG 24 hr tablet  Commonly known as: DITROPAN-XL      Take 5 mg by mouth Daily.       oxybutynin 5 MG tablet  Commonly known as: DITROPAN           venlafaxine XR 75 MG 24 hr capsule  Commonly known as: EFFEXOR-XR      Take 75 mg by mouth Daily. Take DOS       vitamin D 1.25 MG (50138 UT) capsule capsule  Commonly known as: ERGOCALCIFEROL      Take 50,000 Units by mouth Every 7 (Seven) Days.            DISCHARGE Follow Up Recommendations for labs and  diagnostics  Discharge condition; in stable condition to home  Discharge diet; diabetic diet as before  Discharge activity; as tolerated  Discharge total time; more than 33 minutes  Time: I spent more than 33 minutes on this discharge activity which included: face-to-face encounter with the patient, reviewing the data in the system, coordination of the care with the nursing staff as well as consultants, documentation, and entering orders.        Electronically signed by Femi Manzano MD at 11/15/21 1998

## 2021-11-22 ENCOUNTER — HOSPITAL ENCOUNTER (OUTPATIENT)
Dept: ONCOLOGY | Facility: HOSPITAL | Age: 56
Setting detail: INFUSION SERIES
Discharge: HOME | End: 2021-11-22

## 2021-11-22 ENCOUNTER — TELEPHONE (OUTPATIENT)
Dept: ONCOLOGY | Facility: HOSPITAL | Age: 56
End: 2021-11-22

## 2021-11-22 ENCOUNTER — HOSPITAL ENCOUNTER (OUTPATIENT)
Dept: MRI IMAGING | Facility: HOSPITAL | Age: 56
Discharge: HOME | End: 2021-11-22
Admitting: INTERNAL MEDICINE

## 2021-11-22 ENCOUNTER — HOSPITAL ENCOUNTER (OUTPATIENT)
Dept: INFUSION THERAPY | Facility: HOSPITAL | Age: 56
Setting detail: INFUSION SERIES
Discharge: HOME | End: 2021-11-22

## 2021-11-22 VITALS
BODY MASS INDEX: 53.92 KG/M2 | TEMPERATURE: 96.9 F | HEIGHT: 62 IN | HEART RATE: 74 BPM | DIASTOLIC BLOOD PRESSURE: 74 MMHG | RESPIRATION RATE: 18 BRPM | SYSTOLIC BLOOD PRESSURE: 156 MMHG | WEIGHT: 293 LBS

## 2021-11-22 VITALS
TEMPERATURE: 98.3 F | OXYGEN SATURATION: 99 % | SYSTOLIC BLOOD PRESSURE: 144 MMHG | HEART RATE: 71 BPM | DIASTOLIC BLOOD PRESSURE: 65 MMHG | RESPIRATION RATE: 16 BRPM

## 2021-11-22 DIAGNOSIS — C71.1 OLIGOASTROCYTOMA OF FRONTAL LOBE (HCC): ICD-10-CM

## 2021-11-22 DIAGNOSIS — D69.6 ANEMIA WITH LOW PLATELET COUNT (HCC): ICD-10-CM

## 2021-11-22 DIAGNOSIS — D69.59 CHEMOTHERAPY-INDUCED THROMBOCYTOPENIA: ICD-10-CM

## 2021-11-22 DIAGNOSIS — D69.59 CHEMOTHERAPY-INDUCED THROMBOCYTOPENIA: Primary | ICD-10-CM

## 2021-11-22 DIAGNOSIS — T45.1X5A CHEMOTHERAPY-INDUCED THROMBOCYTOPENIA: ICD-10-CM

## 2021-11-22 DIAGNOSIS — Z95.828 PORT-A-CATH IN PLACE: ICD-10-CM

## 2021-11-22 DIAGNOSIS — T45.1X5A CHEMOTHERAPY-INDUCED THROMBOCYTOPENIA: Primary | ICD-10-CM

## 2021-11-22 DIAGNOSIS — C71.9 OLIGODENDROGLIOMA (HCC): Primary | ICD-10-CM

## 2021-11-22 LAB
ALBUMIN SERPL-MCNC: 3.6 G/DL (ref 3.5–5.2)
ALBUMIN/GLOB SERPL: 1.5 G/DL
ALP SERPL-CCNC: 87 U/L (ref 39–117)
ALT SERPL W P-5'-P-CCNC: 28 U/L (ref 1–33)
ANION GAP SERPL CALCULATED.3IONS-SCNC: 11 MMOL/L (ref 5–15)
ANISOCYTOSIS BLD QL: NORMAL
AST SERPL-CCNC: 26 U/L (ref 1–32)
BASOPHILS # BLD AUTO: 0 10*3/MM3 (ref 0–0.2)
BASOPHILS # BLD AUTO: 0.01 10*3/MM3 (ref 0–0.2)
BASOPHILS NFR BLD AUTO: 0.1 % (ref 0–1.5)
BASOPHILS NFR BLD AUTO: 0.2 % (ref 0–1.5)
BILIRUB SERPL-MCNC: 0.7 MG/DL (ref 0–1.2)
BUN SERPL-MCNC: 17 MG/DL (ref 6–20)
BUN/CREAT SERPL: 20.5 (ref 7–25)
CALCIUM SPEC-SCNC: 8.4 MG/DL (ref 8.6–10.5)
CHLORIDE SERPL-SCNC: 107 MMOL/L (ref 98–107)
CO2 SERPL-SCNC: 24 MMOL/L (ref 22–29)
CREAT SERPL-MCNC: 0.83 MG/DL (ref 0.57–1)
DEPRECATED RDW RBC AUTO: 65.3 FL (ref 37–54)
DEPRECATED RDW RBC AUTO: 70.9 FL (ref 37–54)
EOSINOPHIL # BLD AUTO: 0.1 10*3/MM3 (ref 0–0.4)
EOSINOPHIL # BLD AUTO: 0.13 10*3/MM3 (ref 0–0.4)
EOSINOPHIL NFR BLD AUTO: 2.6 % (ref 0.3–6.2)
EOSINOPHIL NFR BLD AUTO: 3.1 % (ref 0.3–6.2)
ERYTHROCYTE [DISTWIDTH] IN BLOOD BY AUTOMATED COUNT: 19.2 % (ref 12.3–15.4)
ERYTHROCYTE [DISTWIDTH] IN BLOOD BY AUTOMATED COUNT: 21.9 % (ref 12.3–15.4)
GFR SERPL CREATININE-BSD FRML MDRD: 72 ML/MIN/1.73
GLOBULIN UR ELPH-MCNC: 2.4 GM/DL
GLUCOSE SERPL-MCNC: 174 MG/DL (ref 65–99)
HCT VFR BLD AUTO: 23.7 % (ref 34–46.6)
HCT VFR BLD AUTO: 23.9 % (ref 34–46.6)
HGB BLD-MCNC: 7.8 G/DL (ref 12–15.9)
HGB BLD-MCNC: 8.2 G/DL (ref 12–15.9)
LYMPHOCYTES # BLD AUTO: 0.6 10*3/MM3 (ref 0.7–3.1)
LYMPHOCYTES # BLD AUTO: 0.6 10*3/MM3 (ref 0.7–3.1)
LYMPHOCYTES NFR BLD AUTO: 13.7 % (ref 19.6–45.3)
LYMPHOCYTES NFR BLD AUTO: 14.5 % (ref 19.6–45.3)
MCH RBC QN AUTO: 32.6 PG (ref 26.6–33)
MCH RBC QN AUTO: 33.1 PG (ref 26.6–33)
MCHC RBC AUTO-ENTMCNC: 32.6 G/DL (ref 31.5–35.7)
MCHC RBC AUTO-ENTMCNC: 34.7 G/DL (ref 31.5–35.7)
MCV RBC AUTO: 100 FL (ref 79–97)
MCV RBC AUTO: 95.5 FL (ref 79–97)
MONOCYTES # BLD AUTO: 0.1 10*3/MM3 (ref 0.1–0.9)
MONOCYTES # BLD AUTO: 0.13 10*3/MM3 (ref 0.1–0.9)
MONOCYTES NFR BLD AUTO: 3.1 % (ref 5–12)
MONOCYTES NFR BLD AUTO: 3.2 % (ref 5–12)
NEUTROPHILS NFR BLD AUTO: 3.26 10*3/MM3 (ref 1.7–7)
NEUTROPHILS NFR BLD AUTO: 3.5 10*3/MM3 (ref 1.7–7)
NEUTROPHILS NFR BLD AUTO: 79.1 % (ref 42.7–76)
NEUTROPHILS NFR BLD AUTO: 80.4 % (ref 42.7–76)
NRBC BLD AUTO-RTO: 0.1 /100 WBC (ref 0–0.2)
PATHOLOGY REVIEW: YES
PLATELET # BLD AUTO: 12 10*3/MM3 (ref 140–450)
PLATELET # BLD AUTO: 18 10*3/MM3 (ref 140–450)
PMV BLD AUTO: 10.7 FL (ref 6–12)
PMV BLD AUTO: 8.4 FL (ref 6–12)
POTASSIUM SERPL-SCNC: 3.9 MMOL/L (ref 3.5–5.2)
PROT SERPL-MCNC: 6 G/DL (ref 6–8.5)
RBC # BLD AUTO: 2.39 10*6/MM3 (ref 3.77–5.28)
RBC # BLD AUTO: 2.48 10*6/MM3 (ref 3.77–5.28)
SMALL PLATELETS BLD QL SMEAR: NORMAL
SODIUM SERPL-SCNC: 142 MMOL/L (ref 136–145)
WBC MORPH BLD: NORMAL
WBC NRBC COR # BLD: 4.13 10*3/MM3 (ref 3.4–10.8)
WBC NRBC COR # BLD: 4.4 10*3/MM3 (ref 3.4–10.8)

## 2021-11-22 PROCEDURE — 85025 COMPLETE CBC W/AUTO DIFF WBC: CPT

## 2021-11-22 PROCEDURE — 85007 BL SMEAR W/DIFF WBC COUNT: CPT

## 2021-11-22 PROCEDURE — 36430 TRANSFUSION BLD/BLD COMPNT: CPT

## 2021-11-22 PROCEDURE — 70553 MRI BRAIN STEM W/O & W/DYE: CPT

## 2021-11-22 PROCEDURE — P9035 PLATELET PHERES LEUKOREDUCED: HCPCS

## 2021-11-22 PROCEDURE — 25010000002 HEPARIN LOCK FLUSH PER 10 UNITS: Performed by: INTERNAL MEDICINE

## 2021-11-22 PROCEDURE — 25010000002 GADOTERIDOL PER 1 ML: Performed by: INTERNAL MEDICINE

## 2021-11-22 PROCEDURE — 85025 COMPLETE CBC W/AUTO DIFF WBC: CPT | Performed by: INTERNAL MEDICINE

## 2021-11-22 PROCEDURE — 80053 COMPREHEN METABOLIC PANEL: CPT | Performed by: INTERNAL MEDICINE

## 2021-11-22 PROCEDURE — P9100 PATHOGEN TEST FOR PLATELETS: HCPCS

## 2021-11-22 PROCEDURE — 63710000001 DIPHENHYDRAMINE PER 50 MG: Performed by: INTERNAL MEDICINE

## 2021-11-22 PROCEDURE — A9579 GAD-BASE MR CONTRAST NOS,1ML: HCPCS | Performed by: INTERNAL MEDICINE

## 2021-11-22 PROCEDURE — 36591 DRAW BLOOD OFF VENOUS DEVICE: CPT

## 2021-11-22 RX ORDER — SODIUM CHLORIDE 9 MG/ML
250 INJECTION, SOLUTION INTRAVENOUS AS NEEDED
Status: DISCONTINUED | OUTPATIENT
Start: 2021-11-22 | End: 2021-11-24 | Stop reason: HOSPADM

## 2021-11-22 RX ORDER — DIPHENHYDRAMINE HCL 25 MG
25 CAPSULE ORAL ONCE
Status: COMPLETED | OUTPATIENT
Start: 2021-11-22 | End: 2021-11-22

## 2021-11-22 RX ORDER — SODIUM CHLORIDE 0.9 % (FLUSH) 0.9 %
10 SYRINGE (ML) INJECTION AS NEEDED
Status: CANCELLED | OUTPATIENT
Start: 2021-11-22

## 2021-11-22 RX ORDER — SODIUM CHLORIDE 9 MG/ML
250 INJECTION, SOLUTION INTRAVENOUS AS NEEDED
Status: CANCELLED | OUTPATIENT
Start: 2021-11-22

## 2021-11-22 RX ORDER — HEPARIN SODIUM (PORCINE) LOCK FLUSH IV SOLN 100 UNIT/ML 100 UNIT/ML
500 SOLUTION INTRAVENOUS AS NEEDED
Status: DISCONTINUED | OUTPATIENT
Start: 2021-11-22 | End: 2021-11-24 | Stop reason: HOSPADM

## 2021-11-22 RX ORDER — DIPHENHYDRAMINE HCL 25 MG
25 CAPSULE ORAL ONCE
Qty: 1 CAPSULE | Refills: 0 | Status: SHIPPED | OUTPATIENT
Start: 2021-11-22 | End: 2021-11-22

## 2021-11-22 RX ORDER — SODIUM CHLORIDE 0.9 % (FLUSH) 0.9 %
10 SYRINGE (ML) INJECTION AS NEEDED
Status: DISCONTINUED | OUTPATIENT
Start: 2021-11-22 | End: 2021-11-24 | Stop reason: HOSPADM

## 2021-11-22 RX ORDER — HEPARIN SODIUM (PORCINE) LOCK FLUSH IV SOLN 100 UNIT/ML 100 UNIT/ML
500 SOLUTION INTRAVENOUS AS NEEDED
Status: CANCELLED | OUTPATIENT
Start: 2021-11-22

## 2021-11-22 RX ADMIN — Medication 500 UNITS: at 13:50

## 2021-11-22 RX ADMIN — Medication 20 ML: at 13:50

## 2021-11-22 RX ADMIN — GADOTERIDOL 20 ML: 279.3 INJECTION, SOLUTION INTRAVENOUS at 15:10

## 2021-11-22 RX ADMIN — DIPHENHYDRAMINE HYDROCHLORIDE 25 MG: 25 CAPSULE ORAL at 12:25

## 2021-11-22 NOTE — PROGRESS NOTES
Pt to the clinic for Vincristine.  She denies having any complaints today. Port accessed and flushed with good blood return noted. 10cc of blood wasted prior to specimen collection. Blood specimen obtained and sent to lab for processing per protocol.  CBC showed Platelet count 12 today.  Discussed with Dr. Workman, orders given to hold treatment today and arrange for 1 unit of platelets today. Spoke with KARIS Alejo at [facility].  Was given an appt time for 1130 today.  Pt notified and v/u. Port flushed with saline and left accessed for use at [facility].

## 2021-11-22 NOTE — TELEPHONE ENCOUNTER
Received call from KARIS Barrios with [facility].  She stated that the patient is there for platelets and said after the last transfusion of blood product, she thinks blood and not platelets, she had itching for three days.  Discussed with Dr. Workman.  Orders received for Benadryl 25 mg po.  Sophie notified and v/u, Sophie will add to transfusion smart set order.

## 2021-11-23 ENCOUNTER — TELEPHONE (OUTPATIENT)
Dept: ONCOLOGY | Facility: CLINIC | Age: 56
End: 2021-11-23

## 2021-11-23 ENCOUNTER — SPECIALTY PHARMACY (OUTPATIENT)
Dept: PHARMACY | Facility: HOSPITAL | Age: 56
End: 2021-11-23

## 2021-11-23 LAB
BH BB BLOOD EXPIRATION DATE: NORMAL
BH BB BLOOD TYPE BARCODE: 5100
BH BB DISPENSE STATUS: NORMAL
BH BB PRODUCT CODE: NORMAL
BH BB UNIT NUMBER: NORMAL
LAB AP CASE REPORT: NORMAL
PATH REPORT.FINAL DX SPEC: NORMAL
UNIT  ABO: NORMAL
UNIT  RH: NORMAL

## 2021-11-23 NOTE — PROGRESS NOTES
"Subjective   History of Present Illness: Cindy Cortes is a 56 y.o. female is here today for follow-up for oligodendroglioma.  Patient had some recent issues with pancytopenia and was in the hospital recently for transfusions.  Her oncologist is currently adjusting medications and following her blood work.  No other new issues.      Previous Treatment:    The following portions of the patient's history were reviewed and updated as appropriate: allergies, current medications, past family history, past medical history, past social history, past surgical history and problem list.    Review of Systems   Constitutional: Positive for fatigue. Negative for fever.   Respiratory: Positive for cough. Negative for shortness of breath.    Neurological: Negative for weakness and headaches.       Objective     /76 (BP Location: Left arm, Patient Position: Sitting, Cuff Size: Large Adult)   Pulse 92   Temp 97.3 °F (36.3 °C)   Resp 16   Ht 157.5 cm (62\")   Wt (!) 152 kg (334 lb 6.4 oz)   LMP  (LMP Unknown)   SpO2 96%   BMI 61.16 kg/m²    Body mass index is 61.16 kg/m².      Neurologic Exam    Assessment/Plan   Independent Review of Radiographic Studies:      I personally reviewed and interpreted the images from the following studies.    MRI brain with and without contrast: Stable with comparison to MRI performed 5 months ago with no definitive tumor recurrence    Medical Decision Making:      Cindy Cortes is a 56 y.o. female status post resection of oligodendroglioma currently undergoing chemotherapy with stable MRI brain.  No further neurosurgical intervention necessary at this time.  Chemotherapy regimen and treatment per oncologist.  Further imaging also per oncologist.  We will see her back after her next MRI.      There are no diagnoses linked to this encounter.  No follow-ups on file.    This patient was examined wearing appropriate personal protective equipment.                      Dr. Yang Prince " IV    11/24/21  14:26 EST

## 2021-11-23 NOTE — PROGRESS NOTES
MTM Note: PCV        11/22/2021   WBC 3.40 - 10.80 10*3/mm3 4.40   Neutrophils Absolute 1.70 - 7.00 10*3/mm3 3.50   Hemoglobin 12.0 - 15.9 g/dL 8.2 (A)   Hematocrit 34.0 - 46.6 % 23.7 (A)   Platelets 140 - 450 10*3/mm3 18 (A)  Result checked   Creatinine 0.57 - 1.00 mg/dL 0.83   eGFR Non African Am >60 mL/min/1.73 72   BUN 6 - 20 mg/dL 17   Sodium 136 - 145 mmol/L 142   Potassium 3.5 - 5.2 mmol/L 3.9   Glucose 65 - 99 mg/dL 174 (A)   Calcium 8.6 - 10.5 mg/dL 8.4 (A)   Albumin 3.50 - 5.20 g/dL 3.60   Total Protein 6.0 - 8.5 g/dL 6.0   AST (SGOT) 1 - 32 U/L 26   ALT (SGPT) 1 - 33 U/L 28   Alkaline Phosphatase 39 - 117 U/L 87   Total Bilirubin 0.0 - 1.2 mg/dL 0.7     Patient received PLT transfusion on 11/22/21 due to levels, which initially resulted as 12,000 in the office.  Per Dr. Workman, patient will be dose reduced on both procarbazine and lomustine once counts recover.  V/o given to d/c current orders of both.  Called [facility] and discontinued both prescriptions via v/o with Concepcion.  Pharmacy will continue to monitor.  Thanks,    Chapis Goff, PharmD

## 2021-11-23 NOTE — TELEPHONE ENCOUNTER
----- Message from Emilie Workman MD sent at 11/22/2021  5:13 PM EST -----  Scan looks stable.  I will discuss this with Dr. Cruz prior to her follow-up.  Please let her know stable findings

## 2021-11-24 ENCOUNTER — OFFICE VISIT (OUTPATIENT)
Dept: NEUROSURGERY | Facility: CLINIC | Age: 56
End: 2021-11-24

## 2021-11-24 ENCOUNTER — LAB (OUTPATIENT)
Dept: LAB | Facility: HOSPITAL | Age: 56
End: 2021-11-24

## 2021-11-24 ENCOUNTER — APPOINTMENT (OUTPATIENT)
Dept: LAB | Facility: HOSPITAL | Age: 56
End: 2021-11-24

## 2021-11-24 VITALS
BODY MASS INDEX: 53.92 KG/M2 | WEIGHT: 293 LBS | HEIGHT: 62 IN | DIASTOLIC BLOOD PRESSURE: 76 MMHG | TEMPERATURE: 97.3 F | SYSTOLIC BLOOD PRESSURE: 126 MMHG | RESPIRATION RATE: 16 BRPM | OXYGEN SATURATION: 96 % | HEART RATE: 92 BPM

## 2021-11-24 DIAGNOSIS — C71.1 OLIGOASTROCYTOMA OF FRONTAL LOBE (HCC): ICD-10-CM

## 2021-11-24 DIAGNOSIS — C71.9 OLIGODENDROGLIOMA (HCC): Primary | ICD-10-CM

## 2021-11-24 DIAGNOSIS — D69.59 CHEMOTHERAPY-INDUCED THROMBOCYTOPENIA: ICD-10-CM

## 2021-11-24 DIAGNOSIS — T45.1X5A CHEMOTHERAPY-INDUCED THROMBOCYTOPENIA: ICD-10-CM

## 2021-11-24 DIAGNOSIS — C71.9 OLIGODENDROGLIOMA (HCC): ICD-10-CM

## 2021-11-24 LAB
BASOPHILS # BLD AUTO: 0 10*3/MM3 (ref 0–0.2)
BASOPHILS NFR BLD AUTO: 0 % (ref 0–1.5)
DEPRECATED RDW RBC AUTO: 63.9 FL (ref 37–54)
EOSINOPHIL # BLD AUTO: 0.22 10*3/MM3 (ref 0–0.4)
EOSINOPHIL NFR BLD AUTO: 7 % (ref 0.3–6.2)
ERYTHROCYTE [DISTWIDTH] IN BLOOD BY AUTOMATED COUNT: 19 % (ref 12.3–15.4)
HCT VFR BLD AUTO: 25.2 % (ref 34–46.6)
HGB BLD-MCNC: 8.4 G/DL (ref 12–15.9)
LYMPHOCYTES # BLD AUTO: 0.87 10*3/MM3 (ref 0.7–3.1)
LYMPHOCYTES NFR BLD AUTO: 27.8 % (ref 19.6–45.3)
MCH RBC QN AUTO: 32.7 PG (ref 26.6–33)
MCHC RBC AUTO-ENTMCNC: 33.3 G/DL (ref 31.5–35.7)
MCV RBC AUTO: 98.1 FL (ref 79–97)
MONOCYTES # BLD AUTO: 0.13 10*3/MM3 (ref 0.1–0.9)
MONOCYTES NFR BLD AUTO: 4.2 % (ref 5–12)
NEUTROPHILS NFR BLD AUTO: 1.91 10*3/MM3 (ref 1.7–7)
NEUTROPHILS NFR BLD AUTO: 61 % (ref 42.7–76)
PLATELET # BLD AUTO: 46 10*3/MM3 (ref 140–450)
PMV BLD AUTO: 11.1 FL (ref 6–12)
RBC # BLD AUTO: 2.57 10*6/MM3 (ref 3.77–5.28)
WBC NRBC COR # BLD: 3.13 10*3/MM3 (ref 3.4–10.8)

## 2021-11-24 PROCEDURE — 85025 COMPLETE CBC W/AUTO DIFF WBC: CPT

## 2021-11-24 PROCEDURE — 99213 OFFICE O/P EST LOW 20 MIN: CPT | Performed by: NEUROLOGICAL SURGERY

## 2021-11-29 ENCOUNTER — HOSPITAL ENCOUNTER (OUTPATIENT)
Dept: ONCOLOGY | Facility: HOSPITAL | Age: 56
Setting detail: INFUSION SERIES
Discharge: HOME | End: 2021-11-29

## 2021-11-29 ENCOUNTER — HOSPITAL ENCOUNTER (OUTPATIENT)
Dept: INFUSION THERAPY | Facility: HOSPITAL | Age: 56
Setting detail: INFUSION SERIES
Discharge: HOME | End: 2021-11-29

## 2021-11-29 ENCOUNTER — SPECIALTY PHARMACY (OUTPATIENT)
Dept: PHARMACY | Facility: HOSPITAL | Age: 56
End: 2021-11-29

## 2021-11-29 VITALS
OXYGEN SATURATION: 96 % | DIASTOLIC BLOOD PRESSURE: 73 MMHG | RESPIRATION RATE: 16 BRPM | WEIGHT: 293 LBS | HEIGHT: 62 IN | TEMPERATURE: 96.9 F | HEART RATE: 117 BPM | BODY MASS INDEX: 53.92 KG/M2 | SYSTOLIC BLOOD PRESSURE: 171 MMHG

## 2021-11-29 VITALS
SYSTOLIC BLOOD PRESSURE: 145 MMHG | TEMPERATURE: 98.3 F | HEART RATE: 73 BPM | DIASTOLIC BLOOD PRESSURE: 73 MMHG | OXYGEN SATURATION: 100 % | RESPIRATION RATE: 16 BRPM

## 2021-11-29 DIAGNOSIS — D69.59 CHEMOTHERAPY-INDUCED THROMBOCYTOPENIA: Primary | ICD-10-CM

## 2021-11-29 DIAGNOSIS — C71.9 OLIGODENDROGLIOMA (HCC): ICD-10-CM

## 2021-11-29 DIAGNOSIS — Z95.828 PORT-A-CATH IN PLACE: ICD-10-CM

## 2021-11-29 DIAGNOSIS — C71.9 OLIGODENDROGLIOMA (HCC): Primary | ICD-10-CM

## 2021-11-29 DIAGNOSIS — T45.1X5A CHEMOTHERAPY-INDUCED THROMBOCYTOPENIA: Primary | ICD-10-CM

## 2021-11-29 DIAGNOSIS — C71.1 OLIGOASTROCYTOMA OF FRONTAL LOBE (HCC): ICD-10-CM

## 2021-11-29 LAB
ABO GROUP BLD: NORMAL
ALBUMIN SERPL-MCNC: 3.7 G/DL (ref 3.5–5.2)
ALBUMIN/GLOB SERPL: 1.3 G/DL
ALP SERPL-CCNC: 90 U/L (ref 39–117)
ALT SERPL W P-5'-P-CCNC: 23 U/L (ref 1–33)
ANION GAP SERPL CALCULATED.3IONS-SCNC: 12 MMOL/L (ref 5–15)
ANTI-FYA: NORMAL
AST SERPL-CCNC: 25 U/L (ref 1–32)
BASOPHILS # BLD AUTO: 0 10*3/MM3 (ref 0–0.2)
BASOPHILS NFR BLD AUTO: 0 % (ref 0–1.5)
BB HOLD TUBE: NORMAL
BILIRUB SERPL-MCNC: 0.6 MG/DL (ref 0–1.2)
BLD GP AB SCN SERPL QL: POSITIVE
BUN SERPL-MCNC: 24 MG/DL (ref 6–20)
BUN/CREAT SERPL: 27.9 (ref 7–25)
CALCIUM SPEC-SCNC: 8.7 MG/DL (ref 8.6–10.5)
CHLORIDE SERPL-SCNC: 104 MMOL/L (ref 98–107)
CO2 SERPL-SCNC: 25 MMOL/L (ref 22–29)
CREAT SERPL-MCNC: 0.86 MG/DL (ref 0.57–1)
DEPRECATED RDW RBC AUTO: 64.8 FL (ref 37–54)
DEPRECATED RDW RBC AUTO: 70.9 FL (ref 37–54)
EOSINOPHIL # BLD AUTO: 0.13 10*3/MM3 (ref 0–0.4)
EOSINOPHIL NFR BLD AUTO: 5.3 % (ref 0.3–6.2)
ERYTHROCYTE [DISTWIDTH] IN BLOOD BY AUTOMATED COUNT: 19.2 % (ref 12.3–15.4)
ERYTHROCYTE [DISTWIDTH] IN BLOOD BY AUTOMATED COUNT: 21.9 % (ref 12.3–15.4)
GFR SERPL CREATININE-BSD FRML MDRD: 69 ML/MIN/1.73
GLOBULIN UR ELPH-MCNC: 2.9 GM/DL
GLUCOSE SERPL-MCNC: 146 MG/DL (ref 65–99)
HAPTOGLOB SERPL-MCNC: 55 MG/DL (ref 30–200)
HCT VFR BLD AUTO: 18.1 % (ref 34–46.6)
HCT VFR BLD AUTO: 20.6 % (ref 34–46.6)
HCT VFR BLD AUTO: 21.2 % (ref 34–46.6)
HGB BLD-MCNC: 6.3 G/DL (ref 12–15.9)
HGB BLD-MCNC: 6.7 G/DL (ref 12–15.9)
HGB BLD-MCNC: 7.7 G/DL (ref 12–15.9)
LDH SERPL-CCNC: 208 U/L (ref 135–214)
LYMPHOCYTES # BLD AUTO: 0.7 10*3/MM3 (ref 0.7–3.1)
LYMPHOCYTES NFR BLD AUTO: 28.8 % (ref 19.6–45.3)
MCH RBC QN AUTO: 32.7 PG (ref 26.6–33)
MCH RBC QN AUTO: 33.5 PG (ref 26.6–33)
MCHC RBC AUTO-ENTMCNC: 32.5 G/DL (ref 31.5–35.7)
MCHC RBC AUTO-ENTMCNC: 35 G/DL (ref 31.5–35.7)
MCV RBC AUTO: 100.5 FL (ref 79–97)
MCV RBC AUTO: 95.6 FL (ref 79–97)
MONOCYTES # BLD AUTO: 0.13 10*3/MM3 (ref 0.1–0.9)
MONOCYTES NFR BLD AUTO: 5.3 % (ref 5–12)
NEUTROPHILS NFR BLD AUTO: 1.47 10*3/MM3 (ref 1.7–7)
NEUTROPHILS NFR BLD AUTO: 60.6 % (ref 42.7–76)
PLATELET # BLD AUTO: 78 10*3/MM3 (ref 140–450)
PLATELET # BLD AUTO: 84 10*3/MM3 (ref 140–450)
PMV BLD AUTO: 7.6 FL (ref 6–12)
PMV BLD AUTO: 9.8 FL (ref 6–12)
POTASSIUM SERPL-SCNC: 4.4 MMOL/L (ref 3.5–5.2)
PROT SERPL-MCNC: 6.6 G/DL (ref 6–8.5)
RBC # BLD AUTO: 1.9 10*6/MM3 (ref 3.77–5.28)
RBC # BLD AUTO: 2.05 10*6/MM3 (ref 3.77–5.28)
RETICS # AUTO: 0.05 10*6/MM3 (ref 0.02–0.13)
RETICS/RBC NFR AUTO: 2.89 % (ref 0.7–1.9)
RH BLD: POSITIVE
SODIUM SERPL-SCNC: 141 MMOL/L (ref 136–145)
T&S EXPIRATION DATE: NORMAL
WBC NRBC COR # BLD: 2 10*3/MM3 (ref 3.4–10.8)
WBC NRBC COR # BLD: 2.43 10*3/MM3 (ref 3.4–10.8)

## 2021-11-29 PROCEDURE — 86922 COMPATIBILITY TEST ANTIGLOB: CPT

## 2021-11-29 PROCEDURE — 83615 LACTATE (LD) (LDH) ENZYME: CPT | Performed by: INTERNAL MEDICINE

## 2021-11-29 PROCEDURE — 86850 RBC ANTIBODY SCREEN: CPT | Performed by: INTERNAL MEDICINE

## 2021-11-29 PROCEDURE — 80053 COMPREHEN METABOLIC PANEL: CPT | Performed by: INTERNAL MEDICINE

## 2021-11-29 PROCEDURE — 85045 AUTOMATED RETICULOCYTE COUNT: CPT | Performed by: INTERNAL MEDICINE

## 2021-11-29 PROCEDURE — 63710000001 DIPHENHYDRAMINE PER 50 MG: Performed by: INTERNAL MEDICINE

## 2021-11-29 PROCEDURE — 85014 HEMATOCRIT: CPT

## 2021-11-29 PROCEDURE — 86900 BLOOD TYPING SEROLOGIC ABO: CPT

## 2021-11-29 PROCEDURE — 86900 BLOOD TYPING SEROLOGIC ABO: CPT | Performed by: INTERNAL MEDICINE

## 2021-11-29 PROCEDURE — 86901 BLOOD TYPING SEROLOGIC RH(D): CPT | Performed by: INTERNAL MEDICINE

## 2021-11-29 PROCEDURE — 36591 DRAW BLOOD OFF VENOUS DEVICE: CPT

## 2021-11-29 PROCEDURE — P9040 RBC LEUKOREDUCED IRRADIATED: HCPCS

## 2021-11-29 PROCEDURE — 86870 RBC ANTIBODY IDENTIFICATION: CPT | Performed by: INTERNAL MEDICINE

## 2021-11-29 PROCEDURE — 85025 COMPLETE CBC W/AUTO DIFF WBC: CPT | Performed by: INTERNAL MEDICINE

## 2021-11-29 PROCEDURE — 36430 TRANSFUSION BLD/BLD COMPNT: CPT

## 2021-11-29 PROCEDURE — 25010000002 HEPARIN LOCK FLUSH PER 10 UNITS: Performed by: INTERNAL MEDICINE

## 2021-11-29 PROCEDURE — 83010 ASSAY OF HAPTOGLOBIN QUANT: CPT | Performed by: INTERNAL MEDICINE

## 2021-11-29 PROCEDURE — 85018 HEMOGLOBIN: CPT

## 2021-11-29 PROCEDURE — 85027 COMPLETE CBC AUTOMATED: CPT | Performed by: INTERNAL MEDICINE

## 2021-11-29 RX ORDER — SODIUM CHLORIDE 9 MG/ML
250 INJECTION, SOLUTION INTRAVENOUS AS NEEDED
Status: CANCELLED | OUTPATIENT
Start: 2021-11-29

## 2021-11-29 RX ORDER — SODIUM CHLORIDE 0.9 % (FLUSH) 0.9 %
10 SYRINGE (ML) INJECTION AS NEEDED
Status: CANCELLED | OUTPATIENT
Start: 2021-11-29

## 2021-11-29 RX ORDER — HEPARIN SODIUM (PORCINE) LOCK FLUSH IV SOLN 100 UNIT/ML 100 UNIT/ML
500 SOLUTION INTRAVENOUS AS NEEDED
Status: DISCONTINUED | OUTPATIENT
Start: 2021-11-29 | End: 2021-12-01 | Stop reason: HOSPADM

## 2021-11-29 RX ORDER — DIPHENHYDRAMINE HCL 25 MG
25 CAPSULE ORAL ONCE
Qty: 1 CAPSULE | Refills: 0 | Status: CANCELLED | OUTPATIENT
Start: 2021-11-29 | End: 2021-11-29

## 2021-11-29 RX ORDER — SODIUM CHLORIDE 9 MG/ML
250 INJECTION, SOLUTION INTRAVENOUS AS NEEDED
Status: DISCONTINUED | OUTPATIENT
Start: 2021-11-29 | End: 2021-12-01 | Stop reason: HOSPADM

## 2021-11-29 RX ORDER — HEPARIN SODIUM (PORCINE) LOCK FLUSH IV SOLN 100 UNIT/ML 100 UNIT/ML
500 SOLUTION INTRAVENOUS AS NEEDED
Status: DISCONTINUED | OUTPATIENT
Start: 2021-11-29 | End: 2021-11-30 | Stop reason: HOSPADM

## 2021-11-29 RX ORDER — DIPHENHYDRAMINE HCL 25 MG
25 CAPSULE ORAL ONCE AS NEEDED
Status: COMPLETED | OUTPATIENT
Start: 2021-11-29 | End: 2021-11-29

## 2021-11-29 RX ORDER — HEPARIN SODIUM (PORCINE) LOCK FLUSH IV SOLN 100 UNIT/ML 100 UNIT/ML
500 SOLUTION INTRAVENOUS AS NEEDED
Status: CANCELLED | OUTPATIENT
Start: 2021-11-29

## 2021-11-29 RX ORDER — SODIUM CHLORIDE 0.9 % (FLUSH) 0.9 %
10 SYRINGE (ML) INJECTION AS NEEDED
Status: DISCONTINUED | OUTPATIENT
Start: 2021-11-29 | End: 2021-12-01 | Stop reason: HOSPADM

## 2021-11-29 RX ORDER — SODIUM CHLORIDE 0.9 % (FLUSH) 0.9 %
10 SYRINGE (ML) INJECTION AS NEEDED
Status: DISCONTINUED | OUTPATIENT
Start: 2021-11-29 | End: 2021-11-30 | Stop reason: HOSPADM

## 2021-11-29 RX ADMIN — Medication 500 UNITS: at 16:55

## 2021-11-29 RX ADMIN — SODIUM CHLORIDE, PRESERVATIVE FREE 10 ML: 5 INJECTION INTRAVENOUS at 16:52

## 2021-11-29 RX ADMIN — DIPHENHYDRAMINE HYDROCHLORIDE 25 MG: 25 CAPSULE ORAL at 11:45

## 2021-11-29 NOTE — PROGRESS NOTES
MTM Note: PCV     11/29/2021  Day 29 (Planned)   WBC 3.40 - 10.80 10*3/mm3 2.43 (A)   Neutrophils Absolute 1.70 - 7.00 10*3/mm3 1.47 (A)   Hemoglobin 12.0 - 15.9 g/dL 6.7 (A)   Hematocrit 34.0 - 46.6 % 20.6 (A)   Platelets 140 - 450 10*3/mm3 84 (A)     Per Dr. Workman, vincristine was delayed today due to hemoglobin and was sent for 1 unit PRBC.  Met with Cindy in the clinic and let her know that the delay in vincristine will also delay the start of her oral chemo.  She expressed understanding.  Doses of both lomustine and procarbazine to be decreased by 20% per v/o from Dr. Workman.  Doses adjusted and sent to Dr. Workman for review and dose adjustment is limited by procarbazine availability in 50mg capsules only (verified with specialty pharmacy, [facility] ).   Pharmacy will continue to follow.  Thanks,    Chapis Goff, PharmD

## 2021-11-29 NOTE — PROGRESS NOTES
Patient came in today for vincristine and denies new complaints today. Platelets came back at 84, Hemoglobin at 6.7. Patient states she is SOB on walking long distances. Dr. Workman notified, new orders to hold treatment today and send patient for one unit of blood. Ana placed orders and spoke with [facility]. Port flushed and left accessed for blood transfusion. Next appts given, patient denies further needs today and sent to [facility].

## 2021-12-01 ENCOUNTER — LAB (OUTPATIENT)
Dept: LAB | Facility: HOSPITAL | Age: 56
End: 2021-12-01

## 2021-12-01 DIAGNOSIS — C71.1 OLIGOASTROCYTOMA OF FRONTAL LOBE (HCC): ICD-10-CM

## 2021-12-01 DIAGNOSIS — C71.9 OLIGODENDROGLIOMA (HCC): ICD-10-CM

## 2021-12-01 LAB
BASOPHILS # BLD AUTO: 0 10*3/MM3 (ref 0–0.2)
BASOPHILS NFR BLD AUTO: 0 % (ref 0–1.5)
BH BB BLOOD EXPIRATION DATE: NORMAL
BH BB BLOOD TYPE BARCODE: 5100
BH BB DISPENSE STATUS: NORMAL
BH BB PRODUCT CODE: NORMAL
BH BB UNIT NUMBER: NORMAL
CROSSMATCH INTERPRETATION: NORMAL
DEPRECATED RDW RBC AUTO: 60 FL (ref 37–54)
EOSINOPHIL # BLD AUTO: 0.09 10*3/MM3 (ref 0–0.4)
EOSINOPHIL NFR BLD AUTO: 4 % (ref 0.3–6.2)
ERYTHROCYTE [DISTWIDTH] IN BLOOD BY AUTOMATED COUNT: 19.2 % (ref 12.3–15.4)
HCT VFR BLD AUTO: 25.5 % (ref 34–46.6)
HGB BLD-MCNC: 8.5 G/DL (ref 12–15.9)
LYMPHOCYTES # BLD AUTO: 0.69 10*3/MM3 (ref 0.7–3.1)
LYMPHOCYTES NFR BLD AUTO: 30.5 % (ref 19.6–45.3)
MCH RBC QN AUTO: 32.1 PG (ref 26.6–33)
MCHC RBC AUTO-ENTMCNC: 33.3 G/DL (ref 31.5–35.7)
MCV RBC AUTO: 96.2 FL (ref 79–97)
MONOCYTES # BLD AUTO: 0.23 10*3/MM3 (ref 0.1–0.9)
MONOCYTES NFR BLD AUTO: 10.2 % (ref 5–12)
NEUTROPHILS NFR BLD AUTO: 1.25 10*3/MM3 (ref 1.7–7)
NEUTROPHILS NFR BLD AUTO: 55.3 % (ref 42.7–76)
PLATELET # BLD AUTO: 120 10*3/MM3 (ref 140–450)
PMV BLD AUTO: 9.8 FL (ref 6–12)
RBC # BLD AUTO: 2.65 10*6/MM3 (ref 3.77–5.28)
UNIT  ABO: NORMAL
UNIT  RH: NORMAL
WBC NRBC COR # BLD: 2.26 10*3/MM3 (ref 3.4–10.8)

## 2021-12-01 PROCEDURE — 85025 COMPLETE CBC W/AUTO DIFF WBC: CPT

## 2021-12-02 ENCOUNTER — SPECIALTY PHARMACY (OUTPATIENT)
Dept: PHARMACY | Facility: HOSPITAL | Age: 56
End: 2021-12-02

## 2021-12-02 NOTE — PROGRESS NOTES
[facility] Lab Review: PCV        12/1/2021   WBC 3.40 - 10.80 10*3/mm3 2.26 (A)   Neutrophils Absolute 1.70 - 7.00 10*3/mm3 1.25 (A)   Hemoglobin 12.0 - 15.9 g/dL 8.5 (A)   Hematocrit 34.0 - 46.6 % 25.5 (A)   Platelets 140 - 450 10*3/mm3 120 (A)     Labs reviewed. Pharmacy to continue to follow.  Thanks,    Chapis Goff, PharmD

## 2021-12-02 NOTE — PROGRESS NOTES
HEMATOLOGY ONCOLOGY OUTPATIENT FOLLOW UP      Patient name: Cindy Cortes  : 1965  MRN: [id number]  Primary Care Physician: Annamarie Monroy APRN  Referring Physician: Annamarie Monroy APRN  Reason For Consult:     Chief Complaint   Patient presents with   • Follow-up     Oligoastrocytoma of frontal lobe        HPI:   History of Present Illness:  Cindy Cortes is 56 y.o. female who presented to our office on 06/10/21 for consultation regarding Oligodendroglioma    Patient is a 55-year-old female who was referred to us for treatment options regarding recent diagnosis of grade 2 oligodendroglioma.  This was IDH [id number] mutated, 1P 19 Q codeleted.  Patient initially presented with seizures and a CT head was obtained that showed vasogenic edema and a right frontal lobe mass MRI was obtained after this.  2021 -MRI of the brain shows 2.1 x 4.4 x 3.3 cm right frontal lobe mass with eccentric cystic or necrotic component with surrounding vasogenic edema and a focal 3 mm right to left midline shift.  Patient was started on Keppra, steroids plan was made for elective craniotomy and surgical resection.    2021 craniotomy of the right frontal lobe, microscopic resection of tumor mass.  Pathology results oligodendroglioma WHO grade 2, IDH1 R132H mutation by immunohistochemistry, 1p19q codeletion by FISH.    2021 -status post resection of the right frontal lobe mass.  Expected postop blood product. resolution of midline shift.    2021 - Started radiation adjuvantly.    2021 - last day of radiation.    2021 - C1 D8 with vincristin started procarbazine  10/7/2021 - C1 D29 Vincristine    10/21/2021 - C2D1 PCV  10/28/2021 -cycle 2-day 8 with vincristine  Started procarbazine  Held procarbazine for a few days with nausea  2021 -patient in the hospital with significant pancytopenia needing blood transfusion.    2021 -vincristine cycle 2-day 29.  This has been  delayed with ongoing cytopenias and hospitalization.      Subjective:    Complains of fatigue no bleeding.    The following portions of the patient's history were reviewed and updated as appropriate: allergies, current medications, past family history, past medical history, past social history, past surgical history and problem list.    Past Medical History:   Diagnosis Date   • Arthritis    • GERD (gastroesophageal reflux disease)    • Hypertension    • Oligodendroglioma (HCC)    • Seizure (HCC)    • Type 2 diabetes mellitus with hyperglycemia, without long-term current use of insulin (HCC) 5/12/2021       Past Surgical History:   Procedure Laterality Date   • CRANIOTOMY FOR TUMOR Right 5/6/2021    Procedure: CRANIOTOMY FOR TUMOR RESECTION WITH STEREOTACTIC;  Surgeon: Jluis Felix MD;  Location: [facility] MAIN OR;  Service: Neurosurgery;  Laterality: Right;         Current Outpatient Medications:   •  Alcohol Swabs (Alcohol Wipes) 70 % pads, Apply 1 each topically to the appropriate area as directed 4 (Four) Times a Day., Disp: , Rfl:   •  amLODIPine (NORVASC) 10 MG tablet, Take 1 tablet by mouth Daily., Disp: 30 tablet, Rfl: 2  •  ferrous sulfate 325 (65 FE) MG tablet, Take 1 tablet by mouth Daily With Breakfast., Disp: , Rfl:   •  folic acid (FOLVITE) 1 MG tablet, TAKE TWO TABLETS BY MOUTH EVERY MORNING, THEN TWO TABLETS EVERY EVENING, Disp: 120 tablet, Rfl: 2  •  glucose blood (OneTouch Verio) test strip, 1 each by Other route 4 (Four) Times a Day Before Meals & at Bedtime. Use as instructed, Disp: 200 each, Rfl: 1  •  levETIRAcetam (KEPPRA) 750 MG tablet, Take 1 tablet by mouth 2 (Two) Times a Day., Disp: 60 tablet, Rfl: 2  •  lidocaine-prilocaine (EMLA) 2.5-2.5 % cream, Apply  topically to the appropriate area as directed Every 2 (Two) Hours As Needed for Mild Pain ., Disp: 5 g, Rfl: 0  •  metoprolol tartrate (LOPRESSOR) 25 MG tablet, Take 0.5 tablets by mouth Every 8 (Eight) Hours., Disp: 45 tablet, Rfl:  2  •  ondansetron (Zofran) 8 MG tablet, Take 1 tablet by mouth Every 8 (Eight) Hours As Needed for Nausea or Vomiting., Disp: 30 tablet, Rfl: 3  •  oxybutynin (DITROPAN) 5 MG tablet, , Disp: , Rfl:   •  venlafaxine XR (EFFEXOR-XR) 75 MG 24 hr capsule, Take 75 mg by mouth Daily. Take DOS, Disp: , Rfl: 3  •  vitamin D (ERGOCALCIFEROL) 1.25 MG (39358 UT) capsule capsule, Take 50,000 Units by mouth Every 7 (Seven) Days.  , Disp: , Rfl:   No current facility-administered medications for this visit.    Current outpatient and discharge medications have been reconciled for the patient.  Reviewed by: Emilie Workman MD    No Known Allergies    Family History   Problem Relation Age of Onset   • Kidney disease Mother    • Prostate cancer Brother    • Breast cancer Maternal Aunt    • Colon cancer Maternal Aunt    • Breast cancer Niece    • Breast cancer Cousin        Cancer-related family history includes Breast cancer in her cousin, maternal aunt, and niece; Colon cancer in her maternal aunt; Prostate cancer in her brother.    Social History     Tobacco Use   • Smoking status: Never Smoker   • Smokeless tobacco: Never Used   Vaping Use   • Vaping Use: Never used   Substance Use Topics   • Alcohol use: Not Currently     Alcohol/week: 1.0 standard drink     Types: 1 Cans of beer per week     Comment: socially   • Drug use: Never     Social History     Social History Narrative   • Not on file            Objective:    Vitals:    12/07/21 1101   BP: 174/81   Pulse: 115   Temp: 97.1 °F (36.2 °C)   SpO2: 96%   Weight: (!) 153 kg (338 lb)   PainSc: 0-No pain     Body mass index is 61.82 kg/m².  ECOG  (0) Fully active, able to carry on all predisease performance without restriction    Physical Exam:     Physical Exam  Constitutional:       Appearance: Normal appearance. She is obese.   HENT:      Head: Normocephalic and atraumatic.      Comments: Radiation changes scalp, surgical scar  Eyes:      Pupils: Pupils are equal, round, and  reactive to light.   Cardiovascular:      Rate and Rhythm: Normal rate and regular rhythm.      Pulses: Normal pulses.      Heart sounds: No murmur heard.      Pulmonary:      Effort: Pulmonary effort is normal.      Breath sounds: Normal breath sounds.   Abdominal:      General: There is no distension.      Palpations: Abdomen is soft. There is no mass.      Tenderness: There is no abdominal tenderness.   Musculoskeletal:         General: Normal range of motion.      Cervical back: Normal range of motion.   Skin:     General: Skin is warm.   Neurological:      General: No focal deficit present.      Mental Status: She is alert.   Psychiatric:         Mood and Affect: Mood normal.           Lab Results - Last 18 Months   Lab Units 12/07/21  1058 12/06/21  1008 12/01/21  1029   WBC 10*3/mm3 1.91* 2.22* 2.26*   HEMOGLOBIN g/dL 7.2* 7.0* 8.5*   HEMATOCRIT % 22.4* 21.5* 25.5*   PLATELETS 10*3/mm3 171 172 120*   MCV fL 97.8* 98.6* 96.2     Lab Results - Last 18 Months   Lab Units 12/06/21  1008 11/29/21  1005 11/22/21  1004   SODIUM mmol/L 139 141 142   POTASSIUM mmol/L 3.8 4.4 3.9   CHLORIDE mmol/L 103 104 107   CO2 mmol/L 24.0 25.0 24.0   BUN mg/dL 13 24* 17   CREATININE mg/dL 0.75 0.86 0.83   CALCIUM mg/dL 8.4* 8.7 8.4*   BILIRUBIN mg/dL 0.6 0.6 0.7   ALK PHOS U/L 97 90 87   ALT (SGPT) U/L 18 23 28   AST (SGOT) U/L 17 25 26   GLUCOSE mg/dL 152* 146* 174*       Lab Results   Component Value Date    GLUCOSE 152 (H) 12/06/2021    BUN 13 12/06/2021    CREATININE 0.75 12/06/2021    EGFRIFNONA 81 12/06/2021    BCR 17.3 12/06/2021    K 3.8 12/06/2021    CO2 24.0 12/06/2021    CALCIUM 8.4 (L) 12/06/2021    ALBUMIN 3.50 12/06/2021    LABIL2 1.4 03/30/2021    AST 17 12/06/2021    ALT 18 12/06/2021       Lab Results - Last 18 Months   Lab Units 08/27/21  0738 05/04/21  0922   INR  0.96 0.96   APTT seconds 25.6 20.7*       Lab Results   Component Value Date    IRON 140 10/27/2021    TIBC 335 10/27/2021    FERRITIN 672.00 (H)  "10/27/2021       Lab Results   Component Value Date    LRWHQRTH27 347 10/27/2021       Lab Results   Component Value Date    PTT 25.6 08/27/2021    INR 0.96 08/27/2021     MRI Brain With & Without Contrast    Result Date: 11/22/2021    1.  Status post right frontal craniotomy for tumor resection.  Resection cavity appears similar to the prior exam.  There is persistent peripheral abnormal increased T2 signal which may be due to treatment-related change or residual tumor.  There is also some stable enhancement within the resection cavity and along the posterior superior margin of the resection cavity, unchanged from prior exam.  No new or worsening contrast enhancement identified.  Electronically Signed By-Robel Neely MD On:11/22/2021 4:33 PM This report was finalized on [id number] by  Robel Neely MD.    XR Chest 1 View    Result Date: 11/13/2021  1.  No acute cardiopulmonary finding. Electronically signed by:  Nicole Meier M.D.  11/13/2021 9:20 PM    CT Chest Pulmonary Embolism    Result Date: 11/13/2021  1.  No evidence of pulmonary embolus. 2.  Cholelithiasis. Electronically signed by:  Nicole Meier M.D.  11/13/2021 9:22 PM    Pathology results  Outside Report, Addendum   Integrated Diagnosis: Oligodendroglioma WHO Grade II  IDH1 R132H mutation by Immunohistochemistry; 1p19q co-deletion by FISH  See attached report from [facility]   Addendum electronically signed by Cecilia Chaudhary on 5/28/2021 at 0824   Final Diagnosis   Specimen #1 (\"Brain tumor right frontal lobe,\" biopsy):    Diffuse glioma (WHO grade II)    See comment     Specimen #2 (\"Brain tumor right frontal lobe,\" biopsy):    Diffuse glioma (WHO grade II)    See comment     Specimen #3 (Brain tumor right frontal lobe, resection):    Diffuse glioma, IDH-mutant (WHO grade II)    See attached report from [facility]         Assessment/Plan     Patient is a 56-year-old " female with recently diagnosed oligodendroglioma grade 2 status post gross total resection    Oligodendroglioma  Previously I had an extensive discussion with the patient about treatment options, prognosis.  We had an extensive discussion at that time with several options.  This is summarized here below    I previously discussed with her that there are findings from RTOG 9802 clinical trial which showed survival advantage with radiation followed by chemotherapy after surgical resection of grade 2 oligodendrogliomas.  Chemotherapy with the PCV regimen in this trial conferred a survival advantage over radiotherapy alone with a median overall survival of 13.3 versus 7.8 years.  In subset analysis benefit was more significant  Histologic oligodendroglioma is as compared to other low-grade gliomas.  Molecular analysis post talk also showed survival advantage in molecularly confirmed IDH mutant, 1p19q codeleted oligodendrogliomas.    PCV can be a toxic regimen however survival advantage is only been shown in randomized control trial using this particular regimen.  The other option would be temozolomide which has advantages over PCV with ease of administration, better tolerance and efficacy in combination with radiation therapy and other types of CNS tumors and gliomas.    The other option I had discussed with her was clinical trial with a CODEL study which is comparing radiation alone versus radiation with Temodar versus radiation with PCV in this patient population.  Patient however is not very interested in enrolling in a clinical trial.  She is wanting to pursue the most aggressive treatment option for her to reduce the chances of recurrence of her tumor.    Lastly also discussed with her the option of surveillance and observation with serial MRIs however also discussed that in above studies the progression free survival is around 50% for 5 years.     Based on her above discussion patient decided to pursue aggressive  treatment with radiation followed by chemotherapy.  We would use the RTOG 9802 regimen with radiation followed by chemotherapy with PCV.  We have evidence that vincristine does not cross the blood-brain barrier significantly and hence if there is toxicity we can choose to omit the drug.  Case was discussed with Dr. Cruz and started sequential radiation and chemo.    Based on above patient decided to proceed with adjuvant PCV.   Now patient has had clinically significant pancytopenia needing blood transfusion hospital admission.  She has completed cycle 2.  Still has profound pancytopenia.  We will hold off starting cycle 3 till her counts recover.  For cycle 3 we will dose reduce procarbazine and lomustine by 20%.    nausea  Continue zofran as needed this is improved.    Anemia  There could be possibility of hemolysis nonimmune.  There have been case reports with her chemotherapy regimen.  She was given 5-day course of prednisone.  Repeat haptoglobin improved  Now her anemia is most likely ongoing myelosuppression.  Bob CBC in a week      Follow-up in 4 weeks    I have reviewed and confirmed the accuracy of the patient's history: Chief complaint, HPI, ROS, Subjective and Past Family Social History as entered by the MA/WILLIAMSN/RN.     Emilie Workman MD 12/07/21

## 2021-12-06 ENCOUNTER — HOSPITAL ENCOUNTER (OUTPATIENT)
Dept: ONCOLOGY | Facility: HOSPITAL | Age: 56
Setting detail: INFUSION SERIES
Discharge: HOME | End: 2021-12-06

## 2021-12-06 ENCOUNTER — DOCUMENTATION (OUTPATIENT)
Dept: RADIATION ONCOLOGY | Facility: HOSPITAL | Age: 56
End: 2021-12-06

## 2021-12-06 VITALS
TEMPERATURE: 96.8 F | DIASTOLIC BLOOD PRESSURE: 64 MMHG | WEIGHT: 293 LBS | HEART RATE: 92 BPM | BODY MASS INDEX: 61.82 KG/M2 | SYSTOLIC BLOOD PRESSURE: 185 MMHG

## 2021-12-06 DIAGNOSIS — C71.9 OLIGODENDROGLIOMA (HCC): Primary | ICD-10-CM

## 2021-12-06 DIAGNOSIS — C71.1 OLIGOASTROCYTOMA OF FRONTAL LOBE (HCC): ICD-10-CM

## 2021-12-06 DIAGNOSIS — Z95.828 PORT-A-CATH IN PLACE: ICD-10-CM

## 2021-12-06 LAB
ALBUMIN SERPL-MCNC: 3.5 G/DL (ref 3.5–5.2)
ALBUMIN/GLOB SERPL: 1.2 G/DL
ALP SERPL-CCNC: 97 U/L (ref 39–117)
ALT SERPL W P-5'-P-CCNC: 18 U/L (ref 1–33)
ANION GAP SERPL CALCULATED.3IONS-SCNC: 12 MMOL/L (ref 5–15)
AST SERPL-CCNC: 17 U/L (ref 1–32)
BASOPHILS # BLD AUTO: 0.01 10*3/MM3 (ref 0–0.2)
BASOPHILS NFR BLD AUTO: 0.5 % (ref 0–1.5)
BILIRUB SERPL-MCNC: 0.6 MG/DL (ref 0–1.2)
BUN SERPL-MCNC: 13 MG/DL (ref 6–20)
BUN/CREAT SERPL: 17.3 (ref 7–25)
CALCIUM SPEC-SCNC: 8.4 MG/DL (ref 8.6–10.5)
CHLORIDE SERPL-SCNC: 103 MMOL/L (ref 98–107)
CO2 SERPL-SCNC: 24 MMOL/L (ref 22–29)
CREAT SERPL-MCNC: 0.75 MG/DL (ref 0.57–1)
DEPRECATED RDW RBC AUTO: 64.7 FL (ref 37–54)
EOSINOPHIL # BLD AUTO: 0.03 10*3/MM3 (ref 0–0.4)
EOSINOPHIL NFR BLD AUTO: 1.4 % (ref 0.3–6.2)
ERYTHROCYTE [DISTWIDTH] IN BLOOD BY AUTOMATED COUNT: 20.4 % (ref 12.3–15.4)
GFR SERPL CREATININE-BSD FRML MDRD: 81 ML/MIN/1.73
GLOBULIN UR ELPH-MCNC: 2.9 GM/DL
GLUCOSE SERPL-MCNC: 152 MG/DL (ref 65–99)
HCT VFR BLD AUTO: 21.5 % (ref 34–46.6)
HGB BLD-MCNC: 7 G/DL (ref 12–15.9)
LYMPHOCYTES # BLD AUTO: 0.64 10*3/MM3 (ref 0.7–3.1)
LYMPHOCYTES NFR BLD AUTO: 28.8 % (ref 19.6–45.3)
MCH RBC QN AUTO: 32.1 PG (ref 26.6–33)
MCHC RBC AUTO-ENTMCNC: 32.6 G/DL (ref 31.5–35.7)
MCV RBC AUTO: 98.6 FL (ref 79–97)
MONOCYTES # BLD AUTO: 0.34 10*3/MM3 (ref 0.1–0.9)
MONOCYTES NFR BLD AUTO: 15.3 % (ref 5–12)
NEUTROPHILS NFR BLD AUTO: 1.2 10*3/MM3 (ref 1.7–7)
NEUTROPHILS NFR BLD AUTO: 54 % (ref 42.7–76)
PLATELET # BLD AUTO: 172 10*3/MM3 (ref 140–450)
PMV BLD AUTO: 9.2 FL (ref 6–12)
POTASSIUM SERPL-SCNC: 3.8 MMOL/L (ref 3.5–5.2)
PROT SERPL-MCNC: 6.4 G/DL (ref 6–8.5)
RBC # BLD AUTO: 2.18 10*6/MM3 (ref 3.77–5.28)
SODIUM SERPL-SCNC: 139 MMOL/L (ref 136–145)
WBC NRBC COR # BLD: 2.22 10*3/MM3 (ref 3.4–10.8)

## 2021-12-06 PROCEDURE — 25010000002 HEPARIN LOCK FLUSH PER 10 UNITS: Performed by: INTERNAL MEDICINE

## 2021-12-06 PROCEDURE — 80053 COMPREHEN METABOLIC PANEL: CPT | Performed by: INTERNAL MEDICINE

## 2021-12-06 PROCEDURE — 96413 CHEMO IV INFUSION 1 HR: CPT

## 2021-12-06 PROCEDURE — 36591 DRAW BLOOD OFF VENOUS DEVICE: CPT

## 2021-12-06 PROCEDURE — 96411 CHEMO IV PUSH ADDL DRUG: CPT

## 2021-12-06 PROCEDURE — 85025 COMPLETE CBC W/AUTO DIFF WBC: CPT | Performed by: INTERNAL MEDICINE

## 2021-12-06 PROCEDURE — 25010000002 VINCRISTINE PER 1 MG: Performed by: INTERNAL MEDICINE

## 2021-12-06 PROCEDURE — 96409 CHEMO IV PUSH SNGL DRUG: CPT

## 2021-12-06 RX ORDER — SODIUM CHLORIDE 0.9 % (FLUSH) 0.9 %
10 SYRINGE (ML) INJECTION AS NEEDED
Status: CANCELLED | OUTPATIENT
Start: 2021-12-06

## 2021-12-06 RX ORDER — HEPARIN SODIUM (PORCINE) LOCK FLUSH IV SOLN 100 UNIT/ML 100 UNIT/ML
500 SOLUTION INTRAVENOUS AS NEEDED
Status: CANCELLED | OUTPATIENT
Start: 2021-12-06

## 2021-12-06 RX ORDER — SODIUM CHLORIDE 0.9 % (FLUSH) 0.9 %
10 SYRINGE (ML) INJECTION AS NEEDED
Status: DISCONTINUED | OUTPATIENT
Start: 2021-12-06 | End: 2021-12-07 | Stop reason: HOSPADM

## 2021-12-06 RX ORDER — HEPARIN SODIUM (PORCINE) LOCK FLUSH IV SOLN 100 UNIT/ML 100 UNIT/ML
500 SOLUTION INTRAVENOUS AS NEEDED
Status: DISCONTINUED | OUTPATIENT
Start: 2021-12-06 | End: 2021-12-07 | Stop reason: HOSPADM

## 2021-12-06 RX ORDER — SODIUM CHLORIDE 9 MG/ML
250 INJECTION, SOLUTION INTRAVENOUS ONCE
Status: DISCONTINUED | OUTPATIENT
Start: 2021-12-06 | End: 2021-12-07 | Stop reason: HOSPADM

## 2021-12-06 RX ADMIN — VINCRISTINE SULFATE 2 MG: 1 INJECTION, SOLUTION INTRAVENOUS at 11:18

## 2021-12-06 RX ADMIN — HEPARIN 500 UNITS: 100 SYRINGE at 11:34

## 2021-12-06 RX ADMIN — Medication 10 ML: at 11:33

## 2021-12-07 ENCOUNTER — HOSPITAL ENCOUNTER (OUTPATIENT)
Dept: RADIATION ONCOLOGY | Facility: HOSPITAL | Age: 56
Setting detail: RADIATION/ONCOLOGY SERIES
End: 2021-12-07

## 2021-12-07 ENCOUNTER — SPECIALTY PHARMACY (OUTPATIENT)
Dept: PHARMACY | Facility: HOSPITAL | Age: 56
End: 2021-12-07

## 2021-12-07 ENCOUNTER — LAB (OUTPATIENT)
Dept: LAB | Facility: HOSPITAL | Age: 56
End: 2021-12-07

## 2021-12-07 ENCOUNTER — OFFICE VISIT (OUTPATIENT)
Dept: RADIATION ONCOLOGY | Facility: HOSPITAL | Age: 56
End: 2021-12-07

## 2021-12-07 ENCOUNTER — OFFICE VISIT (OUTPATIENT)
Dept: ONCOLOGY | Facility: CLINIC | Age: 56
End: 2021-12-07

## 2021-12-07 VITALS
TEMPERATURE: 97.5 F | SYSTOLIC BLOOD PRESSURE: 184 MMHG | DIASTOLIC BLOOD PRESSURE: 82 MMHG | RESPIRATION RATE: 18 BRPM | BODY MASS INDEX: 53.92 KG/M2 | HEIGHT: 62 IN | WEIGHT: 293 LBS | HEART RATE: 96 BPM | OXYGEN SATURATION: 96 %

## 2021-12-07 VITALS
OXYGEN SATURATION: 96 % | BODY MASS INDEX: 61.82 KG/M2 | WEIGHT: 293 LBS | HEART RATE: 115 BPM | TEMPERATURE: 97.1 F | DIASTOLIC BLOOD PRESSURE: 81 MMHG | SYSTOLIC BLOOD PRESSURE: 174 MMHG

## 2021-12-07 DIAGNOSIS — C71.9 OLIGODENDROGLIOMA (HCC): Primary | ICD-10-CM

## 2021-12-07 DIAGNOSIS — C71.9 OLIGODENDROGLIOMA (HCC): ICD-10-CM

## 2021-12-07 DIAGNOSIS — D49.6 BRAIN TUMOR (HCC): ICD-10-CM

## 2021-12-07 DIAGNOSIS — C71.1 OLIGOASTROCYTOMA OF FRONTAL LOBE (HCC): Primary | ICD-10-CM

## 2021-12-07 LAB
BASOPHILS # BLD AUTO: 0.01 10*3/MM3 (ref 0–0.2)
BASOPHILS NFR BLD AUTO: 0.5 % (ref 0–1.5)
DEPRECATED RDW RBC AUTO: 64.2 FL (ref 37–54)
EOSINOPHIL # BLD AUTO: 0.03 10*3/MM3 (ref 0–0.4)
EOSINOPHIL NFR BLD AUTO: 1.6 % (ref 0.3–6.2)
ERYTHROCYTE [DISTWIDTH] IN BLOOD BY AUTOMATED COUNT: 19.9 % (ref 12.3–15.4)
HCT VFR BLD AUTO: 22.4 % (ref 34–46.6)
HGB BLD-MCNC: 7.2 G/DL (ref 12–15.9)
LYMPHOCYTES # BLD AUTO: 0.3 10*3/MM3 (ref 0.7–3.1)
LYMPHOCYTES NFR BLD AUTO: 15.7 % (ref 19.6–45.3)
MCH RBC QN AUTO: 31.4 PG (ref 26.6–33)
MCHC RBC AUTO-ENTMCNC: 32.1 G/DL (ref 31.5–35.7)
MCV RBC AUTO: 97.8 FL (ref 79–97)
MONOCYTES # BLD AUTO: 0.26 10*3/MM3 (ref 0.1–0.9)
MONOCYTES NFR BLD AUTO: 13.6 % (ref 5–12)
NEUTROPHILS NFR BLD AUTO: 1.31 10*3/MM3 (ref 1.7–7)
NEUTROPHILS NFR BLD AUTO: 68.6 % (ref 42.7–76)
PLATELET # BLD AUTO: 171 10*3/MM3 (ref 140–450)
PMV BLD AUTO: 9.1 FL (ref 6–12)
RBC # BLD AUTO: 2.29 10*6/MM3 (ref 3.77–5.28)
WBC NRBC COR # BLD: 1.91 10*3/MM3 (ref 3.4–10.8)

## 2021-12-07 PROCEDURE — 99214 OFFICE O/P EST MOD 30 MIN: CPT | Performed by: INTERNAL MEDICINE

## 2021-12-07 PROCEDURE — 85025 COMPLETE CBC W/AUTO DIFF WBC: CPT

## 2021-12-07 PROCEDURE — 99213 OFFICE O/P EST LOW 20 MIN: CPT | Performed by: RADIOLOGY

## 2021-12-07 PROCEDURE — 36415 COLL VENOUS BLD VENIPUNCTURE: CPT

## 2021-12-07 PROCEDURE — G0463 HOSPITAL OUTPT CLINIC VISIT: HCPCS

## 2021-12-07 NOTE — PROGRESS NOTES
"FOLLOW-UP NOTE    Name: Cindy Cortes  YOB: 1965  MRN #: [id number]  Date of Service: 12/7/2021  Referring Provider: Annamarie Monroy APRN  [address] [place] [address]  Primary Care Provider: Annamarie Monroy APRN    DIAGNOSIS: Oligodendroglioma IDH2 mutant, 1p19Q codeleted CNS malignancy, WHO grade II  1. Oligodendroglioma (HCC)    2. Brain tumor (HCC)      REASON FOR VISIT: WHO oligodendroglioma, WHO grade II    RADIATION TREATMENT COURSE: 4 month f/u    HISTORY OF PRESENT ILLNESS: The patient is a 56 y.o. year old female who continues to follow with us over the interval.    She has residual scar tenderness on the right.    She has appt today with Dr. Workman; continues on chemotherapy.  \"chemo has been kicking my butt\"---Had issues with hgb/plts.  Went to the ED and got negative PE Protocol as noted below.  She has seen neurosurgery--- He felt she was also stable and will see after her next MRI.    MRI brain over the interval is stable, 11/22/2021---IMPRESSION:  My independent review is stable.  1.  Status post right frontal craniotomy for tumor resection.  Resection  cavity appears similar to the prior exam.  There is persistent  peripheral abnormal increased T2 signal which may be due to  treatment-related change or residual tumor.  There is also some stable  enhancement within the resection cavity and along the posterior superior  margin of the resection cavity, unchanged from prior exam.  No new or  worsening contrast enhancement identified.    The following portions of the patient's history were reviewed and updated as appropriate: allergies, current medications, past family history, past medical history, past social history, past surgical history and problem list. Reviewed with the patient and remain unchanged.    PAST MEDICAL HISTORY:  she  has a past medical history of Arthritis, GERD (gastroesophageal reflux disease), Hypertension, Oligodendroglioma (HCC), Seizure " ([facility]), and Type 2 diabetes mellitus with hyperglycemia, without long-term current use of insulin ([facility]) (5/12/2021).  MEDICATIONS:   Current Outpatient Medications:   •  Alcohol Swabs (Alcohol Wipes) 70 % pads, Apply 1 each topically to the appropriate area as directed 4 (Four) Times a Day., Disp: , Rfl:   •  amLODIPine (NORVASC) 10 MG tablet, Take 1 tablet by mouth Daily., Disp: 30 tablet, Rfl: 2  •  ferrous sulfate 325 (65 FE) MG tablet, Take 1 tablet by mouth Daily With Breakfast., Disp: , Rfl:   •  folic acid (FOLVITE) 1 MG tablet, TAKE TWO TABLETS BY MOUTH EVERY MORNING, THEN TWO TABLETS EVERY EVENING, Disp: 120 tablet, Rfl: 2  •  glucose blood (OneTouch Verio) test strip, 1 each by Other route 4 (Four) Times a Day Before Meals & at Bedtime. Use as instructed, Disp: 200 each, Rfl: 1  •  insulin aspart (NovoLOG FlexPen) 100 UNIT/ML solution pen-injector sc pen, Inject 5 Units under the skin into the appropriate area as directed 3 (Three) Times a Day With Meals. Inject 1u lispro SC for every 50 over 150, Disp: 2 pen, Rfl: 2  •  insulin detemir (Levemir FlexTouch) 100 UNIT/ML injection, Inject 15 Units under the skin into the appropriate area as directed 2 (Two) Times a Day., Disp: 3 pen, Rfl: 2  •  Insulin Pen Needle 32G X 4 MM misc, 1 each 5 (Five) Times a Day., Disp: 200 each, Rfl: 1  •  Isopropyl Alcohol (Alcohol Wipes) 70 % misc, Apply 1 each topically 4 (Four) Times a Day Before Meals & at Bedtime., Disp: 200 each, Rfl: 1  •  Lancets (OneTouch Delica Plus [id number]) misc, 1 each 4 (Four) Times a Day Before Meals & at Bedtime., Disp: 200 each, Rfl: 1  •  levETIRAcetam (KEPPRA) 750 MG tablet, Take 1 tablet by mouth 2 (Two) Times a Day., Disp: 60 tablet, Rfl: 2  •  lidocaine-prilocaine (EMLA) 2.5-2.5 % cream, Apply  topically to the appropriate area as directed Every 2 (Two) Hours As Needed for Mild Pain ., Disp: 5 g, Rfl: 0  •  metoprolol tartrate (LOPRESSOR) 25 MG tablet, Take 0.5 tablets by mouth Every 8  "(Eight) Hours., Disp: 45 tablet, Rfl: 2  •  ondansetron (Zofran) 8 MG tablet, Take 1 tablet by mouth Every 8 (Eight) Hours As Needed for Nausea or Vomiting., Disp: 30 tablet, Rfl: 3  •  oxybutynin (DITROPAN) 5 MG tablet, , Disp: , Rfl:   •  oxybutynin XL (DITROPAN-XL) 5 MG 24 hr tablet, Take 5 mg by mouth Daily., Disp: , Rfl:   •  predniSONE (DELTASONE) 50 MG tablet, Take 1 tablet by mouth Daily., Disp: 5 tablet, Rfl: 0  •  venlafaxine XR (EFFEXOR-XR) 75 MG 24 hr capsule, Take 75 mg by mouth Daily. Take DOS, Disp: , Rfl: 3  •  vitamin D (ERGOCALCIFEROL) 1.25 MG (91727 UT) capsule capsule, Take 50,000 Units by mouth Every 7 (Seven) Days.  , Disp: , Rfl:   No current facility-administered medications for this visit.  ALLERGIES: No Known Allergies  PAST SURGICAL HISTORY: she has a past surgical history that includes Craniotomy for Tumor (Right, 5/6/2021).  PREVIOUS RADIOTHERAPY OR CHEMOTHERAPY: XRT  FAMILY HISTORY: her family history includes Breast cancer in her cousin, maternal aunt, and niece; Colon cancer in her maternal aunt; Kidney disease in her mother; Prostate cancer in her brother.  SOCIAL HISTORY: she  reports that she has never smoked. She has never used smokeless tobacco. She reports previous alcohol use of about 1.0 standard drink of alcohol per week. She reports that she does not use drugs.  PAIN AND PAIN MANAGEMENT: no pain or headaches  Tenderness on her scalp is stable to less; discussed with neurosurgery  Vitals:    12/07/21 1025   BP: (!) 184/82   Pulse: 96   Resp: 18   Temp: 97.5 °F (36.4 °C)   TempSrc: Oral   SpO2: 96%   Weight: (!) 153 kg (338 lb)   Height: 157.5 cm (62\")   PainSc: 0-No pain     NUTRITIONAL STATUS:    no issues  KPS: 80:  Normal activity with effort; some signs or symptoms --approaching 70 but doing well.        Review of Systems:   General: No fevers, chills, weight change, or drenching night sweats. Skin: No rashes or jaundice.  HEENT: No change in vision or hearing, no " "headaches.  Neck: No dysphagia or masses.  Heme/Lymph: No easy bruising or bleeding.  Respiratory System: No shortness of breath or cough.  Cardiovascular: No chest pain, palpitations, or dyspnea on exertion.  - Pacemaker. GI: No nausea, vomiting, diarrhea, melena, or hematochezia.  : No dysuria or hematuria.  Endocrine: No heat or cold intolerance. Musculoskeletal: No myalgias or arthralgias.  Neuro: No weakness, numbness, syncope, or seizures. Psych: No mood changes or depression. Ext: Denies swelling.        Objective     Vitals:  Vitals:    12/07/21 1025   BP: (!) 184/82   Pulse: 96   Resp: 18   Temp: 97.5 °F (36.4 °C)   TempSrc: Oral   SpO2: 96%   Weight: (!) 153 kg (338 lb)   Height: 157.5 cm (62\")   PainSc: 0-No pain       PHYSICAL EXAM:  GENERAL: in no apparent distress, sitting comfortably in room.    HEENT: normocephalic, atraumatic. Pupils are equal, round, reactive to light. Sclera anicteric. Conjunctiva not injected. Oropharynx without erythema, ulcerations or thrush.   NECK: Supple with no masses.  LYMPHATIC: no cervical, supraclavicular or axillary adenopathy appreciated bilaterally.   CARDIOVASCULAR: S1 & S2 detected; no murmurs, rubs or gallops.  CHEST: clear to auscultation bilaterally; no wheezes, crackles or rubs. Work of breathing normal.  ABDOMEN: bowel sounds present. Abdomen is soft, nontender, nondistended.   MUSCULOSKELETAL: no tenderness to palpation along the spine or scapulae. Normal range of motion.  EXTREMITIES: no clubbing, cyanosis, edema.  SKIN: no erythema, rashes, ulcerations noted.   NEUROLOGIC: cranial nerves II-XII grossly intact bilaterally. No focal neurologic deficits.  PSYCHIATRIC:  alert, aware, and appropriate.      PERTINENT IMAGING/PATHOLOGY/LABS (Medical Decision Making):     COORDINATION OF CARE: A copy of this note is sent to the referring provider.    PATHOLOGY (Reviewed):     IMAGING (Reviewed): FINDINGS:   Moderate respiratory motion artifact limited " exam.   Pulmonary artery: Well opacified. No filling defect within limitations of motion artifact which obscures some distal subsegmental branches.   Cardiovascular:  Aorta and great vessels exhibit no aneurysm or dissection.   Mediastinum/Yamila:  No mediastinal or hilar mass or significant lymphadenopathy identified.   Lungs/Pleura: Calcified granuloma right lower lobe. No infiltrates or masses are identified. No effusion, pleural mass, thickening or pneumothorax.   Chest wall and Axilla: Normal.   Bones:  Degenerative vertebral body osteophytes..   Upper abdomen: Gallstones..   3-D images corroborate 2-D findings.   IMPRESSION:   1.  No evidence of pulmonary embolus.  2.  Cholelithiasis.    LABS (Reviewed):  Hematology WBC   Date Value Ref Range Status   12/06/2021 2.22 (L) 3.40 - 10.80 10*3/mm3 Final   03/31/2020 5.63 4.5 - 11.0 10*3/uL Final     RBC   Date Value Ref Range Status   12/06/2021 2.18 (L) 3.77 - 5.28 10*6/mm3 Final   03/31/2020 3.86 (L) 4.0 - 5.2 10*6/uL Final     Hemoglobin   Date Value Ref Range Status   12/06/2021 7.0 (C) 12.0 - 15.9 g/dL Final   03/31/2020 11.5 (L) 12.0 - 16.0 g/dL Final     Hematocrit   Date Value Ref Range Status   12/06/2021 21.5 (C) 34.0 - 46.6 % Final   03/31/2020 36.8 36.0 - 46.0 % Final     Platelets   Date Value Ref Range Status   12/06/2021 172 140 - 450 10*3/mm3 Final   03/31/2020 266 140 - 440 10*3/uL Final      Chemistry   Lab Results   Component Value Date    GLUCOSE 152 (H) 12/06/2021    BUN 13 12/06/2021    CREATININE 0.75 12/06/2021    EGFRIFNONA 81 12/06/2021    BCR 17.3 12/06/2021    K 3.8 12/06/2021    CO2 24.0 12/06/2021    CALCIUM 8.4 (L) 12/06/2021    ALBUMIN 3.50 12/06/2021    LABIL2 1.4 03/30/2021    AST 17 12/06/2021    ALT 18 12/06/2021         Assessment/Plan     ASSESSMENT AND PLAN:    1. Oligodendroglioma (HCC)    2. Brain tumor (HCC)       -Patient has 4 remaining cycles of systemic therapy  Appt today with Dr. Workman.  Reviewed interval imaging and BECCA  interval notes.    Patient is stable clinically as is imaging, assessing as cNED but will continue to follow with a MRI brain in 3 months.  She will see BECCA at that time.    This assessment comes from my review of the imaging, pathology, physician notes and other pertinent information as mentioned.    DISPOSITION: 3 month f/u        TIME SPENT WITH PATIENT:   I spent 20 minutes caring for Cindy on this date of service. This time includes time spent by me in the following activities: preparing for the visit, reviewing tests, obtaining and/or reviewing a separately obtained history, performing a medically appropriate examination and/or evaluation, documenting information in the medical record and care coordination      CC: MD Annamarie Sahu APRN John A Cox, MD  12/7/2021  10:24 AM EST

## 2021-12-07 NOTE — PROGRESS NOTES
MTM review: PCV      12/7/2021   WBC 3.40 - 10.80 10*3/mm3 1.91 (A)   Neutrophils Absolute 1.70 - 7.00 10*3/mm3 1.31 (A)   Hemoglobin 12.0 - 15.9 g/dL 7.2 (A)   Hematocrit 34.0 - 46.6 % 22.4 (A)   Platelets 140 - 450 10*3/mm3 171   Was here yesterday for Vincristine infusion, which has been delayed > 2 weeks for cytopenias.  Cycle 3 will start when her counts recover, and will reduce Procarbazine & Lomustine by 20% per Dr Workman's note.  Repeat labs next week.  Sean Lechuga, OpalD BCPS

## 2021-12-09 ENCOUNTER — APPOINTMENT (OUTPATIENT)
Dept: ONCOLOGY | Facility: HOSPITAL | Age: 56
End: 2021-12-09

## 2021-12-15 ENCOUNTER — TELEPHONE (OUTPATIENT)
Dept: ONCOLOGY | Facility: HOSPITAL | Age: 56
End: 2021-12-15

## 2021-12-15 ENCOUNTER — LAB (OUTPATIENT)
Dept: LAB | Facility: HOSPITAL | Age: 56
End: 2021-12-15

## 2021-12-15 ENCOUNTER — TELEPHONE (OUTPATIENT)
Dept: ONCOLOGY | Facility: CLINIC | Age: 56
End: 2021-12-15

## 2021-12-15 DIAGNOSIS — D50.0 IRON DEFICIENCY ANEMIA DUE TO CHRONIC BLOOD LOSS: Primary | ICD-10-CM

## 2021-12-15 DIAGNOSIS — C71.9 OLIGODENDROGLIOMA (HCC): ICD-10-CM

## 2021-12-15 DIAGNOSIS — D69.6 ANEMIA WITH LOW PLATELET COUNT (HCC): Primary | ICD-10-CM

## 2021-12-15 DIAGNOSIS — D50.0 IRON DEFICIENCY ANEMIA DUE TO CHRONIC BLOOD LOSS: ICD-10-CM

## 2021-12-15 DIAGNOSIS — D69.6 ANEMIA WITH LOW PLATELET COUNT (HCC): ICD-10-CM

## 2021-12-15 LAB
BASOPHILS # BLD AUTO: 0.02 10*3/MM3 (ref 0–0.2)
BASOPHILS NFR BLD AUTO: 0.4 % (ref 0–1.5)
DEPRECATED RDW RBC AUTO: 68.2 FL (ref 37–54)
EOSINOPHIL # BLD AUTO: 0.02 10*3/MM3 (ref 0–0.4)
EOSINOPHIL NFR BLD AUTO: 0.4 % (ref 0.3–6.2)
ERYTHROCYTE [DISTWIDTH] IN BLOOD BY AUTOMATED COUNT: 23.1 % (ref 12.3–15.4)
FOLATE SERPL-MCNC: >20 NG/ML (ref 4.78–24.2)
HAPTOGLOB SERPL-MCNC: 36 MG/DL (ref 30–200)
HCT VFR BLD AUTO: 23.3 % (ref 34–46.6)
HGB BLD-MCNC: 7.3 G/DL (ref 12–15.9)
IRON 24H UR-MRATE: 126 MCG/DL (ref 37–145)
IRON SATN MFR SERPL: 39 % (ref 20–50)
LYMPHOCYTES # BLD AUTO: 0.64 10*3/MM3 (ref 0.7–3.1)
LYMPHOCYTES NFR BLD AUTO: 14.3 % (ref 19.6–45.3)
MCH RBC QN AUTO: 32 PG (ref 26.6–33)
MCHC RBC AUTO-ENTMCNC: 31.3 G/DL (ref 31.5–35.7)
MCV RBC AUTO: 102.2 FL (ref 79–97)
MONOCYTES # BLD AUTO: 0.46 10*3/MM3 (ref 0.1–0.9)
MONOCYTES NFR BLD AUTO: 10.3 % (ref 5–12)
NEUTROPHILS NFR BLD AUTO: 3.33 10*3/MM3 (ref 1.7–7)
NEUTROPHILS NFR BLD AUTO: 74.6 % (ref 42.7–76)
PLATELET # BLD AUTO: 248 10*3/MM3 (ref 140–450)
PMV BLD AUTO: 8.8 FL (ref 6–12)
RBC # BLD AUTO: 2.28 10*6/MM3 (ref 3.77–5.28)
RETICS # AUTO: 0.27 10*6/MM3 (ref 0.02–0.13)
RETICS/RBC NFR AUTO: 12.19 % (ref 0.7–1.9)
TIBC SERPL-MCNC: 322 MCG/DL (ref 298–536)
TRANSFERRIN SERPL-MCNC: 216 MG/DL (ref 200–360)
VIT B12 BLD-MCNC: 332 PG/ML (ref 211–946)
WBC NRBC COR # BLD: 4.47 10*3/MM3 (ref 3.4–10.8)

## 2021-12-15 PROCEDURE — 82607 VITAMIN B-12: CPT

## 2021-12-15 PROCEDURE — 85045 AUTOMATED RETICULOCYTE COUNT: CPT

## 2021-12-15 PROCEDURE — 83010 ASSAY OF HAPTOGLOBIN QUANT: CPT

## 2021-12-15 PROCEDURE — 36415 COLL VENOUS BLD VENIPUNCTURE: CPT

## 2021-12-15 PROCEDURE — 82746 ASSAY OF FOLIC ACID SERUM: CPT

## 2021-12-15 PROCEDURE — 85025 COMPLETE CBC W/AUTO DIFF WBC: CPT

## 2021-12-15 PROCEDURE — 84466 ASSAY OF TRANSFERRIN: CPT

## 2021-12-15 PROCEDURE — 83540 ASSAY OF IRON: CPT

## 2021-12-15 RX ORDER — FOLIC ACID 1 MG/1
TABLET ORAL
Qty: 360 TABLET | Refills: 0 | Status: SHIPPED | OUTPATIENT
Start: 2021-12-15 | End: 2022-05-09 | Stop reason: SDUPTHER

## 2021-12-15 RX ORDER — PREDNISONE 20 MG/1
40 TABLET ORAL DAILY
Qty: 10 TABLET | Refills: 0 | Status: SHIPPED | OUTPATIENT
Start: 2021-12-15 | End: 2021-12-20

## 2021-12-15 NOTE — TELEPHONE ENCOUNTER
Cindy called in expressing that she was having SOB when she would walk longer distances or when moving around a lot. She said she was feeling more tired compared to the past few days. Dr. Workman suggested 1 Unit of blood to help with the symptoms. Dr. Workman also wanted to to another round of 40 mg prednisone for 5 days. She had no issues with this. I told her that [facility] was gone for the day but that I would speak with them tomorrow and get her in for 1 unit. Pt v/u and had no other questions.

## 2021-12-15 NOTE — TELEPHONE ENCOUNTER
Discussed Low hgb with provider orders given to recheck next week and check Iron, Vit-b12, folate, Spep, Hapto, and retic count. Pt notified and v/u

## 2021-12-16 ENCOUNTER — TELEPHONE (OUTPATIENT)
Dept: ONCOLOGY | Facility: CLINIC | Age: 56
End: 2021-12-16

## 2021-12-16 ENCOUNTER — TELEPHONE (OUTPATIENT)
Dept: ONCOLOGY | Facility: HOSPITAL | Age: 56
End: 2021-12-16

## 2021-12-16 ENCOUNTER — SPECIALTY PHARMACY (OUTPATIENT)
Dept: PHARMACY | Facility: HOSPITAL | Age: 56
End: 2021-12-16

## 2021-12-16 ENCOUNTER — HOSPITAL ENCOUNTER (OUTPATIENT)
Dept: INFUSION THERAPY | Facility: HOSPITAL | Age: 56
Setting detail: INFUSION SERIES
Discharge: HOME | End: 2021-12-16

## 2021-12-16 ENCOUNTER — HOSPITAL ENCOUNTER (OUTPATIENT)
Dept: CT IMAGING | Facility: HOSPITAL | Age: 56
Discharge: HOME | End: 2021-12-16
Admitting: INTERNAL MEDICINE

## 2021-12-16 VITALS
HEART RATE: 85 BPM | SYSTOLIC BLOOD PRESSURE: 152 MMHG | TEMPERATURE: 98.4 F | RESPIRATION RATE: 16 BRPM | OXYGEN SATURATION: 98 % | DIASTOLIC BLOOD PRESSURE: 77 MMHG

## 2021-12-16 DIAGNOSIS — Z95.828 PORT-A-CATH IN PLACE: ICD-10-CM

## 2021-12-16 DIAGNOSIS — C71.9 OLIGODENDROGLIOMA (HCC): ICD-10-CM

## 2021-12-16 DIAGNOSIS — C71.9 OLIGODENDROGLIOMA (HCC): Primary | ICD-10-CM

## 2021-12-16 DIAGNOSIS — T80.92XA BLOOD TRANSFUSION REACTION, INITIAL ENCOUNTER: ICD-10-CM

## 2021-12-16 DIAGNOSIS — D50.0 IRON DEFICIENCY ANEMIA DUE TO CHRONIC BLOOD LOSS: ICD-10-CM

## 2021-12-16 DIAGNOSIS — D64.9 ANEMIA, UNSPECIFIED TYPE: ICD-10-CM

## 2021-12-16 DIAGNOSIS — D49.6 BRAIN TUMOR (HCC): Primary | ICD-10-CM

## 2021-12-16 LAB
ABO GROUP BLD: NORMAL
ALBUMIN SERPL ELPH-MCNC: 3.3 G/DL (ref 2.9–4.4)
ALBUMIN/GLOB SERPL: 1.1 {RATIO} (ref 0.7–1.7)
ALPHA1 GLOB SERPL ELPH-MCNC: 0.3 G/DL (ref 0–0.4)
ALPHA2 GLOB SERPL ELPH-MCNC: 0.7 G/DL (ref 0.4–1)
ANISOCYTOSIS BLD QL: ABNORMAL
ANTI-FYA: NORMAL
B-GLOBULIN SERPL ELPH-MCNC: 1.2 G/DL (ref 0.7–1.3)
BASO STIPL COARSE BLD QL SMEAR: ABNORMAL
BB HOLD TUBE: NORMAL
BLD GP AB SCN SERPL QL: POSITIVE
DEPRECATED RDW RBC AUTO: 72.6 FL (ref 37–54)
ERYTHROCYTE [DISTWIDTH] IN BLOOD BY AUTOMATED COUNT: 23.2 % (ref 12.3–15.4)
GAMMA GLOB SERPL ELPH-MCNC: 1 G/DL (ref 0.4–1.8)
GLOBULIN SER CALC-MCNC: 3.1 G/DL (ref 2.2–3.9)
HCT VFR BLD AUTO: 18.4 % (ref 34–46.6)
HGB BLD-MCNC: 6.2 G/DL (ref 12–15.9)
LABORATORY COMMENT REPORT: NORMAL
LYMPHOCYTES # BLD MANUAL: 0.42 10*3/MM3 (ref 0.7–3.1)
LYMPHOCYTES NFR BLD MANUAL: 7 % (ref 5–12)
M PROTEIN SERPL ELPH-MCNC: NORMAL G/DL
MCH RBC QN AUTO: 32.3 PG (ref 26.6–33)
MCHC RBC AUTO-ENTMCNC: 33.8 G/DL (ref 31.5–35.7)
MCV RBC AUTO: 95.7 FL (ref 79–97)
METAMYELOCYTES NFR BLD MANUAL: 3 % (ref 0–0)
MONOCYTES # BLD: 0.22 10*3/MM3 (ref 0.1–0.9)
MYELOCYTES NFR BLD MANUAL: 1 % (ref 0–0)
NEUTROPHILS # BLD AUTO: 2.43 10*3/MM3 (ref 1.7–7)
NEUTROPHILS NFR BLD MANUAL: 59 % (ref 42.7–76)
NEUTS BAND NFR BLD MANUAL: 17 % (ref 0–5)
NRBC SPEC MANUAL: 2 /100 WBC (ref 0–0.2)
PLAT MORPH BLD: NORMAL
PLATELET # BLD AUTO: 180 10*3/MM3 (ref 140–450)
PMV BLD AUTO: 6.6 FL (ref 6–12)
PROT PATTERN SERPL ELPH-IMP: NORMAL
PROT SERPL-MCNC: 6.4 G/DL (ref 6–8.5)
RBC # BLD AUTO: 1.93 10*6/MM3 (ref 3.77–5.28)
RH BLD: POSITIVE
SCAN SLIDE: NORMAL
T&S EXPIRATION DATE: NORMAL
VARIANT LYMPHS NFR BLD MANUAL: 13 % (ref 19.6–45.3)
WBC MORPH BLD: NORMAL
WBC NRBC COR # BLD: 3.2 10*3/MM3 (ref 3.4–10.8)

## 2021-12-16 PROCEDURE — 85007 BL SMEAR W/DIFF WBC COUNT: CPT

## 2021-12-16 PROCEDURE — 86850 RBC ANTIBODY SCREEN: CPT | Performed by: INTERNAL MEDICINE

## 2021-12-16 PROCEDURE — 86900 BLOOD TYPING SEROLOGIC ABO: CPT

## 2021-12-16 PROCEDURE — 86900 BLOOD TYPING SEROLOGIC ABO: CPT | Performed by: INTERNAL MEDICINE

## 2021-12-16 PROCEDURE — 36430 TRANSFUSION BLD/BLD COMPNT: CPT

## 2021-12-16 PROCEDURE — 86870 RBC ANTIBODY IDENTIFICATION: CPT | Performed by: INTERNAL MEDICINE

## 2021-12-16 PROCEDURE — 74176 CT ABD & PELVIS W/O CONTRAST: CPT

## 2021-12-16 PROCEDURE — P9016 RBC LEUKOCYTES REDUCED: HCPCS

## 2021-12-16 PROCEDURE — 25010000002 DIPHENHYDRAMINE PER 50 MG: Performed by: INTERNAL MEDICINE

## 2021-12-16 PROCEDURE — 85025 COMPLETE CBC W/AUTO DIFF WBC: CPT

## 2021-12-16 PROCEDURE — 96374 THER/PROPH/DIAG INJ IV PUSH: CPT

## 2021-12-16 PROCEDURE — 25010000002 HEPARIN LOCK FLUSH PER 10 UNITS: Performed by: INTERNAL MEDICINE

## 2021-12-16 PROCEDURE — 86901 BLOOD TYPING SEROLOGIC RH(D): CPT | Performed by: INTERNAL MEDICINE

## 2021-12-16 PROCEDURE — 86922 COMPATIBILITY TEST ANTIGLOB: CPT

## 2021-12-16 RX ORDER — SODIUM CHLORIDE 9 MG/ML
250 INJECTION, SOLUTION INTRAVENOUS AS NEEDED
Status: CANCELLED | OUTPATIENT
Start: 2021-12-16

## 2021-12-16 RX ORDER — SODIUM CHLORIDE 9 MG/ML
250 INJECTION, SOLUTION INTRAVENOUS AS NEEDED
Status: DISCONTINUED | OUTPATIENT
Start: 2021-12-16 | End: 2021-12-18 | Stop reason: HOSPADM

## 2021-12-16 RX ORDER — HEPARIN SODIUM (PORCINE) LOCK FLUSH IV SOLN 100 UNIT/ML 100 UNIT/ML
500 SOLUTION INTRAVENOUS AS NEEDED
Status: CANCELLED | OUTPATIENT
Start: 2021-12-16

## 2021-12-16 RX ORDER — DIPHENHYDRAMINE HYDROCHLORIDE 50 MG/ML
25 INJECTION INTRAMUSCULAR; INTRAVENOUS ONCE
Status: CANCELLED | OUTPATIENT
Start: 2021-12-16 | End: 2021-12-16

## 2021-12-16 RX ORDER — SODIUM CHLORIDE 0.9 % (FLUSH) 0.9 %
10 SYRINGE (ML) INJECTION AS NEEDED
Status: DISCONTINUED | OUTPATIENT
Start: 2021-12-16 | End: 2021-12-18 | Stop reason: HOSPADM

## 2021-12-16 RX ORDER — SODIUM CHLORIDE 0.9 % (FLUSH) 0.9 %
10 SYRINGE (ML) INJECTION AS NEEDED
Status: CANCELLED | OUTPATIENT
Start: 2021-12-16

## 2021-12-16 RX ORDER — HEPARIN SODIUM (PORCINE) LOCK FLUSH IV SOLN 100 UNIT/ML 100 UNIT/ML
500 SOLUTION INTRAVENOUS AS NEEDED
Status: DISCONTINUED | OUTPATIENT
Start: 2021-12-16 | End: 2021-12-18 | Stop reason: HOSPADM

## 2021-12-16 RX ORDER — DIPHENHYDRAMINE HYDROCHLORIDE 50 MG/ML
25 INJECTION INTRAMUSCULAR; INTRAVENOUS ONCE
Status: DISCONTINUED | OUTPATIENT
Start: 2021-12-16 | End: 2022-06-04 | Stop reason: HOSPADM

## 2021-12-16 RX ORDER — DIPHENHYDRAMINE HYDROCHLORIDE 50 MG/ML
25 INJECTION INTRAMUSCULAR; INTRAVENOUS ONCE
Status: COMPLETED | OUTPATIENT
Start: 2021-12-16 | End: 2021-12-16

## 2021-12-16 RX ADMIN — Medication 500 UNITS: at 17:43

## 2021-12-16 RX ADMIN — SODIUM CHLORIDE, PRESERVATIVE FREE 10 ML: 5 INJECTION INTRAVENOUS at 17:42

## 2021-12-16 RX ADMIN — DIPHENHYDRAMINE HYDROCHLORIDE 25 MG: 50 INJECTION, SOLUTION INTRAMUSCULAR; INTRAVENOUS at 12:43

## 2021-12-16 NOTE — TELEPHONE ENCOUNTER
Received call from [facility].  Pt hgb today is 6.2. Needs to make sure the provider still only wants 1 unit since it dropped from 7.3 yesterday.  She also needs a order fr Bendaryl 25mg IV as she has had a reaction in the past. Message sent to Dr. oWrkman and Daxa.

## 2021-12-16 NOTE — TELEPHONE ENCOUNTER
After calling U I called Cindy to let her know to be there at 10 this morning. She had no other questions.

## 2021-12-16 NOTE — TELEPHONE ENCOUNTER
CT scan abdomen pelvis w/o contrast STAT prior auth obtained. The last four digits of the reference number are [id number]. Case number is [id number]. Prior authorization code is [id number] and is valid from 12/16/21 to 01/30/22.

## 2021-12-16 NOTE — PROGRESS NOTES
[facility] Lab Review: PCV        12/16/2021   WBC 3.40 - 10.80 10*3/mm3 3.20 (A)   Neutrophils Absolute 1.70 - 7.00 10*3/mm3 2.43   Hemoglobin 12.0 - 15.9 g/dL 6.2 (A)   Hematocrit 34.0 - 46.6 % 18.4 (A)  Result checked   Platelets 140 - 450 10*3/mm3 180     Patient sent to [facility] for blood.  Awaiting count recovery prior to initiating cycle 3.    Thanks,    Chapis Goff, PharmD

## 2021-12-17 LAB
BH BB BLOOD EXPIRATION DATE: NORMAL
BH BB BLOOD TYPE BARCODE: 9500
BH BB DISPENSE STATUS: NORMAL
BH BB PRODUCT CODE: NORMAL
BH BB UNIT NUMBER: NORMAL
CROSSMATCH INTERPRETATION: NORMAL
UNIT  ABO: NORMAL
UNIT  RH: NORMAL

## 2021-12-18 LAB
BH BB BLOOD EXPIRATION DATE: NORMAL
BH BB BLOOD TYPE BARCODE: 5100
BH BB DISPENSE STATUS: NORMAL
BH BB PRODUCT CODE: NORMAL
BH BB UNIT NUMBER: NORMAL
CROSSMATCH INTERPRETATION: NORMAL
UNIT  ABO: NORMAL
UNIT  RH: NORMAL

## 2021-12-22 ENCOUNTER — LAB (OUTPATIENT)
Dept: LAB | Facility: HOSPITAL | Age: 56
End: 2021-12-22

## 2021-12-22 ENCOUNTER — SPECIALTY PHARMACY (OUTPATIENT)
Dept: PHARMACY | Facility: HOSPITAL | Age: 56
End: 2021-12-22

## 2021-12-22 DIAGNOSIS — C71.9 OLIGODENDROGLIOMA (HCC): ICD-10-CM

## 2021-12-22 LAB
BASOPHILS # BLD AUTO: 0.04 10*3/MM3 (ref 0–0.2)
BASOPHILS NFR BLD AUTO: 0.5 % (ref 0–1.5)
DEPRECATED RDW RBC AUTO: 74.4 FL (ref 37–54)
EOSINOPHIL # BLD AUTO: 0.06 10*3/MM3 (ref 0–0.4)
EOSINOPHIL NFR BLD AUTO: 0.7 % (ref 0.3–6.2)
ERYTHROCYTE [DISTWIDTH] IN BLOOD BY AUTOMATED COUNT: 22.5 % (ref 12.3–15.4)
HCT VFR BLD AUTO: 33.9 % (ref 34–46.6)
HGB BLD-MCNC: 10.8 G/DL (ref 12–15.9)
LYMPHOCYTES # BLD AUTO: 1.03 10*3/MM3 (ref 0.7–3.1)
LYMPHOCYTES NFR BLD AUTO: 12.9 % (ref 19.6–45.3)
MCH RBC QN AUTO: 32.2 PG (ref 26.6–33)
MCHC RBC AUTO-ENTMCNC: 31.9 G/DL (ref 31.5–35.7)
MCV RBC AUTO: 101.2 FL (ref 79–97)
MONOCYTES # BLD AUTO: 0.73 10*3/MM3 (ref 0.1–0.9)
MONOCYTES NFR BLD AUTO: 9.1 % (ref 5–12)
NEUTROPHILS NFR BLD AUTO: 6.15 10*3/MM3 (ref 1.7–7)
NEUTROPHILS NFR BLD AUTO: 76.8 % (ref 42.7–76)
PLATELET # BLD AUTO: 242 10*3/MM3 (ref 140–450)
PMV BLD AUTO: 9 FL (ref 6–12)
RBC # BLD AUTO: 3.35 10*6/MM3 (ref 3.77–5.28)
WBC NRBC COR # BLD: 8.01 10*3/MM3 (ref 3.4–10.8)

## 2021-12-22 PROCEDURE — 85025 COMPLETE CBC W/AUTO DIFF WBC: CPT

## 2021-12-22 PROCEDURE — 36415 COLL VENOUS BLD VENIPUNCTURE: CPT

## 2021-12-22 NOTE — PROGRESS NOTES
MTM Note: PCV     12/22/2021   WBC 3.40 - 10.80 10*3/mm3 8.01   Neutrophils Absolute 1.70 - 7.00 10*3/mm3 6.15   Hemoglobin 12.0 - 15.9 g/dL 10.8 (A)   Hematocrit 34.0 - 46.6 % 33.9 (A)   Platelets 140 - 450 10*3/mm3 242     Per Dr. Workman, patient can start next cycle the week of 12/27/21.  Day 1 will be oral chemo and she will not receive vincristine until day 8.  Infusion notified to remove patient from their schedule and she will need to be rescheduled once she starts day 1.  Patient will be on a 20% dose reduction of both lomustine and procarbazine.  Pharmacy will continue to follow.  Thanks,    Chapis Goff, PharmD

## 2021-12-23 ENCOUNTER — HOSPITAL ENCOUNTER (OUTPATIENT)
Dept: ONCOLOGY | Facility: HOSPITAL | Age: 56
Setting detail: INFUSION SERIES
End: 2021-12-23

## 2021-12-23 DIAGNOSIS — C71.1 OLIGOASTROCYTOMA OF FRONTAL LOBE (HCC): Primary | ICD-10-CM

## 2021-12-23 DIAGNOSIS — C71.9 OLIGODENDROGLIOMA (HCC): ICD-10-CM

## 2021-12-27 ENCOUNTER — SPECIALTY PHARMACY (OUTPATIENT)
Dept: PHARMACY | Facility: HOSPITAL | Age: 56
End: 2021-12-27

## 2021-12-30 ENCOUNTER — SPECIALTY PHARMACY (OUTPATIENT)
Dept: PHARMACY | Facility: HOSPITAL | Age: 56
End: 2021-12-30

## 2022-01-04 ENCOUNTER — LAB (OUTPATIENT)
Dept: LAB | Facility: HOSPITAL | Age: 57
End: 2022-01-04

## 2022-01-04 ENCOUNTER — TRANSCRIBE ORDERS (OUTPATIENT)
Dept: ADMINISTRATIVE | Facility: HOSPITAL | Age: 57
End: 2022-01-04

## 2022-01-04 ENCOUNTER — OFFICE VISIT (OUTPATIENT)
Dept: ONCOLOGY | Facility: CLINIC | Age: 57
End: 2022-01-04

## 2022-01-04 VITALS
RESPIRATION RATE: 18 BRPM | TEMPERATURE: 97.1 F | WEIGHT: 293 LBS | HEIGHT: 62 IN | SYSTOLIC BLOOD PRESSURE: 177 MMHG | OXYGEN SATURATION: 95 % | BODY MASS INDEX: 53.92 KG/M2 | HEART RATE: 95 BPM | DIASTOLIC BLOOD PRESSURE: 75 MMHG

## 2022-01-04 DIAGNOSIS — C71.9 OLIGODENDROGLIOMA: ICD-10-CM

## 2022-01-04 DIAGNOSIS — C71.1 OLIGOASTROCYTOMA OF FRONTAL LOBE: Primary | ICD-10-CM

## 2022-01-04 DIAGNOSIS — Z11.52 ENCOUNTER FOR SCREENING FOR SEVERE ACUTE RESPIRATORY SYNDROME CORONAVIRUS 2 (SARS-COV-2) INFECTION: Primary | ICD-10-CM

## 2022-01-04 DIAGNOSIS — C71.1 OLIGOASTROCYTOMA OF FRONTAL LOBE: ICD-10-CM

## 2022-01-04 DIAGNOSIS — Z11.52 ENCOUNTER FOR SCREENING FOR SEVERE ACUTE RESPIRATORY SYNDROME CORONAVIRUS 2 (SARS-COV-2) INFECTION: ICD-10-CM

## 2022-01-04 LAB
ALBUMIN SERPL-MCNC: 3.7 G/DL (ref 3.5–5.2)
ALBUMIN/GLOB SERPL: 1.2 G/DL
ALP SERPL-CCNC: 90 U/L (ref 39–117)
ALT SERPL W P-5'-P-CCNC: 22 U/L (ref 1–33)
ANION GAP SERPL CALCULATED.3IONS-SCNC: 12 MMOL/L (ref 5–15)
AST SERPL-CCNC: 23 U/L (ref 1–32)
BASOPHILS # BLD AUTO: 0.02 10*3/MM3 (ref 0–0.2)
BASOPHILS NFR BLD AUTO: 0.3 % (ref 0–1.5)
BILIRUB SERPL-MCNC: 0.7 MG/DL (ref 0–1.2)
BUN SERPL-MCNC: 14 MG/DL (ref 6–20)
BUN/CREAT SERPL: 19.7 (ref 7–25)
CALCIUM SPEC-SCNC: 9.5 MG/DL (ref 8.6–10.5)
CHLORIDE SERPL-SCNC: 101 MMOL/L (ref 98–107)
CO2 SERPL-SCNC: 26 MMOL/L (ref 22–29)
CREAT SERPL-MCNC: 0.71 MG/DL (ref 0.57–1)
DEPRECATED RDW RBC AUTO: 66.3 FL (ref 37–54)
EOSINOPHIL # BLD AUTO: 0.22 10*3/MM3 (ref 0–0.4)
EOSINOPHIL NFR BLD AUTO: 2.8 % (ref 0.3–6.2)
ERYTHROCYTE [DISTWIDTH] IN BLOOD BY AUTOMATED COUNT: 20 % (ref 12.3–15.4)
GFR SERPL CREATININE-BSD FRML MDRD: 86 ML/MIN/1.73
GLOBULIN UR ELPH-MCNC: 3.2 GM/DL
GLUCOSE SERPL-MCNC: 127 MG/DL (ref 65–99)
HCT VFR BLD AUTO: 31.6 % (ref 34–46.6)
HGB BLD-MCNC: 10.2 G/DL (ref 12–15.9)
HOLD SPECIMEN: NORMAL
HOLD SPECIMEN: NORMAL
LYMPHOCYTES # BLD AUTO: 0.84 10*3/MM3 (ref 0.7–3.1)
LYMPHOCYTES NFR BLD AUTO: 10.7 % (ref 19.6–45.3)
MCH RBC QN AUTO: 31.9 PG (ref 26.6–33)
MCHC RBC AUTO-ENTMCNC: 32.3 G/DL (ref 31.5–35.7)
MCV RBC AUTO: 98.8 FL (ref 79–97)
MONOCYTES # BLD AUTO: 0.32 10*3/MM3 (ref 0.1–0.9)
MONOCYTES NFR BLD AUTO: 4.1 % (ref 5–12)
NEUTROPHILS NFR BLD AUTO: 6.43 10*3/MM3 (ref 1.7–7)
NEUTROPHILS NFR BLD AUTO: 82.1 % (ref 42.7–76)
PLATELET # BLD AUTO: 190 10*3/MM3 (ref 140–450)
PMV BLD AUTO: 8.3 FL (ref 6–12)
POTASSIUM SERPL-SCNC: 4.1 MMOL/L (ref 3.5–5.2)
PROT SERPL-MCNC: 6.9 G/DL (ref 6–8.5)
RBC # BLD AUTO: 3.2 10*6/MM3 (ref 3.77–5.28)
SODIUM SERPL-SCNC: 139 MMOL/L (ref 136–145)
WBC NRBC COR # BLD: 7.83 10*3/MM3 (ref 3.4–10.8)

## 2022-01-04 PROCEDURE — U0004 COV-19 TEST NON-CDC HGH THRU: HCPCS

## 2022-01-04 PROCEDURE — 99214 OFFICE O/P EST MOD 30 MIN: CPT | Performed by: INTERNAL MEDICINE

## 2022-01-04 PROCEDURE — C9803 HOPD COVID-19 SPEC COLLECT: HCPCS

## 2022-01-04 PROCEDURE — 85025 COMPLETE CBC W/AUTO DIFF WBC: CPT

## 2022-01-04 PROCEDURE — 80053 COMPREHEN METABOLIC PANEL: CPT | Performed by: INTERNAL MEDICINE

## 2022-01-04 PROCEDURE — 36415 COLL VENOUS BLD VENIPUNCTURE: CPT | Performed by: INTERNAL MEDICINE

## 2022-01-04 RX ORDER — LOMUSTINE 100 MG/1
CAPSULE, GELATIN COATED ORAL
COMMUNITY
Start: 2021-12-27 | End: 2022-04-11

## 2022-01-04 RX ORDER — LOMUSTINE 10 MG/1
CAPSULE, GELATIN COATED ORAL
COMMUNITY
Start: 2021-12-27 | End: 2022-04-11

## 2022-01-05 ENCOUNTER — TELEPHONE (OUTPATIENT)
Dept: ONCOLOGY | Facility: HOSPITAL | Age: 57
End: 2022-01-05

## 2022-01-05 LAB — SARS-COV-2 ORF1AB RESP QL NAA+PROBE: NOT DETECTED

## 2022-01-05 NOTE — TELEPHONE ENCOUNTER
Called patient at the request of pharmacist Luke Koehler to confirm start date of Lomustine.  Patient states that she started the lomustine last Thursday which would be 12/30/21. Pharmacist notified.

## 2022-01-06 ENCOUNTER — HOSPITAL ENCOUNTER (OUTPATIENT)
Dept: ONCOLOGY | Facility: HOSPITAL | Age: 57
Setting detail: INFUSION SERIES
Discharge: HOME | End: 2022-01-06

## 2022-01-06 VITALS
SYSTOLIC BLOOD PRESSURE: 130 MMHG | HEIGHT: 62 IN | HEART RATE: 89 BPM | DIASTOLIC BLOOD PRESSURE: 72 MMHG | RESPIRATION RATE: 18 BRPM | BODY MASS INDEX: 53.92 KG/M2 | TEMPERATURE: 96.9 F | WEIGHT: 293 LBS | OXYGEN SATURATION: 97 %

## 2022-01-06 DIAGNOSIS — C71.9 OLIGODENDROGLIOMA: ICD-10-CM

## 2022-01-06 DIAGNOSIS — C71.1 OLIGOASTROCYTOMA OF FRONTAL LOBE: ICD-10-CM

## 2022-01-06 DIAGNOSIS — Z95.828 PORT-A-CATH IN PLACE: Primary | ICD-10-CM

## 2022-01-06 LAB
ALBUMIN SERPL-MCNC: 3.8 G/DL (ref 3.5–5.2)
ALBUMIN/GLOB SERPL: 1.3 G/DL
ALP SERPL-CCNC: 89 U/L (ref 39–117)
ALT SERPL W P-5'-P-CCNC: 20 U/L (ref 1–33)
ANION GAP SERPL CALCULATED.3IONS-SCNC: 11 MMOL/L (ref 5–15)
AST SERPL-CCNC: 20 U/L (ref 1–32)
BASOPHILS # BLD AUTO: 0.02 10*3/MM3 (ref 0–0.2)
BASOPHILS NFR BLD AUTO: 0.4 % (ref 0–1.5)
BILIRUB SERPL-MCNC: 0.7 MG/DL (ref 0–1.2)
BUN SERPL-MCNC: 16 MG/DL (ref 6–20)
BUN/CREAT SERPL: 18 (ref 7–25)
CALCIUM SPEC-SCNC: 9.4 MG/DL (ref 8.6–10.5)
CHLORIDE SERPL-SCNC: 103 MMOL/L (ref 98–107)
CO2 SERPL-SCNC: 25 MMOL/L (ref 22–29)
CREAT SERPL-MCNC: 0.89 MG/DL (ref 0.57–1)
DEPRECATED RDW RBC AUTO: 71.6 FL (ref 37–54)
EOSINOPHIL # BLD AUTO: 0.11 10*3/MM3 (ref 0–0.4)
EOSINOPHIL NFR BLD AUTO: 1.9 % (ref 0.3–6.2)
ERYTHROCYTE [DISTWIDTH] IN BLOOD BY AUTOMATED COUNT: 20.7 % (ref 12.3–15.4)
GFR SERPL CREATININE-BSD FRML MDRD: 66 ML/MIN/1.73
GLOBULIN UR ELPH-MCNC: 2.9 GM/DL
GLUCOSE SERPL-MCNC: 159 MG/DL (ref 65–99)
HCT VFR BLD AUTO: 28.5 % (ref 34–46.6)
HGB BLD-MCNC: 9.2 G/DL (ref 12–15.9)
LYMPHOCYTES # BLD AUTO: 0.58 10*3/MM3 (ref 0.7–3.1)
LYMPHOCYTES NFR BLD AUTO: 10.3 % (ref 19.6–45.3)
MCH RBC QN AUTO: 32.7 PG (ref 26.6–33)
MCHC RBC AUTO-ENTMCNC: 32.3 G/DL (ref 31.5–35.7)
MCV RBC AUTO: 101.4 FL (ref 79–97)
MONOCYTES # BLD AUTO: 0.36 10*3/MM3 (ref 0.1–0.9)
MONOCYTES NFR BLD AUTO: 6.4 % (ref 5–12)
NEUTROPHILS NFR BLD AUTO: 4.58 10*3/MM3 (ref 1.7–7)
NEUTROPHILS NFR BLD AUTO: 81 % (ref 42.7–76)
PLATELET # BLD AUTO: 194 10*3/MM3 (ref 140–450)
PMV BLD AUTO: 9.3 FL (ref 6–12)
POTASSIUM SERPL-SCNC: 3.9 MMOL/L (ref 3.5–5.2)
PROT SERPL-MCNC: 6.7 G/DL (ref 6–8.5)
RBC # BLD AUTO: 2.81 10*6/MM3 (ref 3.77–5.28)
SODIUM SERPL-SCNC: 139 MMOL/L (ref 136–145)
WBC NRBC COR # BLD: 5.65 10*3/MM3 (ref 3.4–10.8)

## 2022-01-06 PROCEDURE — 25010000002 HEPARIN LOCK FLUSH PER 10 UNITS: Performed by: INTERNAL MEDICINE

## 2022-01-06 PROCEDURE — 25010000002 VINCRISTINE PER 1 MG: Performed by: INTERNAL MEDICINE

## 2022-01-06 PROCEDURE — 36591 DRAW BLOOD OFF VENOUS DEVICE: CPT

## 2022-01-06 PROCEDURE — 80053 COMPREHEN METABOLIC PANEL: CPT | Performed by: INTERNAL MEDICINE

## 2022-01-06 PROCEDURE — 96413 CHEMO IV INFUSION 1 HR: CPT

## 2022-01-06 PROCEDURE — 85025 COMPLETE CBC W/AUTO DIFF WBC: CPT | Performed by: INTERNAL MEDICINE

## 2022-01-06 RX ORDER — SODIUM CHLORIDE 0.9 % (FLUSH) 0.9 %
10 SYRINGE (ML) INJECTION AS NEEDED
Status: CANCELLED | OUTPATIENT
Start: 2022-01-06

## 2022-01-06 RX ORDER — SODIUM CHLORIDE 0.9 % (FLUSH) 0.9 %
10 SYRINGE (ML) INJECTION AS NEEDED
Status: DISCONTINUED | OUTPATIENT
Start: 2022-01-06 | End: 2022-01-07 | Stop reason: HOSPADM

## 2022-01-06 RX ORDER — HEPARIN SODIUM (PORCINE) LOCK FLUSH IV SOLN 100 UNIT/ML 100 UNIT/ML
500 SOLUTION INTRAVENOUS AS NEEDED
Status: CANCELLED | OUTPATIENT
Start: 2022-01-06

## 2022-01-06 RX ORDER — LIDOCAINE AND PRILOCAINE 25; 25 MG/G; MG/G
CREAM TOPICAL
Qty: 5 G | Refills: 0 | Status: ON HOLD | OUTPATIENT
Start: 2022-01-06 | End: 2022-05-31

## 2022-01-06 RX ORDER — SODIUM CHLORIDE 9 MG/ML
250 INJECTION, SOLUTION INTRAVENOUS ONCE
Status: COMPLETED | OUTPATIENT
Start: 2022-01-06 | End: 2022-01-06

## 2022-01-06 RX ORDER — HEPARIN SODIUM (PORCINE) LOCK FLUSH IV SOLN 100 UNIT/ML 100 UNIT/ML
500 SOLUTION INTRAVENOUS AS NEEDED
Status: DISCONTINUED | OUTPATIENT
Start: 2022-01-06 | End: 2022-01-07 | Stop reason: HOSPADM

## 2022-01-06 RX ADMIN — Medication 10 ML: at 12:33

## 2022-01-06 RX ADMIN — SODIUM CHLORIDE 250 ML: 9 INJECTION, SOLUTION INTRAVENOUS at 12:15

## 2022-01-06 RX ADMIN — HEPARIN 500 UNITS: 100 SYRINGE at 12:33

## 2022-01-06 RX ADMIN — VINCRISTINE SULFATE 2 MG: 1 INJECTION, SOLUTION INTRAVENOUS at 12:15

## 2022-01-07 ENCOUNTER — SPECIALTY PHARMACY (OUTPATIENT)
Dept: PHARMACY | Facility: HOSPITAL | Age: 57
End: 2022-01-07

## 2022-01-07 NOTE — PROGRESS NOTES
[facility] Lab Review: PCV        1/6/2022  Day 8   WBC 3.40 - 10.80 10*3/mm3 5.65   Neutrophils Absolute 1.70 - 7.00 10*3/mm3 4.58   Hemoglobin 12.0 - 15.9 g/dL 9.2 (A)   Hematocrit 34.0 - 46.6 % 28.5 (A)   Platelets 140 - 450 10*3/mm3 194   Creatinine 0.57 - 1.00 mg/dL 0.89   eGFR Non African Am >60 mL/min/1.73 66   BUN 6 - 20 mg/dL 16   Sodium 136 - 145 mmol/L 139   Potassium 3.5 - 5.2 mmol/L 3.9   Glucose 65 - 99 mg/dL 159 (A)   Calcium 8.6 - 10.5 mg/dL 9.4   Albumin 3.50 - 5.20 g/dL 3.80   Total Protein 6.0 - 8.5 g/dL 6.7   AST (SGOT) 1 - 32 U/L 20   ALT (SGPT) 1 - 33 U/L 20   Alkaline Phosphatase 39 - 117 U/L 89   Total Bilirubin 0.0 - 1.2 mg/dL 0.7     Started cylce 3 on 12/30/21.  Day 8 1/6/22.  Per Dr. Workman's dictation 1/4/22, continue chemo and monitor weekly CBCs.  Pharmacy will continue to follow.     Thanks,    Chapis Goff, PharmD

## 2022-01-11 ENCOUNTER — LAB (OUTPATIENT)
Dept: LAB | Facility: HOSPITAL | Age: 57
End: 2022-01-11

## 2022-01-11 DIAGNOSIS — C71.1 OLIGOASTROCYTOMA OF FRONTAL LOBE: ICD-10-CM

## 2022-01-11 DIAGNOSIS — C71.9 OLIGODENDROGLIOMA: ICD-10-CM

## 2022-01-11 LAB
BASOPHILS # BLD AUTO: 0.01 10*3/MM3 (ref 0–0.2)
BASOPHILS NFR BLD AUTO: 0.3 % (ref 0–1.5)
DEPRECATED RDW RBC AUTO: 66.1 FL (ref 37–54)
EOSINOPHIL # BLD AUTO: 0.07 10*3/MM3 (ref 0–0.4)
EOSINOPHIL NFR BLD AUTO: 1.9 % (ref 0.3–6.2)
ERYTHROCYTE [DISTWIDTH] IN BLOOD BY AUTOMATED COUNT: 19.1 % (ref 12.3–15.4)
HCT VFR BLD AUTO: 28.3 % (ref 34–46.6)
HGB BLD-MCNC: 9.1 G/DL (ref 12–15.9)
LYMPHOCYTES # BLD AUTO: 0.37 10*3/MM3 (ref 0.7–3.1)
LYMPHOCYTES NFR BLD AUTO: 10.1 % (ref 19.6–45.3)
MCH RBC QN AUTO: 31.8 PG (ref 26.6–33)
MCHC RBC AUTO-ENTMCNC: 32.2 G/DL (ref 31.5–35.7)
MCV RBC AUTO: 99 FL (ref 79–97)
MONOCYTES # BLD AUTO: 0.38 10*3/MM3 (ref 0.1–0.9)
MONOCYTES NFR BLD AUTO: 10.4 % (ref 5–12)
NEUTROPHILS NFR BLD AUTO: 2.84 10*3/MM3 (ref 1.7–7)
NEUTROPHILS NFR BLD AUTO: 77.3 % (ref 42.7–76)
PLATELET # BLD AUTO: 148 10*3/MM3 (ref 140–450)
PMV BLD AUTO: 8.6 FL (ref 6–12)
RBC # BLD AUTO: 2.86 10*6/MM3 (ref 3.77–5.28)
WBC NRBC COR # BLD: 3.67 10*3/MM3 (ref 3.4–10.8)

## 2022-01-11 PROCEDURE — 36415 COLL VENOUS BLD VENIPUNCTURE: CPT

## 2022-01-11 PROCEDURE — 85025 COMPLETE CBC W/AUTO DIFF WBC: CPT

## 2022-01-12 ENCOUNTER — SPECIALTY PHARMACY (OUTPATIENT)
Dept: PHARMACY | Facility: HOSPITAL | Age: 57
End: 2022-01-12

## 2022-01-12 NOTE — PROGRESS NOTES
[facility] lab review: Lomustine + Procarbazine      1/11/2022   WBC 3.40 - 10.80 10*3/mm3 3.67   Neutrophils Absolute 1.70 - 7.00 10*3/mm3 2.84   Hemoglobin 12.0 - 15.9 g/dL 9.1 (A)   Hematocrit 34.0 - 46.6 % 28.3 (A)   Platelets 140 - 450 10*3/mm3 148   Will continue to follow weekly labs.  Sean Lechuga, OpalD BCPS

## 2022-01-18 ENCOUNTER — LAB (OUTPATIENT)
Dept: LAB | Facility: HOSPITAL | Age: 57
End: 2022-01-18

## 2022-01-18 DIAGNOSIS — C71.9 OLIGODENDROGLIOMA: ICD-10-CM

## 2022-01-18 DIAGNOSIS — C71.1 OLIGOASTROCYTOMA OF FRONTAL LOBE: ICD-10-CM

## 2022-01-18 LAB
BASOPHILS # BLD AUTO: 0 10*3/MM3 (ref 0–0.2)
BASOPHILS NFR BLD AUTO: 0 % (ref 0–1.5)
DEPRECATED RDW RBC AUTO: 74.5 FL (ref 37–54)
EOSINOPHIL # BLD AUTO: 0.07 10*3/MM3 (ref 0–0.4)
EOSINOPHIL NFR BLD AUTO: 2 % (ref 0.3–6.2)
ERYTHROCYTE [DISTWIDTH] IN BLOOD BY AUTOMATED COUNT: 21.2 % (ref 12.3–15.4)
HCT VFR BLD AUTO: 28.6 % (ref 34–46.6)
HGB BLD-MCNC: 9.1 G/DL (ref 12–15.9)
LYMPHOCYTES # BLD AUTO: 0.93 10*3/MM3 (ref 0.7–3.1)
LYMPHOCYTES NFR BLD AUTO: 26.9 % (ref 19.6–45.3)
MCH RBC QN AUTO: 32.9 PG (ref 26.6–33)
MCHC RBC AUTO-ENTMCNC: 31.8 G/DL (ref 31.5–35.7)
MCV RBC AUTO: 103.2 FL (ref 79–97)
MONOCYTES # BLD AUTO: 0.37 10*3/MM3 (ref 0.1–0.9)
MONOCYTES NFR BLD AUTO: 10.7 % (ref 5–12)
NEUTROPHILS NFR BLD AUTO: 2.09 10*3/MM3 (ref 1.7–7)
NEUTROPHILS NFR BLD AUTO: 60.4 % (ref 42.7–76)
PLATELET # BLD AUTO: 77 10*3/MM3 (ref 140–450)
PMV BLD AUTO: 8.9 FL (ref 6–12)
RBC # BLD AUTO: 2.77 10*6/MM3 (ref 3.77–5.28)
WBC NRBC COR # BLD: 3.46 10*3/MM3 (ref 3.4–10.8)

## 2022-01-18 PROCEDURE — 36415 COLL VENOUS BLD VENIPUNCTURE: CPT

## 2022-01-18 PROCEDURE — 85025 COMPLETE CBC W/AUTO DIFF WBC: CPT

## 2022-01-19 ENCOUNTER — SPECIALTY PHARMACY (OUTPATIENT)
Dept: PHARMACY | Facility: HOSPITAL | Age: 57
End: 2022-01-19

## 2022-01-19 NOTE — PROGRESS NOTES
[facility] Lab Review: PCV        1/18/2022   WBC 3.40 - 10.80 10*3/mm3 3.46   Neutrophils Absolute 1.70 - 7.00 10*3/mm3 2.09   Hemoglobin 12.0 - 15.9 g/dL 9.1 (A)   Hematocrit 34.0 - 46.6 % 28.6 (A)   Platelets 140 - 450 10*3/mm3 77 (A)     Thanks,    Chapis Goff, PharmD

## 2022-01-25 ENCOUNTER — OFFICE VISIT (OUTPATIENT)
Dept: ONCOLOGY | Facility: CLINIC | Age: 57
End: 2022-01-25

## 2022-01-25 ENCOUNTER — LAB (OUTPATIENT)
Dept: LAB | Facility: HOSPITAL | Age: 57
End: 2022-01-25

## 2022-01-25 VITALS
BODY MASS INDEX: 53.92 KG/M2 | HEART RATE: 94 BPM | OXYGEN SATURATION: 94 % | TEMPERATURE: 97.1 F | WEIGHT: 293 LBS | SYSTOLIC BLOOD PRESSURE: 129 MMHG | RESPIRATION RATE: 18 BRPM | HEIGHT: 62 IN | DIASTOLIC BLOOD PRESSURE: 54 MMHG

## 2022-01-25 DIAGNOSIS — C71.9 OLIGODENDROGLIOMA: Primary | ICD-10-CM

## 2022-01-25 DIAGNOSIS — C71.9 OLIGODENDROGLIOMA: ICD-10-CM

## 2022-01-25 DIAGNOSIS — C71.1 OLIGOASTROCYTOMA OF FRONTAL LOBE: Primary | ICD-10-CM

## 2022-01-25 LAB
BASOPHILS # BLD AUTO: 0.02 10*3/MM3 (ref 0–0.2)
BASOPHILS NFR BLD AUTO: 0.4 % (ref 0–1.5)
DEPRECATED RDW RBC AUTO: 76.1 FL (ref 37–54)
EOSINOPHIL # BLD AUTO: 0.03 10*3/MM3 (ref 0–0.4)
EOSINOPHIL NFR BLD AUTO: 0.6 % (ref 0.3–6.2)
ERYTHROCYTE [DISTWIDTH] IN BLOOD BY AUTOMATED COUNT: 21.1 % (ref 12.3–15.4)
HCT VFR BLD AUTO: 27.6 % (ref 34–46.6)
HGB BLD-MCNC: 8.8 G/DL (ref 12–15.9)
HOLD SPECIMEN: NORMAL
HOLD SPECIMEN: NORMAL
LYMPHOCYTES # BLD AUTO: 0.81 10*3/MM3 (ref 0.7–3.1)
LYMPHOCYTES NFR BLD AUTO: 16.3 % (ref 19.6–45.3)
MCH RBC QN AUTO: 33.7 PG (ref 26.6–33)
MCHC RBC AUTO-ENTMCNC: 31.9 G/DL (ref 31.5–35.7)
MCV RBC AUTO: 105.7 FL (ref 79–97)
MONOCYTES # BLD AUTO: 0.27 10*3/MM3 (ref 0.1–0.9)
MONOCYTES NFR BLD AUTO: 5.4 % (ref 5–12)
NEUTROPHILS NFR BLD AUTO: 3.84 10*3/MM3 (ref 1.7–7)
NEUTROPHILS NFR BLD AUTO: 77.3 % (ref 42.7–76)
PLATELET # BLD AUTO: 60 10*3/MM3 (ref 140–450)
PMV BLD AUTO: 9.3 FL (ref 6–12)
RBC # BLD AUTO: 2.61 10*6/MM3 (ref 3.77–5.28)
WBC NRBC COR # BLD: 4.97 10*3/MM3 (ref 3.4–10.8)

## 2022-01-25 PROCEDURE — 36415 COLL VENOUS BLD VENIPUNCTURE: CPT

## 2022-01-25 PROCEDURE — 85025 COMPLETE CBC W/AUTO DIFF WBC: CPT

## 2022-01-25 PROCEDURE — 99214 OFFICE O/P EST MOD 30 MIN: CPT | Performed by: INTERNAL MEDICINE

## 2022-01-25 NOTE — PROGRESS NOTES
HEMATOLOGY ONCOLOGY OUTPATIENT FOLLOW UP      Patient name: Cindy Cortes  : 1965  MRN: [id number]  Primary Care Physician: Annamarie Monroy APRN  Referring Physician: Annamarie Monroy APRN  Reason For Consult:     Chief Complaint   Patient presents with   • Follow-up     Oligoastrocytoma of frontal lobe      HPI:   History of Present Illness:  Cindy Cortes is 56 y.o. female who presented to our office on 06/10/21 for consultation regarding Oligodendroglioma    Patient is a 56 y.o. female who was referred to us for treatment options regarding recent diagnosis of grade 2 oligodendroglioma.  This was IDH [id number] mutated, 1P 19 Q codeleted.  Patient initially presented with seizures and a CT head was obtained that showed vasogenic edema and a right frontal lobe mass MRI was obtained after this.  2021 -MRI of the brain shows 2.1 x 4.4 x 3.3 cm right frontal lobe mass with eccentric cystic or necrotic component with surrounding vasogenic edema and a focal 3 mm right to left midline shift.  Patient was started on Keppra, steroids plan was made for elective craniotomy and surgical resection.    2021 craniotomy of the right frontal lobe, microscopic resection of tumor mass.  Pathology results oligodendroglioma WHO grade 2, IDH1 R132H mutation by immunohistochemistry, 1p19q codeletion by FISH.    2021 -status post resection of the right frontal lobe mass.  Expected postop blood product. resolution of midline shift.    2021 - Started radiation adjuvantly.    2021 - last day of radiation.    2021 - C1 D8 with vincristin started procarbazine  10/7/2021 - C1 D29 Vincristine    10/21/2021 - C2D1 PCV  10/28/2021 -cycle 2-day 8 with vincristine  Started procarbazine  Held procarbazine for a few days with nausea  2021 -patient in the hospital with significant pancytopenia needing blood transfusion.    2021 -vincristine cycle 2-day 29.  This has been delayed with  ongoing cytopenias and hospitalization.    12/27/2021 - C3 D1 12/30/2021 1/6/2022 - C3D8 vincristine.      Subjective:  Patient seen for follow up, nausea, fatigue, vomiting has improved. No bleeding.    The following portions of the patient's history were reviewed and updated as appropriate: allergies, current medications, past family history, past medical history, past social history, past surgical history and problem list.    Past Medical History:   Diagnosis Date   • Arthritis    • GERD (gastroesophageal reflux disease)    • Hypertension    • Oligodendroglioma (HCC)    • Seizure (HCC)    • Type 2 diabetes mellitus with hyperglycemia, without long-term current use of insulin (HCC) 5/12/2021       Past Surgical History:   Procedure Laterality Date   • CRANIOTOMY FOR TUMOR Right 5/6/2021    Procedure: CRANIOTOMY FOR TUMOR RESECTION WITH STEREOTACTIC;  Surgeon: Jluis Felix MD;  Location: [facility];  Service: Neurosurgery;  Laterality: Right;         Current Outpatient Medications:   •  Alcohol Swabs (Alcohol Wipes) 70 % pads, Apply 1 each topically to the appropriate area as directed 4 (Four) Times a Day., Disp: , Rfl:   •  amLODIPine (NORVASC) 10 MG tablet, Take 1 tablet by mouth Daily., Disp: 30 tablet, Rfl: 2  •  ferrous sulfate 325 (65 FE) MG tablet, Take 1 tablet by mouth Daily With Breakfast., Disp: , Rfl:   •  folic acid (FOLVITE) 1 MG tablet, TAKE TWO TABLETS BY MOUTH EVERY MORNING, THEN TWO TABLETS EVERY EVENING, Disp: 360 tablet, Rfl: 0  •  Gleostine 10 MG chemo capsule, TAKE 1 CAPSULE BY MOUTH WITH 1 OTHER GLEOSTINE PRESCRIPTION FOR 210MG TOTAL FOR 1 DOSE, Disp: , Rfl:   •  Gleostine 100 MG chemo capsule, TAKE 2 CAPSULES BY MOUTH WITH 1 OTHER GLEOSTINE PRESCRIPTION FOR 210MG TOTAL FOR 1 DOSE, Disp: , Rfl:   •  glucose blood (OneTouch Verio) test strip, 1 each by Other route 4 (Four) Times a Day Before Meals & at Bedtime. Use as instructed, Disp: 200 each, Rfl: 1  •  levETIRAcetam (KEPPRA)  750 MG tablet, Take 1 tablet by mouth 2 (Two) Times a Day., Disp: 60 tablet, Rfl: 2  •  lidocaine-prilocaine (EMLA) 2.5-2.5 % cream, Apply  topically to the appropriate area as directed Every 2 (Two) Hours As Needed for Mild Pain ., Disp: 5 g, Rfl: 0  •  metoprolol tartrate (LOPRESSOR) 25 MG tablet, Take 0.5 tablets by mouth Every 8 (Eight) Hours., Disp: 45 tablet, Rfl: 2  •  ondansetron (Zofran) 8 MG tablet, Take 1 tablet by mouth Every 8 (Eight) Hours As Needed for Nausea or Vomiting., Disp: 30 tablet, Rfl: 3  •  oxybutynin (DITROPAN) 5 MG tablet, , Disp: , Rfl:   •  venlafaxine XR (EFFEXOR-XR) 75 MG 24 hr capsule, Take 75 mg by mouth Daily. Take DOS, Disp: , Rfl: 3  •  vitamin D (ERGOCALCIFEROL) 1.25 MG (05327 UT) capsule capsule, Take 50,000 Units by mouth Every 7 (Seven) Days.  , Disp: , Rfl:     Current Facility-Administered Medications:   •  diphenhydrAMINE (BENADRYL) injection 25 mg, 25 mg, Intravenous, Once, Emilie Workman MD    Current outpatient and discharge medications have been reconciled for the patient.  Reviewed by: Emilie Workman MD    No Known Allergies    Family History   Problem Relation Age of Onset   • Kidney disease Mother    • Prostate cancer Brother    • Breast cancer Maternal Aunt    • Colon cancer Maternal Aunt    • Breast cancer Niece    • Breast cancer Cousin        Cancer-related family history includes Breast cancer in her cousin, maternal aunt, and niece; Colon cancer in her maternal aunt; Prostate cancer in her brother.    Social History     Tobacco Use   • Smoking status: Never Smoker   • Smokeless tobacco: Never Used   Vaping Use   • Vaping Use: Never used   Substance Use Topics   • Alcohol use: Not Currently     Alcohol/week: 1.0 standard drink     Types: 1 Cans of beer per week     Comment: socially   • Drug use: Never     Social History     Social History Narrative   • Not on file      Objective:    Vitals:    01/25/22 1126   BP: 129/54   Pulse: 94   Resp: 18   Temp: 97.1 °F (36.2  "°C)   SpO2: 94%   Weight: (!) 148 kg (325 lb 6.4 oz)   Height: 157.5 cm (62\")   PainSc: 0-No pain     Body mass index is 59.52 kg/m².  ECOG  (0) Fully active, able to carry on all predisease performance without restriction    Physical Exam:     Physical Exam  Constitutional:       Appearance: Normal appearance. She is obese.   HENT:      Head: Normocephalic and atraumatic.      Comments: Radiation changes scalp, surgical scar  Eyes:      Pupils: Pupils are equal, round, and reactive to light.   Cardiovascular:      Rate and Rhythm: Normal rate and regular rhythm.      Pulses: Normal pulses.      Heart sounds: Normal heart sounds. No murmur heard.      Pulmonary:      Effort: Pulmonary effort is normal.      Breath sounds: Normal breath sounds.   Abdominal:      General: There is no distension.      Palpations: Abdomen is soft. There is no mass.      Tenderness: There is no abdominal tenderness.   Musculoskeletal:         General: Normal range of motion.      Cervical back: Normal range of motion and neck supple.   Skin:     General: Skin is warm.   Neurological:      General: No focal deficit present.      Mental Status: She is alert.   Psychiatric:         Mood and Affect: Mood normal.           Lab Results - Last 18 Months   Lab Units 01/25/22  1117 01/18/22  1033 01/11/22  1058   WBC 10*3/mm3 4.97 3.46 3.67   HEMOGLOBIN g/dL 8.8* 9.1* 9.1*   HEMATOCRIT % 27.6* 28.6* 28.3*   PLATELETS 10*3/mm3 60* 77* 148   MCV fL 105.7* 103.2* 99.0*     Lab Results - Last 18 Months   Lab Units 01/06/22  1136 01/04/22  1331 12/06/21  1008   SODIUM mmol/L 139 139 139   POTASSIUM mmol/L 3.9 4.1 3.8   CHLORIDE mmol/L 103 101 103   CO2 mmol/L 25.0 26.0 24.0   BUN mg/dL 16 14 13   CREATININE mg/dL 0.89 0.71 0.75   CALCIUM mg/dL 9.4 9.5 8.4*   BILIRUBIN mg/dL 0.7 0.7 0.6   ALK PHOS U/L 89 90 97   ALT (SGPT) U/L 20 22 18   AST (SGOT) U/L 20 23 17   GLUCOSE mg/dL 159* 127* 152*       Lab Results   Component Value Date    GLUCOSE 159 (H) " 01/06/2022    BUN 16 01/06/2022    CREATININE 0.89 01/06/2022    EGFRIFNONA 66 01/06/2022    BCR 18.0 01/06/2022    K 3.9 01/06/2022    CO2 25.0 01/06/2022    CALCIUM 9.4 01/06/2022    PROTENTOTREF 6.4 12/15/2021    ALBUMIN 3.80 01/06/2022    LABIL2 1.1 12/15/2021    AST 20 01/06/2022    ALT 20 01/06/2022       Lab Results - Last 18 Months   Lab Units 08/27/21  0738 05/04/21  0922   INR  0.96 0.96   APTT seconds 25.6 20.7*       Lab Results   Component Value Date    IRON 126 12/15/2021    TIBC 322 12/15/2021    FERRITIN 672.00 (H) 10/27/2021       Lab Results   Component Value Date    LWTTEEBZ80 332 12/15/2021       Lab Results   Component Value Date    PTT 25.6 08/27/2021    INR 0.96 08/27/2021     CT Abdomen Pelvis Without Contrast    Result Date: 12/16/2021  No findings of intraperitoneal or retroperitoneal hematoma. Cholelithiasis without inflammatory findings. Small anterior abdominal wall fat-containing hernias.  Electronically Signed By-Stephanie Rodríguez MD On:12/16/2021 6:07 PM This report was finalized on [id number] by  Stephanie Rodríguez MD.    MRI Brain With & Without Contrast    Result Date: 11/22/2021    1.  Status post right frontal craniotomy for tumor resection.  Resection cavity appears similar to the prior exam.  There is persistent peripheral abnormal increased T2 signal which may be due to treatment-related change or residual tumor.  There is also some stable enhancement within the resection cavity and along the posterior superior margin of the resection cavity, unchanged from prior exam.  No new or worsening contrast enhancement identified.  Electronically Signed By-Robel Neely MD On:11/22/2021 4:33 PM This report was finalized on [id number] by  Robel Neely MD.    XR Chest 1 View    Result Date: 11/13/2021  1.  No acute cardiopulmonary finding. Electronically signed by:  Nicole Meier M.D.  11/13/2021 9:20 PM    CT Chest Pulmonary Embolism    Result Date: 11/13/2021  1.  No evidence of  "pulmonary embolus. 2.  Cholelithiasis. Electronically signed by:  Nicole Meier M.D.  11/13/2021 9:22 PM    Pathology results  Outside Report, Addendum   Integrated Diagnosis: Oligodendroglioma WHO Grade II  IDH1 R132H mutation by Immunohistochemistry; 1p19q co-deletion by FISH  See attached report from [facility]   Addendum electronically signed by Cecilia Chaudhary on 5/28/2021 at 0824   Final Diagnosis   Specimen #1 (\"Brain tumor right frontal lobe,\" biopsy):    Diffuse glioma (WHO grade II)    See comment     Specimen #2 (\"Brain tumor right frontal lobe,\" biopsy):    Diffuse glioma (WHO grade II)    See comment     Specimen #3 (Brain tumor right frontal lobe, resection):    Diffuse glioma, IDH-mutant (WHO grade II)    See attached report from [facility]         Assessment/Plan     Patient is a 56-year-old female with oligodendroglioma grade 2 status post gross total resection    Oligodendroglioma  Previously I had an extensive discussion with the patient about treatment options, prognosis.  We had an extensive discussion at that time with several options.  This is summarized here below    I previously discussed with her that there are findings from RTOG 9802 clinical trial which showed survival advantage with radiation followed by chemotherapy after surgical resection of grade 2 oligodendrogliomas.  Chemotherapy with the PCV regimen in this trial conferred a survival advantage over radiotherapy alone with a median overall survival of 13.3 versus 7.8 years.  In subset analysis benefit was more significant  Histologic oligodendroglioma is as compared to other low-grade gliomas.  Molecular analysis post talk also showed survival advantage in molecularly confirmed IDH mutant, 1p19q codeleted oligodendrogliomas.    PCV can be a toxic regimen however survival advantage is only been shown in randomized control trial using this particular regimen.  " The other option would be temozolomide which has advantages over PCV with ease of administration, better tolerance and efficacy in combination with radiation therapy and other types of CNS tumors and gliomas.    The other option I had discussed with her was clinical trial with a CODEL study which is comparing radiation alone versus radiation with Temodar versus radiation with PCV in this patient population.  Patient however is not very interested in enrolling in a clinical trial.  She is wanting to pursue the most aggressive treatment option for her to reduce the chances of recurrence of her tumor.    Lastly also discussed with her the option of surveillance and observation with serial MRIs however also discussed that in above studies the progression free survival is around 50% for 5 years.     Based on her above discussion patient decided to pursue aggressive treatment with radiation followed by chemotherapy.  We would use the RTOG 9802 regimen with radiation followed by chemotherapy with PCV.  We have evidence that vincristine does not cross the blood-brain barrier significantly and hence if there is toxicity we can choose to omit the drug.  Case was discussed with Dr. Cruz and started sequential radiation and chemo    Patient completed radiation without complication.    Based on above patient decided to proceed with adjuvant PCV.   Now patient has had clinically significant pancytopenia needing blood transfusion hospital admission.  She has completed cycle 2.  Pancytopenia is improved  For cycle 3 dose reduced procarbazine and lomustine by 20%.  No worsening in cytopenias per labs today.  Continue vincristine per schedule repeat CBC weekly  No dosage modification for C4 continue same dosage as C3  Repeat MRI brain has been scheduled for 3/2022    nausea  Continue zofran as needed     Anemia  There could be possibility of hemolysis nonimmune.  There have been case reports with her chemotherapy regimen.  She was  given 5-day course of prednisone.  Repeat haptoglobin improved  Now her anemia is most likely ongoing myelosuppression. Repeat cbc in a week.      Follow-up in 3 weeks    I have reviewed and confirmed the accuracy of the patient's history: Chief complaint, HPI, ROS, Subjective and Past Family Social History as entered by the MA/LPN/RN.     Emilie Workman MD 01/25/22

## 2022-01-26 ENCOUNTER — SPECIALTY PHARMACY (OUTPATIENT)
Dept: PHARMACY | Facility: HOSPITAL | Age: 57
End: 2022-01-26

## 2022-01-26 NOTE — PROGRESS NOTES
[facility] Lab Review: PCV        1/25/2022   WBC 3.40 - 10.80 10*3/mm3 4.97   Neutrophils Absolute 1.70 - 7.00 10*3/mm3 3.84   Hemoglobin 12.0 - 15.9 g/dL 8.8 (A)   Hematocrit 34.0 - 46.6 % 27.6 (A)   Platelets 140 - 450 10*3/mm3 60 (A)     Per MD dictation 1/25/22, continue treatment.  Patient is on dose reduced lomustine and procarbazine.  Pharmacy will continue to follow.  Thanks,    Chapis Goff, PharmD

## 2022-01-27 ENCOUNTER — HOSPITAL ENCOUNTER (OUTPATIENT)
Dept: ONCOLOGY | Facility: HOSPITAL | Age: 57
Setting detail: INFUSION SERIES
Discharge: HOME | End: 2022-01-27

## 2022-01-27 VITALS
SYSTOLIC BLOOD PRESSURE: 159 MMHG | WEIGHT: 293 LBS | DIASTOLIC BLOOD PRESSURE: 77 MMHG | OXYGEN SATURATION: 100 % | BODY MASS INDEX: 53.92 KG/M2 | HEART RATE: 59 BPM | TEMPERATURE: 97.1 F | RESPIRATION RATE: 18 BRPM | HEIGHT: 62 IN

## 2022-01-27 DIAGNOSIS — Z95.828 PORT-A-CATH IN PLACE: ICD-10-CM

## 2022-01-27 DIAGNOSIS — C71.1 OLIGOASTROCYTOMA OF FRONTAL LOBE: ICD-10-CM

## 2022-01-27 DIAGNOSIS — C71.9 OLIGODENDROGLIOMA: Primary | ICD-10-CM

## 2022-01-27 LAB
ALBUMIN SERPL-MCNC: 3.8 G/DL (ref 3.5–5.2)
ALBUMIN/GLOB SERPL: 1.4 G/DL
ALP SERPL-CCNC: 81 U/L (ref 39–117)
ALT SERPL W P-5'-P-CCNC: 23 U/L (ref 1–33)
ANION GAP SERPL CALCULATED.3IONS-SCNC: 8 MMOL/L (ref 5–15)
AST SERPL-CCNC: 30 U/L (ref 1–32)
BASOPHILS # BLD AUTO: 0.01 10*3/MM3 (ref 0–0.2)
BASOPHILS NFR BLD AUTO: 0.2 % (ref 0–1.5)
BILIRUB SERPL-MCNC: 0.8 MG/DL (ref 0–1.2)
BUN SERPL-MCNC: 16 MG/DL (ref 6–20)
BUN/CREAT SERPL: 20.5 (ref 7–25)
CALCIUM SPEC-SCNC: 9.3 MG/DL (ref 8.6–10.5)
CHLORIDE SERPL-SCNC: 101 MMOL/L (ref 98–107)
CO2 SERPL-SCNC: 28 MMOL/L (ref 22–29)
CREAT SERPL-MCNC: 0.78 MG/DL (ref 0.57–1)
DEPRECATED RDW RBC AUTO: 72.5 FL (ref 37–54)
EOSINOPHIL # BLD AUTO: 0.03 10*3/MM3 (ref 0–0.4)
EOSINOPHIL NFR BLD AUTO: 0.7 % (ref 0.3–6.2)
ERYTHROCYTE [DISTWIDTH] IN BLOOD BY AUTOMATED COUNT: 20.3 % (ref 12.3–15.4)
GFR SERPL CREATININE-BSD FRML MDRD: 77 ML/MIN/1.73
GLOBULIN UR ELPH-MCNC: 2.8 GM/DL
GLUCOSE SERPL-MCNC: 121 MG/DL (ref 65–99)
HCT VFR BLD AUTO: 24.7 % (ref 34–46.6)
HGB BLD-MCNC: 7.9 G/DL (ref 12–15.9)
LYMPHOCYTES # BLD AUTO: 0.63 10*3/MM3 (ref 0.7–3.1)
LYMPHOCYTES NFR BLD AUTO: 14.2 % (ref 19.6–45.3)
MCH RBC QN AUTO: 33.3 PG (ref 26.6–33)
MCHC RBC AUTO-ENTMCNC: 32 G/DL (ref 31.5–35.7)
MCV RBC AUTO: 104.2 FL (ref 79–97)
MONOCYTES # BLD AUTO: 0.23 10*3/MM3 (ref 0.1–0.9)
MONOCYTES NFR BLD AUTO: 5.2 % (ref 5–12)
NEUTROPHILS NFR BLD AUTO: 3.54 10*3/MM3 (ref 1.7–7)
NEUTROPHILS NFR BLD AUTO: 79.7 % (ref 42.7–76)
PLATELET # BLD AUTO: 47 10*3/MM3 (ref 140–450)
PMV BLD AUTO: 10.2 FL (ref 6–12)
POTASSIUM SERPL-SCNC: 4.3 MMOL/L (ref 3.5–5.2)
PROT SERPL-MCNC: 6.6 G/DL (ref 6–8.5)
RBC # BLD AUTO: 2.37 10*6/MM3 (ref 3.77–5.28)
SODIUM SERPL-SCNC: 137 MMOL/L (ref 136–145)
WBC NRBC COR # BLD: 4.44 10*3/MM3 (ref 3.4–10.8)

## 2022-01-27 PROCEDURE — 85025 COMPLETE CBC W/AUTO DIFF WBC: CPT | Performed by: INTERNAL MEDICINE

## 2022-01-27 PROCEDURE — 25010000002 HEPARIN LOCK FLUSH PER 10 UNITS: Performed by: INTERNAL MEDICINE

## 2022-01-27 PROCEDURE — 80053 COMPREHEN METABOLIC PANEL: CPT | Performed by: INTERNAL MEDICINE

## 2022-01-27 PROCEDURE — 25010000002 VINCRISTINE PER 1 MG: Performed by: INTERNAL MEDICINE

## 2022-01-27 PROCEDURE — 36591 DRAW BLOOD OFF VENOUS DEVICE: CPT

## 2022-01-27 PROCEDURE — 96413 CHEMO IV INFUSION 1 HR: CPT

## 2022-01-27 RX ORDER — SODIUM CHLORIDE 9 MG/ML
250 INJECTION, SOLUTION INTRAVENOUS ONCE
Status: COMPLETED | OUTPATIENT
Start: 2022-01-27 | End: 2022-01-27

## 2022-01-27 RX ORDER — SODIUM CHLORIDE 0.9 % (FLUSH) 0.9 %
10 SYRINGE (ML) INJECTION AS NEEDED
Status: DISCONTINUED | OUTPATIENT
Start: 2022-01-27 | End: 2022-01-28 | Stop reason: HOSPADM

## 2022-01-27 RX ORDER — SODIUM CHLORIDE 0.9 % (FLUSH) 0.9 %
10 SYRINGE (ML) INJECTION AS NEEDED
Status: CANCELLED | OUTPATIENT
Start: 2022-01-27

## 2022-01-27 RX ORDER — HEPARIN SODIUM (PORCINE) LOCK FLUSH IV SOLN 100 UNIT/ML 100 UNIT/ML
500 SOLUTION INTRAVENOUS AS NEEDED
Status: DISCONTINUED | OUTPATIENT
Start: 2022-01-27 | End: 2022-01-28 | Stop reason: HOSPADM

## 2022-01-27 RX ORDER — HEPARIN SODIUM (PORCINE) LOCK FLUSH IV SOLN 100 UNIT/ML 100 UNIT/ML
500 SOLUTION INTRAVENOUS AS NEEDED
Status: CANCELLED | OUTPATIENT
Start: 2022-01-27

## 2022-01-27 RX ADMIN — Medication 10 ML: at 14:41

## 2022-01-27 RX ADMIN — HEPARIN 500 UNITS: 100 SYRINGE at 14:41

## 2022-01-27 RX ADMIN — VINCRISTINE SULFATE 2 MG: 1 INJECTION, SOLUTION INTRAVENOUS at 14:20

## 2022-01-27 RX ADMIN — SODIUM CHLORIDE 500 ML: 9 INJECTION, SOLUTION INTRAVENOUS at 14:26

## 2022-01-27 NOTE — PROGRESS NOTES
Pt here for Chemo with low counts platelets 47 and hgb 7.9. Dr. Workman consulted for orders and he gave orders to treat and hold oral chemo this week until she gets a cbc it is scheduled for Wednesday and pt was educated regarding starting oral after Dr. Workman sees cbc. Chapis in pharmacy notified for oral tracking.

## 2022-01-28 ENCOUNTER — SPECIALTY PHARMACY (OUTPATIENT)
Dept: PHARMACY | Facility: HOSPITAL | Age: 57
End: 2022-01-28

## 2022-01-28 NOTE — PROGRESS NOTES
[facility] Lab Review: PCV     1/27/2022  Day 29   WBC 3.40 - 10.80 10*3/mm3 4.44   Neutrophils Absolute 1.70 - 7.00 10*3/mm3 3.54   Hemoglobin 12.0 - 15.9 g/dL 7.9 (A)   Hematocrit 34.0 - 46.6 % 24.7 (A)   Platelets 140 - 450 10*3/mm3 47 (A)   Creatinine 0.57 - 1.00 mg/dL 0.78   eGFR Non African Am >60 mL/min/1.73 77   BUN 6 - 20 mg/dL 16   Sodium 136 - 145 mmol/L 137   Potassium 3.5 - 5.2 mmol/L 4.3   Glucose 65 - 99 mg/dL 121 (A)   Calcium 8.6 - 10.5 mg/dL 9.3   Albumin 3.50 - 5.20 g/dL 3.80   Total Protein 6.0 - 8.5 g/dL 6.6   AST (SGOT) 1 - 32 U/L 30   ALT (SGPT) 1 - 33 U/L 23   Alkaline Phosphatase 39 - 117 U/L 81   Total Bilirubin 0.0 - 1.2 mg/dL 0.8     Repeat labs in one week. Patient is currently not taking oral chemo at this point in her cycle.  Will follow cbc for possible further reduction in doses.  Thanks,    Chapis Goff, PharmD

## 2022-02-01 ENCOUNTER — LAB (OUTPATIENT)
Dept: LAB | Facility: HOSPITAL | Age: 57
End: 2022-02-01

## 2022-02-01 ENCOUNTER — APPOINTMENT (OUTPATIENT)
Dept: LAB | Facility: HOSPITAL | Age: 57
End: 2022-02-01

## 2022-02-01 DIAGNOSIS — C71.1 OLIGOASTROCYTOMA OF FRONTAL LOBE: ICD-10-CM

## 2022-02-01 DIAGNOSIS — C71.9 OLIGODENDROGLIOMA: ICD-10-CM

## 2022-02-01 LAB
BASOPHILS # BLD AUTO: 0.02 10*3/MM3 (ref 0–0.2)
BASOPHILS NFR BLD AUTO: 0.6 % (ref 0–1.5)
DEPRECATED RDW RBC AUTO: 67.4 FL (ref 37–54)
EOSINOPHIL # BLD AUTO: 0.13 10*3/MM3 (ref 0–0.4)
EOSINOPHIL NFR BLD AUTO: 4 % (ref 0.3–6.2)
ERYTHROCYTE [DISTWIDTH] IN BLOOD BY AUTOMATED COUNT: 19.2 % (ref 12.3–15.4)
HCT VFR BLD AUTO: 21.7 % (ref 34–46.6)
HGB BLD-MCNC: 7.2 G/DL (ref 12–15.9)
LYMPHOCYTES # BLD AUTO: 0.62 10*3/MM3 (ref 0.7–3.1)
LYMPHOCYTES NFR BLD AUTO: 19 % (ref 19.6–45.3)
MCH RBC QN AUTO: 33.5 PG (ref 26.6–33)
MCHC RBC AUTO-ENTMCNC: 33.2 G/DL (ref 31.5–35.7)
MCV RBC AUTO: 100.9 FL (ref 79–97)
MONOCYTES # BLD AUTO: 0.19 10*3/MM3 (ref 0.1–0.9)
MONOCYTES NFR BLD AUTO: 5.8 % (ref 5–12)
NEUTROPHILS NFR BLD AUTO: 2.31 10*3/MM3 (ref 1.7–7)
NEUTROPHILS NFR BLD AUTO: 70.6 % (ref 42.7–76)
PLATELET # BLD AUTO: 48 10*3/MM3 (ref 140–450)
PMV BLD AUTO: 10.2 FL (ref 6–12)
RBC # BLD AUTO: 2.15 10*6/MM3 (ref 3.77–5.28)
WBC NRBC COR # BLD: 3.27 10*3/MM3 (ref 3.4–10.8)

## 2022-02-01 PROCEDURE — 85025 COMPLETE CBC W/AUTO DIFF WBC: CPT

## 2022-02-01 PROCEDURE — 36415 COLL VENOUS BLD VENIPUNCTURE: CPT

## 2022-02-01 NOTE — PROGRESS NOTES
Patient in today one day early for CBC and med review.    Patient counts were still low.  Advised Dr Workman and he requested patient return in one week and continue to hold oral chemo.  Patient advised and VU

## 2022-02-02 ENCOUNTER — SPECIALTY PHARMACY (OUTPATIENT)
Dept: PHARMACY | Facility: HOSPITAL | Age: 57
End: 2022-02-02

## 2022-02-02 ENCOUNTER — APPOINTMENT (OUTPATIENT)
Dept: LAB | Facility: HOSPITAL | Age: 57
End: 2022-02-02

## 2022-02-02 NOTE — PROGRESS NOTES
[facility] Lab Review: PCV        2/1/2022   WBC 3.40 - 10.80 10*3/mm3 3.27 (A)   Neutrophils Absolute 1.70 - 7.00 10*3/mm3 2.31   Hemoglobin 12.0 - 15.9 g/dL 7.2 (A)   Hematocrit 34.0 - 46.6 % 21.7 (A)   Platelets 140 - 450 10*3/mm3 48 (A)     Repeat CBC in one week. Per secure message from Dr. Workman, decrease lomustine by 20% and continue procarbazine and vincrinstine at current doses.  Treatment plan adjusted and sent to Dr. Workman for review and signature.    Thanks,    Chapis Goff, PharmD

## 2022-02-07 DIAGNOSIS — C71.1 OLIGOASTROCYTOMA OF FRONTAL LOBE: Primary | ICD-10-CM

## 2022-02-07 DIAGNOSIS — C71.9 OLIGODENDROGLIOMA: ICD-10-CM

## 2022-02-07 RX ORDER — SODIUM CHLORIDE 9 MG/ML
250 INJECTION, SOLUTION INTRAVENOUS ONCE
Status: CANCELLED | OUTPATIENT
Start: 2022-03-01

## 2022-02-07 RX ORDER — SODIUM CHLORIDE 9 MG/ML
250 INJECTION, SOLUTION INTRAVENOUS ONCE
Status: CANCELLED | OUTPATIENT
Start: 2022-03-22

## 2022-02-08 ENCOUNTER — SPECIALTY PHARMACY (OUTPATIENT)
Dept: PHARMACY | Facility: HOSPITAL | Age: 57
End: 2022-02-08

## 2022-02-08 DIAGNOSIS — C71.1 OLIGOASTROCYTOMA OF FRONTAL LOBE: Primary | ICD-10-CM

## 2022-02-08 DIAGNOSIS — C71.9 OLIGODENDROGLIOMA: ICD-10-CM

## 2022-02-09 ENCOUNTER — TELEPHONE (OUTPATIENT)
Dept: ONCOLOGY | Facility: CLINIC | Age: 57
End: 2022-02-09

## 2022-02-09 ENCOUNTER — LAB (OUTPATIENT)
Dept: LAB | Facility: HOSPITAL | Age: 57
End: 2022-02-09

## 2022-02-09 ENCOUNTER — HOSPITAL ENCOUNTER (OUTPATIENT)
Dept: INFUSION THERAPY | Facility: HOSPITAL | Age: 57
Setting detail: INFUSION SERIES
Discharge: HOME | End: 2022-02-09

## 2022-02-09 ENCOUNTER — TELEPHONE (OUTPATIENT)
Dept: ONCOLOGY | Facility: HOSPITAL | Age: 57
End: 2022-02-09

## 2022-02-09 VITALS
TEMPERATURE: 98.6 F | DIASTOLIC BLOOD PRESSURE: 70 MMHG | RESPIRATION RATE: 16 BRPM | HEART RATE: 65 BPM | SYSTOLIC BLOOD PRESSURE: 132 MMHG | OXYGEN SATURATION: 98 %

## 2022-02-09 DIAGNOSIS — C71.9 OLIGODENDROGLIOMA: ICD-10-CM

## 2022-02-09 DIAGNOSIS — D64.9 ANEMIA, UNSPECIFIED TYPE: Primary | ICD-10-CM

## 2022-02-09 DIAGNOSIS — D69.59 CHEMOTHERAPY-INDUCED THROMBOCYTOPENIA: ICD-10-CM

## 2022-02-09 DIAGNOSIS — C71.1 OLIGOASTROCYTOMA OF FRONTAL LOBE: ICD-10-CM

## 2022-02-09 DIAGNOSIS — T45.1X5A CHEMOTHERAPY-INDUCED THROMBOCYTOPENIA: ICD-10-CM

## 2022-02-09 LAB
ABO GROUP BLD: NORMAL
ANTI-FYA: NORMAL
BASO STIPL COARSE BLD QL SMEAR: NORMAL
BASOPHILS # BLD AUTO: 0 10*3/MM3 (ref 0–0.2)
BASOPHILS # BLD AUTO: 0.01 10*3/MM3 (ref 0–0.2)
BASOPHILS NFR BLD AUTO: 0.4 % (ref 0–1.5)
BASOPHILS NFR BLD AUTO: 0.5 % (ref 0–1.5)
BB HOLD TUBE: NORMAL
BLD GP AB SCN SERPL QL: POSITIVE
DEPRECATED RDW RBC AUTO: 76.2 FL (ref 37–54)
DEPRECATED RDW RBC AUTO: 80.1 FL (ref 37–54)
EOSINOPHIL # BLD AUTO: 0.1 10*3/MM3 (ref 0–0.4)
EOSINOPHIL # BLD AUTO: 0.12 10*3/MM3 (ref 0–0.4)
EOSINOPHIL NFR BLD AUTO: 4.1 % (ref 0.3–6.2)
EOSINOPHIL NFR BLD AUTO: 4.6 % (ref 0.3–6.2)
ERYTHROCYTE [DISTWIDTH] IN BLOOD BY AUTOMATED COUNT: 21.3 % (ref 12.3–15.4)
ERYTHROCYTE [DISTWIDTH] IN BLOOD BY AUTOMATED COUNT: 23.3 % (ref 12.3–15.4)
HCT VFR BLD AUTO: 17.2 % (ref 34–46.6)
HCT VFR BLD AUTO: 21 % (ref 34–46.6)
HGB BLD-MCNC: 6.1 G/DL (ref 12–15.9)
HGB BLD-MCNC: 6.6 G/DL (ref 12–15.9)
LYMPHOCYTES # BLD AUTO: 0.5 10*3/MM3 (ref 0.7–3.1)
LYMPHOCYTES # BLD AUTO: 0.76 10*3/MM3 (ref 0.7–3.1)
LYMPHOCYTES NFR BLD AUTO: 24.2 % (ref 19.6–45.3)
LYMPHOCYTES NFR BLD AUTO: 28.9 % (ref 19.6–45.3)
MACROCYTES BLD QL SMEAR: NORMAL
MCH RBC QN AUTO: 33.5 PG (ref 26.6–33)
MCH RBC QN AUTO: 35.6 PG (ref 26.6–33)
MCHC RBC AUTO-ENTMCNC: 31.4 G/DL (ref 31.5–35.7)
MCHC RBC AUTO-ENTMCNC: 35.4 G/DL (ref 31.5–35.7)
MCV RBC AUTO: 100.6 FL (ref 79–97)
MCV RBC AUTO: 106.6 FL (ref 79–97)
MONOCYTES # BLD AUTO: 0.2 10*3/MM3 (ref 0.1–0.9)
MONOCYTES # BLD AUTO: 0.32 10*3/MM3 (ref 0.1–0.9)
MONOCYTES NFR BLD AUTO: 11.6 % (ref 5–12)
MONOCYTES NFR BLD AUTO: 12.2 % (ref 5–12)
NEUTROPHILS NFR BLD AUTO: 1.2 10*3/MM3 (ref 1.7–7)
NEUTROPHILS NFR BLD AUTO: 1.42 10*3/MM3 (ref 1.7–7)
NEUTROPHILS NFR BLD AUTO: 53.9 % (ref 42.7–76)
NEUTROPHILS NFR BLD AUTO: 59.6 % (ref 42.7–76)
NRBC BLD AUTO-RTO: 0.3 /100 WBC (ref 0–0.2)
OVALOCYTES BLD QL SMEAR: NORMAL
PLAT MORPH BLD: NORMAL
PLATELET # BLD AUTO: 121 10*3/MM3 (ref 140–450)
PLATELET # BLD AUTO: 99 10*3/MM3 (ref 140–450)
PMV BLD AUTO: 7.6 FL (ref 6–12)
PMV BLD AUTO: 9.4 FL (ref 6–12)
POIKILOCYTOSIS BLD QL SMEAR: NORMAL
RBC # BLD AUTO: 1.71 10*6/MM3 (ref 3.77–5.28)
RBC # BLD AUTO: 1.97 10*6/MM3 (ref 3.77–5.28)
RH BLD: POSITIVE
T&S EXPIRATION DATE: NORMAL
WBC MORPH BLD: NORMAL
WBC NRBC COR # BLD: 2 10*3/MM3 (ref 3.4–10.8)
WBC NRBC COR # BLD: 2.63 10*3/MM3 (ref 3.4–10.8)

## 2022-02-09 PROCEDURE — 86870 RBC ANTIBODY IDENTIFICATION: CPT | Performed by: INTERNAL MEDICINE

## 2022-02-09 PROCEDURE — 36415 COLL VENOUS BLD VENIPUNCTURE: CPT

## 2022-02-09 PROCEDURE — 85025 COMPLETE CBC W/AUTO DIFF WBC: CPT | Performed by: INTERNAL MEDICINE

## 2022-02-09 PROCEDURE — 85025 COMPLETE CBC W/AUTO DIFF WBC: CPT

## 2022-02-09 PROCEDURE — 36591 DRAW BLOOD OFF VENOUS DEVICE: CPT

## 2022-02-09 PROCEDURE — 86922 COMPATIBILITY TEST ANTIGLOB: CPT

## 2022-02-09 PROCEDURE — 86901 BLOOD TYPING SEROLOGIC RH(D): CPT | Performed by: INTERNAL MEDICINE

## 2022-02-09 PROCEDURE — P9016 RBC LEUKOCYTES REDUCED: HCPCS

## 2022-02-09 PROCEDURE — 86923 COMPATIBILITY TEST ELECTRIC: CPT

## 2022-02-09 PROCEDURE — 86900 BLOOD TYPING SEROLOGIC ABO: CPT | Performed by: INTERNAL MEDICINE

## 2022-02-09 PROCEDURE — 36430 TRANSFUSION BLD/BLD COMPNT: CPT

## 2022-02-09 PROCEDURE — 86900 BLOOD TYPING SEROLOGIC ABO: CPT

## 2022-02-09 PROCEDURE — 86850 RBC ANTIBODY SCREEN: CPT | Performed by: INTERNAL MEDICINE

## 2022-02-09 PROCEDURE — 85007 BL SMEAR W/DIFF WBC COUNT: CPT | Performed by: INTERNAL MEDICINE

## 2022-02-09 RX ORDER — SODIUM CHLORIDE 9 MG/ML
250 INJECTION, SOLUTION INTRAVENOUS AS NEEDED
Status: CANCELLED | OUTPATIENT
Start: 2022-02-09

## 2022-02-09 RX ORDER — SODIUM CHLORIDE 9 MG/ML
250 INJECTION, SOLUTION INTRAVENOUS AS NEEDED
Status: DISCONTINUED | OUTPATIENT
Start: 2022-02-09 | End: 2022-02-11 | Stop reason: HOSPADM

## 2022-02-09 NOTE — TELEPHONE ENCOUNTER
Called to confirm pt was able to get her blood today. Pt said she is getting her blood at this time.

## 2022-02-09 NOTE — TELEPHONE ENCOUNTER
Pt here for labs. Hgb 6.6, hct 21.0 today. Pt denies feeling bad, but does complain of shortness of air. Informed Pastora OHARA of lab results. Pt to go to ambulatory care for 2 units PRBCs today. Called amb care to let them know that pt is coming. Informed pt and she verbalized understanding.

## 2022-02-09 NOTE — TELEPHONE ENCOUNTER
----- Message from Emilie Workman MD sent at 2/9/2022  3:15 PM EST -----  Can you check if she get blood.

## 2022-02-10 ENCOUNTER — SPECIALTY PHARMACY (OUTPATIENT)
Dept: PHARMACY | Facility: HOSPITAL | Age: 57
End: 2022-02-10

## 2022-02-10 ENCOUNTER — APPOINTMENT (OUTPATIENT)
Dept: LAB | Facility: HOSPITAL | Age: 57
End: 2022-02-10

## 2022-02-10 ENCOUNTER — HOSPITAL ENCOUNTER (OUTPATIENT)
Dept: INFUSION THERAPY | Facility: HOSPITAL | Age: 57
Setting detail: INFUSION SERIES
Discharge: HOME | End: 2022-02-10

## 2022-02-10 VITALS
TEMPERATURE: 98.2 F | SYSTOLIC BLOOD PRESSURE: 148 MMHG | OXYGEN SATURATION: 97 % | DIASTOLIC BLOOD PRESSURE: 68 MMHG | RESPIRATION RATE: 16 BRPM | HEART RATE: 58 BPM

## 2022-02-10 DIAGNOSIS — Z95.828 PORT-A-CATH IN PLACE: ICD-10-CM

## 2022-02-10 DIAGNOSIS — D50.8 OTHER IRON DEFICIENCY ANEMIA: Primary | ICD-10-CM

## 2022-02-10 PROCEDURE — 86900 BLOOD TYPING SEROLOGIC ABO: CPT

## 2022-02-10 PROCEDURE — 25010000002 HEPARIN LOCK FLUSH PER 10 UNITS: Performed by: INTERNAL MEDICINE

## 2022-02-10 PROCEDURE — 36430 TRANSFUSION BLD/BLD COMPNT: CPT

## 2022-02-10 PROCEDURE — P9016 RBC LEUKOCYTES REDUCED: HCPCS

## 2022-02-10 RX ORDER — SODIUM CHLORIDE 0.9 % (FLUSH) 0.9 %
10 SYRINGE (ML) INJECTION AS NEEDED
Status: DISCONTINUED | OUTPATIENT
Start: 2022-02-10 | End: 2022-02-12 | Stop reason: HOSPADM

## 2022-02-10 RX ORDER — HEPARIN SODIUM (PORCINE) LOCK FLUSH IV SOLN 100 UNIT/ML 100 UNIT/ML
500 SOLUTION INTRAVENOUS AS NEEDED
Status: DISCONTINUED | OUTPATIENT
Start: 2022-02-10 | End: 2022-02-12 | Stop reason: HOSPADM

## 2022-02-10 RX ORDER — HEPARIN SODIUM (PORCINE) LOCK FLUSH IV SOLN 100 UNIT/ML 100 UNIT/ML
500 SOLUTION INTRAVENOUS AS NEEDED
Status: CANCELLED | OUTPATIENT
Start: 2022-02-10

## 2022-02-10 RX ORDER — SODIUM CHLORIDE 0.9 % (FLUSH) 0.9 %
10 SYRINGE (ML) INJECTION AS NEEDED
Status: CANCELLED | OUTPATIENT
Start: 2022-02-10

## 2022-02-10 RX ORDER — SODIUM CHLORIDE 9 MG/ML
250 INJECTION, SOLUTION INTRAVENOUS AS NEEDED
Status: CANCELLED | OUTPATIENT
Start: 2022-02-10

## 2022-02-10 RX ADMIN — Medication 500 UNITS: at 11:00

## 2022-02-10 NOTE — PROGRESS NOTES
MTM Note: PCV        2/9/2022 1215   WBC 3.40 - 10.80 10*3/mm3 2.00 (A)   Neutrophils Absolute 1.70 - 7.00 10*3/mm3 1.20 (A)   Hemoglobin 12.0 - 15.9 g/dL 6.1 (A)   Hematocrit 34.0 - 46.6 % 17.2 (A)   Platelets 140 - 450 10*3/mm3 99 (A)     Patient received transfusion.  Treatment delayed until counts recover.  Called [facility] to let her know that they would be calling to reschedule her infusion since she is not starting oral chemo this week.  She expressed understanding and requested [facility] send her oral chemo to our office.  Thanks,    Chapis Goff, PharmD

## 2022-02-11 LAB
BH BB BLOOD EXPIRATION DATE: NORMAL
BH BB BLOOD EXPIRATION DATE: NORMAL
BH BB BLOOD TYPE BARCODE: 5100
BH BB BLOOD TYPE BARCODE: 9500
BH BB DISPENSE STATUS: NORMAL
BH BB DISPENSE STATUS: NORMAL
BH BB PRODUCT CODE: NORMAL
BH BB PRODUCT CODE: NORMAL
BH BB UNIT NUMBER: NORMAL
BH BB UNIT NUMBER: NORMAL
CROSSMATCH INTERPRETATION: NORMAL
CROSSMATCH INTERPRETATION: NORMAL
UNIT  ABO: NORMAL
UNIT  ABO: NORMAL
UNIT  RH: NORMAL
UNIT  RH: NORMAL

## 2022-02-17 ENCOUNTER — APPOINTMENT (OUTPATIENT)
Dept: ONCOLOGY | Facility: HOSPITAL | Age: 57
End: 2022-02-17

## 2022-02-18 NOTE — PROGRESS NOTES
HEMATOLOGY ONCOLOGY OUTPATIENT FOLLOW UP      Patient name: Cindy Cortes  : 1965  MRN: [id number]  Primary Care Physician: Annamarie Monroy APRN  Referring Physician: Annamarie Monroy APRN  Reason For Consult:     Chief Complaint   Patient presents with   • Follow-up     Oligodendroglioma      HPI:   History of Present Illness:  Cindy Cortes is 56 y.o. female who presented to our office on 06/10/21 for consultation regarding Oligodendroglioma    Patient is a 56 y.o. female who was referred to us for treatment options regarding recent diagnosis of grade 2 oligodendroglioma.  This was IDH [id number] mutated, 1P 19 Q codeleted.  Patient initially presented with seizures and a CT head was obtained that showed vasogenic edema and a right frontal lobe mass MRI was obtained after this.  2021 -MRI of the brain shows 2.1 x 4.4 x 3.3 cm right frontal lobe mass with eccentric cystic or necrotic component with surrounding vasogenic edema and a focal 3 mm right to left midline shift.  Patient was started on Keppra, steroids plan was made for elective craniotomy and surgical resection.    2021 craniotomy of the right frontal lobe, microscopic resection of tumor mass.  Pathology results oligodendroglioma WHO grade 2, IDH1 R132H mutation by immunohistochemistry, 1p19q codeletion by FISH.    2021 -status post resection of the right frontal lobe mass.  Expected postop blood product. resolution of midline shift.    2021 - Started radiation adjuvantly.    2021 - last day of radiation.    2021 - C1 D8 with vincristin started procarbazine  10/7/2021 - C1 D29 Vincristine    10/21/2021 - C2D1 PCV  10/28/2021 -cycle 2-day 8 with vincristine  Started procarbazine  Held procarbazine for a few days with nausea  2021 -patient in the hospital with significant pancytopenia needing blood transfusion.    2021 -vincristine cycle 2-day 29.  This has been delayed with ongoing  cytopenias and hospitalization.    12/27/2021 - C3 D1 12/30/2021 1/6/2022 - C3D8 vincristine.    2/21/2022 - C4 D1 decrease dose of Lomustine.      Subjective:  Patient complains of some nausea, she has fatigue which improved after transfusion.    The following portions of the patient's history were reviewed and updated as appropriate: allergies, current medications, past family history, past medical history, past social history, past surgical history and problem list.    Past Medical History:   Diagnosis Date   • Arthritis    • GERD (gastroesophageal reflux disease)    • Hypertension    • Oligodendroglioma (HCC)    • Seizure (HCC)    • Type 2 diabetes mellitus with hyperglycemia, without long-term current use of insulin (HCC) 5/12/2021       Past Surgical History:   Procedure Laterality Date   • CRANIOTOMY FOR TUMOR Right 5/6/2021    Procedure: CRANIOTOMY FOR TUMOR RESECTION WITH STEREOTACTIC;  Surgeon: Jluis Felix MD;  Location: [facility];  Service: Neurosurgery;  Laterality: Right;         Current Outpatient Medications:   •  Alcohol Swabs (Alcohol Wipes) 70 % pads, Apply 1 each topically to the appropriate area as directed 4 (Four) Times a Day., Disp: , Rfl:   •  amLODIPine (NORVASC) 10 MG tablet, Take 1 tablet by mouth Daily., Disp: 30 tablet, Rfl: 2  •  ferrous sulfate 325 (65 FE) MG tablet, Take 1 tablet by mouth Daily With Breakfast., Disp: , Rfl:   •  folic acid (FOLVITE) 1 MG tablet, TAKE TWO TABLETS BY MOUTH EVERY MORNING, THEN TWO TABLETS EVERY EVENING, Disp: 360 tablet, Rfl: 0  •  Gleostine 10 MG chemo capsule, TAKE 1 CAPSULE BY MOUTH WITH 1 OTHER GLEOSTINE PRESCRIPTION FOR 210MG TOTAL FOR 1 DOSE, Disp: , Rfl:   •  Gleostine 100 MG chemo capsule, TAKE 2 CAPSULES BY MOUTH WITH 1 OTHER GLEOSTINE PRESCRIPTION FOR 210MG TOTAL FOR 1 DOSE, Disp: , Rfl:   •  glucose blood (OneTouch Verio) test strip, 1 each by Other route 4 (Four) Times a Day Before Meals & at Bedtime. Use as instructed, Disp:  200 each, Rfl: 1  •  levETIRAcetam (KEPPRA) 750 MG tablet, Take 1 tablet by mouth 2 (Two) Times a Day., Disp: 60 tablet, Rfl: 2  •  lidocaine-prilocaine (EMLA) 2.5-2.5 % cream, Apply  topically to the appropriate area as directed Every 2 (Two) Hours As Needed for Mild Pain ., Disp: 5 g, Rfl: 0  •  metoprolol tartrate (LOPRESSOR) 25 MG tablet, Take 0.5 tablets by mouth Every 8 (Eight) Hours., Disp: 45 tablet, Rfl: 2  •  ondansetron (Zofran) 8 MG tablet, Take 1 tablet by mouth Every 8 (Eight) Hours As Needed for Nausea or Vomiting., Disp: 30 tablet, Rfl: 3  •  oxybutynin (DITROPAN) 5 MG tablet, , Disp: , Rfl:   •  procarbazine (MATULANE) 50 MG chemo capsule, Take 2 capsules by mouth Every Night for 14 days. Take on days 8 through 21 each cycle., Disp: 28 capsule, Rfl: 2  •  venlafaxine XR (EFFEXOR-XR) 75 MG 24 hr capsule, Take 75 mg by mouth Daily. Take DOS, Disp: , Rfl: 3  •  vitamin D (ERGOCALCIFEROL) 1.25 MG (34028 UT) capsule capsule, Take 50,000 Units by mouth Every 7 (Seven) Days.  , Disp: , Rfl:     Current Facility-Administered Medications:   •  diphenhydrAMINE (BENADRYL) injection 25 mg, 25 mg, Intravenous, Once, Emilie Workman MD    Current outpatient and discharge medications have been reconciled for the patient.  Reviewed by: Emilie Workman MD    No Known Allergies    Family History   Problem Relation Age of Onset   • Kidney disease Mother    • Prostate cancer Brother    • Breast cancer Maternal Aunt    • Colon cancer Maternal Aunt    • Breast cancer Niece    • Breast cancer Cousin        Cancer-related family history includes Breast cancer in her cousin, maternal aunt, and niece; Colon cancer in her maternal aunt; Prostate cancer in her brother.    Social History     Tobacco Use   • Smoking status: Never Smoker   • Smokeless tobacco: Never Used   Vaping Use   • Vaping Use: Never used   Substance Use Topics   • Alcohol use: Not Currently     Alcohol/week: 1.0 standard drink     Types: 1 Cans of beer per week  "    Comment: socially   • Drug use: Never     Social History     Social History Narrative   • Not on file      Objective:    Vitals:    02/21/22 1110   BP: 170/57   Pulse: 95   Resp: 18   Temp: 96.9 °F (36.1 °C)   SpO2: 99%   Weight: (!) 144 kg (316 lb 9.6 oz)   Height: 157.5 cm (62\")   PainSc: 0-No pain     Body mass index is 57.91 kg/m².  ECOG  (0) Fully active, able to carry on all predisease performance without restriction    Physical Exam:     Physical Exam  Constitutional:       Appearance: Normal appearance. She is obese.   HENT:      Head: Normocephalic and atraumatic.   Eyes:      Pupils: Pupils are equal, round, and reactive to light.   Cardiovascular:      Rate and Rhythm: Normal rate and regular rhythm.      Pulses: Normal pulses.      Heart sounds: Normal heart sounds. No murmur heard.      Pulmonary:      Effort: Pulmonary effort is normal.      Breath sounds: Normal breath sounds.   Abdominal:      General: There is no distension.      Palpations: Abdomen is soft. There is no mass.      Tenderness: There is no abdominal tenderness.   Musculoskeletal:         General: Normal range of motion.      Cervical back: Normal range of motion and neck supple.   Skin:     General: Skin is warm.   Neurological:      General: No focal deficit present.      Mental Status: She is alert.   Psychiatric:         Mood and Affect: Mood normal.           Lab Results - Last 18 Months   Lab Units 02/21/22  1052 02/09/22  1215 02/09/22  1057   WBC 10*3/mm3 4.77 2.00* 2.63*   HEMOGLOBIN g/dL 8.5* 6.1* 6.6*   HEMATOCRIT % 26.8* 17.2* 21.0*   PLATELETS 10*3/mm3 190 99* 121*   MCV fL 100.8* 100.6* 106.6*     Lab Results - Last 18 Months   Lab Units 01/27/22  1246 01/06/22  1136 01/04/22  1331   SODIUM mmol/L 137 139 139   POTASSIUM mmol/L 4.3 3.9 4.1   CHLORIDE mmol/L 101 103 101   CO2 mmol/L 28.0 25.0 26.0   BUN mg/dL 16 16 14   CREATININE mg/dL 0.78 0.89 0.71   CALCIUM mg/dL 9.3 9.4 9.5   BILIRUBIN mg/dL 0.8 0.7 0.7   ALK PHOS " U/L 81 89 90   ALT (SGPT) U/L 23 20 22   AST (SGOT) U/L 30 20 23   GLUCOSE mg/dL 121* 159* 127*       Lab Results   Component Value Date    GLUCOSE 121 (H) 01/27/2022    BUN 16 01/27/2022    CREATININE 0.78 01/27/2022    EGFRIFNONA 77 01/27/2022    BCR 20.5 01/27/2022    K 4.3 01/27/2022    CO2 28.0 01/27/2022    CALCIUM 9.3 01/27/2022    PROTENTOTREF 6.4 12/15/2021    ALBUMIN 3.80 01/27/2022    LABIL2 1.1 12/15/2021    AST 30 01/27/2022    ALT 23 01/27/2022       Lab Results - Last 18 Months   Lab Units 08/27/21  0738 05/04/21  0922   INR  0.96 0.96   APTT seconds 25.6 20.7*       Lab Results   Component Value Date    IRON 126 12/15/2021    TIBC 322 12/15/2021    FERRITIN 672.00 (H) 10/27/2021       Lab Results   Component Value Date    ZLIJWIEX65 332 12/15/2021       Lab Results   Component Value Date    PTT 25.6 08/27/2021    INR 0.96 08/27/2021     CT Abdomen Pelvis Without Contrast    Result Date: 12/16/2021  No findings of intraperitoneal or retroperitoneal hematoma. Cholelithiasis without inflammatory findings. Small anterior abdominal wall fat-containing hernias.  Electronically Signed By-Stephanie Rodríguez MD On:12/16/2021 6:07 PM This report was finalized on [id number] by  Stephanie Rodríguez MD.    MRI Brain With & Without Contrast    Result Date: 11/22/2021    1.  Status post right frontal craniotomy for tumor resection.  Resection cavity appears similar to the prior exam.  There is persistent peripheral abnormal increased T2 signal which may be due to treatment-related change or residual tumor.  There is also some stable enhancement within the resection cavity and along the posterior superior margin of the resection cavity, unchanged from prior exam.  No new or worsening contrast enhancement identified.  Electronically Signed By-Robel Neely MD On:11/22/2021 4:33 PM This report was finalized on [id number] by  Robel Neely MD.    Pathology results  Outside Report, Addendum   Integrated Diagnosis:  "Oligodendroglioma WHO Grade II  IDH1 R132H mutation by Immunohistochemistry; 1p19q co-deletion by FISH  See attached report from [facility]   Addendum electronically signed by Cecilia Chaudhary on 5/28/2021 at 0824   Final Diagnosis   Specimen #1 (\"Brain tumor right frontal lobe,\" biopsy):    Diffuse glioma (WHO grade II)    See comment     Specimen #2 (\"Brain tumor right frontal lobe,\" biopsy):    Diffuse glioma (WHO grade II)    See comment     Specimen #3 (Brain tumor right frontal lobe, resection):    Diffuse glioma, IDH-mutant (WHO grade II)    See attached report from [facility]         Assessment/Plan     Patient is a 56-year-old female with oligodendroglioma grade 2 status post gross total resection    Oligodendroglioma  Previously I had an extensive discussion with the patient about treatment options, prognosis.  We had an extensive discussion at that time with several options.  This is summarized here below    I previously discussed with her that there are findings from RTOG 9802 clinical trial which showed survival advantage with radiation followed by chemotherapy after surgical resection of grade 2 oligodendrogliomas.  Chemotherapy with the PCV regimen in this trial conferred a survival advantage over radiotherapy alone with a median overall survival of 13.3 versus 7.8 years.  In subset analysis benefit was more significant  Histologic oligodendroglioma is as compared to other low-grade gliomas.  Molecular analysis post hoc analysis also showed survival advantage in molecularly confirmed IDH mutant, 1p19q codeleted oligodendrogliomas.    PCV can be a toxic regimen however survival advantage is only been shown in randomized control trial using this particular regimen.  The other option would be temozolomide which has advantages over PCV with ease of administration, better tolerance and efficacy in combination with radiation therapy and " other types of CNS tumors and gliomas.    The other option I had discussed with her was clinical trial with a CODEL study which is comparing radiation alone versus radiation with Temodar versus radiation with PCV in this patient population.  Patient however is not very interested in enrolling in a clinical trial.  She is wanting to pursue the most aggressive treatment option for her to reduce the chances of recurrence of her tumor.    Lastly also discussed with her the option of surveillance and observation with serial MRIs however also discussed that in above studies the progression free survival is around 50% for 5 years.     Based on her above discussion patient decided to pursue aggressive treatment with radiation followed by chemotherapy.  We would use the RTOG 9802 regimen with radiation followed by chemotherapy with PCV.  We have evidence that vincristine does not cross the blood-brain barrier significantly and hence if there is toxicity we can choose to omit the drug.  Case was discussed with Dr. Cruz and started sequential radiation and chemo    Patient completed radiation without complication.    Based on above patient decided to proceed with adjuvant PCV.   Now patient has had clinically significant pancytopenia needing blood transfusion hospital admission.  She has completed cycle 2.  Pancytopenia is improved  For cycle 3 dose reduced procarbazine and lomustine by 20%.  No worsening in cytopenias per labs today.  Continue vincristine per schedule repeat CBC weekly  Repeat MRI brain has been scheduled for 3/2022  For C4, decrease Lomustine to 110  CBC weekly    nausea  Continue zofran as needed     Anemia  There could be possibility of hemolysis nonimmune.  There have been case reports with her chemotherapy regimen.  She was given 5-day course of prednisone.  Repeat haptoglobin improved  Now her anemia is most likely ongoing myelosuppression. Repeat cbc in a week.      Follow-up in 4 weeks    Time spent on  encounter including record review, history taking, exam, discussion, counseling,dose modification and documentation at: 40 minutes  I have reviewed and confirmed the accuracy of the patient's history: Chief complaint, HPI, ROS, Subjective and Past Family Social History as entered by the MA/LPN/RN.     Emilie Workman MD 02/21/22

## 2022-02-21 ENCOUNTER — SPECIALTY PHARMACY (OUTPATIENT)
Dept: PHARMACY | Facility: HOSPITAL | Age: 57
End: 2022-02-21

## 2022-02-21 ENCOUNTER — LAB (OUTPATIENT)
Dept: LAB | Facility: HOSPITAL | Age: 57
End: 2022-02-21

## 2022-02-21 ENCOUNTER — OFFICE VISIT (OUTPATIENT)
Dept: ONCOLOGY | Facility: CLINIC | Age: 57
End: 2022-02-21

## 2022-02-21 VITALS
WEIGHT: 293 LBS | BODY MASS INDEX: 53.92 KG/M2 | OXYGEN SATURATION: 99 % | TEMPERATURE: 96.9 F | HEART RATE: 95 BPM | HEIGHT: 62 IN | SYSTOLIC BLOOD PRESSURE: 170 MMHG | RESPIRATION RATE: 18 BRPM | DIASTOLIC BLOOD PRESSURE: 57 MMHG

## 2022-02-21 DIAGNOSIS — C71.9 OLIGODENDROGLIOMA: ICD-10-CM

## 2022-02-21 DIAGNOSIS — C71.9 OLIGODENDROGLIOMA: Primary | ICD-10-CM

## 2022-02-21 DIAGNOSIS — C71.1 OLIGOASTROCYTOMA OF FRONTAL LOBE: Primary | ICD-10-CM

## 2022-02-21 LAB
BASOPHILS # BLD AUTO: 0.02 10*3/MM3 (ref 0–0.2)
BASOPHILS NFR BLD AUTO: 0.4 % (ref 0–1.5)
DEPRECATED RDW RBC AUTO: 70.1 FL (ref 37–54)
EOSINOPHIL # BLD AUTO: 0.06 10*3/MM3 (ref 0–0.4)
EOSINOPHIL NFR BLD AUTO: 1.3 % (ref 0.3–6.2)
ERYTHROCYTE [DISTWIDTH] IN BLOOD BY AUTOMATED COUNT: 20.4 % (ref 12.3–15.4)
HCT VFR BLD AUTO: 26.8 % (ref 34–46.6)
HGB BLD-MCNC: 8.5 G/DL (ref 12–15.9)
HOLD SPECIMEN: NORMAL
HOLD SPECIMEN: NORMAL
LYMPHOCYTES # BLD AUTO: 0.81 10*3/MM3 (ref 0.7–3.1)
LYMPHOCYTES NFR BLD AUTO: 17 % (ref 19.6–45.3)
MCH RBC QN AUTO: 32 PG (ref 26.6–33)
MCHC RBC AUTO-ENTMCNC: 31.7 G/DL (ref 31.5–35.7)
MCV RBC AUTO: 100.8 FL (ref 79–97)
MONOCYTES # BLD AUTO: 0.55 10*3/MM3 (ref 0.1–0.9)
MONOCYTES NFR BLD AUTO: 11.5 % (ref 5–12)
NEUTROPHILS NFR BLD AUTO: 3.33 10*3/MM3 (ref 1.7–7)
NEUTROPHILS NFR BLD AUTO: 69.8 % (ref 42.7–76)
PLATELET # BLD AUTO: 190 10*3/MM3 (ref 140–450)
PMV BLD AUTO: 8.5 FL (ref 6–12)
RBC # BLD AUTO: 2.66 10*6/MM3 (ref 3.77–5.28)
WBC NRBC COR # BLD: 4.77 10*3/MM3 (ref 3.4–10.8)

## 2022-02-21 PROCEDURE — 85025 COMPLETE CBC W/AUTO DIFF WBC: CPT

## 2022-02-21 PROCEDURE — 99215 OFFICE O/P EST HI 40 MIN: CPT | Performed by: INTERNAL MEDICINE

## 2022-02-21 PROCEDURE — 36415 COLL VENOUS BLD VENIPUNCTURE: CPT

## 2022-02-21 NOTE — PROGRESS NOTES
[facility] Lab Review: PCV      2/21/2022   WBC 3.40 - 10.80 10*3/mm3 4.77   Neutrophils Absolute 1.70 - 7.00 10*3/mm3 3.33   Hemoglobin 12.0 - 15.9 g/dL 8.5 (A)   Hematocrit 34.0 - 46.6 % 26.8 (A)   Platelets 140 - 450 10*3/mm3 190

## 2022-02-22 ENCOUNTER — SPECIALTY PHARMACY (OUTPATIENT)
Dept: PHARMACY | Facility: HOSPITAL | Age: 57
End: 2022-02-22

## 2022-02-22 NOTE — PROGRESS NOTES
MTM note: Lomustine  Cindy picked up her medicine today - I instructed her to take 3 x 40 mg and 1 x 10 mg capsules today to make a total 130 mg.  She verbalized understanding.  We will make her an appt to come back in 1 week for Vincristine infusion.  She verbalized understanding.  Sean Lechuga, OpalD BCPS

## 2022-02-28 ENCOUNTER — HOSPITAL ENCOUNTER (OUTPATIENT)
Dept: INFUSION THERAPY | Facility: HOSPITAL | Age: 57
Setting detail: INFUSION SERIES
Discharge: HOME | End: 2022-02-28

## 2022-02-28 ENCOUNTER — LAB (OUTPATIENT)
Dept: LAB | Facility: HOSPITAL | Age: 57
End: 2022-02-28

## 2022-02-28 VITALS
DIASTOLIC BLOOD PRESSURE: 71 MMHG | HEART RATE: 61 BPM | SYSTOLIC BLOOD PRESSURE: 152 MMHG | OXYGEN SATURATION: 98 % | TEMPERATURE: 97.5 F | RESPIRATION RATE: 18 BRPM

## 2022-02-28 DIAGNOSIS — C71.9 OLIGODENDROGLIOMA: ICD-10-CM

## 2022-02-28 DIAGNOSIS — D64.9 ANEMIA, UNSPECIFIED TYPE: Primary | ICD-10-CM

## 2022-02-28 DIAGNOSIS — C71.1 OLIGOASTROCYTOMA OF FRONTAL LOBE: ICD-10-CM

## 2022-02-28 DIAGNOSIS — T45.1X5A CHEMOTHERAPY-INDUCED THROMBOCYTOPENIA: Primary | ICD-10-CM

## 2022-02-28 DIAGNOSIS — D50.8 OTHER IRON DEFICIENCY ANEMIA: ICD-10-CM

## 2022-02-28 DIAGNOSIS — D69.59 CHEMOTHERAPY-INDUCED THROMBOCYTOPENIA: Primary | ICD-10-CM

## 2022-02-28 LAB
ABO GROUP BLD: NORMAL
ALBUMIN SERPL-MCNC: 3.8 G/DL (ref 3.5–5.2)
ALBUMIN/GLOB SERPL: 1.3 G/DL
ALP SERPL-CCNC: 102 U/L (ref 39–117)
ALT SERPL W P-5'-P-CCNC: 15 U/L (ref 1–33)
ANION GAP SERPL CALCULATED.3IONS-SCNC: 11 MMOL/L (ref 5–15)
ANTI-FYA: NORMAL
AST SERPL-CCNC: 26 U/L (ref 1–32)
BASOPHILS # BLD AUTO: 0.02 10*3/MM3 (ref 0–0.2)
BASOPHILS NFR BLD AUTO: 0.4 % (ref 0–1.5)
BB HOLD TUBE: NORMAL
BILIRUB SERPL-MCNC: 0.9 MG/DL (ref 0–1.2)
BLD GP AB SCN SERPL QL: POSITIVE
BUN SERPL-MCNC: 13 MG/DL (ref 6–20)
BUN/CREAT SERPL: 15.9 (ref 7–25)
CALCIUM SPEC-SCNC: 9.2 MG/DL (ref 8.6–10.5)
CHLORIDE SERPL-SCNC: 101 MMOL/L (ref 98–107)
CO2 SERPL-SCNC: 26 MMOL/L (ref 22–29)
CREAT SERPL-MCNC: 0.82 MG/DL (ref 0.57–1)
DEPRECATED RDW RBC AUTO: 67.2 FL (ref 37–54)
DEPRECATED RDW RBC AUTO: 75.7 FL (ref 37–54)
EGFRCR SERPLBLD CKD-EPI 2021: 85.1 ML/MIN/1.73
EOSINOPHIL # BLD AUTO: 0.1 10*3/MM3 (ref 0–0.4)
EOSINOPHIL NFR BLD AUTO: 2.2 % (ref 0.3–6.2)
ERYTHROCYTE [DISTWIDTH] IN BLOOD BY AUTOMATED COUNT: 21.1 % (ref 12.3–15.4)
ERYTHROCYTE [DISTWIDTH] IN BLOOD BY AUTOMATED COUNT: 23.3 % (ref 12.3–15.4)
GLOBULIN UR ELPH-MCNC: 2.9 GM/DL
GLUCOSE SERPL-MCNC: 134 MG/DL (ref 65–99)
HCT VFR BLD AUTO: 21.9 % (ref 34–46.6)
HCT VFR BLD AUTO: 22.6 % (ref 34–46.6)
HGB BLD-MCNC: 7.5 G/DL (ref 12–15.9)
HGB BLD-MCNC: 7.6 G/DL (ref 12–15.9)
LYMPHOCYTES # BLD AUTO: 0.56 10*3/MM3 (ref 0.7–3.1)
LYMPHOCYTES NFR BLD AUTO: 12.5 % (ref 19.6–45.3)
MCH RBC QN AUTO: 31.8 PG (ref 26.6–33)
MCH RBC QN AUTO: 32.8 PG (ref 26.6–33)
MCHC RBC AUTO-ENTMCNC: 33.2 G/DL (ref 31.5–35.7)
MCHC RBC AUTO-ENTMCNC: 34.7 G/DL (ref 31.5–35.7)
MCV RBC AUTO: 94.5 FL (ref 79–97)
MCV RBC AUTO: 95.8 FL (ref 79–97)
MONOCYTES # BLD AUTO: 0.4 10*3/MM3 (ref 0.1–0.9)
MONOCYTES NFR BLD AUTO: 8.9 % (ref 5–12)
NEUTROPHILS NFR BLD AUTO: 3.39 10*3/MM3 (ref 1.7–7)
NEUTROPHILS NFR BLD AUTO: 76 % (ref 42.7–76)
PLATELET # BLD AUTO: 173 10*3/MM3 (ref 140–450)
PLATELET # BLD AUTO: 181 10*3/MM3 (ref 140–450)
PMV BLD AUTO: 7.1 FL (ref 6–12)
PMV BLD AUTO: 8.5 FL (ref 6–12)
POTASSIUM SERPL-SCNC: 4.4 MMOL/L (ref 3.5–5.2)
PROT SERPL-MCNC: 6.7 G/DL (ref 6–8.5)
RBC # BLD AUTO: 2.32 10*6/MM3 (ref 3.77–5.28)
RBC # BLD AUTO: 2.36 10*6/MM3 (ref 3.77–5.28)
RH BLD: POSITIVE
SODIUM SERPL-SCNC: 138 MMOL/L (ref 136–145)
T&S EXPIRATION DATE: NORMAL
WBC NRBC COR # BLD: 4.2 10*3/MM3 (ref 3.4–10.8)
WBC NRBC COR # BLD: 4.47 10*3/MM3 (ref 3.4–10.8)

## 2022-02-28 PROCEDURE — 86850 RBC ANTIBODY SCREEN: CPT | Performed by: INTERNAL MEDICINE

## 2022-02-28 PROCEDURE — 86922 COMPATIBILITY TEST ANTIGLOB: CPT

## 2022-02-28 PROCEDURE — 80053 COMPREHEN METABOLIC PANEL: CPT

## 2022-02-28 PROCEDURE — 86900 BLOOD TYPING SEROLOGIC ABO: CPT

## 2022-02-28 PROCEDURE — 86870 RBC ANTIBODY IDENTIFICATION: CPT | Performed by: INTERNAL MEDICINE

## 2022-02-28 PROCEDURE — 86901 BLOOD TYPING SEROLOGIC RH(D): CPT | Performed by: INTERNAL MEDICINE

## 2022-02-28 PROCEDURE — 85025 COMPLETE CBC W/AUTO DIFF WBC: CPT

## 2022-02-28 PROCEDURE — 85027 COMPLETE CBC AUTOMATED: CPT

## 2022-02-28 PROCEDURE — 86900 BLOOD TYPING SEROLOGIC ABO: CPT | Performed by: INTERNAL MEDICINE

## 2022-02-28 PROCEDURE — 36415 COLL VENOUS BLD VENIPUNCTURE: CPT

## 2022-02-28 PROCEDURE — P9016 RBC LEUKOCYTES REDUCED: HCPCS

## 2022-02-28 PROCEDURE — 36591 DRAW BLOOD OFF VENOUS DEVICE: CPT

## 2022-02-28 PROCEDURE — 36430 TRANSFUSION BLD/BLD COMPNT: CPT

## 2022-02-28 RX ORDER — HEPARIN SODIUM (PORCINE) LOCK FLUSH IV SOLN 100 UNIT/ML 100 UNIT/ML
500 SOLUTION INTRAVENOUS AS NEEDED
Status: CANCELLED | OUTPATIENT
Start: 2022-02-28

## 2022-02-28 RX ORDER — SODIUM CHLORIDE 9 MG/ML
250 INJECTION, SOLUTION INTRAVENOUS AS NEEDED
Status: DISCONTINUED | OUTPATIENT
Start: 2022-02-28 | End: 2022-03-02 | Stop reason: HOSPADM

## 2022-02-28 RX ORDER — SODIUM CHLORIDE 0.9 % (FLUSH) 0.9 %
10 SYRINGE (ML) INJECTION AS NEEDED
Status: CANCELLED | OUTPATIENT
Start: 2022-02-28

## 2022-02-28 RX ORDER — SODIUM CHLORIDE 9 MG/ML
250 INJECTION, SOLUTION INTRAVENOUS AS NEEDED
Status: CANCELLED | OUTPATIENT
Start: 2022-02-28

## 2022-03-01 ENCOUNTER — SPECIALTY PHARMACY (OUTPATIENT)
Dept: PHARMACY | Facility: HOSPITAL | Age: 57
End: 2022-03-01

## 2022-03-01 ENCOUNTER — HOSPITAL ENCOUNTER (OUTPATIENT)
Dept: ONCOLOGY | Facility: HOSPITAL | Age: 57
Setting detail: INFUSION SERIES
Discharge: HOME | End: 2022-03-01

## 2022-03-01 VITALS
HEIGHT: 62 IN | TEMPERATURE: 97.3 F | HEART RATE: 97 BPM | SYSTOLIC BLOOD PRESSURE: 175 MMHG | BODY MASS INDEX: 53.92 KG/M2 | DIASTOLIC BLOOD PRESSURE: 83 MMHG | OXYGEN SATURATION: 97 % | RESPIRATION RATE: 18 BRPM | WEIGHT: 293 LBS

## 2022-03-01 DIAGNOSIS — Z95.828 PORT-A-CATH IN PLACE: ICD-10-CM

## 2022-03-01 DIAGNOSIS — C71.9 OLIGODENDROGLIOMA: Primary | ICD-10-CM

## 2022-03-01 DIAGNOSIS — C71.1 OLIGOASTROCYTOMA OF FRONTAL LOBE: ICD-10-CM

## 2022-03-01 LAB
ALBUMIN SERPL-MCNC: 3.7 G/DL (ref 3.5–5.2)
ALBUMIN/GLOB SERPL: 1.1 G/DL
ALP SERPL-CCNC: 107 U/L (ref 39–117)
ALT SERPL W P-5'-P-CCNC: 17 U/L (ref 1–33)
ANION GAP SERPL CALCULATED.3IONS-SCNC: 11 MMOL/L (ref 5–15)
AST SERPL-CCNC: 26 U/L (ref 1–32)
BASOPHILS # BLD AUTO: 0.01 10*3/MM3 (ref 0–0.2)
BASOPHILS NFR BLD AUTO: 0.3 % (ref 0–1.5)
BH BB BLOOD EXPIRATION DATE: NORMAL
BH BB BLOOD TYPE BARCODE: 5100
BH BB DISPENSE STATUS: NORMAL
BH BB PRODUCT CODE: NORMAL
BH BB UNIT NUMBER: NORMAL
BILIRUB SERPL-MCNC: 1 MG/DL (ref 0–1.2)
BUN SERPL-MCNC: 14 MG/DL (ref 6–20)
BUN/CREAT SERPL: 16.5 (ref 7–25)
CALCIUM SPEC-SCNC: 9.3 MG/DL (ref 8.6–10.5)
CHLORIDE SERPL-SCNC: 101 MMOL/L (ref 98–107)
CO2 SERPL-SCNC: 25 MMOL/L (ref 22–29)
CREAT SERPL-MCNC: 0.85 MG/DL (ref 0.57–1)
CROSSMATCH INTERPRETATION: NORMAL
DEPRECATED RDW RBC AUTO: 65.2 FL (ref 37–54)
EGFRCR SERPLBLD CKD-EPI 2021: 81.5 ML/MIN/1.73
EOSINOPHIL # BLD AUTO: 0.08 10*3/MM3 (ref 0–0.4)
EOSINOPHIL NFR BLD AUTO: 2 % (ref 0.3–6.2)
ERYTHROCYTE [DISTWIDTH] IN BLOOD BY AUTOMATED COUNT: 20.8 % (ref 12.3–15.4)
GLOBULIN UR ELPH-MCNC: 3.4 GM/DL
GLUCOSE SERPL-MCNC: 150 MG/DL (ref 65–99)
HCT VFR BLD AUTO: 26.1 % (ref 34–46.6)
HGB BLD-MCNC: 8.7 G/DL (ref 12–15.9)
LYMPHOCYTES # BLD AUTO: 0.47 10*3/MM3 (ref 0.7–3.1)
LYMPHOCYTES NFR BLD AUTO: 12 % (ref 19.6–45.3)
MCH RBC QN AUTO: 31.2 PG (ref 26.6–33)
MCHC RBC AUTO-ENTMCNC: 33.3 G/DL (ref 31.5–35.7)
MCV RBC AUTO: 93.5 FL (ref 79–97)
MONOCYTES # BLD AUTO: 0.33 10*3/MM3 (ref 0.1–0.9)
MONOCYTES NFR BLD AUTO: 8.4 % (ref 5–12)
NEUTROPHILS NFR BLD AUTO: 3.02 10*3/MM3 (ref 1.7–7)
NEUTROPHILS NFR BLD AUTO: 77.3 % (ref 42.7–76)
PLATELET # BLD AUTO: 157 10*3/MM3 (ref 140–450)
PMV BLD AUTO: 8.9 FL (ref 6–12)
POTASSIUM SERPL-SCNC: 3.8 MMOL/L (ref 3.5–5.2)
PROT SERPL-MCNC: 7.1 G/DL (ref 6–8.5)
RBC # BLD AUTO: 2.79 10*6/MM3 (ref 3.77–5.28)
SODIUM SERPL-SCNC: 137 MMOL/L (ref 136–145)
UNIT  ABO: NORMAL
UNIT  RH: NORMAL
WBC NRBC COR # BLD: 3.91 10*3/MM3 (ref 3.4–10.8)

## 2022-03-01 PROCEDURE — 85025 COMPLETE CBC W/AUTO DIFF WBC: CPT | Performed by: INTERNAL MEDICINE

## 2022-03-01 PROCEDURE — 80053 COMPREHEN METABOLIC PANEL: CPT | Performed by: INTERNAL MEDICINE

## 2022-03-01 PROCEDURE — 36591 DRAW BLOOD OFF VENOUS DEVICE: CPT

## 2022-03-01 PROCEDURE — 96413 CHEMO IV INFUSION 1 HR: CPT

## 2022-03-01 PROCEDURE — 25010000002 VINCRISTINE PER 1 MG: Performed by: INTERNAL MEDICINE

## 2022-03-01 PROCEDURE — 25010000002 HEPARIN LOCK FLUSH PER 10 UNITS: Performed by: INTERNAL MEDICINE

## 2022-03-01 RX ORDER — HEPARIN SODIUM (PORCINE) LOCK FLUSH IV SOLN 100 UNIT/ML 100 UNIT/ML
500 SOLUTION INTRAVENOUS AS NEEDED
Status: CANCELLED | OUTPATIENT
Start: 2022-03-01

## 2022-03-01 RX ORDER — HEPARIN SODIUM (PORCINE) LOCK FLUSH IV SOLN 100 UNIT/ML 100 UNIT/ML
500 SOLUTION INTRAVENOUS AS NEEDED
Status: DISCONTINUED | OUTPATIENT
Start: 2022-03-01 | End: 2022-03-02 | Stop reason: HOSPADM

## 2022-03-01 RX ORDER — SODIUM CHLORIDE 0.9 % (FLUSH) 0.9 %
10 SYRINGE (ML) INJECTION AS NEEDED
Status: DISCONTINUED | OUTPATIENT
Start: 2022-03-01 | End: 2022-03-02 | Stop reason: HOSPADM

## 2022-03-01 RX ORDER — SODIUM CHLORIDE 9 MG/ML
250 INJECTION, SOLUTION INTRAVENOUS ONCE
Status: COMPLETED | OUTPATIENT
Start: 2022-03-01 | End: 2022-03-01

## 2022-03-01 RX ORDER — SODIUM CHLORIDE 0.9 % (FLUSH) 0.9 %
10 SYRINGE (ML) INJECTION AS NEEDED
Status: CANCELLED | OUTPATIENT
Start: 2022-03-01

## 2022-03-01 RX ADMIN — SODIUM CHLORIDE 250 ML: 9 INJECTION, SOLUTION INTRAVENOUS at 12:14

## 2022-03-01 RX ADMIN — Medication 10 ML: at 12:33

## 2022-03-01 RX ADMIN — VINCRISTINE SULFATE 2 MG: 1 INJECTION, SOLUTION INTRAVENOUS at 12:14

## 2022-03-01 RX ADMIN — HEPARIN 500 UNITS: 100 SYRINGE at 12:34

## 2022-03-01 NOTE — PROGRESS NOTES
MTM note: Lomustine & Procarbazine  I spoke to Cindy today in the office while she was here for a Vincristine infusion.        3/1/2022  Day 8   WBC 3.40 - 10.80 10*3/mm3 3.91   Neutrophils Absolute 1.70 - 7.00 10*3/mm3 3.02   Hemoglobin 12.0 - 15.9 g/dL 8.7 (A)   Hematocrit 34.0 - 46.6 % 26.1 (A)   Platelets 140 - 450 10*3/mm3 157   Creatinine 0.57 - 1.00 mg/dL 0.85   BUN 6 - 20 mg/dL 14   Sodium 136 - 145 mmol/L 137   Potassium 3.5 - 5.2 mmol/L 3.8   Glucose 65 - 99 mg/dL 150 (A)   Calcium 8.6 - 10.5 mg/dL 9.3   Albumin 3.50 - 5.20 g/dL 3.70   Total Protein 6.0 - 8.5 g/dL 7.1   AST (SGOT) 1 - 32 U/L 26   ALT (SGPT) 1 - 33 U/L 17   Alkaline Phosphatase 39 - 117 U/L 107   Total Bilirubin 0.0 - 1.2 mg/dL 1.0   I confirmed she only took 130 mg of Lomustine on day 1 of this cycle.  She denies any side effects, and will start taking procarbazine tonight.  Sean Lechuga, PharmD BCPS

## 2022-03-01 NOTE — PROGRESS NOTES
Pt here today for C4 D8 vincristine. Pt arrived with port accessed. Pt's port flushed, positive blood return noted. 10 ml blood wasted prior to collecting blood for labs. Pt tolerated today's treatment well. AVS given at discharge and she verbalized understanding.

## 2022-03-07 ENCOUNTER — LAB (OUTPATIENT)
Dept: LAB | Facility: HOSPITAL | Age: 57
End: 2022-03-07

## 2022-03-07 DIAGNOSIS — C71.1 OLIGOASTROCYTOMA OF FRONTAL LOBE: ICD-10-CM

## 2022-03-07 DIAGNOSIS — C71.9 OLIGODENDROGLIOMA: ICD-10-CM

## 2022-03-07 LAB
BASOPHILS # BLD AUTO: 0.01 10*3/MM3 (ref 0–0.2)
BASOPHILS NFR BLD AUTO: 0.2 % (ref 0–1.5)
DEPRECATED RDW RBC AUTO: 66.4 FL (ref 37–54)
EOSINOPHIL # BLD AUTO: 0.11 10*3/MM3 (ref 0–0.4)
EOSINOPHIL NFR BLD AUTO: 2.6 % (ref 0.3–6.2)
ERYTHROCYTE [DISTWIDTH] IN BLOOD BY AUTOMATED COUNT: 19.2 % (ref 12.3–15.4)
HCT VFR BLD AUTO: 26.4 % (ref 34–46.6)
HGB BLD-MCNC: 8.3 G/DL (ref 12–15.9)
LYMPHOCYTES # BLD AUTO: 0.73 10*3/MM3 (ref 0.7–3.1)
LYMPHOCYTES NFR BLD AUTO: 17 % (ref 19.6–45.3)
MCH RBC QN AUTO: 31.4 PG (ref 26.6–33)
MCHC RBC AUTO-ENTMCNC: 31.4 G/DL (ref 31.5–35.7)
MCV RBC AUTO: 100 FL (ref 79–97)
MONOCYTES # BLD AUTO: 0.41 10*3/MM3 (ref 0.1–0.9)
MONOCYTES NFR BLD AUTO: 9.5 % (ref 5–12)
NEUTROPHILS NFR BLD AUTO: 3.04 10*3/MM3 (ref 1.7–7)
NEUTROPHILS NFR BLD AUTO: 70.7 % (ref 42.7–76)
PLATELET # BLD AUTO: 153 10*3/MM3 (ref 140–450)
PMV BLD AUTO: 8.9 FL (ref 6–12)
RBC # BLD AUTO: 2.64 10*6/MM3 (ref 3.77–5.28)
WBC NRBC COR # BLD: 4.3 10*3/MM3 (ref 3.4–10.8)

## 2022-03-07 PROCEDURE — 85025 COMPLETE CBC W/AUTO DIFF WBC: CPT

## 2022-03-07 PROCEDURE — 36415 COLL VENOUS BLD VENIPUNCTURE: CPT

## 2022-03-08 ENCOUNTER — SPECIALTY PHARMACY (OUTPATIENT)
Dept: PHARMACY | Facility: HOSPITAL | Age: 57
End: 2022-03-08

## 2022-03-08 NOTE — PROGRESS NOTES
[facility] lab review: Lomustine / Procarbazine      3/7/2022   WBC 3.40 - 10.80 10*3/mm3 4.30   Neutrophils Absolute 1.70 - 7.00 10*3/mm3 3.04   Hemoglobin 12.0 - 15.9 g/dL 8.3 (A)   Hematocrit 34.0 - 46.6 % 26.4 (A)   Platelets 140 - 450 10*3/mm3 153   Will check labs again next week at end of Procarbazine dosing.  Sean Lechuga, OpalD BCPS

## 2022-03-11 ENCOUNTER — HOSPITAL ENCOUNTER (OUTPATIENT)
Dept: RADIATION ONCOLOGY | Facility: HOSPITAL | Age: 57
Setting detail: RADIATION/ONCOLOGY SERIES
End: 2022-03-11

## 2022-03-14 ENCOUNTER — HOSPITAL ENCOUNTER (OUTPATIENT)
Dept: MRI IMAGING | Facility: HOSPITAL | Age: 57
Discharge: HOME | End: 2022-03-14
Admitting: RADIOLOGY

## 2022-03-14 ENCOUNTER — LAB (OUTPATIENT)
Dept: LAB | Facility: HOSPITAL | Age: 57
End: 2022-03-14

## 2022-03-14 DIAGNOSIS — C71.9 OLIGODENDROGLIOMA: ICD-10-CM

## 2022-03-14 DIAGNOSIS — C71.1 OLIGOASTROCYTOMA OF FRONTAL LOBE: ICD-10-CM

## 2022-03-14 LAB
BASOPHILS # BLD AUTO: 0.01 10*3/MM3 (ref 0–0.2)
BASOPHILS NFR BLD AUTO: 0.2 % (ref 0–1.5)
DEPRECATED RDW RBC AUTO: 79 FL (ref 37–54)
EOSINOPHIL # BLD AUTO: 0.11 10*3/MM3 (ref 0–0.4)
EOSINOPHIL NFR BLD AUTO: 2.7 % (ref 0.3–6.2)
ERYTHROCYTE [DISTWIDTH] IN BLOOD BY AUTOMATED COUNT: 21.7 % (ref 12.3–15.4)
HCT VFR BLD AUTO: 29.6 % (ref 34–46.6)
HGB BLD-MCNC: 8.8 G/DL (ref 12–15.9)
LYMPHOCYTES # BLD AUTO: 0.53 10*3/MM3 (ref 0.7–3.1)
LYMPHOCYTES NFR BLD AUTO: 12.9 % (ref 19.6–45.3)
MCH RBC QN AUTO: 31.8 PG (ref 26.6–33)
MCHC RBC AUTO-ENTMCNC: 29.7 G/DL (ref 31.5–35.7)
MCV RBC AUTO: 106.9 FL (ref 79–97)
MONOCYTES # BLD AUTO: 0.54 10*3/MM3 (ref 0.1–0.9)
MONOCYTES NFR BLD AUTO: 13.2 % (ref 5–12)
NEUTROPHILS NFR BLD AUTO: 2.91 10*3/MM3 (ref 1.7–7)
NEUTROPHILS NFR BLD AUTO: 71 % (ref 42.7–76)
PLATELET # BLD AUTO: 107 10*3/MM3 (ref 140–450)
PMV BLD AUTO: 8.7 FL (ref 6–12)
RBC # BLD AUTO: 2.77 10*6/MM3 (ref 3.77–5.28)
WBC NRBC COR # BLD: 4.1 10*3/MM3 (ref 3.4–10.8)

## 2022-03-14 PROCEDURE — 25010000002 GADOTERIDOL PER 1 ML: Performed by: RADIOLOGY

## 2022-03-14 PROCEDURE — 85025 COMPLETE CBC W/AUTO DIFF WBC: CPT

## 2022-03-14 PROCEDURE — 70553 MRI BRAIN STEM W/O & W/DYE: CPT

## 2022-03-14 PROCEDURE — 36415 COLL VENOUS BLD VENIPUNCTURE: CPT

## 2022-03-14 PROCEDURE — A9579 GAD-BASE MR CONTRAST NOS,1ML: HCPCS | Performed by: RADIOLOGY

## 2022-03-14 RX ADMIN — GADOTERIDOL 20 ML: 279.3 INJECTION, SOLUTION INTRAVENOUS at 11:16

## 2022-03-14 NOTE — PROGRESS NOTES
RADIATION ONCOLOGY FOLLOW-UP NOTE    NAME: Cindy Cortes  YOB: 1965  MRN #: [id number]  DATE OF SERVICE: 3/15/2022  PRIMARY CARE PROVIDER: Annamarie Monroy APRN    DIAGNOSIS:    1. Oligoastrocytoma of frontal lobe (HCC)      REASON FOR VISIT:  3M MRI F/U    RADIATION TREATMENT COURSE:  5400 cGy in 30 fractions of right frontal lobe, 08/09/2022.    HISTORY OF PRESENT ILLNESS: The patient is a 56 y.o. year old female who was last seen in our office on 12/07/2021 for follow up on MRI completed 11/22/2021.    Her most recent visit with Dr. Yang Prince was on 11/24/2021, and he next visit is scheduled with him for 3/28/2022.    She is on concurrent chemotherapy with Dr. Workman, she was last seen by him on 2/21/2022, and she is currently on Cycle 4 with Procarbazine/ Lomustine/ Vincristine. For Cycle 3, procarbazine and lomustine dose was reduced by 20%, and for Cycle 4, Lomustine was decreased to 110. Next follow up appointment with Dr. Workman on Monday, 03/21/2022.    MRI Brain 3/14/2022 showed postsurgical changes right frontal lobe similar to prior study, less enhancement around the surgical cavity as compared to prior study, periventricular white matter changes as well as some vasogenic edema in the right frontal lobe which has been suggested, pansinus disease which has progressed since prior study.    She denies any headaches or pain in the scar region.    The following portions of the patient's history were reviewed and updated as appropriate: allergies, current medications, past family history, past medical history, past social history, past surgical history and problem list. Reviewed with the patient and remain unchanged.    PAST MEDICAL HISTORY:  she has a past medical history of Arthritis, GERD (gastroesophageal reflux disease), Hypertension, Oligodendroglioma (HCC), Seizure (HCC), and Type 2 diabetes mellitus with hyperglycemia, without long-term current use of insulin (HCC)  (5/12/2021).    MEDICATIONS:    Current Outpatient Medications:   •  Alcohol Swabs (Alcohol Wipes) 70 % pads, Apply 1 each topically to the appropriate area as directed 4 (Four) Times a Day., Disp: , Rfl:   •  amLODIPine (NORVASC) 10 MG tablet, Take 1 tablet by mouth Daily., Disp: 30 tablet, Rfl: 2  •  ferrous sulfate 325 (65 FE) MG tablet, Take 1 tablet by mouth Daily With Breakfast., Disp: , Rfl:   •  folic acid (FOLVITE) 1 MG tablet, TAKE TWO TABLETS BY MOUTH EVERY MORNING, THEN TWO TABLETS EVERY EVENING, Disp: 360 tablet, Rfl: 0  •  Gleostine 10 MG chemo capsule, TAKE 1 CAPSULE BY MOUTH WITH 1 OTHER GLEOSTINE PRESCRIPTION FOR 210MG TOTAL FOR 1 DOSE, Disp: , Rfl:   •  Gleostine 100 MG chemo capsule, TAKE 2 CAPSULES BY MOUTH WITH 1 OTHER GLEOSTINE PRESCRIPTION FOR 210MG TOTAL FOR 1 DOSE, Disp: , Rfl:   •  glucose blood (OneTouch Verio) test strip, 1 each by Other route 4 (Four) Times a Day Before Meals & at Bedtime. Use as instructed, Disp: 200 each, Rfl: 1  •  levETIRAcetam (KEPPRA) 750 MG tablet, Take 1 tablet by mouth 2 (Two) Times a Day., Disp: 60 tablet, Rfl: 2  •  lidocaine-prilocaine (EMLA) 2.5-2.5 % cream, Apply  topically to the appropriate area as directed Every 2 (Two) Hours As Needed for Mild Pain ., Disp: 5 g, Rfl: 0  •  metoprolol tartrate (LOPRESSOR) 25 MG tablet, Take 0.5 tablets by mouth Every 8 (Eight) Hours., Disp: 45 tablet, Rfl: 2  •  ondansetron (Zofran) 8 MG tablet, Take 1 tablet by mouth Every 8 (Eight) Hours As Needed for Nausea or Vomiting., Disp: 30 tablet, Rfl: 3  •  oxybutynin (DITROPAN) 5 MG tablet, , Disp: , Rfl:   •  venlafaxine XR (EFFEXOR-XR) 75 MG 24 hr capsule, Take 75 mg by mouth Daily. Take DOS, Disp: , Rfl: 3  •  vitamin D (ERGOCALCIFEROL) 1.25 MG (95753 UT) capsule capsule, Take 50,000 Units by mouth Every 7 (Seven) Days.  , Disp: , Rfl:     Current Facility-Administered Medications:   •  diphenhydrAMINE (BENADRYL) injection 25 mg, 25 mg, Intravenous, Once, Emilie Workman,  MD    ALLERGIES:  No Known Allergies    PAST SURGICAL HISTORY:  she has a past surgical history that includes Craniotomy for Tumor (Right, 5/6/2021).    PREVIOUS RADIOTHERAPY OR CHEMOTHERAPY:  yes    FAMILY HISTORY:  herfamily history includes Breast cancer in her cousin, maternal aunt, and niece; Colon cancer in her maternal aunt; Kidney disease in her mother; Prostate cancer in her brother.    SOCIAL HISTORY:  she reports that she has never smoked. She has never used smokeless tobacco. She reports previous alcohol use of about 1.0 standard drink of alcohol per week. She reports that she does not use drugs.    PAIN AND PAIN MANAGEMENT:  No pain.    NUTRITIONAL STATUS:  no issues    KPS:  90  PHQ-9 Total Score: negative distress tool    REVIEW OF SYSTEMS:   +fatigue, low labs/hgb  No CNS symptoms.    Otherwise negative as below.     General: No fevers, chills, weight change, or drenching night sweats. Skin: No rashes or jaundice.  HEENT: No change in vision or hearing, no headaches.  Neck: No dysphagia or masses.  Heme/Lymph: No easy bruising or bleeding.  Respiratory System: No shortness of breath or cough.  Cardiovascular: No chest pain, palpitations, or dyspnea on exertion.  - Pacemaker. GI: No nausea, vomiting, diarrhea, melena, or hematochezia.  : No dysuria or hematuria.  Endocrine: No heat or cold intolerance. Musculoskeletal: No myalgias or arthralgias.  Neuro: No weakness, numbness, syncope, or seizures. Psych: No mood changes or depression. Ext: Denies swelling.        Objective   VITAL SIGNS:   Vitals:    03/15/22 1136   BP: 161/75   Pulse: 58   Resp: 24   SpO2: 96%   pain 0/10.      PHYSICAL EXAM:  GENERAL: In no apparent distress, sitting comfortably in room.    HEENT: Normocephalic, atraumatic. Pupils are equal, round, reactive to light. Sclera anicteric. Conjunctiva not injected. Oropharynx without erythema, ulcerations or thrush.   NECK: Supple with no masses.  LYMPHATIC: No cervical,  supraclavicular or axillary adenopathy appreciated bilaterally.   CARDIOVASCULAR: S1 & S2 detected; no murmurs, rubs or gallops.  CHEST: Clear to auscultation bilaterally; no wheezes, crackles or rubs. Work of breathing normal.  ABDOMEN: Bowel sounds present. Abdomen is soft, nontender, nondistended.   MUSCULOSKELETAL: No tenderness to palpation along the spine or scapulae. Normal range of motion.  EXTREMITIES: No clubbing, cyanosis, edema.  SKIN: No erythema, rashes, ulcerations noted.   NEUROLOGIC: Cranial nerves II-XII grossly intact bilaterally. No focal neurologic deficits.  PSYCHIATRIC:  Alert, aware, and appropriate.      PERTINENT IMAGING/PATHOLOGY/LABS (Medical Decision Making):     COORDINATION OF CARE: A copy of this note is sent to the referring provider.    PATHOLOGY (Reviewed):     IMAGING (Reviewed):     CT Abdomen Pelvis Without Contrast    Result Date: 12/16/2021  No findings of intraperitoneal or retroperitoneal hematoma. Cholelithiasis without inflammatory findings. Small anterior abdominal wall fat-containing hernias.  Electronically Signed By-Stephanie Rodríguez MD On:12/16/2021 6:07 PM This report was finalized on [id number] by  Stephanie Rodríguez MD.    MRI Brain With & Without Contrast    Result Date: 3/14/2022  1.Postsurgical changes right frontal lobe similar to prior study. There is less enhancement around the surgical cavity as compared to prior study. 2.Periventricular white matter changes as well as some vasogenic edema in the right frontal lobe which has been suggested. 3.Pansinus disease which has progressed since prior study. Electronically Signed: Paxton Kolb MD 3/14/2022 18:09 EDT      LABS (Reviewed):  HEMATOLOGY:  WBC   Date Value Ref Range Status   03/14/2022 4.10 3.40 - 10.80 10*3/mm3 Final   03/31/2020 5.63 4.5 - 11.0 10*3/uL Final     RBC   Date Value Ref Range Status   03/14/2022 2.77 (L) 3.77 - 5.28 10*6/mm3 Final   03/31/2020 3.86 (L) 4.0 - 5.2 10*6/uL Final     Hemoglobin   Date  Value Ref Range Status   03/14/2022 8.8 (L) 12.0 - 15.9 g/dL Final   03/31/2020 11.5 (L) 12.0 - 16.0 g/dL Final     Hematocrit   Date Value Ref Range Status   03/14/2022 29.6 (L) 34.0 - 46.6 % Final   03/31/2020 36.8 36.0 - 46.0 % Final     Platelets   Date Value Ref Range Status   03/14/2022 107 (L) 140 - 450 10*3/mm3 Final   03/31/2020 266 140 - 440 10*3/uL Final     CHEMISTRY:  Lab Results   Component Value Date    GLUCOSE 150 (H) 03/01/2022    BUN 14 03/01/2022    CREATININE 0.85 03/01/2022    EGFRIFNONA 77 01/27/2022    BCR 16.5 03/01/2022    K 3.8 03/01/2022    CO2 25.0 03/01/2022    CALCIUM 9.3 03/01/2022    PROTENTOTREF 6.4 12/15/2021    ALBUMIN 3.70 03/01/2022    LABIL2 1.1 12/15/2021    AST 26 03/01/2022    ALT 17 03/01/2022       Assessment/Plan   ASSESSMENT AND PLAN:    Grade 2 oligodendroglioma  -s/p GTR, XRT and adjuvant chemotherapy is ongoing.  -Dr. Workman is following chemotherapy/labs closely.  She has 2 cycles remaning.    MRI looked good over this interval.  Next study per Dr. Workman.    6 month f/u here.    This assessment comes from my review of the imaging, pathology, physician notes and other pertinent information as mentioned.          TIME SPENT WITH PATIENT:   I spent 20 minutes caring for Cindy on this date of service. This time includes time spent by me in the following activities: preparing for the visit, reviewing tests, obtaining and/or reviewing a separately obtained history, performing a medically appropriate examination and/or evaluation, counseling and educating the patient/family/caregiver, documenting information in the medical record and care coordination      CC: MD Yang Sahu MD  3/15/2022  12:01 PM EDT

## 2022-03-15 ENCOUNTER — SPECIALTY PHARMACY (OUTPATIENT)
Dept: PHARMACY | Facility: HOSPITAL | Age: 57
End: 2022-03-15

## 2022-03-15 ENCOUNTER — OFFICE VISIT (OUTPATIENT)
Dept: RADIATION ONCOLOGY | Facility: HOSPITAL | Age: 57
End: 2022-03-15

## 2022-03-15 VITALS
WEIGHT: 293 LBS | OXYGEN SATURATION: 96 % | SYSTOLIC BLOOD PRESSURE: 161 MMHG | BODY MASS INDEX: 56.26 KG/M2 | DIASTOLIC BLOOD PRESSURE: 75 MMHG | HEART RATE: 58 BPM | RESPIRATION RATE: 24 BRPM

## 2022-03-15 DIAGNOSIS — C71.1 OLIGOASTROCYTOMA OF FRONTAL LOBE: ICD-10-CM

## 2022-03-15 PROCEDURE — G0463 HOSPITAL OUTPT CLINIC VISIT: HCPCS | Performed by: RADIOLOGY

## 2022-03-15 PROCEDURE — 99213 OFFICE O/P EST LOW 20 MIN: CPT | Performed by: RADIOLOGY

## 2022-03-15 NOTE — PROGRESS NOTES
MT lab review: Lomustine & Procarbazine      3/14/2022   WBC 3.40 - 10.80 10*3/mm3 4.10   Neutrophils Absolute 1.70 - 7.00 10*3/mm3 2.91   Hemoglobin 12.0 - 15.9 g/dL 8.8 (A)   Hematocrit 34.0 - 46.6 % 29.6 (A)   Platelets 140 - 450 10*3/mm3 107 (A)   Will continue to follow.  Sean Lechuga, PharmD BCPS

## 2022-03-18 NOTE — PROGRESS NOTES
HEMATOLOGY ONCOLOGY OUTPATIENT FOLLOW UP      Patient name: Cindy Cortes  : 1965  MRN: [id number]  Primary Care Physician: Annamarie Monroy APRN  Referring Physician: Annamarie Monroy APRN  Reason For Consult:     Chief Complaint   Patient presents with   • Follow-up     Oligodendroglioma      HPI:   History of Present Illness:  Cindy Cortes is 56 y.o. female who presented to our office on 06/10/21 for consultation regarding Oligodendroglioma    Patient is a 56 y.o. female who was referred to us for treatment options regarding recent diagnosis of grade 2 oligodendroglioma.  This was IDH [id number] mutated, 1P 19 Q codeleted.  Patient initially presented with seizures and a CT head was obtained that showed vasogenic edema and a right frontal lobe mass MRI was obtained after this.  2021 -MRI of the brain shows 2.1 x 4.4 x 3.3 cm right frontal lobe mass with eccentric cystic or necrotic component with surrounding vasogenic edema and a focal 3 mm right to left midline shift.  Patient was started on Keppra, steroids plan was made for elective craniotomy and surgical resection.    2021 craniotomy of the right frontal lobe, microscopic resection of tumor mass.  Pathology results oligodendroglioma WHO grade 2, IDH1 R132H mutation by immunohistochemistry, 1p19q codeletion by FISH.    2021 -status post resection of the right frontal lobe mass.  Expected postop blood product. resolution of midline shift.    2021 - Started radiation adjuvantly.    2021 - last day of radiation.    2021 - C1 D8 with vincristin started procarbazine  10/7/2021 - C1 D29 Vincristine    10/21/2021 - C2D1 PCV  10/28/2021 -cycle 2-day 8 with vincristine  Started procarbazine  Held procarbazine for a few days with nausea  2021 -patient in the hospital with significant pancytopenia needing blood transfusion.    2021 -vincristine cycle 2-day 29.  This has been delayed with ongoing  cytopenias and hospitalization.    12/27/2021 - C3 D1 12/30/2021 1/6/2022 - C3D8 vincristine.    2/21/2022 - C4 D1 decrease dose of Lomustine.      Subjective:  Patient had a hemoglobin of 7.5 did not need transfusion.  Has shortness of breath more on exertion.  No other new symptoms      The following portions of the patient's history were reviewed and updated as appropriate: allergies, current medications, past family history, past medical history, past social history, past surgical history and problem list.    Past Medical History:   Diagnosis Date   • Arthritis    • GERD (gastroesophageal reflux disease)    • Hypertension    • Oligodendroglioma (HCC)    • Seizure (HCC)    • Type 2 diabetes mellitus with hyperglycemia, without long-term current use of insulin (HCC) 5/12/2021       Past Surgical History:   Procedure Laterality Date   • CRANIOTOMY FOR TUMOR Right 5/6/2021    Procedure: CRANIOTOMY FOR TUMOR RESECTION WITH STEREOTACTIC;  Surgeon: Jluis Felix MD;  Location: [facility];  Service: Neurosurgery;  Laterality: Right;         Current Outpatient Medications:   •  Alcohol Swabs (Alcohol Wipes) 70 % pads, Apply 1 each topically to the appropriate area as directed 4 (Four) Times a Day., Disp: , Rfl:   •  amLODIPine (NORVASC) 10 MG tablet, Take 1 tablet by mouth Daily., Disp: 30 tablet, Rfl: 2  •  ferrous sulfate 325 (65 FE) MG tablet, Take 1 tablet by mouth Daily With Breakfast., Disp: , Rfl:   •  folic acid (FOLVITE) 1 MG tablet, TAKE TWO TABLETS BY MOUTH EVERY MORNING, THEN TWO TABLETS EVERY EVENING, Disp: 360 tablet, Rfl: 0  •  Gleostine 10 MG chemo capsule, TAKE 1 CAPSULE BY MOUTH WITH 1 OTHER GLEOSTINE PRESCRIPTION FOR 210MG TOTAL FOR 1 DOSE, Disp: , Rfl:   •  Gleostine 100 MG chemo capsule, TAKE 2 CAPSULES BY MOUTH WITH 1 OTHER GLEOSTINE PRESCRIPTION FOR 210MG TOTAL FOR 1 DOSE, Disp: , Rfl:   •  glucose blood (OneTouch Verio) test strip, 1 each by Other route 4 (Four) Times a Day Before  Meals & at Bedtime. Use as instructed, Disp: 200 each, Rfl: 1  •  levETIRAcetam (KEPPRA) 750 MG tablet, Take 1 tablet by mouth 2 (Two) Times a Day., Disp: 60 tablet, Rfl: 2  •  lidocaine-prilocaine (EMLA) 2.5-2.5 % cream, Apply  topically to the appropriate area as directed Every 2 (Two) Hours As Needed for Mild Pain ., Disp: 5 g, Rfl: 0  •  metoprolol tartrate (LOPRESSOR) 25 MG tablet, Take 0.5 tablets by mouth Every 8 (Eight) Hours., Disp: 45 tablet, Rfl: 2  •  ondansetron (Zofran) 8 MG tablet, Take 1 tablet by mouth Every 8 (Eight) Hours As Needed for Nausea or Vomiting., Disp: 30 tablet, Rfl: 3  •  oxybutynin (DITROPAN) 5 MG tablet, , Disp: , Rfl:   •  venlafaxine XR (EFFEXOR-XR) 75 MG 24 hr capsule, Take 75 mg by mouth Daily. Take DOS, Disp: , Rfl: 3  •  vitamin D (ERGOCALCIFEROL) 1.25 MG (13836 UT) capsule capsule, Take 50,000 Units by mouth Every 7 (Seven) Days.  , Disp: , Rfl:     Current Facility-Administered Medications:   •  diphenhydrAMINE (BENADRYL) injection 25 mg, 25 mg, Intravenous, Once, Emilie Workman MD    Current outpatient and discharge medications have been reconciled for the patient.  Reviewed by: Emilie Workman MD    No Known Allergies    Family History   Problem Relation Age of Onset   • Kidney disease Mother    • Prostate cancer Brother    • Breast cancer Maternal Aunt    • Colon cancer Maternal Aunt    • Breast cancer Niece    • Breast cancer Cousin        Cancer-related family history includes Breast cancer in her cousin, maternal aunt, and niece; Colon cancer in her maternal aunt; Prostate cancer in her brother.    Social History     Tobacco Use   • Smoking status: Never Smoker   • Smokeless tobacco: Never Used   Vaping Use   • Vaping Use: Never used   Substance Use Topics   • Alcohol use: Not Currently     Alcohol/week: 1.0 standard drink     Types: 1 Cans of beer per week     Comment: socially   • Drug use: Never     Social History     Social History Narrative   • Not on file     "  Objective:    Vitals:    03/21/22 1106   BP: 120/59   Pulse: 77   Resp: 18   Temp: 96.9 °F (36.1 °C)   SpO2: 100%   Weight: (!) 139 kg (306 lb 12.8 oz)   Height: 157.5 cm (62\")   PainSc: 0-No pain     Body mass index is 56.11 kg/m².  ECOG  (0) Fully active, able to carry on all predisease performance without restriction    Physical Exam:     Physical Exam  Constitutional:       Appearance: Normal appearance. She is obese.   HENT:      Head: Normocephalic and atraumatic.   Eyes:      Pupils: Pupils are equal, round, and reactive to light.   Cardiovascular:      Rate and Rhythm: Normal rate and regular rhythm.      Pulses: Normal pulses.      Heart sounds: Normal heart sounds. No murmur heard.  Pulmonary:      Effort: Pulmonary effort is normal.      Breath sounds: Rhonchi present.   Abdominal:      General: There is no distension.      Palpations: Abdomen is soft. There is no mass.      Tenderness: There is no abdominal tenderness.   Musculoskeletal:         General: Normal range of motion.      Cervical back: Normal range of motion and neck supple.   Skin:     General: Skin is warm.   Neurological:      General: No focal deficit present.      Mental Status: She is alert.   Psychiatric:         Mood and Affect: Mood normal.           Lab Results - Last 18 Months   Lab Units 03/21/22  1029 03/14/22  0951 03/07/22  1006   WBC 10*3/mm3 5.53 4.10 4.30   HEMOGLOBIN g/dL 8.3* 8.8* 8.3*   HEMATOCRIT % 26.3* 29.6* 26.4*   PLATELETS 10*3/mm3 80* 107* 153   MCV fL 104.8* 106.9* 100.0*     Lab Results - Last 18 Months   Lab Units 03/01/22  1136 02/28/22  0954 01/27/22  1246   SODIUM mmol/L 137 138 137   POTASSIUM mmol/L 3.8 4.4 4.3   CHLORIDE mmol/L 101 101 101   CO2 mmol/L 25.0 26.0 28.0   BUN mg/dL 14 13 16   CREATININE mg/dL 0.85 0.82 0.78   CALCIUM mg/dL 9.3 9.2 9.3   BILIRUBIN mg/dL 1.0 0.9 0.8   ALK PHOS U/L 107 102 81   ALT (SGPT) U/L 17 15 23   AST (SGOT) U/L 26 26 30   GLUCOSE mg/dL 150* 134* 121*       Lab Results " "  Component Value Date    GLUCOSE 150 (H) 03/01/2022    BUN 14 03/01/2022    CREATININE 0.85 03/01/2022    EGFRIFNONA 77 01/27/2022    BCR 16.5 03/01/2022    K 3.8 03/01/2022    CO2 25.0 03/01/2022    CALCIUM 9.3 03/01/2022    PROTENTOTREF 6.4 12/15/2021    ALBUMIN 3.70 03/01/2022    LABIL2 1.1 12/15/2021    AST 26 03/01/2022    ALT 17 03/01/2022       Lab Results - Last 18 Months   Lab Units 08/27/21  0738 05/04/21  0922   INR  0.96 0.96   APTT seconds 25.6 20.7*       Lab Results   Component Value Date    IRON 126 12/15/2021    TIBC 322 12/15/2021    FERRITIN 672.00 (H) 10/27/2021       Lab Results   Component Value Date    RFAOHRBE52 332 12/15/2021       Lab Results   Component Value Date    PTT 25.6 08/27/2021    INR 0.96 08/27/2021     MRI Brain With & Without Contrast    Result Date: 3/14/2022  1.Postsurgical changes right frontal lobe similar to prior study. There is less enhancement around the surgical cavity as compared to prior study. 2.Periventricular white matter changes as well as some vasogenic edema in the right frontal lobe which has been suggested. 3.Pansinus disease which has progressed since prior study. Electronically Signed: Paxton Kolb MD 3/14/2022 18:09 EDT    Pathology results  Outside Report, Addendum   Integrated Diagnosis: Oligodendroglioma WHO Grade II  IDH1 R132H mutation by Immunohistochemistry; 1p19q co-deletion by FISH  See attached report from [facility]   Addendum electronically signed by Cecilia Chaudhary on 5/28/2021 at 0824   Final Diagnosis   Specimen #1 (\"Brain tumor right frontal lobe,\" biopsy):    Diffuse glioma (WHO grade II)    See comment     Specimen #2 (\"Brain tumor right frontal lobe,\" biopsy):    Diffuse glioma (WHO grade II)    See comment     Specimen #3 (Brain tumor right frontal lobe, resection):    Diffuse glioma, IDH-mutant (WHO grade II)    See attached report from [facility] "         Assessment/Plan     Patient is a 56-year-old female with oligodendroglioma grade 2 status post gross total resection    Oligodendroglioma  Previously I had an extensive discussion with the patient about treatment options, prognosis.  We had an extensive discussion at that time with several options.  This is summarized here below    I previously discussed with her that there are findings from RTOG 9802 clinical trial which showed survival advantage with radiation followed by chemotherapy after surgical resection of grade 2 oligodendrogliomas.  Chemotherapy with the PCV regimen in this trial conferred a survival advantage over radiotherapy alone with a median overall survival of 13.3 versus 7.8 years.  In subset analysis benefit was more significant  Histologic oligodendroglioma is as compared to other low-grade gliomas.  Molecular analysis post hoc analysis also showed survival advantage in molecularly confirmed IDH mutant, 1p19q codeleted oligodendrogliomas.    PCV can be a toxic regimen however survival advantage is only been shown in randomized control trial using this particular regimen.  The other option would be temozolomide which has advantages over PCV with ease of administration, better tolerance and efficacy in combination with radiation therapy and other types of CNS tumors and gliomas.    The other option I had discussed with her was clinical trial with a CODEL study which is comparing radiation alone versus radiation with Temodar versus radiation with PCV in this patient population.  Patient however is not very interested in enrolling in a clinical trial.  She is wanting to pursue the most aggressive treatment option for her to reduce the chances of recurrence of her tumor.    Lastly also discussed with her the option of surveillance and observation with serial MRIs however also discussed that in above studies the progression free survival is around 50% for 5 years.     Based on her above  discussion patient decided to pursue aggressive treatment with radiation followed by chemotherapy.  We would use the RTOG 9802 regimen with radiation followed by chemotherapy with PCV.  We have evidence that vincristine does not cross the blood-brain barrier significantly and hence if there is toxicity we can choose to omit the drug.  Case was discussed with Dr. Cruz and started sequential radiation and chemo    Patient completed radiation without complication.    Based on above patient decided to proceed with adjuvant PCV.   Now patient has had clinically significant pancytopenia needing blood transfusion hospital admission.  She has completed cycle 2.  Pancytopenia is improved  For cycle 3 dose reduced procarbazine and lomustine by 20%.  No worsening in cytopenias per labs today.  Continue vincristine per schedule repeat CBC weekly  Repeat MRI brain has been scheduled for 3/2022  For C4, decrease Lomustine to 110.  Complete cycle  CBC weekly  Hold cycle 5 pending work-up below    nausea  Continue zofran as needed     Shortness of breath  Will get CT of the chest, pulmonary function test again rule out any pulmonary fibrosis or interstitial lung disease development.  This most likely is due to the anemia from myelosuppression however will rule out above.    Anemia  There could be possibility of hemolysis nonimmune.  There have been case reports with her chemotherapy regimen.  She was given 5-day course of prednisone.  Repeat haptoglobin improved  Now her anemia is most likely ongoing myelosuppression. Repeat cbc as per plan    Follow-up in 3 weeks      I have reviewed and confirmed the accuracy of the patient's history: Chief complaint, HPI, ROS, Subjective and Past Family Social History as entered by the MA/TAMY/RN.     Emilie Workman MD 03/21/22

## 2022-03-21 ENCOUNTER — OFFICE VISIT (OUTPATIENT)
Dept: ONCOLOGY | Facility: CLINIC | Age: 57
End: 2022-03-21

## 2022-03-21 ENCOUNTER — LAB (OUTPATIENT)
Dept: LAB | Facility: HOSPITAL | Age: 57
End: 2022-03-21

## 2022-03-21 VITALS
SYSTOLIC BLOOD PRESSURE: 120 MMHG | TEMPERATURE: 96.9 F | DIASTOLIC BLOOD PRESSURE: 59 MMHG | WEIGHT: 293 LBS | OXYGEN SATURATION: 100 % | HEART RATE: 77 BPM | BODY MASS INDEX: 53.92 KG/M2 | RESPIRATION RATE: 18 BRPM | HEIGHT: 62 IN

## 2022-03-21 DIAGNOSIS — C71.9 OLIGODENDROGLIOMA: ICD-10-CM

## 2022-03-21 DIAGNOSIS — C71.9 OLIGODENDROGLIOMA: Primary | ICD-10-CM

## 2022-03-21 DIAGNOSIS — C71.1 OLIGOASTROCYTOMA OF FRONTAL LOBE: Primary | ICD-10-CM

## 2022-03-21 DIAGNOSIS — C71.1 OLIGOASTROCYTOMA OF FRONTAL LOBE: ICD-10-CM

## 2022-03-21 LAB
BASOPHILS # BLD AUTO: 0.01 10*3/MM3 (ref 0–0.2)
BASOPHILS NFR BLD AUTO: 0.2 % (ref 0–1.5)
DEPRECATED RDW RBC AUTO: 77.6 FL (ref 37–54)
EOSINOPHIL # BLD AUTO: 0.09 10*3/MM3 (ref 0–0.4)
EOSINOPHIL NFR BLD AUTO: 1.6 % (ref 0.3–6.2)
ERYTHROCYTE [DISTWIDTH] IN BLOOD BY AUTOMATED COUNT: 21.8 % (ref 12.3–15.4)
HCT VFR BLD AUTO: 26.3 % (ref 34–46.6)
HGB BLD-MCNC: 8.3 G/DL (ref 12–15.9)
HOLD SPECIMEN: NORMAL
HOLD SPECIMEN: NORMAL
LYMPHOCYTES # BLD AUTO: 0.54 10*3/MM3 (ref 0.7–3.1)
LYMPHOCYTES NFR BLD AUTO: 9.8 % (ref 19.6–45.3)
MCH RBC QN AUTO: 33.1 PG (ref 26.6–33)
MCHC RBC AUTO-ENTMCNC: 31.6 G/DL (ref 31.5–35.7)
MCV RBC AUTO: 104.8 FL (ref 79–97)
MONOCYTES # BLD AUTO: 0.42 10*3/MM3 (ref 0.1–0.9)
MONOCYTES NFR BLD AUTO: 7.6 % (ref 5–12)
NEUTROPHILS NFR BLD AUTO: 4.47 10*3/MM3 (ref 1.7–7)
NEUTROPHILS NFR BLD AUTO: 80.8 % (ref 42.7–76)
PLATELET # BLD AUTO: 80 10*3/MM3 (ref 140–450)
PMV BLD AUTO: 9.5 FL (ref 6–12)
RBC # BLD AUTO: 2.51 10*6/MM3 (ref 3.77–5.28)
WBC NRBC COR # BLD: 5.53 10*3/MM3 (ref 3.4–10.8)

## 2022-03-21 PROCEDURE — 36415 COLL VENOUS BLD VENIPUNCTURE: CPT

## 2022-03-21 PROCEDURE — 99214 OFFICE O/P EST MOD 30 MIN: CPT | Performed by: INTERNAL MEDICINE

## 2022-03-21 PROCEDURE — 85025 COMPLETE CBC W/AUTO DIFF WBC: CPT

## 2022-03-22 ENCOUNTER — HOSPITAL ENCOUNTER (OUTPATIENT)
Dept: ONCOLOGY | Facility: HOSPITAL | Age: 57
Setting detail: INFUSION SERIES
Discharge: HOME | End: 2022-03-22

## 2022-03-22 VITALS
WEIGHT: 293 LBS | DIASTOLIC BLOOD PRESSURE: 55 MMHG | BODY MASS INDEX: 53.92 KG/M2 | HEART RATE: 66 BPM | SYSTOLIC BLOOD PRESSURE: 147 MMHG | OXYGEN SATURATION: 94 % | TEMPERATURE: 96.6 F | HEIGHT: 62 IN | RESPIRATION RATE: 16 BRPM

## 2022-03-22 DIAGNOSIS — C71.1 OLIGOASTROCYTOMA OF FRONTAL LOBE: Primary | ICD-10-CM

## 2022-03-22 DIAGNOSIS — C71.9 OLIGODENDROGLIOMA: ICD-10-CM

## 2022-03-22 DIAGNOSIS — Z95.828 PORT-A-CATH IN PLACE: ICD-10-CM

## 2022-03-22 LAB
BASOPHILS # BLD AUTO: 0.01 10*3/MM3 (ref 0–0.2)
BASOPHILS NFR BLD AUTO: 0.3 % (ref 0–1.5)
DEPRECATED RDW RBC AUTO: 77.1 FL (ref 37–54)
EOSINOPHIL # BLD AUTO: 0.07 10*3/MM3 (ref 0–0.4)
EOSINOPHIL NFR BLD AUTO: 1.8 % (ref 0.3–6.2)
ERYTHROCYTE [DISTWIDTH] IN BLOOD BY AUTOMATED COUNT: 22 % (ref 12.3–15.4)
HCT VFR BLD AUTO: 23.8 % (ref 34–46.6)
HGB BLD-MCNC: 7.6 G/DL (ref 12–15.9)
LYMPHOCYTES # BLD AUTO: 0.48 10*3/MM3 (ref 0.7–3.1)
LYMPHOCYTES NFR BLD AUTO: 12.3 % (ref 19.6–45.3)
MCH RBC QN AUTO: 33 PG (ref 26.6–33)
MCHC RBC AUTO-ENTMCNC: 31.9 G/DL (ref 31.5–35.7)
MCV RBC AUTO: 103.5 FL (ref 79–97)
MONOCYTES # BLD AUTO: 0.29 10*3/MM3 (ref 0.1–0.9)
MONOCYTES NFR BLD AUTO: 7.5 % (ref 5–12)
NEUTROPHILS NFR BLD AUTO: 3.04 10*3/MM3 (ref 1.7–7)
NEUTROPHILS NFR BLD AUTO: 78.1 % (ref 42.7–76)
PLATELET # BLD AUTO: 67 10*3/MM3 (ref 140–450)
PMV BLD AUTO: 9.4 FL (ref 6–12)
RBC # BLD AUTO: 2.3 10*6/MM3 (ref 3.77–5.28)
WBC NRBC COR # BLD: 3.89 10*3/MM3 (ref 3.4–10.8)

## 2022-03-22 PROCEDURE — 96413 CHEMO IV INFUSION 1 HR: CPT

## 2022-03-22 PROCEDURE — 25010000002 HEPARIN LOCK FLUSH PER 10 UNITS: Performed by: INTERNAL MEDICINE

## 2022-03-22 PROCEDURE — 85025 COMPLETE CBC W/AUTO DIFF WBC: CPT | Performed by: INTERNAL MEDICINE

## 2022-03-22 PROCEDURE — 25010000002 VINCRISTINE PER 1 MG: Performed by: INTERNAL MEDICINE

## 2022-03-22 PROCEDURE — 36591 DRAW BLOOD OFF VENOUS DEVICE: CPT

## 2022-03-22 RX ORDER — LOMUSTINE 10 MG/1
CAPSULE, GELATIN COATED ORAL
Status: CANCELLED | OUTPATIENT
Start: 2022-03-22

## 2022-03-22 RX ORDER — HEPARIN SODIUM (PORCINE) LOCK FLUSH IV SOLN 100 UNIT/ML 100 UNIT/ML
500 SOLUTION INTRAVENOUS AS NEEDED
Status: CANCELLED | OUTPATIENT
Start: 2022-03-22

## 2022-03-22 RX ORDER — SODIUM CHLORIDE 0.9 % (FLUSH) 0.9 %
10 SYRINGE (ML) INJECTION AS NEEDED
Status: CANCELLED | OUTPATIENT
Start: 2022-03-22

## 2022-03-22 RX ORDER — SODIUM CHLORIDE 0.9 % (FLUSH) 0.9 %
10 SYRINGE (ML) INJECTION AS NEEDED
Status: DISCONTINUED | OUTPATIENT
Start: 2022-03-22 | End: 2022-03-24 | Stop reason: HOSPADM

## 2022-03-22 RX ORDER — HEPARIN SODIUM (PORCINE) LOCK FLUSH IV SOLN 100 UNIT/ML 100 UNIT/ML
500 SOLUTION INTRAVENOUS AS NEEDED
Status: DISCONTINUED | OUTPATIENT
Start: 2022-03-22 | End: 2022-03-24 | Stop reason: HOSPADM

## 2022-03-22 RX ORDER — SODIUM CHLORIDE 9 MG/ML
250 INJECTION, SOLUTION INTRAVENOUS ONCE
Status: COMPLETED | OUTPATIENT
Start: 2022-03-22 | End: 2022-03-22

## 2022-03-22 RX ADMIN — Medication 10 ML: at 13:04

## 2022-03-22 RX ADMIN — HEPARIN 500 UNITS: 100 SYRINGE at 13:04

## 2022-03-22 RX ADMIN — VINCRISTINE SULFATE 2 MG: 1 INJECTION, SOLUTION INTRAVENOUS at 12:40

## 2022-03-22 RX ADMIN — SODIUM CHLORIDE 250 ML: 9 INJECTION, SOLUTION INTRAVENOUS at 12:29

## 2022-03-22 NOTE — PROGRESS NOTES
Pt here for C4D29 Vinorelbine.  Pt seen Dr. oWrkman in clinic.  Clarified plan of care. Ok to treat given today per Dr. Workman and pt to have weekly CBC and f/u with him on 4/12. C5 is on hold until after f/u appt.  Pt notified and understanding verbalized.  Pt D/C home after treatment with AVS given.

## 2022-03-24 ENCOUNTER — TRANSCRIBE ORDERS (OUTPATIENT)
Dept: ADMINISTRATIVE | Facility: HOSPITAL | Age: 57
End: 2022-03-24

## 2022-03-24 DIAGNOSIS — Z01.818 OTHER SPECIFIED PRE-OPERATIVE EXAMINATION: Primary | ICD-10-CM

## 2022-03-24 NOTE — PROGRESS NOTES
"Subjective   History of Present Illness: Cindy Cortes is a 56 y.o. 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 Patient is currently in treatment with oncology/ hematology with radiation and chemotherapy. She has finished radiation and is currently doing chemo. She has nausea and vomiting. She denies any headaches or dizziness.  No new issues.      Previous Treatment: Right frontal craniotomy by Dr. Felix 5/6/2021    The following portions of the patient's history were reviewed and updated as appropriate: allergies, current medications, past family history, past medical history, past social history, past surgical history and problem list.    Review of Systems   Eyes: Negative for visual disturbance.   Respiratory: Negative for chest tightness and shortness of breath.    Cardiovascular: Negative for chest pain.   Gastrointestinal: Positive for nausea and vomiting.   Neurological: Negative for dizziness and headaches.       Objective     /76   Pulse 75   Temp 97.5 °F (36.4 °C)   Resp 18   Ht 157.5 cm (62\")   Wt (!) 138 kg (305 lb)   LMP  (LMP Unknown)   SpO2 98%   BMI 55.79 kg/m²    Body mass index is 55.79 kg/m².      Neurologic Exam    Assessment/Plan   Independent Review of Radiographic Studies:      I personally reviewed and interpreted the images from the following studies.    MRI brain: No evidence of tumor recurrence    Medical Decision Making:      Cindy Cortes is a 56 y.o. female status post resection of oligodendroglioma nearly 1 year ago.  Recent MRI demonstrates no evidence of recurrence.  Patient is still undergoing chemotherapy.  Patient should continue to follow-up with her oncologist, and undergo serial MRIs per their protocol.  We will have the patient follow-up with us in 1 year unless issues arise or new findings on MRI.      Diagnoses and all orders for this visit:    1. Oligodendroglioma (HCC) (Primary)      No follow-ups on file.    This patient was examined " wearing appropriate personal protective equipment.                      Dr. Yang Prince IV    03/28/22  13:22 EDT

## 2022-03-25 ENCOUNTER — TRANSCRIBE ORDERS (OUTPATIENT)
Dept: ADMINISTRATIVE | Facility: HOSPITAL | Age: 57
End: 2022-03-25

## 2022-03-25 DIAGNOSIS — Z01.818 PREOP TESTING: Primary | ICD-10-CM

## 2022-03-28 ENCOUNTER — LAB (OUTPATIENT)
Dept: LAB | Facility: HOSPITAL | Age: 57
End: 2022-03-28

## 2022-03-28 ENCOUNTER — OFFICE VISIT (OUTPATIENT)
Dept: NEUROSURGERY | Facility: CLINIC | Age: 57
End: 2022-03-28

## 2022-03-28 ENCOUNTER — TELEPHONE (OUTPATIENT)
Dept: ONCOLOGY | Facility: HOSPITAL | Age: 57
End: 2022-03-28

## 2022-03-28 ENCOUNTER — SPECIALTY PHARMACY (OUTPATIENT)
Dept: PHARMACY | Facility: HOSPITAL | Age: 57
End: 2022-03-28

## 2022-03-28 VITALS
WEIGHT: 293 LBS | HEIGHT: 62 IN | TEMPERATURE: 97.5 F | HEART RATE: 75 BPM | DIASTOLIC BLOOD PRESSURE: 76 MMHG | SYSTOLIC BLOOD PRESSURE: 150 MMHG | RESPIRATION RATE: 18 BRPM | BODY MASS INDEX: 53.92 KG/M2 | OXYGEN SATURATION: 98 %

## 2022-03-28 DIAGNOSIS — C71.9 OLIGODENDROGLIOMA: ICD-10-CM

## 2022-03-28 DIAGNOSIS — C71.1 OLIGOASTROCYTOMA OF FRONTAL LOBE: ICD-10-CM

## 2022-03-28 DIAGNOSIS — C71.9 OLIGODENDROGLIOMA: Primary | ICD-10-CM

## 2022-03-28 DIAGNOSIS — C71.1 OLIGOASTROCYTOMA OF FRONTAL LOBE: Primary | ICD-10-CM

## 2022-03-28 LAB
BASOPHILS # BLD AUTO: 0.02 10*3/MM3 (ref 0–0.2)
BASOPHILS NFR BLD AUTO: 0.4 % (ref 0–1.5)
DEPRECATED RDW RBC AUTO: 72.7 FL (ref 37–54)
EOSINOPHIL # BLD AUTO: 0.17 10*3/MM3 (ref 0–0.4)
EOSINOPHIL NFR BLD AUTO: 3.4 % (ref 0.3–6.2)
ERYTHROCYTE [DISTWIDTH] IN BLOOD BY AUTOMATED COUNT: 20.8 % (ref 12.3–15.4)
HCT VFR BLD AUTO: 23.4 % (ref 34–46.6)
HGB BLD-MCNC: 7.6 G/DL (ref 12–15.9)
LYMPHOCYTES # BLD AUTO: 0.71 10*3/MM3 (ref 0.7–3.1)
LYMPHOCYTES NFR BLD AUTO: 14 % (ref 19.6–45.3)
MCH RBC QN AUTO: 33.2 PG (ref 26.6–33)
MCHC RBC AUTO-ENTMCNC: 32.5 G/DL (ref 31.5–35.7)
MCV RBC AUTO: 102.2 FL (ref 79–97)
MONOCYTES # BLD AUTO: 0.39 10*3/MM3 (ref 0.1–0.9)
MONOCYTES NFR BLD AUTO: 7.7 % (ref 5–12)
NEUTROPHILS NFR BLD AUTO: 3.77 10*3/MM3 (ref 1.7–7)
NEUTROPHILS NFR BLD AUTO: 74.5 % (ref 42.7–76)
PLATELET # BLD AUTO: 68 10*3/MM3 (ref 140–450)
PMV BLD AUTO: 9.8 FL (ref 6–12)
RBC # BLD AUTO: 2.29 10*6/MM3 (ref 3.77–5.28)
WBC NRBC COR # BLD: 5.06 10*3/MM3 (ref 3.4–10.8)

## 2022-03-28 PROCEDURE — 36415 COLL VENOUS BLD VENIPUNCTURE: CPT

## 2022-03-28 PROCEDURE — 99213 OFFICE O/P EST LOW 20 MIN: CPT | Performed by: NEUROLOGICAL SURGERY

## 2022-03-28 PROCEDURE — 85025 COMPLETE CBC W/AUTO DIFF WBC: CPT

## 2022-03-28 RX ORDER — SODIUM CHLORIDE 9 MG/ML
250 INJECTION, SOLUTION INTRAVENOUS AS NEEDED
Status: CANCELLED | OUTPATIENT
Start: 2022-03-28

## 2022-03-28 NOTE — PROGRESS NOTES
MTM Encounter-Re: Adherence and side effects (PCV)    Today's encounter was conducted in person, face-to-face.     Medication:  PCV  - Reason for outreach: Routine medication check-in .  - Administration: as prescribed .  - Missed doses: Patient reports missing no doses in the last 30 days.  - Self-administration: Patient demonstrates ability to self-administer medication. No barriers to adherence identified.   - Diagnosis/Indication: oligodendroglioma. Progress toward achieving therapeutic goals reviewed.   - Patient is experiencing side effects shortness of air with minimal movement and fatigue.  She will receive a unit of PRBC tomorrow. Also having additional testing to determine if other contributing factors other than just medication related..    - Medication availability/affordability: Patient has had no issues obtaining medication from pharmacy.   - Questions/concerns about medications: none       Last cycle started 2/22/22.  She is due to start next cycle on 4/5/22 but will depend on lab recovery.      All questions addressed. Patient had no additional concerns for MTM office.     Chapis Goff  3/28/2022  10:11 EDT

## 2022-03-28 NOTE — TELEPHONE ENCOUNTER
Called patient and let her know because her hemoglobin is 7.6 and she is SOB Dr. Workman would like her to get a unit of blood. Sophie at [facility] set her up for 1030 tomorrow as patient states she cannot do it today. I also let her know Dr. Workman would like her to hold off on her next cycle of chemo right now. Ana is entering order and also she let Chapis know of the hold as well. Patient verbalized understanding and will call with any issues.

## 2022-03-29 ENCOUNTER — TRANSCRIBE ORDERS (OUTPATIENT)
Dept: ADMINISTRATIVE | Facility: HOSPITAL | Age: 57
End: 2022-03-29

## 2022-03-29 ENCOUNTER — TELEPHONE (OUTPATIENT)
Dept: ONCOLOGY | Facility: CLINIC | Age: 57
End: 2022-03-29

## 2022-03-29 ENCOUNTER — HOSPITAL ENCOUNTER (OUTPATIENT)
Dept: INFUSION THERAPY | Facility: HOSPITAL | Age: 57
Setting detail: INFUSION SERIES
Discharge: HOME | End: 2022-03-29

## 2022-03-29 ENCOUNTER — LAB (OUTPATIENT)
Dept: LAB | Facility: HOSPITAL | Age: 57
End: 2022-03-29

## 2022-03-29 VITALS
TEMPERATURE: 98.3 F | OXYGEN SATURATION: 98 % | RESPIRATION RATE: 18 BRPM | HEART RATE: 65 BPM | SYSTOLIC BLOOD PRESSURE: 131 MMHG | DIASTOLIC BLOOD PRESSURE: 53 MMHG

## 2022-03-29 DIAGNOSIS — T45.1X5A CHEMOTHERAPY-INDUCED THROMBOCYTOPENIA: Primary | ICD-10-CM

## 2022-03-29 DIAGNOSIS — C71.1 OLIGOASTROCYTOMA OF FRONTAL LOBE: ICD-10-CM

## 2022-03-29 DIAGNOSIS — Z01.818 PRE-OP TESTING: ICD-10-CM

## 2022-03-29 DIAGNOSIS — D50.9 IRON DEFICIENCY ANEMIA, UNSPECIFIED IRON DEFICIENCY ANEMIA TYPE: ICD-10-CM

## 2022-03-29 DIAGNOSIS — Z95.828 PORT-A-CATH IN PLACE: ICD-10-CM

## 2022-03-29 DIAGNOSIS — Z01.818 PRE-OP TESTING: Primary | ICD-10-CM

## 2022-03-29 DIAGNOSIS — D69.59 CHEMOTHERAPY-INDUCED THROMBOCYTOPENIA: Primary | ICD-10-CM

## 2022-03-29 LAB
ABO GROUP BLD: NORMAL
ANISOCYTOSIS BLD QL: ABNORMAL
ANTI-FYA: NORMAL
BB HOLD TUBE: NORMAL
BLD GP AB SCN SERPL QL: POSITIVE
DEPRECATED RDW RBC AUTO: 75.3 FL (ref 37–54)
EOSINOPHIL # BLD MANUAL: 0.14 10*3/MM3 (ref 0–0.4)
EOSINOPHIL NFR BLD MANUAL: 3 % (ref 0.3–6.2)
ERYTHROCYTE [DISTWIDTH] IN BLOOD BY AUTOMATED COUNT: 23.4 % (ref 12.3–15.4)
HCT VFR BLD AUTO: 20.5 % (ref 34–46.6)
HGB BLD-MCNC: 7.1 G/DL (ref 12–15.9)
LYMPHOCYTES # BLD MANUAL: 0.36 10*3/MM3 (ref 0.7–3.1)
LYMPHOCYTES NFR BLD MANUAL: 4 % (ref 5–12)
MCH RBC QN AUTO: 32.6 PG (ref 26.6–33)
MCHC RBC AUTO-ENTMCNC: 34.5 G/DL (ref 31.5–35.7)
MCV RBC AUTO: 94.5 FL (ref 79–97)
METAMYELOCYTES NFR BLD MANUAL: 1 % (ref 0–0)
MONOCYTES # BLD: 0.18 10*3/MM3 (ref 0.1–0.9)
NEUTROPHILS # BLD AUTO: 3.78 10*3/MM3 (ref 1.7–7)
NEUTROPHILS NFR BLD MANUAL: 74 % (ref 42.7–76)
NEUTS BAND NFR BLD MANUAL: 10 % (ref 0–5)
PLAT MORPH BLD: NORMAL
PLATELET # BLD AUTO: 67 10*3/MM3 (ref 140–450)
PMV BLD AUTO: 7.6 FL (ref 6–12)
POIKILOCYTOSIS BLD QL SMEAR: ABNORMAL
POLYCHROMASIA BLD QL SMEAR: ABNORMAL
RBC # BLD AUTO: 2.17 10*6/MM3 (ref 3.77–5.28)
RH BLD: POSITIVE
T&S EXPIRATION DATE: NORMAL
VARIANT LYMPHS NFR BLD MANUAL: 8 % (ref 19.6–45.3)
WBC MORPH BLD: NORMAL
WBC NRBC COR # BLD: 4.5 10*3/MM3 (ref 3.4–10.8)

## 2022-03-29 PROCEDURE — 86850 RBC ANTIBODY SCREEN: CPT | Performed by: INTERNAL MEDICINE

## 2022-03-29 PROCEDURE — 86901 BLOOD TYPING SEROLOGIC RH(D): CPT | Performed by: INTERNAL MEDICINE

## 2022-03-29 PROCEDURE — 85007 BL SMEAR W/DIFF WBC COUNT: CPT

## 2022-03-29 PROCEDURE — 36430 TRANSFUSION BLD/BLD COMPNT: CPT

## 2022-03-29 PROCEDURE — 86900 BLOOD TYPING SEROLOGIC ABO: CPT

## 2022-03-29 PROCEDURE — 86902 BLOOD TYPE ANTIGEN DONOR EA: CPT

## 2022-03-29 PROCEDURE — C9803 HOPD COVID-19 SPEC COLLECT: HCPCS

## 2022-03-29 PROCEDURE — 86870 RBC ANTIBODY IDENTIFICATION: CPT | Performed by: INTERNAL MEDICINE

## 2022-03-29 PROCEDURE — 86900 BLOOD TYPING SEROLOGIC ABO: CPT | Performed by: INTERNAL MEDICINE

## 2022-03-29 PROCEDURE — 25010000002 HEPARIN LOCK FLUSH PER 10 UNITS: Performed by: INTERNAL MEDICINE

## 2022-03-29 PROCEDURE — 36591 DRAW BLOOD OFF VENOUS DEVICE: CPT

## 2022-03-29 PROCEDURE — P9016 RBC LEUKOCYTES REDUCED: HCPCS

## 2022-03-29 PROCEDURE — 86922 COMPATIBILITY TEST ANTIGLOB: CPT

## 2022-03-29 PROCEDURE — 85027 COMPLETE CBC AUTOMATED: CPT

## 2022-03-29 PROCEDURE — U0004 COV-19 TEST NON-CDC HGH THRU: HCPCS

## 2022-03-29 RX ORDER — HEPARIN SODIUM (PORCINE) LOCK FLUSH IV SOLN 100 UNIT/ML 100 UNIT/ML
500 SOLUTION INTRAVENOUS AS NEEDED
Status: CANCELLED | OUTPATIENT
Start: 2022-03-29

## 2022-03-29 RX ORDER — SODIUM CHLORIDE 0.9 % (FLUSH) 0.9 %
10 SYRINGE (ML) INJECTION AS NEEDED
Status: DISCONTINUED | OUTPATIENT
Start: 2022-03-29 | End: 2022-03-31 | Stop reason: HOSPADM

## 2022-03-29 RX ORDER — HEPARIN SODIUM (PORCINE) LOCK FLUSH IV SOLN 100 UNIT/ML 100 UNIT/ML
500 SOLUTION INTRAVENOUS AS NEEDED
Status: DISCONTINUED | OUTPATIENT
Start: 2022-03-29 | End: 2022-03-31 | Stop reason: HOSPADM

## 2022-03-29 RX ORDER — SODIUM CHLORIDE 0.9 % (FLUSH) 0.9 %
10 SYRINGE (ML) INJECTION AS NEEDED
Status: CANCELLED | OUTPATIENT
Start: 2022-03-29

## 2022-03-29 RX ORDER — SODIUM CHLORIDE 9 MG/ML
250 INJECTION, SOLUTION INTRAVENOUS AS NEEDED
Status: DISCONTINUED | OUTPATIENT
Start: 2022-03-29 | End: 2022-03-31 | Stop reason: HOSPADM

## 2022-03-29 RX ADMIN — HEPARIN SODIUM (PORCINE) LOCK FLUSH IV SOLN 100 UNIT/ML 500 UNITS: 100 SOLUTION at 16:08

## 2022-03-29 RX ADMIN — Medication 20 ML: at 16:07

## 2022-03-29 NOTE — TELEPHONE ENCOUNTER
Pt returned my phone call. I let the pt know that Dr. Workman wanted her to hold off on starting her next cycle of oral chemo. She v/u and had no other questions.

## 2022-03-29 NOTE — TELEPHONE ENCOUNTER
----- Message from Emilie Workman MD sent at 3/28/2022  3:09 PM EDT -----  Tell her to hold off on her oral chemo cycle start please

## 2022-03-29 NOTE — TELEPHONE ENCOUNTER
Attempted to call the pt to let them know Dr. Workman would like her to hold off on starting her next oral chemo cycle. She did not answer. V/m was left with call back information.

## 2022-03-30 ENCOUNTER — APPOINTMENT (OUTPATIENT)
Dept: LAB | Facility: HOSPITAL | Age: 57
End: 2022-03-30

## 2022-03-30 LAB
BH BB BLOOD EXPIRATION DATE: NORMAL
BH BB BLOOD TYPE BARCODE: 5100
BH BB DISPENSE STATUS: NORMAL
BH BB PRODUCT CODE: NORMAL
BH BB UNIT NUMBER: NORMAL
CROSSMATCH INTERPRETATION: NORMAL
SARS-COV-2 ORF1AB RESP QL NAA+PROBE: NOT DETECTED
UNIT  ABO: NORMAL
UNIT  RH: NORMAL

## 2022-04-01 ENCOUNTER — HOSPITAL ENCOUNTER (OUTPATIENT)
Dept: RESPIRATORY THERAPY | Facility: HOSPITAL | Age: 57
Discharge: HOME | End: 2022-04-01

## 2022-04-01 ENCOUNTER — HOSPITAL ENCOUNTER (OUTPATIENT)
Dept: CT IMAGING | Facility: HOSPITAL | Age: 57
Discharge: HOME | End: 2022-04-01

## 2022-04-01 DIAGNOSIS — C71.1 OLIGOASTROCYTOMA OF FRONTAL LOBE: ICD-10-CM

## 2022-04-01 DIAGNOSIS — C71.9 OLIGODENDROGLIOMA: ICD-10-CM

## 2022-04-01 LAB
CREAT BLDA-MCNC: 0.5 MG/DL (ref 0.6–1.3)
EGFRCR SERPLBLD CKD-EPI 2021: 111.6 ML/MIN/1.73

## 2022-04-01 PROCEDURE — 82565 ASSAY OF CREATININE: CPT

## 2022-04-01 PROCEDURE — 0 IOPAMIDOL PER 1 ML: Performed by: INTERNAL MEDICINE

## 2022-04-01 PROCEDURE — 94727 GAS DIL/WSHOT DETER LNG VOL: CPT

## 2022-04-01 PROCEDURE — 94060 EVALUATION OF WHEEZING: CPT

## 2022-04-01 PROCEDURE — 71260 CT THORAX DX C+: CPT

## 2022-04-01 PROCEDURE — 94729 DIFFUSING CAPACITY: CPT

## 2022-04-01 RX ORDER — ALBUTEROL SULFATE 90 UG/1
2 AEROSOL, METERED RESPIRATORY (INHALATION) ONCE
Status: COMPLETED | OUTPATIENT
Start: 2022-04-01 | End: 2022-04-01

## 2022-04-01 RX ADMIN — ALBUTEROL SULFATE 2 PUFF: 108 INHALANT RESPIRATORY (INHALATION) at 15:38

## 2022-04-01 RX ADMIN — IOPAMIDOL 100 ML: 755 INJECTION, SOLUTION INTRAVENOUS at 15:10

## 2022-04-04 ENCOUNTER — HOSPITAL ENCOUNTER (OUTPATIENT)
Dept: GENERAL RADIOLOGY | Facility: HOSPITAL | Age: 57
Discharge: HOME | End: 2022-04-04

## 2022-04-04 ENCOUNTER — TELEPHONE (OUTPATIENT)
Dept: ONCOLOGY | Facility: HOSPITAL | Age: 57
End: 2022-04-04

## 2022-04-04 ENCOUNTER — PREP FOR SURGERY (OUTPATIENT)
Dept: OTHER | Facility: HOSPITAL | Age: 57
End: 2022-04-04

## 2022-04-04 ENCOUNTER — SPECIALTY PHARMACY (OUTPATIENT)
Dept: PHARMACY | Facility: HOSPITAL | Age: 57
End: 2022-04-04

## 2022-04-04 ENCOUNTER — LAB (OUTPATIENT)
Dept: LAB | Facility: HOSPITAL | Age: 57
End: 2022-04-04

## 2022-04-04 ENCOUNTER — DOCUMENTATION (OUTPATIENT)
Dept: PHARMACY | Facility: HOSPITAL | Age: 57
End: 2022-04-04

## 2022-04-04 ENCOUNTER — OFFICE VISIT (OUTPATIENT)
Dept: PULMONOLOGY | Facility: HOSPITAL | Age: 57
End: 2022-04-04

## 2022-04-04 VITALS
RESPIRATION RATE: 12 BRPM | BODY MASS INDEX: 53.92 KG/M2 | OXYGEN SATURATION: 98 % | SYSTOLIC BLOOD PRESSURE: 125 MMHG | HEIGHT: 62 IN | DIASTOLIC BLOOD PRESSURE: 69 MMHG | HEART RATE: 64 BPM | WEIGHT: 293 LBS

## 2022-04-04 DIAGNOSIS — C71.9 OLIGODENDROGLIOMA: ICD-10-CM

## 2022-04-04 DIAGNOSIS — R05.9 COUGH: Primary | ICD-10-CM

## 2022-04-04 DIAGNOSIS — J18.9 PNEUMONITIS: ICD-10-CM

## 2022-04-04 DIAGNOSIS — J18.9 PNEUMONITIS: Primary | ICD-10-CM

## 2022-04-04 DIAGNOSIS — C71.1 OLIGOASTROCYTOMA OF FRONTAL LOBE: ICD-10-CM

## 2022-04-04 DIAGNOSIS — D64.9 ANEMIA, UNSPECIFIED TYPE: Primary | ICD-10-CM

## 2022-04-04 DIAGNOSIS — R05.9 COUGH: ICD-10-CM

## 2022-04-04 DIAGNOSIS — J18.9 PNEUMONIA DUE TO INFECTIOUS ORGANISM, UNSPECIFIED LATERALITY, UNSPECIFIED PART OF LUNG: Primary | ICD-10-CM

## 2022-04-04 LAB
BASOPHILS # BLD AUTO: 0.01 10*3/MM3 (ref 0–0.2)
BASOPHILS NFR BLD AUTO: 0.2 % (ref 0–1.5)
DEPRECATED RDW RBC AUTO: 72.4 FL (ref 37–54)
EOSINOPHIL # BLD AUTO: 0.19 10*3/MM3 (ref 0–0.4)
EOSINOPHIL NFR BLD AUTO: 4.3 % (ref 0.3–6.2)
ERYTHROCYTE [DISTWIDTH] IN BLOOD BY AUTOMATED COUNT: 21.5 % (ref 12.3–15.4)
HCT VFR BLD AUTO: 24.6 % (ref 34–46.6)
HGB BLD-MCNC: 8 G/DL (ref 12–15.9)
LYMPHOCYTES # BLD AUTO: 0.58 10*3/MM3 (ref 0.7–3.1)
LYMPHOCYTES NFR BLD AUTO: 13.1 % (ref 19.6–45.3)
MCH RBC QN AUTO: 32.9 PG (ref 26.6–33)
MCHC RBC AUTO-ENTMCNC: 32.5 G/DL (ref 31.5–35.7)
MCV RBC AUTO: 101.2 FL (ref 79–97)
MONOCYTES # BLD AUTO: 0.37 10*3/MM3 (ref 0.1–0.9)
MONOCYTES NFR BLD AUTO: 8.4 % (ref 5–12)
NEUTROPHILS NFR BLD AUTO: 3.27 10*3/MM3 (ref 1.7–7)
NEUTROPHILS NFR BLD AUTO: 74 % (ref 42.7–76)
PLATELET # BLD AUTO: 80 10*3/MM3 (ref 140–450)
PMV BLD AUTO: 10.3 FL (ref 6–12)
RBC # BLD AUTO: 2.43 10*6/MM3 (ref 3.77–5.28)
SARS-COV-2 ORF1AB RESP QL NAA+PROBE: NOT DETECTED
WBC NRBC COR # BLD: 4.42 10*3/MM3 (ref 3.4–10.8)

## 2022-04-04 PROCEDURE — 71046 X-RAY EXAM CHEST 2 VIEWS: CPT

## 2022-04-04 PROCEDURE — U0004 COV-19 TEST NON-CDC HGH THRU: HCPCS

## 2022-04-04 PROCEDURE — 85025 COMPLETE CBC W/AUTO DIFF WBC: CPT

## 2022-04-04 PROCEDURE — 36415 COLL VENOUS BLD VENIPUNCTURE: CPT

## 2022-04-04 PROCEDURE — G0463 HOSPITAL OUTPT CLINIC VISIT: HCPCS

## 2022-04-04 PROCEDURE — C9803 HOPD COVID-19 SPEC COLLECT: HCPCS

## 2022-04-04 RX ORDER — ALBUTEROL SULFATE 90 UG/1
2 AEROSOL, METERED RESPIRATORY (INHALATION) EVERY 4 HOURS PRN
Qty: 18 G | Refills: 2 | Status: SHIPPED | OUTPATIENT
Start: 2022-04-04 | End: 2022-12-15

## 2022-04-04 RX ORDER — ALBUTEROL SULFATE 90 UG/1
2 AEROSOL, METERED RESPIRATORY (INHALATION) EVERY 4 HOURS PRN
Qty: 18 G | Refills: 2 | Status: CANCELLED | OUTPATIENT
Start: 2022-04-04

## 2022-04-04 RX ORDER — DOXYCYCLINE HYCLATE 100 MG/1
100 CAPSULE ORAL 2 TIMES DAILY
Qty: 10 CAPSULE | Refills: 0 | Status: ON HOLD | OUTPATIENT
Start: 2022-04-04 | End: 2022-05-31

## 2022-04-04 RX ORDER — BUDESONIDE, GLYCOPYRROLATE, AND FORMOTEROL FUMARATE 160; 9; 4.8 UG/1; UG/1; UG/1
2 AEROSOL, METERED RESPIRATORY (INHALATION) 2 TIMES DAILY
Qty: 1 EACH | Refills: 0 | COMMUNITY
Start: 2022-04-04

## 2022-04-04 NOTE — TELEPHONE ENCOUNTER
Per Pastora LORENZO pt's CXR shows she has pneumonia,   Prescriptions sent into pt's pharmacy per Pastora LORENZO.   I called pt. To let her know about the pneumonia and to  her prescriptions (antibiotics and an inhaler).  Pt. Instructed to call if her symptoms worsen or if she develops a fever.   Pt. Verbalized understanding. Pt. Also stated she was seeing Dr. Alcantar today.

## 2022-04-04 NOTE — PROGRESS NOTES
Pt. Here at clinic for a lab appointment.    Kaylee called me with pt's HGB 8.0 today and pt. Complaining of SOB.   I assessed pt. Further, pt's O2 98% RA, HR 65, pt. Was in no distress and was not SOB at rest. Pt. stated That she has had a cough over the past week or so. Pt. States that she received a blood transfusion last week and that seems to help her symptoms. Pt. Stated she had a CT and pulmonary function test done on Friday 4/1/22 and will F/U with Dr. Workman next Monday 4/11/22. NP notified of pt's complaints and lab results.   Orders given for pt. To have a chest xray today and repeat CBC on Thursday 4/7/22 per Pastora LORENZO.   Pt. Was instructed to go over to the main hospital registration for her CXR.   Pt. Verbalized understanding of all orders/instructions and appointment card given for next visit.

## 2022-04-04 NOTE — PROGRESS NOTES
CXR     Patient complains of SOB and cough.  No chest pain.  Hgb 8.0    Ordered CXR and CBC on Thursday      Electronically signed by BJ Garcia, 04/04/22, 10:10 AM EDT.

## 2022-04-04 NOTE — PROGRESS NOTES
MTM note: Lomustine + Procarbazine      4/4/2022   WBC 3.40 - 10.80 10*3/mm3 4.42   Neutrophils Absolute 1.70 - 7.00 10*3/mm3 3.27   Hemoglobin 12.0 - 15.9 g/dL 8.0 (A)   Hematocrit 34.0 - 46.6 % 24.6 (A)   Platelets 140 - 450 10*3/mm3 80 (A)   Next cycle of chemotherapy is currently on hold.  She will see Dr Workman again on 4/11.  Sean Lechuga, PharmD BCPS

## 2022-04-04 NOTE — PROGRESS NOTES
Per Dr. Workman referral placed to Dr. Alcantar for possible medication induced pneumonitis. Pt made aware and v/u. Pt reminded that she should not be taking any of her oral chemo medication. I let the pt know that we would be holding all treatment at this time per Dr. Workman. Pt v/u.

## 2022-04-04 NOTE — PROGRESS NOTES
Correction of earlier note:    Occasional cough/SOB with Hgb 8.0        CXR today    CBC on Thursday      CXR today shows bilateral probable pneumonia; Doxycycline 100 mg BID and albuterol inhaler every 4 hours prn called to patient pharmacy.    Sydnee DYSON to call patient and inform of CXR report and meds called to pharmacy        Electronically signed by Pastora Guallpa, BJ, 04/04/22, 1:12 PM EDT.

## 2022-04-04 NOTE — PROGRESS NOTES
PULMONARY/ CRITICAL CARE/ SLEEP MEDICINE OUTPATIENT CONSULT/ FOLLOW UP NOTE        Patient Name:  Cindy Cortes    :  1965    Medical Record:  [id number]    PRIMARY CARE PHYSICIAN     Annamarie Monroy APRN    REASON FOR CONSULTATION    Cindy Cortes is a 56 y.o. female who is referred for consultation for worsening dyspnea with alveolar infiltrate in immune compromised host  REVIEW OF SYSTEMS    Constitutional:  Denies fever or chills   Eyes:  Denies change in visual acuity   HENT:  Denies nasal congestion or sore throat   Respiratory:  Denies cough or shortness of breath   Cardiovascular:  Denies chest pain or edema   GI:  Denies abdominal pain, nausea, vomiting, bloody stools or diarrhea   :  Denies dysuria   Musculoskeletal:  Denies back pain or joint pain   Integument:  Denies rash   Neurologic:  Denies headache, focal weakness or sensory changes   Endocrine:  Denies polyuria or polydipsia   Lymphatic:  Denies swollen glands   Psychiatric:  Denies depression or anxiety     MEDICAL HISTORY    Past Medical History:   Diagnosis Date   • Arthritis    • GERD (gastroesophageal reflux disease)    • Hypertension    • Oligodendroglioma (HCC)    • Seizure (HCC)    • Type 2 diabetes mellitus with hyperglycemia, without long-term current use of insulin (HCC) 2021        SURGICAL HISTORY    Past Surgical History:   Procedure Laterality Date   • CRANIOTOMY FOR TUMOR Right 2021    Procedure: CRANIOTOMY FOR TUMOR RESECTION WITH STEREOTACTIC;  Surgeon: Jluis Felix MD;  Location: [facility];  Service: Neurosurgery;  Laterality: Right;        FAMILY HISTORY    Family History   Problem Relation Age of Onset   • Kidney disease Mother    • Prostate cancer Brother    • Breast cancer Maternal Aunt    • Colon cancer Maternal Aunt    • Breast cancer Niece    • Breast cancer Cousin        SOCIAL HISTORY    Social History     Tobacco Use   • Smoking status: Never Smoker   • Smokeless tobacco: Never Used    Substance Use Topics   • Alcohol use: Not Currently     Alcohol/week: 1.0 standard drink     Types: 1 Cans of beer per week     Comment: socially        ALLERGIES    No Known Allergies      MEDICATIONS    Current Outpatient Medications on File Prior to Visit   Medication Sig Dispense Refill   • Alcohol Swabs (Alcohol Wipes) 70 % pads Apply 1 each topically to the appropriate area as directed 4 (Four) Times a Day.     • amLODIPine (NORVASC) 10 MG tablet Take 1 tablet by mouth Daily. 30 tablet 2   • ferrous sulfate 325 (65 FE) MG tablet Take 1 tablet by mouth Daily With Breakfast.     • folic acid (FOLVITE) 1 MG tablet TAKE TWO TABLETS BY MOUTH EVERY MORNING, THEN TWO TABLETS EVERY EVENING 360 tablet 0   • Gleostine 10 MG chemo capsule TAKE 1 CAPSULE BY MOUTH WITH 1 OTHER GLEOSTINE PRESCRIPTION FOR 210MG TOTAL FOR 1 DOSE     • Gleostine 100 MG chemo capsule TAKE 2 CAPSULES BY MOUTH WITH 1 OTHER GLEOSTINE PRESCRIPTION FOR 210MG TOTAL FOR 1 DOSE     • glucose blood (OneTouch Verio) test strip 1 each by Other route 4 (Four) Times a Day Before Meals & at Bedtime. Use as instructed 200 each 1   • levETIRAcetam (KEPPRA) 750 MG tablet Take 1 tablet by mouth 2 (Two) Times a Day. 60 tablet 2   • lidocaine-prilocaine (EMLA) 2.5-2.5 % cream Apply  topically to the appropriate area as directed Every 2 (Two) Hours As Needed for Mild Pain . 5 g 0   • metoprolol tartrate (LOPRESSOR) 25 MG tablet Take 0.5 tablets by mouth Every 8 (Eight) Hours. 45 tablet 2   • ondansetron (Zofran) 8 MG tablet Take 1 tablet by mouth Every 8 (Eight) Hours As Needed for Nausea or Vomiting. 30 tablet 3   • oxybutynin (DITROPAN) 5 MG tablet      • procarbazine (MATULANE) 50 MG chemo capsule Take 2 capsules by mouth Every Night for 14 days. Take on days 8 through 21 each cycle. 28 capsule 2   • venlafaxine XR (EFFEXOR-XR) 75 MG 24 hr capsule Take 75 mg by mouth Daily. Take DOS  3   • vitamin D (ERGOCALCIFEROL) 1.25 MG (59768 UT) capsule capsule Take  "50,000 Units by mouth Every 7 (Seven) Days.         Current Facility-Administered Medications on File Prior to Visit   Medication Dose Route Frequency Provider Last Rate Last Admin   • diphenhydrAMINE (BENADRYL) injection 25 mg  25 mg Intravenous Once Emilie Workman MD           PHYSICAL EXAM    Vitals:    04/04/22 1349   BP: 125/69   BP Location: Left arm   Patient Position: Sitting   Cuff Size: Adult   Pulse: 64   Resp: 12   SpO2: 98%   Weight: (!) 138 kg (305 lb)   Height: 157.5 cm (62\")        Constitutional:  Well developed, well nourished, no acute distress, non-toxic appearance   Eyes:  PERRL, conjunctiva normal   HENT:  Atraumatic, external ears normal, nose normal, oropharynx moist, no pharyngeal exudates. mallampatti   Neck- normal range of motion, no tenderness, supple   Respiratory:  No respiratory distress, normal breath sounds, no rales, no wheezing   Cardiovascular:  Normal rate, normal rhythm, no murmurs, no gallops, no rubs   GI:  Soft, nondistended, normal bowel sounds, nontender, no organomegaly, no mass, no rebound, no guarding   :  No costovertebral angle tenderness   Musculoskeletal:  No edema, no tenderness, no deformities. Back- no tenderness  Integument:  Well hydrated, no rash   Lymphatic:  No lymphadenopathy noted   Neurologic:  Alert & oriented x 3, CN 2-12 normal, normal motor function, normal sensory function, no focal deficits noted   Psychiatric:  Speech and behavior appropriate     CT Chest With Contrast Diagnostic    Result Date: 4/1/2022   1. Interval development of relatively diffuse groundglass opacity with patchy areas of sparing. Findings are nonspecific and differential would include atypical, viral infections, drug toxicity, hypersensitivity pneumonitis.    Electronically Signed By-Efrain Rico MD On:4/1/2022 3:47 PM This report was finalized on [id number] by  Efrain Rico MD.    MRI Brain With & Without Contrast    Result Date: 3/14/2022  1.Postsurgical changes right " frontal lobe similar to prior study. There is less enhancement around the surgical cavity as compared to prior study. 2.Periventricular white matter changes as well as some vasogenic edema in the right frontal lobe which has been suggested. 3.Pansinus disease which has progressed since prior study. Electronically Signed: Paxton Kolb MD 3/14/2022 18:09 EDT    XR Chest PA & Lateral    Result Date: 4/4/2022   1. Diffuse groundglass pulmonary infiltrates throughout both lungs, most likely representing pneumonia although this finding is nonspecific.  Electronically Signed By-Yang Kan MD On:4/4/2022 12:14 PM This report was finalized on [id number] by  Yang Kan MD.         ASSESSMENT & PLAN:      Worsening dyspnea in the last few weeks with CAT scan on 3/15/2022 showed bilateral groundglass opacities     Pulmonary function test 4/1/2022 showed FVC of 2.1 L which is 66%, FEV1 1.6 L which is 64%, FEV1/FVC ratio 77, residual volume 87%, total lung capacity 83%, diffusion capacity 51%    grade 2 oligodendroglioma  -s/p GTR, XRT and adjuvant chemotherapy is ongoing.  She has 2 cycles remaning.  -S/p craniotomy with tumor resection 5/7/2021       Essential hypertension  Prediabetes, currently hyperglycemic  Depression  History of dyslipidemia  Gastroesophageal reflux disease with hiatal herni  Primary osteoarthritis of both knees  Morbid obesity with BMI 64.92 kg/m², adult         Plan  Diagnostic bronchoscopy to rule out opportunistic infection  Sample of Breztri inhaler    Sample of breath 3    This document has been electronically signed by  Drew Alcantar MD  14:31 EDT

## 2022-04-06 ENCOUNTER — ANESTHESIA EVENT (OUTPATIENT)
Dept: GASTROENTEROLOGY | Facility: HOSPITAL | Age: 57
End: 2022-04-06

## 2022-04-06 ENCOUNTER — ANESTHESIA (OUTPATIENT)
Dept: GASTROENTEROLOGY | Facility: HOSPITAL | Age: 57
End: 2022-04-06

## 2022-04-06 ENCOUNTER — HOSPITAL ENCOUNTER (OUTPATIENT)
Facility: HOSPITAL | Age: 57
Setting detail: HOSPITAL OUTPATIENT SURGERY
Discharge: HOME | End: 2022-04-06
Attending: INTERNAL MEDICINE | Admitting: INTERNAL MEDICINE

## 2022-04-06 VITALS
HEIGHT: 62 IN | HEART RATE: 65 BPM | SYSTOLIC BLOOD PRESSURE: 116 MMHG | TEMPERATURE: 98.1 F | RESPIRATION RATE: 16 BRPM | DIASTOLIC BLOOD PRESSURE: 47 MMHG | OXYGEN SATURATION: 95 % | WEIGHT: 293 LBS | BODY MASS INDEX: 53.92 KG/M2

## 2022-04-06 DIAGNOSIS — J18.9 PNEUMONITIS: ICD-10-CM

## 2022-04-06 PROCEDURE — 87186 SC STD MICRODIL/AGAR DIL: CPT | Performed by: INTERNAL MEDICINE

## 2022-04-06 PROCEDURE — 87798 DETECT AGENT NOS DNA AMP: CPT | Performed by: INTERNAL MEDICINE

## 2022-04-06 PROCEDURE — 87102 FUNGUS ISOLATION CULTURE: CPT | Performed by: INTERNAL MEDICINE

## 2022-04-06 PROCEDURE — 87116 MYCOBACTERIA CULTURE: CPT | Performed by: INTERNAL MEDICINE

## 2022-04-06 PROCEDURE — 0202U NFCT DS 22 TRGT SARS-COV-2: CPT | Performed by: INTERNAL MEDICINE

## 2022-04-06 PROCEDURE — 25010000002 PROPOFOL 200 MG/20ML EMULSION

## 2022-04-06 PROCEDURE — 87147 CULTURE TYPE IMMUNOLOGIC: CPT | Performed by: INTERNAL MEDICINE

## 2022-04-06 PROCEDURE — 87206 SMEAR FLUORESCENT/ACID STAI: CPT | Performed by: INTERNAL MEDICINE

## 2022-04-06 PROCEDURE — 87205 SMEAR GRAM STAIN: CPT | Performed by: INTERNAL MEDICINE

## 2022-04-06 PROCEDURE — 87070 CULTURE OTHR SPECIMN AEROBIC: CPT | Performed by: INTERNAL MEDICINE

## 2022-04-06 PROCEDURE — 88108 CYTOPATH CONCENTRATE TECH: CPT | Performed by: INTERNAL MEDICINE

## 2022-04-06 RX ORDER — SODIUM CHLORIDE 9 MG/ML
9 INJECTION, SOLUTION INTRAVENOUS CONTINUOUS
Status: DISCONTINUED | OUTPATIENT
Start: 2022-04-06 | End: 2022-04-07 | Stop reason: HOSPADM

## 2022-04-06 RX ORDER — FLUTICASONE PROPIONATE 44 MCG
1 AEROSOL WITH ADAPTER (GRAM) INHALATION 2 TIMES DAILY PRN
Qty: 10.6 G | Refills: 1 | Status: ON HOLD | OUTPATIENT
Start: 2022-04-06 | End: 2022-05-31

## 2022-04-06 RX ORDER — LIDOCAINE HYDROCHLORIDE 20 MG/ML
INJECTION, SOLUTION EPIDURAL; INFILTRATION; INTRACAUDAL; PERINEURAL
Status: DISCONTINUED
Start: 2022-04-06 | End: 2022-04-06 | Stop reason: HOSPADM

## 2022-04-06 RX ORDER — HEPARIN SODIUM (PORCINE) LOCK FLUSH IV SOLN 100 UNIT/ML 100 UNIT/ML
SOLUTION INTRAVENOUS
Status: DISCONTINUED
Start: 2022-04-06 | End: 2022-04-07 | Stop reason: HOSPADM

## 2022-04-06 RX ORDER — PROPOFOL 10 MG/ML
INJECTION, EMULSION INTRAVENOUS AS NEEDED
Status: DISCONTINUED | OUTPATIENT
Start: 2022-04-06 | End: 2022-04-06 | Stop reason: SURG

## 2022-04-06 RX ORDER — LIDOCAINE HYDROCHLORIDE 10 MG/ML
INJECTION, SOLUTION EPIDURAL; INFILTRATION; INTRACAUDAL; PERINEURAL AS NEEDED
Status: DISCONTINUED | OUTPATIENT
Start: 2022-04-06 | End: 2022-04-06 | Stop reason: SURG

## 2022-04-06 RX ADMIN — PROPOFOL 60 MG: 10 INJECTION, EMULSION INTRAVENOUS at 13:17

## 2022-04-06 RX ADMIN — LIDOCAINE HYDROCHLORIDE 20 MG: 10 INJECTION, SOLUTION EPIDURAL; INFILTRATION; INTRACAUDAL; PERINEURAL at 13:17

## 2022-04-06 RX ADMIN — SODIUM CHLORIDE 9 ML/HR: 9 INJECTION, SOLUTION INTRAVENOUS at 12:19

## 2022-04-06 RX ADMIN — PROPOFOL 80 MG: 10 INJECTION, EMULSION INTRAVENOUS at 13:52

## 2022-04-06 RX ADMIN — LIDOCAINE HYDROCHLORIDE 20 MG: 10 INJECTION, SOLUTION EPIDURAL; INFILTRATION; INTRACAUDAL; PERINEURAL at 13:52

## 2022-04-06 NOTE — ANESTHESIA PREPROCEDURE EVALUATION
Anesthesia Evaluation     Patient summary reviewed and Nursing notes reviewed   no history of anesthetic complications:  NPO Solid Status: > 8 hours  NPO Liquid Status: > 8 hours           Airway   Mallampati: III  Large neck circumference, Small opening and Difficult intubation highly probable  Dental      Pulmonary    Cardiovascular     ECG reviewed  Patient on routine beta blocker    (+) hypertension,       Neuro/Psych  (+) seizures, psychiatric history Depression,    GI/Hepatic/Renal/Endo    (+) morbid obesity, GERD,  diabetes mellitus,     Musculoskeletal     Abdominal    Substance History      OB/GYN          Other   arthritis, blood dyscrasia anemia thrombocytopenia,   history of cancer    ROS/Med Hx Other: Pneumonitis, oligodendroglioma brain tumor, hyperglycemia, low vit D, allergies    [facility]  CRANIOTOMY FOR TUMOR COLONOSCOPY                 Anesthesia Plan    ASA 4     MAC   (Patient identified; pre-operative vital signs, all relevant labs/studies, complete medical/surgical/anesthetic history, full medication list, full allergy list, and NPO status obtained/reviewed; physical assessment performed; anesthetic options, side effects, potential complications, risks, and benefits discussed; questions answered; written anesthesia consent obtained; patient cleared for procedure; anesthesia machine and equipment checked and functioning)    Anesthetic plan, all risks, benefits, and alternatives have been provided, discussed and informed consent has been obtained with: patient.    Plan discussed with CAA.        CODE STATUS:

## 2022-04-06 NOTE — DISCHARGE INSTRUCTIONS
Do not drink alcohol, drive, operate any heavy machinery or power tools, or make any important/legal decisions for the next 24 hours.    Call you doctor immediately if you experience severe chest pain, shortness of breath, bleeding or coughing up blood, or fever over 101 F.    Diet: Nothing by mouth until  4:00pm    After a bronchoscopy, you may experience a scratchy throat. This will gradually get better. You may gargle with warm salt water for this after the time noted above is over.     A responsible adult should stay with you and you should rest quietly for the rest of the day. Follow up with MD as instructed.      [phone]

## 2022-04-06 NOTE — OP NOTE
Bronchoscopy Procedure Note    Timeout was done appropriately by staff    Procedure:  1. Bronchoscopy, Diagnostic  2. Right lung washing    Pre-Operative Diagnosis: Cough and dyspnea and abnormal CAT scan    Post-Operative Diagnosis:   No endobronchial lesions or secretions were noted  Mild inflammatory changes  Moderate to severe reactive airway disease suggestive of possible asthma  Right lung washing    Anesthesia: Moderate Sedation    Procedure Details: Patient was consented for the procedure with all risk and benefit of the procedure explained in detail.  Patient was given the opportunity to ask questions and all concerns were answered.  The bronchocope was inserted into the main airway via the oropharynx. An anatomical survey was done of the main airways and the subsegmental bronchus to at least the first subsegmental level of all five lobes of both lungs.  The findings are reported below.  A bronchoalveolar lavage was performed using aliquots of normal saline instilled into the airways then aspirated back.    Findings:  Bronchoscope passed into oral cavity to the level of the vocal cords.  Lidocaine used for local anesthetic over vocal cords.  Bronchoscope was passed between the vocal cords into the trachea.  All airways were visualized to at least the first subsegment level of all 5 lobes of both lungs.     No endobronchial lesions or secretions were noted  Mild inflammatory changes  Moderate to severe reactive airway disease suggestive of possible asthma  Right lung washing    Patient tolerated procedure well    No blood loss           Specimens:  Sent serosanguinous fluid                Complications:  None; patient tolerated the procedure well.           Disposition: PACU - hemodynamically stable.      Patient tolerated the procedure well.    Drew Alcantar MD  4/6/2022  17:22 EDT

## 2022-04-06 NOTE — ANESTHESIA POSTPROCEDURE EVALUATION
Patient: Cindy Cortes    Procedure Summary     Date: 04/06/22 Room / Location: [facility] ENDOSCOPY 2 / [facility] ENDOSCOPY    Anesthesia Start: 1305 Anesthesia Stop: 1400    Procedure: BRONCHOSCOPY with bronchial washing (N/A Bronchus) Diagnosis:       Pneumonitis      (Pneumonitis [J18.9])    Surgeons: Drew Alcantar MD Provider: Oral Navarrete MD    Anesthesia Type: MAC ASA Status: 4          Anesthesia Type: MAC    Vitals  Vitals Value Taken Time   /47 04/06/22 1433   Temp     Pulse 65 04/06/22 1433   Resp 16 04/06/22 1433   SpO2 95 % 04/06/22 1433           Post Anesthesia Care and Evaluation    Patient location during evaluation: PACU  Patient participation: complete - patient participated  Level of consciousness: awake  Pain scale: See nurse's notes for pain score.  Pain management: adequate  Airway patency: patent  Anesthetic complications: No anesthetic complications  PONV Status: none  Cardiovascular status: acceptable  Respiratory status: acceptable  Hydration status: acceptable    Comments: Patient seen and examined postoperatively; vital signs stable; SpO2 greater than or equal to 90%; cardiopulmonary status stable; nausea/vomiting adequately controlled; pain adequately controlled; no apparent anesthesia complications; patient discharged from anesthesia care when discharge criteria were met

## 2022-04-06 NOTE — H&P
Patient Care Team:  Annamarie Monroy APRN as PCP - General (Nurse Practitioner)    Chief complaint cough             ASSESSMENT & PLAN:       Worsening dyspnea in the last few weeks with CAT scan on 3/15/2022 showed bilateral groundglass opacities      Pulmonary function test 4/1/2022 showed FVC of 2.1 L which is 66%, FEV1 1.6 L which is 64%, FEV1/FVC ratio 77, residual volume 87%, total lung capacity 83%, diffusion capacity 51%     grade 2 oligodendroglioma  -s/p GTR, XRT and adjuvant chemotherapy is ongoing.  She has 2 cycles remaning.  -S/p craniotomy with tumor resection 5/7/2021        Essential hypertension  Prediabetes, currently hyperglycemic  Depression  History of dyslipidemia  Gastroesophageal reflux disease with hiatal herni  Primary osteoarthritis of both knees  Morbid obesity with BMI 64.92 kg/m², adult           Plan  Diagnostic bronchoscopy to rule out opportunistic infection           History    56-year-old female presents with cough and Worsening dyspnea in the last few weeks with CAT scan on 3/15/2022 showed bilateral groundglass opacities      Pulmonary function test 4/1/2022 showed FVC of 2.1 L which is 66%, FEV1 1.6 L which is 64%, FEV1/FVC ratio 77, residual volume 87%, total lung capacity 83%, diffusion capacity 51%     grade 2 oligodendroglioma  -s/p GTR, XRT and adjuvant chemotherapy is ongoing.  She has 2 cycles remaning.  -S/p craniotomy with tumor resection 5/7/2021        Essential hypertension  Prediabetes, currently hyperglycemic  Depression  History of dyslipidemia  Gastroesophageal reflux disease with hiatal herni  Primary osteoarthritis of both knees  Morbid obesity with BMI 64.92 kg/m², adult        Pneumonitis      Past Medical History:   Diagnosis Date   • Arthritis    • GERD (gastroesophageal reflux disease)    • Hypertension    • Oligodendroglioma (HCC)    • Seizure (HCC)    • Type 2 diabetes mellitus with hyperglycemia, without long-term current use of insulin (HCC)  05/12/2021       Past Surgical History:   Procedure Laterality Date   • COLONOSCOPY     • CRANIOTOMY FOR TUMOR Right 05/06/2021    Procedure: CRANIOTOMY FOR TUMOR RESECTION WITH STEREOTACTIC;  Surgeon: Jluis Felix MD;  Location: [facility] MAIN OR;  Service: Neurosurgery;  Laterality: Right;       Family History   Problem Relation Age of Onset   • Kidney disease Mother    • Prostate cancer Brother    • Breast cancer Maternal Aunt    • Colon cancer Maternal Aunt    • Breast cancer Niece    • Breast cancer Cousin        Social History     Socioeconomic History   • Marital status: Single   Tobacco Use   • Smoking status: Never Smoker   • Smokeless tobacco: Never Used   Vaping Use   • Vaping Use: Never used   Substance and Sexual Activity   • Alcohol use: Not Currently     Alcohol/week: 1.0 standard drink     Types: 1 Cans of beer per week     Comment: socially   • Drug use: Never   • Sexual activity: Yes     Partners: Male       Review of Systems  Review of Systems   Respiratory: Positive for cough and shortness of breath.    Cardiovascular: Positive for palpitations and leg swelling.        Vital Signs  Temp:  [98.1 °F (36.7 °C)] 98.1 °F (36.7 °C)  Heart Rate:  [64-74] 65  Resp:  [11-16] 16  BP: (110-146)/(39-47) 116/47    Physical Exam:  Physical Exam  Cardiovascular:      Heart sounds: Murmur heard.   Pulmonary:      Breath sounds: Rhonchi and rales present.           Radiology  Imaging Results (Last 24 Hours)     ** No results found for the last 24 hours. **          Labs:  Results from last 7 days   Lab Units 04/04/22  0936   WBC 10*3/mm3 4.42   HEMOGLOBIN g/dL 8.0*   HEMATOCRIT % 24.6*   PLATELETS 10*3/mm3 80*     Results from last 7 days   Lab Units 04/01/22  1458   CREATININE mg/dL 0.50*                                       Meds:   SCHEDULE  heparin, , ,   ipratropium, 0.5 mg, Nebulization, 4x Daily - RT  lidocaine PF 2%, , ,       Infusions  sodium chloride, 9 mL/hr, Last Rate: 9 mL/hr (04/06/22  1305)      PRNs        I discussed the patients findings and my recommendations with patient and primary care team.     Drew Alcantar MD  04/06/22  17:20 EDT    Time: Critical care 70 min

## 2022-04-07 ENCOUNTER — LAB (OUTPATIENT)
Dept: LAB | Facility: HOSPITAL | Age: 57
End: 2022-04-07

## 2022-04-07 DIAGNOSIS — D64.9 ANEMIA, UNSPECIFIED TYPE: ICD-10-CM

## 2022-04-07 LAB
BASOPHILS # BLD AUTO: 0.01 10*3/MM3 (ref 0–0.2)
BASOPHILS NFR BLD AUTO: 0.2 % (ref 0–1.5)
DEPRECATED RDW RBC AUTO: 74.6 FL (ref 37–54)
EOSINOPHIL # BLD AUTO: 0.11 10*3/MM3 (ref 0–0.4)
EOSINOPHIL NFR BLD AUTO: 2.1 % (ref 0.3–6.2)
ERYTHROCYTE [DISTWIDTH] IN BLOOD BY AUTOMATED COUNT: 21.8 % (ref 12.3–15.4)
HCT VFR BLD AUTO: 25.4 % (ref 34–46.6)
HGB BLD-MCNC: 8.1 G/DL (ref 12–15.9)
LYMPHOCYTES # BLD AUTO: 0.56 10*3/MM3 (ref 0.7–3.1)
LYMPHOCYTES NFR BLD AUTO: 10.7 % (ref 19.6–45.3)
MCH RBC QN AUTO: 33.1 PG (ref 26.6–33)
MCHC RBC AUTO-ENTMCNC: 31.9 G/DL (ref 31.5–35.7)
MCV RBC AUTO: 103.7 FL (ref 79–97)
MONOCYTES # BLD AUTO: 0.44 10*3/MM3 (ref 0.1–0.9)
MONOCYTES NFR BLD AUTO: 8.4 % (ref 5–12)
NEUTROPHILS NFR BLD AUTO: 4.1 10*3/MM3 (ref 1.7–7)
NEUTROPHILS NFR BLD AUTO: 78.6 % (ref 42.7–76)
PLATELET # BLD AUTO: 105 10*3/MM3 (ref 140–450)
PMV BLD AUTO: 9.5 FL (ref 6–12)
RBC # BLD AUTO: 2.45 10*6/MM3 (ref 3.77–5.28)
WBC NRBC COR # BLD: 5.22 10*3/MM3 (ref 3.4–10.8)

## 2022-04-07 PROCEDURE — 85025 COMPLETE CBC W/AUTO DIFF WBC: CPT

## 2022-04-07 PROCEDURE — 36415 COLL VENOUS BLD VENIPUNCTURE: CPT

## 2022-04-07 RX ORDER — ALBUTEROL SULFATE 2.5 MG/3ML
2.5 SOLUTION RESPIRATORY (INHALATION) EVERY 4 HOURS PRN
Status: DISCONTINUED | OUTPATIENT
Start: 2022-04-07 | End: 2022-04-07 | Stop reason: HOSPADM

## 2022-04-08 LAB
LAB AP CASE REPORT: NORMAL
P JIROVECII DNA L RESP QL NAA+NON-PROBE: NEGATIVE
PATH REPORT.FINAL DX SPEC: NORMAL
PATH REPORT.GROSS SPEC: NORMAL
REF LAB TEST METHOD: NORMAL

## 2022-04-08 NOTE — PROGRESS NOTES
HEMATOLOGY ONCOLOGY OUTPATIENT FOLLOW UP      Patient name: Cindy Cortes  : 1965  MRN: [id number]  Primary Care Physician: Annamarie Monroy APRN  Referring Physician: Annamarie Monroy APRN  Reason For Consult:     Chief Complaint   Patient presents with   • Follow-up     Oligodendroglioma     HPI:   History of Present Illness:  Cindy Cortes is 56 y.o. female who presented to our office on 06/10/21 for consultation regarding Oligodendroglioma    Patient is a 56 y.o. female who was referred to us for treatment options regarding recent diagnosis of grade 2 oligodendroglioma.  This was IDH [id number] mutated, 1P 19 Q codeleted.  Patient initially presented with seizures and a CT head was obtained that showed vasogenic edema and a right frontal lobe mass MRI was obtained after this.  2021 -MRI of the brain shows 2.1 x 4.4 x 3.3 cm right frontal lobe mass with eccentric cystic or necrotic component with surrounding vasogenic edema and a focal 3 mm right to left midline shift.  Patient was started on Keppra, steroids plan was made for elective craniotomy and surgical resection.    2021 craniotomy of the right frontal lobe, microscopic resection of tumor mass.  Pathology results oligodendroglioma WHO grade 2, IDH1 R132H mutation by immunohistochemistry, 1p19q codeletion by FISH.    2021 -status post resection of the right frontal lobe mass.  Expected postop blood product. resolution of midline shift.    2021 - Started radiation adjuvantly.    2021 - last day of radiation.    2021 - C1 D8 with vincristin started procarbazine  10/7/2021 - C1 D29 Vincristine    10/21/2021 - C2D1 PCV  10/28/2021 -cycle 2-day 8 with vincristine  Started procarbazine  Held procarbazine for a few days with nausea  2021 -patient in the hospital with significant pancytopenia needing blood transfusion.    2021 -vincristine cycle 2-day 29.  This has been delayed with ongoing  cytopenias and hospitalization.    12/27/2021 - C3 D1 12/30/2021 1/6/2022 - C3D8 vincristine.    2/21/2022 - C4 D1 decrease dose of Lomustine.    Continued myelosuppression afterwards  4/1/2022 -CT imaging with interval development of relatively diffuse groundglass opacity patchy areas of sparing.  Bronchoscopy negative for any concerning findings    Subjective:  Patient still has shortness of breath, fatigue.    The following portions of the patient's history were reviewed and updated as appropriate: allergies, current medications, past family history, past medical history, past social history, past surgical history and problem list.    Past Medical History:   Diagnosis Date   • Arthritis    • GERD (gastroesophageal reflux disease)    • Hypertension    • Oligodendroglioma (HCC)    • Seizure (HCC)    • Type 2 diabetes mellitus with hyperglycemia, without long-term current use of insulin (HCC) 05/12/2021       Past Surgical History:   Procedure Laterality Date   • BRONCHOSCOPY N/A 4/6/2022    Procedure: BRONCHOSCOPY with bronchial washing;  Surgeon: Drew Alcantar MD;  Location: [facility] ENDOSCOPY;  Service: Pulmonary;  Laterality: N/A;  pneumonia   • COLONOSCOPY     • CRANIOTOMY FOR TUMOR Right 05/06/2021    Procedure: CRANIOTOMY FOR TUMOR RESECTION WITH STEREOTACTIC;  Surgeon: Jluis Felix MD;  Location: [facility] MAIN OR;  Service: Neurosurgery;  Laterality: Right;         Current Outpatient Medications:   •  albuterol sulfate  (90 Base) MCG/ACT inhaler, Inhale 2 puffs Every 4 (Four) Hours As Needed for Wheezing., Disp: 18 g, Rfl: 2  •  Alcohol Swabs (Alcohol Wipes) 70 % pads, Apply 1 each topically to the appropriate area as directed 4 (Four) Times a Day., Disp: , Rfl:   •  amLODIPine (NORVASC) 10 MG tablet, Take 1 tablet by mouth Daily., Disp: 30 tablet, Rfl: 2  •  Budeson-Glycopyrrol-Formoterol (Breztri Aerosphere) 160-9-4.8 MCG/ACT aerosol inhaler, Inhale 2 puffs 2 (Two) Times a Day. Indications:  Lot#[id number] Exp: 8/30/23, Disp: 1 each, Rfl: 0  •  doxycycline (VIBRAMYCIN) 100 MG capsule, Take 1 capsule by mouth 2 (Two) Times a Day., Disp: 10 capsule, Rfl: 0  •  ferrous sulfate 325 (65 FE) MG tablet, Take 1 tablet by mouth Daily With Breakfast., Disp: , Rfl:   •  fluticasone (Flovent HFA) 44 MCG/ACT inhaler, Inhale 1 puff 2 (Two) Times a Day As Needed (cough)., Disp: 10.6 g, Rfl: 1  •  folic acid (FOLVITE) 1 MG tablet, TAKE TWO TABLETS BY MOUTH EVERY MORNING, THEN TWO TABLETS EVERY EVENING, Disp: 360 tablet, Rfl: 0  •  glucose blood (OneTouch Verio) test strip, 1 each by Other route 4 (Four) Times a Day Before Meals & at Bedtime. Use as instructed, Disp: 200 each, Rfl: 1  •  levETIRAcetam (KEPPRA) 750 MG tablet, Take 1 tablet by mouth 2 (Two) Times a Day., Disp: 60 tablet, Rfl: 2  •  lidocaine-prilocaine (EMLA) 2.5-2.5 % cream, Apply  topically to the appropriate area as directed Every 2 (Two) Hours As Needed for Mild Pain ., Disp: 5 g, Rfl: 0  •  metoprolol tartrate (LOPRESSOR) 25 MG tablet, Take 0.5 tablets by mouth Every 8 (Eight) Hours., Disp: 45 tablet, Rfl: 2  •  ondansetron (Zofran) 8 MG tablet, Take 1 tablet by mouth Every 8 (Eight) Hours As Needed for Nausea or Vomiting., Disp: 30 tablet, Rfl: 3  •  oxybutynin (DITROPAN) 5 MG tablet, , Disp: , Rfl:   •  venlafaxine XR (EFFEXOR-XR) 75 MG 24 hr capsule, Take 75 mg by mouth Daily. Take DOS, Disp: , Rfl: 3  •  vitamin D (ERGOCALCIFEROL) 1.25 MG (55160 UT) capsule capsule, Take 50,000 Units by mouth Every 7 (Seven) Days.  , Disp: , Rfl:   •  predniSONE (DELTASONE) 20 MG tablet, Take 2 tablets by mouth Daily for 5 days., Disp: 10 tablet, Rfl: 0    Current Facility-Administered Medications:   •  diphenhydrAMINE (BENADRYL) injection 25 mg, 25 mg, Intravenous, Once, Emilie Workman MD    Facility-Administered Medications Ordered in Other Visits:   •  heparin injection 500 Units, 500 Units, Intravenous, PRN, Emilie Workman MD  •  sodium chloride 0.9 % flush 10  "mL, 10 mL, Intravenous, PRN, Emilie Workman MD  •  sodium chloride 0.9 % infusion 250 mL, 250 mL, Intravenous, PRN, Emilie Workman MD    Current outpatient and discharge medications have been reconciled for the patient.  Reviewed by: Emilie Workman MD    No Known Allergies    Family History   Problem Relation Age of Onset   • Kidney disease Mother    • Prostate cancer Brother    • Breast cancer Maternal Aunt    • Colon cancer Maternal Aunt    • Breast cancer Niece    • Breast cancer Cousin        Cancer-related family history includes Breast cancer in her cousin, maternal aunt, and niece; Colon cancer in her maternal aunt; Prostate cancer in her brother.    Social History     Tobacco Use   • Smoking status: Never Smoker   • Smokeless tobacco: Never Used   Vaping Use   • Vaping Use: Never used   Substance Use Topics   • Alcohol use: Not Currently     Alcohol/week: 1.0 standard drink     Types: 1 Cans of beer per week     Comment: socially   • Drug use: Never     Social History     Social History Narrative   • Not on file      Objective:    Vitals:    04/11/22 1205   BP: 100/49   Pulse: 67   Resp: 18   Temp: 96.8 °F (36 °C)   SpO2: 94%   Weight: 136 kg (299 lb)   Height: 157.5 cm (62\")   PainSc: 0-No pain     Body mass index is 54.69 kg/m².  ECOG  (0) Fully active, able to carry on all predisease performance without restriction    Physical Exam:     Physical Exam  Constitutional:       Appearance: Normal appearance. She is obese.   HENT:      Head: Normocephalic and atraumatic.   Eyes:      Pupils: Pupils are equal, round, and reactive to light.   Cardiovascular:      Rate and Rhythm: Normal rate and regular rhythm.      Pulses: Normal pulses.      Heart sounds: Normal heart sounds. No murmur heard.  Pulmonary:      Effort: Pulmonary effort is normal.      Breath sounds: Rhonchi present.   Abdominal:      General: There is no distension.      Palpations: Abdomen is soft. There is no mass.      Tenderness: There is no " abdominal tenderness.   Musculoskeletal:         General: No tenderness. Normal range of motion.      Cervical back: Normal range of motion and neck supple.   Skin:     General: Skin is warm.   Neurological:      General: No focal deficit present.      Mental Status: She is alert.   Psychiatric:         Mood and Affect: Mood normal.           Lab Results - Last 18 Months   Lab Units 04/11/22  1248 04/11/22  1104 04/07/22  0910   WBC 10*3/mm3 4.20 5.45 5.22   HEMOGLOBIN g/dL 7.4* 7.4* 8.1*   HEMATOCRIT % 21.0* 23.0* 25.4*   PLATELETS 10*3/mm3 103* 126* 105*   MCV fL 94.6 102.2* 103.7*     Lab Results - Last 18 Months   Lab Units 04/01/22  1458 03/01/22  1136 02/28/22  0954 01/27/22  1246   SODIUM mmol/L  --  137 138 137   POTASSIUM mmol/L  --  3.8 4.4 4.3   CHLORIDE mmol/L  --  101 101 101   CO2 mmol/L  --  25.0 26.0 28.0   BUN mg/dL  --  14 13 16   CREATININE mg/dL 0.50* 0.85 0.82 0.78   CALCIUM mg/dL  --  9.3 9.2 9.3   BILIRUBIN mg/dL  --  1.0 0.9 0.8   ALK PHOS U/L  --  107 102 81   ALT (SGPT) U/L  --  17 15 23   AST (SGOT) U/L  --  26 26 30   GLUCOSE mg/dL  --  150* 134* 121*       Lab Results   Component Value Date    GLUCOSE 150 (H) 03/01/2022    BUN 14 03/01/2022    CREATININE 0.50 (L) 04/01/2022    EGFRIFNONA 77 01/27/2022    BCR 16.5 03/01/2022    K 3.8 03/01/2022    CO2 25.0 03/01/2022    CALCIUM 9.3 03/01/2022    PROTENTOTREF 6.4 12/15/2021    ALBUMIN 3.70 03/01/2022    LABIL2 1.1 12/15/2021    AST 26 03/01/2022    ALT 17 03/01/2022       Lab Results - Last 18 Months   Lab Units 08/27/21  0738 05/04/21  0922   INR  0.96 0.96   APTT seconds 25.6 20.7*       Lab Results   Component Value Date    IRON 126 12/15/2021    TIBC 322 12/15/2021    FERRITIN 672.00 (H) 10/27/2021       Lab Results   Component Value Date    ZYLOONHS42 332 12/15/2021       Lab Results   Component Value Date    PTT 25.6 08/27/2021    INR 0.96 08/27/2021     CT Chest With Contrast Diagnostic    Result Date: 4/1/2022   1. Interval  "development of relatively diffuse groundglass opacity with patchy areas of sparing. Findings are nonspecific and differential would include atypical, viral infections, drug toxicity, hypersensitivity pneumonitis.    Electronically Signed By-Efrain Rico MD On:4/1/2022 3:47 PM This report was finalized on [id number] by  Efrain Rico MD.    MRI Brain With & Without Contrast    Result Date: 3/14/2022  1.Postsurgical changes right frontal lobe similar to prior study. There is less enhancement around the surgical cavity as compared to prior study. 2.Periventricular white matter changes as well as some vasogenic edema in the right frontal lobe which has been suggested. 3.Pansinus disease which has progressed since prior study. Electronically Signed: Paxton Kolb MD 3/14/2022 18:09 EDT    XR Chest PA & Lateral    Result Date: 4/4/2022   1. Diffuse groundglass pulmonary infiltrates throughout both lungs, most likely representing pneumonia although this finding is nonspecific.  Electronically Signed By-Yang Kan MD On:4/4/2022 12:14 PM This report was finalized on [id number] by  Yang Kan MD.    Pathology results  Outside Report, Addendum   Integrated Diagnosis: Oligodendroglioma WHO Grade II  IDH1 R132H mutation by Immunohistochemistry; 1p19q co-deletion by FISH  See attached report from [facility]   Addendum electronically signed by Cecilia Chaudhary on 5/28/2021 at 0824   Final Diagnosis   Specimen #1 (\"Brain tumor right frontal lobe,\" biopsy):    Diffuse glioma (WHO grade II)    See comment     Specimen #2 (\"Brain tumor right frontal lobe,\" biopsy):    Diffuse glioma (WHO grade II)    See comment     Specimen #3 (Brain tumor right frontal lobe, resection):    Diffuse glioma, IDH-mutant (WHO grade II)    See attached report from [facility]         Assessment/Plan     Patient is a 56-year-old female with oligodendroglioma grade 2 status post " gross total resection    Oligodendroglioma  Previously I had an extensive discussion with the patient about treatment options, prognosis.  We had an extensive discussion at that time with several options.  This is summarized here below    I previously discussed with her that there are findings from RTOG 9802 clinical trial which showed survival advantage with radiation followed by chemotherapy after surgical resection of grade 2 oligodendrogliomas.  Chemotherapy with the PCV regimen in this trial conferred a survival advantage over radiotherapy alone with a median overall survival of 13.3 versus 7.8 years.  In subset analysis benefit was more significant  Histologic oligodendroglioma is as compared to other low-grade gliomas.  Molecular analysis post hoc analysis also showed survival advantage in molecularly confirmed IDH mutant, 1p19q codeleted oligodendrogliomas.    PCV can be a toxic regimen however survival advantage is only been shown in randomized control trial using this particular regimen.  The other option would be temozolomide which has advantages over PCV with ease of administration, better tolerance and efficacy in combination with radiation therapy and other types of CNS tumors and gliomas.    The other option I had discussed with her was clinical trial with a CODEL study which is comparing radiation alone versus radiation with Temodar versus radiation with PCV in this patient population.  Patient however is not very interested in enrolling in a clinical trial.  She is wanting to pursue the most aggressive treatment option for her to reduce the chances of recurrence of her tumor.    Lastly also discussed with her the option of surveillance and observation with serial MRIs however also discussed that in above studies the progression free survival is around 50% for 5 years.     Based on her above discussion patient decided to pursue aggressive treatment with radiation followed by chemotherapy.  We would  use the RTOG 9802 regimen with radiation followed by chemotherapy with PCV.  We have evidence that vincristine does not cross the blood-brain barrier significantly and hence if there is toxicity we can choose to omit the drug.  Case was discussed with Dr. Cruz and started sequential radiation and chemo    Patient completed radiation without complication.    Based on above patient decided to proceed with adjuvant PCV.   Now patient has had clinically significant pancytopenia needing blood transfusion hospital admission.  She has completed cycle 2.  Pancytopenia is improved  For cycle 3 dose reduced procarbazine and lomustine by 20%.  No worsening in cytopenias per labs today.  Continue vincristine per schedule repeat CBC weekly  Repeat MRI brain has been scheduled for 3/2022  For C4, decrease Lomustine to 110.  Complete cycle  Now with prolonged myelosuppression, shortness of breath will stop chemotherapy going forward.  I discussed with her for the risks of continuing chemotherapy versus benefits which at this point she is worsening in terms of myelosuppression, likely pneumonitis with chemotherapy.    nausea  Continue zofran as needed     Shortness of breath  CT of the chest with pneumonitis.  Could also be related to anemia.  Give a short course of steroids, continue albuterol, steroid inhaler per pulmonology    Anemia  There could be possibility of hemolysis nonimmune.  There have been case reports with her chemotherapy regimen.  She was given 5-day course of prednisone.  Repeat haptoglobin improved  Now repeat hemolysis markers, transfuse 1 unit packed red cell check for iron folic acid B12.    Follow-up in 2 weeks    Time spent on encounter including record review, history taking, exam, discussion, counseling and documentation at: 40 minutes  I have reviewed and confirmed the accuracy of the patient's history: Chief complaint, HPI, ROS, Subjective and Past Family Social History as entered by the MA/LPN/RN.      Emilie Workman MD 04/11/22

## 2022-04-09 LAB
BACTERIA SPEC RESP CULT: ABNORMAL
BACTERIA SPEC RESP CULT: ABNORMAL
GRAM STN SPEC: ABNORMAL

## 2022-04-11 ENCOUNTER — LAB (OUTPATIENT)
Dept: LAB | Facility: HOSPITAL | Age: 57
End: 2022-04-11

## 2022-04-11 ENCOUNTER — SPECIALTY PHARMACY (OUTPATIENT)
Dept: PHARMACY | Facility: HOSPITAL | Age: 57
End: 2022-04-11

## 2022-04-11 ENCOUNTER — OFFICE VISIT (OUTPATIENT)
Dept: ONCOLOGY | Facility: CLINIC | Age: 57
End: 2022-04-11

## 2022-04-11 ENCOUNTER — HOSPITAL ENCOUNTER (OUTPATIENT)
Dept: INFUSION THERAPY | Facility: HOSPITAL | Age: 57
Setting detail: INFUSION SERIES
Discharge: HOME | End: 2022-04-11

## 2022-04-11 VITALS
SYSTOLIC BLOOD PRESSURE: 100 MMHG | HEIGHT: 62 IN | HEART RATE: 67 BPM | DIASTOLIC BLOOD PRESSURE: 49 MMHG | WEIGHT: 293 LBS | TEMPERATURE: 96.8 F | BODY MASS INDEX: 53.92 KG/M2 | RESPIRATION RATE: 18 BRPM | OXYGEN SATURATION: 94 %

## 2022-04-11 VITALS
RESPIRATION RATE: 20 BRPM | TEMPERATURE: 97.5 F | SYSTOLIC BLOOD PRESSURE: 117 MMHG | HEART RATE: 58 BPM | OXYGEN SATURATION: 98 % | DIASTOLIC BLOOD PRESSURE: 73 MMHG

## 2022-04-11 DIAGNOSIS — T45.1X5A CHEMOTHERAPY-INDUCED THROMBOCYTOPENIA: Primary | ICD-10-CM

## 2022-04-11 DIAGNOSIS — C71.9 OLIGODENDROGLIOMA: ICD-10-CM

## 2022-04-11 DIAGNOSIS — D69.59 CHEMOTHERAPY-INDUCED THROMBOCYTOPENIA: Primary | ICD-10-CM

## 2022-04-11 DIAGNOSIS — C71.1 OLIGOASTROCYTOMA OF FRONTAL LOBE: ICD-10-CM

## 2022-04-11 DIAGNOSIS — Z95.828 PORT-A-CATH IN PLACE: ICD-10-CM

## 2022-04-11 DIAGNOSIS — C71.1 OLIGOASTROCYTOMA OF FRONTAL LOBE: Primary | ICD-10-CM

## 2022-04-11 LAB
ABO GROUP BLD: NORMAL
ANTI-FYA: NORMAL
BASOPHILS # BLD AUTO: 0 10*3/MM3 (ref 0–0.2)
BASOPHILS # BLD AUTO: 0.01 10*3/MM3 (ref 0–0.2)
BASOPHILS NFR BLD AUTO: 0.2 % (ref 0–1.5)
BASOPHILS NFR BLD AUTO: 0.6 % (ref 0–1.5)
BB HOLD TUBE: NORMAL
BLD GP AB SCN SERPL QL: POSITIVE
DEPRECATED RDW RBC AUTO: 75.7 FL (ref 37–54)
DEPRECATED RDW RBC AUTO: 75.8 FL (ref 37–54)
EOSINOPHIL # BLD AUTO: 0.1 10*3/MM3 (ref 0–0.4)
EOSINOPHIL # BLD AUTO: 0.11 10*3/MM3 (ref 0–0.4)
EOSINOPHIL NFR BLD AUTO: 1.9 % (ref 0.3–6.2)
EOSINOPHIL NFR BLD AUTO: 2 % (ref 0.3–6.2)
ERYTHROCYTE [DISTWIDTH] IN BLOOD BY AUTOMATED COUNT: 22.2 % (ref 12.3–15.4)
ERYTHROCYTE [DISTWIDTH] IN BLOOD BY AUTOMATED COUNT: 23.6 % (ref 12.3–15.4)
FERRITIN SERPL-MCNC: 1044 NG/ML (ref 13–150)
FOLATE SERPL-MCNC: >20 NG/ML (ref 4.78–24.2)
HAPTOGLOB SERPL-MCNC: 97 MG/DL (ref 30–200)
HCT VFR BLD AUTO: 21 % (ref 34–46.6)
HCT VFR BLD AUTO: 23 % (ref 34–46.6)
HGB BLD-MCNC: 7.4 G/DL (ref 12–15.9)
HGB BLD-MCNC: 7.4 G/DL (ref 12–15.9)
HOLD SPECIMEN: NORMAL
HOLD SPECIMEN: NORMAL
IRON 24H UR-MRATE: 130 MCG/DL (ref 37–145)
IRON SATN MFR SERPL: 39 % (ref 20–50)
LDH SERPL-CCNC: 175 U/L (ref 135–214)
LYMPHOCYTES # BLD AUTO: 0.4 10*3/MM3 (ref 0.7–3.1)
LYMPHOCYTES # BLD AUTO: 0.66 10*3/MM3 (ref 0.7–3.1)
LYMPHOCYTES NFR BLD AUTO: 12.1 % (ref 19.6–45.3)
LYMPHOCYTES NFR BLD AUTO: 9.5 % (ref 19.6–45.3)
MCH RBC QN AUTO: 32.9 PG (ref 26.6–33)
MCH RBC QN AUTO: 33.5 PG (ref 26.6–33)
MCHC RBC AUTO-ENTMCNC: 32.2 G/DL (ref 31.5–35.7)
MCHC RBC AUTO-ENTMCNC: 35.4 G/DL (ref 31.5–35.7)
MCV RBC AUTO: 102.2 FL (ref 79–97)
MCV RBC AUTO: 94.6 FL (ref 79–97)
MONOCYTES # BLD AUTO: 0.3 10*3/MM3 (ref 0.1–0.9)
MONOCYTES # BLD AUTO: 0.45 10*3/MM3 (ref 0.1–0.9)
MONOCYTES NFR BLD AUTO: 6.9 % (ref 5–12)
MONOCYTES NFR BLD AUTO: 8.3 % (ref 5–12)
NEUTROPHILS NFR BLD AUTO: 3.4 10*3/MM3 (ref 1.7–7)
NEUTROPHILS NFR BLD AUTO: 4.22 10*3/MM3 (ref 1.7–7)
NEUTROPHILS NFR BLD AUTO: 77.4 % (ref 42.7–76)
NEUTROPHILS NFR BLD AUTO: 81.1 % (ref 42.7–76)
NRBC BLD AUTO-RTO: 0.2 /100 WBC (ref 0–0.2)
PLATELET # BLD AUTO: 103 10*3/MM3 (ref 140–450)
PLATELET # BLD AUTO: 126 10*3/MM3 (ref 140–450)
PMV BLD AUTO: 7 FL (ref 6–12)
PMV BLD AUTO: 9.9 FL (ref 6–12)
RBC # BLD AUTO: 2.22 10*6/MM3 (ref 3.77–5.28)
RBC # BLD AUTO: 2.25 10*6/MM3 (ref 3.77–5.28)
RETICS # AUTO: 0.1 10*6/MM3 (ref 0.02–0.13)
RETICS/RBC NFR AUTO: 4.61 % (ref 0.7–1.9)
RH BLD: POSITIVE
T&S EXPIRATION DATE: NORMAL
TIBC SERPL-MCNC: 335 MCG/DL (ref 298–536)
TRANSFERRIN SERPL-MCNC: 225 MG/DL (ref 200–360)
VIT B12 BLD-MCNC: 328 PG/ML (ref 211–946)
WBC NRBC COR # BLD: 4.2 10*3/MM3 (ref 3.4–10.8)
WBC NRBC COR # BLD: 5.45 10*3/MM3 (ref 3.4–10.8)

## 2022-04-11 PROCEDURE — 86922 COMPATIBILITY TEST ANTIGLOB: CPT

## 2022-04-11 PROCEDURE — 36430 TRANSFUSION BLD/BLD COMPNT: CPT

## 2022-04-11 PROCEDURE — 86901 BLOOD TYPING SEROLOGIC RH(D): CPT | Performed by: INTERNAL MEDICINE

## 2022-04-11 PROCEDURE — 82746 ASSAY OF FOLIC ACID SERUM: CPT

## 2022-04-11 PROCEDURE — 82607 VITAMIN B-12: CPT

## 2022-04-11 PROCEDURE — 86850 RBC ANTIBODY SCREEN: CPT | Performed by: INTERNAL MEDICINE

## 2022-04-11 PROCEDURE — 84466 ASSAY OF TRANSFERRIN: CPT

## 2022-04-11 PROCEDURE — P9016 RBC LEUKOCYTES REDUCED: HCPCS

## 2022-04-11 PROCEDURE — 82728 ASSAY OF FERRITIN: CPT

## 2022-04-11 PROCEDURE — 86900 BLOOD TYPING SEROLOGIC ABO: CPT | Performed by: INTERNAL MEDICINE

## 2022-04-11 PROCEDURE — 36415 COLL VENOUS BLD VENIPUNCTURE: CPT

## 2022-04-11 PROCEDURE — 99215 OFFICE O/P EST HI 40 MIN: CPT | Performed by: INTERNAL MEDICINE

## 2022-04-11 PROCEDURE — 86900 BLOOD TYPING SEROLOGIC ABO: CPT

## 2022-04-11 PROCEDURE — 25010000002 HEPARIN LOCK FLUSH PER 10 UNITS: Performed by: INTERNAL MEDICINE

## 2022-04-11 PROCEDURE — 85025 COMPLETE CBC W/AUTO DIFF WBC: CPT

## 2022-04-11 PROCEDURE — 83010 ASSAY OF HAPTOGLOBIN QUANT: CPT

## 2022-04-11 PROCEDURE — 86902 BLOOD TYPE ANTIGEN DONOR EA: CPT

## 2022-04-11 PROCEDURE — 83540 ASSAY OF IRON: CPT

## 2022-04-11 PROCEDURE — 85045 AUTOMATED RETICULOCYTE COUNT: CPT

## 2022-04-11 PROCEDURE — 83615 LACTATE (LD) (LDH) ENZYME: CPT

## 2022-04-11 PROCEDURE — 86870 RBC ANTIBODY IDENTIFICATION: CPT | Performed by: INTERNAL MEDICINE

## 2022-04-11 PROCEDURE — 85025 COMPLETE CBC W/AUTO DIFF WBC: CPT | Performed by: INTERNAL MEDICINE

## 2022-04-11 RX ORDER — SODIUM CHLORIDE 0.9 % (FLUSH) 0.9 %
10 SYRINGE (ML) INJECTION AS NEEDED
Status: DISCONTINUED | OUTPATIENT
Start: 2022-04-11 | End: 2022-04-13 | Stop reason: HOSPADM

## 2022-04-11 RX ORDER — SODIUM CHLORIDE 9 MG/ML
250 INJECTION, SOLUTION INTRAVENOUS AS NEEDED
Status: DISCONTINUED | OUTPATIENT
Start: 2022-04-11 | End: 2022-04-13 | Stop reason: HOSPADM

## 2022-04-11 RX ORDER — HEPARIN SODIUM (PORCINE) LOCK FLUSH IV SOLN 100 UNIT/ML 100 UNIT/ML
500 SOLUTION INTRAVENOUS AS NEEDED
Status: CANCELLED | OUTPATIENT
Start: 2022-04-11

## 2022-04-11 RX ORDER — PREDNISONE 20 MG/1
40 TABLET ORAL DAILY
Qty: 10 TABLET | Refills: 0 | Status: SHIPPED | OUTPATIENT
Start: 2022-04-11 | End: 2022-04-16

## 2022-04-11 RX ORDER — HEPARIN SODIUM (PORCINE) LOCK FLUSH IV SOLN 100 UNIT/ML 100 UNIT/ML
500 SOLUTION INTRAVENOUS AS NEEDED
Status: DISCONTINUED | OUTPATIENT
Start: 2022-04-11 | End: 2022-04-13 | Stop reason: HOSPADM

## 2022-04-11 RX ORDER — SODIUM CHLORIDE 9 MG/ML
250 INJECTION, SOLUTION INTRAVENOUS AS NEEDED
Status: CANCELLED | OUTPATIENT
Start: 2022-04-11

## 2022-04-11 RX ORDER — SODIUM CHLORIDE 0.9 % (FLUSH) 0.9 %
10 SYRINGE (ML) INJECTION AS NEEDED
Status: CANCELLED | OUTPATIENT
Start: 2022-04-11

## 2022-04-11 RX ADMIN — HEPARIN SODIUM (PORCINE) LOCK FLUSH IV SOLN 100 UNIT/ML 500 UNITS: 100 SOLUTION at 20:08

## 2022-04-11 NOTE — PROGRESS NOTES
Specialty Pharmacy Note: PCV    PCV discontinued.  Called [facility] and spoke with Ashley to d/c existing orders.  Pharmacy will no longer follow patient. If oral treatment is appropriate in the future, please consult again.  Thanks,    Chapis NEWMAN, PharmD

## 2022-04-22 NOTE — PROGRESS NOTES
HEMATOLOGY ONCOLOGY OUTPATIENT FOLLOW UP      Patient name: Cindy Cortes  : 1965  MRN: [id number]  Primary Care Physician: Annamarie Monroy APRN  Referring Physician: Annamarie Monroy APRN  Reason For Consult:     Chief Complaint   Patient presents with   • Follow-up     Oligoastrocytoma of frontal lobe      HPI:   History of Present Illness:  Cindy Cortes is 56 y.o. female who presented to our office on 06/10/21 for consultation regarding Oligodendroglioma    Patient is a 56 y.o. female who was referred to us for treatment options regarding recent diagnosis of grade 2 oligodendroglioma.  This was IDH [id number] mutated, 1P 19 Q codeleted.  Patient initially presented with seizures and a CT head was obtained that showed vasogenic edema and a right frontal lobe mass MRI was obtained after this.  2021 -MRI of the brain shows 2.1 x 4.4 x 3.3 cm right frontal lobe mass with eccentric cystic or necrotic component with surrounding vasogenic edema and a focal 3 mm right to left midline shift.  Patient was started on Keppra, steroids plan was made for elective craniotomy and surgical resection.    2021 craniotomy of the right frontal lobe, microscopic resection of tumor mass.  Pathology results oligodendroglioma WHO grade 2, IDH1 R132H mutation by immunohistochemistry, 1p19q codeletion by FISH.    2021 -status post resection of the right frontal lobe mass.  Expected postop blood product. resolution of midline shift.    2021 - Started radiation adjuvantly.    2021 - last day of radiation.    2021 - C1 D8 with vincristin started procarbazine  10/7/2021 - C1 D29 Vincristine    10/21/2021 - C2D1 PCV  10/28/2021 -cycle 2-day 8 with vincristine  Started procarbazine  Held procarbazine for a few days with nausea  2021 -patient in the hospital with significant pancytopenia needing blood transfusion.    2021 -vincristine cycle 2-day 29.  This has been delayed with  ongoing cytopenias and hospitalization.    12/27/2021 - C3 D1 12/30/2021 1/6/2022 - C3D8 vincristine.    2/21/2022 - C4 D1 decrease dose of Lomustine.    Continued myelosuppression afterwards  4/1/2022 -CT imaging with interval development of relatively diffuse groundglass opacity patchy areas of sparing.  Bronchoscopy negative for any concerning findings  Permanently discontinued chemotherapy with possible pneumonitis, prolonged myelosuppression.    Subjective:  Patient still has shortness of breath, fatigue.  Slightly improved.    The following portions of the patient's history were reviewed and updated as appropriate: allergies, current medications, past family history, past medical history, past social history, past surgical history and problem list.    Past Medical History:   Diagnosis Date   • Arthritis    • GERD (gastroesophageal reflux disease)    • Hypertension    • Oligodendroglioma (HCC)    • Seizure (HCC)    • Type 2 diabetes mellitus with hyperglycemia, without long-term current use of insulin (HCC) 05/12/2021       Past Surgical History:   Procedure Laterality Date   • BRONCHOSCOPY N/A 4/6/2022    Procedure: BRONCHOSCOPY with bronchial washing;  Surgeon: Drew Alcantar MD;  Location: [facility];  Service: Pulmonary;  Laterality: N/A;  pneumonia   • COLONOSCOPY     • CRANIOTOMY FOR TUMOR Right 05/06/2021    Procedure: CRANIOTOMY FOR TUMOR RESECTION WITH STEREOTACTIC;  Surgeon: Jluis Felix MD;  Location: [facility] MAIN OR;  Service: Neurosurgery;  Laterality: Right;         Current Outpatient Medications:   •  albuterol sulfate  (90 Base) MCG/ACT inhaler, Inhale 2 puffs Every 4 (Four) Hours As Needed for Wheezing., Disp: 18 g, Rfl: 2  •  Alcohol Swabs (Alcohol Wipes) 70 % pads, Apply 1 each topically to the appropriate area as directed 4 (Four) Times a Day., Disp: , Rfl:   •  amLODIPine (NORVASC) 10 MG tablet, Take 1 tablet by mouth Daily., Disp: 30 tablet, Rfl: 2  •   Budeson-Glycopyrrol-Formoterol (Breztri Aerosphere) 160-9-4.8 MCG/ACT aerosol inhaler, Inhale 2 puffs 2 (Two) Times a Day. Indications: Lot#[id number] Exp: 8/30/23, Disp: 1 each, Rfl: 0  •  doxycycline (VIBRAMYCIN) 100 MG capsule, Take 1 capsule by mouth 2 (Two) Times a Day., Disp: 10 capsule, Rfl: 0  •  ferrous sulfate 325 (65 FE) MG tablet, Take 1 tablet by mouth Daily With Breakfast., Disp: , Rfl:   •  fluticasone (Flovent HFA) 44 MCG/ACT inhaler, Inhale 1 puff 2 (Two) Times a Day As Needed (cough)., Disp: 10.6 g, Rfl: 1  •  folic acid (FOLVITE) 1 MG tablet, TAKE TWO TABLETS BY MOUTH EVERY MORNING, THEN TWO TABLETS EVERY EVENING, Disp: 360 tablet, Rfl: 0  •  glucose blood (OneTouch Verio) test strip, 1 each by Other route 4 (Four) Times a Day Before Meals & at Bedtime. Use as instructed, Disp: 200 each, Rfl: 1  •  levETIRAcetam (KEPPRA) 750 MG tablet, Take 1 tablet by mouth 2 (Two) Times a Day., Disp: 60 tablet, Rfl: 2  •  lidocaine-prilocaine (EMLA) 2.5-2.5 % cream, Apply  topically to the appropriate area as directed Every 2 (Two) Hours As Needed for Mild Pain ., Disp: 5 g, Rfl: 0  •  metoprolol tartrate (LOPRESSOR) 25 MG tablet, Take 0.5 tablets by mouth Every 8 (Eight) Hours., Disp: 45 tablet, Rfl: 2  •  ondansetron (Zofran) 8 MG tablet, Take 1 tablet by mouth Every 8 (Eight) Hours As Needed for Nausea or Vomiting., Disp: 30 tablet, Rfl: 3  •  oxybutynin (DITROPAN) 5 MG tablet, , Disp: , Rfl:   •  venlafaxine XR (EFFEXOR-XR) 75 MG 24 hr capsule, Take 75 mg by mouth Daily. Take DOS, Disp: , Rfl: 3  •  vitamin D (ERGOCALCIFEROL) 1.25 MG (03026 UT) capsule capsule, Take 50,000 Units by mouth Every 7 (Seven) Days.  , Disp: , Rfl:     Current Facility-Administered Medications:   •  diphenhydrAMINE (BENADRYL) injection 25 mg, 25 mg, Intravenous, Once, Emilie Workman MD    Current outpatient and discharge medications have been reconciled for the patient.  Reviewed by: Emilie Workman MD    No Known Allergies    Family  "History   Problem Relation Age of Onset   • Kidney disease Mother    • Prostate cancer Brother    • Breast cancer Maternal Aunt    • Colon cancer Maternal Aunt    • Breast cancer Niece    • Breast cancer Cousin        Cancer-related family history includes Breast cancer in her cousin, maternal aunt, and niece; Colon cancer in her maternal aunt; Prostate cancer in her brother.    Social History     Tobacco Use   • Smoking status: Never Smoker   • Smokeless tobacco: Never Used   Vaping Use   • Vaping Use: Never used   Substance Use Topics   • Alcohol use: Not Currently     Alcohol/week: 1.0 standard drink     Types: 1 Cans of beer per week     Comment: socially   • Drug use: Never     Social History     Social History Narrative   • Not on file      Objective:    Vitals:    04/25/22 1140   BP: 117/80   Pulse: 73   Resp: 16   Temp: 96.9 °F (36.1 °C)   SpO2: 97%   Weight: 132 kg (291 lb)   Height: 157.5 cm (62\")   PainSc: 0-No pain     Body mass index is 53.22 kg/m².  ECOG  (0) Fully active, able to carry on all predisease performance without restriction    Physical Exam:     Physical Exam  Constitutional:       Appearance: Normal appearance. She is obese.   HENT:      Head: Normocephalic and atraumatic.   Eyes:      Pupils: Pupils are equal, round, and reactive to light.   Cardiovascular:      Rate and Rhythm: Normal rate and regular rhythm.      Pulses: Normal pulses.      Heart sounds: Normal heart sounds. No murmur heard.  Pulmonary:      Effort: Pulmonary effort is normal.      Breath sounds: Rhonchi present.   Abdominal:      General: There is no distension.      Palpations: Abdomen is soft. There is no mass.      Tenderness: There is no abdominal tenderness.   Musculoskeletal:         General: No tenderness. Normal range of motion.      Cervical back: Normal range of motion and neck supple.   Skin:     General: Skin is warm.   Neurological:      General: No focal deficit present.      Mental Status: She is alert. "   Psychiatric:         Mood and Affect: Mood normal.           Lab Results - Last 18 Months   Lab Units 04/25/22  1130 04/11/22  1248 04/11/22  1104   WBC 10*3/mm3 7.65 4.20 5.45   HEMOGLOBIN g/dL 7.0* 7.4* 7.4*   HEMATOCRIT % 22.1* 21.0* 23.0*   PLATELETS 10*3/mm3 141 103* 126*   MCV fL 106.3* 94.6 102.2*     Lab Results - Last 18 Months   Lab Units 04/01/22  1458 03/01/22  1136 02/28/22  0954 01/27/22  1246   SODIUM mmol/L  --  137 138 137   POTASSIUM mmol/L  --  3.8 4.4 4.3   CHLORIDE mmol/L  --  101 101 101   CO2 mmol/L  --  25.0 26.0 28.0   BUN mg/dL  --  14 13 16   CREATININE mg/dL 0.50* 0.85 0.82 0.78   CALCIUM mg/dL  --  9.3 9.2 9.3   BILIRUBIN mg/dL  --  1.0 0.9 0.8   ALK PHOS U/L  --  107 102 81   ALT (SGPT) U/L  --  17 15 23   AST (SGOT) U/L  --  26 26 30   GLUCOSE mg/dL  --  150* 134* 121*       Lab Results   Component Value Date    GLUCOSE 150 (H) 03/01/2022    BUN 14 03/01/2022    CREATININE 0.50 (L) 04/01/2022    EGFRIFNONA 77 01/27/2022    BCR 16.5 03/01/2022    K 3.8 03/01/2022    CO2 25.0 03/01/2022    CALCIUM 9.3 03/01/2022    PROTENTOTREF 6.4 12/15/2021    ALBUMIN 3.70 03/01/2022    LABIL2 1.1 12/15/2021    AST 26 03/01/2022    ALT 17 03/01/2022       Lab Results - Last 18 Months   Lab Units 08/27/21  0738 05/04/21 0922   INR  0.96 0.96   APTT seconds 25.6 20.7*       Lab Results   Component Value Date    IRON 130 04/11/2022    TIBC 335 04/11/2022    FERRITIN 1,044.00 (H) 04/11/2022       Lab Results   Component Value Date    [id number] 328 04/11/2022       Lab Results   Component Value Date    PTT 25.6 08/27/2021    INR 0.96 08/27/2021     CT Chest With Contrast Diagnostic    Result Date: 4/1/2022   1. Interval development of relatively diffuse groundglass opacity with patchy areas of sparing. Findings are nonspecific and differential would include atypical, viral infections, drug toxicity, hypersensitivity pneumonitis.    Electronically Signed By-Efrain Rico MD On:4/1/2022 3:47 PM This  "report was finalized on [id number] by  Efrain Rico MD.    MRI Brain With & Without Contrast    Result Date: 3/14/2022  1.Postsurgical changes right frontal lobe similar to prior study. There is less enhancement around the surgical cavity as compared to prior study. 2.Periventricular white matter changes as well as some vasogenic edema in the right frontal lobe which has been suggested. 3.Pansinus disease which has progressed since prior study. Electronically Signed: Paxton Kolb MD 3/14/2022 18:09 EDT    XR Chest PA & Lateral    Result Date: 4/4/2022   1. Diffuse groundglass pulmonary infiltrates throughout both lungs, most likely representing pneumonia although this finding is nonspecific.  Electronically Signed By-Yang Kan MD On:4/4/2022 12:14 PM This report was finalized on [id number] by  Yang Kan MD.    Pathology results  Outside Report, Addendum   Integrated Diagnosis: Oligodendroglioma WHO Grade II  IDH1 R132H mutation by Immunohistochemistry; 1p19q co-deletion by FISH  See attached report from [facility]   Addendum electronically signed by Cecilia Chaudhary on 5/28/2021 at 0824   Final Diagnosis   Specimen #1 (\"Brain tumor right frontal lobe,\" biopsy):    Diffuse glioma (WHO grade II)    See comment     Specimen #2 (\"Brain tumor right frontal lobe,\" biopsy):    Diffuse glioma (WHO grade II)    See comment     Specimen #3 (Brain tumor right frontal lobe, resection):    Diffuse glioma, IDH-mutant (WHO grade II)    See attached report from [facility]         Assessment/Plan     Patient is a 56-year-old female with oligodendroglioma grade 2 status post gross total resection    Oligodendroglioma  Previously I had an extensive discussion with the patient about treatment options, prognosis.  We had an extensive discussion at that time with several options.  This is summarized here below    I previously discussed with her that there " are findings from RTOG 9802 clinical trial which showed survival advantage with radiation followed by chemotherapy after surgical resection of grade 2 oligodendrogliomas.  Chemotherapy with the PCV regimen in this trial conferred a survival advantage over radiotherapy alone with a median overall survival of 13.3 versus 7.8 years.  In subset analysis benefit was more significant  Histologic oligodendroglioma is as compared to other low-grade gliomas.  Molecular analysis post hoc analysis also showed survival advantage in molecularly confirmed IDH mutant, 1p19q codeleted oligodendrogliomas.    PCV can be a toxic regimen however survival advantage is only been shown in randomized control trial using this particular regimen.  The other option would be temozolomide which has advantages over PCV with ease of administration, better tolerance and efficacy in combination with radiation therapy and other types of CNS tumors and gliomas.    The other option I had discussed with her was clinical trial with a CODEL study which is comparing radiation alone versus radiation with Temodar versus radiation with PCV in this patient population.  Patient however is not very interested in enrolling in a clinical trial.  She is wanting to pursue the most aggressive treatment option for her to reduce the chances of recurrence of her tumor.    Lastly also discussed with her the option of surveillance and observation with serial MRIs however also discussed that in above studies the progression free survival is around 50% for 5 years.     Based on her above discussion patient decided to pursue aggressive treatment with radiation followed by chemotherapy.  We would use the RTOG 9802 regimen with radiation followed by chemotherapy with PCV.  We have evidence that vincristine does not cross the blood-brain barrier significantly and hence if there is toxicity we can choose to omit the drug.  Case was discussed with Dr. Cruz and started sequential  radiation and chemo    Patient completed radiation without complication.    Based on above patient decided to proceed with adjuvant PCV.   Now patient has had clinically significant pancytopenia needing blood transfusion hospital admission.  She has completed cycle 2.  Pancytopenia is improved  For cycle 3 dose reduced procarbazine and lomustine by 20%.  No worsening in cytopenias per labs today.  Continue vincristine per schedule repeat CBC weekly  Repeat MRI brain has been scheduled for 3/2022  For C4, decrease Lomustine to 110.  Complete cycle  Now with prolonged myelosuppression, shortness of breath will stop chemotherapy going forward.  I discussed with her for the risks of continuing chemotherapy versus benefits which at this point she is worsening in terms of myelosuppression, likely pneumonitis with chemotherapy.  Continue to hold chemotherapy.  We have discontinued the plan of further adjuvant treatment.    nausea  Continue zofran as needed     Shortness of breath  CT of the chest with pneumonitis.  Could also be related to anemia.  Give a short course of steroids, continue albuterol, steroid inhaler per pulmonology    Anemia  There could be possibility of hemolysis nonimmune versus myelosuppression  There have been case reports with her chemotherapy regimen.  She was given 5-day course of prednisone.  Repeat haptoglobin improved  transfuse 1 unit packed red cell.  Hemolysis markers negative.  Platelet counts have started to increase hence likely marrow recovery going on.  Continue Folbic, CBC weekly.    Follow-up in 3 weeks      I have reviewed and confirmed the accuracy of the patient's history: Chief complaint, HPI, ROS, Subjective and Past Family Social History as entered by the MA/TAMY/RN.     Emilie Workman MD 04/25/22

## 2022-04-25 ENCOUNTER — LAB (OUTPATIENT)
Dept: LAB | Facility: HOSPITAL | Age: 57
End: 2022-04-25

## 2022-04-25 ENCOUNTER — APPOINTMENT (OUTPATIENT)
Dept: INFUSION THERAPY | Facility: HOSPITAL | Age: 57
End: 2022-04-25

## 2022-04-25 ENCOUNTER — OFFICE VISIT (OUTPATIENT)
Dept: ONCOLOGY | Facility: CLINIC | Age: 57
End: 2022-04-25

## 2022-04-25 VITALS
SYSTOLIC BLOOD PRESSURE: 117 MMHG | OXYGEN SATURATION: 97 % | RESPIRATION RATE: 16 BRPM | HEIGHT: 62 IN | TEMPERATURE: 96.9 F | DIASTOLIC BLOOD PRESSURE: 80 MMHG | HEART RATE: 73 BPM | WEIGHT: 291 LBS | BODY MASS INDEX: 53.55 KG/M2

## 2022-04-25 DIAGNOSIS — C71.9 OLIGODENDROGLIOMA: ICD-10-CM

## 2022-04-25 DIAGNOSIS — C71.1 OLIGOASTROCYTOMA OF FRONTAL LOBE: Primary | ICD-10-CM

## 2022-04-25 DIAGNOSIS — D64.9 ANEMIA, UNSPECIFIED TYPE: ICD-10-CM

## 2022-04-25 LAB
BASOPHILS # BLD AUTO: 0.02 10*3/MM3 (ref 0–0.2)
BASOPHILS NFR BLD AUTO: 0.3 % (ref 0–1.5)
DEPRECATED RDW RBC AUTO: 77.6 FL (ref 37–54)
EOSINOPHIL # BLD AUTO: 0.08 10*3/MM3 (ref 0–0.4)
EOSINOPHIL NFR BLD AUTO: 1 % (ref 0.3–6.2)
ERYTHROCYTE [DISTWIDTH] IN BLOOD BY AUTOMATED COUNT: 22.1 % (ref 12.3–15.4)
HCT VFR BLD AUTO: 22.1 % (ref 34–46.6)
HGB BLD-MCNC: 7 G/DL (ref 12–15.9)
HOLD SPECIMEN: NORMAL
HOLD SPECIMEN: NORMAL
LYMPHOCYTES # BLD AUTO: 0.78 10*3/MM3 (ref 0.7–3.1)
LYMPHOCYTES NFR BLD AUTO: 10.2 % (ref 19.6–45.3)
MCH RBC QN AUTO: 33.7 PG (ref 26.6–33)
MCHC RBC AUTO-ENTMCNC: 31.7 G/DL (ref 31.5–35.7)
MCV RBC AUTO: 106.3 FL (ref 79–97)
MONOCYTES # BLD AUTO: 0.58 10*3/MM3 (ref 0.1–0.9)
MONOCYTES NFR BLD AUTO: 7.6 % (ref 5–12)
NEUTROPHILS NFR BLD AUTO: 6.19 10*3/MM3 (ref 1.7–7)
NEUTROPHILS NFR BLD AUTO: 80.9 % (ref 42.7–76)
PLATELET # BLD AUTO: 141 10*3/MM3 (ref 140–450)
PMV BLD AUTO: 10 FL (ref 6–12)
RBC # BLD AUTO: 2.08 10*6/MM3 (ref 3.77–5.28)
WBC NRBC COR # BLD: 7.65 10*3/MM3 (ref 3.4–10.8)

## 2022-04-25 PROCEDURE — 36415 COLL VENOUS BLD VENIPUNCTURE: CPT

## 2022-04-25 PROCEDURE — 99214 OFFICE O/P EST MOD 30 MIN: CPT | Performed by: INTERNAL MEDICINE

## 2022-04-25 PROCEDURE — 85025 COMPLETE CBC W/AUTO DIFF WBC: CPT

## 2022-04-25 RX ORDER — SODIUM CHLORIDE 9 MG/ML
250 INJECTION, SOLUTION INTRAVENOUS AS NEEDED
Status: CANCELLED | OUTPATIENT
Start: 2022-04-25

## 2022-04-26 ENCOUNTER — HOSPITAL ENCOUNTER (OUTPATIENT)
Dept: INFUSION THERAPY | Facility: HOSPITAL | Age: 57
Setting detail: INFUSION SERIES
Discharge: HOME | End: 2022-04-26

## 2022-04-26 VITALS
HEART RATE: 72 BPM | SYSTOLIC BLOOD PRESSURE: 131 MMHG | TEMPERATURE: 98.2 F | RESPIRATION RATE: 16 BRPM | DIASTOLIC BLOOD PRESSURE: 62 MMHG | OXYGEN SATURATION: 93 %

## 2022-04-26 DIAGNOSIS — C71.1 OLIGOASTROCYTOMA OF FRONTAL LOBE: Primary | ICD-10-CM

## 2022-04-26 DIAGNOSIS — D64.9 ANEMIA, UNSPECIFIED TYPE: ICD-10-CM

## 2022-04-26 DIAGNOSIS — Z95.828 PORT-A-CATH IN PLACE: ICD-10-CM

## 2022-04-26 LAB
ABO GROUP BLD: NORMAL
ANTI-FYA: NORMAL
BASOPHILS # BLD AUTO: 0 10*3/MM3 (ref 0–0.2)
BASOPHILS NFR BLD AUTO: 0.3 % (ref 0–1.5)
BB HOLD TUBE: NORMAL
BLD GP AB SCN SERPL QL: POSITIVE
DEPRECATED RDW RBC AUTO: 77 FL (ref 37–54)
EOSINOPHIL # BLD AUTO: 0 10*3/MM3 (ref 0–0.4)
EOSINOPHIL NFR BLD AUTO: 0.1 % (ref 0.3–6.2)
ERYTHROCYTE [DISTWIDTH] IN BLOOD BY AUTOMATED COUNT: 23 % (ref 12.3–15.4)
HCT VFR BLD AUTO: 21.5 % (ref 34–46.6)
HGB BLD-MCNC: 7.1 G/DL (ref 12–15.9)
LYMPHOCYTES # BLD AUTO: 0.4 10*3/MM3 (ref 0.7–3.1)
LYMPHOCYTES NFR BLD AUTO: 6.3 % (ref 19.6–45.3)
MCH RBC QN AUTO: 32.9 PG (ref 26.6–33)
MCHC RBC AUTO-ENTMCNC: 33.3 G/DL (ref 31.5–35.7)
MCV RBC AUTO: 99 FL (ref 79–97)
MONOCYTES # BLD AUTO: 0.2 10*3/MM3 (ref 0.1–0.9)
MONOCYTES NFR BLD AUTO: 3 % (ref 5–12)
NEUTROPHILS NFR BLD AUTO: 5.3 10*3/MM3 (ref 1.7–7)
NEUTROPHILS NFR BLD AUTO: 90.3 % (ref 42.7–76)
NRBC BLD AUTO-RTO: 0.3 /100 WBC (ref 0–0.2)
PLATELET # BLD AUTO: 133 10*3/MM3 (ref 140–450)
PMV BLD AUTO: 7.9 FL (ref 6–12)
RBC # BLD AUTO: 2.17 10*6/MM3 (ref 3.77–5.28)
RH BLD: POSITIVE
T&S EXPIRATION DATE: NORMAL
WBC NRBC COR # BLD: 5.9 10*3/MM3 (ref 3.4–10.8)

## 2022-04-26 PROCEDURE — P9016 RBC LEUKOCYTES REDUCED: HCPCS

## 2022-04-26 PROCEDURE — 86922 COMPATIBILITY TEST ANTIGLOB: CPT

## 2022-04-26 PROCEDURE — 36430 TRANSFUSION BLD/BLD COMPNT: CPT

## 2022-04-26 PROCEDURE — 25010000002 HEPARIN LOCK FLUSH PER 10 UNITS: Performed by: INTERNAL MEDICINE

## 2022-04-26 PROCEDURE — 86901 BLOOD TYPING SEROLOGIC RH(D): CPT | Performed by: INTERNAL MEDICINE

## 2022-04-26 PROCEDURE — 86850 RBC ANTIBODY SCREEN: CPT | Performed by: INTERNAL MEDICINE

## 2022-04-26 PROCEDURE — 85025 COMPLETE CBC W/AUTO DIFF WBC: CPT | Performed by: INTERNAL MEDICINE

## 2022-04-26 PROCEDURE — 86900 BLOOD TYPING SEROLOGIC ABO: CPT

## 2022-04-26 PROCEDURE — 86900 BLOOD TYPING SEROLOGIC ABO: CPT | Performed by: INTERNAL MEDICINE

## 2022-04-26 PROCEDURE — 86870 RBC ANTIBODY IDENTIFICATION: CPT | Performed by: INTERNAL MEDICINE

## 2022-04-26 RX ORDER — SODIUM CHLORIDE 9 MG/ML
250 INJECTION, SOLUTION INTRAVENOUS AS NEEDED
Status: CANCELLED | OUTPATIENT
Start: 2022-04-26

## 2022-04-26 RX ORDER — HEPARIN SODIUM (PORCINE) LOCK FLUSH IV SOLN 100 UNIT/ML 100 UNIT/ML
500 SOLUTION INTRAVENOUS AS NEEDED
Status: DISCONTINUED | OUTPATIENT
Start: 2022-04-26 | End: 2022-04-28 | Stop reason: HOSPADM

## 2022-04-26 RX ORDER — SODIUM CHLORIDE 0.9 % (FLUSH) 0.9 %
10 SYRINGE (ML) INJECTION AS NEEDED
Status: DISCONTINUED | OUTPATIENT
Start: 2022-04-26 | End: 2022-04-28 | Stop reason: HOSPADM

## 2022-04-26 RX ORDER — SODIUM CHLORIDE 9 MG/ML
250 INJECTION, SOLUTION INTRAVENOUS AS NEEDED
Status: DISCONTINUED | OUTPATIENT
Start: 2022-04-26 | End: 2022-04-28 | Stop reason: HOSPADM

## 2022-04-26 RX ORDER — SODIUM CHLORIDE 0.9 % (FLUSH) 0.9 %
10 SYRINGE (ML) INJECTION AS NEEDED
Status: CANCELLED | OUTPATIENT
Start: 2022-04-26

## 2022-04-26 RX ORDER — HEPARIN SODIUM (PORCINE) LOCK FLUSH IV SOLN 100 UNIT/ML 100 UNIT/ML
500 SOLUTION INTRAVENOUS AS NEEDED
Status: CANCELLED | OUTPATIENT
Start: 2022-04-26

## 2022-04-26 RX ADMIN — Medication 500 UNITS: at 13:56

## 2022-04-28 ENCOUNTER — APPOINTMENT (OUTPATIENT)
Dept: RESPIRATORY THERAPY | Facility: HOSPITAL | Age: 57
End: 2022-04-28

## 2022-04-28 LAB — FUNGUS WND CULT: ABNORMAL

## 2022-05-02 ENCOUNTER — LAB (OUTPATIENT)
Dept: LAB | Facility: HOSPITAL | Age: 57
End: 2022-05-02

## 2022-05-02 DIAGNOSIS — D64.9 ANEMIA, UNSPECIFIED TYPE: ICD-10-CM

## 2022-05-02 DIAGNOSIS — C71.9 OLIGODENDROGLIOMA: ICD-10-CM

## 2022-05-02 DIAGNOSIS — C71.1 OLIGOASTROCYTOMA OF FRONTAL LOBE: ICD-10-CM

## 2022-05-02 LAB
BASOPHILS # BLD AUTO: 0.01 10*3/MM3 (ref 0–0.2)
BASOPHILS NFR BLD AUTO: 0.2 % (ref 0–1.5)
DEPRECATED RDW RBC AUTO: 72 FL (ref 37–54)
EOSINOPHIL # BLD AUTO: 0.07 10*3/MM3 (ref 0–0.4)
EOSINOPHIL NFR BLD AUTO: 1.2 % (ref 0.3–6.2)
ERYTHROCYTE [DISTWIDTH] IN BLOOD BY AUTOMATED COUNT: 20.7 % (ref 12.3–15.4)
HCT VFR BLD AUTO: 24.8 % (ref 34–46.6)
HGB BLD-MCNC: 8 G/DL (ref 12–15.9)
LYMPHOCYTES # BLD AUTO: 0.73 10*3/MM3 (ref 0.7–3.1)
LYMPHOCYTES NFR BLD AUTO: 12.6 % (ref 19.6–45.3)
MCH RBC QN AUTO: 34 PG (ref 26.6–33)
MCHC RBC AUTO-ENTMCNC: 32.3 G/DL (ref 31.5–35.7)
MCV RBC AUTO: 105.5 FL (ref 79–97)
MONOCYTES # BLD AUTO: 0.47 10*3/MM3 (ref 0.1–0.9)
MONOCYTES NFR BLD AUTO: 8.1 % (ref 5–12)
NEUTROPHILS NFR BLD AUTO: 4.53 10*3/MM3 (ref 1.7–7)
NEUTROPHILS NFR BLD AUTO: 77.9 % (ref 42.7–76)
PLATELET # BLD AUTO: 129 10*3/MM3 (ref 140–450)
PMV BLD AUTO: 9.5 FL (ref 6–12)
RBC # BLD AUTO: 2.35 10*6/MM3 (ref 3.77–5.28)
WBC NRBC COR # BLD: 5.81 10*3/MM3 (ref 3.4–10.8)

## 2022-05-02 PROCEDURE — 36415 COLL VENOUS BLD VENIPUNCTURE: CPT

## 2022-05-02 PROCEDURE — 85025 COMPLETE CBC W/AUTO DIFF WBC: CPT

## 2022-05-05 NOTE — PROGRESS NOTES
HEMATOLOGY ONCOLOGY OUTPATIENT FOLLOW UP      Patient name: Cindy Cortes  : 1965  MRN: [id number]  Primary Care Physician: Annamarie Monroy APRN  Referring Physician: Annamarie Monroy APRN  Reason For Consult:     Chief Complaint   Patient presents with   • Follow-up     Oligoastrocytoma of frontal lobe      HPI:   History of Present Illness:  Cindy Cortes is 56 y.o. female who presented to our office on 06/10/21 for consultation regarding Oligodendroglioma    Patient is a 56 y.o. female who was referred to us for treatment options regarding recent diagnosis of grade 2 oligodendroglioma.  This was IDH [id number] mutated, 1P 19 Q codeleted.  Patient initially presented with seizures and a CT head was obtained that showed vasogenic edema and a right frontal lobe mass MRI was obtained after this.  2021 -MRI of the brain shows 2.1 x 4.4 x 3.3 cm right frontal lobe mass with eccentric cystic or necrotic component with surrounding vasogenic edema and a focal 3 mm right to left midline shift.  Patient was started on Keppra, steroids plan was made for elective craniotomy and surgical resection.    2021 craniotomy of the right frontal lobe, microscopic resection of tumor mass.  Pathology results oligodendroglioma WHO grade 2, IDH1 R132H mutation by immunohistochemistry, 1p19q codeletion by FISH.    2021 -status post resection of the right frontal lobe mass.  Expected postop blood product. resolution of midline shift.    2021 - Started radiation adjuvantly.    2021 - last day of radiation.    2021 - C1 D8 with vincristin started procarbazine  10/7/2021 - C1 D29 Vincristine    10/21/2021 - C2D1 PCV  10/28/2021 -cycle 2-day 8 with vincristine  Started procarbazine  Held procarbazine for a few days with nausea  2021 -patient in the hospital with significant pancytopenia needing blood transfusion.    2021 -vincristine cycle 2-day 29.  This has been delayed with  ongoing cytopenias and hospitalization.    12/27/2021 - C3 D1 12/30/2021 1/6/2022 - C3D8 vincristine.    2/21/2022 - C4 D1 decrease dose of Lomustine.    Continued myelosuppression afterwards  4/1/2022 -CT imaging with interval development of relatively diffuse groundglass opacity patchy areas of sparing.  Bronchoscopy negative for any concerning findings  Permanently discontinued chemotherapy with possible pneumonitis, prolonged myelosuppression.    5/16/2022 - WBC 5.52 hb 8.2 hct 27.8, plt 128    Subjective:  Fatigue, shortness of breath.    The following portions of the patient's history were reviewed and updated as appropriate: allergies, current medications, past family history, past medical history, past social history, past surgical history and problem list.    Past Medical History:   Diagnosis Date   • Arthritis    • GERD (gastroesophageal reflux disease)    • Hypertension    • Oligodendroglioma (HCC)    • Seizure (HCC)    • Type 2 diabetes mellitus with hyperglycemia, without long-term current use of insulin (HCC) 05/12/2021       Past Surgical History:   Procedure Laterality Date   • BRONCHOSCOPY N/A 4/6/2022    Procedure: BRONCHOSCOPY with bronchial washing;  Surgeon: Drew Alcantar MD;  Location: [facility] ENDOSCOPY;  Service: Pulmonary;  Laterality: N/A;  pneumonia   • COLONOSCOPY     • CRANIOTOMY FOR TUMOR Right 05/06/2021    Procedure: CRANIOTOMY FOR TUMOR RESECTION WITH STEREOTACTIC;  Surgeon: Jluis Felix MD;  Location: [facility] MAIN OR;  Service: Neurosurgery;  Laterality: Right;         Current Outpatient Medications:   •  levETIRAcetam (KEPPRA) 750 MG tablet, Take 1 tablet by mouth 2 (Two) Times a Day., Disp: , Rfl:   •  albuterol sulfate  (90 Base) MCG/ACT inhaler, Inhale 2 puffs Every 4 (Four) Hours As Needed for Wheezing., Disp: 18 g, Rfl: 2  •  Alcohol Swabs (Alcohol Wipes) 70 % pads, Apply 1 each topically to the appropriate area as directed 4 (Four) Times a Day., Disp: , Rfl:   •   amLODIPine (NORVASC) 10 MG tablet, Take 1 tablet by mouth Daily., Disp: 30 tablet, Rfl: 2  •  Budeson-Glycopyrrol-Formoterol (Breztri Aerosphere) 160-9-4.8 MCG/ACT aerosol inhaler, Inhale 2 puffs 2 (Two) Times a Day. Indications: Lot#[id number] Exp: 8/30/23, Disp: 1 each, Rfl: 0  •  doxycycline (VIBRAMYCIN) 100 MG capsule, Take 1 capsule by mouth 2 (Two) Times a Day., Disp: 10 capsule, Rfl: 0  •  ferrous sulfate 325 (65 FE) MG tablet, Take 1 tablet by mouth Daily With Breakfast., Disp: , Rfl:   •  fluticasone (Flovent HFA) 44 MCG/ACT inhaler, Inhale 1 puff 2 (Two) Times a Day As Needed (cough)., Disp: 10.6 g, Rfl: 1  •  folic acid (FOLVITE) 1 MG tablet, Take one tablet by mouth Daily, Disp: 360 tablet, Rfl: 0  •  glucose blood (OneTouch Verio) test strip, 1 each by Other route 4 (Four) Times a Day Before Meals & at Bedtime. Use as instructed, Disp: 200 each, Rfl: 1  •  levETIRAcetam (KEPPRA) 750 MG tablet, Take 1 tablet by mouth 2 (Two) Times a Day., Disp: 60 tablet, Rfl: 2  •  lidocaine-prilocaine (EMLA) 2.5-2.5 % cream, Apply  topically to the appropriate area as directed Every 2 (Two) Hours As Needed for Mild Pain ., Disp: 5 g, Rfl: 0  •  metoprolol tartrate (LOPRESSOR) 25 MG tablet, Take 0.5 tablets by mouth Every 8 (Eight) Hours., Disp: 45 tablet, Rfl: 2  •  ondansetron (Zofran) 8 MG tablet, Take 1 tablet by mouth Every 8 (Eight) Hours As Needed for Nausea or Vomiting., Disp: 30 tablet, Rfl: 3  •  oxybutynin (DITROPAN) 5 MG tablet, , Disp: , Rfl:   •  venlafaxine XR (EFFEXOR-XR) 75 MG 24 hr capsule, Take 75 mg by mouth Daily. Take DOS, Disp: , Rfl: 3  •  vitamin D (ERGOCALCIFEROL) 1.25 MG (97753 UT) capsule capsule, Take 50,000 Units by mouth Every 7 (Seven) Days.  , Disp: , Rfl:     Current Facility-Administered Medications:   •  diphenhydrAMINE (BENADRYL) injection 25 mg, 25 mg, Intravenous, Once, Emilie Workman MD    Current outpatient and discharge medications have been reconciled for the patient.  Reviewed  by: Emilie Workman MD    No Known Allergies    Family History   Problem Relation Age of Onset   • Kidney disease Mother    • Prostate cancer Brother    • Breast cancer Maternal Aunt    • Colon cancer Maternal Aunt    • Breast cancer Niece    • Breast cancer Cousin        Cancer-related family history includes Breast cancer in her cousin, maternal aunt, and niece; Colon cancer in her maternal aunt; Prostate cancer in her brother.    Social History     Tobacco Use   • Smoking status: Never Smoker   • Smokeless tobacco: Never Used   Vaping Use   • Vaping Use: Never used   Substance Use Topics   • Alcohol use: Not Currently     Alcohol/week: 1.0 standard drink     Types: 1 Cans of beer per week     Comment: socially   • Drug use: Never     Social History     Social History Narrative   • Not on file      Objective:    Vitals:    05/16/22 1318   BP: 115/56   Pulse: 73   Temp: 96.6 °F (35.9 °C)   Weight: 129 kg (285 lb 3.2 oz)   PainSc: 0-No pain     Body mass index is 52.16 kg/m².  ECOG  (0) Fully active, able to carry on all predisease performance without restriction    Physical Exam:     Physical Exam  Constitutional:       Appearance: Normal appearance. She is obese.   HENT:      Head: Normocephalic and atraumatic.   Eyes:      Pupils: Pupils are equal, round, and reactive to light.   Cardiovascular:      Rate and Rhythm: Normal rate and regular rhythm.      Pulses: Normal pulses.      Heart sounds: Normal heart sounds. No murmur heard.  Pulmonary:      Effort: Pulmonary effort is normal.      Breath sounds: Rhonchi present.   Abdominal:      General: There is no distension.      Palpations: Abdomen is soft. There is no mass.      Tenderness: There is no abdominal tenderness.   Musculoskeletal:         General: No tenderness. Normal range of motion.      Cervical back: Normal range of motion and neck supple.   Skin:     General: Skin is warm.   Neurological:      General: No focal deficit present.      Mental Status: She  is alert.   Psychiatric:         Mood and Affect: Mood normal.           Lab Results - Last 18 Months   Lab Units 05/16/22  1315 05/09/22  1046 05/02/22  1045   WBC 10*3/mm3 5.52 6.75 5.81   HEMOGLOBIN g/dL 8.2* 7.7* 8.0*   HEMATOCRIT % 27.8* 23.8* 24.8*   PLATELETS 10*3/mm3 128* 137* 129*   MCV fL 119.3* 105.8* 105.5*     Lab Results - Last 18 Months   Lab Units 04/01/22  1458 03/01/22  1136 02/28/22  0954 01/27/22  1246   SODIUM mmol/L  --  137 138 137   POTASSIUM mmol/L  --  3.8 4.4 4.3   CHLORIDE mmol/L  --  101 101 101   CO2 mmol/L  --  25.0 26.0 28.0   BUN mg/dL  --  14 13 16   CREATININE mg/dL 0.50* 0.85 0.82 0.78   CALCIUM mg/dL  --  9.3 9.2 9.3   BILIRUBIN mg/dL  --  1.0 0.9 0.8   ALK PHOS U/L  --  107 102 81   ALT (SGPT) U/L  --  17 15 23   AST (SGOT) U/L  --  26 26 30   GLUCOSE mg/dL  --  150* 134* 121*       Lab Results   Component Value Date    GLUCOSE 150 (H) 03/01/2022    BUN 14 03/01/2022    CREATININE 0.50 (L) 04/01/2022    EGFRIFNONA 77 01/27/2022    BCR 16.5 03/01/2022    K 3.8 03/01/2022    CO2 25.0 03/01/2022    CALCIUM 9.3 03/01/2022    PROTENTOTREF 6.4 12/15/2021    ALBUMIN 3.70 03/01/2022    LABIL2 1.1 12/15/2021    AST 26 03/01/2022    ALT 17 03/01/2022       Lab Results - Last 18 Months   Lab Units 08/27/21  0738 05/04/21  0922   INR  0.96 0.96   APTT seconds 25.6 20.7*       Lab Results   Component Value Date    IRON 130 04/11/2022    TIBC 335 04/11/2022    FERRITIN 1,044.00 (H) 04/11/2022       Lab Results   Component Value Date    ILTPREWM22 328 04/11/2022       Lab Results   Component Value Date    PTT 25.6 08/27/2021    INR 0.96 08/27/2021     CT Chest With Contrast Diagnostic    Result Date: 4/1/2022   1. Interval development of relatively diffuse groundglass opacity with patchy areas of sparing. Findings are nonspecific and differential would include atypical, viral infections, drug toxicity, hypersensitivity pneumonitis.    Electronically Signed By-Efrain Rico MD On:4/1/2022 3:47  "PM This report was finalized on [id number] by  Efrain Rico MD.    MRI Brain With & Without Contrast    Result Date: 3/14/2022  1.Postsurgical changes right frontal lobe similar to prior study. There is less enhancement around the surgical cavity as compared to prior study. 2.Periventricular white matter changes as well as some vasogenic edema in the right frontal lobe which has been suggested. 3.Pansinus disease which has progressed since prior study. Electronically Signed: Paxton Kolb MD 3/14/2022 18:09 EDT    XR Chest PA & Lateral    Result Date: 4/4/2022   1. Diffuse groundglass pulmonary infiltrates throughout both lungs, most likely representing pneumonia although this finding is nonspecific.  Electronically Signed By-Yang Kan MD On:4/4/2022 12:14 PM This report was finalized on [id number] by  Yang Kan MD.    Pathology results  Outside Report, Addendum   Integrated Diagnosis: Oligodendroglioma WHO Grade II  IDH1 R132H mutation by Immunohistochemistry; 1p19q co-deletion by FISH  See attached report from [facility]   Addendum electronically signed by Cecilia Chaudhary on 5/28/2021 at 0824   Final Diagnosis   Specimen #1 (\"Brain tumor right frontal lobe,\" biopsy):    Diffuse glioma (WHO grade II)    See comment     Specimen #2 (\"Brain tumor right frontal lobe,\" biopsy):    Diffuse glioma (WHO grade II)    See comment     Specimen #3 (Brain tumor right frontal lobe, resection):    Diffuse glioma, IDH-mutant (WHO grade II)    See attached report from [facility]         Assessment & Plan     Patient is a 56-year-old female with oligodendroglioma grade 2 status post gross total resection    Oligodendroglioma  Previously I had an extensive discussion with the patient about treatment options, prognosis.  We had an extensive discussion at that time with several options.  This is summarized here below    I previously discussed with her " that there are findings from RTOG 9802 clinical trial which showed survival advantage with radiation followed by chemotherapy after surgical resection of grade 2 oligodendrogliomas.  Chemotherapy with the PCV regimen in this trial conferred a survival advantage over radiotherapy alone with a median overall survival of 13.3 versus 7.8 years.  In subset analysis benefit was more significant  Histologic oligodendroglioma is as compared to other low-grade gliomas.  Molecular analysis post hoc analysis also showed survival advantage in molecularly confirmed IDH mutant, 1p19q codeleted oligodendrogliomas.    PCV can be a toxic regimen however survival advantage is only been shown in randomized control trial using this particular regimen.  The other option would be temozolomide which has advantages over PCV with ease of administration, better tolerance and efficacy in combination with radiation therapy and other types of CNS tumors and gliomas.    The other option I had discussed with her was clinical trial with a CODEL study which is comparing radiation alone versus radiation with Temodar versus radiation with PCV in this patient population.  Patient however is not very interested in enrolling in a clinical trial.  She is wanting to pursue the most aggressive treatment option for her to reduce the chances of recurrence of her tumor.    Lastly also discussed with her the option of surveillance and observation with serial MRIs however also discussed that in above studies the progression free survival is around 50% for 5 years.     Based on her above discussion patient decided to pursue aggressive treatment with radiation followed by chemotherapy.  We would use the RTOG 9802 regimen with radiation followed by chemotherapy with PCV.  We have evidence that vincristine does not cross the blood-brain barrier significantly and hence if there is toxicity we can choose to omit the drug.  Case was discussed with Dr. Cruz and started  sequential radiation and chemo    Patient completed radiation without complication.    Based on above patient decided to proceed with adjuvant PCV.   Now patient has had clinically significant pancytopenia needing blood transfusion hospital admission.  She has completed cycle 2.  Pancytopenia is improved  For cycle 3 dose reduced procarbazine and lomustine by 20%.  No worsening in cytopenias per labs today.  Continue vincristine per schedule repeat CBC weekly  Repeat MRI brain has been scheduled for 3/2022  For C4, decrease Lomustine to 110.  Complete cycle  Now with prolonged myelosuppression, shortness of breath will stop chemotherapy going forward.  I discussed with her for the risks of continuing chemotherapy versus benefits which at this point she is worsening in terms of myelosuppression, likely pneumonitis with chemotherapy.  Continue to hold chemotherapy.  We have discontinued the plan of further adjuvant treatment.   Repeat CBC in a month with improvement.    nausea  Continue zofran as needed     Shortness of breath  CT of the chest with pneumonitis.  Could also be related to anemia.  Given a short course of steroids, continue albuterol, steroid inhaler per pulmonology  She was started on an antibiotic which she couldn't tolerate, she is scheduled to see Dr. Alcantar  Shortness of breath persists.     Anemia  There could be possibility of hemolysis nonimmune versus myelosuppression  There have been case reports with her chemotherapy regimen.  She was given 5-day course of prednisone.  Repeat haptoglobin improved  transfuse 1 unit packed red cell.  Hemolysis markers negative on recheck  Platelet counts have started to increase hence likely marrow recovery going on.  Continue Folbic, CBC    Follow-up in a month with cbc      I have reviewed and confirmed the accuracy of the patient's history: Chief complaint, HPI, ROS, Subjective and Past Family Social History as entered by the MA/LPN/RN.     Emilie Workman MD  05/16/22

## 2022-05-09 ENCOUNTER — LAB (OUTPATIENT)
Dept: LAB | Facility: HOSPITAL | Age: 57
End: 2022-05-09

## 2022-05-09 DIAGNOSIS — C71.1 OLIGOASTROCYTOMA OF FRONTAL LOBE: ICD-10-CM

## 2022-05-09 DIAGNOSIS — C71.9 OLIGODENDROGLIOMA: ICD-10-CM

## 2022-05-09 DIAGNOSIS — D64.9 ANEMIA, UNSPECIFIED TYPE: ICD-10-CM

## 2022-05-09 LAB
BASOPHILS # BLD AUTO: 0.01 10*3/MM3 (ref 0–0.2)
BASOPHILS NFR BLD AUTO: 0.1 % (ref 0–1.5)
DEPRECATED RDW RBC AUTO: 74.1 FL (ref 37–54)
EOSINOPHIL # BLD AUTO: 0.1 10*3/MM3 (ref 0–0.4)
EOSINOPHIL NFR BLD AUTO: 1.5 % (ref 0.3–6.2)
ERYTHROCYTE [DISTWIDTH] IN BLOOD BY AUTOMATED COUNT: 21 % (ref 12.3–15.4)
HCT VFR BLD AUTO: 23.8 % (ref 34–46.6)
HGB BLD-MCNC: 7.7 G/DL (ref 12–15.9)
LYMPHOCYTES # BLD AUTO: 0.61 10*3/MM3 (ref 0.7–3.1)
LYMPHOCYTES NFR BLD AUTO: 9 % (ref 19.6–45.3)
MCH RBC QN AUTO: 34.2 PG (ref 26.6–33)
MCHC RBC AUTO-ENTMCNC: 32.4 G/DL (ref 31.5–35.7)
MCV RBC AUTO: 105.8 FL (ref 79–97)
MONOCYTES # BLD AUTO: 0.34 10*3/MM3 (ref 0.1–0.9)
MONOCYTES NFR BLD AUTO: 5 % (ref 5–12)
NEUTROPHILS NFR BLD AUTO: 5.69 10*3/MM3 (ref 1.7–7)
NEUTROPHILS NFR BLD AUTO: 84.4 % (ref 42.7–76)
PLATELET # BLD AUTO: 137 10*3/MM3 (ref 140–450)
PMV BLD AUTO: 9.5 FL (ref 6–12)
RBC # BLD AUTO: 2.25 10*6/MM3 (ref 3.77–5.28)
WBC NRBC COR # BLD: 6.75 10*3/MM3 (ref 3.4–10.8)

## 2022-05-09 PROCEDURE — 36415 COLL VENOUS BLD VENIPUNCTURE: CPT

## 2022-05-09 PROCEDURE — 85025 COMPLETE CBC W/AUTO DIFF WBC: CPT

## 2022-05-09 RX ORDER — FOLIC ACID 1 MG/1
TABLET ORAL
Qty: 360 TABLET | Refills: 0 | Status: ON HOLD | OUTPATIENT
Start: 2022-05-09 | End: 2022-05-31

## 2022-05-16 ENCOUNTER — LAB (OUTPATIENT)
Dept: LAB | Facility: HOSPITAL | Age: 57
End: 2022-05-16

## 2022-05-16 ENCOUNTER — OFFICE VISIT (OUTPATIENT)
Dept: ONCOLOGY | Facility: CLINIC | Age: 57
End: 2022-05-16

## 2022-05-16 VITALS
DIASTOLIC BLOOD PRESSURE: 56 MMHG | TEMPERATURE: 96.6 F | HEART RATE: 73 BPM | SYSTOLIC BLOOD PRESSURE: 115 MMHG | WEIGHT: 285.2 LBS | BODY MASS INDEX: 52.16 KG/M2

## 2022-05-16 DIAGNOSIS — C71.9 OLIGODENDROGLIOMA: ICD-10-CM

## 2022-05-16 DIAGNOSIS — C71.9 OLIGODENDROGLIOMA: Primary | ICD-10-CM

## 2022-05-16 DIAGNOSIS — C71.1 OLIGOASTROCYTOMA OF FRONTAL LOBE: Primary | ICD-10-CM

## 2022-05-16 DIAGNOSIS — D64.9 ANEMIA, UNSPECIFIED TYPE: ICD-10-CM

## 2022-05-16 LAB
BASOPHILS # BLD AUTO: 0.01 10*3/MM3 (ref 0–0.2)
BASOPHILS NFR BLD AUTO: 0.2 % (ref 0–1.5)
DEPRECATED RDW RBC AUTO: 86.8 FL (ref 37–54)
EOSINOPHIL # BLD AUTO: 0.09 10*3/MM3 (ref 0–0.4)
EOSINOPHIL NFR BLD AUTO: 1.6 % (ref 0.3–6.2)
ERYTHROCYTE [DISTWIDTH] IN BLOOD BY AUTOMATED COUNT: 21.9 % (ref 12.3–15.4)
HCT VFR BLD AUTO: 27.8 % (ref 34–46.6)
HGB BLD-MCNC: 8.2 G/DL (ref 12–15.9)
HOLD SPECIMEN: NORMAL
HOLD SPECIMEN: NORMAL
LYMPHOCYTES # BLD AUTO: 0.61 10*3/MM3 (ref 0.7–3.1)
LYMPHOCYTES NFR BLD AUTO: 11.1 % (ref 19.6–45.3)
MCH RBC QN AUTO: 35.2 PG (ref 26.6–33)
MCHC RBC AUTO-ENTMCNC: 29.5 G/DL (ref 31.5–35.7)
MCV RBC AUTO: 119.3 FL (ref 79–97)
MONOCYTES # BLD AUTO: 0.37 10*3/MM3 (ref 0.1–0.9)
MONOCYTES NFR BLD AUTO: 6.7 % (ref 5–12)
NEUTROPHILS NFR BLD AUTO: 4.44 10*3/MM3 (ref 1.7–7)
NEUTROPHILS NFR BLD AUTO: 80.4 % (ref 42.7–76)
PLATELET # BLD AUTO: 128 10*3/MM3 (ref 140–450)
PMV BLD AUTO: 10 FL (ref 6–12)
RBC # BLD AUTO: 2.33 10*6/MM3 (ref 3.77–5.28)
WBC NRBC COR # BLD: 5.52 10*3/MM3 (ref 3.4–10.8)

## 2022-05-16 PROCEDURE — 85025 COMPLETE CBC W/AUTO DIFF WBC: CPT

## 2022-05-16 PROCEDURE — 36415 COLL VENOUS BLD VENIPUNCTURE: CPT

## 2022-05-16 PROCEDURE — 99213 OFFICE O/P EST LOW 20 MIN: CPT | Performed by: INTERNAL MEDICINE

## 2022-05-16 RX ORDER — LEVETIRACETAM 750 MG/1
1 TABLET ORAL 2 TIMES DAILY
Status: ON HOLD | COMMUNITY
Start: 2022-05-02 | End: 2022-05-31

## 2022-05-17 ENCOUNTER — TELEPHONE (OUTPATIENT)
Dept: ONCOLOGY | Facility: CLINIC | Age: 57
End: 2022-05-17

## 2022-05-17 NOTE — TELEPHONE ENCOUNTER
Received a call from the pt stating that she is having trouble with her breathing, she has a hernia, and wanted to know if that could effect her breathing. I told her that it could. She stated that she was in the office yesterday and was experiencing shortness of air then. I pulled up her chart and saw that Dr. Workman had mentioned the SOA and that she has been referred to Dr. Alcantar (the pt stated that she has an appt with him on Monday). I asked if her symptoms have worsened from yesterday and she said no. I advised her to avoid activities that make it harder for her to breathe and to go to the ER if her symptoms worsen. Otherwise, I told her to keep her appt with Dr. Alcantar on Monday and hopefully he will be able to help. I advised her to call us back if she has any other questions or needs anything else. She verbalized understanding.

## 2022-05-18 LAB
MYCOBACTERIUM SPEC CULT: NORMAL
NIGHT BLUE STAIN TISS: NORMAL

## 2022-05-30 ENCOUNTER — APPOINTMENT (OUTPATIENT)
Dept: CT IMAGING | Facility: HOSPITAL | Age: 57
End: 2022-05-30

## 2022-05-30 ENCOUNTER — HOSPITAL ENCOUNTER (INPATIENT)
Facility: HOSPITAL | Age: 57
LOS: 4 days | Discharge: HOME HEALTH | End: 2022-06-04
Attending: EMERGENCY MEDICINE | Admitting: HOSPITALIST

## 2022-05-30 DIAGNOSIS — J18.9 PNEUMONITIS: ICD-10-CM

## 2022-05-30 DIAGNOSIS — A41.9 SEPSIS WITH HYPOTENSION: Primary | ICD-10-CM

## 2022-05-30 DIAGNOSIS — C71.9 OLIGODENDROGLIOMA: ICD-10-CM

## 2022-05-30 DIAGNOSIS — E86.0 DEHYDRATION: ICD-10-CM

## 2022-05-30 DIAGNOSIS — N17.9 ACUTE KIDNEY INJURY (NONTRAUMATIC): ICD-10-CM

## 2022-05-30 DIAGNOSIS — I95.9 SEPSIS WITH HYPOTENSION: Primary | ICD-10-CM

## 2022-05-30 DIAGNOSIS — J18.9 PNEUMONIA OF BOTH LOWER LOBES DUE TO INFECTIOUS ORGANISM: ICD-10-CM

## 2022-05-30 DIAGNOSIS — D64.9 SEVERE ANEMIA: ICD-10-CM

## 2022-05-30 LAB
ABO GROUP BLD: NORMAL
ALBUMIN SERPL-MCNC: 2.9 G/DL (ref 3.5–5.2)
ALBUMIN/GLOB SERPL: 1 G/DL
ALP SERPL-CCNC: 129 U/L (ref 39–117)
ALT SERPL W P-5'-P-CCNC: 12 U/L (ref 1–33)
ANION GAP SERPL CALCULATED.3IONS-SCNC: 23 MMOL/L (ref 10–20)
ANION GAP SERPL CALCULATED.3IONS-SCNC: 26 MMOL/L (ref 5–15)
ARTERIAL PATENCY WRIST A: POSITIVE
AST SERPL-CCNC: 20 U/L (ref 1–32)
ATMOSPHERIC PRESS: ABNORMAL MM[HG]
BASE EXCESS BLDA CALC-SCNC: -11.7 MMOL/L (ref 0–3)
BASOPHILS # BLD AUTO: 0 10*3/MM3 (ref 0–0.2)
BASOPHILS NFR BLD AUTO: 0.3 % (ref 0–1.5)
BDY SITE: ABNORMAL
BILIRUB SERPL-MCNC: 2.8 MG/DL (ref 0–1.2)
BLD GP AB SCN SERPL QL: POSITIVE
BUN BLDA-MCNC: 21 MG/DL (ref 8–26)
BUN SERPL-MCNC: 22 MG/DL (ref 6–20)
BUN/CREAT SERPL: 17.1 (ref 7–25)
CA-I BLDA-SCNC: 0.98 MMOL/L (ref 1.12–1.32)
CA-I BLDA-SCNC: 1.13 MMOL/L (ref 1.15–1.33)
CALCIUM SPEC-SCNC: 8.2 MG/DL (ref 8.6–10.5)
CHLORIDE BLDA-SCNC: 106 MMOL/L (ref 98–109)
CHLORIDE SERPL-SCNC: 99 MMOL/L (ref 98–107)
CO2 BLDA-SCNC: 10 MMOL/L (ref 24–29)
CO2 BLDA-SCNC: 12.2 MMOL/L (ref 22–29)
CO2 SERPL-SCNC: 10 MMOL/L (ref 22–29)
CREAT BLDA-MCNC: 1.4 MG/DL (ref 0.6–1.3)
CREAT SERPL-MCNC: 1.29 MG/DL (ref 0.57–1)
D-LACTATE SERPL-SCNC: 11.7 MMOL/L (ref 0.5–2)
D-LACTATE SERPL-SCNC: 9.7 MMOL/L (ref 0.5–2)
DEPRECATED RDW RBC AUTO: 88.8 FL (ref 37–54)
EGFRCR SERPLBLD CKD-EPI 2021: 44.8 ML/MIN/1.73
EGFRCR SERPLBLD CKD-EPI 2021: 49.4 ML/MIN/1.73
EOSINOPHIL # BLD AUTO: 0 10*3/MM3 (ref 0–0.4)
EOSINOPHIL NFR BLD AUTO: 0.1 % (ref 0.3–6.2)
ERYTHROCYTE [DISTWIDTH] IN BLOOD BY AUTOMATED COUNT: 23.6 % (ref 12.3–15.4)
GLOBULIN UR ELPH-MCNC: 2.9 GM/DL
GLUCOSE BLDC GLUCOMTR-MCNC: 241 MG/DL (ref 74–100)
GLUCOSE BLDC GLUCOMTR-MCNC: 241 MG/DL (ref 74–100)
GLUCOSE BLDC GLUCOMTR-MCNC: 280 MG/DL (ref 70–105)
GLUCOSE SERPL-MCNC: 280 MG/DL (ref 65–99)
HCO3 BLDA-SCNC: 11.7 MMOL/L (ref 21–28)
HCT VFR BLD AUTO: 22.2 % (ref 34–46.6)
HCT VFR BLDA CALC: 18 % (ref 38–51)
HCT VFR BLDA CALC: 18 % (ref 38–51)
HEMODILUTION: NO
HGB BLD-MCNC: 6.9 G/DL (ref 12–15.9)
HGB BLDA-MCNC: 6.1 G/DL (ref 12–17)
HGB BLDA-MCNC: 6.2 G/DL (ref 12–17)
INHALED O2 CONCENTRATION: 100 %
LYMPHOCYTES # BLD AUTO: 1 10*3/MM3 (ref 0.7–3.1)
LYMPHOCYTES NFR BLD AUTO: 5.4 % (ref 19.6–45.3)
MCH RBC QN AUTO: 34.3 PG (ref 26.6–33)
MCHC RBC AUTO-ENTMCNC: 31.2 G/DL (ref 31.5–35.7)
MCV RBC AUTO: 109.9 FL (ref 79–97)
MODALITY: ABNORMAL
MONOCYTES # BLD AUTO: 1.1 10*3/MM3 (ref 0.1–0.9)
MONOCYTES NFR BLD AUTO: 6.1 % (ref 5–12)
NEUTROPHILS NFR BLD AUTO: 15.7 10*3/MM3 (ref 1.7–7)
NEUTROPHILS NFR BLD AUTO: 88.1 % (ref 42.7–76)
NRBC BLD AUTO-RTO: 0.4 /100 WBC (ref 0–0.2)
PCO2 BLDA: 18 MM HG (ref 35–48)
PH BLDA: 7.42 PH UNITS (ref 7.35–7.45)
PLATELET # BLD AUTO: 133 10*3/MM3 (ref 140–450)
PMV BLD AUTO: 9.6 FL (ref 6–12)
PO2 BLDA: 245.6 MM HG (ref 83–108)
POTASSIUM BLDA-SCNC: 3.8 MMOL/L (ref 3.5–4.5)
POTASSIUM BLDA-SCNC: 4.1 MMOL/L (ref 3.5–4.9)
POTASSIUM SERPL-SCNC: 4.2 MMOL/L (ref 3.5–5.2)
PROCALCITONIN SERPL-MCNC: 6.58 NG/ML (ref 0–0.25)
PROT SERPL-MCNC: 5.8 G/DL (ref 6–8.5)
RBC # BLD AUTO: 2.02 10*6/MM3 (ref 3.77–5.28)
RH BLD: POSITIVE
SAO2 % BLDCOA: 99.9 % (ref 94–98)
SODIUM BLD-SCNC: 134 MMOL/L (ref 138–146)
SODIUM BLD-SCNC: 137 MMOL/L (ref 138–146)
SODIUM SERPL-SCNC: 135 MMOL/L (ref 136–145)
T&S EXPIRATION DATE: NORMAL
TROPONIN T SERPL-MCNC: <0.01 NG/ML (ref 0–0.03)
WBC NRBC COR # BLD: 17.8 10*3/MM3 (ref 3.4–10.8)

## 2022-05-30 PROCEDURE — 36600 WITHDRAWAL OF ARTERIAL BLOOD: CPT

## 2022-05-30 PROCEDURE — 99285 EMERGENCY DEPT VISIT HI MDM: CPT

## 2022-05-30 PROCEDURE — 82330 ASSAY OF CALCIUM: CPT

## 2022-05-30 PROCEDURE — 0 IOPAMIDOL PER 1 ML: Performed by: EMERGENCY MEDICINE

## 2022-05-30 PROCEDURE — 86850 RBC ANTIBODY SCREEN: CPT | Performed by: EMERGENCY MEDICINE

## 2022-05-30 PROCEDURE — 94640 AIRWAY INHALATION TREATMENT: CPT

## 2022-05-30 PROCEDURE — 80051 ELECTROLYTE PANEL: CPT

## 2022-05-30 PROCEDURE — 80053 COMPREHEN METABOLIC PANEL: CPT | Performed by: EMERGENCY MEDICINE

## 2022-05-30 PROCEDURE — 85014 HEMATOCRIT: CPT

## 2022-05-30 PROCEDURE — 94799 UNLISTED PULMONARY SVC/PX: CPT

## 2022-05-30 PROCEDURE — 82803 BLOOD GASES ANY COMBINATION: CPT

## 2022-05-30 PROCEDURE — 84145 PROCALCITONIN (PCT): CPT | Performed by: EMERGENCY MEDICINE

## 2022-05-30 PROCEDURE — P9612 CATHETERIZE FOR URINE SPEC: HCPCS

## 2022-05-30 PROCEDURE — 84484 ASSAY OF TROPONIN QUANT: CPT | Performed by: EMERGENCY MEDICINE

## 2022-05-30 PROCEDURE — 86870 RBC ANTIBODY IDENTIFICATION: CPT | Performed by: EMERGENCY MEDICINE

## 2022-05-30 PROCEDURE — 87150 DNA/RNA AMPLIFIED PROBE: CPT | Performed by: EMERGENCY MEDICINE

## 2022-05-30 PROCEDURE — 85018 HEMOGLOBIN: CPT

## 2022-05-30 PROCEDURE — 82962 GLUCOSE BLOOD TEST: CPT

## 2022-05-30 PROCEDURE — 93005 ELECTROCARDIOGRAM TRACING: CPT | Performed by: EMERGENCY MEDICINE

## 2022-05-30 PROCEDURE — 86922 COMPATIBILITY TEST ANTIGLOB: CPT

## 2022-05-30 PROCEDURE — 86901 BLOOD TYPING SEROLOGIC RH(D): CPT | Performed by: EMERGENCY MEDICINE

## 2022-05-30 PROCEDURE — 86900 BLOOD TYPING SEROLOGIC ABO: CPT | Performed by: EMERGENCY MEDICINE

## 2022-05-30 PROCEDURE — 87040 BLOOD CULTURE FOR BACTERIA: CPT | Performed by: EMERGENCY MEDICINE

## 2022-05-30 PROCEDURE — 0202U NFCT DS 22 TRGT SARS-COV-2: CPT | Performed by: EMERGENCY MEDICINE

## 2022-05-30 PROCEDURE — 71275 CT ANGIOGRAPHY CHEST: CPT

## 2022-05-30 PROCEDURE — 25010000002 DEXAMETHASONE PER 1 MG: Performed by: EMERGENCY MEDICINE

## 2022-05-30 PROCEDURE — 25010000002 DIPHENHYDRAMINE PER 50 MG: Performed by: EMERGENCY MEDICINE

## 2022-05-30 PROCEDURE — 80047 BASIC METABLC PNL IONIZED CA: CPT

## 2022-05-30 PROCEDURE — 87186 SC STD MICRODIL/AGAR DIL: CPT | Performed by: EMERGENCY MEDICINE

## 2022-05-30 PROCEDURE — 83605 ASSAY OF LACTIC ACID: CPT

## 2022-05-30 PROCEDURE — 85025 COMPLETE CBC W/AUTO DIFF WBC: CPT | Performed by: EMERGENCY MEDICINE

## 2022-05-30 RX ORDER — DILTIAZEM HYDROCHLORIDE 5 MG/ML
INJECTION INTRAVENOUS
Status: COMPLETED
Start: 2022-05-30 | End: 2022-05-30

## 2022-05-30 RX ORDER — IPRATROPIUM BROMIDE AND ALBUTEROL SULFATE 2.5; .5 MG/3ML; MG/3ML
3 SOLUTION RESPIRATORY (INHALATION) ONCE
Status: COMPLETED | OUTPATIENT
Start: 2022-05-30 | End: 2022-05-30

## 2022-05-30 RX ORDER — DILTIAZEM HYDROCHLORIDE 5 MG/ML
25 INJECTION INTRAVENOUS ONCE
Status: COMPLETED | OUTPATIENT
Start: 2022-05-30 | End: 2022-05-30

## 2022-05-30 RX ORDER — DEXAMETHASONE SODIUM PHOSPHATE 4 MG/ML
10 INJECTION, SOLUTION INTRA-ARTICULAR; INTRALESIONAL; INTRAMUSCULAR; INTRAVENOUS; SOFT TISSUE ONCE
Status: COMPLETED | OUTPATIENT
Start: 2022-05-30 | End: 2022-05-30

## 2022-05-30 RX ORDER — ACETAMINOPHEN 650 MG/1
650 SUPPOSITORY RECTAL ONCE
Status: COMPLETED | OUTPATIENT
Start: 2022-05-30 | End: 2022-05-30

## 2022-05-30 RX ORDER — DIPHENHYDRAMINE HYDROCHLORIDE 50 MG/ML
25 INJECTION INTRAMUSCULAR; INTRAVENOUS ONCE
Status: COMPLETED | OUTPATIENT
Start: 2022-05-30 | End: 2022-05-30

## 2022-05-30 RX ADMIN — IOPAMIDOL 100 ML: 755 INJECTION, SOLUTION INTRAVENOUS at 23:24

## 2022-05-30 RX ADMIN — DEXAMETHASONE SODIUM PHOSPHATE 10 MG: 4 INJECTION, SOLUTION INTRAMUSCULAR; INTRAVENOUS at 22:50

## 2022-05-30 RX ADMIN — IPRATROPIUM BROMIDE AND ALBUTEROL SULFATE 3 ML: 2.5; .5 SOLUTION RESPIRATORY (INHALATION) at 22:50

## 2022-05-30 RX ADMIN — SODIUM CHLORIDE 1503 ML: 9 INJECTION, SOLUTION INTRAVENOUS at 22:37

## 2022-05-30 RX ADMIN — DIPHENHYDRAMINE HYDROCHLORIDE 25 MG: 50 INJECTION, SOLUTION INTRAMUSCULAR; INTRAVENOUS at 22:52

## 2022-05-30 RX ADMIN — ACETAMINOPHEN 650 MG: 650 SUPPOSITORY RECTAL at 22:56

## 2022-05-30 RX ADMIN — DILTIAZEM HYDROCHLORIDE 25 MG: 5 INJECTION, SOLUTION INTRAVENOUS at 22:32

## 2022-05-30 RX ADMIN — DILTIAZEM HYDROCHLORIDE 25 MG: 5 INJECTION INTRAVENOUS at 22:32

## 2022-05-31 ENCOUNTER — TRANSCRIBE ORDERS (OUTPATIENT)
Dept: ADMINISTRATIVE | Facility: HOSPITAL | Age: 57
End: 2022-05-31

## 2022-05-31 ENCOUNTER — ANESTHESIA (OUTPATIENT)
Dept: GASTROENTEROLOGY | Facility: HOSPITAL | Age: 57
End: 2022-05-31

## 2022-05-31 ENCOUNTER — ANESTHESIA EVENT (OUTPATIENT)
Dept: GASTROENTEROLOGY | Facility: HOSPITAL | Age: 57
End: 2022-05-31

## 2022-05-31 VITALS
TEMPERATURE: 97 F | OXYGEN SATURATION: 98 % | SYSTOLIC BLOOD PRESSURE: 116 MMHG | DIASTOLIC BLOOD PRESSURE: 69 MMHG | HEART RATE: 84 BPM

## 2022-05-31 DIAGNOSIS — J84.115 RESPIRATORY BRONCHIOLITIS INTERSTITIAL LUNG DISEASE: ICD-10-CM

## 2022-05-31 DIAGNOSIS — I27.20 PHT (PULMONARY HYPERTENSION): Primary | ICD-10-CM

## 2022-05-31 PROBLEM — E66.01 MORBID OBESITY WITH BMI OF 60.0-69.9, ADULT (HCC): Status: ACTIVE | Noted: 2022-05-31

## 2022-05-31 PROBLEM — N17.9 ACUTE KIDNEY INJURY (HCC): Status: ACTIVE | Noted: 2022-05-31

## 2022-05-31 PROBLEM — R53.1 WEAKNESS: Status: ACTIVE | Noted: 2022-05-31

## 2022-05-31 PROBLEM — R45.86 MOOD SWINGS: Status: ACTIVE | Noted: 2021-05-21

## 2022-05-31 PROBLEM — A41.9 SEPSIS WITH HYPOTENSION (HCC): Status: ACTIVE | Noted: 2022-05-31

## 2022-05-31 LAB
ALBUMIN SERPL-MCNC: 2.8 G/DL (ref 3.5–5.2)
ALBUMIN/GLOB SERPL: 1 G/DL
ALP SERPL-CCNC: 101 U/L (ref 39–117)
ALT SERPL W P-5'-P-CCNC: 13 U/L (ref 1–33)
ANION GAP SERPL CALCULATED.3IONS-SCNC: 10 MMOL/L (ref 5–15)
ANTI-FYA: NORMAL
AST SERPL-CCNC: 30 U/L (ref 1–32)
B PARAPERT DNA SPEC QL NAA+PROBE: NOT DETECTED
B PERT DNA SPEC QL NAA+PROBE: NOT DETECTED
BACTERIA BLD CULT: ABNORMAL
BACTERIA UR QL AUTO: ABNORMAL /HPF
BASOPHILS # BLD AUTO: 0 10*3/MM3 (ref 0–0.2)
BASOPHILS NFR BLD AUTO: 0.1 % (ref 0–1.5)
BILIRUB SERPL-MCNC: 3.4 MG/DL (ref 0–1.2)
BILIRUB UR QL STRIP: ABNORMAL
BOTTLE TYPE: ABNORMAL
BUN SERPL-MCNC: 27 MG/DL (ref 6–20)
BUN/CREAT SERPL: 26.7 (ref 7–25)
C PNEUM DNA NPH QL NAA+NON-PROBE: NOT DETECTED
CALCIUM SPEC-SCNC: 7.8 MG/DL (ref 8.6–10.5)
CHLORIDE SERPL-SCNC: 106 MMOL/L (ref 98–107)
CLARITY UR: ABNORMAL
CO2 SERPL-SCNC: 21 MMOL/L (ref 22–29)
COLOR UR: YELLOW
CREAT SERPL-MCNC: 1.01 MG/DL (ref 0.57–1)
D-LACTATE SERPL-SCNC: 0.8 MMOL/L (ref 0.5–2)
D-LACTATE SERPL-SCNC: 1.6 MMOL/L (ref 0.5–2)
DEPRECATED RDW RBC AUTO: 81.4 FL (ref 37–54)
EGFRCR SERPLBLD CKD-EPI 2021: 66.3 ML/MIN/1.73
EOSINOPHIL # BLD AUTO: 0 10*3/MM3 (ref 0–0.4)
EOSINOPHIL NFR BLD AUTO: 0 % (ref 0.3–6.2)
ERYTHROCYTE [DISTWIDTH] IN BLOOD BY AUTOMATED COUNT: 25.1 % (ref 12.3–15.4)
FERRITIN SERPL-MCNC: 1718 NG/ML (ref 13–150)
FLUAV SUBTYP SPEC NAA+PROBE: NOT DETECTED
FLUBV RNA ISLT QL NAA+PROBE: NOT DETECTED
GLOBULIN UR ELPH-MCNC: 2.7 GM/DL
GLUCOSE BLDC GLUCOMTR-MCNC: 164 MG/DL (ref 70–105)
GLUCOSE BLDC GLUCOMTR-MCNC: 196 MG/DL (ref 70–105)
GLUCOSE BLDC GLUCOMTR-MCNC: 203 MG/DL (ref 70–105)
GLUCOSE BLDC GLUCOMTR-MCNC: 217 MG/DL (ref 70–105)
GLUCOSE BLDC GLUCOMTR-MCNC: 229 MG/DL (ref 70–105)
GLUCOSE SERPL-MCNC: 202 MG/DL (ref 65–99)
GLUCOSE UR STRIP-MCNC: NEGATIVE MG/DL
GRAN CASTS URNS QL MICRO: ABNORMAL /LPF
HADV DNA SPEC NAA+PROBE: NOT DETECTED
HCOV 229E RNA SPEC QL NAA+PROBE: NOT DETECTED
HCOV HKU1 RNA SPEC QL NAA+PROBE: NOT DETECTED
HCOV NL63 RNA SPEC QL NAA+PROBE: NOT DETECTED
HCOV OC43 RNA SPEC QL NAA+PROBE: NOT DETECTED
HCT VFR BLD AUTO: 20.8 % (ref 34–46.6)
HCT VFR BLD AUTO: 21 % (ref 34–46.6)
HCT VFR BLD AUTO: 22 % (ref 34–46.6)
HCT VFR BLD AUTO: 26.2 % (ref 34–46.6)
HGB BLD-MCNC: 6.9 G/DL (ref 12–15.9)
HGB BLD-MCNC: 7 G/DL (ref 12–15.9)
HGB BLD-MCNC: 7.4 G/DL (ref 12–15.9)
HGB BLD-MCNC: 8.7 G/DL (ref 12–15.9)
HGB UR QL STRIP.AUTO: ABNORMAL
HMPV RNA NPH QL NAA+NON-PROBE: NOT DETECTED
HPIV1 RNA ISLT QL NAA+PROBE: NOT DETECTED
HPIV2 RNA SPEC QL NAA+PROBE: NOT DETECTED
HPIV3 RNA NPH QL NAA+PROBE: NOT DETECTED
HPIV4 P GENE NPH QL NAA+PROBE: NOT DETECTED
HYALINE CASTS UR QL AUTO: ABNORMAL /LPF
IRON 24H UR-MRATE: 169 MCG/DL (ref 37–145)
IRON SATN MFR SERPL: 76 % (ref 20–50)
KETONES UR QL STRIP: NEGATIVE
LEUKOCYTE ESTERASE UR QL STRIP.AUTO: ABNORMAL
LYMPHOCYTES # BLD AUTO: 0.3 10*3/MM3 (ref 0.7–3.1)
LYMPHOCYTES NFR BLD AUTO: 3.2 % (ref 19.6–45.3)
M PNEUMO IGG SER IA-ACNC: NOT DETECTED
MAGNESIUM SERPL-MCNC: 2 MG/DL (ref 1.6–2.6)
MCH RBC QN AUTO: 32.9 PG (ref 26.6–33)
MCHC RBC AUTO-ENTMCNC: 33.3 G/DL (ref 31.5–35.7)
MCV RBC AUTO: 98.7 FL (ref 79–97)
MONOCYTES # BLD AUTO: 0.3 10*3/MM3 (ref 0.1–0.9)
MONOCYTES NFR BLD AUTO: 3.3 % (ref 5–12)
MRSA DNA SPEC QL NAA+PROBE: NORMAL
NEUTROPHILS NFR BLD AUTO: 7.6 10*3/MM3 (ref 1.7–7)
NEUTROPHILS NFR BLD AUTO: 93.4 % (ref 42.7–76)
NITRITE UR QL STRIP: POSITIVE
NRBC BLD AUTO-RTO: 0.1 /100 WBC (ref 0–0.2)
PH UR STRIP.AUTO: <=5 [PH] (ref 5–8)
PHOSPHATE SERPL-MCNC: 2.4 MG/DL (ref 2.5–4.5)
PLATELET # BLD AUTO: 53 10*3/MM3 (ref 140–450)
PMV BLD AUTO: 8.5 FL (ref 6–12)
POTASSIUM SERPL-SCNC: 4.1 MMOL/L (ref 3.5–5.2)
PROT SERPL-MCNC: 5.5 G/DL (ref 6–8.5)
PROT UR QL STRIP: ABNORMAL
RBC # BLD AUTO: 2.13 10*6/MM3 (ref 3.77–5.28)
RBC # UR STRIP: ABNORMAL /HPF
REF LAB TEST METHOD: ABNORMAL
RHINOVIRUS RNA SPEC NAA+PROBE: NOT DETECTED
RSV RNA NPH QL NAA+NON-PROBE: NOT DETECTED
SARS-COV-2 RNA NPH QL NAA+NON-PROBE: NOT DETECTED
SODIUM SERPL-SCNC: 137 MMOL/L (ref 136–145)
SP GR UR STRIP: 1.02 (ref 1–1.03)
SQUAMOUS #/AREA URNS HPF: ABNORMAL /HPF
TIBC SERPL-MCNC: 224 MCG/DL (ref 298–536)
TRANSFERRIN SERPL-MCNC: 150 MG/DL (ref 200–360)
UROBILINOGEN UR QL STRIP: ABNORMAL
WBC # UR STRIP: ABNORMAL /HPF
WBC NRBC COR # BLD: 8.1 10*3/MM3 (ref 3.4–10.8)

## 2022-05-31 PROCEDURE — 25010000002 VANCOMYCIN 10 G RECONSTITUTED SOLUTION: Performed by: EMERGENCY MEDICINE

## 2022-05-31 PROCEDURE — 63710000001 INSULIN LISPRO (HUMAN) PER 5 UNITS: Performed by: NURSE PRACTITIONER

## 2022-05-31 PROCEDURE — 87186 SC STD MICRODIL/AGAR DIL: CPT | Performed by: EMERGENCY MEDICINE

## 2022-05-31 PROCEDURE — 87116 MYCOBACTERIA CULTURE: CPT | Performed by: INTERNAL MEDICINE

## 2022-05-31 PROCEDURE — 25010000002 CEFEPIME PER 500 MG: Performed by: EMERGENCY MEDICINE

## 2022-05-31 PROCEDURE — 86900 BLOOD TYPING SEROLOGIC ABO: CPT

## 2022-05-31 PROCEDURE — 25010000002 CEFEPIME PER 500 MG: Performed by: NURSE PRACTITIONER

## 2022-05-31 PROCEDURE — 87102 FUNGUS ISOLATION CULTURE: CPT | Performed by: INTERNAL MEDICINE

## 2022-05-31 PROCEDURE — 87086 URINE CULTURE/COLONY COUNT: CPT | Performed by: EMERGENCY MEDICINE

## 2022-05-31 PROCEDURE — 87206 SMEAR FLUORESCENT/ACID STAI: CPT | Performed by: INTERNAL MEDICINE

## 2022-05-31 PROCEDURE — 85025 COMPLETE CBC W/AUTO DIFF WBC: CPT | Performed by: NURSE PRACTITIONER

## 2022-05-31 PROCEDURE — 80053 COMPREHEN METABOLIC PANEL: CPT | Performed by: NURSE PRACTITIONER

## 2022-05-31 PROCEDURE — 36415 COLL VENOUS BLD VENIPUNCTURE: CPT | Performed by: EMERGENCY MEDICINE

## 2022-05-31 PROCEDURE — 83605 ASSAY OF LACTIC ACID: CPT

## 2022-05-31 PROCEDURE — 0B9D8ZX DRAINAGE OF RIGHT MIDDLE LUNG LOBE, VIA NATURAL OR ARTIFICIAL OPENING ENDOSCOPIC, DIAGNOSTIC: ICD-10-PCS | Performed by: INTERNAL MEDICINE

## 2022-05-31 PROCEDURE — 87798 DETECT AGENT NOS DNA AMP: CPT | Performed by: INTERNAL MEDICINE

## 2022-05-31 PROCEDURE — P9016 RBC LEUKOCYTES REDUCED: HCPCS

## 2022-05-31 PROCEDURE — 85018 HEMOGLOBIN: CPT | Performed by: NURSE PRACTITIONER

## 2022-05-31 PROCEDURE — 84466 ASSAY OF TRANSFERRIN: CPT | Performed by: INTERNAL MEDICINE

## 2022-05-31 PROCEDURE — 83540 ASSAY OF IRON: CPT | Performed by: INTERNAL MEDICINE

## 2022-05-31 PROCEDURE — 83605 ASSAY OF LACTIC ACID: CPT | Performed by: NURSE PRACTITIONER

## 2022-05-31 PROCEDURE — 81001 URINALYSIS AUTO W/SCOPE: CPT | Performed by: EMERGENCY MEDICINE

## 2022-05-31 PROCEDURE — 87385 HISTOPLASMA CAPSUL AG IA: CPT | Performed by: INTERNAL MEDICINE

## 2022-05-31 PROCEDURE — 84100 ASSAY OF PHOSPHORUS: CPT | Performed by: NURSE PRACTITIONER

## 2022-05-31 PROCEDURE — 87641 MR-STAPH DNA AMP PROBE: CPT | Performed by: NURSE PRACTITIONER

## 2022-05-31 PROCEDURE — 36430 TRANSFUSION BLD/BLD COMPNT: CPT

## 2022-05-31 PROCEDURE — 88108 CYTOPATH CONCENTRATE TECH: CPT | Performed by: INTERNAL MEDICINE

## 2022-05-31 PROCEDURE — 99222 1ST HOSP IP/OBS MODERATE 55: CPT | Performed by: NURSE PRACTITIONER

## 2022-05-31 PROCEDURE — 85014 HEMATOCRIT: CPT | Performed by: NURSE PRACTITIONER

## 2022-05-31 PROCEDURE — 87071 CULTURE AEROBIC QUANT OTHER: CPT | Performed by: INTERNAL MEDICINE

## 2022-05-31 PROCEDURE — 82728 ASSAY OF FERRITIN: CPT | Performed by: INTERNAL MEDICINE

## 2022-05-31 PROCEDURE — 99222 1ST HOSP IP/OBS MODERATE 55: CPT | Performed by: INTERNAL MEDICINE

## 2022-05-31 PROCEDURE — 87040 BLOOD CULTURE FOR BACTERIA: CPT | Performed by: EMERGENCY MEDICINE

## 2022-05-31 PROCEDURE — 82962 GLUCOSE BLOOD TEST: CPT

## 2022-05-31 PROCEDURE — 25010000002 PROPOFOL 10 MG/ML EMULSION: Performed by: ANESTHESIOLOGIST ASSISTANT

## 2022-05-31 PROCEDURE — 87205 SMEAR GRAM STAIN: CPT | Performed by: INTERNAL MEDICINE

## 2022-05-31 PROCEDURE — 87077 CULTURE AEROBIC IDENTIFY: CPT | Performed by: EMERGENCY MEDICINE

## 2022-05-31 PROCEDURE — 87252 VIRUS INOCULATION TISSUE: CPT | Performed by: INTERNAL MEDICINE

## 2022-05-31 PROCEDURE — 83735 ASSAY OF MAGNESIUM: CPT | Performed by: NURSE PRACTITIONER

## 2022-05-31 RX ORDER — LIDOCAINE HYDROCHLORIDE 20 MG/ML
JELLY TOPICAL
Status: DISPENSED
Start: 2022-05-31 | End: 2022-06-01

## 2022-05-31 RX ORDER — FLUTICASONE PROPIONATE 44 UG/1
2 AEROSOL, METERED RESPIRATORY (INHALATION) DAILY PRN
COMMUNITY

## 2022-05-31 RX ORDER — FOLIC ACID 1 MG/1
1 TABLET ORAL DAILY
COMMUNITY
End: 2022-12-21 | Stop reason: SDUPTHER

## 2022-05-31 RX ORDER — LIDOCAINE HYDROCHLORIDE 20 MG/ML
INJECTION, SOLUTION INFILTRATION; PERINEURAL AS NEEDED
Status: DISCONTINUED | OUTPATIENT
Start: 2022-05-31 | End: 2022-06-04 | Stop reason: HOSPADM

## 2022-05-31 RX ORDER — NICOTINE POLACRILEX 4 MG
15 LOZENGE BUCCAL
Status: DISCONTINUED | OUTPATIENT
Start: 2022-05-31 | End: 2022-06-04 | Stop reason: HOSPADM

## 2022-05-31 RX ORDER — SODIUM CHLORIDE 0.9 % (FLUSH) 0.9 %
10 SYRINGE (ML) INJECTION AS NEEDED
Status: DISCONTINUED | OUTPATIENT
Start: 2022-05-31 | End: 2022-06-04 | Stop reason: HOSPADM

## 2022-05-31 RX ORDER — SODIUM CHLORIDE 9 MG/ML
INJECTION, SOLUTION INTRAVENOUS CONTINUOUS PRN
Status: DISCONTINUED | OUTPATIENT
Start: 2022-05-31 | End: 2022-05-31 | Stop reason: SURG

## 2022-05-31 RX ORDER — INSULIN LISPRO 100 [IU]/ML
0-7 INJECTION, SOLUTION INTRAVENOUS; SUBCUTANEOUS
Status: DISCONTINUED | OUTPATIENT
Start: 2022-05-31 | End: 2022-06-04 | Stop reason: HOSPADM

## 2022-05-31 RX ORDER — NITROGLYCERIN 0.4 MG/1
0.4 TABLET SUBLINGUAL
Status: DISCONTINUED | OUTPATIENT
Start: 2022-05-31 | End: 2022-06-04 | Stop reason: HOSPADM

## 2022-05-31 RX ORDER — PROPOFOL 10 MG/ML
VIAL (ML) INTRAVENOUS AS NEEDED
Status: DISCONTINUED | OUTPATIENT
Start: 2022-05-31 | End: 2022-05-31 | Stop reason: SURG

## 2022-05-31 RX ORDER — ACETAMINOPHEN 325 MG/1
650 TABLET ORAL EVERY 4 HOURS PRN
Status: DISCONTINUED | OUTPATIENT
Start: 2022-05-31 | End: 2022-06-04 | Stop reason: HOSPADM

## 2022-05-31 RX ORDER — OXYBUTYNIN CHLORIDE 5 MG/1
5 TABLET ORAL 4 TIMES DAILY
Status: DISCONTINUED | OUTPATIENT
Start: 2022-05-31 | End: 2022-06-04 | Stop reason: HOSPADM

## 2022-05-31 RX ORDER — ACETAMINOPHEN 650 MG/1
650 SUPPOSITORY RECTAL EVERY 4 HOURS PRN
Status: DISCONTINUED | OUTPATIENT
Start: 2022-05-31 | End: 2022-06-04 | Stop reason: HOSPADM

## 2022-05-31 RX ORDER — LIDOCAINE HYDROCHLORIDE 20 MG/ML
JELLY TOPICAL AS NEEDED
Status: DISCONTINUED | OUTPATIENT
Start: 2022-05-31 | End: 2022-06-04 | Stop reason: HOSPADM

## 2022-05-31 RX ORDER — DEXTROSE MONOHYDRATE 25 G/50ML
25 INJECTION, SOLUTION INTRAVENOUS
Status: DISCONTINUED | OUTPATIENT
Start: 2022-05-31 | End: 2022-06-04 | Stop reason: HOSPADM

## 2022-05-31 RX ORDER — FOLIC ACID 1 MG/1
1 TABLET ORAL DAILY
Status: DISCONTINUED | OUTPATIENT
Start: 2022-05-31 | End: 2022-06-04 | Stop reason: HOSPADM

## 2022-05-31 RX ORDER — ONDANSETRON 2 MG/ML
4 INJECTION INTRAMUSCULAR; INTRAVENOUS EVERY 6 HOURS PRN
Status: DISCONTINUED | OUTPATIENT
Start: 2022-05-31 | End: 2022-06-04 | Stop reason: HOSPADM

## 2022-05-31 RX ORDER — FERROUS SULFATE TAB EC 324 MG (65 MG FE EQUIVALENT) 324 (65 FE) MG
324 TABLET DELAYED RESPONSE ORAL
Status: DISCONTINUED | OUTPATIENT
Start: 2022-06-01 | End: 2022-06-04 | Stop reason: HOSPADM

## 2022-05-31 RX ORDER — SODIUM CHLORIDE 0.9 % (FLUSH) 0.9 %
10 SYRINGE (ML) INJECTION EVERY 12 HOURS SCHEDULED
Status: DISCONTINUED | OUTPATIENT
Start: 2022-05-31 | End: 2022-06-04 | Stop reason: HOSPADM

## 2022-05-31 RX ORDER — VANCOMYCIN 1.75 GRAM/500 ML IN 0.9 % SODIUM CHLORIDE INTRAVENOUS
20 ONCE
Status: COMPLETED | OUTPATIENT
Start: 2022-05-31 | End: 2022-05-31

## 2022-05-31 RX ORDER — LIDOCAINE HYDROCHLORIDE 10 MG/ML
INJECTION, SOLUTION EPIDURAL; INFILTRATION; INTRACAUDAL; PERINEURAL AS NEEDED
Status: DISCONTINUED | OUTPATIENT
Start: 2022-05-31 | End: 2022-05-31 | Stop reason: SURG

## 2022-05-31 RX ORDER — ONDANSETRON 4 MG/1
4 TABLET, FILM COATED ORAL EVERY 6 HOURS PRN
Status: DISCONTINUED | OUTPATIENT
Start: 2022-05-31 | End: 2022-06-04 | Stop reason: HOSPADM

## 2022-05-31 RX ORDER — INSULIN LISPRO 100 [IU]/ML
0-7 INJECTION, SOLUTION INTRAVENOUS; SUBCUTANEOUS AS NEEDED
Status: DISCONTINUED | OUTPATIENT
Start: 2022-05-31 | End: 2022-06-04 | Stop reason: HOSPADM

## 2022-05-31 RX ORDER — VENLAFAXINE HYDROCHLORIDE 75 MG/1
150 CAPSULE, EXTENDED RELEASE ORAL DAILY
Status: DISCONTINUED | OUTPATIENT
Start: 2022-05-31 | End: 2022-06-04 | Stop reason: HOSPADM

## 2022-05-31 RX ORDER — OLANZAPINE 10 MG/2ML
1 INJECTION, POWDER, LYOPHILIZED, FOR SOLUTION INTRAMUSCULAR
Status: DISCONTINUED | OUTPATIENT
Start: 2022-05-31 | End: 2022-06-04 | Stop reason: HOSPADM

## 2022-05-31 RX ADMIN — PROPOFOL 20 MG: 10 INJECTION, EMULSION INTRAVENOUS at 15:24

## 2022-05-31 RX ADMIN — Medication 10 ML: at 08:28

## 2022-05-31 RX ADMIN — CEFEPIME HYDROCHLORIDE 2 G: 2 INJECTION, POWDER, FOR SOLUTION INTRAVENOUS at 08:28

## 2022-05-31 RX ADMIN — INSULIN LISPRO 3 UNITS: 100 INJECTION, SOLUTION INTRAVENOUS; SUBCUTANEOUS at 21:02

## 2022-05-31 RX ADMIN — PROPOFOL 70 MG: 10 INJECTION, EMULSION INTRAVENOUS at 15:20

## 2022-05-31 RX ADMIN — Medication 10 ML: at 01:07

## 2022-05-31 RX ADMIN — INSULIN LISPRO 3 UNITS: 100 INJECTION, SOLUTION INTRAVENOUS; SUBCUTANEOUS at 11:30

## 2022-05-31 RX ADMIN — VANCOMYCIN HYDROCHLORIDE 1750 MG: 10 INJECTION, POWDER, LYOPHILIZED, FOR SOLUTION INTRAVENOUS at 02:00

## 2022-05-31 RX ADMIN — LEVETIRACETAM 750 MG: 500 TABLET, FILM COATED ORAL at 21:02

## 2022-05-31 RX ADMIN — OXYBUTYNIN CHLORIDE 5 MG: 5 TABLET ORAL at 21:02

## 2022-05-31 RX ADMIN — LIDOCAINE HYDROCHLORIDE 30 MG: 10 INJECTION, SOLUTION EPIDURAL; INFILTRATION; INTRACAUDAL; PERINEURAL at 15:22

## 2022-05-31 RX ADMIN — FOLIC ACID 1 MG: 1 TABLET ORAL at 17:00

## 2022-05-31 RX ADMIN — PROPOFOL 30 MG: 10 INJECTION, EMULSION INTRAVENOUS at 15:22

## 2022-05-31 RX ADMIN — SODIUM CHLORIDE: 0.9 INJECTION, SOLUTION INTRAVENOUS at 14:59

## 2022-05-31 RX ADMIN — INSULIN LISPRO 2 UNITS: 100 INJECTION, SOLUTION INTRAVENOUS; SUBCUTANEOUS at 17:05

## 2022-05-31 RX ADMIN — OXYBUTYNIN CHLORIDE 5 MG: 5 TABLET ORAL at 17:05

## 2022-05-31 RX ADMIN — VENLAFAXINE HYDROCHLORIDE 150 MG: 75 CAPSULE, EXTENDED RELEASE ORAL at 17:00

## 2022-05-31 RX ADMIN — CEFEPIME HYDROCHLORIDE 2 G: 2 INJECTION, POWDER, FOR SOLUTION INTRAVENOUS at 01:05

## 2022-05-31 RX ADMIN — Medication 10 ML: at 21:02

## 2022-05-31 RX ADMIN — CEFEPIME HYDROCHLORIDE 2 G: 2 INJECTION, POWDER, FOR SOLUTION INTRAVENOUS at 17:00

## 2022-05-31 NOTE — ANESTHESIA PREPROCEDURE EVALUATION
Anesthesia Evaluation     Patient summary reviewed and Nursing notes reviewed   NPO Solid Status: > 8 hours  NPO Liquid Status: > 8 hours           Airway   Mallampati: I  TM distance: >3 FB  Neck ROM: full  No difficulty expected  Dental - normal exam     Pulmonary - normal exam   (+) pneumonia ,   Cardiovascular - normal exam    (+) hypertension,       Neuro/Psych  (+) seizures,    GI/Hepatic/Renal/Endo    (+) morbid obesity,  renal disease CRI, diabetes mellitus,     Musculoskeletal     Abdominal  - normal exam    Bowel sounds: normal.   Substance History - negative use     OB/GYN negative ob/gyn ROS         Other   arthritis,                      Anesthesia Plan    ASA 4     MAC     intravenous induction     Anesthetic plan, all risks, benefits, and alternatives have been provided, discussed and informed consent has been obtained with: patient.    Plan discussed with CRNA and CAA.        CODE STATUS:    Code Status (Patient has no pulse and is not breathing): CPR (Attempt to Resuscitate)  Medical Interventions (Patient has pulse or is breathing): Full Support

## 2022-05-31 NOTE — ANESTHESIA POSTPROCEDURE EVALUATION
Patient: Cindy Cortes    Procedure Summary     Date: 05/31/22 Room / Location: [facility] ENDO VIRTUAL RM / [facility] ENDOSCOPY    Anesthesia Start: 1519 Anesthesia Stop: 1528    Procedure: BRONCHOSCOPY AT BEDSIDE with bronchoalveolar lavage right middle lobe (N/A Bronchus) Diagnosis:       Pneumonitis      (Pneumonitis [J18.9])    Surgeons: Den Feldman MD Provider: Robel Davis MD    Anesthesia Type: MAC ASA Status: 4          Anesthesia Type: MAC    Vitals  Vitals Value Taken Time   /70 05/31/22 1641   Temp 97 °F (36.1 °C) 05/31/22 1526   Pulse 82 05/31/22 1641   Resp     SpO2 94 % 05/31/22 1641   Vitals shown include unvalidated device data.        Post Anesthesia Care and Evaluation    Patient location during evaluation: PACU  Patient participation: complete - patient participated  Level of consciousness: awake  Pain scale: See nurse's notes for pain score.  Pain management: adequate  Airway patency: patent  Anesthetic complications: No anesthetic complications  PONV Status: none  Cardiovascular status: acceptable  Respiratory status: acceptable  Hydration status: acceptable    Comments: Patient seen and examined postoperatively; vital signs stable; SpO2 greater than or equal to 90%; cardiopulmonary status stable; nausea/vomiting adequately controlled; pain adequately controlled; no apparent anesthesia complications; patient discharged from anesthesia care when discharge criteria were met

## 2022-06-01 ENCOUNTER — APPOINTMENT (OUTPATIENT)
Dept: GENERAL RADIOLOGY | Facility: HOSPITAL | Age: 57
End: 2022-06-01

## 2022-06-01 LAB
ALBUMIN SERPL-MCNC: 3 G/DL (ref 3.5–5.2)
ALBUMIN/GLOB SERPL: 1.2 G/DL
ALP SERPL-CCNC: 107 U/L (ref 39–117)
ALT SERPL W P-5'-P-CCNC: 15 U/L (ref 1–33)
ANION GAP SERPL CALCULATED.3IONS-SCNC: 9 MMOL/L (ref 5–15)
AST SERPL-CCNC: 26 U/L (ref 1–32)
BACTERIA SPEC AEROBE CULT: ABNORMAL
BASOPHILS # BLD AUTO: 0 10*3/MM3 (ref 0–0.2)
BASOPHILS NFR BLD AUTO: 0.1 % (ref 0–1.5)
BH BB BLOOD EXPIRATION DATE: NORMAL
BH BB BLOOD EXPIRATION DATE: NORMAL
BH BB BLOOD TYPE BARCODE: 9500
BH BB BLOOD TYPE BARCODE: 9500
BH BB DISPENSE STATUS: NORMAL
BH BB DISPENSE STATUS: NORMAL
BH BB PRODUCT CODE: NORMAL
BH BB PRODUCT CODE: NORMAL
BH BB UNIT NUMBER: NORMAL
BH BB UNIT NUMBER: NORMAL
BILIRUB CONJ SERPL-MCNC: 0.8 MG/DL (ref 0–0.3)
BILIRUB SERPL-MCNC: 1.8 MG/DL (ref 0–1.2)
BUN SERPL-MCNC: 29 MG/DL (ref 6–20)
BUN/CREAT SERPL: 30.2 (ref 7–25)
CALCIUM SPEC-SCNC: 8.3 MG/DL (ref 8.6–10.5)
CHLORIDE SERPL-SCNC: 106 MMOL/L (ref 98–107)
CO2 SERPL-SCNC: 21 MMOL/L (ref 22–29)
CREAT SERPL-MCNC: 0.96 MG/DL (ref 0.57–1)
DEPRECATED RDW RBC AUTO: 74.4 FL (ref 37–54)
EGFRCR SERPLBLD CKD-EPI 2021: 70.5 ML/MIN/1.73
EOSINOPHIL # BLD AUTO: 0 10*3/MM3 (ref 0–0.4)
EOSINOPHIL NFR BLD AUTO: 0 % (ref 0.3–6.2)
ERYTHROCYTE [DISTWIDTH] IN BLOOD BY AUTOMATED COUNT: 23.2 % (ref 12.3–15.4)
FOLATE SERPL-MCNC: 17.8 NG/ML (ref 4.78–24.2)
GLOBULIN UR ELPH-MCNC: 2.5 GM/DL
GLUCOSE BLDC GLUCOMTR-MCNC: 159 MG/DL (ref 70–105)
GLUCOSE BLDC GLUCOMTR-MCNC: 164 MG/DL (ref 70–105)
GLUCOSE BLDC GLUCOMTR-MCNC: 173 MG/DL (ref 70–105)
GLUCOSE BLDC GLUCOMTR-MCNC: 208 MG/DL (ref 70–105)
GLUCOSE SERPL-MCNC: 191 MG/DL (ref 65–99)
HAPTOGLOB SERPL-MCNC: 34 MG/DL (ref 30–200)
HCT VFR BLD AUTO: 23.5 % (ref 34–46.6)
HCT VFR BLD AUTO: 24.7 % (ref 34–46.6)
HGB BLD-MCNC: 7.8 G/DL (ref 12–15.9)
HGB BLD-MCNC: 8.2 G/DL (ref 12–15.9)
LDH SERPL-CCNC: 264 U/L (ref 135–214)
LYMPHOCYTES # BLD AUTO: 0.3 10*3/MM3 (ref 0.7–3.1)
LYMPHOCYTES NFR BLD AUTO: 4.3 % (ref 19.6–45.3)
MAGNESIUM SERPL-MCNC: 2 MG/DL (ref 1.6–2.6)
MCH RBC QN AUTO: 32.1 PG (ref 26.6–33)
MCHC RBC AUTO-ENTMCNC: 33.3 G/DL (ref 31.5–35.7)
MCV RBC AUTO: 96.3 FL (ref 79–97)
MONOCYTES # BLD AUTO: 0.3 10*3/MM3 (ref 0.1–0.9)
MONOCYTES NFR BLD AUTO: 3.8 % (ref 5–12)
NEUTROPHILS NFR BLD AUTO: 6.9 10*3/MM3 (ref 1.7–7)
NEUTROPHILS NFR BLD AUTO: 91.8 % (ref 42.7–76)
NRBC BLD AUTO-RTO: 0.5 /100 WBC (ref 0–0.2)
PHOSPHATE SERPL-MCNC: 2.3 MG/DL (ref 2.5–4.5)
PLATELET # BLD AUTO: 42 10*3/MM3 (ref 140–450)
PMV BLD AUTO: 8.9 FL (ref 6–12)
POTASSIUM SERPL-SCNC: 4 MMOL/L (ref 3.5–5.2)
PROT SERPL-MCNC: 5.5 G/DL (ref 6–8.5)
RBC # BLD AUTO: 2.44 10*6/MM3 (ref 3.77–5.28)
RBC MORPH BLD: NORMAL
RETICS # AUTO: 0.14 10*6/MM3 (ref 0.02–0.13)
RETICS/RBC NFR AUTO: 5.73 % (ref 0.7–1.9)
SMALL PLATELETS BLD QL SMEAR: NORMAL
SODIUM SERPL-SCNC: 136 MMOL/L (ref 136–145)
UNIT  ABO: NORMAL
UNIT  ABO: NORMAL
UNIT  RH: NORMAL
UNIT  RH: NORMAL
VIT B12 BLD-MCNC: 553 PG/ML (ref 211–946)
WBC MORPH BLD: NORMAL
WBC NRBC COR # BLD: 7.5 10*3/MM3 (ref 3.4–10.8)

## 2022-06-01 PROCEDURE — 80053 COMPREHEN METABOLIC PANEL: CPT | Performed by: NURSE PRACTITIONER

## 2022-06-01 PROCEDURE — 82607 VITAMIN B-12: CPT | Performed by: INTERNAL MEDICINE

## 2022-06-01 PROCEDURE — 82248 BILIRUBIN DIRECT: CPT | Performed by: INTERNAL MEDICINE

## 2022-06-01 PROCEDURE — 85045 AUTOMATED RETICULOCYTE COUNT: CPT | Performed by: INTERNAL MEDICINE

## 2022-06-01 PROCEDURE — 99232 SBSQ HOSP IP/OBS MODERATE 35: CPT | Performed by: HOSPITALIST

## 2022-06-01 PROCEDURE — 83010 ASSAY OF HAPTOGLOBIN QUANT: CPT | Performed by: INTERNAL MEDICINE

## 2022-06-01 PROCEDURE — 82962 GLUCOSE BLOOD TEST: CPT

## 2022-06-01 PROCEDURE — 83735 ASSAY OF MAGNESIUM: CPT | Performed by: NURSE PRACTITIONER

## 2022-06-01 PROCEDURE — 63710000001 PREDNISONE PER 1 MG: Performed by: INTERNAL MEDICINE

## 2022-06-01 PROCEDURE — 25010000002 CEFEPIME PER 500 MG: Performed by: NURSE PRACTITIONER

## 2022-06-01 PROCEDURE — 86900 BLOOD TYPING SEROLOGIC ABO: CPT

## 2022-06-01 PROCEDURE — 83615 LACTATE (LD) (LDH) ENZYME: CPT | Performed by: INTERNAL MEDICINE

## 2022-06-01 PROCEDURE — 71045 X-RAY EXAM CHEST 1 VIEW: CPT

## 2022-06-01 PROCEDURE — 82746 ASSAY OF FOLIC ACID SERUM: CPT | Performed by: INTERNAL MEDICINE

## 2022-06-01 PROCEDURE — 85007 BL SMEAR W/DIFF WBC COUNT: CPT | Performed by: NURSE PRACTITIONER

## 2022-06-01 PROCEDURE — 99232 SBSQ HOSP IP/OBS MODERATE 35: CPT | Performed by: INTERNAL MEDICINE

## 2022-06-01 PROCEDURE — 85025 COMPLETE CBC W/AUTO DIFF WBC: CPT | Performed by: NURSE PRACTITIONER

## 2022-06-01 PROCEDURE — 97162 PT EVAL MOD COMPLEX 30 MIN: CPT

## 2022-06-01 PROCEDURE — 63710000001 INSULIN LISPRO (HUMAN) PER 5 UNITS: Performed by: NURSE PRACTITIONER

## 2022-06-01 PROCEDURE — P9016 RBC LEUKOCYTES REDUCED: HCPCS

## 2022-06-01 PROCEDURE — 84100 ASSAY OF PHOSPHORUS: CPT | Performed by: NURSE PRACTITIONER

## 2022-06-01 PROCEDURE — 36430 TRANSFUSION BLD/BLD COMPNT: CPT

## 2022-06-01 RX ORDER — MAGNESIUM SULFATE 1 G/100ML
1 INJECTION INTRAVENOUS AS NEEDED
Status: DISCONTINUED | OUTPATIENT
Start: 2022-06-01 | End: 2022-06-04 | Stop reason: HOSPADM

## 2022-06-01 RX ORDER — POTASSIUM CHLORIDE 7.45 MG/ML
10 INJECTION INTRAVENOUS
Status: DISCONTINUED | OUTPATIENT
Start: 2022-06-01 | End: 2022-06-04 | Stop reason: HOSPADM

## 2022-06-01 RX ORDER — MAGNESIUM SULFATE HEPTAHYDRATE 40 MG/ML
2 INJECTION, SOLUTION INTRAVENOUS AS NEEDED
Status: DISCONTINUED | OUTPATIENT
Start: 2022-06-01 | End: 2022-06-04 | Stop reason: HOSPADM

## 2022-06-01 RX ORDER — MAGNESIUM SULFATE HEPTAHYDRATE 40 MG/ML
4 INJECTION, SOLUTION INTRAVENOUS AS NEEDED
Status: DISCONTINUED | OUTPATIENT
Start: 2022-06-01 | End: 2022-06-04 | Stop reason: HOSPADM

## 2022-06-01 RX ORDER — POTASSIUM CHLORIDE 20 MEQ/1
40 TABLET, EXTENDED RELEASE ORAL AS NEEDED
Status: DISCONTINUED | OUTPATIENT
Start: 2022-06-01 | End: 2022-06-04 | Stop reason: HOSPADM

## 2022-06-01 RX ORDER — POTASSIUM CHLORIDE 1.5 G/1.77G
40 POWDER, FOR SOLUTION ORAL AS NEEDED
Status: DISCONTINUED | OUTPATIENT
Start: 2022-06-01 | End: 2022-06-04 | Stop reason: HOSPADM

## 2022-06-01 RX ORDER — PREDNISONE 20 MG/1
40 TABLET ORAL
Status: DISCONTINUED | OUTPATIENT
Start: 2022-06-01 | End: 2022-06-03

## 2022-06-01 RX ADMIN — ACETAMINOPHEN 650 MG: 325 TABLET ORAL at 13:42

## 2022-06-01 RX ADMIN — ACETAMINOPHEN 650 MG: 325 TABLET ORAL at 21:11

## 2022-06-01 RX ADMIN — PREDNISONE 40 MG: 20 TABLET ORAL at 09:14

## 2022-06-01 RX ADMIN — FOLIC ACID 1 MG: 1 TABLET ORAL at 08:11

## 2022-06-01 RX ADMIN — INSULIN LISPRO 2 UNITS: 100 INJECTION, SOLUTION INTRAVENOUS; SUBCUTANEOUS at 21:10

## 2022-06-01 RX ADMIN — INSULIN LISPRO 2 UNITS: 100 INJECTION, SOLUTION INTRAVENOUS; SUBCUTANEOUS at 11:23

## 2022-06-01 RX ADMIN — OXYBUTYNIN CHLORIDE 5 MG: 5 TABLET ORAL at 11:23

## 2022-06-01 RX ADMIN — Medication 10 ML: at 21:11

## 2022-06-01 RX ADMIN — FERROUS SULFATE TAB EC 324 MG (65 MG FE EQUIVALENT) 324 MG: 324 (65 FE) TABLET DELAYED RESPONSE at 08:11

## 2022-06-01 RX ADMIN — LEVETIRACETAM 750 MG: 500 TABLET, FILM COATED ORAL at 21:10

## 2022-06-01 RX ADMIN — CEFEPIME HYDROCHLORIDE 2 G: 2 INJECTION, POWDER, FOR SOLUTION INTRAVENOUS at 00:54

## 2022-06-01 RX ADMIN — Medication 2 PACKET: at 09:14

## 2022-06-01 RX ADMIN — LEVETIRACETAM 750 MG: 500 TABLET, FILM COATED ORAL at 08:11

## 2022-06-01 RX ADMIN — Medication 10 ML: at 08:12

## 2022-06-01 RX ADMIN — VENLAFAXINE HYDROCHLORIDE 150 MG: 75 CAPSULE, EXTENDED RELEASE ORAL at 08:11

## 2022-06-01 RX ADMIN — OXYBUTYNIN CHLORIDE 5 MG: 5 TABLET ORAL at 08:12

## 2022-06-01 RX ADMIN — CEFEPIME HYDROCHLORIDE 2 G: 2 INJECTION, POWDER, FOR SOLUTION INTRAVENOUS at 08:24

## 2022-06-01 RX ADMIN — OXYBUTYNIN CHLORIDE 5 MG: 5 TABLET ORAL at 21:10

## 2022-06-01 RX ADMIN — INSULIN LISPRO 2 UNITS: 100 INJECTION, SOLUTION INTRAVENOUS; SUBCUTANEOUS at 08:12

## 2022-06-01 RX ADMIN — OXYBUTYNIN CHLORIDE 5 MG: 5 TABLET ORAL at 18:36

## 2022-06-01 RX ADMIN — INSULIN LISPRO 3 UNITS: 100 INJECTION, SOLUTION INTRAVENOUS; SUBCUTANEOUS at 18:36

## 2022-06-01 NOTE — PLAN OF CARE
Goal Outcome Evaluation:               57 y/o F who presented with acute on chronic SOA. Diagnosed with UTI,  Sepsis. History of oligodendroglioma s/p crani: Chemo stopped in April due to side effects on bone marrow and pneumonitis. On room air at baseline. Pt is mod I with ADLs at baseline. Ambulates short distances independently without AD. For community distances, uses manual w/c that her  pushes. Patient lives in single story home with one MONICA with  and other family members. She is typically sedentary. Denies falls. This date she requires 3L supp O2 for mobility. Min A for bed mobility, CGA for transfers. CGA for ambulation with RW 75' with dyspnea however SaO2 WNL. Vitals WNL throughout. Pt with increased SOA as compared to normal and benefiting from use of RW (which she has available at home). Due to slight decline from baseline function and baseline endurance deficits, recommending HHPT at d/c.

## 2022-06-01 NOTE — PROGRESS NOTES
Hematology/Oncology Inpatient Progress Note    PATIENT NAME: Cindy Cortes  : 1965  MRN: [id number]    Chief complaint: Shortness of breath     History of present illness:    Cindy Cortes is a 56 y.o. female who presented to [facility] on 2022 with complaints of shortness of breath.     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  She will be with significant myelosuppression, possible pneumonitis.  She has had longstanding shortness of breath, anemia she has required multiple transfusions in the past.  Patient was getting lethargic, shortness of breath was worsening over the last few days.  She presented to the emergency room.  In the ER her hemoglobin was down to 6.9, she had a temperature of 102.5 she had an elevated procalcitonin.  She was placed on 100% nonrebreather.  This was weaned down to 4 L.     Other work-up showed positive for UTI, she was thought to be septic started on broad-spectrum IV antibiotics and treated for septic shock.  She also had a CT PE which was negative for PE.  Very subtle groundglass attenuation possibly inflammatory or infectious.  PRBC transfusion was started.  Patient admitted for further work-up     22  Hematology/Oncology was consulted.  She is down to 2 L, improvement in symptoms.  Status post bronchoscopy.     He/She  has a past medical history of Arthritis, GERD (gastroesophageal reflux disease), Hypertension, Oligodendroglioma ([facility]), Seizure ([facility]), and Type 2 diabetes mellitus with hyperglycemia, without long-term current use of insulin ([facility]) (2021).     PCP: Annamarie Monroy APRN    Subjective     Patient is improved this morning.         MEDICATIONS:    Scheduled Meds:  cefepime, 2 g, Intravenous, Q8H  ferrous sulfate, 324 mg, Oral, Daily With Breakfast  folic acid, 1 mg, Oral, Daily  insulin lispro, 0-7 Units,  "Subcutaneous, 4x Daily With Meals & Nightly  levETIRAcetam, 750 mg, Oral, BID  oxybutynin, 5 mg, Oral, 4x Daily  sodium chloride, 10 mL, Intravenous, Q12H  venlafaxine XR, 150 mg, Oral, Daily       Continuous Infusions:      PRN Meds:    acetaminophen **OR** acetaminophen    dextrose    dextrose    glucagon (human recombinant)    insulin lispro **AND** insulin lispro    lidocaine    Lidocaine HCl gel    nitroglycerin    ondansetron **OR** ondansetron    sodium chloride     ALLERGIES:  No Known Allergies    Objective    VITALS:   /67   Pulse 75   Temp 97.5 °F (36.4 °C) (Oral)   Resp 20   Ht 157.5 cm (62\")   Wt 128 kg (282 lb 6.6 oz)   LMP  (LMP Unknown)   SpO2 90%   BMI 51.65 kg/m²     PHYSICAL EXAM: (performed by MD)  Physical Exam  Constitutional:       Appearance: Normal appearance. She is obese.   HENT:      Head: Normocephalic and atraumatic.   Eyes:      Pupils: Pupils are equal, round, and reactive to light.   Cardiovascular:      Rate and Rhythm: Normal rate and regular rhythm.      Pulses: Normal pulses.      Heart sounds: No murmur heard.  Pulmonary:      Effort: Pulmonary effort is normal.      Breath sounds: Rhonchi present.   Abdominal:      General: There is no distension.      Palpations: Abdomen is soft. There is no mass.      Tenderness: There is no abdominal tenderness.   Musculoskeletal:         General: Normal range of motion.      Cervical back: Normal range of motion.   Skin:     General: Skin is warm.   Neurological:      General: No focal deficit present.      Mental Status: She is alert.   Psychiatric:         Mood and Affect: Mood normal.           RECENT LABS:  Lab Results (last 24 hours)       Procedure Component Value Units Date/Time    Vitamin B12 [[id number]] Collected: 06/01/22 0606    Specimen: Blood Updated: 06/01/22 0730    Folate [[id number]] Collected: 06/01/22 0606    Specimen: Blood Updated: 06/01/22 0730    Haptoglobin [[id number]] Collected: 06/01/22 0606    " Specimen: Blood Updated: 06/01/22 0730    BAL Culture, Quantitative - Lavage, Lung, Right Middle Lobe [[id number]] Collected: 05/31/22 1523    Specimen: Lavage from Lung, Right Middle Lobe Updated: 06/01/22 0716     Gram Stain Rare (1+) WBCs seen      No organisms seen    POC Glucose Once [[id number]]  (Abnormal) Collected: 06/01/22 0709    Specimen: Blood Updated: 06/01/22 0711     Glucose 164 mg/dL      Comment: Serial Number: [id number]:  [id number]       CBC & Differential [[id number]]  (Abnormal) Collected: 06/01/22 0606    Specimen: Blood Updated: 06/01/22 0710    Narrative:      The following orders were created for panel order CBC & Differential.  Procedure                               Abnormality         Status                     ---------                               -----------         ------                     CBC Auto Differential[[id number]]        Abnormal            Final result               Scan Slide[[id number]]                                       Final result                 Please view results for these tests on the individual orders.    Scan Slide [[id number]] Collected: 06/01/22 0606    Specimen: Blood Updated: 06/01/22 0710     RBC Morphology Normal     WBC Morphology Normal     Platelet Estimate Decreased    Narrative:      Slide Reviewed    CBC Auto Differential [[id number]]  (Abnormal) Collected: 06/01/22 0606    Specimen: Blood Updated: 06/01/22 0710     WBC 7.50 10*3/mm3      RBC 2.44 10*6/mm3      Hemoglobin 7.8 g/dL      Hematocrit 23.5 %      MCV 96.3 fL      MCH 32.1 pg      MCHC 33.3 g/dL      RDW 23.2 %      RDW-SD 74.4 fl      MPV 8.9 fL      Platelets 42 10*3/mm3      Neutrophil % 91.8 %      Lymphocyte % 4.3 %      Monocyte % 3.8 %      Eosinophil % 0.0 %      Basophil % 0.1 %      Neutrophils, Absolute 6.90 10*3/mm3      Lymphocytes, Absolute 0.30 10*3/mm3      Monocytes, Absolute 0.30 10*3/mm3      Eosinophils, Absolute 0.00 10*3/mm3      Basophils, Absolute 0.00 10*3/mm3       nRBC 0.5 /100 WBC     Narrative:      Appended report. These results have been appended to a previously verified report.    Comprehensive Metabolic Panel [[id number]]  (Abnormal) Collected: 06/01/22 0606    Specimen: Blood Updated: 06/01/22 0654     Glucose 191 mg/dL      BUN 29 mg/dL      Creatinine 0.96 mg/dL      Sodium 136 mmol/L      Potassium 4.0 mmol/L      Chloride 106 mmol/L      CO2 21.0 mmol/L      Calcium 8.3 mg/dL      Total Protein 5.5 g/dL      Albumin 3.00 g/dL      ALT (SGPT) 15 U/L      AST (SGOT) 26 U/L      Alkaline Phosphatase 107 U/L      Total Bilirubin 1.8 mg/dL      Globulin 2.5 gm/dL      A/G Ratio 1.2 g/dL      BUN/Creatinine Ratio 30.2     Anion Gap 9.0 mmol/L      eGFR 70.5 mL/min/1.73      Comment: National Kidney Foundation and American Society of Nephrology (ASN) Task Force recommended calculation based on the Chronic Kidney Disease Epidemiology Collaboration (CKD-EPI) equation refit without adjustment for race.       Narrative:      GFR Normal >60  Chronic Kidney Disease <60  Kidney Failure <15      Phosphorus [[id number]]  (Abnormal) Collected: 06/01/22 0606    Specimen: Blood Updated: 06/01/22 0654     Phosphorus 2.3 mg/dL     Magnesium [[id number]]  (Normal) Collected: 06/01/22 0606    Specimen: Blood Updated: 06/01/22 0654     Magnesium 2.0 mg/dL     Blood Culture - Blood, Chest, Right [[id number]]  (Abnormal) Collected: 05/30/22 2244    Specimen: Blood from Chest, Right Updated: 06/01/22 0650     Blood Culture Escherichia coli     Isolated from Aerobic Bottle     Gram Stain Aerobic Bottle Gram negative bacilli    Reticulocytes [[id number]]  (Abnormal) Collected: 06/01/22 0606    Specimen: Blood Updated: 06/01/22 0621     Reticulocyte % 5.73 %      Reticulocyte Absolute 0.1407 10*6/mm3     Blood Culture - Blood, Arm, Right [[id number]]  (Normal) Collected: 05/31/22 0044    Specimen: Blood from Arm, Right Updated: 06/01/22 0102     Blood Culture No growth at 24 hours    Hemoglobin  & Hematocrit, Blood [[id number]]  (Abnormal) Collected: 05/31/22 2356    Specimen: Blood Updated: 06/01/22 0003     Hemoglobin 8.2 g/dL      Hematocrit 24.7 %     Ferritin [[id number]]  (Abnormal) Collected: 05/31/22 1304    Specimen: Blood Updated: 05/31/22 2237     Ferritin 1,718.00 ng/mL     Narrative:      Results may be falsely decreased if patient taking Biotin.      Iron Profile [[id number]]  (Abnormal) Collected: 05/31/22 1304    Specimen: Blood Updated: 05/31/22 2232     Iron 169 mcg/dL      Iron Saturation 76 %      Transferrin 150 mg/dL      TIBC 224 mcg/dL     POC Glucose Once [[id number]]  (Abnormal) Collected: 05/31/22 2039    Specimen: Blood Updated: 05/31/22 2040     Glucose 217 mg/dL      Comment: Serial Number: [id number]:  [id number]       Hemoglobin & Hematocrit, Blood [[id number]]  (Abnormal) Collected: 05/31/22 1754    Specimen: Blood Updated: 05/31/22 1805     Hemoglobin 8.7 g/dL      Hematocrit 26.2 %      Comment: Result checked        Pneumocystis PCR - Lavage, Lung, Right Middle Lobe [[id number]] Collected: 05/31/22 1644    Specimen: Lavage from Lung, Right Middle Lobe Updated: 05/31/22 1644    Virus Culture - Lavage, Lung, Right Middle Lobe [[id number]] Collected: 05/31/22 1523    Specimen: Lavage from Lung, Right Middle Lobe Updated: 05/31/22 1638    Histoplasma Antigen, CSF or BAL - Lavage, Lung, Right Middle Lobe [[id number]] Collected: 05/31/22 1523    Specimen: Lavage from Lung, Right Middle Lobe Updated: 05/31/22 1638    Cytomegalovirus (CMV) By PCR - Lavage, Lung, Right Middle Lobe [[id number]] Collected: 05/31/22 1523    Specimen: Lavage from Lung, Right Middle Lobe Updated: 05/31/22 1638    Fungus Culture - Lavage, Lung, Right Middle Lobe [[id number]] Collected: 05/31/22 1523    Specimen: Lavage from Lung, Right Middle Lobe Updated: 05/31/22 1638    AFB Culture - Lavage, Lung, Right Middle Lobe [[id number]] Collected: 05/31/22 1523    Specimen: Lavage from Lung, Right Middle Lobe  Updated: 05/31/22 1638    POC Glucose Once [[id number]]  (Abnormal) Collected: 05/31/22 1633    Specimen: Blood Updated: 05/31/22 1634     Glucose 164 mg/dL      Comment: Serial Number: [id number]:  [id number]       Comprehensive Metabolic Panel [[id number]]  (Abnormal) Collected: 05/31/22 1304    Specimen: Blood Updated: 05/31/22 1338     Glucose 202 mg/dL      BUN 27 mg/dL      Creatinine 1.01 mg/dL      Sodium 137 mmol/L      Potassium 4.1 mmol/L      Chloride 106 mmol/L      CO2 21.0 mmol/L      Calcium 7.8 mg/dL      Total Protein 5.5 g/dL      Albumin 2.80 g/dL      ALT (SGPT) 13 U/L      AST (SGOT) 30 U/L      Alkaline Phosphatase 101 U/L      Total Bilirubin 3.4 mg/dL      Globulin 2.7 gm/dL      A/G Ratio 1.0 g/dL      BUN/Creatinine Ratio 26.7     Anion Gap 10.0 mmol/L      eGFR 66.3 mL/min/1.73      Comment: National Kidney Foundation and American Society of Nephrology (ASN) Task Force recommended calculation based on the Chronic Kidney Disease Epidemiology Collaboration (CKD-EPI) equation refit without adjustment for race.       Narrative:      GFR Normal >60  Chronic Kidney Disease <60  Kidney Failure <15      Phosphorus [[id number]]  (Abnormal) Collected: 05/31/22 1304    Specimen: Blood Updated: 05/31/22 1338     Phosphorus 2.4 mg/dL     Magnesium [[id number]]  (Normal) Collected: 05/31/22 1304    Specimen: Blood Updated: 05/31/22 1338     Magnesium 2.0 mg/dL     Blood Culture ID, PCR - Blood, Chest, Right [[id number]]  (Abnormal) Collected: 05/30/22 2244    Specimen: Blood from Chest, Right Updated: 05/31/22 1322     BCID, PCR Eschericia coli. Identification by BCID2 PCR.     BOTTLE TYPE Aerobic Bottle    CBC & Differential [[id number]]  (Abnormal) Collected: 05/31/22 1125    Specimen: Blood Updated: 05/31/22 1232    Narrative:      The following orders were created for panel order CBC & Differential.  Procedure                               Abnormality         Status                     ---------                                -----------         ------                     CBC Auto Differential[[id number]]        Abnormal            Final result                 Please view results for these tests on the individual orders.    CBC Auto Differential [[id number]]  (Abnormal) Collected: 05/31/22 1125    Specimen: Blood Updated: 05/31/22 1232     WBC 8.10 10*3/mm3      RBC 2.13 10*6/mm3      Hemoglobin 7.0 g/dL      Hematocrit 21.0 %      MCV 98.7 fL      MCH 32.9 pg      MCHC 33.3 g/dL      RDW 25.1 %      RDW-SD 81.4 fl      MPV 8.5 fL      Platelets 53 10*3/mm3      Neutrophil % 93.4 %      Lymphocyte % 3.2 %      Monocyte % 3.3 %      Eosinophil % 0.0 %      Basophil % 0.1 %      Neutrophils, Absolute 7.60 10*3/mm3      Lymphocytes, Absolute 0.30 10*3/mm3      Monocytes, Absolute 0.30 10*3/mm3      Eosinophils, Absolute 0.00 10*3/mm3      Basophils, Absolute 0.00 10*3/mm3      nRBC 0.1 /100 WBC     Hemoglobin & Hematocrit, Blood [[id number]]  (Abnormal) Collected: 05/31/22 1125    Specimen: Blood Updated: 05/31/22 1143     Hemoglobin 6.9 g/dL      Hematocrit 20.8 %     Urine Culture - Urine, Urine, Catheter In/Out [[id number]]  (Abnormal) Collected: 05/31/22 0038    Specimen: Urine, Catheter In/Out Updated: 05/31/22 1141     Urine Culture >100,000 CFU/mL Gram Negative Bacilli    Narrative:      Colonization of the urinary tract without infection is common. Treatment is discouraged unless the patient is symptomatic, pregnant, or undergoing an invasive urologic procedure.    POC Glucose Once [[id number]]  (Abnormal) Collected: 05/31/22 1124    Specimen: Blood Updated: 05/31/22 1125     Glucose 229 mg/dL      Comment: Serial Number: [id number]:  [id number]                   IMAGING REVIEWED:  CT Angiogram Chest Pulmonary Embolism    Result Date: 5/31/2022  1. No evidence of pulmonary embolism. 2. No evidence of thoracic aortic aneurysm or dissection. 3. Very subtle groundglass attenuation seen throughout the lungs  bilaterally could potentially be inflammatory or infectious. Clinical correlation is recommended. 4. Cholelithiasis. 5. Mild splenomegaly. Electronically signed by:  Robel Aj D.O.  5/30/2022 10:11 PM      Assessment & Plan       Patient is a 56-year-old female with oligodendroglioma s/p resection, adjuvant chemotherapy now admitted with shortness of breath, anemia     Anemia  Patient does have underlying anemia of chronic disease, anemia related to myelosuppression  This however should have improved however she continues to have significant anemia.  retic is elevated however to a lesser degree with his hb. This is also after transfusion not very accurate  hapto pending. Tbili is elevated check direct, LDH pending  Iron ferritin high after transfusion likely, not accurate  B12 folic acid pending.  If above work-up is negative, consider bone marrow biopsy for longstanding anemia, recent chemotherapy.     Shortness of breath  Possible causes include pneumonitis, pneumonia atypical sepsis.  Status post bronchoscopy, pending PCP work-up.     Sepsis  Improvement in blood pressure,  Broad-spectrum, UTI     Thrombocytopenia  Acute on chronic  Likely infection related.

## 2022-06-01 NOTE — PROGRESS NOTES
Daily Progress Note        Sepsis with hypotension (HCC)    Depression    Essential hypertension    Seizure (HCC)    Brain tumor (HCC)    Dyslipidemia    Gastroesophageal reflux disease with hiatal hernia    Morbid obesity with BMI of 60.0-69.9, adult (HCC)    Type 2 diabetes mellitus with hyperglycemia, without long-term current use of insulin (HCC)    Pneumonia    UTI (urinary tract infection)    Acute respiratory failure with hypoxia (HCC)    Anemia due to chemotherapy    Acute kidney injury (HCC)    Weakness      Assessment    Sepsis  Hypotension secondary to hypovolemia  Pneumonitis: CT scan showed groundglass opacities bilaterally which were present in April 1, 2022 was believed secondary to chemo  Severe anemia with history of bone marrow suppression secondary to chemo  UTI  E. coli bacteremia  Dehydration  Lactic acidosis: Improving  CARLOS MANUEL  Altered mental status  History of oligodendroglioma: Chemo stopped in April due to side effects on bone marrow and pneumonitis  Hypoxemia  Diabetes mellitus with hyperglycemia  Morbid obesity with BMI 50  GERD  Dyslipidemia  History of seizure disorder  History of essential hypertension  History of depression     Plan:  Oxygen supplement and titration: Currently requiring 4 L per nasal cannula  Blood pressure support: Currently requiring no pressors  IV antibiotics: Cefepime  Monitor H&H: Hemoglobin improved from 6.9-7.4 after 2 units of packed RBC  Elevated bilirubin  Discussed with hematology oncology: Plan for bone marrow biopsy soon  Bronchoscopy 5/31/2022 showed no purulent secretions: Start prednisone 40 mg daily and when she improves we will start weaning it slowly she might require a few weeks of low-dose later on  Discussed with the patient and family  Patient is improving and can move to [facility]     LOS: 1 day     Subjective     Mild shortness of breath and dry cough    Objective     Vital signs for last 24 hours:  Vitals:    06/01/22 0500 06/01/22 0600 06/01/22 0700  06/01/22 0800   BP: 116/62 117/78 129/67 126/76   Pulse: 69 72 75 68   Resp:       Temp:    97.7 °F (36.5 °C)   TempSrc:    Oral   SpO2: 93% 97% 90% 97%   Weight: 128 kg (282 lb 6.6 oz)      Height:           Intake/Output last 3 shifts:  I/O last 3 completed shifts:  In: 4052 [P.O.:540; I.V.:1292; Blood:520; IV Piggyback:1700]  Out: 1200 [Urine:1200]  Intake/Output this shift:  I/O this shift:  In: 60 [P.O.:60]  Out: -       Radiology  Imaging Results (Last 24 Hours)     ** No results found for the last 24 hours. **          Labs:  Results from last 7 days   Lab Units 06/01/22  0606   WBC 10*3/mm3 7.50   HEMOGLOBIN g/dL 7.8*   HEMATOCRIT % 23.5*   PLATELETS 10*3/mm3 42*     Results from last 7 days   Lab Units 06/01/22  0606   SODIUM mmol/L 136   POTASSIUM mmol/L 4.0   CHLORIDE mmol/L 106   CO2 mmol/L 21.0*   BUN mg/dL 29*   CREATININE mg/dL 0.96   CALCIUM mg/dL 8.3*   BILIRUBIN mg/dL 1.8*   ALK PHOS U/L 107   ALT (SGPT) U/L 15   AST (SGOT) U/L 26   GLUCOSE mg/dL 191*     Results from last 7 days   Lab Units 05/30/22  2244   PH, ARTERIAL pH units 7.419   PO2 ART mm Hg 245.6*   PCO2, ARTERIAL mm Hg 18.0*   HCO3 ART mmol/L 11.7*     Results from last 7 days   Lab Units 06/01/22  0606 05/31/22  1304 05/30/22  2244   ALBUMIN g/dL 3.00* 2.80* 2.90*     Results from last 7 days   Lab Units 05/30/22  2244   TROPONIN T ng/mL <0.010         Results from last 7 days   Lab Units 06/01/22  0606   MAGNESIUM mg/dL 2.0                   Meds:   SCHEDULE  cefepime, 2 g, Intravenous, Q8H  ferrous sulfate, 324 mg, Oral, Daily With Breakfast  folic acid, 1 mg, Oral, Daily  insulin lispro, 0-7 Units, Subcutaneous, 4x Daily With Meals & Nightly  levETIRAcetam, 750 mg, Oral, BID  oxybutynin, 5 mg, Oral, 4x Daily  sodium chloride, 10 mL, Intravenous, Q12H  venlafaxine XR, 150 mg, Oral, Daily      Infusions     PRNs  •  acetaminophen **OR** acetaminophen  •  dextrose  •  dextrose  •  glucagon (human recombinant)  •  insulin lispro **AND**  insulin lispro  •  lidocaine  •  Lidocaine HCl gel  •  nitroglycerin  •  ondansetron **OR** ondansetron  •  sodium chloride    Physical Exam:  Physical Exam  General Appearance:  Alert   HEENT:  Normocephalic, without obvious abnormality, Conjunctiva/corneas clear,.   Nares normal, no drainage     Neck:  Supple, symmetrical, trachea midline. No JVD.  Lungs /Chest wall:   Good respiratory effort without wheezing but has scattered dry rhonchi, respirations unlabored, symmetrical wall movement.     Heart:  Regular rate and rhythm, S1 S2 normal  Abdomen: Soft, non-tender, no masses, no organomegaly.    Extremities: No edema, no clubbing or cyanosis  ROS  Review of Systems  Constitutional: Negative for chills, fever and malaise/fatigue.   HENT: Negative.    Eyes: Negative.    Cardiovascular: Negative.    Respiratory: Positive for cough and shortness of breath.    Skin: Negative.    Musculoskeletal: Negative.    Gastrointestinal: Negative.    Genitourinary: Negative.    Neurological: Negative.    Psychiatric/Behavioral: Negative.

## 2022-06-01 NOTE — THERAPY EVALUATION
Patient Name: Cindy Cortes  : 1965    MRN: [id number]                              Today's Date: 2022       Admit Date: 2022    Visit Dx:     ICD-10-CM ICD-9-CM   1. Sepsis with hypotension (HCC)  A41.9 038.9    I95.9 995.91     458.8   2. Pneumonia of both lower lobes due to infectious organism  J18.9 486   3. Severe anemia  D64.9 285.9   4. Dehydration  E86.0 276.51   5. Acute kidney injury (nontraumatic) (HCC)  N17.9 584.9   6. Oligodendroglioma (HCC)  C71.9 191.9   7. Pneumonitis  J18.9 486     Patient Active Problem List   Diagnosis   • Depression   • Essential hypertension   • Primary osteoarthritis of both knees   • Seasonal allergic rhinitis   • Seizure (HCC)   • Oligodendroglioma (HCC)   • Brain tumor (HCC)   • Combined forms of age-related cataract, bilateral   • Post-menopausal   • Presbyopia   • Anemia   • Dyslipidemia   • HTN, goal below 130/80   • Gastroesophageal reflux disease with hiatal hernia   • Morbid obesity with BMI of 60.0-69.9, adult (HCC)   • Prediabetes   • Vitamin D deficiency   • Hyperglycemia   • Type 2 diabetes mellitus with hyperglycemia, without long-term current use of insulin (HCC)   • Oligoastrocytoma of frontal lobe (HCC)   • Port-A-Cath in place   • Chemotherapy-induced thrombocytopenia   • Pneumonia   • Mood swings   • HTN, goal below 130/80   • Morbid obesity with BMI of 60.0-69.9, adult (HCC)   • Oligoastrocytoma of frontal lobe (HCC)   • Type 2 diabetes mellitus with hyperglycemia, without long-term current use of insulin (HCC)   • Vitamin D deficiency   • Sepsis with hypotension (HCC)   • UTI (urinary tract infection)   • Acute respiratory failure with hypoxia (HCC)   • Anemia due to chemotherapy   • Acute kidney injury (HCC)   • Weakness     Past Medical History:   Diagnosis Date   • Arthritis    • GERD (gastroesophageal reflux disease)    • Hypertension    • Oligodendroglioma (HCC)    • Seizure (HCC)    • Type 2 diabetes mellitus with hyperglycemia,  without long-term current use of insulin (HCC) 05/12/2021     Past Surgical History:   Procedure Laterality Date   • BRONCHOSCOPY N/A 4/6/2022    Procedure: BRONCHOSCOPY with bronchial washing;  Surgeon: Drew Alcantar MD;  Location: [facility] ENDOSCOPY;  Service: Pulmonary;  Laterality: N/A;  pneumonia   • BRONCHOSCOPY N/A 5/31/2022    Procedure: BRONCHOSCOPY AT BEDSIDE with bronchoalveolar lavage right middle lobe;  Surgeon: Den Feldman MD;  Location: [facility] ENDOSCOPY;  Service: Pulmonary;  Laterality: N/A;   • COLONOSCOPY     • CRANIOTOMY FOR TUMOR Right 05/06/2021    Procedure: CRANIOTOMY FOR TUMOR RESECTION WITH STEREOTACTIC;  Surgeon: Jluis Felix MD;  Location: [facility] MAIN OR;  Service: Neurosurgery;  Laterality: Right;      General Information     Row Name 06/01/22 1514          Physical Therapy Time and Intention    Document Type evaluation  -     Mode of Treatment physical therapy  -     Row Name 06/01/22 1514          General Information    Patient Profile Reviewed yes  -SS     Prior Level of Function independent:;all household mobility;ADL's;dependent:;community mobility;w/c or scooter   pushes her in manual w/c in community due to SOA. She ambulates without AD in home  -     Existing Precautions/Restrictions oxygen therapy device and L/min  3L currently  -     Barriers to Rehab previous functional deficit  -SS     Row Name 06/01/22 1514          Living Environment    People in Home spouse  -SS     Row Name 06/01/22 1514          Home Main Entrance    Number of Stairs, Main Entrance one  -SS     Row Name 06/01/22 1514          Stairs Within Home, Primary    Number of Stairs, Within Home, Primary none  -SS     Row Name 06/01/22 1514          Cognition    Orientation Status (Cognition) oriented x 4  -SS     Row Name 06/01/22 1514          Safety Issues, Functional Mobility    Impairments Affecting Function (Mobility) endurance/activity tolerance;shortness of breath  -           User Key   (r) = Recorded By, (t) = Taken By, (c) = Cosigned By    Initials Name Provider Type     Sonam Florian PT Physical Therapist               Mobility     Row Name 06/01/22 1627          Bed Mobility    Bed Mobility supine-sit  -SS     Supine-Sit [place] (Bed Mobility) minimum assist (75% patient effort)  -     Row Name 06/01/22 1627          Sit-Stand Transfer    Sit-Stand Lynn (Transfers) contact guard  -     Assistive Device (Sit-Stand Transfers) walker, front-wheeled  -     Row Name 06/01/22 1627          Gait/Stairs (Locomotion)    [place] Level (Gait) contact guard  -     Assistive Device (Gait) walker, front-wheeled  -SS     Distance in Feet (Gait) 75  -SS     Deviations/Abnormal Patterns (Gait) gait speed decreased  -     Comment, (Gait/Stairs) becomes SOA and requires standing rest breaks, fatigued following  -           User Key  (r) = Recorded By, (t) = Taken By, (c) = Cosigned By    Initials Name Provider Type     Sonam Florian PT Physical Therapist               Obj/Interventions     Row Name 06/01/22 1634          Range of Motion Comprehensive    Comment, General Range of Motion HIP ROM limited by body habitus at baseline  -     Row Name 06/01/22 1634          Strength Comprehensive (MMT)    Comment, General Manual Muscle Testing (MMT) Assessment B LE >3/5  -     Row Name 06/01/22 1634          Balance    Balance Assessment sitting static balance;standing static balance  -SS     Static Sitting Balance independent  -     Static Standing Balance supervision  -     Row Name 06/01/22 1634          Sensory Assessment (Somatosensory)    Sensory Assessment (Somatosensory) sensation intact  -           User Key  (r) = Recorded By, (t) = Taken By, (c) = Cosigned By    Initials Name Provider Type     Sonam Florian PT Physical Therapist               Goals/Plan     Row Name 06/01/22 1637          Bed Mobility Goal 1 (PT)    Activity/Assistive Device  (Bed Mobility Goal 1, PT) bed mobility activities, all  -SS     [place] Level/Cues Needed (Bed Mobility Goal 1, PT) modified independence  -SS     Time Frame (Bed Mobility Goal 1, PT) long term goal (LTG);2 weeks  -SS     Row Name 06/01/22 1637          Transfer Goal 1 (PT)    Activity/Assistive Device (Transfer Goal 1, PT) transfers, all  -SS     [place] Level/Cues Needed (Transfer Goal 1, PT) modified independence  -SS     Time Frame (Transfer Goal 1, PT) long term goal (LTG);2 weeks  -SS     Row Name 06/01/22 1637          Gait Training Goal 1 (PT)    Activity/Assistive Device (Gait Training Goal 1, PT) gait (walking locomotion);walker, rolling  -SS     [place] Level (Gait Training Goal 1, PT) modified independence  -SS     Distance (Gait Training Goal 1, PT) 75  -SS     Time Frame (Gait Training Goal 1, PT) long term goal (LTG);2 weeks  -SS     Row Name 06/01/22 1637          Therapy Assessment/Plan (PT)    Planned Therapy Interventions (PT) balance training;bed mobility training;gait training;transfer training;patient/family education;home exercise program;strengthening  -SS           User Key  (r) = Recorded By, (t) = Taken By, (c) = Cosigned By    Initials Name Provider Type     Sonam Florian, PT Physical Therapist               Clinical Impression     Row Name 06/01/22 1635          Pain    Pretreatment Pain Rating 0/10 - no pain  -SS     Posttreatment Pain Rating 0/10 - no pain  -SS     Row Name 06/01/22 1644 06/01/22 1635       Plan of Care Review    Plan of Care Reviewed With -- patient  -SS    Outcome Evaluation 57 y/o F who presented with acute on chronic SOA. Diagnosed with UTI,  Sepsis. History of oligodendroglioma s/p crani: Chemo stopped in April due to side effects on bone marrow and pneumonitis. On room air at baseline. Pt is mod I with ADLs at baseline. Ambulates short distances independently without AD. For community distances, uses manual w/c that her  pushes.  Patient lives in single story home with one MONICA with  and other family members. She is typically sedentary. Denies falls. This date she requires 3L supp O2 for mobility. Min A for bed mobility, CGA for transfers. CGA for ambulation with RW 75' with dyspnea however SaO2 WNL. Vitals WNL throughout. Pt with increased SOA as compared to normal and benefiting from use of RW (which she has available at home). Due to slight decline from baseline function and baseline endurance deficits, recommending HHPT at d/c.  - --    Row Name 06/01/22 1635          Therapy Assessment/Plan (PT)    Rehab Potential (PT) good, to achieve stated therapy goals  -     Criteria for Skilled Interventions Met (PT) yes;meets criteria  -     Therapy Frequency (PT) 3 times/wk  -     Predicted Duration of Therapy Intervention (PT) until d/c  -     Row Name 06/01/22 1635          Positioning and Restraints    Pre-Treatment Position in bed  -     Post Treatment Position chair  -SS     In Chair exit alarm on  -           User Key  (r) = Recorded By, (t) = Taken By, (c) = Cosigned By    Initials Name Provider Type    SS Sonam Florian, PT Physical Therapist               Outcome Measures    No documentation.                              Physical Therapy Education                 Title: PT OT SLP Therapies (Done)     Topic: Physical Therapy (Done)     Point: Mobility training (Done)     Learning Progress Summary           Patient Acceptance, E, VU by  at 6/1/2022 1638                               User Key     Initials Effective Dates Name Provider Type Discipline     06/16/21 -  Sonam Florian PT Physical Therapist PT              PT Recommendation and Plan  Planned Therapy Interventions (PT): balance training, bed mobility training, gait training, transfer training, patient/family education, home exercise program, strengthening  Plan of Care Reviewed With: patient  Outcome Evaluation: 55 y/o F who presented with  acute on chronic SOA. Diagnosed with UTI,  Sepsis. History of oligodendroglioma s/p crani: Chemo stopped in April due to side effects on bone marrow and pneumonitis. On room air at baseline. Pt is mod I with ADLs at baseline. Ambulates short distances independently without AD. For community distances, uses manual w/c that her  pushes. Patient lives in single story home with one MONICA with  and other family members. She is typically sedentary. Denies falls. This date she requires 3L supp O2 for mobility. Min A for bed mobility, CGA for transfers. CGA for ambulation with RW 75' with dyspnea however SaO2 WNL. Vitals WNL throughout. Pt with increased SOA as compared to normal and benefiting from use of RW (which she has available at home). Due to slight decline from baseline function and baseline endurance deficits, recommending HHPT at d/c.     Time Calculation:    PT Charges     Row Name 06/01/22 1639             Time Calculation    Start Time 1200  -      Stop Time 1230  -      Time Calculation (min) 30 min  -      PT Received On 06/01/22  -      PT - Next Appointment 06/03/22  -      PT Goal Re-Cert Due Date 06/15/22  -              Time Calculation- PT    Total Timed Code Minutes- PT 0 minute(s)  -            User Key  (r) = Recorded By, (t) = Taken By, (c) = Cosigned By    Initials Name Provider Type    Sonam Quintanilla PT Physical Therapist              Therapy Charges for Today     Code Description Service Date Service Provider Modifiers Qty    75633740542 HC PT EVAL MOD COMPLEXITY 4 6/1/2022 Sonam Florian, PT GP 1               Sonam Florian PT  6/1/2022

## 2022-06-01 NOTE — CONSULTS
Nutrition Services    Patient Name: Cindy Cortes  YOB: 1965  MRN: [id number]  Admission date: 5/30/2022    Comment:  -- Add Boost Glucose Control BID (Provides 380 kcals, 32 g protein if consumed)       PPE Documentation        PPE Worn By Provider mask, gloves and eye protection   PPE Worn By Patient  None      CLINICAL NUTRITION ASSESSMENT      Reason for Assessment 6/1: MST of 4      H&P      Past Medical History:   Diagnosis Date   • Arthritis    • GERD (gastroesophageal reflux disease)    • Hypertension    • Oligodendroglioma (HCC)    • Seizure (HCC)    • Type 2 diabetes mellitus with hyperglycemia, without long-term current use of insulin (HCC) 05/12/2021       Past Surgical History:   Procedure Laterality Date   • BRONCHOSCOPY N/A 4/6/2022    Procedure: BRONCHOSCOPY with bronchial washing;  Surgeon: Drew Alcantar MD;  Location: [facility] ENDOSCOPY;  Service: Pulmonary;  Laterality: N/A;  pneumonia   • COLONOSCOPY     • CRANIOTOMY FOR TUMOR Right 05/06/2021    Procedure: CRANIOTOMY FOR TUMOR RESECTION WITH STEREOTACTIC;  Surgeon: Jluis Felix MD;  Location: [facility] MAIN OR;  Service: Neurosurgery;  Laterality: Right;        Current Problems   Depression    Essential hypertension  - cardiology following    Seizure    Brain tumor    Dyslipidemia    Gastroesophageal reflux disease with hiatal hernia    Type 2 diabetes mellitus    Pneumonia  -bronch 5/31    Sepsis        Encounter Information        Trending Narrative     6/1: Patient discussed in AM rounds.  Noted patient is a MIPS downgrade. To get Mag and Phos replacements today.  Patient received 1 unit of blood.  RD visited patient at bedside.  Patient states not a good appetite, no N/V, no chewing/swallowing issues noted, reports 85# weight loss since September, 366# UBW prior to weight loss, does not drink Boost or Ensure.  States all she ordered for lunch was fruit.  RD expressed concern for patient decreased PO intakes and encouraged to  "add protein to meals.       Anthropometrics        Current Height, Weight Height: 157.5 cm (62\")  Weight: 128 kg (282 lb 6.6 oz) (06/01/22 0500)       Ideal Body Weight (IBW) 110#   Usual Body Weight (UBW) 366# per patient        Trending Weight Hx     This admission: 6/1: 276-282# range              PTA: 9.9% weight loss x 3 months    Wt Readings from Last 30 Encounters:   06/01/22 0500 128 kg (282 lb 6.6 oz)   05/31/22 0230 125 kg (276 lb 3.8 oz)   05/30/22 2218 129 kg (285 lb)   05/16/22 1318 129 kg (285 lb 3.2 oz)   04/25/22 1140 132 kg (291 lb)   04/11/22 1205 136 kg (299 lb)   04/06/22 1100 135 kg (297 lb)   04/05/22 0817 135 kg (297 lb)   04/04/22 1349 (!) 138 kg (305 lb)   03/28/22 1258 (!) 138 kg (305 lb)   03/22/22 1124 (!) 138 kg (305 lb)   03/21/22 1106 (!) 139 kg (306 lb 12.8 oz)   03/15/22 1136 (!) 140 kg (307 lb 9.6 oz)   03/01/22 1123 (!) 142 kg (313 lb)   02/21/22 1110 (!) 144 kg (316 lb 9.6 oz)   01/27/22 1236 (!) 148 kg (326 lb)   01/25/22 1126 (!) 148 kg (325 lb 6.4 oz)   01/06/22 1204 (!) 148 kg (326 lb)   01/06/22 1133 121 kg (267 lb)   01/04/22 1338 (!) 153 kg (338 lb)   12/07/21 1101 (!) 153 kg (338 lb)   12/07/21 1025 (!) 153 kg (338 lb)   12/06/21 1000 (!) 153 kg (338 lb)   11/29/21 1002 (!) 151 kg (333 lb)   11/24/21 1422 (!) 152 kg (334 lb 6.4 oz)   11/22/21 0944 (!) 152 kg (334 lb 11.2 oz)   11/16/21 1500 (!) 157 kg (346 lb)   11/14/21 0247 (!) 157 kg (346 lb 12.5 oz)   11/13/21 1959 (!) 154 kg (339 lb 15.2 oz)   10/28/21 0853 (!) 152 kg (334 lb)   10/27/21 1120 (!) 152 kg (334 lb 6.4 oz)   10/07/21 0908 (!) 151 kg (333 lb 9.6 oz)   09/29/21 1104 (!) 154 kg (340 lb)   09/16/21 0910 (!) 154 kg (338 lb 12.8 oz)   09/08/21 1126 (!) 154 kg (339 lb)      BMI kg/m2 Body mass index is 51.65 kg/m².       Labs        Pertinent Labs    Results from last 7 days   Lab Units 06/01/22  0606 05/31/22  1304 05/30/22  2244   SODIUM mmol/L 136 137 135*   POTASSIUM mmol/L 4.0 4.1 4.2   CHLORIDE mmol/L 106 " 106 99   CO2 mmol/L 21.0* 21.0* 10.0*   BUN mg/dL 29* 27* 22*   CREATININE mg/dL 0.96 1.01* 1.29*   CALCIUM mg/dL 8.3* 7.8* 8.2*   BILIRUBIN mg/dL 1.8* 3.4* 2.8*   ALK PHOS U/L 107 101 129*   ALT (SGPT) U/L 15 13 12   AST (SGOT) U/L 26 30 20   GLUCOSE mg/dL 191* 202* 280*     Results from last 7 days   Lab Units 06/01/22  0606 05/31/22  1754 05/31/22  1304   MAGNESIUM mg/dL 2.0  --  2.0   PHOSPHORUS mg/dL 2.3*  --  2.4*   HEMOGLOBIN g/dL 7.8*   < >  --    HEMATOCRIT % 23.5*   < >  --     < > = values in this interval not displayed.     COVID19   Date Value Ref Range Status   05/30/2022 Not Detected Not Detected - Ref. Range Final     Lab Results   Component Value Date    HGBA1C 5.7 (H) 11/01/2021        Medications    Scheduled Medications cefepime, 2 g, Intravenous, Q8H  ferrous sulfate, 324 mg, Oral, Daily With Breakfast  folic acid, 1 mg, Oral, Daily  insulin lispro, 0-7 Units, Subcutaneous, 4x Daily With Meals & Nightly  levETIRAcetam, 750 mg, Oral, BID  oxybutynin, 5 mg, Oral, 4x Daily  sodium chloride, 10 mL, Intravenous, Q12H  venlafaxine XR, 150 mg, Oral, Daily        Infusions      PRN Medications •  acetaminophen **OR** acetaminophen  •  dextrose  •  dextrose  •  glucagon (human recombinant)  •  insulin lispro **AND** insulin lispro  •  lidocaine  •  Lidocaine HCl gel  •  magnesium sulfate **OR** magnesium sulfate in D5W 1g/100mL (PREMIX) **OR** magnesium sulfate  •  nitroglycerin  •  ondansetron **OR** ondansetron  •  potassium & sodium phosphates **OR** potassium & sodium phosphates  •  potassium chloride **OR** potassium chloride **OR** potassium chloride  •  sodium chloride     Physical Findings        Trending Physical   Appearance, NFPE 6/1: NFPE completed and not consistent with nutrition diagnosis of malnutrition at this time using AND/ASPEN criteria but continues at risk related to decreased PO intakes and weight loss.      --  Edema  No edema documented      Bowel Function Last  5/31     Tubes  No feeding tube      Chewing/Swallowing No issues per patient      Skin Intact      --  Current Nutrition Orders & Evaluation of Intake       Oral Nutrition     Food Allergies NKFA   Current PO Diet Diet Diabetic/Consistent Carbs; Diabetic - Consistent Carb   Supplement None ordered    PO Evaluation     Trending % PO Intake 6/1: 17% average PO intakes since admission    --  Nutritional Risk Screening        NRS-2002 Score          Nutrition Diagnosis         Nutrition Dx Problem 1 Inadequate energy intake related to intake less than needs as evidenced by PO intakes less than 25% during admission.       Nutrition Dx Problem 2        Intervention Goal         Intervention Goal(s) PO intakes at least 50%  Accept ONS     Nutrition Intervention        RD Action Add ONS      Nutrition Prescription          Diet Prescription Consistent CHO   Supplement Prescription Boost Glucose Control BID    --  Monitor/Evaluation        Monitor Per protocol, I&O, PO intake, Supplement intake, Pertinent labs, Weight, Skin status, GI status, Symptoms, POC/GOC, Hemodynamic stability     Electronically signed by:  Gayle Petit RD  06/01/22 08:47 EDT

## 2022-06-01 NOTE — CONSULTS
Hematology/Oncology Inpatient Consultation    Patient name: Cindy Cortes  : 1965  MRN: [id number]  Referring Provider: Dr. Feldman  Reason for Consultation: Anemia, chemotherapy    Chief complaint: Shortness of breath    History of present illness:    Cindy Cortes is a 56 y.o. female who presented to [facility] on 2022 with complaints of shortness of breath.    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  She will be with significant myelosuppression, possible pneumonitis.  She has had longstanding shortness of breath, anemia she has required multiple transfusions in the past.  Patient was getting lethargic, shortness of breath was worsening over the last few days.  She presented to the emergency room.  In the ER her hemoglobin was down to 6.9, she had a temperature of 102.5 she had an elevated procalcitonin.  She was placed on 100% nonrebreather.  This was weaned down to 4 L.    Other work-up showed positive for UTI, she was thought to be septic started on broad-spectrum IV antibiotics and treated for septic shock.  She also had a CT PE which was negative for PE.  Very subtle groundglass attenuation possibly inflammatory or infectious.  PRBC transfusion was started.  Patient admitted for further work-up    22  Hematology/Oncology was consulted.  She is down to 2 L, improvement in symptoms.  Status post bronchoscopy.    He/She  has a past medical history of Arthritis, GERD (gastroesophageal reflux disease), Hypertension, Oligodendroglioma (HCC), Seizure (HCC), and Type 2 diabetes mellitus with hyperglycemia, without long-term current use of insulin (HCC) (2021).    PCP: Annamarie Monroy APRN    History:  Past Medical History:   Diagnosis Date   • Arthritis    • GERD (gastroesophageal reflux disease)    • Hypertension    • Oligodendroglioma (HCC)    • Seizure  (HCC)    • Type 2 diabetes mellitus with hyperglycemia, without long-term current use of insulin (HCC) 05/12/2021   ,   Past Surgical History:   Procedure Laterality Date   • BRONCHOSCOPY N/A 4/6/2022    Procedure: BRONCHOSCOPY with bronchial washing;  Surgeon: Drew Alcantar MD;  Location: [facility] ENDOSCOPY;  Service: Pulmonary;  Laterality: N/A;  pneumonia   • COLONOSCOPY     • CRANIOTOMY FOR TUMOR Right 05/06/2021    Procedure: CRANIOTOMY FOR TUMOR RESECTION WITH STEREOTACTIC;  Surgeon: Jluis Felix MD;  Location: [facility] MAIN OR;  Service: Neurosurgery;  Laterality: Right;   ,   Family History   Problem Relation Age of Onset   • Kidney disease Mother    • Prostate cancer Brother    • Breast cancer Maternal Aunt    • Colon cancer Maternal Aunt    • Breast cancer Niece    • Breast cancer Cousin    ,   Social History     Tobacco Use   • Smoking status: Never Smoker   • Smokeless tobacco: Never Used   Vaping Use   • Vaping Use: Never used   Substance Use Topics   • Alcohol use: Not Currently     Alcohol/week: 1.0 standard drink     Types: 1 Cans of beer per week     Comment: socially   • Drug use: Never   ,   Facility-Administered Medications Prior to Admission   Medication Dose Route Frequency Provider Last Rate Last Admin   • diphenhydrAMINE (BENADRYL) injection 25 mg  25 mg Intravenous Once Emilie Workman MD         Medications Prior to Admission   Medication Sig Dispense Refill Last Dose   • albuterol sulfate  (90 Base) MCG/ACT inhaler Inhale 2 puffs Every 4 (Four) Hours As Needed for Wheezing. 18 g 2    • amLODIPine (NORVASC) 10 MG tablet Take 1 tablet by mouth Daily. 30 tablet 2    • ferrous sulfate 325 (65 FE) MG tablet Take 1 tablet by mouth Daily With Breakfast.      • fluticasone (FLOVENT HFA) 44 MCG/ACT inhaler Inhale 2 puffs Daily As Needed.      • folic acid (FOLVITE) 1 MG tablet Take 1 mg by mouth Daily.      • levETIRAcetam (KEPPRA) 750 MG tablet Take 1 tablet by mouth 2 (Two) Times a Day. 60  tablet 2    • ondansetron (Zofran) 8 MG tablet Take 1 tablet by mouth Every 8 (Eight) Hours As Needed for Nausea or Vomiting. 30 tablet 3    • oxybutynin (DITROPAN) 5 MG tablet Take 5 mg by mouth 4 (Four) Times a Day.      • venlafaxine XR (EFFEXOR-XR) 150 MG 24 hr capsule Take 150 mg by mouth Daily.  3    • Budeson-Glycopyrrol-Formoterol (Breztri Aerosphere) 160-9-4.8 MCG/ACT aerosol inhaler Inhale 2 puffs 2 (Two) Times a Day. Indications: Lot#[id number] Exp: 8/30/23 1 each 0    , Scheduled Meds:  cefepime, 2 g, Intravenous, Q8H  [START ON 6/1/2022] ferrous sulfate, 324 mg, Oral, Daily With Breakfast  folic acid, 1 mg, Oral, Daily  insulin lispro, 0-7 Units, Subcutaneous, 4x Daily With Meals & Nightly  levETIRAcetam, 750 mg, Oral, BID  Lidocaine HCl gel, , ,   oxybutynin, 5 mg, Oral, 4x Daily  sodium chloride, 10 mL, Intravenous, Q12H  venlafaxine XR, 150 mg, Oral, Daily    , Continuous Infusions:   , PRN Meds:  •  acetaminophen **OR** acetaminophen  •  dextrose  •  dextrose  •  glucagon (human recombinant)  •  insulin lispro **AND** insulin lispro  •  lidocaine  •  Lidocaine HCl gel  •  nitroglycerin  •  ondansetron **OR** ondansetron  •  sodium chloride   Allergies:  Patient has no known allergies.    Subjective     ROS:  Review of Systems   Constitutional: Positive for fatigue. Negative for fever.   HENT: Negative for congestion and nosebleeds.    Eyes: Negative for pain.   Respiratory: Positive for shortness of breath. Negative for cough.    Cardiovascular: Negative for chest pain.   Gastrointestinal: Negative for abdominal pain, blood in stool, diarrhea, nausea and vomiting.   Endocrine: Negative for cold intolerance and heat intolerance.   Genitourinary: Negative for difficulty urinating.   Musculoskeletal: Negative for arthralgias.   Skin: Negative for rash.   Neurological: Positive for weakness. Negative for dizziness and headaches.   Hematological: Does not bruise/bleed easily.   Psychiatric/Behavioral:  "Negative for behavioral problems.        Objective   Vital Signs:   BP (!) 125/107   Pulse 85   Temp 98 °F (36.7 °C) (Oral)   Resp 20   Ht 157.5 cm (62\")   Wt 125 kg (276 lb 3.8 oz)   LMP  (LMP Unknown)   SpO2 100%   BMI 50.52 kg/m²     Physical Exam: (performed by MD)  Physical Exam  Constitutional:       Appearance: Normal appearance. She is obese.   HENT:      Head: Normocephalic and atraumatic.   Eyes:      Pupils: Pupils are equal, round, and reactive to light.   Cardiovascular:      Rate and Rhythm: Normal rate and regular rhythm.      Pulses: Normal pulses.      Heart sounds: Normal heart sounds. No murmur heard.  Pulmonary:      Effort: Pulmonary effort is normal.      Breath sounds: Normal breath sounds.   Abdominal:      General: There is no distension.      Palpations: Abdomen is soft. There is no mass.      Tenderness: There is no abdominal tenderness.   Musculoskeletal:         General: Normal range of motion.      Cervical back: Normal range of motion.   Skin:     General: Skin is warm.   Neurological:      General: No focal deficit present.      Mental Status: She is alert.   Psychiatric:         Mood and Affect: Mood normal.         Results Review:  Lab Results (last 48 hours)     Procedure Component Value Units Date/Time    POC Glucose Once [[id number]]  (Abnormal) Collected: 05/31/22 2039    Specimen: Blood Updated: 05/31/22 2040     Glucose 217 mg/dL      Comment: Serial Number: [id number]:  [id number]       Hemoglobin & Hematocrit, Blood [[id number]]  (Abnormal) Collected: 05/31/22 1754    Specimen: Blood Updated: 05/31/22 1805     Hemoglobin 8.7 g/dL      Hematocrit 26.2 %      Comment: Result checked        Pneumocystis PCR - Lavage, Lung, Right Middle Lobe [[id number]] Collected: 05/31/22 1644    Specimen: Lavage from Lung, Right Middle Lobe Updated: 05/31/22 1644    Virus Culture - Lavage, Lung, Right Middle Lobe [[id number]] Collected: 05/31/22 1523    Specimen: Lavage from Lung, " Right Middle Lobe Updated: 05/31/22 1638    Histoplasma Antigen, CSF or BAL - Lavage, Lung, Right Middle Lobe [[id number]] Collected: 05/31/22 1523    Specimen: Lavage from Lung, Right Middle Lobe Updated: 05/31/22 1638    Cytomegalovirus (CMV) By PCR - Lavage, Lung, Right Middle Lobe [[id number]] Collected: 05/31/22 1523    Specimen: Lavage from Lung, Right Middle Lobe Updated: 05/31/22 1638    Fungus Culture - Lavage, Lung, Right Middle Lobe [[id number]] Collected: 05/31/22 1523    Specimen: Lavage from Lung, Right Middle Lobe Updated: 05/31/22 1638    AFB Culture - Lavage, Lung, Right Middle Lobe [[id number]] Collected: 05/31/22 1523    Specimen: Lavage from Lung, Right Middle Lobe Updated: 05/31/22 1638    BAL Culture, Quantitative - Lavage, Lung, Right Middle Lobe [[id number]] Collected: 05/31/22 1523    Specimen: Lavage from Lung, Right Middle Lobe Updated: 05/31/22 1638    POC Glucose Once [[id number]]  (Abnormal) Collected: 05/31/22 1633    Specimen: Blood Updated: 05/31/22 1634     Glucose 164 mg/dL      Comment: Serial Number: [id number]:  [id number]       Comprehensive Metabolic Panel [[id number]]  (Abnormal) Collected: 05/31/22 1304    Specimen: Blood Updated: 05/31/22 1338     Glucose 202 mg/dL      BUN 27 mg/dL      Creatinine 1.01 mg/dL      Sodium 137 mmol/L      Potassium 4.1 mmol/L      Chloride 106 mmol/L      CO2 21.0 mmol/L      Calcium 7.8 mg/dL      Total Protein 5.5 g/dL      Albumin 2.80 g/dL      ALT (SGPT) 13 U/L      AST (SGOT) 30 U/L      Alkaline Phosphatase 101 U/L      Total Bilirubin 3.4 mg/dL      Globulin 2.7 gm/dL      A/G Ratio 1.0 g/dL      BUN/Creatinine Ratio 26.7     Anion Gap 10.0 mmol/L      eGFR 66.3 mL/min/1.73      Comment: National Kidney Foundation and American Society of Nephrology (ASN) Task Force recommended calculation based on the Chronic Kidney Disease Epidemiology Collaboration (CKD-EPI) equation refit without adjustment for race.       Narrative:      GFR Normal  >60  Chronic Kidney Disease <60  Kidney Failure <15      Phosphorus [[id number]]  (Abnormal) Collected: 05/31/22 1304    Specimen: Blood Updated: 05/31/22 1338     Phosphorus 2.4 mg/dL     Magnesium [[id number]]  (Normal) Collected: 05/31/22 1304    Specimen: Blood Updated: 05/31/22 1338     Magnesium 2.0 mg/dL     Blood Culture - Blood, Chest, Right [[id number]]  (Abnormal) Collected: 05/30/22 2244    Specimen: Blood from Chest, Right Updated: 05/31/22 1322     Blood Culture Abnormal Stain     Gram Stain Aerobic Bottle Gram negative bacilli    Blood Culture ID, PCR - Blood, Chest, Right [[id number]]  (Abnormal) Collected: 05/30/22 2244    Specimen: Blood from Chest, Right Updated: 05/31/22 1322     BCID, PCR Eschericia coli. Identification by BCID2 PCR.     BOTTLE TYPE Aerobic Bottle    CBC & Differential [[id number]]  (Abnormal) Collected: 05/31/22 1125    Specimen: Blood Updated: 05/31/22 1232    Narrative:      The following orders were created for panel order CBC & Differential.  Procedure                               Abnormality         Status                     ---------                               -----------         ------                     CBC Auto Differential[[id number]]        Abnormal            Final result                 Please view results for these tests on the individual orders.    CBC Auto Differential [[id number]]  (Abnormal) Collected: 05/31/22 1125    Specimen: Blood Updated: 05/31/22 1232     WBC 8.10 10*3/mm3      RBC 2.13 10*6/mm3      Hemoglobin 7.0 g/dL      Hematocrit 21.0 %      MCV 98.7 fL      MCH 32.9 pg      MCHC 33.3 g/dL      RDW 25.1 %      RDW-SD 81.4 fl      MPV 8.5 fL      Platelets 53 10*3/mm3      Neutrophil % 93.4 %      Lymphocyte % 3.2 %      Monocyte % 3.3 %      Eosinophil % 0.0 %      Basophil % 0.1 %      Neutrophils, Absolute 7.60 10*3/mm3      Lymphocytes, Absolute 0.30 10*3/mm3      Monocytes, Absolute 0.30 10*3/mm3      Eosinophils, Absolute 0.00 10*3/mm3       Basophils, Absolute 0.00 10*3/mm3      nRBC 0.1 /100 WBC     Hemoglobin & Hematocrit, Blood [[id number]]  (Abnormal) Collected: 05/31/22 1125    Specimen: Blood Updated: 05/31/22 1143     Hemoglobin 6.9 g/dL      Hematocrit 20.8 %     Urine Culture - Urine, Urine, Catheter In/Out [[id number]]  (Abnormal) Collected: 05/31/22 0038    Specimen: Urine, Catheter In/Out Updated: 05/31/22 1141     Urine Culture >100,000 CFU/mL Gram Negative Bacilli    Narrative:      Colonization of the urinary tract without infection is common. Treatment is discouraged unless the patient is symptomatic, pregnant, or undergoing an invasive urologic procedure.    POC Glucose Once [[id number]]  (Abnormal) Collected: 05/31/22 1124    Specimen: Blood Updated: 05/31/22 1125     Glucose 229 mg/dL      Comment: Serial Number: [id number]:  [id number]       POC Glucose Once [[id number]]  (Abnormal) Collected: 05/31/22 0725    Specimen: Blood Updated: 05/31/22 0727     Glucose 196 mg/dL      Comment: Serial Number: [id number]:  [id number]       MRSA Screen, PCR (Inpatient) - Swab, Nares [[id number]]  (Normal) Collected: 05/31/22 0532    Specimen: Swab from Nares Updated: 05/31/22 0706     MRSA PCR No MRSA Detected    Lactic Acid, Plasma [[id number]]  (Normal) Collected: 05/31/22 0614    Specimen: Blood Updated: 05/31/22 0640     Lactate 0.8 mmol/L     Hemoglobin & Hematocrit, Blood [[id number]]  (Abnormal) Collected: 05/31/22 0614    Specimen: Blood Updated: 05/31/22 0623     Hemoglobin 7.4 g/dL      Hematocrit 22.0 %     STAT Lactic Acid, Reflex [[id number]]  (Normal) Collected: 05/31/22 0257    Specimen: Blood Updated: 05/31/22 0331     Lactate 1.6 mmol/L     POC Glucose Once [[id number]]  (Abnormal) Collected: 05/31/22 0241    Specimen: Blood Updated: 05/31/22 0241     Glucose 203 mg/dL      Comment: Serial Number: [id number]:  [id number]       Urinalysis, Microscopic Only - Urine, Catheter In/Out [[id number]]  (Abnormal) Collected:  05/31/22 0038    Specimen: Urine, Catheter In/Out Updated: 05/31/22 0131     RBC, UA 0-2 /HPF      WBC, UA Too Numerous to Count /HPF      Bacteria, UA 4+ /HPF      Squamous Epithelial Cells, UA 3-6 /HPF      Hyaline Casts, UA 7-12 /LPF      Granular Casts, UA 3-6 /LPF      Methodology Manual Light Microscopy    Urinalysis With Culture If Indicated - Urine, Catheter In/Out [[id number]]  (Abnormal) Collected: 05/31/22 0038    Specimen: Urine, Catheter In/Out Updated: 05/31/22 0055     Color, UA Yellow     Appearance, UA Turbid     pH, UA <=5.0     Specific Gravity, UA 1.019     Glucose, UA Negative     Ketones, UA Negative     Bilirubin, UA Small (1+)     Comment: Confirmation testing is unavailable.  A serum bilirubin is recommended for further assessment.        Blood, UA Moderate (2+)     Protein,  mg/dL (2+)     Leuk Esterase, UA Large (3+)     Nitrite, UA Positive     Urobilinogen, UA 1.0 E.U./dL    Narrative:      In absence of clinical symptoms, the presence of pyuria, bacteria, and/or nitrites on the urinalysis result does not correlate with infection.    Blood Culture - Blood, Arm, Right [[id number]] Collected: 05/31/22 0044    Specimen: Blood from Arm, Right Updated: 05/31/22 0048    Respiratory Panel PCR w/COVID-19(SARS-CoV-2) JONELLE/PAUL/SARITHA/PAD/COR/MAD/BELINDA In-House, NP Swab in UTM/VTM, 3-4 HR TAT - Swab, Nasopharynx [[id number]]  (Normal) Collected: 05/30/22 2254    Specimen: Swab from Nasopharynx Updated: 05/31/22 0002     ADENOVIRUS, PCR Not Detected     Coronavirus 229E Not Detected     Coronavirus HKU1 Not Detected     Coronavirus NL63 Not Detected     Coronavirus OC43 Not Detected     COVID19 Not Detected     Human Metapneumovirus Not Detected     Human Rhinovirus/Enterovirus Not Detected     Influenza A PCR Not Detected     Influenza B PCR Not Detected     Parainfluenza Virus 1 Not Detected     Parainfluenza Virus 2 Not Detected     Parainfluenza Virus 3 Not Detected     Parainfluenza Virus 4 Not  Detected     RSV, PCR Not Detected     Bordetella pertussis pcr Not Detected     Bordetella parapertussis PCR Not Detected     Chlamydophila pneumoniae PCR Not Detected     Mycoplasma pneumo by PCR Not Detected    Narrative:      In the setting of a positive respiratory panel with a viral infection PLUS a negative procalcitonin without other underlying concern for bacterial infection, consider observing off antibiotics or discontinuation of antibiotics and continue supportive care. If the respiratory panel is positive for atypical bacterial infection (Bordetella pertussis, Chlamydophila pneumoniae, or Mycoplasma pneumoniae), consider antibiotic de-escalation to target atypical bacterial infection.    CBC & Differential [[id number]]  (Abnormal) Collected: 05/30/22 2244    Specimen: Blood Updated: 05/30/22 2320    Narrative:      The following orders were created for panel order CBC & Differential.  Procedure                               Abnormality         Status                     ---------                               -----------         ------                     CBC Auto Differential[[id number]]        Abnormal            Final result               Scan Slide[[id number]]                                                                    Please view results for these tests on the individual orders.    CBC Auto Differential [[id number]]  (Abnormal) Collected: 05/30/22 2244    Specimen: Blood Updated: 05/30/22 2320     WBC 17.80 10*3/mm3      RBC 2.02 10*6/mm3      Hemoglobin 6.9 g/dL      Hematocrit 22.2 %      .9 fL      MCH 34.3 pg      MCHC 31.2 g/dL      RDW 23.6 %      RDW-SD 88.8 fl      MPV 9.6 fL      Platelets 133 10*3/mm3      Neutrophil % 88.1 %      Lymphocyte % 5.4 %      Monocyte % 6.1 %      Eosinophil % 0.1 %      Basophil % 0.3 %      Neutrophils, Absolute 15.70 10*3/mm3      Lymphocytes, Absolute 1.00 10*3/mm3      Monocytes, Absolute 1.10 10*3/mm3      Eosinophils, Absolute 0.00 10*3/mm3   "    Basophils, Absolute 0.00 10*3/mm3      nRBC 0.4 /100 WBC     Procalcitonin [[id number]]  (Abnormal) Collected: 05/30/22 2244    Specimen: Blood Updated: 05/30/22 2319     Procalcitonin 6.58 ng/mL     Narrative:      As a Marker for Sepsis (Non-Neonates):    1. <0.5 ng/mL represents a low risk of severe sepsis and/or septic shock.  2. >2 ng/mL represents a high risk of severe sepsis and/or septic shock.    As a Marker for Lower Respiratory Tract Infections that require antibiotic therapy:    PCT on Admission    Antibiotic Therapy       6-12 Hrs later    >0.5                Strongly Recommended  >0.25 - <0.5        Recommended   0.1 - 0.25          Discouraged              Remeasure/reassess PCT  <0.1                Strongly Discouraged     Remeasure/reassess PCT    As 28 day mortality risk marker: \"Change in Procalcitonin Result\" (>80% or <=80%) if Day 0 (or Day 1) and Day 4 values are available. Refer to http://www.[employer]-pct-calculator.com    Change in PCT <=80%  A decrease of PCT levels below or equal to 80% defines a positive change in PCT test result representing a higher risk for 28-day all-cause mortality of patients diagnosed with severe sepsis for septic shock.    Change in PCT >80%  A decrease of PCT levels of more than 80% defines a negative change in PCT result representing a lower risk for 28-day all-cause mortality of patients diagnosed with severe sepsis or septic shock.       Comprehensive Metabolic Panel [[id number]]  (Abnormal) Collected: 05/30/22 2244    Specimen: Blood Updated: 05/30/22 2313     Glucose 280 mg/dL      BUN 22 mg/dL      Creatinine 1.29 mg/dL      Sodium 135 mmol/L      Potassium 4.2 mmol/L      Chloride 99 mmol/L      CO2 10.0 mmol/L      Calcium 8.2 mg/dL      Total Protein 5.8 g/dL      Albumin 2.90 g/dL      ALT (SGPT) 12 U/L      AST (SGOT) 20 U/L      Alkaline Phosphatase 129 U/L      Total Bilirubin 2.8 mg/dL      Globulin 2.9 gm/dL      A/G Ratio 1.0 g/dL      " BUN/Creatinine Ratio 17.1     Anion Gap 26.0 mmol/L      eGFR 49.4 mL/min/1.73      Comment: National Kidney Foundation and American Society of Nephrology (ASN) Task Force recommended calculation based on the Chronic Kidney Disease Epidemiology Collaboration (CKD-EPI) equation refit without adjustment for race.       Narrative:      GFR Normal >60  Chronic Kidney Disease <60  Kidney Failure <15      Troponin [[id number]]  (Normal) Collected: 05/30/22 2244    Specimen: Blood Updated: 05/30/22 2313     Troponin T <0.010 ng/mL     Narrative:      Troponin T Reference Range:  <= 0.03 ng/mL-   Negative for AMI  >0.03 ng/mL-     Abnormal for myocardial necrosis.  Clinicians would have to utilize clinical acumen, EKG, Troponin and serial changes to determine if it is an Acute Myocardial Infarction or myocardial injury due to an underlying chronic condition.       Results may be falsely decreased if patient taking Biotin.      POC Lactate [[id number]]  (Abnormal) Collected: 05/30/22 2244    Specimen: Blood Updated: 05/30/22 2255     Lactate 11.7 mmol/L      Comment: Serial Number: [id number]:  [id number]       Blood Gas, Arterial - [[id number]]  (Abnormal) Collected: 05/30/22 2244    Specimen: Arterial Blood Updated: 05/30/22 2254     Site Left Radial     Frank's Test Positive     pH, Arterial 7.419 pH units      pCO2, Arterial 18.0 mm Hg      pO2, Arterial 245.6 mm Hg      HCO3, Arterial 11.7 mmol/L      Base Excess, Arterial -11.7 mmol/L      Comment: Serial Number: [id number]:  [id number]        O2 Saturation, Arterial 99.9 %      CO2 Content 12.2 mmol/L      Barometric Pressure for Blood Gas --     Comment: N/A        Modality NRB     FIO2 100 %      Hemodilution No    POCT Electrolytes +HGB +HCT [[id number]]  (Abnormal) Collected: 05/30/22 2244    Specimen: Blood Updated: 05/30/22 2251     Sodium 137 mmol/L      POC Potassium 3.8 mmol/L      Ionized Calcium 1.13 mmol/L      Comment: Serial Number: [id number]:  [id number]         Glucose 241 mg/dL      Hematocrit 18 %      Hemoglobin 6.2 g/dL     POC Glucose Once [[id number]]  (Abnormal) Collected: 05/30/22 2244    Specimen: Blood Updated: 05/30/22 2251     Glucose 241 mg/dL      Comment: Serial Number: [id number]:  [id number]       POC CHEM 8 [[id number]]  (Abnormal) Collected: 05/30/22 2236    Specimen: Blood Updated: 05/30/22 2238     Glucose 280 mg/dL      BUN 21 mg/dL      Creatinine 1.40 mg/dL      Sodium 134 mmol/L      POC Potassium 4.1 mmol/L      Chloride 106 mmol/L      Total CO2 10 mmol/L      Anion Gap 23.0 mmol/L      Comment: Serial Number: [id number]:  [id number]        Hemoglobin 6.1 g/dL      Hematocrit 18 %      Ionized Calcium 0.98 mmol/L      eGFR 44.8 mL/min/1.73     POC Lactate [[id number]]  (Abnormal) Collected: 05/30/22 2233    Specimen: Blood Updated: 05/30/22 2234     Lactate 9.7 mmol/L      Comment: Serial Number: [id number]:  [id number]              Imaging Reviewed:   CT Angiogram Chest Pulmonary Embolism    Result Date: 5/31/2022  1. No evidence of pulmonary embolism. 2. No evidence of thoracic aortic aneurysm or dissection. 3. Very subtle groundglass attenuation seen throughout the lungs bilaterally could potentially be inflammatory or infectious. Clinical correlation is recommended. 4. Cholelithiasis. 5. Mild splenomegaly. Electronically signed by:  Robel Aj D.O.  5/30/2022 10:11 PM           Assessment & Plan     Patient is a 56-year-old female with oligodendroglioma s/p resection, adjuvant chemotherapy now admitted with shortness of breath, anemia    Anemia  Patient does have underlying anemia of chronic disease, anemia related to myelosuppression  This however should have improved however she continues to have significant anemia.  I will do hemolysis work-up.  We will also send iron studies, B12 folic acid levels.  If above work-up is negative, consider bone marrow biopsy for longstanding anemia, recent chemotherapy.    Shortness of  breath  Possible causes include pneumonitis, pneumonia atypical sepsis.  Status post bronchoscopy, pending PCP work-up.    Sepsis  Improvement in blood pressure,  Broad-spectrum, UTI    Electronically signed by Emilie Workman MD, 05/31/22, 9:57 PM EDT.        Thank you for this consult. We will be happy to follow along with you.

## 2022-06-01 NOTE — PROGRESS NOTES
[facility] - Consult Note     Patient Name: Cindy Cortes  : 1965  MRN: [id number]  Primary Care Physician:  Annamarie Monroy APRN  Referring Physician: BJ Rolon  Date of admission: 2022          Subjective       Reason for Consult/ Chief Complaint: shortness of breath, lethargy, weakness     History of Present Illness: Cindy Cortes is a 56 y.o. 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 She was started on an antibiotic, which she could not tolerate. She has also had pancytopenia and required multiple blood transfusions. The patient stated she has had shortness of breath upon exertion for the last 3-4 weeks, worse in the last 1 week. The patient's family stated she was short of breath, lethargic, and could not walk/hold herself up due to weakness on 2022 so they called EMS. She has had a nonproductive cough and chills in the last 24 hours. She denied any blood in her urine or stool.      In the ED the patient was on a nonrebreather placed by EMS. She was tachycardic, mildly hypotensive, tachypneic, and had temperature of 102.5. Her hgb was 6.9, WBC 17.8, lactate 11.7, procalcitonin 6.58.  ABG showed pH 7.41, PCO2 18, PaO2 245, HCO3 11.7 on 100% nonrebreather.  CT PE protocol showed no PE, no aneurysm or dissection, very subtle groundglass attenuation seen throughout the lungs bilaterally could potentially be inflammatory or infectious.  Cholelithiasis, mild splenomegaly. Urinalysis was nitrite positive, large leukocytes, too numerous WBCs, 4+ bacteria.  She was given 30cc/kg IVFs, 2 units PRBCs, IV antibiotics. She reportedly has a history of antibodies so she was pretreated with antihistamine, steroids, and tylenol. Her oxygen was weaned down to 4L O2. She was diagnosed with  sepsis with septic shock, metabolic acidosis, pneumonitis, UTI, anemia, dehydration, and altered mental status.         Date:   5/31/2022: Patient felt stable for transfer out of ICU. She did not require vasopressors. Pulmonary planning bronchoscopy    6/1/2022; patient is sitting in the side of the bed, afebrile vital signs are stable, spoke with the bedside RN awaiting bed and general medical floor.     Review of Systems   Constitutional: Positive for fever.   HENT: Negative.    Eyes: Negative.    Cardiovascular: Negative.    Respiratory: Positive for cough and shortness of breath.    Endocrine: Negative.    Hematologic/Lymphatic: Negative.    Skin: Negative.    Musculoskeletal: Negative.    Gastrointestinal: Negative.    Genitourinary: Negative.    Neurological: Positive for weakness.   Psychiatric/Behavioral: Negative.    Allergic/Immunologic: Negative.    All other systems reviewed and are negative.        Personal History      Medical History        Past Medical History:   Diagnosis Date   • Arthritis     • GERD (gastroesophageal reflux disease)     • Hypertension     • Oligodendroglioma (HCC)     • Seizure (HCC)     • Type 2 diabetes mellitus with hyperglycemia, without long-term current use of insulin (HCC) 05/12/2021            Surgical History         Past Surgical History:   Procedure Laterality Date   • BRONCHOSCOPY N/A 4/6/2022     Procedure: BRONCHOSCOPY with bronchial washing;  Surgeon: Drew Alcantar MD;  Location: [facility] ENDOSCOPY;  Service: Pulmonary;  Laterality: N/A;  pneumonia   • COLONOSCOPY       • CRANIOTOMY FOR TUMOR Right 05/06/2021     Procedure: CRANIOTOMY FOR TUMOR RESECTION WITH STEREOTACTIC;  Surgeon: Jluis Felix MD;  Location: [facility] MAIN OR;  Service: Neurosurgery;  Laterality: Right;            Family History: family history includes Breast cancer in her cousin, maternal aunt, and niece; Colon cancer in her maternal aunt; Kidney disease in her mother; Prostate cancer in her  brother. Otherwise pertinent FHx was reviewed and not pertinent to current issue.     Social History:  reports that she has never smoked. She has never used smokeless tobacco. She reports previous alcohol use of about 1.0 standard drink of alcohol per week. She reports that she does not use drugs.     Home Medications:   Budeson-Glycopyrrol-Formoterol, albuterol sulfate HFA, amLODIPine, ferrous sulfate, fluticasone, folic acid, levETIRAcetam, ondansetron, oxybutynin, and venlafaxine XR     Allergies:  No Known Allergies        Objective       Vitals:  Temp:  [97.4 °F (36.3 °C)-98.1 °F (36.7 °C)] 97.8 °F (36.6 °C)  Heart Rate:  [61-89] 73  Resp:  [20] 20  BP: (102-158)/() 158/98  Flow (L/min):  [4] 4     Physical Exam  Vitals and nursing note reviewed.   Constitutional:       Appearance: She is obese.   HENT:      Head: Normocephalic and atraumatic.   Eyes:      Extraocular Movements: Extraocular movements intact.      Pupils: Pupils are equal, round, and reactive to light.   Cardiovascular:      Rate and Rhythm: Normal rate and regular rhythm.      Pulses: Normal pulses.      Heart sounds: Normal heart sounds.   Pulmonary:      Effort: Pulmonary effort is normal.      Breath sounds: Examination of the right-upper field reveals decreased breath sounds. Examination of the left-upper field reveals decreased breath sounds. Examination of the right-lower field reveals decreased breath sounds. Examination of the left-lower field reveals decreased breath sounds. Decreased breath sounds present.   Abdominal:      General: Bowel sounds are normal.      Palpations: Abdomen is soft.      Tenderness: There is no abdominal tenderness.   Musculoskeletal:         General: Normal range of motion.   Skin:     General: Skin is warm and dry.   Neurological:      Mental Status: She is alert and oriented to person, place, and time.   Psychiatric:         Mood and Affect: Mood normal.         Behavior: Behavior normal.          Result Review    Result Review:  I have personally reviewed the results from the time of this admission to 5/31/2022 13:16 EDT and agree with these findings:  [x]?  Laboratory  []?  Microbiology  [x]?  Radiology  []?  EKG/Telemetry   []?  Cardiology/Vascular   []?  Pathology  [x]?  Old records     Assessment & Plan          Active Hospital Problems:        Active Hospital Problems     Diagnosis     • **Sepsis with hypotension (HCC)     • Anemia due to chemotherapy     • Acute kidney injury (HCC)     • Weakness     • UTI (urinary tract infection)     • Acute respiratory failure with hypoxia (HCC)     • Pneumonia         Added automatically from request for surgery [id number]      • Type 2 diabetes mellitus with hyperglycemia, without long-term current use of insulin (HCC)     • Morbid obesity with BMI of 60.0-69.9, adult (HCC)     • Gastroesophageal reflux disease with hiatal hernia         Formatting of this note might be different from the original.  S/P EGD (08/19/19) = GASTRITIS, BX = NEG  GI - DR. LE>>>IF STILL SXC, REFER TO GEN SURGERY FOR YEFRI     Last Assessment & Plan:   Formatting of this note might be different from the original.  Stable on pantoprazole      • Brain tumor (HCC)     • Seizure (HCC)     • Essential hypertension     • Dyslipidemia         Last Assessment & Plan:   Formatting of this note might be different from the original.  FLP today      • Depression        Plan:      Sepsis  -likely secondary to pneumonia, hypoxia, UTI, and intravascular volume depletion from anemia and dehydration   -WBC 17.8 now 8.1, temp 102.8 now normalized   -lactic 11.7 now normalized   -procalcitonin 6.58  -blood cultures no growth to date.  -BP improved after 30cc/IVFs and 2 units PRBCs  -on IV cefepime, vancomycin    E. coli bacteremia; positive blood cultures from 5/30/2022, infectious disease consulted  Acute respiratory failure with hypoxia  Pneumonitis   -ABG showed pH 7.41, PCO2 18, PaO2 245, HCO3 11.7  on 100% nonrebreather  -requiring nonrebreather now weaned down to 4L O2 via NC  -CT PE protocol:  no PE, no aneurysm or dissection, very subtle groundglass attenuation seen throughout the lungs bilaterally could potentially be inflammatory or infectious.  Cholelithiasis, mild splenomegaly.  -IV cefepime, vancomycin  -pulmonary managing, s/p bronchoscopy on 5/31/2022, showed no purulent secretions, started on prednisone 40 mg daily wean steroids slowly as per pulmonology.     Acute UTI  -UA: nitrite positive, large leukocytes, too numerous WBCs, 4+ bacteria  -urine culture more than 100,000 CFU per mL of Klebsiella pneumonia  -on IV antibiotics as above     Acute kidney injury  Metabolic acidosis  Dehydration  -received IVFs and PRBCs, improving   -monitor BMP     Anemia; underlying anemia of chronic disease related to myelosuppression  -likely from metastatic disease, previous chemotherapy  -hgb 6.9 s/p 2 units PRBCs, currently hemoglobin at 7.8  -oncology following.     Oligodendroglioma s/p craniotomy/resection of right frontal lobe mass 5/6/2021  -completed radiation  -chemotherapy had to be stopped due to possible pneumonitis found on bronchoscopy April 2022. She was started on an antibiotic, which she could not tolerate.   -oncology consulted      Altered mental status - resolved      Weakness  -likely multifactorial from all above  -PT eval      This patient has been examined wearing appropriate Personal Protective Equipment   Part of this note may be an electronic transcription/translation of spoken language to printed text using the Dragon Dictation System.    Electronically signed by Femi Manzano MD, 06/01/22, 5:05 PM EDT.

## 2022-06-02 ENCOUNTER — APPOINTMENT (OUTPATIENT)
Dept: CT IMAGING | Facility: HOSPITAL | Age: 57
End: 2022-06-02

## 2022-06-02 LAB
ALBUMIN SERPL-MCNC: 3.1 G/DL (ref 3.5–5.2)
ALBUMIN/GLOB SERPL: 1.3 G/DL
ALP SERPL-CCNC: 90 U/L (ref 39–117)
ALT SERPL W P-5'-P-CCNC: 15 U/L (ref 1–33)
ANION GAP SERPL CALCULATED.3IONS-SCNC: 10 MMOL/L (ref 5–15)
AST SERPL-CCNC: 17 U/L (ref 1–32)
BACTERIA SPEC AEROBE CULT: ABNORMAL
BACTERIA SPEC AEROBE CULT: NO GROWTH
BASOPHILS # BLD AUTO: 0 10*3/MM3 (ref 0–0.2)
BASOPHILS NFR BLD AUTO: 0.1 % (ref 0–1.5)
BILIRUB SERPL-MCNC: 1.3 MG/DL (ref 0–1.2)
BUN SERPL-MCNC: 26 MG/DL (ref 6–20)
BUN/CREAT SERPL: 34.2 (ref 7–25)
CALCIUM SPEC-SCNC: 8.8 MG/DL (ref 8.6–10.5)
CHLORIDE SERPL-SCNC: 104 MMOL/L (ref 98–107)
CO2 SERPL-SCNC: 21 MMOL/L (ref 22–29)
CREAT SERPL-MCNC: 0.76 MG/DL (ref 0.57–1)
D DIMER PPP FEU-MCNC: 2.65 MG/L (FEU) (ref 0–0.59)
DEPRECATED RDW RBC AUTO: 74.8 FL (ref 37–54)
EGFRCR SERPLBLD CKD-EPI 2021: 93.3 ML/MIN/1.73
EOSINOPHIL # BLD AUTO: 0 10*3/MM3 (ref 0–0.4)
EOSINOPHIL NFR BLD AUTO: 0.1 % (ref 0.3–6.2)
ERYTHROCYTE [DISTWIDTH] IN BLOOD BY AUTOMATED COUNT: 22.8 % (ref 12.3–15.4)
FIBRINOGEN PPP-MCNC: 379 MG/DL (ref 210–450)
GLOBULIN UR ELPH-MCNC: 2.4 GM/DL
GLUCOSE BLDC GLUCOMTR-MCNC: 121 MG/DL (ref 70–105)
GLUCOSE BLDC GLUCOMTR-MCNC: 198 MG/DL (ref 70–105)
GLUCOSE BLDC GLUCOMTR-MCNC: 202 MG/DL (ref 70–105)
GLUCOSE BLDC GLUCOMTR-MCNC: 208 MG/DL (ref 70–105)
GLUCOSE SERPL-MCNC: 146 MG/DL (ref 65–99)
GRAM STN SPEC: ABNORMAL
GRAM STN SPEC: ABNORMAL
GRAM STN SPEC: NORMAL
GRAM STN SPEC: NORMAL
HCT VFR BLD AUTO: 23.5 % (ref 34–46.6)
HGB BLD-MCNC: 7.8 G/DL (ref 12–15.9)
ISOLATED FROM: ABNORMAL
LAB AP CASE REPORT: NORMAL
LYMPHOCYTES # BLD AUTO: 0.4 10*3/MM3 (ref 0.7–3.1)
LYMPHOCYTES NFR BLD AUTO: 7.3 % (ref 19.6–45.3)
MAGNESIUM SERPL-MCNC: 1.9 MG/DL (ref 1.6–2.6)
MCH RBC QN AUTO: 32 PG (ref 26.6–33)
MCHC RBC AUTO-ENTMCNC: 33.2 G/DL (ref 31.5–35.7)
MCV RBC AUTO: 96.6 FL (ref 79–97)
MONOCYTES # BLD AUTO: 0.2 10*3/MM3 (ref 0.1–0.9)
MONOCYTES NFR BLD AUTO: 4.1 % (ref 5–12)
NEUTROPHILS NFR BLD AUTO: 5 10*3/MM3 (ref 1.7–7)
NEUTROPHILS NFR BLD AUTO: 88.4 % (ref 42.7–76)
NRBC BLD AUTO-RTO: 0.6 /100 WBC (ref 0–0.2)
PATH REPORT.FINAL DX SPEC: NORMAL
PATH REPORT.GROSS SPEC: NORMAL
PHOSPHATE SERPL-MCNC: 2.4 MG/DL (ref 2.5–4.5)
PLATELET # BLD AUTO: 40 10*3/MM3 (ref 140–450)
PMV BLD AUTO: 9.2 FL (ref 6–12)
POTASSIUM SERPL-SCNC: 4.1 MMOL/L (ref 3.5–5.2)
PROT SERPL-MCNC: 5.5 G/DL (ref 6–8.5)
RBC # BLD AUTO: 2.43 10*6/MM3 (ref 3.77–5.28)
SODIUM SERPL-SCNC: 135 MMOL/L (ref 136–145)
WBC NRBC COR # BLD: 5.7 10*3/MM3 (ref 3.4–10.8)

## 2022-06-02 PROCEDURE — 85362 FIBRIN DEGRADATION PRODUCTS: CPT | Performed by: INTERNAL MEDICINE

## 2022-06-02 PROCEDURE — 88311 DECALCIFY TISSUE: CPT | Performed by: INTERNAL MEDICINE

## 2022-06-02 PROCEDURE — 99152 MOD SED SAME PHYS/QHP 5/>YRS: CPT

## 2022-06-02 PROCEDURE — 079T3ZX DRAINAGE OF BONE MARROW, PERCUTANEOUS APPROACH, DIAGNOSTIC: ICD-10-PCS | Performed by: RADIOLOGY

## 2022-06-02 PROCEDURE — 88313 SPECIAL STAINS GROUP 2: CPT | Performed by: INTERNAL MEDICINE

## 2022-06-02 PROCEDURE — 25010000002 CEFEPIME PER 500 MG: Performed by: NURSE PRACTITIONER

## 2022-06-02 PROCEDURE — 88184 FLOWCYTOMETRY/ TC 1 MARKER: CPT

## 2022-06-02 PROCEDURE — 0 LIDOCAINE 1 % SOLUTION: Performed by: RADIOLOGY

## 2022-06-02 PROCEDURE — 88237 TISSUE CULTURE BONE MARROW: CPT

## 2022-06-02 PROCEDURE — 88185 FLOWCYTOMETRY/TC ADD-ON: CPT

## 2022-06-02 PROCEDURE — 88305 TISSUE EXAM BY PATHOLOGIST: CPT | Performed by: INTERNAL MEDICINE

## 2022-06-02 PROCEDURE — 88323 CONSLTJ&REPRT MATRL PREP SLD: CPT

## 2022-06-02 PROCEDURE — 88264 CHROMOSOME ANALYSIS 20-25: CPT

## 2022-06-02 PROCEDURE — 85384 FIBRINOGEN ACTIVITY: CPT | Performed by: INTERNAL MEDICINE

## 2022-06-02 PROCEDURE — 81450 HL NEO GSAP 5-50DNA/DNA&RNA: CPT

## 2022-06-02 PROCEDURE — 85379 FIBRIN DEGRADATION QUANT: CPT | Performed by: INTERNAL MEDICINE

## 2022-06-02 PROCEDURE — 88342 IMHCHEM/IMCYTCHM 1ST ANTB: CPT

## 2022-06-02 PROCEDURE — 85025 COMPLETE CBC W/AUTO DIFF WBC: CPT | Performed by: NURSE PRACTITIONER

## 2022-06-02 PROCEDURE — 84100 ASSAY OF PHOSPHORUS: CPT | Performed by: NURSE PRACTITIONER

## 2022-06-02 PROCEDURE — 25010000002 FENTANYL CITRATE (PF) 50 MCG/ML SOLUTION: Performed by: RADIOLOGY

## 2022-06-02 PROCEDURE — 82962 GLUCOSE BLOOD TEST: CPT

## 2022-06-02 PROCEDURE — 99233 SBSQ HOSP IP/OBS HIGH 50: CPT | Performed by: INTERNAL MEDICINE

## 2022-06-02 PROCEDURE — 77012 CT SCAN FOR NEEDLE BIOPSY: CPT

## 2022-06-02 PROCEDURE — 82542 COL CHROMOTOGRAPHY QUAL/QUAN: CPT | Performed by: INTERNAL MEDICINE

## 2022-06-02 PROCEDURE — 83735 ASSAY OF MAGNESIUM: CPT | Performed by: NURSE PRACTITIONER

## 2022-06-02 PROCEDURE — 07DR3ZX EXTRACTION OF ILIAC BONE MARROW, PERCUTANEOUS APPROACH, DIAGNOSTIC: ICD-10-PCS | Performed by: RADIOLOGY

## 2022-06-02 PROCEDURE — 88341 IMHCHEM/IMCYTCHM EA ADD ANTB: CPT

## 2022-06-02 PROCEDURE — 74176 CT ABD & PELVIS W/O CONTRAST: CPT

## 2022-06-02 PROCEDURE — 63710000001 PREDNISONE PER 1 MG: Performed by: INTERNAL MEDICINE

## 2022-06-02 PROCEDURE — 63710000001 INSULIN LISPRO (HUMAN) PER 5 UNITS: Performed by: NURSE PRACTITIONER

## 2022-06-02 PROCEDURE — 80053 COMPREHEN METABOLIC PANEL: CPT | Performed by: NURSE PRACTITIONER

## 2022-06-02 PROCEDURE — 88312 SPECIAL STAINS GROUP 1: CPT | Performed by: INTERNAL MEDICINE

## 2022-06-02 PROCEDURE — 99232 SBSQ HOSP IP/OBS MODERATE 35: CPT | Performed by: HOSPITALIST

## 2022-06-02 PROCEDURE — 86022 PLATELET ANTIBODIES: CPT | Performed by: INTERNAL MEDICINE

## 2022-06-02 PROCEDURE — 25010000002 CEFTRIAXONE PER 250 MG: Performed by: INTERNAL MEDICINE

## 2022-06-02 RX ORDER — LIDOCAINE HYDROCHLORIDE 10 MG/ML
INJECTION, SOLUTION INFILTRATION; PERINEURAL
Status: COMPLETED | OUTPATIENT
Start: 2022-06-02 | End: 2022-06-02

## 2022-06-02 RX ORDER — FENTANYL CITRATE 50 UG/ML
INJECTION, SOLUTION INTRAMUSCULAR; INTRAVENOUS
Status: COMPLETED | OUTPATIENT
Start: 2022-06-02 | End: 2022-06-02

## 2022-06-02 RX ADMIN — INSULIN LISPRO 2 UNITS: 100 INJECTION, SOLUTION INTRAVENOUS; SUBCUTANEOUS at 17:38

## 2022-06-02 RX ADMIN — VENLAFAXINE HYDROCHLORIDE 150 MG: 75 CAPSULE, EXTENDED RELEASE ORAL at 07:50

## 2022-06-02 RX ADMIN — CEFEPIME HYDROCHLORIDE 2 G: 2 INJECTION, POWDER, FOR SOLUTION INTRAVENOUS at 07:50

## 2022-06-02 RX ADMIN — LEVETIRACETAM 750 MG: 500 TABLET, FILM COATED ORAL at 07:49

## 2022-06-02 RX ADMIN — Medication 10 ML: at 08:30

## 2022-06-02 RX ADMIN — ACETAMINOPHEN 650 MG: 325 TABLET ORAL at 21:37

## 2022-06-02 RX ADMIN — PREDNISONE 40 MG: 20 TABLET ORAL at 07:46

## 2022-06-02 RX ADMIN — FOLIC ACID 1 MG: 1 TABLET ORAL at 07:50

## 2022-06-02 RX ADMIN — CEFEPIME HYDROCHLORIDE 2 G: 2 INJECTION, POWDER, FOR SOLUTION INTRAVENOUS at 00:57

## 2022-06-02 RX ADMIN — OXYBUTYNIN CHLORIDE 5 MG: 5 TABLET ORAL at 20:00

## 2022-06-02 RX ADMIN — CEFTRIAXONE 2 G: 2 INJECTION, POWDER, FOR SOLUTION INTRAMUSCULAR; INTRAVENOUS at 14:21

## 2022-06-02 RX ADMIN — FENTANYL CITRATE 100 MCG: 50 INJECTION, SOLUTION INTRAMUSCULAR; INTRAVENOUS at 12:24

## 2022-06-02 RX ADMIN — LIDOCAINE HYDROCHLORIDE 10 ML: 10 INJECTION, SOLUTION INFILTRATION; PERINEURAL at 12:32

## 2022-06-02 RX ADMIN — LEVETIRACETAM 750 MG: 500 TABLET, FILM COATED ORAL at 20:00

## 2022-06-02 RX ADMIN — FENTANYL CITRATE 50 MCG: 50 INJECTION, SOLUTION INTRAMUSCULAR; INTRAVENOUS at 12:31

## 2022-06-02 RX ADMIN — FERROUS SULFATE TAB EC 324 MG (65 MG FE EQUIVALENT) 324 MG: 324 (65 FE) TABLET DELAYED RESPONSE at 07:47

## 2022-06-02 RX ADMIN — OXYBUTYNIN CHLORIDE 5 MG: 5 TABLET ORAL at 07:47

## 2022-06-02 RX ADMIN — FENTANYL CITRATE 50 MCG: 50 INJECTION, SOLUTION INTRAMUSCULAR; INTRAVENOUS at 12:28

## 2022-06-02 RX ADMIN — Medication 10 ML: at 20:00

## 2022-06-02 RX ADMIN — OXYBUTYNIN CHLORIDE 5 MG: 5 TABLET ORAL at 17:38

## 2022-06-02 RX ADMIN — INSULIN LISPRO 3 UNITS: 100 INJECTION, SOLUTION INTRAVENOUS; SUBCUTANEOUS at 20:00

## 2022-06-02 NOTE — CASE MANAGEMENT/SOCIAL WORK
Continued Stay Note  [facility]     Patient Name: Cindy Cortes  MRN: [id number]  Today's Date: 6/2/2022    Admit Date: 5/30/2022     Discharge Plan     Row Name 06/02/22 1423       Plan    Plan DC Plan: Patient anticipates Home with [facility] accepted and following.    Patient/Family in Agreement with Plan yes    Provided Post Acute Provider List? N/A    Provided Post Acute Provider Quality & Resource List? N/A    Plan Comments CM spoke with patient’s nurse and also reviewed chart documentation to obtain clinical updates. No significant changes in condition or care plans to report. DC Barriers: Bone Marrow Aspiration and CT abdomen and pelvis planned for today.              Phone communication or documentation only- no physical contact with patient or family.        Laurie Hatch RN     Office Phone: [phone]  Office Cell:     [phone]

## 2022-06-02 NOTE — PROGRESS NOTES
Daily Progress Note        Sepsis with hypotension (HCC)    Depression    Essential hypertension    Seizure (HCC)    Brain tumor (HCC)    Dyslipidemia    Gastroesophageal reflux disease with hiatal hernia    Morbid obesity with BMI of 60.0-69.9, adult (HCC)    Type 2 diabetes mellitus with hyperglycemia, without long-term current use of insulin (HCC)    Pneumonia    UTI (urinary tract infection)    Acute respiratory failure with hypoxia (HCC)    Anemia due to chemotherapy    Acute kidney injury (HCC)    Weakness      Assessment    Sepsis  Hypotension secondary to hypovolemia: Improved  Pneumonitis: CT scan showed groundglass opacities bilaterally which were present in April 1, 2022 was believed secondary to chemo  Severe anemia with history of bone marrow suppression secondary to chemo  UTI  E. coli bacteremia  Dehydration  Lactic acidosis: Improving  CARLOS MANUEL  Altered mental status  History of oligodendroglioma: Chemo stopped in April due to side effects on bone marrow and pneumonitis  Hypoxemia  Diabetes mellitus with hyperglycemia  Morbid obesity with BMI 50  GERD  Dyslipidemia  History of seizure disorder  History of essential hypertension  History of depression mask     Plan:  Oxygen supplement and titration: Currently on room air  Blood pressure support: Currently requiring no pressors  IV antibiotics: Cefepime  Monitor H&H: Hemoglobin improved from 6.9 to 7.4 after 2 units of packed RBC  Elevated bilirubin  Discussed with hematology oncology: Plan for bone marrow biopsy soon  Bronchoscopy 5/31/2022 showed no purulent secretions: Start prednisone 40 mg daily and when she improves we will start weaning it slowly she might require a few weeks of low-dose later on  Discussed with the patient and family  Patient is improving and can move to [facility]     LOS: 2 days     Subjective     Mild shortness of breath and dry cough    Objective     Vital signs for last 24 hours:  Vitals:    06/02/22 0600 06/02/22 0700 06/02/22 0800  06/02/22 0900   BP: 129/92 158/83  119/66   BP Location:    Left arm   Patient Position:    Sitting   Pulse: 72 89 83 89   Resp:   18    Temp:   97.7 °F (36.5 °C)    TempSrc:   Oral    SpO2: 96% 98% 92% (!) 87%   Weight:       Height:           Intake/Output last 3 shifts:  I/O last 3 completed shifts:  In: 1097 [P.O.:900; IV Piggyback:197]  Out: 900 [Urine:900]  Intake/Output this shift:  I/O this shift:  In: 240 [P.O.:240]  Out: -       Radiology  Imaging Results (Last 24 Hours)     Procedure Component Value Units Date/Time    XR Chest 1 View [[id number]] Collected: 06/01/22 1911     Updated: 06/01/22 1913    Narrative:      Examination: XR CHEST 1 VW-     Date of Exam: 6/1/2022 6:32 PM     Indication: Port positioning; A41.9-Sepsis, unspecified organism;  I95.9-Hypotension, unspecified; J18.9-Pneumonia, unspecified organism;  D64.9-Anemia, unspecified; E86.0-Dehydration; N17.9-Acute kidney  failure, unspecified; C71.9-Malignant neoplasm of brain, unspecified;  J18.9-Pneumonia, unspecified organism.     Comparison: 04/04/2022.     Technique: Single radiographic view of the chest was obtained.     Findings:  Stable right chest port central venous catheter. There is no  pneumothorax, pleural effusion or focal airspace consolidation.  Cardiomediastinal silhouette is unremarkable. Pulmonary vasculature  appears within normal limits. Regional bones appear grossly intact.        Impression:      No acute cardiopulmonary abnormality.     Electronically Signed By-Jon Lynch MD On:6/1/2022 7:11 PM  This report was finalized on [id number] by  Jon Lynch MD.          Labs:  Results from last 7 days   Lab Units 06/02/22  0453   WBC 10*3/mm3 5.70   HEMOGLOBIN g/dL 7.8*   HEMATOCRIT % 23.5*   PLATELETS 10*3/mm3 40*     Results from last 7 days   Lab Units 06/02/22  0453   SODIUM mmol/L 135*   POTASSIUM mmol/L 4.1   CHLORIDE mmol/L 104   CO2 mmol/L 21.0*   BUN mg/dL 26*   CREATININE mg/dL 0.76   CALCIUM mg/dL 8.8    BILIRUBIN mg/dL 1.3*   ALK PHOS U/L 90   ALT (SGPT) U/L 15   AST (SGOT) U/L 17   GLUCOSE mg/dL 146*     Results from last 7 days   Lab Units 05/30/22  2244   PH, ARTERIAL pH units 7.419   PO2 ART mm Hg 245.6*   PCO2, ARTERIAL mm Hg 18.0*   HCO3 ART mmol/L 11.7*     Results from last 7 days   Lab Units 06/02/22  0453 06/01/22  0606 05/31/22  1304   ALBUMIN g/dL 3.10* 3.00* 2.80*     Results from last 7 days   Lab Units 05/30/22  2244   TROPONIN T ng/mL <0.010         Results from last 7 days   Lab Units 06/02/22  0453   MAGNESIUM mg/dL 1.9                   Meds:   SCHEDULE  cefepime, 2 g, Intravenous, Q8H  ferrous sulfate, 324 mg, Oral, Daily With Breakfast  folic acid, 1 mg, Oral, Daily  insulin lispro, 0-7 Units, Subcutaneous, 4x Daily With Meals & Nightly  levETIRAcetam, 750 mg, Oral, BID  oxybutynin, 5 mg, Oral, 4x Daily  predniSONE, 40 mg, Oral, Daily With Breakfast  sodium chloride, 10 mL, Intravenous, Q12H  venlafaxine XR, 150 mg, Oral, Daily      Infusions     PRNs  •  acetaminophen **OR** acetaminophen  •  dextrose  •  dextrose  •  glucagon (human recombinant)  •  insulin lispro **AND** insulin lispro  •  lidocaine  •  Lidocaine HCl gel  •  magnesium sulfate **OR** magnesium sulfate in D5W 1g/100mL (PREMIX) **OR** magnesium sulfate  •  nitroglycerin  •  ondansetron **OR** ondansetron  •  potassium & sodium phosphates **OR** potassium & sodium phosphates  •  potassium chloride **OR** potassium chloride **OR** potassium chloride  •  sodium chloride    Physical Exam:  Physical Exam  General Appearance:  Alert   HEENT:  Normocephalic, without obvious abnormality, Conjunctiva/corneas clear,.   Nares normal, no drainage     Neck:  Supple, symmetrical, trachea midline. No JVD.  Lungs /Chest wall:   Good respiratory effort without wheezing but has scattered dry rhonchi, respirations unlabored, symmetrical wall movement.     Heart:  Regular rate and rhythm, S1 S2 normal  Abdomen: Soft, non-tender, no masses, no  organomegaly.    Extremities: No edema, no clubbing or cyanosis  ROS  Review of Systems  Constitutional: Negative for chills, fever and malaise/fatigue.   HENT: Negative.    Eyes: Negative.    Cardiovascular: Negative.    Respiratory: Positive for cough and shortness of breath.    Skin: Negative.    Musculoskeletal: Negative.    Gastrointestinal: Negative.    Genitourinary: Negative.    Neurological: Negative.    Psychiatric/Behavioral: Negative.

## 2022-06-02 NOTE — PLAN OF CARE
Goal Outcome Evaluation:  Plan of Care Reviewed With: patient        Progress: improving  Outcome Evaluation: patient progressing to goal and currently awaiting bed on MIPS unit; denies any pain; VSS; up with 1 assist and walker to BSC and chair this AM; mobilized well; experienced some dizziness when standing from sitting position, but quickly resolved; underwent CT Bone Marrow Biopsy and Aspiration  and CT of Abdomen and Pelvis Stone protocol today; restful throughout afternoon after procedure; patient expecting to be discharged to home in a few days; will continue to monitor progress.

## 2022-06-02 NOTE — PROGRESS NOTES
Hematology/Oncology Inpatient Progress Note    PATIENT NAME: Cindy Cortes  : 1965  MRN: [id number]    Chief complaint: Shortness of breath     History of present illness:    Cindy Cortes is a 56 y.o. female who presented to [facility] on 2022 with complaints of shortness of breath.     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  She will be with significant myelosuppression, possible pneumonitis.  She has had longstanding shortness of breath, anemia she has required multiple transfusions in the past.  Patient was getting lethargic, shortness of breath was worsening over the last few days.  She presented to the emergency room.  In the ER her hemoglobin was down to 6.9, she had a temperature of 102.5 she had an elevated procalcitonin.  She was placed on 100% nonrebreather.  This was weaned down to 4 L.     Other work-up showed positive for UTI, she was thought to be septic started on broad-spectrum IV antibiotics and treated for septic shock.  She also had a CT PE which was negative for PE.  Very subtle groundglass attenuation possibly inflammatory or infectious.  PRBC transfusion was started.  Patient admitted for further work-up     22  Hematology/Oncology was consulted.  She is down to 2 L, improvement in symptoms.  Status post bronchoscopy.     He/She  has a past medical history of Arthritis, GERD (gastroesophageal reflux disease), Hypertension, Oligodendroglioma ([facility]), Seizure ([facility]), and Type 2 diabetes mellitus with hyperglycemia, without long-term current use of insulin ([facility]) (2021).     PCP: Annamarie Monroy APRN    Subjective     Patient still has shortness of breath on ambulation.      MEDICATIONS:    Scheduled Meds:  cefepime, 2 g, Intravenous, Q8H  ferrous sulfate, 324 mg, Oral, Daily With Breakfast  folic acid, 1 mg, Oral, Daily  insulin lispro,  "0-7 Units, Subcutaneous, 4x Daily With Meals & Nightly  levETIRAcetam, 750 mg, Oral, BID  oxybutynin, 5 mg, Oral, 4x Daily  predniSONE, 40 mg, Oral, Daily With Breakfast  sodium chloride, 10 mL, Intravenous, Q12H  venlafaxine XR, 150 mg, Oral, Daily       Continuous Infusions:      PRN Meds:  •  acetaminophen **OR** acetaminophen  •  dextrose  •  dextrose  •  glucagon (human recombinant)  •  insulin lispro **AND** insulin lispro  •  lidocaine  •  Lidocaine HCl gel  •  magnesium sulfate **OR** magnesium sulfate in D5W 1g/100mL (PREMIX) **OR** magnesium sulfate  •  nitroglycerin  •  ondansetron **OR** ondansetron  •  potassium & sodium phosphates **OR** potassium & sodium phosphates  •  potassium chloride **OR** potassium chloride **OR** potassium chloride  •  sodium chloride     ALLERGIES:  No Known Allergies    Objective    VITALS:   /83   Pulse 89   Temp 97.7 °F (36.5 °C) (Oral)   Resp 13   Ht 157.5 cm (62\")   Wt 128 kg (282 lb 6.6 oz)   LMP  (LMP Unknown)   SpO2 98%   BMI 51.65 kg/m²     PHYSICAL EXAM: (performed by MD)  Physical Exam  Constitutional:       Appearance: Normal appearance. She is obese.   HENT:      Head: Normocephalic and atraumatic.   Eyes:      Pupils: Pupils are equal, round, and reactive to light.   Cardiovascular:      Rate and Rhythm: Normal rate and regular rhythm.      Pulses: Normal pulses.      Heart sounds: No murmur heard.  Pulmonary:      Effort: Pulmonary effort is normal.      Breath sounds: Rhonchi present.   Abdominal:      General: There is no distension.      Palpations: Abdomen is soft. There is no mass.      Tenderness: There is no abdominal tenderness.   Musculoskeletal:         General: Normal range of motion.      Cervical back: Normal range of motion.   Skin:     General: Skin is warm.   Neurological:      General: No focal deficit present.      Mental Status: She is alert.   Psychiatric:         Mood and Affect: Mood normal.           RECENT LABS:  Lab " Results (last 24 hours)     Procedure Component Value Units Date/Time    POC Glucose Once [[id number]]  (Abnormal) Collected: 06/02/22 0726    Specimen: Blood Updated: 06/02/22 0729     Glucose 121 mg/dL      Comment: Serial Number: [id number]:  [id number]       Blood Culture - Blood, Chest, Right [[id number]]  (Abnormal)  (Susceptibility) Collected: 05/30/22 2244    Specimen: Blood from Chest, Right Updated: 06/02/22 0614     Blood Culture Escherichia coli     Isolated from Aerobic and Anaerobic Bottles     Gram Stain Aerobic Bottle Gram negative bacilli      Anaerobic Bottle Gram negative bacilli    Susceptibility      Escherichia coli      SUSAN      Ampicillin Susceptible      Ampicillin + Sulbactam Susceptible      Cefepime Susceptible      Ceftazidime Susceptible      Ceftriaxone Susceptible      Gentamicin Susceptible      Levofloxacin Susceptible      Piperacillin + Tazobactam Susceptible      Trimethoprim + Sulfamethoxazole Susceptible                    Linear View               Susceptibility Comments     Escherichia coli    Cefazolin sensitivity will not be reported for Enterobacteriaceae in non-urine isolates. If cefazolin is preferred, please call the microbiology lab to request an E-test.  With the exception of urinary-sourced infections, aminoglycosides should not be used as monotherapy.             Phosphorus [[id number]]  (Abnormal) Collected: 06/02/22 0453    Specimen: Blood Updated: 06/02/22 0519     Phosphorus 2.4 mg/dL     Comprehensive Metabolic Panel [[id number]]  (Abnormal) Collected: 06/02/22 0453    Specimen: Blood Updated: 06/02/22 0519     Glucose 146 mg/dL      BUN 26 mg/dL      Creatinine 0.76 mg/dL      Sodium 135 mmol/L      Potassium 4.1 mmol/L      Chloride 104 mmol/L      CO2 21.0 mmol/L      Calcium 8.8 mg/dL      Total Protein 5.5 g/dL      Albumin 3.10 g/dL      ALT (SGPT) 15 U/L      AST (SGOT) 17 U/L      Alkaline Phosphatase 90 U/L      Total Bilirubin 1.3 mg/dL       Globulin 2.4 gm/dL      A/G Ratio 1.3 g/dL      BUN/Creatinine Ratio 34.2     Anion Gap 10.0 mmol/L      eGFR 93.3 mL/min/1.73      Comment: National Kidney Foundation and American Society of Nephrology (ASN) Task Force recommended calculation based on the Chronic Kidney Disease Epidemiology Collaboration (CKD-EPI) equation refit without adjustment for race.       Narrative:      GFR Normal >60  Chronic Kidney Disease <60  Kidney Failure <15      Magnesium [[id number]]  (Normal) Collected: 06/02/22 0453    Specimen: Blood Updated: 06/02/22 0519     Magnesium 1.9 mg/dL     CBC & Differential [[id number]]  (Abnormal) Collected: 06/02/22 0453    Specimen: Blood Updated: 06/02/22 0516    Narrative:      The following orders were created for panel order CBC & Differential.  Procedure                               Abnormality         Status                     ---------                               -----------         ------                     CBC Auto Differential[[id number]]        Abnormal            Final result               Scan Slide[[id number]]                                                                    Please view results for these tests on the individual orders.    CBC Auto Differential [[id number]]  (Abnormal) Collected: 06/02/22 0453    Specimen: Blood Updated: 06/02/22 0516     WBC 5.70 10*3/mm3      RBC 2.43 10*6/mm3      Hemoglobin 7.8 g/dL      Hematocrit 23.5 %      MCV 96.6 fL      MCH 32.0 pg      MCHC 33.2 g/dL      RDW 22.8 %      RDW-SD 74.8 fl      MPV 9.2 fL      Platelets 40 10*3/mm3      Comment: Parameter reviewed in the past 30 days on 6/1/22 .         Neutrophil % 88.4 %      Lymphocyte % 7.3 %      Monocyte % 4.1 %      Eosinophil % 0.1 %      Basophil % 0.1 %      Neutrophils, Absolute 5.00 10*3/mm3      Lymphocytes, Absolute 0.40 10*3/mm3      Monocytes, Absolute 0.20 10*3/mm3      Eosinophils, Absolute 0.00 10*3/mm3      Basophils, Absolute 0.00 10*3/mm3      nRBC 0.6 /100 WBC      Blood Culture - Blood, Arm, Right [[id number]]  (Normal) Collected: 05/31/22 0044    Specimen: Blood from Arm, Right Updated: 06/02/22 0101     Blood Culture No growth at 2 days    POC Glucose Once [[id number]]  (Abnormal) Collected: 06/01/22 2059    Specimen: Blood Updated: 06/01/22 2100     Glucose 173 mg/dL      Comment: Serial Number: [id number]:  [id number]       POC Glucose Once [[id number]]  (Abnormal) Collected: 06/01/22 1656    Specimen: Blood Updated: 06/01/22 1657     Glucose 208 mg/dL      Comment: Serial Number: [id number]:  [id number]       Lactate Dehydrogenase [[id number]]  (Abnormal) Collected: 06/01/22 0606    Specimen: Blood Updated: 06/01/22 1135      U/L     Folate [[id number]]  (Normal) Collected: 06/01/22 0606    Specimen: Blood Updated: 06/01/22 1125     Folate 17.80 ng/mL     Narrative:      Results may be falsely increased if patient taking Biotin.      Vitamin B12 [[id number]]  (Normal) Collected: 06/01/22 0606    Specimen: Blood Updated: 06/01/22 1125     Vitamin B-12 553 pg/mL     Narrative:      Results may be falsely increased if patient taking Biotin.      Non-gynecologic Cytology [[id number]] Collected: 05/31/22 1523    Specimen: Lavage from Lung, Right Middle Lobe Updated: 06/01/22 1123    Urine Culture - Urine, Urine, Catheter In/Out [[id number]]  (Abnormal)  (Susceptibility) Collected: 05/31/22 0038    Specimen: Urine, Catheter In/Out Updated: 06/01/22 1122     Urine Culture >100,000 CFU/mL Klebsiella pneumoniae ssp pneumoniae    Narrative:      Colonization of the urinary tract without infection is common. Treatment is discouraged unless the patient is symptomatic, pregnant, or undergoing an invasive urologic procedure.    Susceptibility      Klebsiella pneumoniae ssp pneumoniae      SUSAN      Ampicillin Resistant     Ampicillin + Sulbactam Susceptible      Cefazolin Susceptible      Cefepime Susceptible      Ceftazidime Susceptible      Ceftriaxone Susceptible       Gentamicin Susceptible      Levofloxacin Susceptible      Nitrofurantoin Resistant     Piperacillin + Tazobactam Susceptible      Trimethoprim + Sulfamethoxazole Susceptible                    Linear View                   POC Glucose Once [[id number]]  (Abnormal) Collected: 06/01/22 1120    Specimen: Blood Updated: 06/01/22 1121     Glucose 159 mg/dL      Comment: Serial Number: [id number]:  [id number]       Haptoglobin [[id number]]  (Normal) Collected: 06/01/22 0606    Specimen: Blood Updated: 06/01/22 1108     Haptoglobin 34 mg/dL      Comment: Specimen hemolyzed. Results may be affected.       AFB Culture - Lavage, Lung, Right Middle Lobe [[id number]] Collected: 05/31/22 1523    Specimen: Lavage from Lung, Right Middle Lobe Updated: 06/01/22 1017     AFB Stain No acid fast bacilli seen    BAL Culture, Quantitative - Lavage, Lung, Right Middle Lobe [[id number]] Collected: 05/31/22 1523    Specimen: Lavage from Lung, Right Middle Lobe Updated: 06/01/22 1014     BAL Culture No growth     Gram Stain Rare (1+) WBCs seen      No organisms seen    Bilirubin, Direct [[id number]]  (Abnormal) Collected: 06/01/22 0606    Specimen: Blood Updated: 06/01/22 0843     Bilirubin, Direct 0.8 mg/dL               IMAGING REVIEWED:  XR Chest 1 View    Result Date: 6/1/2022  No acute cardiopulmonary abnormality.  Electronically Signed By-Jon Lynch MD On:6/1/2022 7:11 PM This report was finalized on [id number] by  Jon Lynch MD.      Assessment & Plan       Patient is a 56-year-old female with oligodendroglioma s/p resection, adjuvant chemotherapy now admitted with shortness of breath, anemia     Anemia  Patient does have underlying anemia of chronic disease, anemia related to myelosuppression  This however should have improved however she continues to have significant anemia.  retic is elevated however to a lesser degree with his hb. This is also after transfusion not very accurate  hapto pending. Tbili is elevated  check direct, LDH mildly elevated  Iron ferritin high after transfusion likely, not accurate  B12 folic acid not low  Will plan on bone marrow biopsy with persistent long standing anemia/thrombocytopenia.      Shortness of breath  Possible causes include pneumonitis, pneumonia atypical sepsis.  Status post bronchoscopy, pending PCP work-up.     Sepsis  Improvement in blood pressure,  Broad-spectrum, UTI     Thrombocytopenia  Acute on chronic  Likely infection related.  Rule out DIC

## 2022-06-02 NOTE — PLAN OF CARE
Problem: Fall Injury Risk  Goal: Absence of Fall and Fall-Related Injury  Outcome: Adequate for Care Transition  Intervention: Identify and Manage Contributors  Recent Flowsheet Documentation  Taken 6/2/2022 0500 by Liza Bahena RN  Medication Review/Management: medications reviewed  Taken 6/2/2022 0400 by Liza Bahena RN  Medication Review/Management: medications reviewed  Taken 6/2/2022 0300 by Liza Bahena RN  Medication Review/Management: medications reviewed  Taken 6/2/2022 0200 by Liza Bahena RN  Medication Review/Management: medications reviewed  Taken 6/2/2022 0100 by Liza Bahena RN  Medication Review/Management: medications reviewed  Taken 6/2/2022 0000 by Liza Bahena RN  Medication Review/Management: medications reviewed  Taken 6/1/2022 2300 by Liza Bahena RN  Medication Review/Management: medications reviewed  Taken 6/1/2022 2200 by Liza Bahena RN  Medication Review/Management: medications reviewed  Taken 6/1/2022 2100 by Liza Bahena RN  Medication Review/Management: medications reviewed  Taken 6/1/2022 2000 by Liza Bahena RN  Medication Review/Management: medications reviewed  Taken 6/1/2022 1900 by Liza Bahena RN  Medication Review/Management: medications reviewed  Intervention: Promote Injury-Free Environment  Recent Flowsheet Documentation  Taken 6/2/2022 0500 by Liza Bahena RN  Safety Promotion/Fall Prevention: safety round/check completed  Taken 6/2/2022 0400 by Liza Bahena RN  Safety Promotion/Fall Prevention: safety round/check completed  Taken 6/2/2022 0300 by Liza Bahena RN  Safety Promotion/Fall Prevention: safety round/check completed  Taken 6/2/2022 0200 by Liza Bahena RN  Safety Promotion/Fall Prevention: safety round/check completed  Taken 6/2/2022 0100 by Liza Bahena RN  Safety Promotion/Fall Prevention: safety round/check completed  Taken 6/2/2022 0000 by Liza Bahena RN  Safety Promotion/Fall Prevention: safety round/check completed  Taken 6/1/2022 2300 by Liza Bahena  RN  Safety Promotion/Fall Prevention: safety round/check completed  Taken 6/1/2022 2200 by Liza Bahena RN  Safety Promotion/Fall Prevention: safety round/check completed  Taken 6/1/2022 2100 by Liza Bahena RN  Safety Promotion/Fall Prevention: safety round/check completed  Taken 6/1/2022 2000 by Liza Bahena RN  Safety Promotion/Fall Prevention: safety round/check completed  Taken 6/1/2022 1900 by Liza Bahena RN  Safety Promotion/Fall Prevention: safety round/check completed     Problem: Adult Inpatient Plan of Care  Goal: Plan of Care Review  Outcome: Adequate for Care Transition  Goal: Patient-Specific Goal (Individualized)  Outcome: Adequate for Care Transition  Goal: Absence of Hospital-Acquired Illness or Injury  Outcome: Adequate for Care Transition  Intervention: Identify and Manage Fall Risk  Recent Flowsheet Documentation  Taken 6/2/2022 0500 by Liza Bahena RN  Safety Promotion/Fall Prevention: safety round/check completed  Taken 6/2/2022 0400 by Liza Bahena RN  Safety Promotion/Fall Prevention: safety round/check completed  Taken 6/2/2022 0300 by Liza Bahena RN  Safety Promotion/Fall Prevention: safety round/check completed  Taken 6/2/2022 0200 by Liza Bahena RN  Safety Promotion/Fall Prevention: safety round/check completed  Taken 6/2/2022 0100 by Liza Bahena RN  Safety Promotion/Fall Prevention: safety round/check completed  Taken 6/2/2022 0000 by Liza Bahena RN  Safety Promotion/Fall Prevention: safety round/check completed  Taken 6/1/2022 2300 by Liza Bahena RN  Safety Promotion/Fall Prevention: safety round/check completed  Taken 6/1/2022 2200 by Liza Bahena RN  Safety Promotion/Fall Prevention: safety round/check completed  Taken 6/1/2022 2100 by Liza Bahena RN  Safety Promotion/Fall Prevention: safety round/check completed  Taken 6/1/2022 2000 by Liza Bahena RN  Safety Promotion/Fall Prevention: safety round/check completed  Taken 6/1/2022 1900 by Liza Bahena RN  Safety Promotion/Fall  Prevention: safety round/check completed  Intervention: Prevent Skin Injury  Recent Flowsheet Documentation  Taken 6/2/2022 0500 by Liza Bahena RN  Body Position: position changed independently  Taken 6/2/2022 0300 by Liza Bahena RN  Body Position: position changed independently  Taken 6/2/2022 0100 by Liza Bahena RN  Body Position: position changed independently  Taken 6/1/2022 2300 by Liza Bahena RN  Body Position: position changed independently  Taken 6/1/2022 2100 by Liza Bahena RN  Body Position:   position changed independently   supine  Taken 6/1/2022 1900 by Liza Bahena RN  Body Position:   turned   right   position changed independently  Intervention: Prevent and Manage VTE (Venous Thromboembolism) Risk  Recent Flowsheet Documentation  Taken 6/2/2022 0400 by Liza Bahena RN  VTE Prevention/Management:   bilateral   sequential compression devices off   patient refused intervention  Taken 6/2/2022 0000 by Liza Bahena RN  VTE Prevention/Management:   bilateral   sequential compression devices off   patient refused intervention  Taken 6/1/2022 2000 by Liza Bahena RN  VTE Prevention/Management:   bilateral   sequential compression devices off   patient refused intervention  Range of Motion: active ROM (range of motion) encouraged  Goal: Optimal Comfort and Wellbeing  Outcome: Adequate for Care Transition  Intervention: Provide Person-Centered Care  Recent Flowsheet Documentation  Taken 6/2/2022 0400 by Liza Bahena RN  Trust Relationship/Rapport: care explained  Taken 6/2/2022 0000 by Liza Bahena RN  Trust Relationship/Rapport: care explained  Taken 6/1/2022 2000 by Liza Bahena RN  Trust Relationship/Rapport: care explained  Goal: Readiness for Transition of Care  Outcome: Adequate for Care Transition     Problem: Skin Injury Risk Increased  Goal: Skin Health and Integrity  Outcome: Adequate for Care Transition  Intervention: Optimize Skin Protection  Recent Flowsheet Documentation  Taken 6/2/2022 0500  by Liza Bahena RN  Head of Bed (HOB) Positioning: HOB flat  Taken 6/2/2022 0300 by Liza Bahena RN  Head of Bed (HOB) Positioning: HOB lowered  Taken 6/2/2022 0100 by Liza Bahena RN  Head of Bed (HOB) Positioning: HOB lowered  Taken 6/1/2022 2300 by Liza Bahena RN  Head of Bed (HOB) Positioning: HOB at 30 degrees  Taken 6/1/2022 2100 by Liza Bahena RN  Head of Bed (HOB) Positioning: HOB at 30 degrees  Taken 6/1/2022 1900 by Liza Bahena RN  Head of Bed (HOB) Positioning: HOB at 30 degrees   Goal Outcome Evaluation:

## 2022-06-02 NOTE — CONSULTS
Infectious Diseases Consult Note    Referring Provider: Femi Manzano MD    Reason for Consultation: UTI and bacteremia    Patient Care Team:  Annamarie Monroy APRN as PCP - General (Nurse Practitioner)    Chief complaint presented with shortness of breath and fever    Subjective     History of present illness:      This is 56-year-old white female with past medical history significant for oligodendroglioma status postresection and adjunctive chemotherapy.  She received her last chemotherapy 6 weeks according to her .  The patient presented to hospital on May 30, 2022 with fever and chills and shortness of breath.  Blood cultures x2 sets came positive for 1 out of the 2 sets collected from the port for E. coli.  Urine culture growing Klebsiella pneumoniae.  The patient is feeling better today.  The patient is currently on IV cefepime.  The patient is hemodynamically stable requiring no vasopressors.    Review of Systems   Review of Systems   Constitutional: Positive for fatigue.   HENT: Negative.    Eyes: Negative.    Respiratory: Negative.    Cardiovascular: Negative.    Gastrointestinal: Negative.    Genitourinary: Negative.    Musculoskeletal: Negative.    Skin: Negative.    Neurological: Negative.    Hematological: Negative.    Psychiatric/Behavioral: Negative.        Medications  Facility-Administered Medications Prior to Admission   Medication Dose Route Frequency Provider Last Rate Last Admin   • diphenhydrAMINE (BENADRYL) injection 25 mg  25 mg Intravenous Once Emilie Workman MD         Medications Prior to Admission   Medication Sig Dispense Refill Last Dose   • albuterol sulfate  (90 Base) MCG/ACT inhaler Inhale 2 puffs Every 4 (Four) Hours As Needed for Wheezing. 18 g 2    • amLODIPine (NORVASC) 10 MG tablet Take 1 tablet by mouth Daily. 30 tablet 2    • ferrous sulfate 325 (65 FE) MG tablet Take 1 tablet by mouth Daily With Breakfast.      • fluticasone (FLOVENT HFA) 44 MCG/ACT inhaler  Inhale 2 puffs Daily As Needed.      • folic acid (FOLVITE) 1 MG tablet Take 1 mg by mouth Daily.      • levETIRAcetam (KEPPRA) 750 MG tablet Take 1 tablet by mouth 2 (Two) Times a Day. 60 tablet 2    • ondansetron (Zofran) 8 MG tablet Take 1 tablet by mouth Every 8 (Eight) Hours As Needed for Nausea or Vomiting. 30 tablet 3    • oxybutynin (DITROPAN) 5 MG tablet Take 5 mg by mouth 4 (Four) Times a Day.      • venlafaxine XR (EFFEXOR-XR) 150 MG 24 hr capsule Take 150 mg by mouth Daily.  3    • Budeson-Glycopyrrol-Formoterol (Breztri Aerosphere) 160-9-4.8 MCG/ACT aerosol inhaler Inhale 2 puffs 2 (Two) Times a Day. Indications: Lot#[id number] Exp: 8/30/23 1 each 0        History  Past Medical History:   Diagnosis Date   • Arthritis    • GERD (gastroesophageal reflux disease)    • Hypertension    • Oligodendroglioma (HCC)    • Seizure (HCC)    • Type 2 diabetes mellitus with hyperglycemia, without long-term current use of insulin (HCC) 05/12/2021     Past Surgical History:   Procedure Laterality Date   • BRONCHOSCOPY N/A 4/6/2022    Procedure: BRONCHOSCOPY with bronchial washing;  Surgeon: Drew Alcantar MD;  Location: [facility] ENDOSCOPY;  Service: Pulmonary;  Laterality: N/A;  pneumonia   • BRONCHOSCOPY N/A 5/31/2022    Procedure: BRONCHOSCOPY AT BEDSIDE with bronchoalveolar lavage right middle lobe;  Surgeon: Den Feldman MD;  Location: [facility] ENDOSCOPY;  Service: Pulmonary;  Laterality: N/A;   • COLONOSCOPY     • CRANIOTOMY FOR TUMOR Right 05/06/2021    Procedure: CRANIOTOMY FOR TUMOR RESECTION WITH STEREOTACTIC;  Surgeon: Jluis Felix MD;  Location: [facility] MAIN OR;  Service: Neurosurgery;  Laterality: Right;       Family History  Family History   Problem Relation Age of Onset   • Kidney disease Mother    • Prostate cancer Brother    • Breast cancer Maternal Aunt    • Colon cancer Maternal Aunt    • Breast cancer Niece    • Breast cancer Cousin        Social History   reports that she has never smoked. She has  never used smokeless tobacco. She reports previous alcohol use of about 1.0 standard drink of alcohol per week. She reports that she does not use drugs.    Allergies  Patient has no known allergies.    Objective     Vital Signs   Vital Signs (last 24 hours)       06/01 0700  06/02 0659 06/02 0700  06/02 1215   Most Recent      Temp (°F) 97.4 -  98    97.7 -  97.8     97.8 (36.6) 06/02 1200    Heart Rate 59 -  84    83 -  90     87 06/02 1100    Resp 13 -  19      18     18 06/02 0800    BP 57/40 -  158/98    100/63 -  158/83     100/63 06/02 1100    SpO2 (%) 90 -  100    87 -  98     90 06/02 1100          Physical Exam:  Physical Exam  Vitals and nursing note reviewed.   Constitutional:       Appearance: She is well-developed.   HENT:      Head: Normocephalic and atraumatic.   Eyes:      Pupils: Pupils are equal, round, and reactive to light.   Cardiovascular:      Rate and Rhythm: Normal rate and regular rhythm.      Heart sounds: Normal heart sounds.   Pulmonary:      Effort: Pulmonary effort is normal. No respiratory distress.      Breath sounds: Normal breath sounds. No wheezing or rales.   Abdominal:      General: Bowel sounds are normal. There is no distension.      Palpations: Abdomen is soft. There is no mass.      Tenderness: There is no abdominal tenderness. There is no guarding or rebound.   Musculoskeletal:         General: No deformity. Normal range of motion.      Cervical back: Normal range of motion and neck supple.   Skin:     General: Skin is warm.      Findings: No erythema or rash.   Neurological:      Mental Status: She is alert and oriented to person, place, and time.      Cranial Nerves: No cranial nerve deficit.         Microbiology  Microbiology Results (last 10 days)     Procedure Component Value - Date/Time    AFB Culture - Lavage, Lung, Right Middle Lobe [[id number]] Collected: 05/31/22 1523    Lab Status: Preliminary result Specimen: Lavage from Lung, Right Middle Lobe Updated: 06/01/22  1017     AFB Stain No acid fast bacilli seen    BAL Culture, Quantitative - Lavage, Lung, Right Middle Lobe [[id number]] Collected: 05/31/22 1523    Lab Status: Final result Specimen: Lavage from Lung, Right Middle Lobe Updated: 06/02/22 0950     BAL Culture No growth     Gram Stain Rare (1+) WBCs seen      No organisms seen    MRSA Screen, PCR (Inpatient) - Swab, Nares [[id number]]  (Normal) Collected: 05/31/22 0532    Lab Status: Final result Specimen: Swab from Nares Updated: 05/31/22 0706     MRSA PCR No MRSA Detected    Blood Culture - Blood, Arm, Right [[id number]]  (Normal) Collected: 05/31/22 0044    Lab Status: Preliminary result Specimen: Blood from Arm, Right Updated: 06/02/22 0101     Blood Culture No growth at 2 days    Urine Culture - Urine, Urine, Catheter In/Out [[id number]]  (Abnormal)  (Susceptibility) Collected: 05/31/22 0038    Lab Status: Final result Specimen: Urine, Catheter In/Out Updated: 06/01/22 1122     Urine Culture >100,000 CFU/mL Klebsiella pneumoniae ssp pneumoniae    Narrative:      Colonization of the urinary tract without infection is common. Treatment is discouraged unless the patient is symptomatic, pregnant, or undergoing an invasive urologic procedure.    Susceptibility      Klebsiella pneumoniae ssp pneumoniae      SUSAN      Ampicillin Resistant     Ampicillin + Sulbactam Susceptible      Cefazolin Susceptible      Cefepime Susceptible      Ceftazidime Susceptible      Ceftriaxone Susceptible      Gentamicin Susceptible      Levofloxacin Susceptible      Nitrofurantoin Resistant     Piperacillin + Tazobactam Susceptible      Trimethoprim + Sulfamethoxazole Susceptible                           Respiratory Panel PCR w/COVID-19(SARS-CoV-2) JONELLE/PAUL/SARITHA/PAD/COR/MAD/BELINDA In-House, NP Swab in UTM/VTM, 3-4 HR TAT - Swab, Nasopharynx [[id number]]  (Normal) Collected: 05/30/22 2254    Lab Status: Final result Specimen: Swab from Nasopharynx Updated: 05/31/22 0002     ADENOVIRUS, PCR Not  Detected     Coronavirus 229E Not Detected     Coronavirus HKU1 Not Detected     Coronavirus NL63 Not Detected     Coronavirus OC43 Not Detected     COVID19 Not Detected     Human Metapneumovirus Not Detected     Human Rhinovirus/Enterovirus Not Detected     Influenza A PCR Not Detected     Influenza B PCR Not Detected     Parainfluenza Virus 1 Not Detected     Parainfluenza Virus 2 Not Detected     Parainfluenza Virus 3 Not Detected     Parainfluenza Virus 4 Not Detected     RSV, PCR Not Detected     Bordetella pertussis pcr Not Detected     Bordetella parapertussis PCR Not Detected     Chlamydophila pneumoniae PCR Not Detected     Mycoplasma pneumo by PCR Not Detected    Narrative:      In the setting of a positive respiratory panel with a viral infection PLUS a negative procalcitonin without other underlying concern for bacterial infection, consider observing off antibiotics or discontinuation of antibiotics and continue supportive care. If the respiratory panel is positive for atypical bacterial infection (Bordetella pertussis, Chlamydophila pneumoniae, or Mycoplasma pneumoniae), consider antibiotic de-escalation to target atypical bacterial infection.    Blood Culture - Blood, Chest, Right [[id number]]  (Abnormal)  (Susceptibility) Collected: 05/30/22 224    Lab Status: Edited Result - FINAL Specimen: Blood from Chest, Right Updated: 06/02/22 0614     Blood Culture Escherichia coli     Isolated from Aerobic and Anaerobic Bottles     Gram Stain Aerobic Bottle Gram negative bacilli      Anaerobic Bottle Gram negative bacilli    Susceptibility      Escherichia coli      SUSAN      Ampicillin Susceptible      Ampicillin + Sulbactam Susceptible      Cefepime Susceptible      Ceftazidime Susceptible      Ceftriaxone Susceptible      Gentamicin Susceptible      Levofloxacin Susceptible      Piperacillin + Tazobactam Susceptible      Trimethoprim + Sulfamethoxazole Susceptible                       Susceptibility  Comments     Escherichia coli    Cefazolin sensitivity will not be reported for Enterobacteriaceae in non-urine isolates. If cefazolin is preferred, please call the microbiology lab to request an E-test.  With the exception of urinary-sourced infections, aminoglycosides should not be used as monotherapy.             Blood Culture ID, PCR - Blood, Chest, Right [[id number]]  (Abnormal) Collected: 05/30/22 2244    Lab Status: Final result Specimen: Blood from Chest, Right Updated: 05/31/22 1322     BCID, PCR Eschericia coli. Identification by BCID2 PCR.     BOTTLE TYPE Aerobic Bottle          Laboratory  Results from last 7 days   Lab Units 06/02/22  0453   WBC 10*3/mm3 5.70   HEMOGLOBIN g/dL 7.8*   HEMATOCRIT % 23.5*   PLATELETS 10*3/mm3 40*     Results from last 7 days   Lab Units 06/02/22  0453   SODIUM mmol/L 135*   POTASSIUM mmol/L 4.1   CHLORIDE mmol/L 104   CO2 mmol/L 21.0*   BUN mg/dL 26*   CREATININE mg/dL 0.76   GLUCOSE mg/dL 146*   CALCIUM mg/dL 8.8     Results from last 7 days   Lab Units 06/02/22  0453   SODIUM mmol/L 135*   POTASSIUM mmol/L 4.1   CHLORIDE mmol/L 104   CO2 mmol/L 21.0*   BUN mg/dL 26*   CREATININE mg/dL 0.76   GLUCOSE mg/dL 146*   CALCIUM mg/dL 8.8                   Radiology  Imaging Results (Last 72 Hours)     Procedure Component Value Units Date/Time    CT Bone marrow biopsy and aspiration [[id number]] Resulted: 06/02/22 1214     Updated: 06/02/22 1214    XR Chest 1 View [[id number]] Collected: 06/01/22 1911     Updated: 06/01/22 1913    Narrative:      Examination: XR CHEST 1 VW-     Date of Exam: 6/1/2022 6:32 PM     Indication: Port positioning; A41.9-Sepsis, unspecified organism;  I95.9-Hypotension, unspecified; J18.9-Pneumonia, unspecified organism;  D64.9-Anemia, unspecified; E86.0-Dehydration; N17.9-Acute kidney  failure, unspecified; C71.9-Malignant neoplasm of brain, unspecified;  J18.9-Pneumonia, unspecified organism.     Comparison: 04/04/2022.     Technique: Single radiographic  view of the chest was obtained.     Findings:  Stable right chest port central venous catheter. There is no  pneumothorax, pleural effusion or focal airspace consolidation.  Cardiomediastinal silhouette is unremarkable. Pulmonary vasculature  appears within normal limits. Regional bones appear grossly intact.        Impression:      No acute cardiopulmonary abnormality.     Electronically Signed By-Jon Lynch MD On:6/1/2022 7:11 PM  This report was finalized on [id number] by  Jon Lynch MD.    CT Angiogram Chest Pulmonary Embolism [[id number]] Collected: 05/31/22 0008     Updated: 05/31/22 0012    Narrative:      Exam: CT Angiography of the Chest.    Date: 5/30/2022.     Comparison: None    History: Shortness of breath.    Technique: CT examination of the chest was performed following the intravenous administration of 100 mL of Isovue-370. Sagittal, coronal and 3-D reformatted images were provided. CT dose lowering techniques were used, to include: automated exposure   control, adjustment for patient size, and/or use of iterative reconstruction.    FINDINGS:    Mediastinum and Yamila: There is no axillary, mediastinal or hilar lymphadenopathy. There are several calcified granulomas in the right hilar region.    Pleural and Pericardial spaces: There are no pleural or pericardial effusions.    Upper Abdomen: Gallstones are seen filling the gallbladder. The spleen is enlarged measuring 15.8 cm in AP dimension. Visualized upper abdomen otherwise appears unremarkable    Cardiovascular: The thoracic aorta is normal in size without evidence of aneurysm or dissection.    Pulmonary Artery: There are no filling defects in the pulmonary arteries.    Lung Parenchyma and Airways: There is some subtle areas of groundglass attenuation throughout the lungs bilaterally which could relate to an inflammatory or infectious etiology. Clinical correlation recommended.    Bones: No fracture or aggressive osseous lesion.       Impression:        1. No evidence of pulmonary embolism.  2. No evidence of thoracic aortic aneurysm or dissection.  3. Very subtle groundglass attenuation seen throughout the lungs bilaterally could potentially be inflammatory or infectious. Clinical correlation is recommended.  4. Cholelithiasis.  5. Mild splenomegaly.      Electronically signed by:  Robel Aj D.O.    5/30/2022 10:11 PM          Cardiology      Results Review:  I have reviewed all clinical data, test, lab, and imaging results.       Schedule Meds  cefepime, 2 g, Intravenous, Q8H  ferrous sulfate, 324 mg, Oral, Daily With Breakfast  folic acid, 1 mg, Oral, Daily  insulin lispro, 0-7 Units, Subcutaneous, 4x Daily With Meals & Nightly  levETIRAcetam, 750 mg, Oral, BID  oxybutynin, 5 mg, Oral, 4x Daily  predniSONE, 40 mg, Oral, Daily With Breakfast  sodium chloride, 10 mL, Intravenous, Q12H  venlafaxine XR, 150 mg, Oral, Daily        Infusion Meds       PRN Meds  •  acetaminophen **OR** acetaminophen  •  dextrose  •  dextrose  •  glucagon (human recombinant)  •  insulin lispro **AND** insulin lispro  •  lidocaine  •  Lidocaine HCl gel  •  magnesium sulfate **OR** magnesium sulfate in D5W 1g/100mL (PREMIX) **OR** magnesium sulfate  •  nitroglycerin  •  ondansetron **OR** ondansetron  •  potassium & sodium phosphates **OR** potassium & sodium phosphates  •  potassium chloride **OR** potassium chloride **OR** potassium chloride  •  sodium chloride      Assessment & Plan       Assessment    E. coli bacteremia.  The most likely source is her UTI or port infection.  The organism is relatively susceptible    UTI with a Klebsiella pneumoniae.  The organism is relatively susceptible.  We need to rule out obstructive uropathy    S/p port placement in the past for chemotherapy    History of  oligodendroglioma s/p resection and adjunctive chemotherapy in the past.  Last treatment was 6 weeks ago.    Obesity    Diabetes mellitus    Plan    Discontinue IV  cefepime  Start ceftriaxone 2 g IV daily for 2 weeks  Request CT scan of abdomen pelvis with stone protocol  Supportive care  A.m. labs    Erin Shaw MD  06/02/22  12:15 EDT    Note is dictated utilizing voice recognition software/Dragon

## 2022-06-02 NOTE — PLAN OF CARE
Problem: Adult Inpatient Plan of Care  Goal: Plan of Care Review  Recent Flowsheet Documentation  Taken 6/2/2022 1651 by Trini Epstein RN  Progress: improving  Plan of Care Reviewed With: patient  Outcome Evaluation:   patient progressing to goal and currently awaiting bed on MIPS unit   denies any pain   VSS   up with 1 assist and walker to BSC and chair this AM   mobilized well   experienced some dizziness when standing from sitting position, but quickly resolved   underwent CT Bone Marrow Biopsy and Aspiration  and CT of Abdomen and Pelvis Stone protocol today   restful throughout afternoon after procedure   patient expecting to be discharged to home in a few days   will continue to monitor progress.   Goal Outcome Evaluation:  Plan of Care Reviewed With: patient        Progress: improving  Outcome Evaluation: patient progressing to goal and currently awaiting bed on MIPS unit; denies any pain; VSS; up with 1 assist and walker to BSC and chair this AM; mobilized well; experienced some dizziness when standing from sitting position, but quickly resolved; underwent CT Bone Marrow Biopsy and Aspiration  and CT of Abdomen and Pelvis Stone protocol today; restful throughout afternoon after procedure; patient expecting to be discharged to home in a few days; will continue to monitor progress.

## 2022-06-02 NOTE — POST-PROCEDURE NOTE
IR POST OP NOTE    Procedure:CT guided bone marrow biopsy      Pre Op DX:Anemia, thrombocytopenia      Post Op DX:same      Anesthesia: Local, CS      Findings:See dictation      Complications:None immediate.       Provider Signature: Dr. Vick Draper

## 2022-06-02 NOTE — PROGRESS NOTES
[facility] Medicine Services - Consult Note     Patient Name: Cindy Cortes  : 1965  MRN: [id number]  Primary Care Physician:  Annamarie Monroy APRN  Referring Physician: BJ Rolon  Date of admission: 2022          Subjective       Reason for Consult/ Chief Complaint: shortness of breath, lethargy, weakness     History of Present Illness: Cindy Cortes is a 56 y.o. 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 She was started on an antibiotic, which she could not tolerate. She has also had pancytopenia and required multiple blood transfusions. The patient stated she has had shortness of breath upon exertion for the last 3-4 weeks, worse in the last 1 week. The patient's family stated she was short of breath, lethargic, and could not walk/hold herself up due to weakness on 2022 so they called EMS. She has had a nonproductive cough and chills in the last 24 hours. She denied any blood in her urine or stool.      In the ED the patient was on a nonrebreather placed by EMS. She was tachycardic, mildly hypotensive, tachypneic, and had temperature of 102.5. Her hgb was 6.9, WBC 17.8, lactate 11.7, procalcitonin 6.58.  ABG showed pH 7.41, PCO2 18, PaO2 245, HCO3 11.7 on 100% nonrebreather.  CT PE protocol showed no PE, no aneurysm or dissection, very subtle groundglass attenuation seen throughout the lungs bilaterally could potentially be inflammatory or infectious.  Cholelithiasis, mild splenomegaly. Urinalysis was nitrite positive, large leukocytes, too numerous WBCs, 4+ bacteria.  She was given 30cc/kg IVFs, 2 units PRBCs, IV antibiotics. She reportedly has a history of antibodies so she was pretreated with antihistamine, steroids, and tylenol. Her oxygen was weaned down to 4L O2. She was diagnosed with  sepsis with septic shock, metabolic acidosis, pneumonitis, UTI, anemia, dehydration, and altered mental status.         Date:   5/31/2022: Patient felt stable for transfer out of ICU. She did not require vasopressors. Pulmonary planning bronchoscopy    6/1/2022; patient is sitting in the side of the bed, afebrile vital signs are stable, spoke with the bedside RN awaiting bed and general medical floor.   6/2/2022; afebrile vital signs are stable currently on 3 L of nasal cannula no new symptoms endorsed spoke with the bedside RN.  Review of Systems   Constitutional: Positive for fever.   HENT: Negative.    Eyes: Negative.    Cardiovascular: Negative.    Respiratory: Positive for cough and shortness of breath.    Endocrine: Negative.    Hematologic/Lymphatic: Negative.    Skin: Negative.    Musculoskeletal: Negative.    Gastrointestinal: Negative.    Genitourinary: Negative.    Neurological: Positive for weakness.   Psychiatric/Behavioral: Negative.    Allergic/Immunologic: Negative.    All other systems reviewed and are negative.        Personal History      Medical History        Past Medical History:   Diagnosis Date   • Arthritis     • GERD (gastroesophageal reflux disease)     • Hypertension     • Oligodendroglioma (HCC)     • Seizure (HCC)     • Type 2 diabetes mellitus with hyperglycemia, without long-term current use of insulin (HCC) 05/12/2021            Surgical History         Past Surgical History:   Procedure Laterality Date   • BRONCHOSCOPY N/A 4/6/2022     Procedure: BRONCHOSCOPY with bronchial washing;  Surgeon: Drew Alcantar MD;  Location: [facility] ENDOSCOPY;  Service: Pulmonary;  Laterality: N/A;  pneumonia   • COLONOSCOPY       • CRANIOTOMY FOR TUMOR Right 05/06/2021     Procedure: CRANIOTOMY FOR TUMOR RESECTION WITH STEREOTACTIC;  Surgeon: Jluis Felix MD;  Location: [facility] MAIN OR;  Service: Neurosurgery;  Laterality: Right;            Family History: family history includes Breast cancer in her  cousin, maternal aunt, and niece; Colon cancer in her maternal aunt; Kidney disease in her mother; Prostate cancer in her brother. Otherwise pertinent FHx was reviewed and not pertinent to current issue.     Social History:  reports that she has never smoked. She has never used smokeless tobacco. She reports previous alcohol use of about 1.0 standard drink of alcohol per week. She reports that she does not use drugs.     Home Medications:   Budeson-Glycopyrrol-Formoterol, albuterol sulfate HFA, amLODIPine, ferrous sulfate, fluticasone, folic acid, levETIRAcetam, ondansetron, oxybutynin, and venlafaxine XR     Allergies:  No Known Allergies        Objective       Vitals:  Temp:  [97.4 °F (36.3 °C)-98 °F (36.7 °C)] 97.6 °F (36.4 °C)  Heart Rate:  [59-90] 69  Resp:  [12-19] 12  BP: ()/(40-94) 126/62  Flow (L/min):  [3-4] 3     Physical Exam  Vitals and nursing note reviewed.   Constitutional:       Appearance: She is obese.   HENT:      Head: Normocephalic and atraumatic.   Eyes:      Extraocular Movements: Extraocular movements intact.      Pupils: Pupils are equal, round, and reactive to light.   Cardiovascular:      Rate and Rhythm: Normal rate and regular rhythm.      Pulses: Normal pulses.      Heart sounds: Normal heart sounds.   Pulmonary:      Effort: Pulmonary effort is normal.      Breath sounds: Examination of the right-upper field reveals decreased breath sounds. Examination of the left-upper field reveals decreased breath sounds. Examination of the right-lower field reveals decreased breath sounds. Examination of the left-lower field reveals decreased breath sounds. Decreased breath sounds present.   Abdominal:      General: Bowel sounds are normal.      Palpations: Abdomen is soft.      Tenderness: There is no abdominal tenderness.   Musculoskeletal:         General: Normal range of motion.   Skin:     General: Skin is warm and dry.   Neurological:      Mental Status: She is alert and oriented to  person, place, and time.   Psychiatric:         Mood and Affect: Mood normal.         Behavior: Behavior normal.         Result Review    Result Review:  I have personally reviewed the results from the time of this admission to 5/31/2022 13:16 EDT and agree with these findings:  [x]?  Laboratory  []?  Microbiology  [x]?  Radiology  []?  EKG/Telemetry   []?  Cardiology/Vascular   []?  Pathology  [x]?  Old records     Assessment & Plan          Active Hospital Problems:        Active Hospital Problems     Diagnosis     • **Sepsis with hypotension (HCC)     • Anemia due to chemotherapy     • Acute kidney injury (HCC)     • Weakness     • UTI (urinary tract infection)     • Acute respiratory failure with hypoxia (HCC)     • Pneumonia         Added automatically from request for surgery [id number]      • Type 2 diabetes mellitus with hyperglycemia, without long-term current use of insulin (HCC)     • Morbid obesity with BMI of 60.0-69.9, adult (HCC)     • Gastroesophageal reflux disease with hiatal hernia         Formatting of this note might be different from the original.  S/P EGD (08/19/19) = GASTRITIS, BX = NEG  GI - DR. LE>>>IF STILL SXC, REFER TO GEN SURGERY FOR YEFRI     Last Assessment & Plan:   Formatting of this note might be different from the original.  Stable on pantoprazole      • Brain tumor (HCC)     • Seizure (HCC)     • Essential hypertension     • Dyslipidemia         Last Assessment & Plan:   Formatting of this note might be different from the original.  FLP today      • Depression        Plan:      Sepsis  -likely secondary to pneumonia, hypoxia, UTI, and intravascular volume depletion from anemia and dehydration   -WBC 17.8 now 8.1, temp 102.8 now normalized   -lactic 11.7 now normalized   -procalcitonin 6.58  -blood cultures no growth to date.  -BP improved after 30cc/IVFs and 2 units PRBCs  -on IV cefepime, vancomycin    E. coli bacteremia; likely source UTI or port infection positive blood  cultures from 5/30/2022, infectious disease consulted  Acute respiratory failure with hypoxia  Pneumonitis   -ABG showed pH 7.41, PCO2 18, PaO2 245, HCO3 11.7 on 100% nonrebreather  -requiring nonrebreather now weaned down to 4L O2 via NC  -CT PE protocol:  no PE, no aneurysm or dissection, very subtle groundglass attenuation seen throughout the lungs bilaterally could potentially be inflammatory or infectious.  Cholelithiasis, mild splenomegaly.  -IV cefepime, vancomycin, cefepime was discontinued and started on ceftriaxone 2 g IV daily for 2 weeks beginning 6/2/2022  -pulmonary managing, s/p bronchoscopy on 5/31/2022, showed no purulent secretions, started on prednisone 40 mg daily wean steroids slowly as per pulmonology.     Acute UTI  -UA: nitrite positive, large leukocytes, too numerous WBCs, 4+ bacteria  -urine culture more than 100,000 CFU per mL of Klebsiella pneumonia  -on IV antibiotics as above  -CT abdomen pelvis with stone protocol ordered on 6/2/2022.     Acute kidney injury  Metabolic acidosis  Dehydration  -received IVFs and PRBCs, improving   -monitor BMP     Anemia; underlying anemia of chronic disease related to myelosuppression  -likely from metastatic disease, previous chemotherapy  -hgb 6.9 s/p 2 units PRBCs, currently hemoglobin at 7.8  -oncology following.  S/p CT-guided bone marrow biopsy by IR on 6/2/2022.     Oligodendroglioma s/p craniotomy/resection of right frontal lobe mass 5/6/2021  -completed radiation  -chemotherapy had to be stopped due to possible pneumonitis found on bronchoscopy April 2022. She was started on an antibiotic, which she could not tolerate.   -oncology consulted      Altered mental status - resolved      Weakness  -likely multifactorial from all above  -PT eval      This patient has been examined wearing appropriate Personal Protective Equipment   Part of this note may be an electronic transcription/translation of spoken language to printed text using the Dragon  Dictation System.      Electronically signed by Femi Manzano MD, 06/02/22, 4:27 PM EDT.

## 2022-06-02 NOTE — NURSING NOTE
1229 MD scans and marks entry point with CT imaging.  1230 Site prepped with chlorhexidine and sterile drape when dry.  1233 MD nicks skin and places trocar into right posterior iliac.  1235 MD advances trocar with hammer and aspirates marrow.  1237 MD advances trocar with hammer, obtains core sample and removes needle.  1238 Site dressed with bacitracin, sterile gauze and tegaderm.

## 2022-06-03 ENCOUNTER — APPOINTMENT (OUTPATIENT)
Dept: ONCOLOGY | Facility: HOSPITAL | Age: 57
End: 2022-06-03

## 2022-06-03 LAB
ALBUMIN SERPL-MCNC: 3.1 G/DL (ref 3.5–5.2)
ALBUMIN/GLOB SERPL: 1.2 G/DL
ALP SERPL-CCNC: 94 U/L (ref 39–117)
ALT SERPL W P-5'-P-CCNC: 16 U/L (ref 1–33)
ANION GAP SERPL CALCULATED.3IONS-SCNC: 11 MMOL/L (ref 5–15)
ANISOCYTOSIS BLD QL: ABNORMAL
AST SERPL-CCNC: 13 U/L (ref 1–32)
BH BB BLOOD EXPIRATION DATE: NORMAL
BH BB BLOOD TYPE BARCODE: 5100
BH BB DISPENSE STATUS: NORMAL
BH BB PRODUCT CODE: NORMAL
BH BB UNIT NUMBER: NORMAL
BILIRUB SERPL-MCNC: 1 MG/DL (ref 0–1.2)
BUN SERPL-MCNC: 23 MG/DL (ref 6–20)
BUN/CREAT SERPL: 31.5 (ref 7–25)
CALCIUM SPEC-SCNC: 9 MG/DL (ref 8.6–10.5)
CHLORIDE SERPL-SCNC: 102 MMOL/L (ref 98–107)
CO2 SERPL-SCNC: 20 MMOL/L (ref 22–29)
CREAT SERPL-MCNC: 0.73 MG/DL (ref 0.57–1)
CROSSMATCH INTERPRETATION: NORMAL
DEPRECATED RDW RBC AUTO: 73.5 FL (ref 37–54)
EGFRCR SERPLBLD CKD-EPI 2021: 97.9 ML/MIN/1.73
ERYTHROCYTE [DISTWIDTH] IN BLOOD BY AUTOMATED COUNT: 22.4 % (ref 12.3–15.4)
FSP PPP-MCNC: <5 UG/ML
GLOBULIN UR ELPH-MCNC: 2.6 GM/DL
GLUCOSE BLDC GLUCOMTR-MCNC: 142 MG/DL (ref 70–105)
GLUCOSE BLDC GLUCOMTR-MCNC: 145 MG/DL (ref 70–105)
GLUCOSE BLDC GLUCOMTR-MCNC: 239 MG/DL (ref 70–105)
GLUCOSE SERPL-MCNC: 136 MG/DL (ref 65–99)
HCT VFR BLD AUTO: 26 % (ref 34–46.6)
HGB BLD-MCNC: 8.6 G/DL (ref 12–15.9)
LYMPHOCYTES # BLD MANUAL: 0.42 10*3/MM3 (ref 0.7–3.1)
LYMPHOCYTES NFR BLD MANUAL: 2 % (ref 5–12)
MAGNESIUM SERPL-MCNC: 1.9 MG/DL (ref 1.6–2.6)
MCH RBC QN AUTO: 32.3 PG (ref 26.6–33)
MCHC RBC AUTO-ENTMCNC: 33 G/DL (ref 31.5–35.7)
MCV RBC AUTO: 97.9 FL (ref 79–97)
MONOCYTES # BLD: 0.12 10*3/MM3 (ref 0.1–0.9)
MYELOCYTES NFR BLD MANUAL: 1 % (ref 0–0)
NEUTROPHILS # BLD AUTO: 5.4 10*3/MM3 (ref 1.7–7)
NEUTROPHILS NFR BLD MANUAL: 84 % (ref 42.7–76)
NEUTS BAND NFR BLD MANUAL: 6 % (ref 0–5)
NRBC SPEC MANUAL: 2 /100 WBC (ref 0–0.2)
P JIROVECII DNA L RESP QL NAA+NON-PROBE: NEGATIVE
PF4 HEPARIN CMPLX IGG SERPL IA: 0.09 OD (ref 0–0.4)
PHOSPHATE SERPL-MCNC: 2.5 MG/DL (ref 2.5–4.5)
PLATELET # BLD AUTO: 53 10*3/MM3 (ref 140–450)
PMV BLD AUTO: 9.2 FL (ref 6–12)
POTASSIUM SERPL-SCNC: 4.1 MMOL/L (ref 3.5–5.2)
PROT SERPL-MCNC: 5.7 G/DL (ref 6–8.5)
RBC # BLD AUTO: 2.65 10*6/MM3 (ref 3.77–5.28)
REF LAB TEST METHOD: NORMAL
SCAN SLIDE: NORMAL
SMALL PLATELETS BLD QL SMEAR: ABNORMAL
SODIUM SERPL-SCNC: 133 MMOL/L (ref 136–145)
UNIT  ABO: NORMAL
UNIT  RH: NORMAL
VARIANT LYMPHS NFR BLD MANUAL: 7 % (ref 19.6–45.3)
WBC MORPH BLD: NORMAL
WBC NRBC COR # BLD: 6 10*3/MM3 (ref 3.4–10.8)

## 2022-06-03 PROCEDURE — 97116 GAIT TRAINING THERAPY: CPT

## 2022-06-03 PROCEDURE — 63710000001 PREDNISONE PER 1 MG: Performed by: INTERNAL MEDICINE

## 2022-06-03 PROCEDURE — 97530 THERAPEUTIC ACTIVITIES: CPT

## 2022-06-03 PROCEDURE — 99232 SBSQ HOSP IP/OBS MODERATE 35: CPT | Performed by: INTERNAL MEDICINE

## 2022-06-03 PROCEDURE — 83735 ASSAY OF MAGNESIUM: CPT | Performed by: NURSE PRACTITIONER

## 2022-06-03 PROCEDURE — 85007 BL SMEAR W/DIFF WBC COUNT: CPT | Performed by: NURSE PRACTITIONER

## 2022-06-03 PROCEDURE — 85025 COMPLETE CBC W/AUTO DIFF WBC: CPT | Performed by: NURSE PRACTITIONER

## 2022-06-03 PROCEDURE — 84100 ASSAY OF PHOSPHORUS: CPT | Performed by: NURSE PRACTITIONER

## 2022-06-03 PROCEDURE — 80053 COMPREHEN METABOLIC PANEL: CPT | Performed by: NURSE PRACTITIONER

## 2022-06-03 PROCEDURE — 25010000002 CEFTRIAXONE PER 250 MG: Performed by: INTERNAL MEDICINE

## 2022-06-03 PROCEDURE — 82962 GLUCOSE BLOOD TEST: CPT

## 2022-06-03 PROCEDURE — 63710000001 INSULIN LISPRO (HUMAN) PER 5 UNITS: Performed by: NURSE PRACTITIONER

## 2022-06-03 PROCEDURE — 99232 SBSQ HOSP IP/OBS MODERATE 35: CPT | Performed by: HOSPITALIST

## 2022-06-03 RX ORDER — PREDNISONE 20 MG/1
20 TABLET ORAL
Status: DISCONTINUED | OUTPATIENT
Start: 2022-06-04 | End: 2022-06-04 | Stop reason: HOSPADM

## 2022-06-03 RX ADMIN — OXYBUTYNIN CHLORIDE 5 MG: 5 TABLET ORAL at 21:04

## 2022-06-03 RX ADMIN — FERROUS SULFATE TAB EC 324 MG (65 MG FE EQUIVALENT) 324 MG: 324 (65 FE) TABLET DELAYED RESPONSE at 09:38

## 2022-06-03 RX ADMIN — OXYBUTYNIN CHLORIDE 5 MG: 5 TABLET ORAL at 09:38

## 2022-06-03 RX ADMIN — INSULIN LISPRO 3 UNITS: 100 INJECTION, SOLUTION INTRAVENOUS; SUBCUTANEOUS at 17:41

## 2022-06-03 RX ADMIN — LEVETIRACETAM 750 MG: 500 TABLET, FILM COATED ORAL at 09:38

## 2022-06-03 RX ADMIN — PREDNISONE 40 MG: 20 TABLET ORAL at 09:38

## 2022-06-03 RX ADMIN — Medication 10 ML: at 21:05

## 2022-06-03 RX ADMIN — Medication 10 ML: at 09:39

## 2022-06-03 RX ADMIN — CEFTRIAXONE 2 G: 2 INJECTION, POWDER, FOR SOLUTION INTRAMUSCULAR; INTRAVENOUS at 16:38

## 2022-06-03 RX ADMIN — OXYBUTYNIN CHLORIDE 5 MG: 5 TABLET ORAL at 14:58

## 2022-06-03 RX ADMIN — LEVETIRACETAM 750 MG: 500 TABLET, FILM COATED ORAL at 21:04

## 2022-06-03 RX ADMIN — VENLAFAXINE HYDROCHLORIDE 150 MG: 75 CAPSULE, EXTENDED RELEASE ORAL at 09:38

## 2022-06-03 RX ADMIN — OXYBUTYNIN CHLORIDE 5 MG: 5 TABLET ORAL at 17:41

## 2022-06-03 RX ADMIN — FOLIC ACID 1 MG: 1 TABLET ORAL at 09:38

## 2022-06-03 NOTE — THERAPY TREATMENT NOTE
Subjective: Pt agreeable to therapeutic plan of care.    Objective:     Bed mobility - CGA supine to sit  Transfers - CGA  Ambulation - 30 feet SBA and with rolling walker  Pt's gait distance limited by being short of air.     Vitals: WNL    Pain: 0 VAS  Education: Provided education on importance of mobility and skilled verbal / tactile cueing throughout intervention.     Assessment: Cindy Cortes presents with functional mobility impairments which indicate the need for skilled intervention. Tolerating session today without incident. Will continue to follow and progress as tolerated.     Plan/Recommendations:   Low Intensity Therapy recommended post-acute care - This is recommended as therapy feels this patient would require 2-3 visits per week. OP or HH would be the best option for this patient. Pt requires rolling walker at discharge.     Pt desires Home with family assist and and Home Health at discharge. Pt cooperative; agreeable to therapeutic recommendations and plan of care.     Post-Tx Position: Up in Chair and Call light and personal items within reach  PPE: gloves, surgical mask, eyewear protection     - Continue Ampicillin, Cefepime, and Acyclovir  - F/U cultures  - F/U HSV PCR Continue current antimicrobials--follow up CSf and blood and urine cultures and will consider discontinuation if all cultures and HSV PCR are negative

## 2022-06-03 NOTE — PROGRESS NOTES
[facility] - Consult Note     Patient Name: Cindy Cortes  : 1965  MRN: [id number]  Primary Care Physician:  Annamarie Monroy APRN  Referring Physician: BJ Rolon  Date of admission: 2022          Subjective       Reason for Consult/ Chief Complaint: shortness of breath, lethargy, weakness     History of Present Illness: Cindy Cortes is a 56 y.o. 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 She was started on an antibiotic, which she could not tolerate. She has also had pancytopenia and required multiple blood transfusions. The patient stated she has had shortness of breath upon exertion for the last 3-4 weeks, worse in the last 1 week. The patient's family stated she was short of breath, lethargic, and could not walk/hold herself up due to weakness on 2022 so they called EMS. She has had a nonproductive cough and chills in the last 24 hours. She denied any blood in her urine or stool.      In the ED the patient was on a nonrebreather placed by EMS. She was tachycardic, mildly hypotensive, tachypneic, and had temperature of 102.5. Her hgb was 6.9, WBC 17.8, lactate 11.7, procalcitonin 6.58.  ABG showed pH 7.41, PCO2 18, PaO2 245, HCO3 11.7 on 100% nonrebreather.  CT PE protocol showed no PE, no aneurysm or dissection, very subtle groundglass attenuation seen throughout the lungs bilaterally could potentially be inflammatory or infectious.  Cholelithiasis, mild splenomegaly. Urinalysis was nitrite positive, large leukocytes, too numerous WBCs, 4+ bacteria.  She was given 30cc/kg IVFs, 2 units PRBCs, IV antibiotics. She reportedly has a history of antibodies so she was pretreated with antihistamine, steroids, and tylenol. Her oxygen was weaned down to 4L O2. She was diagnosed with  sepsis with septic shock, metabolic acidosis, pneumonitis, UTI, anemia, dehydration, and altered mental status.         Date:   5/31/2022: Patient felt stable for transfer out of ICU. She did not require vasopressors. Pulmonary planning bronchoscopy   6/1/2022; patient is sitting in the side of the bed, afebrile vital signs are stable, spoke with the bedside RN awaiting bed and general medical floor.  6/2/2022; afebrile vital signs are stable currently on 3 L of nasal cannula no new symptoms endorsed spoke with the bedside RN.  6/3/2022; patient is lying in the La-Z-Boy chair at the bedside no new symptoms endorsed, hemodynamically stable.  Review of Systems   Constitutional: Positive for fever.   HENT: Negative.    Eyes: Negative.    Cardiovascular: Negative.    Respiratory: Positive for cough and shortness of breath.    Endocrine: Negative.    Hematologic/Lymphatic: Negative.    Skin: Negative.    Musculoskeletal: Negative.    Gastrointestinal: Negative.    Genitourinary: Negative.    Neurological: Positive for weakness.   Psychiatric/Behavioral: Negative.    Allergic/Immunologic: Negative.    All other systems reviewed and are negative.        Personal History      Medical History        Past Medical History:   Diagnosis Date   • Arthritis     • GERD (gastroesophageal reflux disease)     • Hypertension     • Oligodendroglioma (HCC)     • Seizure (HCC)     • Type 2 diabetes mellitus with hyperglycemia, without long-term current use of insulin (HCC) 05/12/2021            Surgical History         Past Surgical History:   Procedure Laterality Date   • BRONCHOSCOPY N/A 4/6/2022     Procedure: BRONCHOSCOPY with bronchial washing;  Surgeon: Drew Alcantar MD;  Location: [facility] ENDOSCOPY;  Service: Pulmonary;  Laterality: N/A;  pneumonia   • COLONOSCOPY       • CRANIOTOMY FOR TUMOR Right 05/06/2021     Procedure: CRANIOTOMY FOR TUMOR RESECTION WITH STEREOTACTIC;  Surgeon: Jluis Felix MD;  Location: [facility] MAIN OR;   Service: Neurosurgery;  Laterality: Right;            Family History: family history includes Breast cancer in her cousin, maternal aunt, and niece; Colon cancer in her maternal aunt; Kidney disease in her mother; Prostate cancer in her brother. Otherwise pertinent FHx was reviewed and not pertinent to current issue.     Social History:  reports that she has never smoked. She has never used smokeless tobacco. She reports previous alcohol use of about 1.0 standard drink of alcohol per week. She reports that she does not use drugs.     Home Medications:   Budeson-Glycopyrrol-Formoterol, albuterol sulfate HFA, amLODIPine, ferrous sulfate, fluticasone, folic acid, levETIRAcetam, ondansetron, oxybutynin, and venlafaxine XR     Allergies:  No Known Allergies        Objective       Vitals:  Temp:  [97.4 °F (36.3 °C)-97.9 °F (36.6 °C)] 97.7 °F (36.5 °C)  Heart Rate:  [69-91] 70  Resp:  [16] 16  BP: (104-141)/(61-80) 123/74  Flow (L/min):  [3] 3     Physical Exam  Vitals and nursing note reviewed.   Constitutional:       Appearance: She is obese.   HENT:      Head: Normocephalic and atraumatic.   Eyes:      Extraocular Movements: Extraocular movements intact.      Pupils: Pupils are equal, round, and reactive to light.   Cardiovascular:      Rate and Rhythm: Normal rate and regular rhythm.      Pulses: Normal pulses.      Heart sounds: Normal heart sounds.   Pulmonary:      Effort: Pulmonary effort is normal.      Breath sounds: Examination of the right-upper field reveals decreased breath sounds. Examination of the left-upper field reveals decreased breath sounds. Examination of the right-lower field reveals decreased breath sounds. Examination of the left-lower field reveals decreased breath sounds. Decreased breath sounds present.   Abdominal:      General: Bowel sounds are normal.      Palpations: Abdomen is soft.      Tenderness: There is no abdominal tenderness.   Musculoskeletal:         General: Normal range of  motion.   Skin:     General: Skin is warm and dry.   Neurological:      Mental Status: She is alert and oriented to person, place, and time.   Psychiatric:         Mood and Affect: Mood normal.         Behavior: Behavior normal.         Result Review    Result Review:  I have personally reviewed the results from the time of this admission to 5/31/2022 13:16 EDT and agree with these findings:  [x]?  Laboratory  []?  Microbiology  [x]?  Radiology  []?  EKG/Telemetry   []?  Cardiology/Vascular   []?  Pathology  [x]?  Old records     Lab Results   Component Value Date    GLUCOSE 136 (H) 06/03/2022    CALCIUM 9.0 06/03/2022     (L) 06/03/2022    K 4.1 06/03/2022    CO2 20.0 (L) 06/03/2022     06/03/2022    BUN 23 (H) 06/03/2022    CREATININE 0.73 06/03/2022    EGFRIFNONA 77 01/27/2022    BCR 31.5 (H) 06/03/2022    ANIONGAP 11.0 06/03/2022     Lab Results   Component Value Date    HGBA1C 5.7 (H) 11/01/2021     Lab Results   Component Value Date    WBC 6.00 06/03/2022    HGB 8.6 (L) 06/03/2022    HCT 26.0 (L) 06/03/2022    MCV 97.9 (H) 06/03/2022    PLT 53 (L) 06/03/2022   XR Chest 1 View    Result Date: 6/1/2022  No acute cardiopulmonary abnormality.  Electronically Signed By-Jon Lynch MD On:6/1/2022 7:11 PM This report was finalized on [id number] by  Jon Lynch MD.    CT Angiogram Chest Pulmonary Embolism    Result Date: 5/31/2022  1. No evidence of pulmonary embolism. 2. No evidence of thoracic aortic aneurysm or dissection. 3. Very subtle groundglass attenuation seen throughout the lungs bilaterally could potentially be inflammatory or infectious. Clinical correlation is recommended. 4. Cholelithiasis. 5. Mild splenomegaly. Electronically signed by:  Robel Aj D.O.  5/30/2022 10:11 PM    CT Abdomen Pelvis Stone Protocol    Result Date: 6/2/2022  1. Small 2 mm nonobstructing stone within the superior left kidney. No evidence of obstructive uropathy. 2. Nonspecific hepatosplenomegaly.  Correlate clinically for risk factors of chronic liver disease. 3. Patchy bibasilar groundglass opacities with a bronchovascular distribution. This can be seen with bronchiolitis or mild bronchopneumonia. 4. Cholelithiasis with gallstones clustered within the fundus of the gallbladder likely related to superimposed gallbladder adenomyomatosis.    Electronically Signed By-Tayo Brumfield MD On:6/2/2022 2:09 PM This report was finalized on [id number] by  Tayo Brumfield MD.    CT Bone marrow biopsy and aspiration    Result Date: 6/2/2022  CT-guided bone marrow core biopsy and aspirate.  Electronically Signed By-Vick Draper MD On:6/2/2022 3:41 PM This report was finalized on [id number] by  Vick Draper MD.    Assessment & Plan          Active Hospital Problems:        Active Hospital Problems     Diagnosis     • **Sepsis with hypotension (HCC)     • Anemia due to chemotherapy     • Acute kidney injury (HCC)     • Weakness     • UTI (urinary tract infection)     • Acute respiratory failure with hypoxia (HCC)     • Pneumonia         Added automatically from request for surgery [id number]      • Type 2 diabetes mellitus with hyperglycemia, without long-term current use of insulin (HCC)     • Morbid obesity with BMI of 60.0-69.9, adult (HCC)     • Gastroesophageal reflux disease with hiatal hernia         Formatting of this note might be different from the original.  S/P EGD (08/19/19) = GASTRITIS, BX = NEG  GI - DR. LE>>>IF STILL SXC, REFER TO GEN SURGERY FOR YEFRI     Last Assessment & Plan:   Formatting of this note might be different from the original.  Stable on pantoprazole      • Brain tumor (HCC)     • Seizure (HCC)     • Essential hypertension     • Dyslipidemia         Last Assessment & Plan:   Formatting of this note might be different from the original.  FLP today      • Depression        Plan:      Sepsis  -likely secondary to pneumonia, hypoxia, UTI, and intravascular volume depletion from anemia  and dehydration   -WBC 17.8 now 8.1, temp 102.8 now normalized   -lactic 11.7 now normalized   -procalcitonin 6.58  -blood cultures no growth to date.  -BP improved after 30cc/IVFs and 2 units PRBCs  -Was IV cefepime, vancomycin    E. coli bacteremia; likely source UTI or port infection positive blood cultures from 5/30/2022, infectious disease consult appreciated  Acute respiratory failure with hypoxia  Pneumonitis   -ABG showed pH 7.41, PCO2 18, PaO2 245, HCO3 11.7 on 100% nonrebreather  -requiring nonrebreather now weaned down to 4L O2 via NC  -CT PE protocol:  no PE, no aneurysm or dissection, very subtle groundglass attenuation seen throughout the lungs bilaterally could potentially be inflammatory or infectious.  Cholelithiasis, mild splenomegaly.  -Was on IV cefepime, vancomycin, cefepime was discontinued and started on ceftriaxone 2 g IV daily for 2 weeks beginning 6/2/2022  -pulmonary managing, s/p bronchoscopy on 5/31/2022, showed no purulent secretions, started on prednisone 40 mg daily wean steroids slowly as per pulmonology.     Acute UTI  -UA: nitrite positive, large leukocytes, too numerous WBCs, 4+ bacteria  -urine culture more than 100,000 CFU per mL of Klebsiella pneumonia  -on IV antibiotics as above  -CT abdomen pelvis with stone protocol  on 6/2/2022.  Patchy bibasilar opacities groundglass within the bronchovascular distribution can be bronchiolitis or mild bronchopneumonia small 2 mm nonobstructing stone within the left superior kidney no evidence of obstructive uropathy.     Acute kidney injury; baseline serum creatinine 0.8, but 1.49 on admission currently resolved.  Metabolic acidosis  Dehydration  -received IVFs and PRBCs, improving   -monitor BMP     Anemia; underlying anemia of chronic disease related to myelosuppression  -likely from metastatic disease, previous chemotherapy  -hgb 6.9 s/p 2 units PRBCs, currently hemoglobin at 7.8  -oncology following.  S/p CT-guided bone marrow biopsy  by IR on 6/2/2022.     Oligodendroglioma s/p craniotomy/resection of right frontal lobe mass 5/6/2021  -completed radiation  -chemotherapy had to be stopped due to possible pneumonitis found on bronchoscopy April 2022. She was started on an antibiotic, which she could not tolerate.   -oncology consulted      Altered mental status - resolved      Weakness  -likely multifactorial from all above  -PT eval      This patient has been examined wearing appropriate Personal Protective Equipment   Part of this note may be an electronic transcription/translation of spoken language to printed text using the Dragon Dictation System.        Electronically signed by Femi Manzano MD, 06/03/22, 1:20 PM EDT.

## 2022-06-03 NOTE — PROGRESS NOTES
Hematology/Oncology Inpatient Progress Note    PATIENT NAME: Cindy Cortes  : 1965  MRN: [id number]    Chief complaint: Shortness of breath     History of present illness:    Cindy Cortes is a 56 y.o. female who presented to [facility] on 2022 with complaints of shortness of breath.     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  She will be with significant myelosuppression, possible pneumonitis.  She has had longstanding shortness of breath, anemia she has required multiple transfusions in the past.  Patient was getting lethargic, shortness of breath was worsening over the last few days.  She presented to the emergency room.  In the ER her hemoglobin was down to 6.9, she had a temperature of 102.5 she had an elevated procalcitonin.  She was placed on 100% nonrebreather.  This was weaned down to 4 L.     Other work-up showed positive for UTI, she was thought to be septic started on broad-spectrum IV antibiotics and treated for septic shock.  She also had a CT PE which was negative for PE.  Very subtle groundglass attenuation possibly inflammatory or infectious.  PRBC transfusion was started.  Patient admitted for further work-up     22  Hematology/Oncology was consulted.  She is down to 2 L, improvement in symptoms.  Status post bronchoscopy.    2022 - CT guided BMB,      He/She  has a past medical history of Arthritis, GERD (gastroesophageal reflux disease), Hypertension, Oligodendroglioma (HCC), Seizure (HCC), and Type 2 diabetes mellitus with hyperglycemia, without long-term current use of insulin (HCC) (2021).     PCP: Annamarie Monroy APRN    Subjective     Patient has some improvement in her symptoms.       MEDICATIONS:    Scheduled Meds:  cefTRIAXone, 2 g, Intravenous, Q24H  ferrous sulfate, 324 mg, Oral, Daily With Breakfast  folic acid, 1 mg,  "Oral, Daily  insulin lispro, 0-7 Units, Subcutaneous, 4x Daily With Meals & Nightly  levETIRAcetam, 750 mg, Oral, BID  oxybutynin, 5 mg, Oral, 4x Daily  predniSONE, 40 mg, Oral, Daily With Breakfast  sodium chloride, 10 mL, Intravenous, Q12H  venlafaxine XR, 150 mg, Oral, Daily       Continuous Infusions:      PRN Meds:  •  acetaminophen **OR** acetaminophen  •  dextrose  •  dextrose  •  glucagon (human recombinant)  •  insulin lispro **AND** insulin lispro  •  lidocaine  •  Lidocaine HCl gel  •  magnesium sulfate **OR** magnesium sulfate in D5W 1g/100mL (PREMIX) **OR** magnesium sulfate  •  nitroglycerin  •  ondansetron **OR** ondansetron  •  potassium & sodium phosphates **OR** potassium & sodium phosphates  •  potassium chloride **OR** potassium chloride **OR** potassium chloride  •  sodium chloride     ALLERGIES:  No Known Allergies    Objective    VITALS:   /64 (BP Location: Left arm, Patient Position: Sitting)   Pulse 82   Temp 97.5 °F (36.4 °C) (Oral)   Resp 16   Ht 157.5 cm (62\")   Wt 128 kg (281 lb 4.9 oz)   LMP  (LMP Unknown)   SpO2 (!) 83% Comment: inaccurate.  BMI 51.45 kg/m²     PHYSICAL EXAM: (performed by MD)  Physical Exam  Constitutional:       Appearance: Normal appearance. She is obese.   HENT:      Head: Normocephalic and atraumatic.   Eyes:      Pupils: Pupils are equal, round, and reactive to light.   Cardiovascular:      Rate and Rhythm: Normal rate and regular rhythm.      Pulses: Normal pulses.      Heart sounds: Normal heart sounds. No murmur heard.  Pulmonary:      Effort: Pulmonary effort is normal.      Breath sounds: Rhonchi present.   Abdominal:      General: There is no distension.      Palpations: Abdomen is soft. There is no mass.      Tenderness: There is no abdominal tenderness.   Musculoskeletal:         General: Normal range of motion.      Cervical back: Normal range of motion.   Skin:     General: Skin is warm.   Neurological:      General: No focal deficit " present.      Mental Status: She is alert.   Psychiatric:         Mood and Affect: Mood normal.           RECENT LABS:  Lab Results (last 24 hours)     Procedure Component Value Units Date/Time    Comprehensive Metabolic Panel [[id number]]  (Abnormal) Collected: 06/03/22 0351    Specimen: Blood Updated: 06/03/22 0439     Glucose 136 mg/dL      BUN 23 mg/dL      Creatinine 0.73 mg/dL      Sodium 133 mmol/L      Potassium 4.1 mmol/L      Chloride 102 mmol/L      CO2 20.0 mmol/L      Calcium 9.0 mg/dL      Total Protein 5.7 g/dL      Albumin 3.10 g/dL      ALT (SGPT) 16 U/L      AST (SGOT) 13 U/L      Alkaline Phosphatase 94 U/L      Total Bilirubin 1.0 mg/dL      Globulin 2.6 gm/dL      A/G Ratio 1.2 g/dL      BUN/Creatinine Ratio 31.5     Anion Gap 11.0 mmol/L      eGFR 97.9 mL/min/1.73      Comment: National Kidney Foundation and American Society of Nephrology (ASN) Task Force recommended calculation based on the Chronic Kidney Disease Epidemiology Collaboration (CKD-EPI) equation refit without adjustment for race.       Narrative:      GFR Normal >60  Chronic Kidney Disease <60  Kidney Failure <15      Phosphorus [[id number]]  (Normal) Collected: 06/03/22 0351    Specimen: Blood Updated: 06/03/22 0439     Phosphorus 2.5 mg/dL     Magnesium [[id number]]  (Normal) Collected: 06/03/22 0351    Specimen: Blood Updated: 06/03/22 0439     Magnesium 1.9 mg/dL     Manual Differential [[id number]]  (Abnormal) Collected: 06/03/22 0351    Specimen: Blood Updated: 06/03/22 0419     Neutrophil % 84.0 %      Lymphocyte % 7.0 %      Monocyte % 2.0 %      Bands %  6.0 %      Myelocyte % 1.0 %      Neutrophils Absolute 5.40 10*3/mm3      Lymphocytes Absolute 0.42 10*3/mm3      Monocytes Absolute 0.12 10*3/mm3      nRBC 2.0 /100 WBC      Anisocytosis Slight/1+     WBC Morphology Normal     Platelet Estimate Decreased    CBC & Differential [[id number]]  (Abnormal) Collected: 06/03/22 0351    Specimen: Blood Updated: 06/03/22 0419     Narrative:      The following orders were created for panel order CBC & Differential.  Procedure                               Abnormality         Status                     ---------                               -----------         ------                     CBC Auto Differential[[id number]]        Abnormal            Final result               Scan Slide[[id number]]                                       Final result                 Please view results for these tests on the individual orders.    Scan Slide [[id number]] Collected: 06/03/22 0351    Specimen: Blood Updated: 06/03/22 0419     Scan Slide --     Comment: See Manual Differential Results       CBC Auto Differential [[id number]]  (Abnormal) Collected: 06/03/22 0351    Specimen: Blood Updated: 06/03/22 0419     WBC 6.00 10*3/mm3      RBC 2.65 10*6/mm3      Hemoglobin 8.6 g/dL      Hematocrit 26.0 %      MCV 97.9 fL      MCH 32.3 pg      MCHC 33.0 g/dL      RDW 22.4 %      RDW-SD 73.5 fl      MPV 9.2 fL      Platelets 53 10*3/mm3     Narrative:      The previously reported component NRBC is no longer being reported. Previous result was 0.9 /100 WBC (Reference Range: 0.0-0.2 /100 WBC) on 6/3/2022 at 0359 EDT.    Virus Culture - Lavage, Lung, Right Middle Lobe [[id number]] Collected: 05/31/22 1523    Specimen: Lavage from Lung, Right Middle Lobe Updated: 06/03/22 0337     Viral Culture, General Comment     Comment: Preliminary Report:  No virus isolated at 24 hours. Next report to follow after 4 days.       Narrative:      Performed at:  [address] [place] [address]  [id number]  : Chavo Agarwal MD, Phone:  [phone]    Blood Culture - Blood, Arm, Right [[id number]]  (Normal) Collected: 05/31/22 0044    Specimen: Blood from Arm, Right Updated: 06/03/22 0102     Blood Culture No growth at 3 days    POC Glucose Once [[id number]]  (Abnormal) Collected: 06/02/22 1929    Specimen: Blood Updated: 06/02/22 1931     Glucose 208 mg/dL  "     Comment: Serial Number: [id number]:  [id number]       POC Glucose Once [[id number]]  (Abnormal) Collected: 06/02/22 1734    Specimen: Blood Updated: 06/02/22 1735     Glucose 198 mg/dL      Comment: Serial Number: [id number]:  [id number]       Flow Cytometry [[id number]] Collected: 06/02/22 1259    Specimen: Body Fluid Updated: 06/02/22 1259    Chromosome Analysis, Bone Marrow [[id number]] Collected: 06/02/22 1235    Specimen: Bone Marrow Aspirate from Iliac Crest, Right - Aspirate Updated: 06/02/22 1254    Tissue Pathology Exam [[id number]] Collected: 06/02/22 1235    Specimen: Tissue from Iliac Crest, Right - Aspirate; Tissue from Iliac Crest, Right - Aspirate; Tissue from Iliac Crest, Right - Biopsy Updated: 06/02/22 1249    Non-gynecologic Cytology [[id number]] Collected: 05/31/22 1523    Specimen: Lavage from Lung, Right Middle Lobe Updated: 06/02/22 1229     Case Report --     Medical Cytology Report                           Case: [id number]                                  Authorizing Provider:  Den Feldman MD         Collected:           05/31/2022 03:23 PM          Ordering Location:     [facility]       Received:            06/01/2022 11:23 AM                                 INTENSIVE [facility]ARE UNIT                                                          Pathologist:           Олег Guallpa MD                                                             Specimen:    Lung, Right Middle Lobe, bronchoalveolar lavage right middle lobe                           Final Diagnosis --     Lung, right middle lobe, bronchoalveolar lavage, smears and cytospin preparation:    Reactive squamous cells and benign bronchial cells    Pulmonary macrophages present    Moderate acute inflammation    No fungal organisms identified    Negative for malignant cells    JPR/sms        Gross Description --     1. Lung, Right Middle Lobe.  Received in [place] and designated \"BAL, RML\" are 45 mL of cloudy, green " fluid. Particulate matter is not present. This specimen is processed as per protocol.        POC Glucose Once [[id number]]  (Abnormal) Collected: 06/02/22 1107    Specimen: Blood Updated: 06/02/22 1110     Glucose 202 mg/dL      Comment: Serial Number: [id number]:  [id number]                 IMAGING REVIEWED:  XR Chest 1 View    Result Date: 6/1/2022  No acute cardiopulmonary abnormality.  Electronically Signed By-Jon Lynch MD On:6/1/2022 7:11 PM This report was finalized on [id number] by  Jon Lynch MD.    CT Abdomen Pelvis Stone Protocol    Result Date: 6/2/2022  1. Small 2 mm nonobstructing stone within the superior left kidney. No evidence of obstructive uropathy. 2. Nonspecific hepatosplenomegaly. Correlate clinically for risk factors of chronic liver disease. 3. Patchy bibasilar groundglass opacities with a bronchovascular distribution. This can be seen with bronchiolitis or mild bronchopneumonia. 4. Cholelithiasis with gallstones clustered within the fundus of the gallbladder likely related to superimposed gallbladder adenomyomatosis.    Electronically Signed By-Tayo Brumfield MD On:6/2/2022 2:09 PM This report was finalized on [id number] by  Tayo Brumfield MD.    CT Bone marrow biopsy and aspiration    Result Date: 6/2/2022  CT-guided bone marrow core biopsy and aspirate.  Electronically Signed By-Vick Draper MD On:6/2/2022 3:41 PM This report was finalized on [id number] by  Vick Draper MD.      Assessment & Plan       Patient is a 56-year-old female with oligodendroglioma s/p resection, adjuvant chemotherapy now admitted with shortness of breath, anemia     Anemia  Patient does have underlying anemia of chronic disease, anemia related to myelosuppression  This however should have improved however she continues to have significant anemia.  retic is elevated however to a lesser degree with his hb. This is also after transfusion not very accurate  hapto normal. Tbili is elevated  check direct, LDH mildly elevated  Iron ferritin high after transfusion likely, not accurate  B12 folic acid not low  S/p bone marrow biopsy      Shortness of breath  Possible causes include pneumonitis, pneumonia atypical sepsis, anemia  Status post bronchoscopy, pending PCP work-up.     Sepsis  Improvement in blood pressure,  Broad-spectrum antibiotics, UTI  Blood cultures positive, ID on board.     Thrombocytopenia  Acute on chronic  Likely infection related.  Rule out DIC, fibrinogen normal.

## 2022-06-03 NOTE — PROGRESS NOTES
Daily Progress Note        Sepsis with hypotension (HCC)    Depression    Essential hypertension    Seizure (HCC)    Brain tumor (HCC)    Dyslipidemia    Gastroesophageal reflux disease with hiatal hernia    Morbid obesity with BMI of 60.0-69.9, adult (HCC)    Type 2 diabetes mellitus with hyperglycemia, without long-term current use of insulin (HCC)    Pneumonia    UTI (urinary tract infection)    Acute respiratory failure with hypoxia (HCC)    Anemia due to chemotherapy    Acute kidney injury (HCC)    Weakness      Assessment    Sepsis  Hypotension secondary to hypovolemia: Improved  Pneumonitis: CT scan showed groundglass opacities bilaterally which were present in April 1, 2022 was believed secondary to chemo  Severe anemia with history of bone marrow suppression secondary to chemo  UTI  E. coli bacteremia  Dehydration  Lactic acidosis: Improving  CARLOS MANUEL  Altered mental status  History of oligodendroglioma: Chemo stopped in April due to side effects on bone marrow and pneumonitis  Hypoxemia  Diabetes mellitus with hyperglycemia  Morbid obesity with BMI 50  GERD  Dyslipidemia  History of seizure disorder  History of essential hypertension  History of depression mask     Plan:  Oxygen supplement and titration: Currently on room air  Blood pressure support: Currently requiring no pressors  IV antibiotics: Cefepime  Monitor H&H: Hemoglobin improved from 6.9 to 7.4 after 2 units of packed RBC  Elevated bilirubin  Discussed with hematology oncology: Plan for bone marrow biopsy soon  Bronchoscopy 5/31/2022 showed no purulent secretions: Decreased prednisone 20 mg daily and when she improves we will start weaning it slowly she might require a few weeks of low-dose later on  Discussed with the patient and family  Patient is improving and can move to [facility]     LOS: 3 days     Subjective     Mild shortness of breath and dry cough    Objective     Vital signs for last 24 hours:  Vitals:    06/03/22 0000 06/03/22 0300 06/03/22  0402 06/03/22 0800   BP: 126/69   104/64   BP Location:    Left arm   Patient Position:    Sitting   Pulse: 77   82   Resp:    16   Temp:  97.5 °F (36.4 °C)  97.5 °F (36.4 °C)   TempSrc:  Oral  Oral   SpO2:       Weight:   128 kg (281 lb 4.9 oz)    Height:           Intake/Output last 3 shifts:  I/O last 3 completed shifts:  In: 480 [P.O.:480]  Out: -   Intake/Output this shift:  I/O this shift:  In: 240 [P.O.:240]  Out: -       Radiology  Imaging Results (Last 24 Hours)     Procedure Component Value Units Date/Time    CT Bone marrow biopsy and aspiration [[id number]] Collected: 06/02/22 1536     Updated: 06/02/22 1543    Narrative:      CT BONE MARROW ASPIRATION AND BIOPSY-     Date of Exam: 6/2/2022 12:13 PM     Indication: prolonged anemia/thrombocytopenia; A41.9-Sepsis, unspecified  organism; I95.9-Hypotension, unspecified; J18.9-Pneumonia, unspecified  organism; D64.9-Anemia, unspecified; E86.0-Dehydration; N17.9-Acute  kidney failure, unspecified; C71.9-Malignant neoplasm of brain,  unspecified; J18.9-Pneumonia, unspecified organism.     Comparison: None.     Fluoroscopy time: 8.04 seconds. 68.24 mGy      DESCRIPTION OF PROCEDURE:  Informed consent was obtained. The patient's medications were reviewed.  The patient was placed prone on CT table and a brief noncontrasted scan  performed the region of interest using a localization grid. The  overlying skin was marked and the area prepped and draped sterilely.  Lidocaine was used for local anesthesia. Access was obtained along the  long axis of the ileum using 11-gauge bone biopsy needle. After  confirming appropriate position of the needle tip within the marrow  space, aspirate samples were obtained followed by a 2-3 cm long core  specimen. Samples appeared adequate and were sent for all requested  studies. The patient tolerated the procedure well with no immediate post  procedure complication. Sterile dressing was applied.       Impression:      CT-guided bone  marrow core biopsy and aspirate.     Electronically Signed By-Vick Draper MD On:6/2/2022 3:41 PM  This report was finalized on [id number] by  Vick Draper MD.    CT Abdomen Pelvis Stone Protocol [[id number]] Collected: 06/02/22 1403     Updated: 06/02/22 1411    Narrative:      EXAM: CT ABDOMEN PELVIS STONE PROTOCOL-     DATE OF EXAM: 6/2/2022 12:54 PM     INDICATION: bacteremia to r/o obstructive uropathy; A41.9-Sepsis,  unspecified organism; I95.9-Hypotension, unspecified; J18.9-Pneumonia,  unspecified organism; D64.9-Anemia, unspecified; E86.0-Dehydration;  N17.9-Acute kidney failure, unspecified; C71.9-Malignant neoplasm of  brain, unspecified; J18.9-Pneumonia, unspecified organism.     COMPARISON: CT abdomen and pelvis dated 12/16/2021     TECHNIQUE: Contiguous axial CT images were obtained from the lung bases  to the pubic symphysis without contrast. Sagittal and coronal  reconstructions were performed.  Automated exposure control and  iterative reconstruction methods were used.     FINDINGS:  The lack of intravenous contrast limits the evaluation of visceral and  vascular structures.     The heart size is normal. There is no pericardial effusion. There are  areas of nonspecific groundglass opacity within a peribronchovascular  distribution which could reflect infectious or inflammatory  bronchiolitis/bronchopneumonia.     The liver is enlarged measuring 23 cm in craniocaudal dimension. There  is no focal liver lesion identified by noncontrast technique.     There are multiple gallstones within the fundal portion of the  gallbladder. There appears to be some focal narrowing of the mid aspect  of the gallbladder which could relate to adenomyomatosis causing  immobility of the gallstones. There is no abnormal gallbladder wall  thickening. The spleen is enlarged measuring 15 cm in AP dimension. The  adrenal glands and pancreas appear within normal limits.     The kidneys are symmetric in size. There is a 2 mm  nonobstructing stone  within the superior left kidney. There is no hydronephrosis or  hydroureter. Urinary bladder is fluid-filled without wall thickening.  The uterus is present and anteverted. The ovaries appear symmetric in  size. There is trace free fluid within the pelvis.     The stomach and duodenum is normal in caliber and configuration. There  are no abnormally dilated loops of small bowel to suggest small bowel  obstruction. The appendix is visualized and normal within the right  lower quadrant. There is no evidence of acute colitis or diverticulitis.     The aorta is normal in caliber without evidence of aneurysm formation.  There is no mesenteric, retroperitoneal, or pelvic lymphadenopathy.  There is multilevel degenerative disc disease of the lumbar spine. There  is a fat-containing umbilical hernia.       Impression:      1. Small 2 mm nonobstructing stone within the superior left kidney. No  evidence of obstructive uropathy.  2. Nonspecific hepatosplenomegaly. Correlate clinically for risk factors  of chronic liver disease.  3. Patchy bibasilar groundglass opacities with a bronchovascular  distribution. This can be seen with bronchiolitis or mild  bronchopneumonia.  4. Cholelithiasis with gallstones clustered within the fundus of the  gallbladder likely related to superimposed gallbladder adenomyomatosis.           Electronically Signed By-Tayo Brumfield MD On:6/2/2022 2:09 PM  This report was finalized on [id number] by  Tayo Brumfield MD.          Labs:  Results from last 7 days   Lab Units 06/03/22  0351   WBC 10*3/mm3 6.00   HEMOGLOBIN g/dL 8.6*   HEMATOCRIT % 26.0*   PLATELETS 10*3/mm3 53*     Results from last 7 days   Lab Units 06/03/22  0351   SODIUM mmol/L 133*   POTASSIUM mmol/L 4.1   CHLORIDE mmol/L 102   CO2 mmol/L 20.0*   BUN mg/dL 23*   CREATININE mg/dL 0.73   CALCIUM mg/dL 9.0   BILIRUBIN mg/dL 1.0   ALK PHOS U/L 94   ALT (SGPT) U/L 16   AST (SGOT) U/L 13   GLUCOSE mg/dL 136*      Results from last 7 days   Lab Units 05/30/22  2244   PH, ARTERIAL pH units 7.419   PO2 ART mm Hg 245.6*   PCO2, ARTERIAL mm Hg 18.0*   HCO3 ART mmol/L 11.7*     Results from last 7 days   Lab Units 06/03/22  0351 06/02/22  0453 06/01/22  0606   ALBUMIN g/dL 3.10* 3.10* 3.00*     Results from last 7 days   Lab Units 05/30/22  2244   TROPONIN T ng/mL <0.010         Results from last 7 days   Lab Units 06/03/22  0351   MAGNESIUM mg/dL 1.9                   Meds:   SCHEDULE  cefTRIAXone, 2 g, Intravenous, Q24H  ferrous sulfate, 324 mg, Oral, Daily With Breakfast  folic acid, 1 mg, Oral, Daily  insulin lispro, 0-7 Units, Subcutaneous, 4x Daily With Meals & Nightly  levETIRAcetam, 750 mg, Oral, BID  oxybutynin, 5 mg, Oral, 4x Daily  predniSONE, 40 mg, Oral, Daily With Breakfast  sodium chloride, 10 mL, Intravenous, Q12H  venlafaxine XR, 150 mg, Oral, Daily      Infusions     PRNs  •  acetaminophen **OR** acetaminophen  •  dextrose  •  dextrose  •  glucagon (human recombinant)  •  insulin lispro **AND** insulin lispro  •  lidocaine  •  Lidocaine HCl gel  •  magnesium sulfate **OR** magnesium sulfate in D5W 1g/100mL (PREMIX) **OR** magnesium sulfate  •  nitroglycerin  •  ondansetron **OR** ondansetron  •  potassium & sodium phosphates **OR** potassium & sodium phosphates  •  potassium chloride **OR** potassium chloride **OR** potassium chloride  •  sodium chloride    Physical Exam:  Physical Exam  General Appearance:  Alert   HEENT:  Normocephalic, without obvious abnormality, Conjunctiva/corneas clear,.   Nares normal, no drainage     Neck:  Supple, symmetrical, trachea midline. No JVD.  Lungs /Chest wall:   Good respiratory effort without wheezing but has scattered dry rhonchi, respirations unlabored, symmetrical wall movement.     Heart:  Regular rate and rhythm, S1 S2 normal  Abdomen: Soft, non-tender, no masses, no organomegaly.    Extremities: No edema, no clubbing or cyanosis  ROS  Review of  Systems  Constitutional: Negative for chills, fever and malaise/fatigue.   HENT: Negative.    Eyes: Negative.    Cardiovascular: Negative.    Respiratory: Positive for cough and shortness of breath.    Skin: Negative.    Musculoskeletal: Negative.    Gastrointestinal: Negative.    Genitourinary: Negative.    Neurological: Negative.    Psychiatric/Behavioral: Negative.

## 2022-06-03 NOTE — PLAN OF CARE
Goal Outcome Evaluation:     Bed mobility - CGA supine to sit  Transfers - CGA  Ambulation - 30 feet SBA and with rolling walker  Pt's gait distance limited by being short of air.     Low Intensity Therapy recommended post-acute care - This is recommended as therapy feels this patient would require 2-3 visits per week. OP or HH would be the best option for this patient. Pt requires rolling walker at discharge.     Pt desires Home with family assist and and Home Health at discharge. Pt cooperative; agreeable to therapeutic recommendations and plan of care.

## 2022-06-03 NOTE — PROGRESS NOTES
Infectious Diseases Progress Note      LOS: 3 days   Patient Care Team:  Annamarie Monroy APRN as PCP - General (Nurse Practitioner)    Chief Complaint: Weakness    Subjective     The patient had no fever during the last 24 hours.  She remained hemodynamically stable.  She is not having any new complaints today.    Review of Systems:   Review of Systems   Constitutional: Negative.    HENT: Negative.    Eyes: Negative.    Respiratory: Negative.    Cardiovascular: Negative.    Gastrointestinal: Negative.    Genitourinary: Negative.    Musculoskeletal: Negative.    Skin: Negative.    Neurological: Negative.    Hematological: Negative.    Psychiatric/Behavioral: Negative.         Objective     Vital Signs  Temp:  [97.4 °F (36.3 °C)-97.9 °F (36.6 °C)] 97.5 °F (36.4 °C)  Heart Rate:  [68-91] 82  Resp:  [12-19] 16  BP: (104-141)/(61-93) 104/64    Physical Exam:  Physical Exam  Vitals and nursing note reviewed.   Constitutional:       Appearance: She is well-developed.   HENT:      Head: Normocephalic and atraumatic.   Eyes:      Pupils: Pupils are equal, round, and reactive to light.   Cardiovascular:      Rate and Rhythm: Normal rate and regular rhythm.      Heart sounds: Normal heart sounds.   Pulmonary:      Effort: Pulmonary effort is normal. No respiratory distress.      Breath sounds: Normal breath sounds. No wheezing or rales.   Abdominal:      General: Bowel sounds are normal. There is no distension.      Palpations: Abdomen is soft. There is no mass.      Tenderness: There is no abdominal tenderness. There is no guarding or rebound.   Musculoskeletal:         General: No deformity. Normal range of motion.      Cervical back: Normal range of motion and neck supple.   Skin:     General: Skin is warm.      Findings: No erythema or rash.   Neurological:      Mental Status: She is alert and oriented to person, place, and time.      Cranial Nerves: No cranial nerve deficit.          Results Review:    I have reviewed all  clinical data, test, lab, and imaging results.     Radiology  CT Abdomen Pelvis Stone Protocol    Result Date: 6/2/2022  EXAM: CT ABDOMEN PELVIS STONE PROTOCOL-  DATE OF EXAM: 6/2/2022 12:54 PM  INDICATION: bacteremia to r/o obstructive uropathy; A41.9-Sepsis, unspecified organism; I95.9-Hypotension, unspecified; J18.9-Pneumonia, unspecified organism; D64.9-Anemia, unspecified; E86.0-Dehydration; N17.9-Acute kidney failure, unspecified; C71.9-Malignant neoplasm of brain, unspecified; J18.9-Pneumonia, unspecified organism.  COMPARISON: CT abdomen and pelvis dated 12/16/2021  TECHNIQUE: Contiguous axial CT images were obtained from the lung bases to the pubic symphysis without contrast. Sagittal and coronal reconstructions were performed.  Automated exposure control and iterative reconstruction methods were used.  FINDINGS: The lack of intravenous contrast limits the evaluation of visceral and vascular structures.  The heart size is normal. There is no pericardial effusion. There are areas of nonspecific groundglass opacity within a peribronchovascular distribution which could reflect infectious or inflammatory bronchiolitis/bronchopneumonia.  The liver is enlarged measuring 23 cm in craniocaudal dimension. There is no focal liver lesion identified by noncontrast technique.  There are multiple gallstones within the fundal portion of the gallbladder. There appears to be some focal narrowing of the mid aspect of the gallbladder which could relate to adenomyomatosis causing immobility of the gallstones. There is no abnormal gallbladder wall thickening. The spleen is enlarged measuring 15 cm in AP dimension. The adrenal glands and pancreas appear within normal limits.  The kidneys are symmetric in size. There is a 2 mm nonobstructing stone within the superior left kidney. There is no hydronephrosis or hydroureter. Urinary bladder is fluid-filled without wall thickening. The uterus is present and anteverted. The ovaries  appear symmetric in size. There is trace free fluid within the pelvis.  The stomach and duodenum is normal in caliber and configuration. There are no abnormally dilated loops of small bowel to suggest small bowel obstruction. The appendix is visualized and normal within the right lower quadrant. There is no evidence of acute colitis or diverticulitis.  The aorta is normal in caliber without evidence of aneurysm formation. There is no mesenteric, retroperitoneal, or pelvic lymphadenopathy. There is multilevel degenerative disc disease of the lumbar spine. There is a fat-containing umbilical hernia.      1. Small 2 mm nonobstructing stone within the superior left kidney. No evidence of obstructive uropathy. 2. Nonspecific hepatosplenomegaly. Correlate clinically for risk factors of chronic liver disease. 3. Patchy bibasilar groundglass opacities with a bronchovascular distribution. This can be seen with bronchiolitis or mild bronchopneumonia. 4. Cholelithiasis with gallstones clustered within the fundus of the gallbladder likely related to superimposed gallbladder adenomyomatosis.    Electronically Signed By-Tayo Brumfield MD On:6/2/2022 2:09 PM This report was finalized on [id number] by  Tayo Brumfield MD.    CT Bone marrow biopsy and aspiration    Result Date: 6/2/2022  CT BONE MARROW ASPIRATION AND BIOPSY-  Date of Exam: 6/2/2022 12:13 PM  Indication: prolonged anemia/thrombocytopenia; A41.9-Sepsis, unspecified organism; I95.9-Hypotension, unspecified; J18.9-Pneumonia, unspecified organism; D64.9-Anemia, unspecified; E86.0-Dehydration; N17.9-Acute kidney failure, unspecified; C71.9-Malignant neoplasm of brain, unspecified; J18.9-Pneumonia, unspecified organism.  Comparison: None.  Fluoroscopy time: 8.04 seconds. 68.24 mGy  DESCRIPTION OF PROCEDURE: Informed consent was obtained. The patient's medications were reviewed. The patient was placed prone on CT table and a brief noncontrasted scan performed the  region of interest using a localization grid. The overlying skin was marked and the area prepped and draped sterilely. Lidocaine was used for local anesthesia. Access was obtained along the long axis of the ileum using 11-gauge bone biopsy needle. After confirming appropriate position of the needle tip within the marrow space, aspirate samples were obtained followed by a 2-3 cm long core specimen. Samples appeared adequate and were sent for all requested studies. The patient tolerated the procedure well with no immediate post procedure complication. Sterile dressing was applied.      CT-guided bone marrow core biopsy and aspirate.  Electronically Signed By-Vick Draper MD On:6/2/2022 3:41 PM This report was finalized on [id number] by  Vick Draper MD.      Cardiology    Laboratory    Results from last 7 days   Lab Units 06/03/22  0351 06/02/22  0453 06/01/22  0606 05/31/22  2356 05/31/22  1754 05/31/22  1125 05/31/22  0614 05/30/22  2244   WBC 10*3/mm3 6.00 5.70 7.50  --   --  8.10  --  17.80*   HEMOGLOBIN g/dL 8.6* 7.8* 7.8* 8.2* 8.7* 7.0*  6.9* 7.4* 6.9*   HEMOGLOBIN, POC g/dL  --   --   --   --   --   --   --  6.2*   HEMATOCRIT % 26.0* 23.5* 23.5* 24.7* 26.2* 21.0*  20.8* 22.0* 22.2*   HEMATOCRIT POC %  --   --   --   --   --   --   --  18*   PLATELETS 10*3/mm3 53* 40* 42*  --   --  53*  --  133*     Results from last 7 days   Lab Units 06/03/22  0351 06/02/22  0453 06/01/22  0606 05/31/22  1304 05/30/22  2244 05/30/22  2236   SODIUM mmol/L 133* 135* 136 137 135*  --    POTASSIUM mmol/L 4.1 4.1 4.0 4.1 4.2  --    CHLORIDE mmol/L 102 104 106 106 99  --    CO2 mmol/L 20.0* 21.0* 21.0* 21.0* 10.0*  --    BUN mg/dL 23* 26* 29* 27* 22*  --    CREATININE mg/dL 0.73 0.76 0.96 1.01* 1.29* 1.40*   GLUCOSE mg/dL 136* 146* 191* 202* 280*  --    ALBUMIN g/dL 3.10* 3.10* 3.00* 2.80* 2.90*  --    BILIRUBIN mg/dL 1.0 1.3* 1.8* 3.4* 2.8*  --    ALK PHOS U/L 94 90 107 101 129*  --    AST (SGOT) U/L 13 17 26 30 20  --    ALT  (SGPT) U/L 16 15 15 13 12  --    CALCIUM mg/dL 9.0 8.8 8.3* 7.8* 8.2*  --                  Microbiology   Microbiology Results (last 10 days)     Procedure Component Value - Date/Time    Virus Culture - Lavage, Lung, Right Middle Lobe [[id number]] Collected: 05/31/22 1523    Lab Status: Preliminary result Specimen: Lavage from Lung, Right Middle Lobe Updated: 06/03/22 0337     Viral Culture, General Comment     Comment: Preliminary Report:  No virus isolated at 24 hours. Next report to follow after 4 days.       Narrative:      Performed at:  [address]  [id number]  : Chavo Agarwal MD, Phone:  [phone]    AFB Culture - Lavage, Lung, Right Middle Lobe [[id number]] Collected: 05/31/22 1523    Lab Status: Preliminary result Specimen: Lavage from Lung, Right Middle Lobe Updated: 06/01/22 1017     AFB Stain No acid fast bacilli seen    BAL Culture, Quantitative - Lavage, Lung, Right Middle Lobe [[id number]] Collected: 05/31/22 1523    Lab Status: Final result Specimen: Lavage from Lung, Right Middle Lobe Updated: 06/02/22 0950     BAL Culture No growth     Gram Stain Rare (1+) WBCs seen      No organisms seen    MRSA Screen, PCR (Inpatient) - Swab, Nares [[id number]]  (Normal) Collected: 05/31/22 0532    Lab Status: Final result Specimen: Swab from Nares Updated: 05/31/22 0706     MRSA PCR No MRSA Detected    Blood Culture - Blood, Arm, Right [[id number]]  (Normal) Collected: 05/31/22 0044    Lab Status: Preliminary result Specimen: Blood from Arm, Right Updated: 06/03/22 0102     Blood Culture No growth at 3 days    Urine Culture - Urine, Urine, Catheter In/Out [[id number]]  (Abnormal)  (Susceptibility) Collected: 05/31/22 0038    Lab Status: Final result Specimen: Urine, Catheter In/Out Updated: 06/01/22 1122     Urine Culture >100,000 CFU/mL Klebsiella pneumoniae ssp pneumoniae    Narrative:      Colonization of the urinary tract without infection is common.  Treatment is discouraged unless the patient is symptomatic, pregnant, or undergoing an invasive urologic procedure.    Susceptibility      Klebsiella pneumoniae ssp pneumoniae      SUSAN      Ampicillin Resistant     Ampicillin + Sulbactam Susceptible      Cefazolin Susceptible      Cefepime Susceptible      Ceftazidime Susceptible      Ceftriaxone Susceptible      Gentamicin Susceptible      Levofloxacin Susceptible      Nitrofurantoin Resistant     Piperacillin + Tazobactam Susceptible      Trimethoprim + Sulfamethoxazole Susceptible                           Respiratory Panel PCR w/COVID-19(SARS-CoV-2) JONELLE/PAUL/SARITHA/PAD/COR/MAD/BELINDA In-House, NP Swab in UTM/VTM, 3-4 HR TAT - Swab, Nasopharynx [[id number]]  (Normal) Collected: 05/30/22 2254    Lab Status: Final result Specimen: Swab from Nasopharynx Updated: 05/31/22 0002     ADENOVIRUS, PCR Not Detected     Coronavirus 229E Not Detected     Coronavirus HKU1 Not Detected     Coronavirus NL63 Not Detected     Coronavirus OC43 Not Detected     COVID19 Not Detected     Human Metapneumovirus Not Detected     Human Rhinovirus/Enterovirus Not Detected     Influenza A PCR Not Detected     Influenza B PCR Not Detected     Parainfluenza Virus 1 Not Detected     Parainfluenza Virus 2 Not Detected     Parainfluenza Virus 3 Not Detected     Parainfluenza Virus 4 Not Detected     RSV, PCR Not Detected     Bordetella pertussis pcr Not Detected     Bordetella parapertussis PCR Not Detected     Chlamydophila pneumoniae PCR Not Detected     Mycoplasma pneumo by PCR Not Detected    Narrative:      In the setting of a positive respiratory panel with a viral infection PLUS a negative procalcitonin without other underlying concern for bacterial infection, consider observing off antibiotics or discontinuation of antibiotics and continue supportive care. If the respiratory panel is positive for atypical bacterial infection (Bordetella pertussis, Chlamydophila pneumoniae, or Mycoplasma  pneumoniae), consider antibiotic de-escalation to target atypical bacterial infection.    Blood Culture - Blood, Chest, Right [[id number]]  (Abnormal)  (Susceptibility) Collected: 05/30/22 2244    Lab Status: Edited Result - FINAL Specimen: Blood from Chest, Right Updated: 06/02/22 0614     Blood Culture Escherichia coli     Isolated from Aerobic and Anaerobic Bottles     Gram Stain Aerobic Bottle Gram negative bacilli      Anaerobic Bottle Gram negative bacilli    Susceptibility      Escherichia coli      SUSAN      Ampicillin Susceptible      Ampicillin + Sulbactam Susceptible      Cefepime Susceptible      Ceftazidime Susceptible      Ceftriaxone Susceptible      Gentamicin Susceptible      Levofloxacin Susceptible      Piperacillin + Tazobactam Susceptible      Trimethoprim + Sulfamethoxazole Susceptible                       Susceptibility Comments     Escherichia coli    Cefazolin sensitivity will not be reported for Enterobacteriaceae in non-urine isolates. If cefazolin is preferred, please call the microbiology lab to request an E-test.  With the exception of urinary-sourced infections, aminoglycosides should not be used as monotherapy.             Blood Culture ID, PCR - Blood, Chest, Right [[id number]]  (Abnormal) Collected: 05/30/22 2244    Lab Status: Final result Specimen: Blood from Chest, Right Updated: 05/31/22 1322     BCID, PCR Eschericia coli. Identification by BCID2 PCR.     BOTTLE TYPE Aerobic Bottle          Medication Review:       Schedule Meds  cefTRIAXone, 2 g, Intravenous, Q24H  ferrous sulfate, 324 mg, Oral, Daily With Breakfast  folic acid, 1 mg, Oral, Daily  insulin lispro, 0-7 Units, Subcutaneous, 4x Daily With Meals & Nightly  levETIRAcetam, 750 mg, Oral, BID  oxybutynin, 5 mg, Oral, 4x Daily  [START ON 6/4/2022] predniSONE, 20 mg, Oral, Daily With Breakfast  sodium chloride, 10 mL, Intravenous, Q12H  venlafaxine XR, 150 mg, Oral, Daily        Infusion Meds       PRN Meds  •   acetaminophen **OR** acetaminophen  •  dextrose  •  dextrose  •  glucagon (human recombinant)  •  insulin lispro **AND** insulin lispro  •  lidocaine  •  Lidocaine HCl gel  •  magnesium sulfate **OR** magnesium sulfate in D5W 1g/100mL (PREMIX) **OR** magnesium sulfate  •  nitroglycerin  •  ondansetron **OR** ondansetron  •  potassium & sodium phosphates **OR** potassium & sodium phosphates  •  potassium chloride **OR** potassium chloride **OR** potassium chloride  •  sodium chloride        Assessment & Plan       Antimicrobial Therapy   1.  IV ceftriaxone        2.        3.        4.        5.                  Assessment     E. coli bacteremia.  The most likely source is her UTI or port infection.  The organism is relatively susceptible     UTI with a Klebsiella pneumoniae.  The organism is relatively susceptible.  CT scan of abdomen and pelvis showed no obstructive uropathy     S/p port placement in the past for chemotherapy     History of  oligodendroglioma s/p resection and adjunctive chemotherapy in the past.  Last treatment was 6 weeks ago.     Obesity     Diabetes mellitus     Plan     Continue ceftriaxone 2 g IV daily for 2 weeks  Okay to transfer from ICU  Supportive care  A.m. labs        Erin Shaw MD  06/03/22  11:51 EDT    Note is dictated utilizing voice recognition software/Dragon

## 2022-06-03 NOTE — PLAN OF CARE
Problem: Fall Injury Risk  Goal: Absence of Fall and Fall-Related Injury  Outcome: Ongoing, Progressing  Intervention: Promote Injury-Free Environment  Recent Flowsheet Documentation  Taken 6/3/2022 1645 by Eloina Salvador RN  Safety Promotion/Fall Prevention: safety round/check completed     Problem: Adult Inpatient Plan of Care  Goal: Plan of Care Review  Outcome: Ongoing, Progressing  Goal: Patient-Specific Goal (Individualized)  Outcome: Ongoing, Progressing  Goal: Absence of Hospital-Acquired Illness or Injury  Outcome: Ongoing, Progressing  Intervention: Identify and Manage Fall Risk  Recent Flowsheet Documentation  Taken 6/3/2022 1645 by Eloina Salvador RN  Safety Promotion/Fall Prevention: safety round/check completed  Goal: Optimal Comfort and Wellbeing  Outcome: Ongoing, Progressing  Goal: Readiness for Transition of Care  Outcome: Ongoing, Progressing   Goal Outcome Evaluation:                 Patient transferred from ICU. ABT therapy continued. No c/o pain or discomfort. Will observe

## 2022-06-03 NOTE — PROGRESS NOTES
Nutrition Services    Patient Name: Cindy Cortes  YOB: 1965  MRN: [id number]  Admission date: 5/30/2022    PPE Documentation        PPE Worn By Provider Did not enter room for this encounter   PPE Worn By Patient  N/A     PROGRESS NOTE      Encounter Information: Progress note to monitor PO intakes        PO Diet: Diet Regular; Consistent Carbohydrate   PO Supplements: Boost Glucose Control shakes were dc'd, likely when made NPO yesterday    PO Intake:  25-50% of meals        Nutrition support orders:    Nutrition support review:        Labs (reviewed below): Management per attending         GI Function:  + BM 6/2        Nutrition Intervention: Adding back Boost Glucose Control TID (Provides 570 kcals, 48 g protein if consumed)        Results from last 7 days   Lab Units 06/03/22  0351 06/02/22  0453 06/01/22  0606   SODIUM mmol/L 133* 135* 136   POTASSIUM mmol/L 4.1 4.1 4.0   CHLORIDE mmol/L 102 104 106   CO2 mmol/L 20.0* 21.0* 21.0*   BUN mg/dL 23* 26* 29*   CREATININE mg/dL 0.73 0.76 0.96   CALCIUM mg/dL 9.0 8.8 8.3*   BILIRUBIN mg/dL 1.0 1.3* 1.8*   ALK PHOS U/L 94 90 107   ALT (SGPT) U/L 16 15 15   AST (SGOT) U/L 13 17 26   GLUCOSE mg/dL 136* 146* 191*     Results from last 7 days   Lab Units 06/03/22  0351 06/02/22 0453 06/01/22  0606   MAGNESIUM mg/dL 1.9 1.9 2.0   PHOSPHORUS mg/dL 2.5 2.4* 2.3*   HEMOGLOBIN g/dL 8.6* 7.8* 7.8*   HEMATOCRIT % 26.0* 23.5* 23.5*     COVID19   Date Value Ref Range Status   05/30/2022 Not Detected Not Detected - Ref. Range Final     Lab Results   Component Value Date    HGBA1C 5.7 (H) 11/01/2021       RD to follow up per protocol.    Electronically signed by:  Ibeth Fernandez RD  06/03/22 09:37 EDT

## 2022-06-03 NOTE — CASE MANAGEMENT/SOCIAL WORK
Continued Stay Note  [facility]     Patient Name: Cindy Cortes  MRN: [id number]  Today's Date: 6/3/2022    Admit Date: 5/30/2022     Discharge Plan     Row Name 06/03/22 1555       Plan    Plan DC Plan: Patient anticipates Home with [facility] accepted and following. [facility] Following for IV Ceftriaxone for 2 weeks.    Patient/Family in Agreement with Plan yes    Provided Post Acute Provider List? N/A    Provided Post Acute Provider Quality & Resource List? N/A    Plan Comments CM spoke with patient’s nurse Trini RAMIREZ and also reviewed chart documentation to obtain clinical updates. Trini states she is unsure of planned time frame of discharge but anticipates it will be tomorrow 6/4/22 most likely. CM discussed need for long term antibiotics for two weeks and Trini RAMIREZ confirmed.CM placed referral in basket for [facility] and liaison Trinity (with [facility]) notified. CM sent SC to MD to request orders for ambulatory Home Health and infusion therapy referrals if agreeable.CM will continue to follow for any additional needs that may develop and adjust discharge plan accordingly. DC Barriers: Pending Biopsy Results              Phone communication or documentation only- no physical contact with patient or family.          Laurie Hatch RN     Office Phone: [phone]  Office Cell:     [phone]

## 2022-06-03 NOTE — PLAN OF CARE
Problem: Fall Injury Risk  Goal: Absence of Fall and Fall-Related Injury  Outcome: Ongoing, Progressing  Intervention: Identify and Manage Contributors  Recent Flowsheet Documentation  Taken 6/3/2022 0400 by Liza Bahena RN  Medication Review/Management: medications reviewed  Taken 6/3/2022 0300 by Liza Bahena RN  Medication Review/Management: medications reviewed  Taken 6/3/2022 0200 by Liza Bahena RN  Medication Review/Management: medications reviewed  Taken 6/3/2022 0100 by Liza Bahena RN  Medication Review/Management: medications reviewed  Taken 6/3/2022 0000 by Liza Bahena RN  Medication Review/Management: medications reviewed  Taken 6/2/2022 2300 by Liza Bahena RN  Medication Review/Management: medications reviewed  Taken 6/2/2022 2200 by Liza Bahena RN  Medication Review/Management: medications reviewed  Taken 6/2/2022 2100 by Liza Bahena RN  Medication Review/Management: medications reviewed  Taken 6/2/2022 2000 by Liza Bahena RN  Medication Review/Management: medications reviewed  Taken 6/2/2022 1900 by Liza Bahena RN  Medication Review/Management: medications reviewed  Intervention: Promote Injury-Free Environment  Recent Flowsheet Documentation  Taken 6/3/2022 0400 by Liza Bahena RN  Safety Promotion/Fall Prevention: safety round/check completed  Taken 6/3/2022 0300 by Liza Bahena RN  Safety Promotion/Fall Prevention: safety round/check completed  Taken 6/3/2022 0200 by Liza Bahena RN  Safety Promotion/Fall Prevention: safety round/check completed  Taken 6/3/2022 0100 by Liza Bahena RN  Safety Promotion/Fall Prevention: safety round/check completed  Taken 6/3/2022 0000 by Liza Bahena RN  Safety Promotion/Fall Prevention: safety round/check completed  Taken 6/2/2022 2300 by Liza Bahena RN  Safety Promotion/Fall Prevention: safety round/check completed  Taken 6/2/2022 2200 by Liza Bahena RN  Safety Promotion/Fall Prevention: safety round/check completed  Taken 6/2/2022 2100 by Liza Bahena  RN  Safety Promotion/Fall Prevention: safety round/check completed  Taken 6/2/2022 2000 by Liza Bahena RN  Safety Promotion/Fall Prevention: safety round/check completed  Taken 6/2/2022 1900 by Liza Bahena RN  Safety Promotion/Fall Prevention: safety round/check completed     Problem: Adult Inpatient Plan of Care  Goal: Plan of Care Review  Outcome: Ongoing, Progressing  Goal: Patient-Specific Goal (Individualized)  Outcome: Ongoing, Progressing  Goal: Absence of Hospital-Acquired Illness or Injury  Outcome: Ongoing, Progressing  Intervention: Identify and Manage Fall Risk  Recent Flowsheet Documentation  Taken 6/3/2022 0400 by Liza Bahena RN  Safety Promotion/Fall Prevention: safety round/check completed  Taken 6/3/2022 0300 by Liza Bahena RN  Safety Promotion/Fall Prevention: safety round/check completed  Taken 6/3/2022 0200 by Liza Bahena RN  Safety Promotion/Fall Prevention: safety round/check completed  Taken 6/3/2022 0100 by Liza Bahena RN  Safety Promotion/Fall Prevention: safety round/check completed  Taken 6/3/2022 0000 by Liza Bahena RN  Safety Promotion/Fall Prevention: safety round/check completed  Taken 6/2/2022 2300 by Liza Bahena RN  Safety Promotion/Fall Prevention: safety round/check completed  Taken 6/2/2022 2200 by Liza Bahena RN  Safety Promotion/Fall Prevention: safety round/check completed  Taken 6/2/2022 2100 by Liza Bahena RN  Safety Promotion/Fall Prevention: safety round/check completed  Taken 6/2/2022 2000 by Liza Bahena RN  Safety Promotion/Fall Prevention: safety round/check completed  Taken 6/2/2022 1900 by Liza Bahena RN  Safety Promotion/Fall Prevention: safety round/check completed  Intervention: Prevent Skin Injury  Recent Flowsheet Documentation  Taken 6/3/2022 0300 by Liza Bahena RN  Body Position: position changed independently  Taken 6/3/2022 0100 by Liza Bahena RN  Body Position: position changed independently  Taken 6/2/2022 2300 by Liza Bahena RN  Body  Position: position changed independently  Taken 6/2/2022 2100 by Liza Bahena RN  Body Position: position changed independently  Taken 6/2/2022 1900 by Liza Bahena RN  Body Position: position changed independently  Intervention: Prevent and Manage VTE (Venous Thromboembolism) Risk  Recent Flowsheet Documentation  Taken 6/2/2022 2000 by Liza Bahena RN  VTE Prevention/Management:   bilateral   sequential compression devices off   dorsiflexion/plantar flexion performed  Goal: Optimal Comfort and Wellbeing  Outcome: Ongoing, Progressing  Intervention: Provide Person-Centered Care  Recent Flowsheet Documentation  Taken 6/2/2022 2000 by Liza Bahena RN  Trust Relationship/Rapport: care explained  Goal: Readiness for Transition of Care  Outcome: Ongoing, Progressing   Goal Outcome Evaluation:

## 2022-06-04 ENCOUNTER — READMISSION MANAGEMENT (OUTPATIENT)
Dept: CALL CENTER | Facility: HOSPITAL | Age: 57
End: 2022-06-04

## 2022-06-04 ENCOUNTER — HOME HEALTH ADMISSION (OUTPATIENT)
Dept: HOME HEALTH SERVICES | Facility: HOME HEALTHCARE | Age: 57
End: 2022-06-04

## 2022-06-04 VITALS
DIASTOLIC BLOOD PRESSURE: 77 MMHG | OXYGEN SATURATION: 99 % | TEMPERATURE: 98.7 F | SYSTOLIC BLOOD PRESSURE: 121 MMHG | RESPIRATION RATE: 18 BRPM | BODY MASS INDEX: 52.38 KG/M2 | HEIGHT: 62 IN | HEART RATE: 71 BPM | WEIGHT: 284.64 LBS

## 2022-06-04 LAB
ALBUMIN SERPL-MCNC: 3.1 G/DL (ref 3.5–5.2)
ALBUMIN/GLOB SERPL: 1.5 G/DL
ALP SERPL-CCNC: 86 U/L (ref 39–117)
ALT SERPL W P-5'-P-CCNC: 16 U/L (ref 1–33)
ANION GAP SERPL CALCULATED.3IONS-SCNC: 10 MMOL/L (ref 5–15)
ANISOCYTOSIS BLD QL: ABNORMAL
AST SERPL-CCNC: 13 U/L (ref 1–32)
BILIRUB SERPL-MCNC: 1 MG/DL (ref 0–1.2)
BUN SERPL-MCNC: 20 MG/DL (ref 6–20)
BUN/CREAT SERPL: 35.7 (ref 7–25)
CALCIUM SPEC-SCNC: 8.7 MG/DL (ref 8.6–10.5)
CHLORIDE SERPL-SCNC: 105 MMOL/L (ref 98–107)
CO2 SERPL-SCNC: 22 MMOL/L (ref 22–29)
CREAT SERPL-MCNC: 0.56 MG/DL (ref 0.57–1)
DEPRECATED RDW RBC AUTO: 70.4 FL (ref 37–54)
EGFRCR SERPLBLD CKD-EPI 2021: 108.6 ML/MIN/1.73
ERYTHROCYTE [DISTWIDTH] IN BLOOD BY AUTOMATED COUNT: 21.6 % (ref 12.3–15.4)
GLOBULIN UR ELPH-MCNC: 2.1 GM/DL
GLUCOSE BLDC GLUCOMTR-MCNC: 153 MG/DL (ref 70–105)
GLUCOSE BLDC GLUCOMTR-MCNC: 95 MG/DL (ref 70–105)
GLUCOSE SERPL-MCNC: 109 MG/DL (ref 65–99)
HCT VFR BLD AUTO: 22.7 % (ref 34–46.6)
HGB BLD-MCNC: 7.7 G/DL (ref 12–15.9)
LARGE PLATELETS: ABNORMAL
LYMPHOCYTES # BLD MANUAL: 0.71 10*3/MM3 (ref 0.7–3.1)
LYMPHOCYTES NFR BLD MANUAL: 2 % (ref 5–12)
MAGNESIUM SERPL-MCNC: 1.6 MG/DL (ref 1.6–2.6)
MCH RBC QN AUTO: 32.8 PG (ref 26.6–33)
MCHC RBC AUTO-ENTMCNC: 33.8 G/DL (ref 31.5–35.7)
MCV RBC AUTO: 97.2 FL (ref 79–97)
METAMYELOCYTES NFR BLD MANUAL: 2 % (ref 0–0)
MONOCYTES # BLD: 0.1 10*3/MM3 (ref 0.1–0.9)
MYELOCYTES NFR BLD MANUAL: 1 % (ref 0–0)
NEUTROPHILS # BLD AUTO: 4.13 10*3/MM3 (ref 1.7–7)
NEUTROPHILS NFR BLD MANUAL: 63 % (ref 42.7–76)
NEUTS BAND NFR BLD MANUAL: 18 % (ref 0–5)
NRBC SPEC MANUAL: 1 /100 WBC (ref 0–0.2)
PHOSPHATE SERPL-MCNC: 2.2 MG/DL (ref 2.5–4.5)
PLATELET # BLD AUTO: 50 10*3/MM3 (ref 140–450)
PMV BLD AUTO: 9 FL (ref 6–12)
POTASSIUM SERPL-SCNC: 3.7 MMOL/L (ref 3.5–5.2)
PROT SERPL-MCNC: 5.2 G/DL (ref 6–8.5)
QT INTERVAL: 238 MS
RBC # BLD AUTO: 2.34 10*6/MM3 (ref 3.77–5.28)
SCAN SLIDE: NORMAL
SMALL PLATELETS BLD QL SMEAR: ABNORMAL
SODIUM SERPL-SCNC: 137 MMOL/L (ref 136–145)
TOXIC GRANULATION: ABNORMAL
VARIANT LYMPHS NFR BLD MANUAL: 12 % (ref 19.6–45.3)
VARIANT LYMPHS NFR BLD MANUAL: 2 % (ref 0–5)
WBC NRBC COR # BLD: 5.1 10*3/MM3 (ref 3.4–10.8)

## 2022-06-04 PROCEDURE — 84100 ASSAY OF PHOSPHORUS: CPT | Performed by: NURSE PRACTITIONER

## 2022-06-04 PROCEDURE — 99239 HOSP IP/OBS DSCHRG MGMT >30: CPT | Performed by: HOSPITALIST

## 2022-06-04 PROCEDURE — 94618 PULMONARY STRESS TESTING: CPT

## 2022-06-04 PROCEDURE — 85025 COMPLETE CBC W/AUTO DIFF WBC: CPT | Performed by: NURSE PRACTITIONER

## 2022-06-04 PROCEDURE — 83735 ASSAY OF MAGNESIUM: CPT | Performed by: NURSE PRACTITIONER

## 2022-06-04 PROCEDURE — 25010000002 CEFTRIAXONE PER 250 MG: Performed by: INTERNAL MEDICINE

## 2022-06-04 PROCEDURE — 80053 COMPREHEN METABOLIC PANEL: CPT | Performed by: NURSE PRACTITIONER

## 2022-06-04 PROCEDURE — 63710000001 PREDNISONE PER 1 MG: Performed by: INTERNAL MEDICINE

## 2022-06-04 PROCEDURE — 99232 SBSQ HOSP IP/OBS MODERATE 35: CPT | Performed by: INTERNAL MEDICINE

## 2022-06-04 PROCEDURE — 85007 BL SMEAR W/DIFF WBC COUNT: CPT | Performed by: NURSE PRACTITIONER

## 2022-06-04 PROCEDURE — 82962 GLUCOSE BLOOD TEST: CPT

## 2022-06-04 RX ORDER — PREDNISONE 20 MG/1
TABLET ORAL
Qty: 8 TABLET | Refills: 0 | Status: SHIPPED | OUTPATIENT
Start: 2022-06-04 | End: 2022-06-14

## 2022-06-04 RX ADMIN — OXYBUTYNIN CHLORIDE 5 MG: 5 TABLET ORAL at 10:37

## 2022-06-04 RX ADMIN — PREDNISONE 20 MG: 20 TABLET ORAL at 10:36

## 2022-06-04 RX ADMIN — LEVETIRACETAM 750 MG: 500 TABLET, FILM COATED ORAL at 10:36

## 2022-06-04 RX ADMIN — FERROUS SULFATE TAB EC 324 MG (65 MG FE EQUIVALENT) 324 MG: 324 (65 FE) TABLET DELAYED RESPONSE at 10:37

## 2022-06-04 RX ADMIN — FOLIC ACID 1 MG: 1 TABLET ORAL at 10:36

## 2022-06-04 RX ADMIN — CEFTRIAXONE 2 G: 2 INJECTION, POWDER, FOR SOLUTION INTRAMUSCULAR; INTRAVENOUS at 14:50

## 2022-06-04 RX ADMIN — VENLAFAXINE HYDROCHLORIDE 150 MG: 75 CAPSULE, EXTENDED RELEASE ORAL at 10:37

## 2022-06-04 NOTE — PROGRESS NOTES
Daily Progress Note        Sepsis with hypotension (HCC)    Depression    Essential hypertension    Seizure (HCC)    Brain tumor (HCC)    Dyslipidemia    Gastroesophageal reflux disease with hiatal hernia    Morbid obesity with BMI of 60.0-69.9, adult (HCC)    Type 2 diabetes mellitus with hyperglycemia, without long-term current use of insulin (HCC)    Pneumonia    UTI (urinary tract infection)    Acute respiratory failure with hypoxia (HCC)    Anemia due to chemotherapy    Acute kidney injury (HCC)    Weakness      Assessment    Sepsis  Hypotension secondary to hypovolemia: Improved    History of oligodendroglioma: Chemo stopped in April due to side effects on bone marrow and pneumonitis  Pneumonitis: CT scan showed groundglass opacities bilaterally which were present in April 1, 2022 was believed secondary to chemo    Bronchoscopy 5/31/2022 showed no purulent secretions:     Severe anemia with history of bone marrow suppression secondary to chemo    UTI  E. coli bacteremia    Dehydration  CARLOS MANUEL  Altered mental status  Hypoxemia  Diabetes mellitus with hyperglycemia  Morbid obesity with BMI 50  GERD  Dyslipidemia  History of seizure disorder  History of essential hypertension  History of depression mask     Plan:    Oxygen supplement and titration: Currently on room air    IV antibiotics: Rocephin    Decreased prednisone 20 mg daily and when she improves we will start weaning it slowly she might require a few weeks of low-dose later on    Monitor H&H: Transfuse as indicated  Discussed with hematology oncology: Plan for bone marrow biopsy soon    Discussed with the patient and family         LOS: 4 days     Subjective         Objective     Vital signs for last 24 hours:  Vitals:    06/03/22 1225 06/03/22 1618 06/04/22 0027 06/04/22 0402   BP: 123/74 119/75 121/77    BP Location: Right arm Left arm Right arm    Patient Position: Lying Lying Lying    Pulse: 70 64 71    Resp: 16 16 18    Temp:  98 °F (36.7 °C) 98.7 °F  "(37.1 °C)    TempSrc:  Oral     SpO2: 92% 94% 99%    Weight:  129 kg (283 lb 6.4 oz)  129 kg (284 lb 10.2 oz)   Height:  157.5 cm (62\")         Intake/Output last 3 shifts:  I/O last 3 completed shifts:  In: 580 [P.O.:480; IV Piggyback:100]  Out: 600 [Urine:600]  Intake/Output this shift:  No intake/output data recorded.      Radiology  Imaging Results (Last 24 Hours)     ** No results found for the last 24 hours. **          Labs:  Results from last 7 days   Lab Units 06/04/22  0610   WBC 10*3/mm3 5.10   HEMOGLOBIN g/dL 7.7*   HEMATOCRIT % 22.7*   PLATELETS 10*3/mm3 50*     Results from last 7 days   Lab Units 06/04/22  0610   SODIUM mmol/L 137   POTASSIUM mmol/L 3.7   CHLORIDE mmol/L 105   CO2 mmol/L 22.0   BUN mg/dL 20   CREATININE mg/dL 0.56*   CALCIUM mg/dL 8.7   BILIRUBIN mg/dL 1.0   ALK PHOS U/L 86   ALT (SGPT) U/L 16   AST (SGOT) U/L 13   GLUCOSE mg/dL 109*     Results from last 7 days   Lab Units 05/30/22  2244   PH, ARTERIAL pH units 7.419   PO2 ART mm Hg 245.6*   PCO2, ARTERIAL mm Hg 18.0*   HCO3 ART mmol/L 11.7*     Results from last 7 days   Lab Units 06/04/22  0610 06/03/22  0351 06/02/22  0453   ALBUMIN g/dL 3.10* 3.10* 3.10*     Results from last 7 days   Lab Units 05/30/22  2244   TROPONIN T ng/mL <0.010         Results from last 7 days   Lab Units 06/04/22  0610   MAGNESIUM mg/dL 1.6                   Meds:   SCHEDULE  cefTRIAXone, 2 g, Intravenous, Q24H  ferrous sulfate, 324 mg, Oral, Daily With Breakfast  folic acid, 1 mg, Oral, Daily  insulin lispro, 0-7 Units, Subcutaneous, 4x Daily With Meals & Nightly  levETIRAcetam, 750 mg, Oral, BID  oxybutynin, 5 mg, Oral, 4x Daily  predniSONE, 20 mg, Oral, Daily With Breakfast  sodium chloride, 10 mL, Intravenous, Q12H  venlafaxine XR, 150 mg, Oral, Daily      Infusions     PRNs  •  acetaminophen **OR** acetaminophen  •  dextrose  •  dextrose  •  glucagon (human recombinant)  •  insulin lispro **AND** insulin lispro  •  lidocaine  •  Lidocaine HCl gel  •  " magnesium sulfate **OR** magnesium sulfate in D5W 1g/100mL (PREMIX) **OR** magnesium sulfate  •  nitroglycerin  •  ondansetron **OR** ondansetron  •  potassium & sodium phosphates **OR** potassium & sodium phosphates  •  potassium chloride **OR** potassium chloride **OR** potassium chloride  •  sodium chloride    Physical Exam:  Physical Exam  Cardiovascular:      Heart sounds: Murmur heard.   Pulmonary:      Breath sounds: Rhonchi and rales present.         ROS  Review of Systems   Respiratory: Positive for cough and shortness of breath.        I have reviewed the patient's new clinical results.    Electronically signed by BJ Morrison.

## 2022-06-04 NOTE — PROGRESS NOTES
Exercise Oximetry    Patient Name:Cindy Cortes   MRN: [id number]   Date: 06/04/22             ROOM AIR BASELINE   SpO2% 95% room air   Heart Rate    Blood Pressure      EXERCISE ON ROOM AIR SpO2% EXERCISE ON O2 @  LPM SpO2%   1 MINUTE 94 1 MINUTE    2 MINUTES 93 2 MINUTES    3 MINUTES 93 3 MINUTES    4 MINUTES 92 4 MINUTES    5 MINUTES 94 5 MINUTES    6 MINUTES 93 6 MINUTES               Distance Walked   Distance Walked   Dyspnea (Indigo Scale)  Dyspnea (Indigo Scale)   Fatigue (Indigo Scale)   Fatigue (Indigo Scale)   SpO2% Post Exercise  93% room air SpO2% Post Exercise   HR Post Exercise   HR Post Exercise   Time to Recovery   Time to Recovery     Comments:

## 2022-06-04 NOTE — CASE MANAGEMENT/SOCIAL WORK
Continued Stay Note  [facility]     Patient Name: Cindy Cortes  MRN: [id number]  Today's Date: 6/4/2022    Admit Date: 5/30/2022     Discharge Plan     Row Name 06/04/22 1420       Plan    Plan Plan for home with [facility]; accepted. Order in place. [facility] for IV antibiotics.    Patient/Family in Agreement with Plan yes    Plan Comments Per ID, patient will  Continue ceftriaxone 2 g IV daily for 2 weeks. Patient has port accessed and will have daily dose of antibiotic prior to dc. [facility] to deliver medications and supplies to patient's home tomorrow morning and provide education. [facility] notified of pending dc.    Row Name 06/04/22 1411       Plan    Plan --              Phone communication or documentation only - no physical contact with patient or family.    Ann Mccray RN  Weekend   [address] [address]  Office: [phone]  Fax: [phone]  [email or web address]

## 2022-06-04 NOTE — PROGRESS NOTES
Hematology/Oncology Inpatient Progress Note    PATIENT NAME: Cindy Cortes  : 1965  MRN: [id number]    Chief complaint: Shortness of breath     History of present illness:    Cindy Cortes is a 56 y.o. female who presented to [facility] on 2022 with complaints of shortness of breath.     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  She will be with significant myelosuppression, possible pneumonitis.  She has had longstanding shortness of breath, anemia she has required multiple transfusions in the past.  Patient was getting lethargic, shortness of breath was worsening over the last few days.  She presented to the emergency room.  In the ER her hemoglobin was down to 6.9, she had a temperature of 102.5 she had an elevated procalcitonin.  She was placed on 100% nonrebreather.  This was weaned down to 4 L.     Other work-up showed positive for UTI, she was thought to be septic started on broad-spectrum IV antibiotics and treated for septic shock.  She also had a CT PE which was negative for PE.  Very subtle groundglass attenuation possibly inflammatory or infectious.  PRBC transfusion was started.  Patient admitted for further work-up     22  Hematology/Oncology was consulted.  She is down to 2 L, improvement in symptoms.  Status post bronchoscopy.    2022 - CT guided BMB,      He/She  has a past medical history of Arthritis, GERD (gastroesophageal reflux disease), Hypertension, Oligodendroglioma (HCC), Seizure (HCC), and Type 2 diabetes mellitus with hyperglycemia, without long-term current use of insulin ([facility]) (2021).     PCP: Annamarie Monroy APRN    Subjective     Patient has some improvement in her symptoms. Still has shortness of breath.      MEDICATIONS:    Scheduled Meds:  cefTRIAXone, 2 g, Intravenous, Q24H  ferrous sulfate, 324 mg, Oral, Daily With  "Breakfast  folic acid, 1 mg, Oral, Daily  insulin lispro, 0-7 Units, Subcutaneous, 4x Daily With Meals & Nightly  levETIRAcetam, 750 mg, Oral, BID  oxybutynin, 5 mg, Oral, 4x Daily  predniSONE, 20 mg, Oral, Daily With Breakfast  sodium chloride, 10 mL, Intravenous, Q12H  venlafaxine XR, 150 mg, Oral, Daily       Continuous Infusions:      PRN Meds:  •  acetaminophen **OR** acetaminophen  •  dextrose  •  dextrose  •  glucagon (human recombinant)  •  insulin lispro **AND** insulin lispro  •  lidocaine  •  Lidocaine HCl gel  •  magnesium sulfate **OR** magnesium sulfate in D5W 1g/100mL (PREMIX) **OR** magnesium sulfate  •  nitroglycerin  •  ondansetron **OR** ondansetron  •  potassium & sodium phosphates **OR** potassium & sodium phosphates  •  potassium chloride **OR** potassium chloride **OR** potassium chloride  •  sodium chloride     ALLERGIES:  No Known Allergies    Objective    VITALS:   /77 (BP Location: Right arm, Patient Position: Lying)   Pulse 71   Temp 98.7 °F (37.1 °C)   Resp 18   Ht 157.5 cm (62\")   Wt 129 kg (284 lb 10.2 oz)   LMP  (LMP Unknown)   SpO2 99%   BMI 52.06 kg/m²     PHYSICAL EXAM: (performed by MD)  Physical Exam  Constitutional:       Appearance: Normal appearance. She is obese.   HENT:      Head: Normocephalic and atraumatic.   Eyes:      Pupils: Pupils are equal, round, and reactive to light.   Cardiovascular:      Rate and Rhythm: Normal rate and regular rhythm.      Pulses: Normal pulses.      Heart sounds: Normal heart sounds. No murmur heard.  Pulmonary:      Effort: Pulmonary effort is normal.      Breath sounds: Rhonchi present.   Abdominal:      General: There is no distension.      Palpations: Abdomen is soft. There is no mass.      Tenderness: There is no abdominal tenderness.   Musculoskeletal:         General: Normal range of motion.      Cervical back: Normal range of motion.   Skin:     General: Skin is warm.   Neurological:      General: No focal deficit " present.      Mental Status: She is alert.   Psychiatric:         Mood and Affect: Mood normal.           RECENT LABS:  Lab Results (last 24 hours)     Procedure Component Value Units Date/Time    Manual Differential [[id number]]  (Abnormal) Collected: 06/04/22 0610    Specimen: Blood Updated: 06/04/22 0737     Neutrophil % 63.0 %      Lymphocyte % 12.0 %      Monocyte % 2.0 %      Bands %  18.0 %      Metamyelocyte % 2.0 %      Myelocyte % 1.0 %      Atypical Lymphocyte % 2.0 %      Neutrophils Absolute 4.13 10*3/mm3      Lymphocytes Absolute 0.71 10*3/mm3      Monocytes Absolute 0.10 10*3/mm3      nRBC 1.0 /100 WBC      Anisocytosis Slight/1+     Toxic Granulation Slight/1+     Platelet Estimate Decreased     Large Platelets Slight/1+    CBC & Differential [[id number]]  (Abnormal) Collected: 06/04/22 0610    Specimen: Blood Updated: 06/04/22 0737    Narrative:      The following orders were created for panel order CBC & Differential.  Procedure                               Abnormality         Status                     ---------                               -----------         ------                     CBC Auto Differential[[id number]]        Abnormal            Final result               Scan Slide[[id number]]                                       Final result                 Please view results for these tests on the individual orders.    Scan Slide [[id number]] Collected: 06/04/22 0610    Specimen: Blood Updated: 06/04/22 0737     Scan Slide --     Comment: See Manual Differential Results       CBC Auto Differential [[id number]]  (Abnormal) Collected: 06/04/22 0610    Specimen: Blood Updated: 06/04/22 0737     WBC 5.10 10*3/mm3      RBC 2.34 10*6/mm3      Hemoglobin 7.7 g/dL      Hematocrit 22.7 %      MCV 97.2 fL      MCH 32.8 pg      MCHC 33.8 g/dL      RDW 21.6 %      RDW-SD 70.4 fl      MPV 9.0 fL      Platelets 50 10*3/mm3     Narrative:      The previously reported component NRBC is no longer being reported.  Previous result was 1.3 /100 WBC (Reference Range: 0.0-0.2 /100 WBC) on 6/4/2022 at 0635 EDT.    POC Glucose Once [[id number]]  (Normal) Collected: 06/04/22 0727    Specimen: Blood Updated: 06/04/22 0728     Glucose 95 mg/dL      Comment: Serial Number: [id number]:  [id number]       Comprehensive Metabolic Panel [[id number]]  (Abnormal) Collected: 06/04/22 0610    Specimen: Blood Updated: 06/04/22 0647     Glucose 109 mg/dL      BUN 20 mg/dL      Creatinine 0.56 mg/dL      Sodium 137 mmol/L      Potassium 3.7 mmol/L      Chloride 105 mmol/L      CO2 22.0 mmol/L      Calcium 8.7 mg/dL      Total Protein 5.2 g/dL      Albumin 3.10 g/dL      ALT (SGPT) 16 U/L      AST (SGOT) 13 U/L      Alkaline Phosphatase 86 U/L      Total Bilirubin 1.0 mg/dL      Globulin 2.1 gm/dL      A/G Ratio 1.5 g/dL      BUN/Creatinine Ratio 35.7     Anion Gap 10.0 mmol/L      eGFR 108.6 mL/min/1.73      Comment: National Kidney Foundation and American Society of Nephrology (ASN) Task Force recommended calculation based on the Chronic Kidney Disease Epidemiology Collaboration (CKD-EPI) equation refit without adjustment for race.       Narrative:      GFR Normal >60  Chronic Kidney Disease <60  Kidney Failure <15      Phosphorus [[id number]]  (Abnormal) Collected: 06/04/22 0610    Specimen: Blood Updated: 06/04/22 0647     Phosphorus 2.2 mg/dL     Magnesium [[id number]]  (Normal) Collected: 06/04/22 0610    Specimen: Blood Updated: 06/04/22 0647     Magnesium 1.6 mg/dL     Blood Culture - Blood, Arm, Right [[id number]]  (Normal) Collected: 05/31/22 0044    Specimen: Blood from Arm, Right Updated: 06/04/22 0102     Blood Culture No growth at 4 days    POC Glucose Once [[id number]]  (Abnormal) Collected: 06/03/22 2228    Specimen: Blood Updated: 06/03/22 2228     Glucose 145 mg/dL      Comment: Serial Number: [id number]:  [id number]       POC Glucose Once [[id number]]  (Abnormal) Collected: 06/03/22 1627    Specimen: Blood Updated: 06/03/22  1630     Glucose 239 mg/dL      Comment: Serial Number: [id number]:  [id number]       Heparin-induced Platelet Antibody [[id number]] Collected: 06/02/22 0915    Specimen: Blood Updated: 06/03/22 1613     Heparin Induced Plt Ab 0.091 OD     Narrative:      Performed at:  [address]  [id number]  : Chavo Agarwal MD, Phone:  [phone]    Flow Cytometry [[id number]] Collected: 06/02/22 1259    Specimen: Body Fluid Updated: 06/03/22 1537     Reference Lab Report See [facility]    Pneumocystis PCR - Lavage, Lung, Right Middle Lobe [[id number]] Collected: 05/31/22 1644    Specimen: Lavage from Lung, Right Middle Lobe Updated: 06/03/22 1332     Reference Lab Report See Attached Report     Pneumocystis Negative    Cytomegalovirus (CMV) By PCR - Lavage, Lung, Right Middle Lobe [[id number]] Collected: 05/31/22 1523    Specimen: Lavage from Lung, Right Middle Lobe Updated: 06/03/22 1331     Reference Lab Report See Attached Report    Histoplasma Antigen, CSF or BAL - Lavage, Lung, Right Middle Lobe [[id number]] Collected: 05/31/22 1523    Specimen: Lavage from Lung, Right Middle Lobe Updated: 06/03/22 1330     Reference Lab Report See Attached Report    Fibrin Split Products [[id number]] Collected: 06/02/22 0915    Specimen: Blood Updated: 06/03/22 1211     FDP <5 ug/mL     Narrative:      Performed at:  [address]  [id number]  : Chavo Agarwal MD, Phone:  [phone]    POC Glucose Once [[id number]]  (Abnormal) Collected: 06/03/22 1139    Specimen: Blood Updated: 06/03/22 1141     Glucose 142 mg/dL      Comment: Serial Number: [id number]:  [id number]                 IMAGING REVIEWED:  CT Abdomen Pelvis Stone Protocol    Result Date: 6/2/2022  1. Small 2 mm nonobstructing stone within the superior left kidney. No evidence of obstructive uropathy. 2. Nonspecific hepatosplenomegaly.  Correlate clinically for risk factors of chronic liver disease. 3. Patchy bibasilar groundglass opacities with a bronchovascular distribution. This can be seen with bronchiolitis or mild bronchopneumonia. 4. Cholelithiasis with gallstones clustered within the fundus of the gallbladder likely related to superimposed gallbladder adenomyomatosis.    Electronically Signed By-Tayo Brumfield MD On:6/2/2022 2:09 PM This report was finalized on [id number] by  Tayo Brumfield MD.    CT Bone marrow biopsy and aspiration    Result Date: 6/2/2022  CT-guided bone marrow core biopsy and aspirate.  Electronically Signed By-Vick Draper MD On:6/2/2022 3:41 PM This report was finalized on [id number] by  Vick Draper MD.      Assessment & Plan       Patient is a 56-year-old female with oligodendroglioma s/p resection, adjuvant chemotherapy now admitted with shortness of breath, anemia     Anemia  Patient does have underlying anemia of chronic disease, anemia related to myelosuppression  This however should have improved however she continues to have significant anemia.  retic is elevated however to a lesser degree with his hb. This is also after transfusion not very accurate  hapto normal. Tbili is elevated check direct, LDH mildly elevated  Iron ferritin high after transfusion likely, not accurate  B12 folic acid not low  S/p bone marrow biopsy will await results.      Shortness of breath  Possible causes include pneumonitis, pneumonia atypical sepsis, anemia  Status post bronchoscopy, pending PCP work-up.  Plan for prednisone taper per pulm.     Sepsis  Improvement in blood pressure,  Broad-spectrum antibiotics, UTI  Blood cultures positive, ID on board.  Plan for 2 weeks of iv antibiotics for bacteremia.     Thrombocytopenia  Acute on chronic  Likely infection related.  Rule out DIC, fibrinogen normal.  plt count low likely with infection.  Will await BMB results.

## 2022-06-04 NOTE — DISCHARGE SUMMARY
[facility] Medicine Services - Consult Note     Patient Name: Cindy Cortes  : 1965  MRN: [id number]  Primary Care Physician:  Annamarie Monroy APRN  Referring Physician: BJ Rolon  Date of admission: 2022   Date of discharge; 2022       Subjective       Reason for Consult/ Chief Complaint: shortness of breath, lethargy, weakness     History of Present Illness: Cindy Cortes is a 56 y.o. 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 She was started on an antibiotic, which she could not tolerate. She has also had pancytopenia and required multiple blood transfusions. The patient stated she has had shortness of breath upon exertion for the last 3-4 weeks, worse in the last 1 week. The patient's family stated she was short of breath, lethargic, and could not walk/hold herself up due to weakness on 2022 so they called EMS. She has had a nonproductive cough and chills in the last 24 hours. She denied any blood in her urine or stool.      In the ED the patient was on a nonrebreather placed by EMS. She was tachycardic, mildly hypotensive, tachypneic, and had temperature of 102.5. Her hgb was 6.9, WBC 17.8, lactate 11.7, procalcitonin 6.58.  ABG showed pH 7.41, PCO2 18, PaO2 245, HCO3 11.7 on 100% nonrebreather.  CT PE protocol showed no PE, no aneurysm or dissection, very subtle groundglass attenuation seen throughout the lungs bilaterally could potentially be inflammatory or infectious.  Cholelithiasis, mild splenomegaly. Urinalysis was nitrite positive, large leukocytes, too numerous WBCs, 4+ bacteria.  She was given 30cc/kg IVFs, 2 units PRBCs, IV antibiotics. She reportedly has a history of antibodies so she was pretreated with antihistamine, steroids, and tylenol. Her oxygen was weaned down to 4L  O2. She was diagnosed with sepsis with septic shock, metabolic acidosis, pneumonitis, UTI, anemia, dehydration, and altered mental status.         Date:   5/31/2022: Patient felt stable for transfer out of ICU. She did not require vasopressors. Pulmonary planning bronchoscopy   6/1/2022; patient is sitting in the side of the bed, afebrile vital signs are stable, spoke with the bedside RN awaiting bed and general medical floor.  6/2/2022; afebrile vital signs are stable currently on 3 L of nasal cannula no new symptoms endorsed spoke with the bedside RN.  6/3/2022; patient is lying in the La-Z-Boy chair at the bedside no new symptoms endorsed, hemodynamically stable.  6/4/2022; patient complaining of shortness of breath even walking to the bathroom likely deconditioning and anemia, but will get walking oximetry before discharge.  Review of Systems   Constitutional: Positive for fever.   HENT: Negative.    Eyes: Negative.    Cardiovascular: Negative.    Respiratory: Positive for cough and shortness of breath.    Endocrine: Negative.    Hematologic/Lymphatic: Negative.    Skin: Negative.    Musculoskeletal: Negative.    Gastrointestinal: Negative.    Genitourinary: Negative.    Neurological: Positive for weakness.   Psychiatric/Behavioral: Negative.    Allergic/Immunologic: Negative.    All other systems reviewed and are negative.        Personal History      Medical History        Past Medical History:   Diagnosis Date   • Arthritis     • GERD (gastroesophageal reflux disease)     • Hypertension     • Oligodendroglioma (HCC)     • Seizure (HCC)     • Type 2 diabetes mellitus with hyperglycemia, without long-term current use of insulin (HCC) 05/12/2021            Surgical History         Past Surgical History:   Procedure Laterality Date   • BRONCHOSCOPY N/A 4/6/2022     Procedure: BRONCHOSCOPY with bronchial washing;  Surgeon: Drew Alcantar MD;  Location: [facility] ENDOSCOPY;  Service: Pulmonary;  Laterality: N/A;   pneumonia   • COLONOSCOPY       • CRANIOTOMY FOR TUMOR Right 05/06/2021     Procedure: CRANIOTOMY FOR TUMOR RESECTION WITH STEREOTACTIC;  Surgeon: Jluis Felix MD;  Location: [facility] OR;  Service: Neurosurgery;  Laterality: Right;            Family History: family history includes Breast cancer in her cousin, maternal aunt, and niece; Colon cancer in her maternal aunt; Kidney disease in her mother; Prostate cancer in her brother. Otherwise pertinent FHx was reviewed and not pertinent to current issue.     Social History:  reports that she has never smoked. She has never used smokeless tobacco. She reports previous alcohol use of about 1.0 standard drink of alcohol per week. She reports that she does not use drugs.     Home Medications:   Budeson-Glycopyrrol-Formoterol, albuterol sulfate HFA, amLODIPine, ferrous sulfate, fluticasone, folic acid, levETIRAcetam, ondansetron, oxybutynin, and venlafaxine XR     Allergies:  No Known Allergies        Objective       Vitals:  Temp:  [97.7 °F (36.5 °C)-98.7 °F (37.1 °C)] 98.7 °F (37.1 °C)  Heart Rate:  [64-71] 71  Resp:  [16-18] 18  BP: (119-123)/(74-77) 121/77     Physical Exam  Vitals and nursing note reviewed.   Constitutional:       Appearance: She is obese.   HENT:      Head: Normocephalic and atraumatic.   Eyes:      Extraocular Movements: Extraocular movements intact.      Pupils: Pupils are equal, round, and reactive to light.   Cardiovascular:      Rate and Rhythm: Normal rate and regular rhythm.      Pulses: Normal pulses.      Heart sounds: Normal heart sounds.   Pulmonary:      Effort: Pulmonary effort is normal.      Breath sounds: Examination of the right-upper field reveals decreased breath sounds. Examination of the left-upper field reveals decreased breath sounds. Examination of the right-lower field reveals decreased breath sounds. Examination of the left-lower field reveals decreased breath sounds. Decreased breath sounds present.   Abdominal:       General: Bowel sounds are normal.      Palpations: Abdomen is soft.      Tenderness: There is no abdominal tenderness.   Musculoskeletal:         General: Normal range of motion.   Skin:     General: Skin is warm and dry.   Neurological:      Mental Status: She is alert and oriented to person, place, and time.   Psychiatric:         Mood and Affect: Mood normal.         Behavior: Behavior normal.         Result Review    Result Review:  I have personally reviewed the results from the time of this admission to 5/31/2022 13:16 EDT and agree with these findings:  [x]?  Laboratory  []?  Microbiology  [x]?  Radiology  []?  EKG/Telemetry   []?  Cardiology/Vascular   []?  Pathology  [x]?  Old records     Lab Results   Component Value Date    GLUCOSE 109 (H) 06/04/2022    CALCIUM 8.7 06/04/2022     06/04/2022    K 3.7 06/04/2022    CO2 22.0 06/04/2022     06/04/2022    BUN 20 06/04/2022    CREATININE 0.56 (L) 06/04/2022    EGFRIFNONA 77 01/27/2022    BCR 35.7 (H) 06/04/2022    ANIONGAP 10.0 06/04/2022     Lab Results   Component Value Date    HGBA1C 5.7 (H) 11/01/2021     Lab Results   Component Value Date    WBC 5.10 06/04/2022    HGB 7.7 (L) 06/04/2022    HCT 22.7 (L) 06/04/2022    MCV 97.2 (H) 06/04/2022    PLT 50 (L) 06/04/2022   XR Chest 1 View    Result Date: 6/1/2022  No acute cardiopulmonary abnormality.  Electronically Signed By-Jon Lynch MD On:6/1/2022 7:11 PM This report was finalized on [id number] by  Jon Lynch MD.    CT Angiogram Chest Pulmonary Embolism    Result Date: 5/31/2022  1. No evidence of pulmonary embolism. 2. No evidence of thoracic aortic aneurysm or dissection. 3. Very subtle groundglass attenuation seen throughout the lungs bilaterally could potentially be inflammatory or infectious. Clinical correlation is recommended. 4. Cholelithiasis. 5. Mild splenomegaly. Electronically signed by:  Robel Aj D.O.  5/30/2022 10:11 PM    CT Abdomen Pelvis Stone  Protocol    Result Date: 6/2/2022  1. Small 2 mm nonobstructing stone within the superior left kidney. No evidence of obstructive uropathy. 2. Nonspecific hepatosplenomegaly. Correlate clinically for risk factors of chronic liver disease. 3. Patchy bibasilar groundglass opacities with a bronchovascular distribution. This can be seen with bronchiolitis or mild bronchopneumonia. 4. Cholelithiasis with gallstones clustered within the fundus of the gallbladder likely related to superimposed gallbladder adenomyomatosis.    Electronically Signed By-Tayo Brumfield MD On:6/2/2022 2:09 PM This report was finalized on [id number] by  Tayo Brumfield MD.    CT Bone marrow biopsy and aspiration    Result Date: 6/2/2022  CT-guided bone marrow core biopsy and aspirate.  Electronically Signed By-Vick Draper MD On:6/2/2022 3:41 PM This report was finalized on [id number] by  Vick Draper MD.    Assessment & Plan          Active Hospital Problems:        Active Hospital Problems     Diagnosis     • **Sepsis with hypotension (HCC)     • Anemia due to chemotherapy     • Acute kidney injury (HCC)     • Weakness     • UTI (urinary tract infection)     • Acute respiratory failure with hypoxia (HCC)     • Pneumonia         Added automatically from request for surgery [id number]      • Type 2 diabetes mellitus with hyperglycemia, without long-term current use of insulin (HCC)     • Morbid obesity with BMI of 60.0-69.9, adult (HCC)     • Gastroesophageal reflux disease with hiatal hernia         Formatting of this note might be different from the original.  S/P EGD (08/19/19) = GASTRITIS, BX = NEG  GI - DR. LE>>>IF STILL SXC, REFER TO GEN SURGERY FOR YEFRI     Last Assessment & Plan:   Formatting of this note might be different from the original.  Stable on pantoprazole      • Brain tumor (HCC)     • Seizure (HCC)     • Essential hypertension     • Dyslipidemia         Last Assessment & Plan:   Formatting of this note might be  different from the original.  FLP today      • Depression        Assessment and plan/hospital course.      Sepsis  -likely secondary to pneumonia, hypoxia, UTI, and intravascular volume depletion from anemia and dehydration   -WBC 17.8 now 8.1, temp 102.8 now normalized   -lactic 11.7 now normalized   -procalcitonin 6.58  -blood cultures no growth to date.  -BP improved after 30cc/IVFs and 2 units PRBCs  -Was IV cefepime, vancomycin    E. coli bacteremia; likely source UTI or port infection positive blood cultures from 5/30/2022, infectious disease consult appreciated, patient to be discharged on IV ceftriaxone for 2 weeks.  Acute respiratory failure with hypoxia  Pneumonitis   -ABG showed pH 7.41, PCO2 18, PaO2 245, HCO3 11.7 on 100% nonrebreather  -requiring nonrebreather now weaned down to 4L O2 via NC  -CT PE protocol:  no PE, no aneurysm or dissection, very subtle groundglass attenuation seen throughout the lungs bilaterally could potentially be inflammatory or infectious.  Cholelithiasis, mild splenomegaly.  -Was on IV cefepime, vancomycin, cefepime was discontinued and started on ceftriaxone 2 g IV daily for 2 weeks beginning 6/2/2022  -pulmonary managing, s/p bronchoscopy on 5/31/2022, showed no purulent secretions, started on prednisone 40 mg daily wean steroids slowly as per pulmonology.     Acute UTI  -UA: nitrite positive, large leukocytes, too numerous WBCs, 4+ bacteria  -urine culture more than 100,000 CFU per mL of Klebsiella pneumonia  -on IV antibiotics as above  -CT abdomen pelvis with stone protocol  on 6/2/2022.  Patchy bibasilar opacities groundglass within the bronchovascular distribution can be bronchiolitis or mild bronchopneumonia small 2 mm nonobstructing stone within the left superior kidney no evidence of obstructive uropathy.     Acute kidney injury; baseline serum creatinine 0.8, but 1.49 on admission currently resolved.  Metabolic acidosis  Dehydration  -received IVFs and PRBCs,  improving   -monitor BMP     Anemia; underlying anemia of chronic disease related to myelosuppression  -likely from metastatic disease, previous chemotherapy  -hgb 6.9 s/p 2 units PRBCs, currently hemoglobin at 7.8  -oncology following.  S/p CT-guided bone marrow biopsy by IR on 6/2/2022, biopsy results awaited to follow-up with oncology as outpatient..     Oligodendroglioma s/p craniotomy/resection of right frontal lobe mass 5/6/2021  -completed radiation  -chemotherapy had to be stopped due to possible pneumonitis found on bronchoscopy April 2022. She was started on an antibiotic, which she could not tolerate.   -oncology consulted      Altered mental status - resolved      Weakness/deconditioning/super morbid obesity  -likely multifactorial from all above  -PT eval      This patient has been examined wearing appropriate Personal Protective Equipment   Part of this note may be an electronic transcription/translation of spoken language to printed text using the Dragon Dictation System.           Your medication list      START taking these medications      Instructions Last Dose Given Next Dose Due   cefTRIAXone 2 g in sodium chloride 0.9 % 100 mL IVPB      Infuse 2 g into a venous catheter Daily for 12 doses. Indications: Bacteria in the Blood, Urinary Tract Infection       predniSONE 20 MG tablet  Commonly known as: DELTASONE  Start taking on: June 4, 2022      Take 1 tablet by mouth Daily With Breakfast for 5 days, THEN 0.5 tablets Daily With Breakfast for 5 days.          CONTINUE taking these medications      Instructions Last Dose Given Next Dose Due   albuterol sulfate  (90 Base) MCG/ACT inhaler  Commonly known as: PROVENTIL HFA;VENTOLIN HFA;PROAIR HFA      Inhale 2 puffs Every 4 (Four) Hours As Needed for Wheezing.       KaryCoridon 160-9-4.8 MCG/ACT aerosol inhaler  Generic drug: Budeson-Glycopyrrol-Formoterol      Inhale 2 puffs 2 (Two) Times a Day. Indications: Lot#[id number] Exp: 8/30/23        ferrous sulfate 325 (65 FE) MG tablet      Take 1 tablet by mouth Daily With Breakfast.       fluticasone 44 MCG/ACT inhaler  Commonly known as: FLOVENT HFA      Inhale 2 puffs Daily As Needed.       folic acid 1 MG tablet  Commonly known as: FOLVITE      Take 1 mg by mouth Daily.       levETIRAcetam 750 MG tablet  Commonly known as: KEPPRA      Take 1 tablet by mouth 2 (Two) Times a Day.       ondansetron 8 MG tablet  Commonly known as: Zofran      Take 1 tablet by mouth Every 8 (Eight) Hours As Needed for Nausea or Vomiting.       oxybutynin 5 MG tablet  Commonly known as: DITROPAN      Take 5 mg by mouth 4 (Four) Times a Day.       venlafaxine  MG 24 hr capsule  Commonly known as: EFFEXOR-XR      Take 150 mg by mouth Daily.          STOP taking these medications    amLODIPine 10 MG tablet  Commonly known as: NORVASC              Where to Get Your Medications      These medications were sent to [address] [place] [address]    Hours: Mon-Fri 7:00AM-7:00PM Phone: [phone]   · predniSONE 20 MG tablet     Information about where to get these medications is not yet available    Ask your nurse or doctor about these medications  · cefTRIAXone 2 g in sodium chloride 0.9 % 100 mL IVPB           DISCHARGE Follow Up Recommendations for labs and diagnostics; follow-up with primary care, pulmonology, oncology as scheduled  Discharge condition; fair condition  Discharge diet; regular diet  Discharge activity; as per PT OT.  Discharge total time; more than 33 minutes.    Electronically signed by Femi Manzano MD, 06/04/22, 9:22 AM EDT.

## 2022-06-05 ENCOUNTER — HOME CARE VISIT (OUTPATIENT)
Dept: HOME HEALTH SERVICES | Facility: HOME HEALTHCARE | Age: 57
End: 2022-06-05

## 2022-06-05 LAB — BACTERIA SPEC AEROBE CULT: NORMAL

## 2022-06-05 PROCEDURE — G0299 HHS/HOSPICE OF RN EA 15 MIN: HCPCS

## 2022-06-05 NOTE — OUTREACH NOTE
Prep Survey    Flowsheet Row Responses   [facility] patient discharged from? [facility]   Is LACE score < 7 ? No   Emergency Room discharge w/ pulse ox? No   Eligibility Readm Mgmt   Discharge diagnosis Sepsis with hypotension , Pneumonia,   Anemia due to chemotherapy,  UTI    Does the patient have one of the following disease processes/diagnoses(primary or secondary)? Sepsis   Does the patient have Home health ordered? Yes   [facility]   Is there a DME ordered? Yes   What DME was ordered? [facility] for IV antibiotics   Comments regarding appointments See AVS   Medication alerts for this patient Ceftriaxone, keppra   General alerts for this patient IV antibiotics at home   Prep survey completed? Yes          BERTHA LEE - Registered Nurse

## 2022-06-06 ENCOUNTER — APPOINTMENT (OUTPATIENT)
Dept: LAB | Facility: HOSPITAL | Age: 57
End: 2022-06-06

## 2022-06-06 VITALS
SYSTOLIC BLOOD PRESSURE: 130 MMHG | HEART RATE: 77 BPM | DIASTOLIC BLOOD PRESSURE: 68 MMHG | OXYGEN SATURATION: 95 % | RESPIRATION RATE: 18 BRPM | TEMPERATURE: 98 F

## 2022-06-06 NOTE — PAYOR COMM NOTE
"DISCHARGE NOTIFICATION FOR CASE# [id number]    ==========    THANK YOU,    COY Simmons, RN  Utilization Review  [facility]  Phone: [phone]  Fax: [phone]      NPI: [id number]  Tax ID: [id number]    Nikki Cortes (56 y.o. Female)             Date of Birth   1965    Social Security Number       Address   [address] [place] [facility]    Home Phone   [phone]    MRN   [id number]       Yazdanism   None    Marital Status                               Admission Date   5/30/22    Admission Type   Emergency    Admitting Provider   Den Feldman MD    Attending Provider       Department, Room/Bed   [facility] 3A MEDICAL INPATIENT, 311/1       Discharge Date   6/4/2022    Discharge Disposition   Home or Self Care    Discharge Destination                               Attending Provider: (none)   Allergies: No Known Allergies    Isolation: None   Infection: None   Code Status: CPR   Advance Care Planning Activity    Ht: 157.5 cm (62\")   Wt: 129 kg (284 lb 10.2 oz)    Admission Cmt: None   Principal Problem: Sepsis with hypotension (HCC) [A41.9,I95.9]                 Active Insurance as of 5/30/2022     Primary Coverage     Payor Plan Insurance Group Employer/Plan Group    [facility] [id number]     Payor Plan Address Payor Plan Phone Number Payor Plan Fax Number Effective Dates    [address]   4/1/2019 - None Entered    [facility] [id number]       Subscriber Name Subscriber Birth Date Member ID       NIKKI CORTES 1965 [id number]                 Emergency Contacts      (Rel.) Home Phone Work Phone Mobile Phone    BJ ROJAS (Significant Other) -- -- [phone]    OZIEL LAGOS (Sister) -- -- [phone]    AMILCAR LAGOS (Brother) [phone] -- [phone]               Discharge Summary      Femi Manzano MD at 06/04/22 0919               [facility] Medicine Services - Consult Note   "   Patient Name: Cindy Cortes  : 1965  MRN: [id number]  Primary Care Physician:  Annamarie Monroy APRN  Referring Physician: BJ Rolon  Date of admission: 2022   Date of discharge; 2022       Subjective       Reason for Consult/ Chief Complaint: shortness of breath, lethargy, weakness     History of Present Illness: Cindy Cortes is a 56 y.o. 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 She was started on an antibiotic, which she could not tolerate. She has also had pancytopenia and required multiple blood transfusions. The patient stated she has had shortness of breath upon exertion for the last 3-4 weeks, worse in the last 1 week. The patient's family stated she was short of breath, lethargic, and could not walk/hold herself up due to weakness on 2022 so they called EMS. She has had a nonproductive cough and chills in the last 24 hours. She denied any blood in her urine or stool.      In the ED the patient was on a nonrebreather placed by EMS. She was tachycardic, mildly hypotensive, tachypneic, and had temperature of 102.5. Her hgb was 6.9, WBC 17.8, lactate 11.7, procalcitonin 6.58.  ABG showed pH 7.41, PCO2 18, PaO2 245, HCO3 11.7 on 100% nonrebreather.  CT PE protocol showed no PE, no aneurysm or dissection, very subtle groundglass attenuation seen throughout the lungs bilaterally could potentially be inflammatory or infectious.  Cholelithiasis, mild splenomegaly. Urinalysis was nitrite positive, large leukocytes, too numerous WBCs, 4+ bacteria.  She was given 30cc/kg IVFs, 2 units PRBCs, IV antibiotics. She reportedly has a history of antibodies so she was pretreated with antihistamine, steroids, and tylenol. Her oxygen was weaned down to 4L O2. She was diagnosed with sepsis with septic shock, metabolic  acidosis, pneumonitis, UTI, anemia, dehydration, and altered mental status.         Date:   5/31/2022: Patient felt stable for transfer out of ICU. She did not require vasopressors. Pulmonary planning bronchoscopy   6/1/2022; patient is sitting in the side of the bed, afebrile vital signs are stable, spoke with the bedside RN awaiting bed and general medical floor.  6/2/2022; afebrile vital signs are stable currently on 3 L of nasal cannula no new symptoms endorsed spoke with the bedside RN.  6/3/2022; patient is lying in the La-Z-Boy chair at the bedside no new symptoms endorsed, hemodynamically stable.  6/4/2022; patient complaining of shortness of breath even walking to the bathroom likely deconditioning and anemia, but will get walking oximetry before discharge.  Review of Systems   Constitutional: Positive for fever.   HENT: Negative.    Eyes: Negative.    Cardiovascular: Negative.    Respiratory: Positive for cough and shortness of breath.    Endocrine: Negative.    Hematologic/Lymphatic: Negative.    Skin: Negative.    Musculoskeletal: Negative.    Gastrointestinal: Negative.    Genitourinary: Negative.    Neurological: Positive for weakness.   Psychiatric/Behavioral: Negative.    Allergic/Immunologic: Negative.    All other systems reviewed and are negative.        Personal History      Medical History        Past Medical History:   Diagnosis Date   • Arthritis     • GERD (gastroesophageal reflux disease)     • Hypertension     • Oligodendroglioma (HCC)     • Seizure (HCC)     • Type 2 diabetes mellitus with hyperglycemia, without long-term current use of insulin (HCC) 05/12/2021            Surgical History         Past Surgical History:   Procedure Laterality Date   • BRONCHOSCOPY N/A 4/6/2022     Procedure: BRONCHOSCOPY with bronchial washing;  Surgeon: Drew Alcantar MD;  Location: [facility] ENDOSCOPY;  Service: Pulmonary;  Laterality: N/A;  pneumonia   • COLONOSCOPY       • CRANIOTOMY FOR TUMOR Right  05/06/2021     Procedure: CRANIOTOMY FOR TUMOR RESECTION WITH STEREOTACTIC;  Surgeon: Jluis Felix MD;  Location: [facility] MAIN OR;  Service: Neurosurgery;  Laterality: Right;            Family History: family history includes Breast cancer in her cousin, maternal aunt, and niece; Colon cancer in her maternal aunt; Kidney disease in her mother; Prostate cancer in her brother. Otherwise pertinent FHx was reviewed and not pertinent to current issue.     Social History:  reports that she has never smoked. She has never used smokeless tobacco. She reports previous alcohol use of about 1.0 standard drink of alcohol per week. She reports that she does not use drugs.     Home Medications:   Budeson-Glycopyrrol-Formoterol, albuterol sulfate HFA, amLODIPine, ferrous sulfate, fluticasone, folic acid, levETIRAcetam, ondansetron, oxybutynin, and venlafaxine XR     Allergies:  No Known Allergies        Objective       Vitals:  Temp:  [97.7 °F (36.5 °C)-98.7 °F (37.1 °C)] 98.7 °F (37.1 °C)  Heart Rate:  [64-71] 71  Resp:  [16-18] 18  BP: (119-123)/(74-77) 121/77     Physical Exam  Vitals and nursing note reviewed.   Constitutional:       Appearance: She is obese.   HENT:      Head: Normocephalic and atraumatic.   Eyes:      Extraocular Movements: Extraocular movements intact.      Pupils: Pupils are equal, round, and reactive to light.   Cardiovascular:      Rate and Rhythm: Normal rate and regular rhythm.      Pulses: Normal pulses.      Heart sounds: Normal heart sounds.   Pulmonary:      Effort: Pulmonary effort is normal.      Breath sounds: Examination of the right-upper field reveals decreased breath sounds. Examination of the left-upper field reveals decreased breath sounds. Examination of the right-lower field reveals decreased breath sounds. Examination of the left-lower field reveals decreased breath sounds. Decreased breath sounds present.   Abdominal:      General: Bowel sounds are normal.      Palpations: Abdomen  is soft.      Tenderness: There is no abdominal tenderness.   Musculoskeletal:         General: Normal range of motion.   Skin:     General: Skin is warm and dry.   Neurological:      Mental Status: She is alert and oriented to person, place, and time.   Psychiatric:         Mood and Affect: Mood normal.         Behavior: Behavior normal.         Result Review    Result Review:  I have personally reviewed the results from the time of this admission to 5/31/2022 13:16 EDT and agree with these findings:  [x]?  Laboratory  []?  Microbiology  [x]?  Radiology  []?  EKG/Telemetry   []?  Cardiology/Vascular   []?  Pathology  [x]?  Old records     Lab Results   Component Value Date    GLUCOSE 109 (H) 06/04/2022    CALCIUM 8.7 06/04/2022     06/04/2022    K 3.7 06/04/2022    CO2 22.0 06/04/2022     06/04/2022    BUN 20 06/04/2022    CREATININE 0.56 (L) 06/04/2022    EGFRIFNONA 77 01/27/2022    BCR 35.7 (H) 06/04/2022    ANIONGAP 10.0 06/04/2022     Lab Results   Component Value Date    HGBA1C 5.7 (H) 11/01/2021     Lab Results   Component Value Date    WBC 5.10 06/04/2022    HGB 7.7 (L) 06/04/2022    HCT 22.7 (L) 06/04/2022    MCV 97.2 (H) 06/04/2022    PLT 50 (L) 06/04/2022   XR Chest 1 View    Result Date: 6/1/2022  No acute cardiopulmonary abnormality.  Electronically Signed By-Jon Lynch MD On:6/1/2022 7:11 PM This report was finalized on [id number] by  Jon Lynch MD.    CT Angiogram Chest Pulmonary Embolism    Result Date: 5/31/2022  1. No evidence of pulmonary embolism. 2. No evidence of thoracic aortic aneurysm or dissection. 3. Very subtle groundglass attenuation seen throughout the lungs bilaterally could potentially be inflammatory or infectious. Clinical correlation is recommended. 4. Cholelithiasis. 5. Mild splenomegaly. Electronically signed by:  Robel Aj D.O.  5/30/2022 10:11 PM    CT Abdomen Pelvis Stone Protocol    Result Date: 6/2/2022  1. Small 2 mm nonobstructing stone within  the superior left kidney. No evidence of obstructive uropathy. 2. Nonspecific hepatosplenomegaly. Correlate clinically for risk factors of chronic liver disease. 3. Patchy bibasilar groundglass opacities with a bronchovascular distribution. This can be seen with bronchiolitis or mild bronchopneumonia. 4. Cholelithiasis with gallstones clustered within the fundus of the gallbladder likely related to superimposed gallbladder adenomyomatosis.    Electronically Signed By-Tayo Brumfield MD On:6/2/2022 2:09 PM This report was finalized on [id number] by  Tayo Brumfield MD.    CT Bone marrow biopsy and aspiration    Result Date: 6/2/2022  CT-guided bone marrow core biopsy and aspirate.  Electronically Signed By-Vick Draper MD On:6/2/2022 3:41 PM This report was finalized on [id number] by  Vick Draper MD.    Assessment & Plan          Active Hospital Problems:        Active Hospital Problems     Diagnosis     • **Sepsis with hypotension (HCC)     • Anemia due to chemotherapy     • Acute kidney injury (HCC)     • Weakness     • UTI (urinary tract infection)     • Acute respiratory failure with hypoxia (HCC)     • Pneumonia         Added automatically from request for surgery [id number]      • Type 2 diabetes mellitus with hyperglycemia, without long-term current use of insulin (HCC)     • Morbid obesity with BMI of 60.0-69.9, adult (HCC)     • Gastroesophageal reflux disease with hiatal hernia         Formatting of this note might be different from the original.  S/P EGD (08/19/19) = GASTRITIS, BX = NEG  GI - DR. LE>>>IF STILL SXC, REFER TO GEN SURGERY FOR YEFRI     Last Assessment & Plan:   Formatting of this note might be different from the original.  Stable on pantoprazole      • Brain tumor (HCC)     • Seizure (HCC)     • Essential hypertension     • Dyslipidemia         Last Assessment & Plan:   Formatting of this note might be different from the original.  FLP today      • Depression        Assessment  and plan/hospital course.      Sepsis  -likely secondary to pneumonia, hypoxia, UTI, and intravascular volume depletion from anemia and dehydration   -WBC 17.8 now 8.1, temp 102.8 now normalized   -lactic 11.7 now normalized   -procalcitonin 6.58  -blood cultures no growth to date.  -BP improved after 30cc/IVFs and 2 units PRBCs  -Was IV cefepime, vancomycin    E. coli bacteremia; likely source UTI or port infection positive blood cultures from 5/30/2022, infectious disease consult appreciated, patient to be discharged on IV ceftriaxone for 2 weeks.  Acute respiratory failure with hypoxia  Pneumonitis   -ABG showed pH 7.41, PCO2 18, PaO2 245, HCO3 11.7 on 100% nonrebreather  -requiring nonrebreather now weaned down to 4L O2 via NC  -CT PE protocol:  no PE, no aneurysm or dissection, very subtle groundglass attenuation seen throughout the lungs bilaterally could potentially be inflammatory or infectious.  Cholelithiasis, mild splenomegaly.  -Was on IV cefepime, vancomycin, cefepime was discontinued and started on ceftriaxone 2 g IV daily for 2 weeks beginning 6/2/2022  -pulmonary managing, s/p bronchoscopy on 5/31/2022, showed no purulent secretions, started on prednisone 40 mg daily wean steroids slowly as per pulmonology.     Acute UTI  -UA: nitrite positive, large leukocytes, too numerous WBCs, 4+ bacteria  -urine culture more than 100,000 CFU per mL of Klebsiella pneumonia  -on IV antibiotics as above  -CT abdomen pelvis with stone protocol  on 6/2/2022.  Patchy bibasilar opacities groundglass within the bronchovascular distribution can be bronchiolitis or mild bronchopneumonia small 2 mm nonobstructing stone within the left superior kidney no evidence of obstructive uropathy.     Acute kidney injury; baseline serum creatinine 0.8, but 1.49 on admission currently resolved.  Metabolic acidosis  Dehydration  -received IVFs and PRBCs, improving   -monitor BMP     Anemia; underlying anemia of chronic disease related  to myelosuppression  -likely from metastatic disease, previous chemotherapy  -hgb 6.9 s/p 2 units PRBCs, currently hemoglobin at 7.8  -oncology following.  S/p CT-guided bone marrow biopsy by IR on 6/2/2022, biopsy results awaited to follow-up with oncology as outpatient..     Oligodendroglioma s/p craniotomy/resection of right frontal lobe mass 5/6/2021  -completed radiation  -chemotherapy had to be stopped due to possible pneumonitis found on bronchoscopy April 2022. She was started on an antibiotic, which she could not tolerate.   -oncology consulted      Altered mental status - resolved      Weakness/deconditioning/super morbid obesity  -likely multifactorial from all above  -PT eval      This patient has been examined wearing appropriate Personal Protective Equipment   Part of this note may be an electronic transcription/translation of spoken language to printed text using the Dragon Dictation System.           Your medication list      START taking these medications      Instructions Last Dose Given Next Dose Due   cefTRIAXone 2 g in sodium chloride 0.9 % 100 mL IVPB      Infuse 2 g into a venous catheter Daily for 12 doses. Indications: Bacteria in the Blood, Urinary Tract Infection       predniSONE 20 MG tablet  Commonly known as: DELTASONE  Start taking on: June 4, 2022      Take 1 tablet by mouth Daily With Breakfast for 5 days, THEN 0.5 tablets Daily With Breakfast for 5 days.          CONTINUE taking these medications      Instructions Last Dose Given Next Dose Due   albuterol sulfate  (90 Base) MCG/ACT inhaler  Commonly known as: PROVENTIL HFA;VENTOLIN HFA;PROAIR HFA      Inhale 2 puffs Every 4 (Four) Hours As Needed for Wheezing.       Lisai Aerosphere 160-9-4.8 MCG/ACT aerosol inhaler  Generic drug: Budeson-Glycopyrrol-Formoterol      Inhale 2 puffs 2 (Two) Times a Day. Indications: Lot#[id number] Exp: 8/30/23       ferrous sulfate 325 (65 FE) MG tablet      Take 1 tablet by mouth Daily With  Breakfast.       fluticasone 44 MCG/ACT inhaler  Commonly known as: FLOVENT HFA      Inhale 2 puffs Daily As Needed.       folic acid 1 MG tablet  Commonly known as: FOLVITE      Take 1 mg by mouth Daily.       levETIRAcetam 750 MG tablet  Commonly known as: KEPPRA      Take 1 tablet by mouth 2 (Two) Times a Day.       ondansetron 8 MG tablet  Commonly known as: Zofran      Take 1 tablet by mouth Every 8 (Eight) Hours As Needed for Nausea or Vomiting.       oxybutynin 5 MG tablet  Commonly known as: DITROPAN      Take 5 mg by mouth 4 (Four) Times a Day.       venlafaxine  MG 24 hr capsule  Commonly known as: EFFEXOR-XR      Take 150 mg by mouth Daily.          STOP taking these medications    amLODIPine 10 MG tablet  Commonly known as: NORVASC              Where to Get Your Medications      These medications were sent to [facility] [address] [place] [address]    Hours: Mon-Fri 7:00AM-7:00PM Phone: [phone]   · predniSONE 20 MG tablet     Information about where to get these medications is not yet available    Ask your nurse or doctor about these medications  · cefTRIAXone 2 g in sodium chloride 0.9 % 100 mL IVPB           DISCHARGE Follow Up Recommendations for labs and diagnostics; follow-up with primary care, pulmonology, oncology as scheduled  Discharge condition; fair condition  Discharge diet; regular diet  Discharge activity; as per PT OT.  Discharge total time; more than 33 minutes.    Electronically signed by Aquiles Linda MD, 06/04/22, 9:22 AM EDT.            Electronically signed by Aquiles Linda MD at 06/04/22 0924       Discharge Order (From admission, onward)     Start     Ordered    06/04/22 0918  Discharge patient  Once        Expected Discharge Date: 06/04/22    Expected Discharge Time: Morning    Discharge Disposition: Home or Self Care    Physician of Record for Attribution - Please select from Treatment Team: AQUILES LINDA [[id number]]    Review  needed by CMO to determine Physician of Record: No    Please choose which facility the patient is currently admitted if they are being discharged to another facility or unit.: [facility]       Question Answer Comment   Physician of Record for Attribution - Please select from Treatment Team AQUILES LINDA    Review needed by CMO to determine Physician of Record No    Please choose which facility the patient is currently admitted if they are being discharged to another facility or unit. [facility]        06/04/22 0919

## 2022-06-06 NOTE — CASE MANAGEMENT/SOCIAL WORK
Case Management Discharge Note      [facility]    Provided Post Acute Provider List?: N/A  Post Acute Provider List: Home Health  Provided Post Acute Provider Quality & Resource List?: N/A  Post Acute Provider Quality and Resource List: Home Health  Delivered To: Patient  Method of Delivery: In person    Selected Continued Care - Discharged on 6/4/2022 Admission date: 5/30/2022 - Discharge disposition: Home or Self Care        Dialysis/Infusion Coordination complete.    Service Provider Selected Services Address Phone Fax Patient Preferred    [facility] INFUSION  Infusion and IV Therapy [address] [id number] [phone] --          Home Medical Care Coordination complete.    Service Provider Selected Services Address Phone Fax Patient Preferred    [facility]  [facility] [address] [address] [phone] --                  Transportation Services  Private: Car    Final Discharge Disposition Code: 06 - home with home health care

## 2022-06-06 NOTE — HOME HEALTH
Patient is a 56 year old female referred to [facility] for sepsis. She is on once daily ceftriaxone through her port until 6.16.22. She had a bone marrow biopsy and states her low back is sore but improving. Caregiver verbalized understanding of IV infusion and performed successfully using teachback method. Also received teaching from [facility] earlier today. Port needle was changed yesterday, dressing CDI. Patient ambulating without DME, states she is weak but declines PT at this time.     PMH: oligodendroglioma s/p craniotomy/resection of right frontal lobe mass 5.6.21. (Completed radiation, chemo had to be stopped due to possible pneumonitis in April, developed pancytopenia and required multiple blood transfusions), UTI, sepsis with septic shock, anemia, dehydration    Plan for next visit: CP assess, port needle/dressing change, IV infusion education, assess pain/falls/safety, f/u on any MD appts

## 2022-06-07 LAB
SRA .2 IU/ML UFH SER-ACNC: 4 % (ref 0–20)
SRA 100IU/ML UFH SER-ACNC: 3 % (ref 0–20)
SRA UFH SER-IMP: NORMAL

## 2022-06-08 ENCOUNTER — READMISSION MANAGEMENT (OUTPATIENT)
Dept: CALL CENTER | Facility: HOSPITAL | Age: 57
End: 2022-06-08

## 2022-06-08 NOTE — OUTREACH NOTE
Sepsis Week 1 Survey    Flowsheet Row Responses   [facility] facility patient discharged from? [facility]   Does the patient have one of the following disease processes/diagnoses(primary or secondary)? Sepsis   Week 1 attempt successful? No   Unsuccessful attempts Attempt 1          CLIFF VIZCAINO - Registered Nurse

## 2022-06-09 ENCOUNTER — HOME CARE VISIT (OUTPATIENT)
Dept: HOME HEALTH SERVICES | Facility: HOME HEALTHCARE | Age: 57
End: 2022-06-09

## 2022-06-09 VITALS
OXYGEN SATURATION: 90 % | DIASTOLIC BLOOD PRESSURE: 78 MMHG | RESPIRATION RATE: 18 BRPM | SYSTOLIC BLOOD PRESSURE: 142 MMHG | HEART RATE: 61 BPM | TEMPERATURE: 96.7 F

## 2022-06-09 LAB — VIRUS SPEC CULT: NORMAL

## 2022-06-09 PROCEDURE — G0299 HHS/HOSPICE OF RN EA 15 MIN: HCPCS

## 2022-06-09 NOTE — HOME HEALTH
SOA at baseline, no worsening respiratory symptoms. Dressing and needle to port changed per sterile protocol. Blood return noted, infusion started. Patient tolerated well. Patient demonstrates ability to manage IV abx adminstration. Patient denies CP, recent falls or other symptoms. Full med rec completed with patient. Patient verbalizes understanding of medications.     Plan for next visit: CL needle and dressing change, medication review, disease management education.

## 2022-06-10 ENCOUNTER — READMISSION MANAGEMENT (OUTPATIENT)
Dept: CALL CENTER | Facility: HOSPITAL | Age: 57
End: 2022-06-10

## 2022-06-10 NOTE — OUTREACH NOTE
Sepsis Week 1 Survey    Flowsheet Row Responses   [facility] patient discharged from? [facility]   Does the patient have one of the following disease processes/diagnoses(primary or secondary)? Sepsis   Week 1 attempt successful? Yes   Call start time 1336   Call end time 1338   Discharge diagnosis Sepsis with hypotension , Pneumonia,   Anemia due to chemotherapy,  UTI    Person spoke with today (if not patient) and relationship Kvng    Meds reviewed with patient/caregiver? Yes   Is the patient having any side effects they believe may be caused by any medication additions or changes? No   Does the patient have all medications related to this admission filled (includes all antibiotics, inhalers, nebulizers,steroids,etc.) Yes   Is the patient taking all medications as directed (includes completed medication regime)? Yes   Does the patient have a primary care provider?  Yes   Comments regarding PCP Annamarie Monroy APRN   Does the patient have an appointment with their PCP within 7 days of discharge? No   What is preventing the patient from scheduling follow up appointments within 7 days of discharge? Haven't had time   Nursing Interventions Educated patient on importance of making appointment, Advised patient to make appointment   Has the patient kept scheduled appointments due by today? N/A   What is the Home health agency?   [facility]   Has home health visited the patient within 72 hours of discharge? Yes   What DME was ordered? [facility] for IV antibiotics   Has all DME been delivered? Yes   Psychosocial issues? No   Did the patient receive a copy of their discharge instructions? Yes   Nursing interventions Reviewed instructions with patient   What is the patient's perception of their health status since discharge? Improving   Nursing interventions Nurse provided patient education   Is the patient/caregiver able to teach back Sepsis? S - Shivering,fever or very cold, E - Extreme pain or generalized  discomfort (worst ever,especially abdomen)   Nursing interventions Nurse provided reassurance to patient, Nurse provided patient education   Is patient/caregiver able to teach back steps to recovery at home? Set small, achievable goals for return to baseline health, Rest and regain strength, Make a list of questions for PCP appoinment   Is the patient/caregiver able to teach back signs and symptoms of worsening condition: Fever, Rapid heart rate (>90)   If the patient is a current smoker, are they able to teach back resources for cessation? Not a smoker   Is the patient/caregiver able to teach back the hierarchy of who to call/visit for symptoms/problems? PCP, Specialist, Home health nurse, Urgent Care, ED, 911 Yes   Week 1 call completed? Yes   Wrap up additional comments No needs or issues per            JANETH JEONG - Registered Nurse

## 2022-06-14 ENCOUNTER — HOSPITAL ENCOUNTER (OUTPATIENT)
Dept: INFUSION THERAPY | Facility: HOSPITAL | Age: 57
Setting detail: INFUSION SERIES
Discharge: HOME | End: 2022-06-14

## 2022-06-14 ENCOUNTER — APPOINTMENT (OUTPATIENT)
Dept: LAB | Facility: HOSPITAL | Age: 57
End: 2022-06-14

## 2022-06-14 ENCOUNTER — HOSPITAL ENCOUNTER (OUTPATIENT)
Dept: ONCOLOGY | Facility: HOSPITAL | Age: 57
Setting detail: INFUSION SERIES
Discharge: HOME | End: 2022-06-14

## 2022-06-14 ENCOUNTER — OFFICE VISIT (OUTPATIENT)
Dept: ONCOLOGY | Facility: CLINIC | Age: 57
End: 2022-06-14

## 2022-06-14 VITALS
RESPIRATION RATE: 16 BRPM | HEART RATE: 74 BPM | DIASTOLIC BLOOD PRESSURE: 58 MMHG | HEIGHT: 62 IN | TEMPERATURE: 97.1 F | WEIGHT: 283 LBS | OXYGEN SATURATION: 98 % | BODY MASS INDEX: 52.08 KG/M2 | SYSTOLIC BLOOD PRESSURE: 108 MMHG

## 2022-06-14 VITALS
TEMPERATURE: 98 F | HEART RATE: 60 BPM | SYSTOLIC BLOOD PRESSURE: 123 MMHG | OXYGEN SATURATION: 94 % | RESPIRATION RATE: 16 BRPM | DIASTOLIC BLOOD PRESSURE: 64 MMHG

## 2022-06-14 DIAGNOSIS — D64.81 ANEMIA DUE TO CHEMOTHERAPY: ICD-10-CM

## 2022-06-14 DIAGNOSIS — T45.1X5A ANEMIA DUE TO CHEMOTHERAPY: ICD-10-CM

## 2022-06-14 DIAGNOSIS — C71.9 OLIGODENDROGLIOMA: ICD-10-CM

## 2022-06-14 DIAGNOSIS — D64.9 TRANSFUSION-DEPENDENT ANEMIA: Primary | ICD-10-CM

## 2022-06-14 DIAGNOSIS — D50.0 IRON DEFICIENCY ANEMIA DUE TO CHRONIC BLOOD LOSS: Primary | ICD-10-CM

## 2022-06-14 DIAGNOSIS — C71.9 OLIGODENDROGLIOMA: Primary | ICD-10-CM

## 2022-06-14 DIAGNOSIS — Z95.828 PORT-A-CATH IN PLACE: Primary | ICD-10-CM

## 2022-06-14 LAB
ABO GROUP BLD: NORMAL
ANTI-FYA: NORMAL
BASOPHILS # BLD AUTO: 0 10*3/MM3 (ref 0–0.2)
BASOPHILS # BLD AUTO: 0.02 10*3/MM3 (ref 0–0.2)
BASOPHILS NFR BLD AUTO: 0.3 % (ref 0–1.5)
BASOPHILS NFR BLD AUTO: 0.3 % (ref 0–1.5)
BB HOLD TUBE: NORMAL
BLD GP AB SCN SERPL QL: POSITIVE
DEPRECATED RDW RBC AUTO: 76.1 FL (ref 37–54)
DEPRECATED RDW RBC AUTO: 76.1 FL (ref 37–54)
EOSINOPHIL # BLD AUTO: 0.08 10*3/MM3 (ref 0–0.4)
EOSINOPHIL # BLD AUTO: 0.1 10*3/MM3 (ref 0–0.4)
EOSINOPHIL NFR BLD AUTO: 0.8 % (ref 0.3–6.2)
EOSINOPHIL NFR BLD AUTO: 1 % (ref 0.3–6.2)
ERYTHROCYTE [DISTWIDTH] IN BLOOD BY AUTOMATED COUNT: 21.2 % (ref 12.3–15.4)
ERYTHROCYTE [DISTWIDTH] IN BLOOD BY AUTOMATED COUNT: 21.9 % (ref 12.3–15.4)
FERRITIN SERPL-MCNC: 994.6 NG/ML (ref 13–150)
FOLATE SERPL-MCNC: >20 NG/ML (ref 4.78–24.2)
HCT VFR BLD AUTO: 19 % (ref 34–46.6)
HCT VFR BLD AUTO: 20.2 % (ref 34–46.6)
HCT VFR BLD AUTO: 23.4 % (ref 34–46.6)
HGB BLD-MCNC: 6.4 G/DL (ref 12–15.9)
HGB BLD-MCNC: 6.4 G/DL (ref 12–15.9)
HGB BLD-MCNC: 7.9 G/DL (ref 12–15.9)
IRON 24H UR-MRATE: 190 MCG/DL (ref 37–145)
IRON SATN MFR SERPL: 76 % (ref 20–50)
LYMPHOCYTES # BLD AUTO: 0.6 10*3/MM3 (ref 0.7–3.1)
LYMPHOCYTES # BLD AUTO: 0.69 10*3/MM3 (ref 0.7–3.1)
LYMPHOCYTES NFR BLD AUTO: 8 % (ref 19.6–45.3)
LYMPHOCYTES NFR BLD AUTO: 8.9 % (ref 19.6–45.3)
MCH RBC QN AUTO: 34.5 PG (ref 26.6–33)
MCH RBC QN AUTO: 35 PG (ref 26.6–33)
MCHC RBC AUTO-ENTMCNC: 31.7 G/DL (ref 31.5–35.7)
MCHC RBC AUTO-ENTMCNC: 33.5 G/DL (ref 31.5–35.7)
MCV RBC AUTO: 102.8 FL (ref 79–97)
MCV RBC AUTO: 110.4 FL (ref 79–97)
MONOCYTES # BLD AUTO: 0.3 10*3/MM3 (ref 0.1–0.9)
MONOCYTES # BLD AUTO: 0.37 10*3/MM3 (ref 0.1–0.9)
MONOCYTES NFR BLD AUTO: 4.2 % (ref 5–12)
MONOCYTES NFR BLD AUTO: 4.8 % (ref 5–12)
NEUTROPHILS NFR BLD AUTO: 6.3 10*3/MM3 (ref 1.7–7)
NEUTROPHILS NFR BLD AUTO: 6.59 10*3/MM3 (ref 1.7–7)
NEUTROPHILS NFR BLD AUTO: 85 % (ref 42.7–76)
NEUTROPHILS NFR BLD AUTO: 86.7 % (ref 42.7–76)
NRBC BLD AUTO-RTO: 0.3 /100 WBC (ref 0–0.2)
PLATELET # BLD AUTO: 90 10*3/MM3 (ref 140–450)
PLATELET # BLD AUTO: 94 10*3/MM3 (ref 140–450)
PMV BLD AUTO: 10.2 FL (ref 6–12)
PMV BLD AUTO: 7.9 FL (ref 6–12)
RBC # BLD AUTO: 1.83 10*6/MM3 (ref 3.77–5.28)
RBC # BLD AUTO: 1.85 10*6/MM3 (ref 3.77–5.28)
RH BLD: POSITIVE
T&S EXPIRATION DATE: NORMAL
TIBC SERPL-MCNC: 249 MCG/DL (ref 298–536)
TRANSFERRIN SERPL-MCNC: 167 MG/DL (ref 200–360)
WBC NRBC COR # BLD: 7.3 10*3/MM3 (ref 3.4–10.8)
WBC NRBC COR # BLD: 7.75 10*3/MM3 (ref 3.4–10.8)

## 2022-06-14 PROCEDURE — 85018 HEMOGLOBIN: CPT | Performed by: INTERNAL MEDICINE

## 2022-06-14 PROCEDURE — 99214 OFFICE O/P EST MOD 30 MIN: CPT | Performed by: INTERNAL MEDICINE

## 2022-06-14 PROCEDURE — 86900 BLOOD TYPING SEROLOGIC ABO: CPT

## 2022-06-14 PROCEDURE — 86902 BLOOD TYPE ANTIGEN DONOR EA: CPT

## 2022-06-14 PROCEDURE — 82746 ASSAY OF FOLIC ACID SERUM: CPT | Performed by: INTERNAL MEDICINE

## 2022-06-14 PROCEDURE — 85025 COMPLETE CBC W/AUTO DIFF WBC: CPT

## 2022-06-14 PROCEDURE — 84466 ASSAY OF TRANSFERRIN: CPT | Performed by: INTERNAL MEDICINE

## 2022-06-14 PROCEDURE — 86900 BLOOD TYPING SEROLOGIC ABO: CPT | Performed by: INTERNAL MEDICINE

## 2022-06-14 PROCEDURE — 82668 ASSAY OF ERYTHROPOIETIN: CPT | Performed by: INTERNAL MEDICINE

## 2022-06-14 PROCEDURE — 86922 COMPATIBILITY TEST ANTIGLOB: CPT

## 2022-06-14 PROCEDURE — 83540 ASSAY OF IRON: CPT | Performed by: INTERNAL MEDICINE

## 2022-06-14 PROCEDURE — 86901 BLOOD TYPING SEROLOGIC RH(D): CPT | Performed by: INTERNAL MEDICINE

## 2022-06-14 PROCEDURE — 82728 ASSAY OF FERRITIN: CPT | Performed by: INTERNAL MEDICINE

## 2022-06-14 PROCEDURE — 36430 TRANSFUSION BLD/BLD COMPNT: CPT

## 2022-06-14 PROCEDURE — 85014 HEMATOCRIT: CPT | Performed by: INTERNAL MEDICINE

## 2022-06-14 PROCEDURE — 86870 RBC ANTIBODY IDENTIFICATION: CPT | Performed by: INTERNAL MEDICINE

## 2022-06-14 PROCEDURE — 86850 RBC ANTIBODY SCREEN: CPT | Performed by: INTERNAL MEDICINE

## 2022-06-14 PROCEDURE — P9016 RBC LEUKOCYTES REDUCED: HCPCS

## 2022-06-14 PROCEDURE — 36591 DRAW BLOOD OFF VENOUS DEVICE: CPT

## 2022-06-14 PROCEDURE — 25010000002 HEPARIN LOCK FLUSH PER 10 UNITS: Performed by: INTERNAL MEDICINE

## 2022-06-14 PROCEDURE — 85025 COMPLETE CBC W/AUTO DIFF WBC: CPT | Performed by: INTERNAL MEDICINE

## 2022-06-14 RX ORDER — SODIUM CHLORIDE 9 MG/ML
250 INJECTION, SOLUTION INTRAVENOUS AS NEEDED
Status: CANCELLED | OUTPATIENT
Start: 2022-06-14

## 2022-06-14 RX ORDER — SODIUM CHLORIDE 0.9 % (FLUSH) 0.9 %
10 SYRINGE (ML) INJECTION AS NEEDED
Status: CANCELLED | OUTPATIENT
Start: 2022-06-14

## 2022-06-14 RX ORDER — SODIUM CHLORIDE 0.9 % (FLUSH) 0.9 %
10 SYRINGE (ML) INJECTION AS NEEDED
Status: DISCONTINUED | OUTPATIENT
Start: 2022-06-14 | End: 2022-06-15 | Stop reason: HOSPADM

## 2022-06-14 RX ORDER — SODIUM CHLORIDE 9 MG/ML
250 INJECTION, SOLUTION INTRAVENOUS AS NEEDED
Status: DISCONTINUED | OUTPATIENT
Start: 2022-06-14 | End: 2022-06-16 | Stop reason: HOSPADM

## 2022-06-14 RX ORDER — HEPARIN SODIUM (PORCINE) LOCK FLUSH IV SOLN 100 UNIT/ML 100 UNIT/ML
500 SOLUTION INTRAVENOUS AS NEEDED
Status: CANCELLED | OUTPATIENT
Start: 2022-06-14

## 2022-06-14 RX ORDER — HEPARIN SODIUM (PORCINE) LOCK FLUSH IV SOLN 100 UNIT/ML 100 UNIT/ML
500 SOLUTION INTRAVENOUS AS NEEDED
Status: DISCONTINUED | OUTPATIENT
Start: 2022-06-14 | End: 2022-06-15 | Stop reason: HOSPADM

## 2022-06-14 RX ADMIN — Medication 20 ML: at 10:43

## 2022-06-14 RX ADMIN — HEPARIN 500 UNITS: 100 SYRINGE at 10:45

## 2022-06-14 NOTE — PROGRESS NOTES
HEMATOLOGY ONCOLOGY OUTPATIENT FOLLOW UP      Patient name: Cindy Cortes  : 1965  MRN: [id number]  Primary Care Physician: Annamarie Monroy APRN  Referring Physician: Annamarie Monroy APRN  Reason For Consult:     Chief Complaint   Patient presents with   • Follow-up     Oligoastrocytoma of frontal lobe     HPI:   History of Present Illness:  Cindy Cortes is 56 y.o. female who presented to our office on 06/10/21 for consultation regarding Oligodendroglioma    Patient is a 56 y.o. female who was referred to us for treatment options regarding recent diagnosis of grade 2 oligodendroglioma.  This was IDH [id number] mutated, 1P 19 Q codeleted.  Patient initially presented with seizures and a CT head was obtained that showed vasogenic edema and a right frontal lobe mass MRI was obtained after this.  2021 -MRI of the brain shows 2.1 x 4.4 x 3.3 cm right frontal lobe mass with eccentric cystic or necrotic component with surrounding vasogenic edema and a focal 3 mm right to left midline shift.  Patient was started on Keppra, steroids plan was made for elective craniotomy and surgical resection.    2021 craniotomy of the right frontal lobe, microscopic resection of tumor mass.  Pathology results oligodendroglioma WHO grade 2, IDH1 R132H mutation by immunohistochemistry, 1p19q codeletion by FISH.    2021 -status post resection of the right frontal lobe mass.  Expected postop blood product. resolution of midline shift.    2021 - Started radiation adjuvantly.    2021 - last day of radiation.    2021 - C1 D8 with vincristin started procarbazine  10/7/2021 - C1 D29 Vincristine    10/21/2021 - C2D1 PCV  10/28/2021 -cycle 2-day 8 with vincristine  Started procarbazine  Held procarbazine for a few days with nausea  2021 -patient in the hospital with significant pancytopenia needing blood transfusion.    2021 -vincristine cycle 2-day 29.  This has been delayed with  ongoing cytopenias and hospitalization.    12/27/2021 - C3 D1 12/30/2021 1/6/2022 - C3D8 vincristine.    2/21/2022 - C4 D1 decrease dose of Lomustine.    Continued myelosuppression afterwards  4/1/2022 -CT imaging with interval development of relatively diffuse groundglass opacity patchy areas of sparing.  Bronchoscopy negative for any concerning findings  Permanently discontinued chemotherapy with possible pneumonitis, prolonged myelosuppression.    5/16/2022 - WBC 5.52 hb 8.2 hct 27.8, plt 128    6/14/2022 -CBC with hemoglobin 6.4, hematocrit 20.2, platelet 94    Subjective:  Patient continues have fatigue shortness of breath.  She felt better after hospital discharge but subsequently started to feel worse.  Now she is again fatigued short of breath.  She denies any bleeding blood in stools or dark stools.    The following portions of the patient's history were reviewed and updated as appropriate: allergies, current medications, past family history, past medical history, past social history, past surgical history and problem list.    Past Medical History:   Diagnosis Date   • Arthritis    • GERD (gastroesophageal reflux disease)    • Hypertension    • Oligodendroglioma (HCC)    • Seizure (HCC)    • Type 2 diabetes mellitus with hyperglycemia, without long-term current use of insulin (HCC) 05/12/2021       Past Surgical History:   Procedure Laterality Date   • BRONCHOSCOPY N/A 4/6/2022    Procedure: BRONCHOSCOPY with bronchial washing;  Surgeon: Drew Alcantar MD;  Location: [facility] ENDOSCOPY;  Service: Pulmonary;  Laterality: N/A;  pneumonia   • BRONCHOSCOPY N/A 5/31/2022    Procedure: BRONCHOSCOPY AT BEDSIDE with bronchoalveolar lavage right middle lobe;  Surgeon: Den Feldman MD;  Location: [facility] ENDOSCOPY;  Service: Pulmonary;  Laterality: N/A;   • COLONOSCOPY     • CRANIOTOMY FOR TUMOR Right 05/06/2021    Procedure: CRANIOTOMY FOR TUMOR RESECTION WITH STEREOTACTIC;  Surgeon: Jluis Felix MD;   Location: [facility] MAIN OR;  Service: Neurosurgery;  Laterality: Right;         Current Outpatient Medications:   •  albuterol sulfate  (90 Base) MCG/ACT inhaler, Inhale 2 puffs Every 4 (Four) Hours As Needed for Wheezing., Disp: 18 g, Rfl: 2  •  Budeson-Glycopyrrol-Formoterol (Breztri Aerosphere) 160-9-4.8 MCG/ACT aerosol inhaler, Inhale 2 puffs 2 (Two) Times a Day. Indications: Lot#[id number] Exp: 8/30/23, Disp: 1 each, Rfl: 0  •  cefTRIAXone 2 g in sodium chloride 0.9 % 100 mL IVPB, Infuse 2 g into a venous catheter Daily for 12 doses. Indications: Bacteria in the Blood, Urinary Tract Infection, Disp: , Rfl:   •  diphenhydrAMINE (BENADRYL) 25 MG tablet, Take 25 mg by mouth Daily As Needed for Allergies., Disp: , Rfl:   •  ferrous sulfate 325 (65 FE) MG tablet, Take 1 tablet by mouth Daily With Breakfast., Disp: , Rfl:   •  fluticasone (FLOVENT HFA) 44 MCG/ACT inhaler, Inhale 2 puffs Daily As Needed., Disp: , Rfl:   •  folic acid (FOLVITE) 1 MG tablet, Take 1 mg by mouth Daily., Disp: , Rfl:   •  levETIRAcetam (KEPPRA) 750 MG tablet, Take 1 tablet by mouth 2 (Two) Times a Day., Disp: 60 tablet, Rfl: 2  •  ondansetron (Zofran) 8 MG tablet, Take 1 tablet by mouth Every 8 (Eight) Hours As Needed for Nausea or Vomiting., Disp: 30 tablet, Rfl: 3  •  oxybutynin (DITROPAN) 5 MG tablet, Take 5 mg by mouth 4 (Four) Times a Day., Disp: , Rfl:   •  predniSONE (DELTASONE) 20 MG tablet, Take 1 tablet by mouth Daily With Breakfast for 5 days, THEN 0.5 tablets Daily With Breakfast for 5 days., Disp: 8 tablet, Rfl: 0  •  venlafaxine XR (EFFEXOR-XR) 150 MG 24 hr capsule, Take 150 mg by mouth Daily., Disp: , Rfl: 3  No current facility-administered medications for this visit.    Facility-Administered Medications Ordered in Other Visits:   •  heparin injection 500 Units, 500 Units, Intravenous, PRN, Emilie Workman MD  •  sodium chloride 0.9 % flush 10 mL, 10 mL, Intravenous, THANHN, Emilie Workman MD  •  sodium chloride 0.9 %  "infusion 250 mL, 250 mL, Intravenous, PRN, Emilie Workman MD    Current outpatient and discharge medications have been reconciled for the patient.  Reviewed by: Emilie Workman MD    No Known Allergies    Family History   Problem Relation Age of Onset   • Kidney disease Mother    • Prostate cancer Brother    • Breast cancer Maternal Aunt    • Colon cancer Maternal Aunt    • Breast cancer Niece    • Breast cancer Cousin        Cancer-related family history includes Breast cancer in her cousin, maternal aunt, and niece; Colon cancer in her maternal aunt; Prostate cancer in her brother.    Social History     Tobacco Use   • Smoking status: Never Smoker   • Smokeless tobacco: Never Used   Vaping Use   • Vaping Use: Never used   Substance Use Topics   • Alcohol use: Not Currently     Alcohol/week: 1.0 standard drink     Types: 1 Cans of beer per week     Comment: socially   • Drug use: Never     Social History     Social History Narrative   • Not on file      Objective:    Vitals:    06/14/22 1103   BP: 108/58   Pulse: 74   Resp: 16   Temp: 97.1 °F (36.2 °C)   SpO2: 98%   Weight: 128 kg (283 lb)   Height: 157.5 cm (62\")   PainSc: 0-No pain     Body mass index is 51.76 kg/m².  ECOG  (0) Fully active, able to carry on all predisease performance without restriction    Physical Exam:     Physical Exam  Constitutional:       Appearance: Normal appearance. She is obese.   HENT:      Head: Normocephalic and atraumatic.   Eyes:      Pupils: Pupils are equal, round, and reactive to light.   Cardiovascular:      Rate and Rhythm: Normal rate and regular rhythm.      Pulses: Normal pulses.      Heart sounds: Normal heart sounds. No murmur heard.  Pulmonary:      Effort: Pulmonary effort is normal.      Breath sounds: Rhonchi present.   Abdominal:      General: There is no distension.      Palpations: Abdomen is soft. There is no mass.      Tenderness: There is no abdominal tenderness.      Hernia: No hernia is present. "   Musculoskeletal:         General: No tenderness. Normal range of motion.      Cervical back: Normal range of motion and neck supple.   Skin:     General: Skin is warm.   Neurological:      General: No focal deficit present.      Mental Status: She is alert.   Psychiatric:         Mood and Affect: Mood normal.           Lab Results - Last 18 Months   Lab Units 06/14/22  1146 06/14/22  1045 06/04/22  0610   WBC 10*3/mm3 7.30 7.75 5.10   HEMOGLOBIN g/dL 6.4* 6.4* 7.7*   HEMATOCRIT % 19.0* 20.2* 22.7*   PLATELETS 10*3/mm3 90* 94* 50*   MCV fL 102.8* 110.4* 97.2*     Lab Results - Last 18 Months   Lab Units 06/04/22  0610 06/03/22  0351 06/02/22  0453   SODIUM mmol/L 137 133* 135*   POTASSIUM mmol/L 3.7 4.1 4.1   CHLORIDE mmol/L 105 102 104   CO2 mmol/L 22.0 20.0* 21.0*   BUN mg/dL 20 23* 26*   CREATININE mg/dL 0.56* 0.73 0.76   CALCIUM mg/dL 8.7 9.0 8.8   BILIRUBIN mg/dL 1.0 1.0 1.3*   ALK PHOS U/L 86 94 90   ALT (SGPT) U/L 16 16 15   AST (SGOT) U/L 13 13 17   GLUCOSE mg/dL 109* 136* 146*       Lab Results   Component Value Date    GLUCOSE 109 (H) 06/04/2022    BUN 20 06/04/2022    CREATININE 0.56 (L) 06/04/2022    EGFRIFNONA 77 01/27/2022    BCR 35.7 (H) 06/04/2022    K 3.7 06/04/2022    CO2 22.0 06/04/2022    CALCIUM 8.7 06/04/2022    PROTENTOTREF 6.4 12/15/2021    ALBUMIN 3.10 (L) 06/04/2022    LABIL2 1.1 12/15/2021    AST 13 06/04/2022    ALT 16 06/04/2022       Lab Results - Last 18 Months   Lab Units 08/27/21  0738 05/04/21  0922   INR  0.96 0.96   APTT seconds 25.6 20.7*       Lab Results   Component Value Date    IRON 169 (H) 05/31/2022    TIBC 224 (L) 05/31/2022    FERRITIN 1,718.00 (H) 05/31/2022       Lab Results   Component Value Date    JEMOBUNZ47 553 06/01/2022       Lab Results   Component Value Date    PTT 25.6 08/27/2021    INR 0.96 08/27/2021     CT Chest With Contrast Diagnostic    Result Date: 4/1/2022   1. Interval development of relatively diffuse groundglass opacity with patchy areas of sparing.  Findings are nonspecific and differential would include atypical, viral infections, drug toxicity, hypersensitivity pneumonitis.    Electronically Signed By-Efrain Rico MD On:4/1/2022 3:47 PM This report was finalized on [id number] by  Efrain Rico MD.    XR Chest 1 View    Result Date: 6/1/2022  No acute cardiopulmonary abnormality.  Electronically Signed By-Jon Lynch MD On:6/1/2022 7:11 PM This report was finalized on [id number] by  Jon Lynch MD.    CT Angiogram Chest Pulmonary Embolism    Result Date: 5/31/2022  1. No evidence of pulmonary embolism. 2. No evidence of thoracic aortic aneurysm or dissection. 3. Very subtle groundglass attenuation seen throughout the lungs bilaterally could potentially be inflammatory or infectious. Clinical correlation is recommended. 4. Cholelithiasis. 5. Mild splenomegaly. Electronically signed by:  Robel Aj D.O.  5/30/2022 10:11 PM    CT Abdomen Pelvis Stone Protocol    Result Date: 6/2/2022  1. Small 2 mm nonobstructing stone within the superior left kidney. No evidence of obstructive uropathy. 2. Nonspecific hepatosplenomegaly. Correlate clinically for risk factors of chronic liver disease. 3. Patchy bibasilar groundglass opacities with a bronchovascular distribution. This can be seen with bronchiolitis or mild bronchopneumonia. 4. Cholelithiasis with gallstones clustered within the fundus of the gallbladder likely related to superimposed gallbladder adenomyomatosis.    Electronically Signed By-Tayo Brumfield MD On:6/2/2022 2:09 PM This report was finalized on [id number] by  Tayo Brumfield MD.    XR Chest PA & Lateral    Result Date: 4/4/2022   1. Diffuse groundglass pulmonary infiltrates throughout both lungs, most likely representing pneumonia although this finding is nonspecific.  Electronically Signed By-Yang Kan MD On:4/4/2022 12:14 PM This report was finalized on [id number] by  Yang Kan MD.    CT Bone marrow biopsy and  "aspiration    Result Date: 6/2/2022  CT-guided bone marrow core biopsy and aspirate.  Electronically Signed By-Vick Draper MD On:6/2/2022 3:41 PM This report was finalized on [id number] by  Vick Draper MD.    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  We had an extensive discussion at that time with several options.  This is summarized here below    I previously discussed with her that there are findings from RTOG 9802 clinical trial which showed survival advantage with radiation followed by chemotherapy after surgical resection of grade 2 oligodendrogliomas.  Chemotherapy with the PCV regimen in this trial conferred a survival advantage over radiotherapy alone with a median overall survival of 13.3 versus 7.8 years.  In subset analysis benefit was more significant  Histologic oligodendroglioma is as compared to other low-grade gliomas.  Molecular analysis post hoc analysis also showed survival advantage in molecularly confirmed IDH mutant, 1p19q codeleted oligodendrogliomas.    PCV can be a toxic " regimen however survival advantage is only been shown in randomized control trial using this particular regimen.  The other option would be temozolomide which has advantages over PCV with ease of administration, better tolerance and efficacy in combination with radiation therapy and other types of CNS tumors and gliomas.    The other option I had discussed with her was clinical trial with a CODEL study which is comparing radiation alone versus radiation with Temodar versus radiation with PCV in this patient population.  Patient however is not very interested in enrolling in a clinical trial.  She is wanting to pursue the most aggressive treatment option for her to reduce the chances of recurrence of her tumor.    Lastly also discussed with her the option of surveillance and observation with serial MRIs however also discussed that in above studies the progression free survival is around 50% for 5 years.     Based on her above discussion patient decided to pursue aggressive treatment with radiation followed by chemotherapy.  We would use the RTOG 9802 regimen with radiation followed by chemotherapy with PCV.  We have evidence that vincristine does not cross the blood-brain barrier significantly and hence if there is toxicity we can choose to omit the drug.  Case was discussed with Dr. Cruz and started sequential radiation and chemo    Patient completed radiation without complication.    Based on above patient decided to proceed with adjuvant PCV.   Now patient has had clinically significant pancytopenia needing blood transfusion hospital admission.  For cycle 3 dose reduced procarbazine and lomustine by 20%.  For C4, decrease Lomustine to 110.  Complete cycle  with prolonged myelosuppression, shortness of breath will stop chemotherapy going forward.  I discussed with her for the risks of continuing chemotherapy versus benefits which at this point she is worsening in terms of myelosuppression, likely pneumonitis with  chemotherapy.  Now continued surveillance with no evidence of disease.  We will repeat MRI brain in a month.    Shortness of breath  CT of the chest with pneumonitis.  Could also be related to anemia.  Given a short course of steroids, continue albuterol, steroid inhaler per pulmonology  Repeat CT with only mild changes.  Her shortness of breath is mostly related to anemia.    Anemia  There could be possibility of hemolysis nonimmune versus myelosuppression  There have been case reports with her chemotherapy regimen.  She was given 5-day course of prednisone.  Repeat haptoglobin improved  Now with continued drop in hemoglobin patient had a bone marrow biopsy which showed some erythrocyte dyspoiesis.  She could have an underlying myelodysplastic syndrome which got worse with chemotherapy.  Neotype has been ordered this is pending.  We will check erythropoietin and potentially start darbepoetin since patient has been transfusion dependent.  Consider [facility] bone marrow transplant referral once all results are back.    Follow-up in a month with cbc      I have reviewed and confirmed the accuracy of the patient's history: Chief complaint, HPI, ROS, Subjective and Past Family Social History as entered by the MA/WILLIAMSN/RN.     Emilie Workman MD 06/14/22

## 2022-06-14 NOTE — SIGNIFICANT NOTE
Patient arrived to this facility with Martini Needle intact to right subclavian area.[facility] to change dressing or remove Martini Needle  per orders this week.Patient in for port flush and blood draw.  10 cc blood wasted prior to specimen collection.  Specimens collected and sent to lab for processing. Martini Needle left intact to right chest.

## 2022-06-14 NOTE — PROGRESS NOTES
Patient arrived to this facility with Martini Needle intact to right subclavian area and patient reports that [facility] will change dressing this week or remove the needle according to orders. Patient in for port flush and blood draw.  10 cc blood wasted prior to specimen collection.  Specimens collected and sent to lab for processing.Martini Needle left intact to right subclavian area.

## 2022-06-15 ENCOUNTER — HOME CARE VISIT (OUTPATIENT)
Dept: HOME HEALTH SERVICES | Facility: HOME HEALTHCARE | Age: 57
End: 2022-06-15

## 2022-06-15 VITALS
RESPIRATION RATE: 18 BRPM | HEART RATE: 85 BPM | DIASTOLIC BLOOD PRESSURE: 76 MMHG | OXYGEN SATURATION: 90 % | TEMPERATURE: 97 F | SYSTOLIC BLOOD PRESSURE: 122 MMHG

## 2022-06-15 LAB
EPO SERPL-ACNC: 1494.6 MIU/ML (ref 2.6–18.5)
KARYOTYP MAR: NORMAL

## 2022-06-15 PROCEDURE — G0299 HHS/HOSPICE OF RN EA 15 MIN: HCPCS

## 2022-06-15 NOTE — HOME HEALTH
saw Dr. Jiménez, 2 days ago and had to have infusion d/t Low cell count.  Pt. has completed antibiotics via PORT.  Will start once a week in July for injections to increase cell count.     No falls, no pain. PORT needle deacessed.      Next SN visit: CP assess, pain assess, any MD appt's. falls assess, dizziness assess.

## 2022-06-16 LAB
BH BB BLOOD EXPIRATION DATE: NORMAL
BH BB BLOOD EXPIRATION DATE: NORMAL
BH BB BLOOD TYPE BARCODE: 5100
BH BB BLOOD TYPE BARCODE: 5100
BH BB DISPENSE STATUS: NORMAL
BH BB DISPENSE STATUS: NORMAL
BH BB PRODUCT CODE: NORMAL
BH BB PRODUCT CODE: NORMAL
BH BB UNIT NUMBER: NORMAL
BH BB UNIT NUMBER: NORMAL
CROSSMATCH INTERPRETATION: NORMAL
CROSSMATCH INTERPRETATION: NORMAL
UNIT  ABO: NORMAL
UNIT  ABO: NORMAL
UNIT  RH: NORMAL
UNIT  RH: NORMAL

## 2022-06-17 ENCOUNTER — READMISSION MANAGEMENT (OUTPATIENT)
Dept: CALL CENTER | Facility: HOSPITAL | Age: 57
End: 2022-06-17

## 2022-06-17 LAB
LAB AP CASE REPORT: NORMAL
LAB AP DIAGNOSIS COMMENT: NORMAL
LAB AP FLOW CYTOMETRY SUMMARY: NORMAL
LAB AP INTEGRATED ONCOLOGY, ADDENDUM: NORMAL
PATH REPORT.FINAL DX SPEC: NORMAL
PATH REPORT.GROSS SPEC: NORMAL

## 2022-06-17 NOTE — OUTREACH NOTE
Sepsis Week 2 Survey    Flowsheet Row Responses   [facility] patient discharged from? [facility]   Does the patient have one of the following disease processes/diagnoses(primary or secondary)? Sepsis   Week 2 attempt successful? Yes   Call start time 1351   Call end time 1356   General alerts for this patient IV antibiotics at home   Discharge diagnosis Sepsis with hypotension , Pneumonia,   Anemia due to chemotherapy,  UTI    Meds reviewed with patient/caregiver? Yes   Is the patient having any side effects they believe may be caused by any medication additions or changes? No   Does the patient have all medications related to this admission filled (includes all antibiotics, inhalers, nebulizers,steroids,etc.) Yes   Is the patient taking all medications as directed (includes completed medication regime)? Yes   Does the patient have a primary care provider?  Yes   Comments regarding PCP Annamarie Monroy APRN   Does the patient have an appointment with their PCP within 7 days of discharge? Yes   Has the patient kept scheduled appointments due by today? N/A   Comments Appt on Tuesday   What is the Home health agency?   [facility]   Has home health visited the patient within 72 hours of discharge? Yes   What DME was ordered? [facility] for IV antibiotics   Has all DME been delivered? Yes   Psychosocial issues? No   Did the patient receive a copy of their discharge instructions? Yes   Nursing interventions Reviewed instructions with patient   What is the patient's perception of their health status since discharge? Improving   Nursing interventions Nurse provided patient education   Is the patient/caregiver able to teach back Sepsis? S - Shivering,fever or very cold, E - Extreme pain or generalized discomfort (worst ever,especially abdomen), P - Pale or discolored skin, S - Short of breath, I -   I feel like I might die-a feeling of hopelessness, S - Sleepy, difficult to arouse,confused   Nursing interventions Nurse  provided reassurance to patient   Is patient/caregiver able to teach back steps to recovery at home? Set small, achievable goals for return to baseline health, Rest and regain strength, Talk about feelings with family/friends, Eat a balanced diet, Exercise as tolerated, Make a list of questions for PCP appoinment   Is the patient/caregiver able to teach back signs and symptoms of worsening condition: Fever, Edema   If the patient is a current smoker, are they able to teach back resources for cessation? Not a smoker   Is the patient/caregiver able to teach back the hierarchy of who to call/visit for symptoms/problems? PCP, Specialist, Home health nurse, Urgent Care, ED, 911 Yes   Additional teach back comments Left foot is swelling some, she feels better overall, says she looks better with weight loss and color.   Week 2 call completed? Yes   Wrap up additional comments Says she will take unused abx to [facility] pharmacy. No questions today.          CONRADO ALAN - Registered Nurse

## 2022-06-20 ENCOUNTER — TELEPHONE (OUTPATIENT)
Dept: ONCOLOGY | Facility: CLINIC | Age: 57
End: 2022-06-20

## 2022-06-20 DIAGNOSIS — C71.9 OLIGODENDROGLIOMA: Primary | ICD-10-CM

## 2022-06-20 NOTE — TELEPHONE ENCOUNTER
Called the pt to set her up for weekly CBCs per Dr. Workman. Pt said that she actually just called and was told we were closed to she if she could get labs drawn in the morning due to having some s/s she typically gets when her Hgb is low. All apts made for her till the week of 7/11 when she is scheduled to see Dr. Workman. Standing CBC order placed.

## 2022-06-21 ENCOUNTER — LAB (OUTPATIENT)
Dept: LAB | Facility: HOSPITAL | Age: 57
End: 2022-06-21

## 2022-06-21 ENCOUNTER — HOSPITAL ENCOUNTER (OUTPATIENT)
Dept: INFUSION THERAPY | Facility: HOSPITAL | Age: 57
Setting detail: INFUSION SERIES
Discharge: HOME | End: 2022-06-21

## 2022-06-21 VITALS
SYSTOLIC BLOOD PRESSURE: 132 MMHG | TEMPERATURE: 98.2 F | HEART RATE: 95 BPM | DIASTOLIC BLOOD PRESSURE: 74 MMHG | OXYGEN SATURATION: 96 % | RESPIRATION RATE: 16 BRPM

## 2022-06-21 DIAGNOSIS — C71.9 OLIGODENDROGLIOMA: Primary | ICD-10-CM

## 2022-06-21 DIAGNOSIS — C71.9 OLIGODENDROGLIOMA: ICD-10-CM

## 2022-06-21 DIAGNOSIS — Z95.828 PORT-A-CATH IN PLACE: ICD-10-CM

## 2022-06-21 LAB
ABO GROUP BLD: NORMAL
ANTI-FYA: NORMAL
BASOPHILS # BLD AUTO: 0 10*3/MM3 (ref 0–0.2)
BASOPHILS # BLD AUTO: 0.01 10*3/MM3 (ref 0–0.2)
BASOPHILS NFR BLD AUTO: 0.2 % (ref 0–1.5)
BASOPHILS NFR BLD AUTO: 0.5 % (ref 0–1.5)
BB HOLD TUBE: NORMAL
BLD GP AB SCN SERPL QL: POSITIVE
DEPRECATED RDW RBC AUTO: 72.6 FL (ref 37–54)
DEPRECATED RDW RBC AUTO: 74.4 FL (ref 37–54)
EOSINOPHIL # BLD AUTO: 0 10*3/MM3 (ref 0–0.4)
EOSINOPHIL # BLD AUTO: 0.01 10*3/MM3 (ref 0–0.4)
EOSINOPHIL NFR BLD AUTO: 0.2 % (ref 0.3–6.2)
EOSINOPHIL NFR BLD AUTO: 0.2 % (ref 0.3–6.2)
ERYTHROCYTE [DISTWIDTH] IN BLOOD BY AUTOMATED COUNT: 20.9 % (ref 12.3–15.4)
ERYTHROCYTE [DISTWIDTH] IN BLOOD BY AUTOMATED COUNT: 22.4 % (ref 12.3–15.4)
HCT VFR BLD AUTO: 20.2 % (ref 34–46.6)
HCT VFR BLD AUTO: 21.3 % (ref 34–46.6)
HGB BLD-MCNC: 6.8 G/DL (ref 12–15.9)
HGB BLD-MCNC: 6.9 G/DL (ref 12–15.9)
LYMPHOCYTES # BLD AUTO: 0.49 10*3/MM3 (ref 0.7–3.1)
LYMPHOCYTES # BLD AUTO: 0.6 10*3/MM3 (ref 0.7–3.1)
LYMPHOCYTES NFR BLD AUTO: 11.9 % (ref 19.6–45.3)
LYMPHOCYTES NFR BLD AUTO: 12.8 % (ref 19.6–45.3)
MCH RBC QN AUTO: 32.9 PG (ref 26.6–33)
MCH RBC QN AUTO: 34.5 PG (ref 26.6–33)
MCHC RBC AUTO-ENTMCNC: 32.4 G/DL (ref 31.5–35.7)
MCHC RBC AUTO-ENTMCNC: 33.4 G/DL (ref 31.5–35.7)
MCV RBC AUTO: 106.5 FL (ref 79–97)
MCV RBC AUTO: 98.4 FL (ref 79–97)
MONOCYTES # BLD AUTO: 0.4 10*3/MM3 (ref 0.1–0.9)
MONOCYTES # BLD AUTO: 0.4 10*3/MM3 (ref 0.1–0.9)
MONOCYTES NFR BLD AUTO: 8.3 % (ref 5–12)
MONOCYTES NFR BLD AUTO: 9.7 % (ref 5–12)
NEUTROPHILS NFR BLD AUTO: 3.2 10*3/MM3 (ref 1.7–7)
NEUTROPHILS NFR BLD AUTO: 3.5 10*3/MM3 (ref 1.7–7)
NEUTROPHILS NFR BLD AUTO: 78 % (ref 42.7–76)
NEUTROPHILS NFR BLD AUTO: 78.2 % (ref 42.7–76)
NRBC BLD AUTO-RTO: 0.3 /100 WBC (ref 0–0.2)
PLATELET # BLD AUTO: 71 10*3/MM3 (ref 140–450)
PLATELET # BLD AUTO: 83 10*3/MM3 (ref 140–450)
PMV BLD AUTO: 10 FL (ref 6–12)
PMV BLD AUTO: 8.5 FL (ref 6–12)
RBC # BLD AUTO: 2 10*6/MM3 (ref 3.77–5.28)
RBC # BLD AUTO: 2.06 10*6/MM3 (ref 3.77–5.28)
RH BLD: POSITIVE
T&S EXPIRATION DATE: NORMAL
WBC NRBC COR # BLD: 4.11 10*3/MM3 (ref 3.4–10.8)
WBC NRBC COR # BLD: 4.5 10*3/MM3 (ref 3.4–10.8)

## 2022-06-21 PROCEDURE — 86922 COMPATIBILITY TEST ANTIGLOB: CPT

## 2022-06-21 PROCEDURE — 86850 RBC ANTIBODY SCREEN: CPT | Performed by: INTERNAL MEDICINE

## 2022-06-21 PROCEDURE — 86900 BLOOD TYPING SEROLOGIC ABO: CPT | Performed by: INTERNAL MEDICINE

## 2022-06-21 PROCEDURE — 85025 COMPLETE CBC W/AUTO DIFF WBC: CPT

## 2022-06-21 PROCEDURE — 85025 COMPLETE CBC W/AUTO DIFF WBC: CPT | Performed by: INTERNAL MEDICINE

## 2022-06-21 PROCEDURE — 25010000002 HEPARIN LOCK FLUSH PER 10 UNITS: Performed by: INTERNAL MEDICINE

## 2022-06-21 PROCEDURE — 86901 BLOOD TYPING SEROLOGIC RH(D): CPT | Performed by: INTERNAL MEDICINE

## 2022-06-21 PROCEDURE — P9016 RBC LEUKOCYTES REDUCED: HCPCS

## 2022-06-21 PROCEDURE — 86900 BLOOD TYPING SEROLOGIC ABO: CPT

## 2022-06-21 PROCEDURE — 36430 TRANSFUSION BLD/BLD COMPNT: CPT

## 2022-06-21 PROCEDURE — 36415 COLL VENOUS BLD VENIPUNCTURE: CPT

## 2022-06-21 PROCEDURE — 86870 RBC ANTIBODY IDENTIFICATION: CPT | Performed by: INTERNAL MEDICINE

## 2022-06-21 RX ORDER — SODIUM CHLORIDE 0.9 % (FLUSH) 0.9 %
10 SYRINGE (ML) INJECTION AS NEEDED
Status: CANCELLED | OUTPATIENT
Start: 2022-06-21

## 2022-06-21 RX ORDER — SODIUM CHLORIDE 9 MG/ML
250 INJECTION, SOLUTION INTRAVENOUS AS NEEDED
Status: CANCELLED | OUTPATIENT
Start: 2022-06-21

## 2022-06-21 RX ORDER — SODIUM CHLORIDE 0.9 % (FLUSH) 0.9 %
10 SYRINGE (ML) INJECTION AS NEEDED
Status: DISCONTINUED | OUTPATIENT
Start: 2022-06-21 | End: 2022-06-23 | Stop reason: HOSPADM

## 2022-06-21 RX ORDER — HEPARIN SODIUM (PORCINE) LOCK FLUSH IV SOLN 100 UNIT/ML 100 UNIT/ML
500 SOLUTION INTRAVENOUS AS NEEDED
Status: CANCELLED | OUTPATIENT
Start: 2022-06-21

## 2022-06-21 RX ORDER — HEPARIN SODIUM (PORCINE) LOCK FLUSH IV SOLN 100 UNIT/ML 100 UNIT/ML
500 SOLUTION INTRAVENOUS AS NEEDED
Status: DISCONTINUED | OUTPATIENT
Start: 2022-06-21 | End: 2022-06-23 | Stop reason: HOSPADM

## 2022-06-21 RX ORDER — SODIUM CHLORIDE 9 MG/ML
250 INJECTION, SOLUTION INTRAVENOUS AS NEEDED
Status: DISCONTINUED | OUTPATIENT
Start: 2022-06-21 | End: 2022-06-23 | Stop reason: HOSPADM

## 2022-06-21 RX ADMIN — Medication 500 UNITS: at 16:37

## 2022-06-21 RX ADMIN — Medication 10 ML: at 16:36

## 2022-06-23 ENCOUNTER — HOME CARE VISIT (OUTPATIENT)
Dept: HOME HEALTH SERVICES | Facility: HOME HEALTHCARE | Age: 57
End: 2022-06-23

## 2022-06-23 VITALS — SYSTOLIC BLOOD PRESSURE: 114 MMHG | DIASTOLIC BLOOD PRESSURE: 64 MMHG

## 2022-06-23 PROCEDURE — G0299 HHS/HOSPICE OF RN EA 15 MIN: HCPCS

## 2022-06-23 NOTE — HOME HEALTH
Patient denies any pain but reports she has been feelling slightly more short of breath when she is up moving around at all. She had transfusion on Tuesday for reported Hgb of 6.8. Patient unsure what Hgb was post transfusion. States she has been told her bone marrow is not making new RBC. We discussed roll RBC and Hgb play in oxygen transportation. Patient further states she may have done too much yesterday as she spent quite a bit of time in the pool. Encouraged patient to rest and take it easy today to see if this will improve. Reinforced need to call 911 for any increasing shortness or breath or urgent needs. Patient verbalized understanding and agreement    Plan for next visit  Cardiopulmonary assessment  Assess for new medications  assess enrgy and shortness of breath  assess if oxygen was delivered

## 2022-06-28 ENCOUNTER — LAB (OUTPATIENT)
Dept: LAB | Facility: HOSPITAL | Age: 57
End: 2022-06-28

## 2022-06-28 ENCOUNTER — HOSPITAL ENCOUNTER (OUTPATIENT)
Dept: ONCOLOGY | Facility: HOSPITAL | Age: 57
Discharge: HOME | End: 2022-06-28
Admitting: INTERNAL MEDICINE

## 2022-06-28 VITALS
SYSTOLIC BLOOD PRESSURE: 124 MMHG | HEART RATE: 88 BPM | HEIGHT: 62 IN | DIASTOLIC BLOOD PRESSURE: 62 MMHG | WEIGHT: 283 LBS | BODY MASS INDEX: 52.08 KG/M2

## 2022-06-28 DIAGNOSIS — C71.9 OLIGODENDROGLIOMA: ICD-10-CM

## 2022-06-28 DIAGNOSIS — D64.9 TRANSFUSION-DEPENDENT ANEMIA: Primary | ICD-10-CM

## 2022-06-28 LAB
BASOPHILS # BLD AUTO: 0.01 10*3/MM3 (ref 0–0.2)
BASOPHILS NFR BLD AUTO: 0.2 % (ref 0–1.5)
DEPRECATED RDW RBC AUTO: 66.7 FL (ref 37–54)
EOSINOPHIL # BLD AUTO: 0.07 10*3/MM3 (ref 0–0.4)
EOSINOPHIL NFR BLD AUTO: 1.7 % (ref 0.3–6.2)
ERYTHROCYTE [DISTWIDTH] IN BLOOD BY AUTOMATED COUNT: 19.6 % (ref 12.3–15.4)
FUNGUS WND CULT: NORMAL
HCT VFR BLD AUTO: 22 % (ref 34–46.6)
HGB BLD-MCNC: 7.3 G/DL (ref 12–15.9)
LYMPHOCYTES # BLD AUTO: 0.44 10*3/MM3 (ref 0.7–3.1)
LYMPHOCYTES NFR BLD AUTO: 10.8 % (ref 19.6–45.3)
MCH RBC QN AUTO: 33 PG (ref 26.6–33)
MCHC RBC AUTO-ENTMCNC: 33.2 G/DL (ref 31.5–35.7)
MCV RBC AUTO: 99.5 FL (ref 79–97)
MONOCYTES # BLD AUTO: 0.41 10*3/MM3 (ref 0.1–0.9)
MONOCYTES NFR BLD AUTO: 10 % (ref 5–12)
NEUTROPHILS NFR BLD AUTO: 3.15 10*3/MM3 (ref 1.7–7)
NEUTROPHILS NFR BLD AUTO: 77.3 % (ref 42.7–76)
PLATELET # BLD AUTO: 94 10*3/MM3 (ref 140–450)
PMV BLD AUTO: 10.2 FL (ref 6–12)
RBC # BLD AUTO: 2.21 10*6/MM3 (ref 3.77–5.28)
WBC NRBC COR # BLD: 4.08 10*3/MM3 (ref 3.4–10.8)

## 2022-06-28 PROCEDURE — 25010000002 EPOETIN ALFA-EPBX 40000 UNIT/ML SOLUTION: Performed by: INTERNAL MEDICINE

## 2022-06-28 PROCEDURE — 96372 THER/PROPH/DIAG INJ SC/IM: CPT

## 2022-06-28 PROCEDURE — 85025 COMPLETE CBC W/AUTO DIFF WBC: CPT

## 2022-06-28 PROCEDURE — 36415 COLL VENOUS BLD VENIPUNCTURE: CPT

## 2022-06-28 RX ADMIN — EPOETIN ALFA-EPBX 40000 UNITS: 40000 INJECTION, SOLUTION INTRAVENOUS; SUBCUTANEOUS at 10:57

## 2022-06-28 NOTE — PROGRESS NOTES
Notified per  nurse Dottie CRUZ that  wants patient to receive Retacrit injection today. Patient reports she has never had this shot before. Patient aware of what injection is for. I gave patient Retacrit injection as ordered in right arm and patient taken to lobby and will recheck patient for reaction after 30minutes time lapse. Patient denies any issues and no signs of reaction noted after 30 minutes time frame finished. Patient aware of next appointment.

## 2022-07-01 ENCOUNTER — HOME CARE VISIT (OUTPATIENT)
Dept: HOME HEALTH SERVICES | Facility: HOME HEALTHCARE | Age: 57
End: 2022-07-01

## 2022-07-03 ENCOUNTER — HOSPITAL ENCOUNTER (OUTPATIENT)
Dept: MRI IMAGING | Facility: HOSPITAL | Age: 57
Discharge: HOME | End: 2022-07-03
Admitting: INTERNAL MEDICINE

## 2022-07-03 DIAGNOSIS — C71.9 OLIGODENDROGLIOMA: ICD-10-CM

## 2022-07-03 PROCEDURE — 25010000002 GADOTERIDOL PER 1 ML: Performed by: INTERNAL MEDICINE

## 2022-07-03 PROCEDURE — A9579 GAD-BASE MR CONTRAST NOS,1ML: HCPCS | Performed by: INTERNAL MEDICINE

## 2022-07-03 PROCEDURE — 70553 MRI BRAIN STEM W/O & W/DYE: CPT

## 2022-07-03 RX ADMIN — GADOTERIDOL 20 ML: 279.3 INJECTION, SOLUTION INTRAVENOUS at 12:26

## 2022-07-05 ENCOUNTER — LAB (OUTPATIENT)
Dept: LAB | Facility: HOSPITAL | Age: 57
End: 2022-07-05

## 2022-07-05 ENCOUNTER — APPOINTMENT (OUTPATIENT)
Dept: GENERAL RADIOLOGY | Facility: HOSPITAL | Age: 57
End: 2022-07-05

## 2022-07-05 ENCOUNTER — APPOINTMENT (OUTPATIENT)
Dept: LAB | Facility: HOSPITAL | Age: 57
End: 2022-07-05

## 2022-07-05 ENCOUNTER — HOSPITAL ENCOUNTER (OUTPATIENT)
Dept: CT IMAGING | Facility: HOSPITAL | Age: 57
Discharge: HOME | End: 2022-07-05

## 2022-07-05 ENCOUNTER — HOSPITAL ENCOUNTER (OUTPATIENT)
Facility: HOSPITAL | Age: 57
Setting detail: OBSERVATION
Discharge: HOME | End: 2022-07-08
Attending: EMERGENCY MEDICINE | Admitting: HOSPITALIST

## 2022-07-05 ENCOUNTER — HOSPITAL ENCOUNTER (OUTPATIENT)
Dept: ONCOLOGY | Facility: HOSPITAL | Age: 57
Discharge: HOME | End: 2022-07-05

## 2022-07-05 ENCOUNTER — HOSPITAL ENCOUNTER (OUTPATIENT)
Dept: CARDIOLOGY | Facility: HOSPITAL | Age: 57
Discharge: HOME | End: 2022-07-05

## 2022-07-05 VITALS — BODY MASS INDEX: 52.08 KG/M2 | HEIGHT: 62 IN | WEIGHT: 283 LBS

## 2022-07-05 VITALS
BODY MASS INDEX: 52.08 KG/M2 | HEIGHT: 62 IN | SYSTOLIC BLOOD PRESSURE: 142 MMHG | DIASTOLIC BLOOD PRESSURE: 62 MMHG | RESPIRATION RATE: 20 BRPM | TEMPERATURE: 96.9 F | WEIGHT: 283 LBS | HEART RATE: 110 BPM

## 2022-07-05 DIAGNOSIS — T45.1X5A ANEMIA DUE TO CHEMOTHERAPY: ICD-10-CM

## 2022-07-05 DIAGNOSIS — I48.91 ATRIAL FIBRILLATION WITH RAPID VENTRICULAR RESPONSE: Primary | ICD-10-CM

## 2022-07-05 DIAGNOSIS — C71.9 OLIGODENDROGLIOMA: ICD-10-CM

## 2022-07-05 DIAGNOSIS — D64.9 SEVERE ANEMIA: ICD-10-CM

## 2022-07-05 DIAGNOSIS — E66.01 MORBID OBESITY WITH BMI OF 60.0-69.9, ADULT: ICD-10-CM

## 2022-07-05 DIAGNOSIS — D64.81 ANEMIA DUE TO CHEMOTHERAPY: ICD-10-CM

## 2022-07-05 DIAGNOSIS — I27.20 PHT (PULMONARY HYPERTENSION): ICD-10-CM

## 2022-07-05 DIAGNOSIS — D64.9 TRANSFUSION-DEPENDENT ANEMIA: Primary | ICD-10-CM

## 2022-07-05 LAB
ABO GROUP BLD: NORMAL
ALBUMIN SERPL-MCNC: 3.5 G/DL (ref 3.5–5.2)
ALBUMIN/GLOB SERPL: 1.3 G/DL
ALP SERPL-CCNC: 115 U/L (ref 39–117)
ALT SERPL W P-5'-P-CCNC: 8 U/L (ref 1–33)
ANION GAP SERPL CALCULATED.3IONS-SCNC: 16 MMOL/L (ref 5–15)
ANTI-FYA: NORMAL
APTT PPP: 26.8 SECONDS (ref 61–76.5)
AST SERPL-CCNC: 10 U/L (ref 1–32)
BASOPHILS # BLD AUTO: 0 10*3/MM3 (ref 0–0.2)
BASOPHILS # BLD AUTO: 0.02 10*3/MM3 (ref 0–0.2)
BASOPHILS NFR BLD AUTO: 0.2 % (ref 0–1.5)
BASOPHILS NFR BLD AUTO: 0.3 % (ref 0–1.5)
BILIRUB SERPL-MCNC: 1.5 MG/DL (ref 0–1.2)
BLD GP AB SCN SERPL QL: POSITIVE
BUN SERPL-MCNC: 13 MG/DL (ref 6–20)
BUN/CREAT SERPL: 14.8 (ref 7–25)
CALCIUM SPEC-SCNC: 8.8 MG/DL (ref 8.6–10.5)
CHLORIDE SERPL-SCNC: 103 MMOL/L (ref 98–107)
CO2 SERPL-SCNC: 19 MMOL/L (ref 22–29)
CREAT SERPL-MCNC: 0.88 MG/DL (ref 0.57–1)
DEPRECATED RDW RBC AUTO: 66.6 FL (ref 37–54)
DEPRECATED RDW RBC AUTO: 66.9 FL (ref 37–54)
EGFRCR SERPLBLD CKD-EPI 2021: 78.2 ML/MIN/1.73
EOSINOPHIL # BLD AUTO: 0 10*3/MM3 (ref 0–0.4)
EOSINOPHIL # BLD AUTO: 0.02 10*3/MM3 (ref 0–0.4)
EOSINOPHIL NFR BLD AUTO: 0.2 % (ref 0.3–6.2)
EOSINOPHIL NFR BLD AUTO: 0.2 % (ref 0.3–6.2)
ERYTHROCYTE [DISTWIDTH] IN BLOOD BY AUTOMATED COUNT: 20 % (ref 12.3–15.4)
ERYTHROCYTE [DISTWIDTH] IN BLOOD BY AUTOMATED COUNT: 21.4 % (ref 12.3–15.4)
GLOBULIN UR ELPH-MCNC: 2.7 GM/DL
GLUCOSE SERPL-MCNC: 152 MG/DL (ref 65–99)
HCT VFR BLD AUTO: 18.6 % (ref 34–46.6)
HCT VFR BLD AUTO: 21.1 % (ref 34–46.6)
HGB BLD-MCNC: 6.2 G/DL (ref 12–15.9)
HGB BLD-MCNC: 6.9 G/DL (ref 12–15.9)
HOLD SPECIMEN: NORMAL
INR PPP: 1.19 (ref 0.93–1.1)
LYMPHOCYTES # BLD AUTO: 0.5 10*3/MM3 (ref 0.7–3.1)
LYMPHOCYTES # BLD AUTO: 0.71 10*3/MM3 (ref 0.7–3.1)
LYMPHOCYTES NFR BLD AUTO: 8.1 % (ref 19.6–45.3)
LYMPHOCYTES NFR BLD AUTO: 8.8 % (ref 19.6–45.3)
MAGNESIUM SERPL-MCNC: 1.4 MG/DL (ref 1.6–2.6)
MCH RBC QN AUTO: 30.6 PG (ref 26.6–33)
MCH RBC QN AUTO: 32.1 PG (ref 26.6–33)
MCHC RBC AUTO-ENTMCNC: 32.7 G/DL (ref 31.5–35.7)
MCHC RBC AUTO-ENTMCNC: 33.3 G/DL (ref 31.5–35.7)
MCV RBC AUTO: 92 FL (ref 79–97)
MCV RBC AUTO: 98.1 FL (ref 79–97)
MONOCYTES # BLD AUTO: 0.3 10*3/MM3 (ref 0.1–0.9)
MONOCYTES # BLD AUTO: 0.54 10*3/MM3 (ref 0.1–0.9)
MONOCYTES NFR BLD AUTO: 4.7 % (ref 5–12)
MONOCYTES NFR BLD AUTO: 6.2 % (ref 5–12)
NEUTROPHILS NFR BLD AUTO: 4.8 10*3/MM3 (ref 1.7–7)
NEUTROPHILS NFR BLD AUTO: 7.46 10*3/MM3 (ref 1.7–7)
NEUTROPHILS NFR BLD AUTO: 85.3 % (ref 42.7–76)
NEUTROPHILS NFR BLD AUTO: 86 % (ref 42.7–76)
NRBC BLD AUTO-RTO: 0.5 /100 WBC (ref 0–0.2)
NT-PROBNP SERPL-MCNC: 1208 PG/ML (ref 0–900)
PLATELET # BLD AUTO: 102 10*3/MM3 (ref 140–450)
PLATELET # BLD AUTO: 132 10*3/MM3 (ref 140–450)
PMV BLD AUTO: 10.2 FL (ref 6–12)
PMV BLD AUTO: 8.2 FL (ref 6–12)
POTASSIUM SERPL-SCNC: 3.5 MMOL/L (ref 3.5–5.2)
PROT SERPL-MCNC: 6.2 G/DL (ref 6–8.5)
PROTHROMBIN TIME: 12.1 SECONDS (ref 9.6–11.7)
RBC # BLD AUTO: 2.02 10*6/MM3 (ref 3.77–5.28)
RBC # BLD AUTO: 2.15 10*6/MM3 (ref 3.77–5.28)
RH BLD: POSITIVE
SARS-COV-2 RNA PNL SPEC NAA+PROBE: NOT DETECTED
SODIUM SERPL-SCNC: 138 MMOL/L (ref 136–145)
T&S EXPIRATION DATE: NORMAL
TROPONIN T SERPL-MCNC: <0.01 NG/ML (ref 0–0.03)
TSH SERPL DL<=0.05 MIU/L-ACNC: 3.89 UIU/ML (ref 0.27–4.2)
WBC NRBC COR # BLD: 5.5 10*3/MM3 (ref 3.4–10.8)
WBC NRBC COR # BLD: 8.75 10*3/MM3 (ref 3.4–10.8)

## 2022-07-05 PROCEDURE — 86922 COMPATIBILITY TEST ANTIGLOB: CPT

## 2022-07-05 PROCEDURE — 25010000002 EPOETIN ALFA-EPBX 40000 UNIT/ML SOLUTION: Performed by: INTERNAL MEDICINE

## 2022-07-05 PROCEDURE — G0378 HOSPITAL OBSERVATION PER HR: HCPCS

## 2022-07-05 PROCEDURE — 36415 COLL VENOUS BLD VENIPUNCTURE: CPT

## 2022-07-05 PROCEDURE — C9803 HOPD COVID-19 SPEC COLLECT: HCPCS

## 2022-07-05 PROCEDURE — 80053 COMPREHEN METABOLIC PANEL: CPT | Performed by: EMERGENCY MEDICINE

## 2022-07-05 PROCEDURE — 93005 ELECTROCARDIOGRAM TRACING: CPT | Performed by: EMERGENCY MEDICINE

## 2022-07-05 PROCEDURE — 96372 THER/PROPH/DIAG INJ SC/IM: CPT

## 2022-07-05 PROCEDURE — 36430 TRANSFUSION BLD/BLD COMPNT: CPT

## 2022-07-05 PROCEDURE — 85025 COMPLETE CBC W/AUTO DIFF WBC: CPT | Performed by: EMERGENCY MEDICINE

## 2022-07-05 PROCEDURE — 96365 THER/PROPH/DIAG IV INF INIT: CPT

## 2022-07-05 PROCEDURE — 83735 ASSAY OF MAGNESIUM: CPT | Performed by: EMERGENCY MEDICINE

## 2022-07-05 PROCEDURE — 86900 BLOOD TYPING SEROLOGIC ABO: CPT

## 2022-07-05 PROCEDURE — 85025 COMPLETE CBC W/AUTO DIFF WBC: CPT

## 2022-07-05 PROCEDURE — 84443 ASSAY THYROID STIM HORMONE: CPT | Performed by: EMERGENCY MEDICINE

## 2022-07-05 PROCEDURE — 86902 BLOOD TYPE ANTIGEN DONOR EA: CPT

## 2022-07-05 PROCEDURE — 86901 BLOOD TYPING SEROLOGIC RH(D): CPT | Performed by: EMERGENCY MEDICINE

## 2022-07-05 PROCEDURE — 86850 RBC ANTIBODY SCREEN: CPT | Performed by: EMERGENCY MEDICINE

## 2022-07-05 PROCEDURE — 85610 PROTHROMBIN TIME: CPT | Performed by: EMERGENCY MEDICINE

## 2022-07-05 PROCEDURE — P9016 RBC LEUKOCYTES REDUCED: HCPCS

## 2022-07-05 PROCEDURE — 99284 EMERGENCY DEPT VISIT MOD MDM: CPT

## 2022-07-05 PROCEDURE — 86900 BLOOD TYPING SEROLOGIC ABO: CPT | Performed by: EMERGENCY MEDICINE

## 2022-07-05 PROCEDURE — 99219 PR INITIAL OBSERVATION CARE/DAY 50 MINUTES: CPT | Performed by: NURSE PRACTITIONER

## 2022-07-05 PROCEDURE — 86870 RBC ANTIBODY IDENTIFICATION: CPT | Performed by: EMERGENCY MEDICINE

## 2022-07-05 PROCEDURE — 83880 ASSAY OF NATRIURETIC PEPTIDE: CPT | Performed by: EMERGENCY MEDICINE

## 2022-07-05 PROCEDURE — 87635 SARS-COV-2 COVID-19 AMP PRB: CPT | Performed by: EMERGENCY MEDICINE

## 2022-07-05 PROCEDURE — 93005 ELECTROCARDIOGRAM TRACING: CPT

## 2022-07-05 PROCEDURE — 85730 THROMBOPLASTIN TIME PARTIAL: CPT | Performed by: EMERGENCY MEDICINE

## 2022-07-05 PROCEDURE — 84484 ASSAY OF TROPONIN QUANT: CPT | Performed by: EMERGENCY MEDICINE

## 2022-07-05 PROCEDURE — 25010000002 MAGNESIUM SULFATE 2 GM/50ML SOLUTION: Performed by: NURSE PRACTITIONER

## 2022-07-05 PROCEDURE — 71045 X-RAY EXAM CHEST 1 VIEW: CPT

## 2022-07-05 RX ORDER — POTASSIUM CHLORIDE 20 MEQ/1
40 TABLET, EXTENDED RELEASE ORAL AS NEEDED
Status: DISCONTINUED | OUTPATIENT
Start: 2022-07-05 | End: 2022-07-08 | Stop reason: HOSPADM

## 2022-07-05 RX ORDER — ONDANSETRON 4 MG/1
4 TABLET, FILM COATED ORAL EVERY 6 HOURS PRN
Status: DISCONTINUED | OUTPATIENT
Start: 2022-07-05 | End: 2022-07-08 | Stop reason: HOSPADM

## 2022-07-05 RX ORDER — ACETAMINOPHEN 650 MG/1
650 SUPPOSITORY RECTAL EVERY 4 HOURS PRN
Status: DISCONTINUED | OUTPATIENT
Start: 2022-07-05 | End: 2022-07-08 | Stop reason: HOSPADM

## 2022-07-05 RX ORDER — FOLIC ACID 1 MG/1
1 TABLET ORAL DAILY
Status: DISCONTINUED | OUTPATIENT
Start: 2022-07-06 | End: 2022-07-08 | Stop reason: HOSPADM

## 2022-07-05 RX ORDER — SODIUM CHLORIDE 0.9 % (FLUSH) 0.9 %
10 SYRINGE (ML) INJECTION AS NEEDED
Status: DISCONTINUED | OUTPATIENT
Start: 2022-07-05 | End: 2022-07-08 | Stop reason: HOSPADM

## 2022-07-05 RX ORDER — MAGNESIUM SULFATE HEPTAHYDRATE 40 MG/ML
2 INJECTION, SOLUTION INTRAVENOUS AS NEEDED
Status: DISCONTINUED | OUTPATIENT
Start: 2022-07-05 | End: 2022-07-08 | Stop reason: HOSPADM

## 2022-07-05 RX ORDER — SODIUM CHLORIDE 0.9 % (FLUSH) 0.9 %
10 SYRINGE (ML) INJECTION EVERY 12 HOURS SCHEDULED
Status: DISCONTINUED | OUTPATIENT
Start: 2022-07-05 | End: 2022-07-08 | Stop reason: HOSPADM

## 2022-07-05 RX ORDER — ALBUTEROL SULFATE 2.5 MG/3ML
2.5 SOLUTION RESPIRATORY (INHALATION) EVERY 6 HOURS PRN
Refills: 2 | Status: DISCONTINUED | OUTPATIENT
Start: 2022-07-05 | End: 2022-07-08 | Stop reason: HOSPADM

## 2022-07-05 RX ORDER — FERROUS SULFATE TAB EC 324 MG (65 MG FE EQUIVALENT) 324 (65 FE) MG
324 TABLET DELAYED RESPONSE ORAL
Status: DISCONTINUED | OUTPATIENT
Start: 2022-07-06 | End: 2022-07-08 | Stop reason: HOSPADM

## 2022-07-05 RX ORDER — ONDANSETRON 2 MG/ML
4 INJECTION INTRAMUSCULAR; INTRAVENOUS EVERY 6 HOURS PRN
Status: DISCONTINUED | OUTPATIENT
Start: 2022-07-05 | End: 2022-07-08 | Stop reason: HOSPADM

## 2022-07-05 RX ORDER — VENLAFAXINE HYDROCHLORIDE 75 MG/1
150 CAPSULE, EXTENDED RELEASE ORAL DAILY
Status: DISCONTINUED | OUTPATIENT
Start: 2022-07-06 | End: 2022-07-08 | Stop reason: HOSPADM

## 2022-07-05 RX ORDER — DIPHENHYDRAMINE HCL 25 MG
25 CAPSULE ORAL EVERY 6 HOURS PRN
Status: DISCONTINUED | OUTPATIENT
Start: 2022-07-05 | End: 2022-07-08 | Stop reason: HOSPADM

## 2022-07-05 RX ORDER — OXYBUTYNIN CHLORIDE 5 MG/1
5 TABLET ORAL 4 TIMES DAILY
Status: DISCONTINUED | OUTPATIENT
Start: 2022-07-05 | End: 2022-07-08 | Stop reason: HOSPADM

## 2022-07-05 RX ORDER — ACETAMINOPHEN 160 MG/5ML
650 SOLUTION ORAL EVERY 4 HOURS PRN
Status: DISCONTINUED | OUTPATIENT
Start: 2022-07-05 | End: 2022-07-08 | Stop reason: HOSPADM

## 2022-07-05 RX ORDER — CHOLECALCIFEROL (VITAMIN D3) 125 MCG
5 CAPSULE ORAL NIGHTLY PRN
Status: DISCONTINUED | OUTPATIENT
Start: 2022-07-05 | End: 2022-07-08 | Stop reason: HOSPADM

## 2022-07-05 RX ORDER — ACETAMINOPHEN 325 MG/1
650 TABLET ORAL EVERY 4 HOURS PRN
Status: DISCONTINUED | OUTPATIENT
Start: 2022-07-05 | End: 2022-07-08 | Stop reason: HOSPADM

## 2022-07-05 RX ORDER — ALUMINA, MAGNESIA, AND SIMETHICONE 2400; 2400; 240 MG/30ML; MG/30ML; MG/30ML
15 SUSPENSION ORAL EVERY 6 HOURS PRN
Status: DISCONTINUED | OUTPATIENT
Start: 2022-07-05 | End: 2022-07-08 | Stop reason: HOSPADM

## 2022-07-05 RX ORDER — POTASSIUM CHLORIDE 1.5 G/1.77G
40 POWDER, FOR SOLUTION ORAL AS NEEDED
Status: DISCONTINUED | OUTPATIENT
Start: 2022-07-05 | End: 2022-07-08 | Stop reason: HOSPADM

## 2022-07-05 RX ORDER — MAGNESIUM SULFATE HEPTAHYDRATE 40 MG/ML
4 INJECTION, SOLUTION INTRAVENOUS AS NEEDED
Status: DISCONTINUED | OUTPATIENT
Start: 2022-07-05 | End: 2022-07-08 | Stop reason: HOSPADM

## 2022-07-05 RX ADMIN — Medication 10 ML: at 20:38

## 2022-07-05 RX ADMIN — OXYBUTYNIN CHLORIDE 5 MG: 5 TABLET ORAL at 20:38

## 2022-07-05 RX ADMIN — MAGNESIUM SULFATE HEPTAHYDRATE 2 G: 2 INJECTION, SOLUTION INTRAVENOUS at 23:02

## 2022-07-05 RX ADMIN — EPOETIN ALFA-EPBX 40000 UNITS: 40000 INJECTION, SOLUTION INTRAVENOUS; SUBCUTANEOUS at 14:37

## 2022-07-05 RX ADMIN — LEVETIRACETAM 750 MG: 500 TABLET, FILM COATED ORAL at 20:38

## 2022-07-05 NOTE — H&P
[facility] Medicine Services      Patient Name: Cindy Cortes  : 1965  MRN: [id number]  Primary Care Physician:  Annamarie Monroy APRN  Date of admission: 2022      Subjective      Chief Complaint: afib with RVR    History of Present Illness:     Cindy Cortes is a 56 y.o. 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 She has also had pancytopenia and required multiple blood transfusions. She was admitted on 2022 due to shortness of breath and weakness. She was treated for sepsis, pneumonia, UTI, and E. Coli bacteremia. She completed 2 weeks of IV rocephin since that time. She continues to have shortness of breath upon minimal exertion. Her oncologist ordered a CT chest and 2D echo, which she was having completed today when her heart rate was noted to be in the 190s. She felt some heart burn at that time but no palpitations. She was sent to the ED. patient stated she did receive Retacrit injection at the cancer center around 2:00 today.    In the ED initial EKG showed atrial fibrillation with rapid ventricular rate of 190.  She spontaneously converted to normal sinus rhythm.  CXR showed low lung volumes without acute cardiopulmonary abnormality.  Lab work showed hemoglobin 6.2, magnesium 1.4, proBNP 1200.  1 unit PRBCs ordered and patient was admitted for further treatment of paroxysmal atrial fib with RVR, and anemia.      Review of Systems   HENT: Negative.    Eyes: Negative.    Cardiovascular: Positive for dyspnea on exertion.   Respiratory: Positive for cough.    Endocrine: Negative.    Hematologic/Lymphatic: Negative.    Skin: Negative.    Musculoskeletal: Negative.    Gastrointestinal: Negative.    Genitourinary: Negative.    Neurological: Positive for weakness.   Psychiatric/Behavioral: Negative.     Allergic/Immunologic: Negative.    All other systems reviewed and are negative.       Personal History     Past Medical History:   Diagnosis Date   • Anemia    • Arthritis    • GERD (gastroesophageal reflux disease)    • Hypertension    • Oligodendroglioma (HCC)    • Seizure (HCC)    • Type 2 diabetes mellitus with hyperglycemia, without long-term current use of insulin (HCC) 05/12/2021       Past Surgical History:   Procedure Laterality Date   • BRONCHOSCOPY N/A 4/6/2022    Procedure: BRONCHOSCOPY with bronchial washing;  Surgeon: Drew Alcantar MD;  Location: [facility] ENDOSCOPY;  Service: Pulmonary;  Laterality: N/A;  pneumonia   • BRONCHOSCOPY N/A 5/31/2022    Procedure: BRONCHOSCOPY AT BEDSIDE with bronchoalveolar lavage right middle lobe;  Surgeon: Den Feldman MD;  Location: [facility] ENDOSCOPY;  Service: Pulmonary;  Laterality: N/A;   • COLONOSCOPY     • CRANIOTOMY FOR TUMOR Right 05/06/2021    Procedure: CRANIOTOMY FOR TUMOR RESECTION WITH STEREOTACTIC;  Surgeon: Jluis Felix MD;  Location: [facility] MAIN OR;  Service: Neurosurgery;  Laterality: Right;       Family History: family history includes Breast cancer in her cousin, maternal aunt, and niece; Colon cancer in her maternal aunt; Kidney disease in her mother; Prostate cancer in her brother. Otherwise pertinent FHx was reviewed and not pertinent to current issue.    Social History:  reports that she has never smoked. She has never used smokeless tobacco. She reports previous alcohol use of about 1.0 standard drink of alcohol per week. She reports that she does not use drugs.    Home Medications:  Prior to Admission Medications     Prescriptions Last Dose Informant Patient Reported? Taking?    albuterol sulfate  (90 Base) MCG/ACT inhaler   No No    Inhale 2 puffs Every 4 (Four) Hours As Needed for Wheezing.    Budeson-Glycopyrrol-Formoterol (Breztri Aerosphere) 160-9-4.8 MCG/ACT aerosol inhaler   No No    Inhale 2 puffs 2 (Two) Times a Day.  Indications: Lot#[id number] Exp: 8/30/23    diphenhydrAMINE (BENADRYL) 25 MG tablet   Yes No    Take 25 mg by mouth Daily As Needed for Allergies.    ferrous sulfate 325 (65 FE) MG tablet   Yes No    Take 1 tablet by mouth Daily With Breakfast.    fluticasone (FLOVENT HFA) 44 MCG/ACT inhaler   Yes No    Inhale 2 puffs Daily As Needed.    folic acid (FOLVITE) 1 MG tablet   Yes No    Take 1 mg by mouth Daily.    levETIRAcetam (KEPPRA) 750 MG tablet   No No    Take 1 tablet by mouth 2 (Two) Times a Day.    ondansetron (Zofran) 8 MG tablet   No No    Take 1 tablet by mouth Every 8 (Eight) Hours As Needed for Nausea or Vomiting.    oxybutynin (DITROPAN) 5 MG tablet   Yes No    Take 5 mg by mouth 4 (Four) Times a Day.    venlafaxine XR (EFFEXOR-XR) 150 MG 24 hr capsule   Yes No    Take 150 mg by mouth Daily.            Allergies:  No Known Allergies    Objective      Vitals:   Temp:  [96.9 °F (36.1 °C)-98.4 °F (36.9 °C)] 97.6 °F (36.4 °C)  Heart Rate:  [101-181] 110  Resp:  [16-22] 16  BP: (110-142)/(45-68) 110/50    Physical Exam  Vitals and nursing note reviewed.   Constitutional:       Appearance: She is obese.   HENT:      Head: Normocephalic and atraumatic.   Eyes:      Extraocular Movements: Extraocular movements intact.      Pupils: Pupils are equal, round, and reactive to light.   Cardiovascular:      Rate and Rhythm: Normal rate and regular rhythm.      Pulses: Normal pulses.      Heart sounds: Normal heart sounds.   Pulmonary:      Effort: Pulmonary effort is normal.      Breath sounds: Examination of the right-lower field reveals decreased breath sounds. Examination of the left-lower field reveals decreased breath sounds. Decreased breath sounds present.   Abdominal:      General: Bowel sounds are normal.      Palpations: Abdomen is soft.      Tenderness: There is no abdominal tenderness.   Musculoskeletal:         General: Normal range of motion.   Skin:     General: Skin is warm and dry.   Neurological:       Mental Status: She is alert and oriented to person, place, and time.   Psychiatric:         Mood and Affect: Mood normal.         Behavior: Behavior normal.        Result Review    Result Review:  I have personally reviewed the results from the time of this admission to 7/5/2022 18:43 EDT and agree with these findings:  [x]  Laboratory  []  Microbiology  [x]  Radiology  [x]  EKG/Telemetry   []  Cardiology/Vascular   []  Pathology  [x]  Old records      Assessment & Plan        Active Hospital Problems:  Active Hospital Problems    Diagnosis    • **Anemia      Last Assessment & Plan:   Formatting of this note might be different from the original.  Check CBC     • Paroxysmal atrial fibrillation with rapid ventricular response (HCC)    • Dyspnea on exertion    • Hypomagnesemia    • Gastroesophageal reflux disease with hiatal hernia      Formatting of this note might be different from the original.  S/P EGD (08/19/19) = GASTRITIS, BX = NEG  GI - DR. LE>>>IF STILL SXC, REFER TO GEN SURGERY FOR YEFRI    Last Assessment & Plan:   Formatting of this note might be different from the original.  Stable on pantoprazole     • Oligodendroglioma (HCC)    • Essential hypertension    • Depression      Plan:     Symptomatic Anemia -underlying anemia of chronic disease related to myelosuppression  Dyspnea on Exertion   -hgb 6.2  -no reports of bleeding  -this has been recurrent and is followed by hematology/oncology. She received a Retacrit injection today 7/5/2022 at the cancer care center  -S/p CT-guided bone marrow biopsy by IR on 6/2/2022  -1 unit PRBCs ordered in ED  -cont home ferrous sulfate  -consult Dr. Workman for further recommendations  -CXR showed low lung volumes without acute cardiopulmonary abnormality.    Paroxysmal atrial fibrillation with rapid ventricular rate  -initial EKG showed atrial fibrillation with rapid ventricular rate of 190.  She spontaneously converted to normal sinus rhythm    Hypomagnesemia   -Mg  1.4  -replacement protocol      Oligodendroglioma s/p craniotomy/resection of right frontal lobe mass 5/6/2021  -completed radiation  -chemotherapy had to be stopped due to possible pneumonitis found on bronchoscopy April 2022  -cont home keppra    Depression  -cont home effexor     Morbid obesity BMI 47  -encourage lifestyle modifications      DVT prophylaxis:  Mechanical DVT prophylaxis orders are present.    CODE STATUS:    Level Of Support Discussed With: Patient  Code Status (Patient has no pulse and is not breathing): CPR (Attempt to Resuscitate)  Medical Interventions (Patient has pulse or is breathing): Full Support    Admission Status:  I believe this patient meets observation status.    I discussed the patient's findings and my recommendations with patient and family.    This patient has been examined wearing appropriate Personal Protective Equipment. 07/05/22      Signature: Electronically signed by BJ Garcia, 07/05/22, 6:44 PM EDT.

## 2022-07-05 NOTE — PROGRESS NOTES
Notified  nurse Umm DONALD R.N. of hgb 6.9,hct 21.1,rbc2.18,platelets 132,wbc 8.75,and anc 7.46. . Dottie WALKER reports that Dr. Workman wants patient to receive Retacrit injection and return on Friday for a CBC @ 0830.Patient and spouse aware of new information from  and will return on Friday morning.Patient received [id number] Retacrit injection and tolerated injection well.

## 2022-07-05 NOTE — ED PROVIDER NOTES
Subjective   History of Present Illness  Shortness of breath  56-year-old female with a history of brain cancer who has been on chemotherapy and radiation therapy who has expressed some shortness of breath dyspnea on exertion to cancer care over the last several weeks and was ordered echocardiogram today was here at the hospital in cardiovascular suite and noted to have increased heart rate and A. fib.  She was sent to the ED for evaluation.  She denies heart racing sensation or palpitations and reports no chest pain or cough or fever.  She has had no syncope or near syncope.  States she does have dyspnea on exertion for the last several weeks.  She reports no productive cough or fever.  Review of Systems   Constitutional: Negative.    HENT: Negative.    Eyes: Negative.    Respiratory: Positive for shortness of breath.    Cardiovascular: Negative.    Gastrointestinal: Negative.    Genitourinary: Negative.    Musculoskeletal: Negative.    Skin: Negative.    Neurological: Negative.    Psychiatric/Behavioral: Negative.        Past Medical History:   Diagnosis Date   • Anemia    • Arthritis    • GERD (gastroesophageal reflux disease)    • Hypertension    • Oligodendroglioma (HCC)    • Seizure (HCC)    • Type 2 diabetes mellitus with hyperglycemia, without long-term current use of insulin (HCC) 05/12/2021       No Known Allergies    Past Surgical History:   Procedure Laterality Date   • BRONCHOSCOPY N/A 4/6/2022    Procedure: BRONCHOSCOPY with bronchial washing;  Surgeon: Drew Alcantar MD;  Location: [facility] ENDOSCOPY;  Service: Pulmonary;  Laterality: N/A;  pneumonia   • BRONCHOSCOPY N/A 5/31/2022    Procedure: BRONCHOSCOPY AT BEDSIDE with bronchoalveolar lavage right middle lobe;  Surgeon: Den Feldmna MD;  Location: [facility] ENDOSCOPY;  Service: Pulmonary;  Laterality: N/A;   • COLONOSCOPY     • CRANIOTOMY FOR TUMOR Right 05/06/2021    Procedure: CRANIOTOMY FOR TUMOR RESECTION WITH STEREOTACTIC;  Surgeon: Jluis Felix  MD KADI;  Location: [facility] MAIN OR;  Service: Neurosurgery;  Laterality: Right;       Family History   Problem Relation Age of Onset   • Kidney disease Mother    • Prostate cancer Brother    • Breast cancer Maternal Aunt    • Colon cancer Maternal Aunt    • Breast cancer Niece    • Breast cancer Cousin        Social History     Socioeconomic History   • Marital status:    Tobacco Use   • Smoking status: Never Smoker   • Smokeless tobacco: Never Used   Vaping Use   • Vaping Use: Never used   Substance and Sexual Activity   • Alcohol use: Not Currently     Alcohol/week: 1.0 standard drink     Types: 1 Cans of beer per week     Comment: socially   • Drug use: Never   • Sexual activity: Yes     Partners: Male       Prior to Admission medications    Medication Sig Start Date End Date Taking? Authorizing Provider   albuterol sulfate  (90 Base) MCG/ACT inhaler Inhale 2 puffs Every 4 (Four) Hours As Needed for Wheezing. 4/4/22   Pastora Guallpa APRN   Budeson-Glycopyrrol-Formoterol (Breztri Aerosphere) 160-9-4.8 MCG/ACT aerosol inhaler Inhale 2 puffs 2 (Two) Times a Day. Indications: Lot#[id number] Exp: 8/30/23 4/4/22   Drew Alcantar MD   diphenhydrAMINE (BENADRYL) 25 MG tablet Take 25 mg by mouth Daily As Needed for Allergies. 6/9/22   Ismael Schulte MD   ferrous sulfate 325 (65 FE) MG tablet Take 1 tablet by mouth Daily With Breakfast. 7/23/21   Ismael Schulte MD   fluticasone (FLOVENT HFA) 44 MCG/ACT inhaler Inhale 2 puffs Daily As Needed.    Ismael Schulte MD   folic acid (FOLVITE) 1 MG tablet Take 1 mg by mouth Daily.    Ismael Schulte MD   levETIRAcetam (KEPPRA) 750 MG tablet Take 1 tablet by mouth 2 (Two) Times a Day. 5/13/21   Newton Pereira DO   ondansetron (Zofran) 8 MG tablet Take 1 tablet by mouth Every 8 (Eight) Hours As Needed for Nausea or Vomiting. 8/23/21   Radha Peace APRN   oxybutynin (DITROPAN) 5 MG tablet Take 5 mg by mouth 4 (Four) Times a Day. 10/5/21    "Ismael Schulte MD   venlafaxine XR (EFFEXOR-XR) 150 MG 24 hr capsule Take 150 mg by mouth Daily. 4/27/19   Ismael Schulte MD     /63   Pulse 105   Temp 98.4 °F (36.9 °C) (Oral)   Resp 22   Ht 157.5 cm (62\")   Wt 118 kg (261 lb)   LMP  (LMP Unknown)   SpO2 92%   BMI 47.74 kg/m²   I examined the patient using the appropriate personal protective equipment.        Objective   Physical Exam  General: Obese female, no acute distress, alert and appropriate  Eyes: sclera nonicteric  HEENT: Mucous membranes moist, no mucosal swelling  Neck: Supple, no nuchal rigidity,  Respirations: Respirations nonlabored, equal breath sounds bilaterally, clear lungs  Heart rapid rate, irregular rhythm, no murmurs rubs or gallops,   Abdomen soft nontender nondistended, no hepatosplenomegaly, no hernia, no mass, normal bowel sounds, no CVA tenderness  Extremities trace edema in all 4 extremities, calves symmetric and nontender  Neuro cranial nerves grossly intact, no focal limb deficits  Psych oriented, pleasant affect  Skin no rash, brisk cap refill  Procedures           ED Course      Results for orders placed or performed during the hospital encounter of 07/05/22   COVID-19,CEPHEID/ADITI,COR/SARITHA/PAD/DEANNE IN-HOUSE(OR EMERGENT/ADD-ON),NP SWAB IN TRANSPORT MEDIA 3-4 HR TAT, RT-PCR - Swab, Nasopharynx    Specimen: Nasopharynx; Swab   Result Value Ref Range    COVID19 Not Detected Not Detected - Ref. Range   Comprehensive Metabolic Panel    Specimen: Blood   Result Value Ref Range    Glucose 152 (H) 65 - 99 mg/dL    BUN 13 6 - 20 mg/dL    Creatinine 0.88 0.57 - 1.00 mg/dL    Sodium 138 136 - 145 mmol/L    Potassium 3.5 3.5 - 5.2 mmol/L    Chloride 103 98 - 107 mmol/L    CO2 19.0 (L) 22.0 - 29.0 mmol/L    Calcium 8.8 8.6 - 10.5 mg/dL    Total Protein 6.2 6.0 - 8.5 g/dL    Albumin 3.50 3.50 - 5.20 g/dL    ALT (SGPT) 8 1 - 33 U/L    AST (SGOT) 10 1 - 32 U/L    Alkaline Phosphatase 115 39 - 117 U/L    Total Bilirubin 1.5 " (H) 0.0 - 1.2 mg/dL    Globulin 2.7 gm/dL    A/G Ratio 1.3 g/dL    BUN/Creatinine Ratio 14.8 7.0 - 25.0    Anion Gap 16.0 (H) 5.0 - 15.0 mmol/L    eGFR 78.2 >60.0 mL/min/1.73   Protime-INR    Specimen: Blood   Result Value Ref Range    Protime 12.1 (H) 9.6 - 11.7 Seconds    INR 1.19 (H) 0.93 - 1.10   aPTT    Specimen: Blood   Result Value Ref Range    PTT 26.8 (L) 61.0 - 76.5 seconds   Troponin    Specimen: Blood   Result Value Ref Range    Troponin T <0.010 0.000 - 0.030 ng/mL   BNP    Specimen: Blood   Result Value Ref Range    proBNP 1,208.0 (H) 0.0 - 900.0 pg/mL   TSH    Specimen: Blood   Result Value Ref Range    TSH 3.890 0.270 - 4.200 uIU/mL   Magnesium    Specimen: Blood   Result Value Ref Range    Magnesium 1.4 (L) 1.6 - 2.6 mg/dL   CBC Auto Differential    Specimen: Blood   Result Value Ref Range    WBC 5.50 3.40 - 10.80 10*3/mm3    RBC 2.02 (L) 3.77 - 5.28 10*6/mm3    Hemoglobin 6.2 (C) 12.0 - 15.9 g/dL    Hematocrit 18.6 (C) 34.0 - 46.6 %    MCV 92.0 79.0 - 97.0 fL    MCH 30.6 26.6 - 33.0 pg    MCHC 33.3 31.5 - 35.7 g/dL    RDW 21.4 (H) 12.3 - 15.4 %    RDW-SD 66.9 (H) 37.0 - 54.0 fl    MPV 8.2 6.0 - 12.0 fL    Platelets 102 (L) 140 - 450 10*3/mm3    Neutrophil % 86.0 (H) 42.7 - 76.0 %    Lymphocyte % 8.8 (L) 19.6 - 45.3 %    Monocyte % 4.7 (L) 5.0 - 12.0 %    Eosinophil % 0.2 (L) 0.3 - 6.2 %    Basophil % 0.3 0.0 - 1.5 %    Neutrophils, Absolute 4.80 1.70 - 7.00 10*3/mm3    Lymphocytes, Absolute 0.50 (L) 0.70 - 3.10 10*3/mm3    Monocytes, Absolute 0.30 0.10 - 0.90 10*3/mm3    Eosinophils, Absolute 0.00 0.00 - 0.40 10*3/mm3    Basophils, Absolute 0.00 0.00 - 0.20 10*3/mm3    nRBC 0.5 (H) 0.0 - 0.2 /100 WBC   ECG 12 Lead   Result Value Ref Range    QT Interval 237 ms   ECG 12 Lead   Result Value Ref Range    QT Interval 356 ms   Type & Screen    Specimen: Blood   Result Value Ref Range    ABO Type O     RH type Positive     Antibody Screen Positive     T&S Expiration Date 7/8/2022 11:59:59 PM    Gold Top  - SST   Result Value Ref Range    Extra Tube Hold for add-ons.      XR Chest 1 View    Result Date: 7/5/2022  1. Low lung volumes without acute cardiopulmonary abnormality. 2. Mild cardiomegaly, likely accentuated by low lung volumes and AP portable technique.  Electronically Signed By-Tayo Brumfield MD On:7/5/2022 4:58 PM This report was finalized on [id number] by  Tayo Brumfield MD.                                         MDM  While in the room with my initial examination patient converted from atrial fibrillation with rapid ventricular response to a sinus rhythm.  My interpretation of initial EKG is rate 190 atrial fibrillation, rate related repolarization abnormalities.  A repeat EKG was performed following the conversion and my interpretation is sinus tachycardia rate 113 with PACs  Patient converted prior to initiating treatment of the A. fib.  She was found to have severe anemia.  Notably she is not describing active bleeding.  I suspect the anemia is contributing to her underlying dyspnea.  She has no signs of DVT.  EKG shows no definite ischemic pattern.  Troponin is normal.  She was ordered packed red cell transfusion.  She is advised the findings and agree with the plan.  Case discussed with Sapna OHARA with the hospitalist service for admission.  Final diagnoses:   Atrial fibrillation with rapid ventricular response (HCC)   Severe anemia       ED Disposition  ED Disposition     ED Disposition   Decision to Admit    Condition   --    Comment   Level of Care: Telemetry [5]   Admitting Physician: HENRRY LAU [[id number]]   Attending Physician: HENRRY LAU [[id number]]               No follow-up provider specified.       Medication List      No changes were made to your prescriptions during this visit.          Luigi Limon MD  07/05/22 [id number]

## 2022-07-05 NOTE — CASE MANAGEMENT/SOCIAL WORK
Discharge Planning Assessment  [facility]     Patient Name: Cindy Cortes  MRN: [id number]  Today's Date: 7/5/2022    Admit Date: 7/5/2022     Discharge Needs Assessment     Row Name 07/05/22 1607       Living Environment    People in Home spouse    Name(s) of People in Home Kvng-S.O.    Current Living Arrangements home    Primary Care Provided by self    Provides Primary Care For no one    Family Caregiver if Needed significant other    Quality of Family Relationships helpful;supportive    Able to Return to Prior Arrangements yes       Transition Planning    Patient/Family Anticipates Transition to home    Transportation Anticipated family or friend will provide       Discharge Needs Assessment    Readmission Within the Last 30 Days no previous admission in last 30 days    Equipment Currently Used at Home walker, standard;wheelchair    Concerns to be Addressed no discharge needs identified               Discharge Plan     Row Name 07/05/22 1609       Plan    Plan DC Plan:Return Home.    Plan Comments Spoke with patient at bedside,states independent with ADLs,lives with S.O. Confirmed Pharmacy and PCP. No transportation/prescription coverage issues. Denies needs at this time.              Continued Care and Services - Admitted Since 7/5/2022    Coordination has not been started for this encounter.     Selected Continued Care - Prior Encounters Includes selections from prior encounters from 4/6/2022 to 7/5/2022            Demographic Summary     Row Name 07/05/22 1606       General Information    Admission Type other (see comments)  no status yet    Arrived From emergency department    Referral Source admission list;emergency department    Reason for Consult discharge planning    Preferred Language English               Functional Status     Row Name 07/05/22 1607       Functional Status    Usual Activity Tolerance good    Current Activity Tolerance good       Functional Status, IADL    Medications independent     Meal Preparation independent    Housekeeping independent    Laundry independent    Shopping independent       Mental Status    General Appearance WDL WDL            Met with patient in room wearing PPE: mask, face shield/goggles, gloves, gown.      Maintained distance greater than six feet and spent less than 15 minutes in the room.      Melinda Patton RN

## 2022-07-06 LAB
ANION GAP SERPL CALCULATED.3IONS-SCNC: 13 MMOL/L (ref 5–15)
ANISOCYTOSIS BLD QL: ABNORMAL
BUN SERPL-MCNC: 12 MG/DL (ref 6–20)
BUN/CREAT SERPL: 17.4 (ref 7–25)
CALCIUM SPEC-SCNC: 8.3 MG/DL (ref 8.6–10.5)
CHLORIDE SERPL-SCNC: 103 MMOL/L (ref 98–107)
CO2 SERPL-SCNC: 23 MMOL/L (ref 22–29)
CREAT SERPL-MCNC: 0.69 MG/DL (ref 0.57–1)
DEPRECATED RDW RBC AUTO: 60.4 FL (ref 37–54)
EGFRCR SERPLBLD CKD-EPI 2021: 103.3 ML/MIN/1.73
EOSINOPHIL # BLD MANUAL: 0.05 10*3/MM3 (ref 0–0.4)
EOSINOPHIL NFR BLD MANUAL: 1 % (ref 0.3–6.2)
ERYTHROCYTE [DISTWIDTH] IN BLOOD BY AUTOMATED COUNT: 20 % (ref 12.3–15.4)
GLUCOSE SERPL-MCNC: 106 MG/DL (ref 65–99)
HAPTOGLOB SERPL-MCNC: 69 MG/DL (ref 30–200)
HCT VFR BLD AUTO: 18.5 % (ref 34–46.6)
HCT VFR BLD AUTO: 22.6 % (ref 34–46.6)
HGB BLD-MCNC: 6.6 G/DL (ref 12–15.9)
HGB BLD-MCNC: 7.8 G/DL (ref 12–15.9)
HYPOCHROMIA BLD QL: ABNORMAL
LARGE PLATELETS: ABNORMAL
LDH SERPL-CCNC: 202 U/L (ref 135–214)
LYMPHOCYTES # BLD MANUAL: 0.54 10*3/MM3 (ref 0.7–3.1)
LYMPHOCYTES NFR BLD MANUAL: 2 % (ref 5–12)
MCH RBC QN AUTO: 31.3 PG (ref 26.6–33)
MCHC RBC AUTO-ENTMCNC: 35.4 G/DL (ref 31.5–35.7)
MCV RBC AUTO: 88.2 FL (ref 79–97)
METAMYELOCYTES NFR BLD MANUAL: 2 % (ref 0–0)
MICROCYTES BLD QL: ABNORMAL
MONOCYTES # BLD: 0.09 10*3/MM3 (ref 0.1–0.9)
NEUTROPHILS # BLD AUTO: 3.74 10*3/MM3 (ref 1.7–7)
NEUTROPHILS NFR BLD MANUAL: 76 % (ref 42.7–76)
NEUTS BAND NFR BLD MANUAL: 7 % (ref 0–5)
PLATELET # BLD AUTO: 80 10*3/MM3 (ref 140–450)
PMV BLD AUTO: 8.2 FL (ref 6–12)
POIKILOCYTOSIS BLD QL SMEAR: ABNORMAL
POTASSIUM SERPL-SCNC: 3.4 MMOL/L (ref 3.5–5.2)
RBC # BLD AUTO: 2.1 10*6/MM3 (ref 3.77–5.28)
RETICS # AUTO: 0.07 10*6/MM3 (ref 0.02–0.13)
RETICS/RBC NFR AUTO: 2.85 % (ref 0.7–1.9)
SCAN SLIDE: NORMAL
SMALL PLATELETS BLD QL SMEAR: ABNORMAL
SODIUM SERPL-SCNC: 139 MMOL/L (ref 136–145)
TARGETS BLD QL SMEAR: ABNORMAL
VARIANT LYMPHS NFR BLD MANUAL: 12 % (ref 19.6–45.3)
WBC MORPH BLD: NORMAL
WBC NRBC COR # BLD: 4.5 10*3/MM3 (ref 3.4–10.8)

## 2022-07-06 PROCEDURE — 83010 ASSAY OF HAPTOGLOBIN QUANT: CPT | Performed by: NURSE PRACTITIONER

## 2022-07-06 PROCEDURE — 80048 BASIC METABOLIC PNL TOTAL CA: CPT | Performed by: NURSE PRACTITIONER

## 2022-07-06 PROCEDURE — 85018 HEMOGLOBIN: CPT | Performed by: HOSPITALIST

## 2022-07-06 PROCEDURE — 25010000002 MAGNESIUM SULFATE 2 GM/50ML SOLUTION: Performed by: NURSE PRACTITIONER

## 2022-07-06 PROCEDURE — G0378 HOSPITAL OBSERVATION PER HR: HCPCS

## 2022-07-06 PROCEDURE — 85014 HEMATOCRIT: CPT | Performed by: HOSPITALIST

## 2022-07-06 PROCEDURE — 99214 OFFICE O/P EST MOD 30 MIN: CPT | Performed by: INTERNAL MEDICINE

## 2022-07-06 PROCEDURE — 86900 BLOOD TYPING SEROLOGIC ABO: CPT

## 2022-07-06 PROCEDURE — P9016 RBC LEUKOCYTES REDUCED: HCPCS

## 2022-07-06 PROCEDURE — 85025 COMPLETE CBC W/AUTO DIFF WBC: CPT | Performed by: NURSE PRACTITIONER

## 2022-07-06 PROCEDURE — 83615 LACTATE (LD) (LDH) ENZYME: CPT | Performed by: NURSE PRACTITIONER

## 2022-07-06 PROCEDURE — 99225 PR SBSQ OBSERVATION CARE/DAY 25 MINUTES: CPT | Performed by: HOSPITALIST

## 2022-07-06 PROCEDURE — 85007 BL SMEAR W/DIFF WBC COUNT: CPT | Performed by: NURSE PRACTITIONER

## 2022-07-06 PROCEDURE — 96366 THER/PROPH/DIAG IV INF ADDON: CPT

## 2022-07-06 PROCEDURE — 85045 AUTOMATED RETICULOCYTE COUNT: CPT | Performed by: NURSE PRACTITIONER

## 2022-07-06 PROCEDURE — 36430 TRANSFUSION BLD/BLD COMPNT: CPT

## 2022-07-06 RX ADMIN — FOLIC ACID 1 MG: 1 TABLET ORAL at 08:33

## 2022-07-06 RX ADMIN — MAGNESIUM SULFATE HEPTAHYDRATE 2 G: 2 INJECTION, SOLUTION INTRAVENOUS at 05:39

## 2022-07-06 RX ADMIN — OXYBUTYNIN CHLORIDE 5 MG: 5 TABLET ORAL at 21:04

## 2022-07-06 RX ADMIN — POTASSIUM CHLORIDE 40 MEQ: 1500 TABLET, EXTENDED RELEASE ORAL at 05:12

## 2022-07-06 RX ADMIN — LEVETIRACETAM 750 MG: 500 TABLET, FILM COATED ORAL at 21:04

## 2022-07-06 RX ADMIN — OXYBUTYNIN CHLORIDE 5 MG: 5 TABLET ORAL at 08:33

## 2022-07-06 RX ADMIN — OXYBUTYNIN CHLORIDE 5 MG: 5 TABLET ORAL at 11:47

## 2022-07-06 RX ADMIN — POTASSIUM CHLORIDE 40 MEQ: 1500 TABLET, EXTENDED RELEASE ORAL at 01:21

## 2022-07-06 RX ADMIN — MAGNESIUM SULFATE HEPTAHYDRATE 2 G: 2 INJECTION, SOLUTION INTRAVENOUS at 00:54

## 2022-07-06 RX ADMIN — LEVETIRACETAM 750 MG: 500 TABLET, FILM COATED ORAL at 08:33

## 2022-07-06 RX ADMIN — Medication 10 ML: at 08:34

## 2022-07-06 RX ADMIN — OXYBUTYNIN CHLORIDE 5 MG: 5 TABLET ORAL at 17:04

## 2022-07-06 RX ADMIN — FERROUS SULFATE TAB EC 324 MG (65 MG FE EQUIVALENT) 324 MG: 324 (65 FE) TABLET DELAYED RESPONSE at 08:33

## 2022-07-06 RX ADMIN — Medication 10 ML: at 21:04

## 2022-07-06 RX ADMIN — VENLAFAXINE HYDROCHLORIDE 150 MG: 75 CAPSULE, EXTENDED RELEASE ORAL at 08:33

## 2022-07-06 NOTE — PROGRESS NOTES
[facility] Medicine Services Daily Progress Note    Patient Name: Cindy Cortes  : 1965  MRN: [id number]  Primary Care Physician:  Annamarie Monroy APRN  Date of admission: 2022      Subjective      Chief Complaint: soa      Patient Reports     22: s/p 2U PRBC transfusion.    Review of Systems   All other systems reviewed and are negative.        Objective      Vitals:   Temp:  [97.6 °F (36.4 °C)-98.3 °F (36.8 °C)] 98.1 °F (36.7 °C)  Heart Rate:  [] 92  Resp:  [16-22] 22  BP: (109-162)/(45-82) 121/56    Physical Exam  HENT:      Head: Normocephalic.      Nose: Nose normal.   Eyes:      Extraocular Movements: Extraocular movements intact.      Pupils: Pupils are equal, round, and reactive to light.   Cardiovascular:      Rate and Rhythm: Normal rate and regular rhythm.   Pulmonary:      Effort: Pulmonary effort is normal.   Abdominal:      General: Bowel sounds are normal.      Comments: obese   Musculoskeletal:         General: Normal range of motion.      Cervical back: Normal range of motion.   Skin:     General: Skin is warm.   Neurological:      Mental Status: She is alert. Mental status is at baseline.   Psychiatric:         Mood and Affect: Mood normal.             Result Review    Result Review:  I have personally reviewed the results from the time of this admission to 2022 17:24 EDT and agree with these findings:  [x]  Laboratory  []  Microbiology  [x]  Radiology  []  EKG/Telemetry   []  Cardiology/Vascular   []  Pathology  [x]  Old records  []  Other:            Assessment & Plan      Brief Patient Summary:  56-year-old female with symptomatic anemia      ferrous sulfate, 324 mg, Oral, Daily With Breakfast  folic acid, 1 mg, Oral, Daily  levETIRAcetam, 750 mg, Oral, BID  oxybutynin, 5 mg, Oral, 4x Daily  sodium chloride, 10 mL, Intravenous, Q12H  venlafaxine XR, 150 mg, Oral, Daily             Active Hospital Problems:  Active Hospital Problems    Diagnosis     • **Anemia      Last Assessment & Plan:   Formatting of this note might be different from the original.  Check CBC     • Paroxysmal atrial fibrillation with rapid ventricular response (HCC)    • Dyspnea on exertion    • Hypomagnesemia    • Gastroesophageal reflux disease with hiatal hernia      Formatting of this note might be different from the original.  S/P EGD (08/19/19) = GASTRITIS, BX = NEG  GI - DR. LE>>>IF STILL SXC, REFER TO GEN SURGERY FOR YEFRI    Last Assessment & Plan:   Formatting of this note might be different from the original.  Stable on pantoprazole     • Oligodendroglioma (HCC)    • Essential hypertension    • Depression          Symptomatic anemia causing FALL:  -hgb 6.2 in the ED  -No evidence of bleeding  -s/p Retacrit injection today 7/5/20[facility] at [facility]  -S/p CT-guided bone marrow biopsy by IR on 6/2/2022  -s/p 2Ut PRBCs   -cont home ferrous sulfate  -consult Dr. Workman for further recommendations       Paroxysmal atrial fibrillation with RVR: controlled  -initial EKG showed atrial fibrillation with rapid ventricular rate of 190.  She spontaneously converted to normal sinus rhythm     Hypomagnesemia   -Mg 1.4  -replacement protocol      Oligodendroglioma s/p craniotomy/resection of right frontal lobe mass 5/6/2021  -s/p completed radiation  -chemotherapy had to be stopped due to possible pneumonitis found on bronchoscopy April 2022  -cont home keppra     Depression:  -cont home effexor      Morbid obesity BMI 47  -encourage lifestyle modifications    DVT prophylaxis:  Mechanical DVT prophylaxis orders are present.    CODE STATUS:    Level Of Support Discussed With: Patient  Code Status (Patient has no pulse and is not breathing): CPR (Attempt to Resuscitate)  Medical Interventions (Patient has pulse or is breathing): Full Support      Disposition:  I expect patient to be discharged defer.    This patient has been examined wearing appropriate Personal Protective Equipment  and discussed with nursing. 07/06/22      Electronically signed by Ba Ventura DO, 07/06/22, 17:24 EDT.  [facility]ist Team

## 2022-07-06 NOTE — PLAN OF CARE
Goal Outcome Evaluation:   Patient rested well with no c/o pain throughout shift. Pts HGB came back at 7.8 this AM after 2 units of blood. Normal sinus today. VSS, continue to monitor.

## 2022-07-06 NOTE — PLAN OF CARE
Problem: Adult Inpatient Plan of Care  Goal: Plan of Care Review  Outcome: Ongoing, Progressing  Flowsheets (Taken 7/6/2022 0116)  Progress: no change  Plan of Care Reviewed With: patient  Outcome Evaluation: Patient rested throughout the night, no complaints at this time. Patient recieved on unit of RBCs.  Goal: Patient-Specific Goal (Individualized)  Outcome: Ongoing, Progressing  Goal: Absence of Hospital-Acquired Illness or Injury  Outcome: Ongoing, Progressing  Intervention: Identify and Manage Fall Risk  Recent Flowsheet Documentation  Taken 7/6/2022 0000 by Sudhir Lund RN  Safety Promotion/Fall Prevention:   safety round/check completed   room organization consistent   nonskid shoes/slippers when out of bed   clutter free environment maintained   fall prevention program maintained   activity supervised   assistive device/personal items within reach  Taken 7/5/2022 2208 by Sudhir Lund RN  Safety Promotion/Fall Prevention: safety round/check completed  Taken 7/5/2022 1906 by Sudhir Lund RN  Safety Promotion/Fall Prevention:   safety round/check completed   room organization consistent   nonskid shoes/slippers when out of bed   fall prevention program maintained   clutter free environment maintained   assistive device/personal items within reach   activity supervised  Intervention: Prevent Skin Injury  Recent Flowsheet Documentation  Taken 7/5/2022 1906 by Sudhir Lund RN  Skin Protection:   adhesive use limited   protective footwear used   transparent dressing maintained  Intervention: Prevent and Manage VTE (Venous Thromboembolism) Risk  Recent Flowsheet Documentation  Taken 7/5/2022 1906 by Sudhir Lund RN  VTE Prevention/Management:   bilateral   sequential compression devices off  Intervention: Prevent Infection  Recent Flowsheet Documentation  Taken 7/6/2022 0000 by Sudhir Lund RN  Infection Prevention:   single patient room provided   rest/sleep promoted   personal protective  equipment utilized   hand hygiene promoted  Taken 7/5/2022 1906 by Sudhir Lund RN  Infection Prevention:   single patient room provided   rest/sleep promoted   personal protective equipment utilized   hand hygiene promoted  Goal: Optimal Comfort and Wellbeing  Outcome: Ongoing, Progressing  Intervention: Monitor Pain and Promote Comfort  Recent Flowsheet Documentation  Taken 7/5/2022 1906 by Sudhir Lund RN  Pain Management Interventions:   position adjusted   pillow support provided   no interventions per patient request  Intervention: Provide Person-Centered Care  Recent Flowsheet Documentation  Taken 7/5/2022 1906 by Sudhir Lund RN  Trust Relationship/Rapport:   care explained   choices provided   emotional support provided   empathic listening provided   questions answered   questions encouraged   reassurance provided   thoughts/feelings acknowledged  Goal: Readiness for Transition of Care  Outcome: Ongoing, Progressing  Intervention: Mutually Develop Transition Plan  Recent Flowsheet Documentation  Taken 7/5/2022 1908 by Sudhir Lund RN  Transportation Anticipated: family or friend will provide  Patient/Family Anticipated Services at Transition: none  Patient/Family Anticipates Transition to: home with family  Taken 7/5/2022 1906 by Sudhir Lund RN  Equipment Currently Used at Home: none     Problem: Fall Injury Risk  Goal: Absence of Fall and Fall-Related Injury  Outcome: Ongoing, Progressing  Intervention: Identify and Manage Contributors  Recent Flowsheet Documentation  Taken 7/6/2022 0000 by Sudhir Lund RN  Medication Review/Management: medications reviewed  Taken 7/5/2022 1906 by Sudhir uLnd RN  Medication Review/Management: medications reviewed  Intervention: Promote Injury-Free Environment  Recent Flowsheet Documentation  Taken 7/6/2022 0000 by Sudhir Lund RN  Safety Promotion/Fall Prevention:   safety round/check completed   room organization consistent   nonskid  shoes/slippers when out of bed   clutter free environment maintained   fall prevention program maintained   activity supervised   assistive device/personal items within reach  Taken 7/5/2022 2208 by Sudhir Lund, RN  Safety Promotion/Fall Prevention: safety round/check completed  Taken 7/5/2022 1906 by Sudhir Lund, RN  Safety Promotion/Fall Prevention:   safety round/check completed   room organization consistent   nonskid shoes/slippers when out of bed   fall prevention program maintained   clutter free environment maintained   assistive device/personal items within reach   activity supervised     Problem: Hypertension Comorbidity  Goal: Blood Pressure in Desired Range  Outcome: Ongoing, Progressing  Intervention: Maintain Blood Pressure Management  Recent Flowsheet Documentation  Taken 7/6/2022 0000 by Sudhir Lund, RN  Medication Review/Management: medications reviewed  Taken 7/5/2022 1906 by Sudhir Lund, RN  Medication Review/Management: medications reviewed   Goal Outcome Evaluation:  Plan of Care Reviewed With: patient        Progress: no change  Outcome Evaluation: Patient rested throughout the night, no complaints at this time. Patient recieved on unit of RBCs.

## 2022-07-06 NOTE — PROGRESS NOTES
Patient is current with [facility]. Please call [phone] with any questions or concerns.Thank you

## 2022-07-06 NOTE — CASE MANAGEMENT/SOCIAL WORK
Continued Stay Note   [facility]     Patient Name: Cindy Cortes  MRN: [id number]  Today's Date: 7/6/2022    Admit Date: 7/5/2022     Discharge Plan     Row Name 07/06/22 1023       Plan    Plan D/C plan: Anticipate return home, pt current with [facility].    Patient/Family in Agreement with Plan yes    Plan Comments Anticipate return home when medically ready. CM received notification from [facility] liaison that patient is current with [facility].                   Expected Discharge Date and Time     Expected Discharge Date Expected Discharge Time    Jul 7, 2022         Phone communication or documentation only - no physical contact with patient or family.      SHAYLA JEROME RN

## 2022-07-06 NOTE — DISCHARGE PLACEMENT REQUEST
"Nikki Cortes (56 y.o. Female)             Date of Birth   1965    Social Security Number       Address   [address] [address]    Home Phone   [phone]    MRN   [id number]       Judaism   None    Marital Status                               Admission Date   7/5/22    Admission Type   Emergency    Admitting Provider   Ba Ventura DO    Attending Provider   Ba Ventura DO    Department, Room/Bed   [facility] 3A MEDICAL INPATIENT, 302/1       Discharge Date       Discharge Disposition       Discharge Destination                               Attending Provider: Ba Ventura DO    Allergies: No Known Allergies    Isolation: None   Infection: None   Code Status: CPR   Advance Care Planning Activity    Ht: 157.5 cm (62\")   Wt: 118 kg (259 lb 14.4 oz)    Admission Cmt: None   Principal Problem: Anemia [D64.9] More...                 Active Insurance as of 7/5/2022     Primary Coverage     Payor Plan Insurance Group Employer/Plan Group    [facility] [id number]     Payor Plan Address Payor Plan Phone Number Payor Plan Fax Number Effective Dates    [address]   4/1/2019 - None Entered    [facility] [place] [id number]       Subscriber Name Subscriber Birth Date Member ID       NIKKI CORTES 1965 [id number]                 Emergency Contacts      (Rel.) Home Phone Work Phone Mobile Phone    EL ROJASIC (Significant Other) -- -- [phone]    OZIEL LAGOS (Sister) -- -- [phone]    AMILCAR LAGOS (Brother) [phone] -- [phone]              "

## 2022-07-06 NOTE — CONSULTS
Hematology/Oncology Inpatient Consultation    Patient name: Cindy Cortes  : 1965  MRN: [id number]  Referring Provider: Lili Barba APRN     Reason for Consultation: Anemia    Chief complaint: SOB, Atrial Fibrillation with RVR    History of present illness:    Cindy Cortes is a 56 y.o. female who presented to [facility] on 2022 after presenting to cardiovascular area for echocardiogram due to recent history of shortness of breath and dyspnea on exertion for the last few weeks and found to have elevated heart rate and A. fib.  She was sent to the ER for evaluation.  An EKG showed atrial fibrillation, and spontaneously converted to normal sinus rhythm.  Her hemoglobin was noted to be 6.2, and magnesium 1.4.  The patient was given a unit of blood and admitted for further evaluation and treatment.    22  Hematology/Oncology was consulted for anemia with shortness of breath and dyspnea on exertion.  Patient has a history of oligodendroglioma and initially presented with seizures and subsequent CT head, then MRI brain showed 2.1 x 4.4 x 3.3 cm right frontal lobe mass with right to left midline shift.  Patient was started on Keppra, steroids and underwent craniotomy 2021 with pathology revealing oligodendroglioma.  Patient started radiation 2021 completed 2021, then started vincristine, procarbazine 2021 with dose reduction due to pancytopenia.  With continued myelosuppression, chemotherapy was stopped and patient has been on surveillance with plan for MRI brain mid 2022.  Patient has been having shortness of breath and exertional dyspnea and was sent for echocardiogram and found to have atrial fibrillation and elevated heart rate upon arrival to for the test, and sent to ER.    She  has a past medical history of Anemia, Arthritis, GERD (gastroesophageal reflux disease), Hypertension, Oligodendroglioma (HCC), Seizure (HCC), and Type 2 diabetes  mellitus with hyperglycemia, without long-term current use of insulin (HCC) (05/12/2021).    PCP: Annamarie Monroy APRN    History:  Past Medical History:   Diagnosis Date   • Anemia    • Arthritis    • GERD (gastroesophageal reflux disease)    • Hypertension    • Oligodendroglioma (HCC)    • Seizure (HCC)    • Type 2 diabetes mellitus with hyperglycemia, without long-term current use of insulin (HCC) 05/12/2021   ,   Past Surgical History:   Procedure Laterality Date   • BRONCHOSCOPY N/A 4/6/2022    Procedure: BRONCHOSCOPY with bronchial washing;  Surgeon: Drew Alcantar MD;  Location: [facility] ENDOSCOPY;  Service: Pulmonary;  Laterality: N/A;  pneumonia   • BRONCHOSCOPY N/A 5/31/2022    Procedure: BRONCHOSCOPY AT BEDSIDE with bronchoalveolar lavage right middle lobe;  Surgeon: Den Feldman MD;  Location: [facility] ENDOSCOPY;  Service: Pulmonary;  Laterality: N/A;   • COLONOSCOPY     • CRANIOTOMY FOR TUMOR Right 05/06/2021    Procedure: CRANIOTOMY FOR TUMOR RESECTION WITH STEREOTACTIC;  Surgeon: Jluis Felix MD;  Location: [facility] MAIN OR;  Service: Neurosurgery;  Laterality: Right;   ,   Family History   Problem Relation Age of Onset   • Kidney disease Mother    • Prostate cancer Brother    • Breast cancer Maternal Aunt    • Colon cancer Maternal Aunt    • Breast cancer Niece    • Breast cancer Cousin    ,   Social History     Tobacco Use   • Smoking status: Never Smoker   • Smokeless tobacco: Never Used   Vaping Use   • Vaping Use: Never used   Substance Use Topics   • Alcohol use: Not Currently     Alcohol/week: 1.0 standard drink     Types: 1 Cans of beer per week     Comment: socially   • Drug use: Never   ,   Medications Prior to Admission   Medication Sig Dispense Refill Last Dose   • albuterol sulfate  (90 Base) MCG/ACT inhaler Inhale 2 puffs Every 4 (Four) Hours As Needed for Wheezing. 18 g 2 7/4/2022 at Unknown time   • Budeson-Glycopyrrol-Formoterol (Breztri Aerosphere) 160-9-4.8 MCG/ACT aerosol  inhaler Inhale 2 puffs 2 (Two) Times a Day. Indications: Lot#[id number] Exp: 8/30/23 1 each 0 Past Week at Unknown time   • ferrous sulfate 325 (65 FE) MG tablet Take 1 tablet by mouth Daily With Breakfast.   7/4/2022 at Unknown time   • fluticasone (FLOVENT HFA) 44 MCG/ACT inhaler Inhale 2 puffs Daily As Needed.   Past Week at Unknown time   • folic acid (FOLVITE) 1 MG tablet Take 1 mg by mouth Daily.   7/4/2022 at Unknown time   • levETIRAcetam (KEPPRA) 750 MG tablet Take 1 tablet by mouth 2 (Two) Times a Day. 60 tablet 2 7/4/2022 at Unknown time   • oxybutynin (DITROPAN) 5 MG tablet Take 5 mg by mouth 4 (Four) Times a Day.   7/4/2022 at Unknown time   • venlafaxine XR (EFFEXOR-XR) 150 MG 24 hr capsule Take 150 mg by mouth Daily.  3 7/4/2022 at Unknown time   • diphenhydrAMINE (BENADRYL) 25 MG tablet Take 25 mg by mouth Daily As Needed for Allergies.   Unknown at Unknown time   • ondansetron (Zofran) 8 MG tablet Take 1 tablet by mouth Every 8 (Eight) Hours As Needed for Nausea or Vomiting. 30 tablet 3 Unknown at Unknown time   , Scheduled Meds:  ferrous sulfate, 324 mg, Oral, Daily With Breakfast  folic acid, 1 mg, Oral, Daily  levETIRAcetam, 750 mg, Oral, BID  oxybutynin, 5 mg, Oral, 4x Daily  sodium chloride, 10 mL, Intravenous, Q12H  venlafaxine XR, 150 mg, Oral, Daily    , Continuous Infusions:   , PRN Meds:  •  acetaminophen **OR** acetaminophen **OR** acetaminophen  •  albuterol  •  aluminum-magnesium hydroxide-simethicone  •  diphenhydrAMINE  •  magnesium sulfate **OR** magnesium sulfate **OR** magnesium sulfate  •  melatonin  •  ondansetron **OR** ondansetron  •  potassium chloride  •  potassium chloride  •  [COMPLETED] Insert peripheral IV **AND** sodium chloride  •  sodium chloride   Allergies:  Patient has no known allergies.    Subjective     ROS:  Review of Systems   Constitutional: Positive for fatigue. Negative for fever.   HENT: Negative for congestion and nosebleeds.    Eyes: Negative for pain.  "  Respiratory: Positive for shortness of breath. Negative for cough.    Cardiovascular: Negative for chest pain.   Gastrointestinal: Negative for abdominal pain, blood in stool, diarrhea, nausea and vomiting.   Endocrine: Negative for cold intolerance and heat intolerance.   Genitourinary: Negative for difficulty urinating.   Musculoskeletal: Negative for arthralgias.   Skin: Negative for rash.   Neurological: Negative for dizziness and headaches.   Hematological: Does not bruise/bleed easily.   Psychiatric/Behavioral: Negative for behavioral problems.        Objective   Vital Signs:   /60   Pulse 76   Temp 98.3 °F (36.8 °C) (Oral)   Resp 18   Ht 157.5 cm (62\")   Wt 118 kg (259 lb 14.4 oz)   LMP  (LMP Unknown)   SpO2 95%   BMI 47.54 kg/m²     Physical Exam: (performed by MD)  Physical Exam  Constitutional:       Appearance: Normal appearance. She is obese.   HENT:      Head: Normocephalic and atraumatic.   Eyes:      Pupils: Pupils are equal, round, and reactive to light.   Cardiovascular:      Rate and Rhythm: Normal rate and regular rhythm.      Pulses: Normal pulses.      Heart sounds: Normal heart sounds. No murmur heard.  Pulmonary:      Effort: Pulmonary effort is normal.      Breath sounds: Normal breath sounds.   Abdominal:      General: There is no distension.      Palpations: Abdomen is soft. There is no mass.      Tenderness: There is no abdominal tenderness.   Musculoskeletal:         General: Normal range of motion.      Cervical back: Normal range of motion.   Skin:     General: Skin is warm.   Neurological:      General: No focal deficit present.      Mental Status: She is alert.   Psychiatric:         Mood and Affect: Mood normal.         Results Review:  Lab Results (last 48 hours)     Procedure Component Value Units Date/Time    Manual Differential [[id number]]  (Abnormal) Collected: 07/06/22 0030    Specimen: Blood Updated: 07/06/22 0141     Neutrophil % 76.0 %      Lymphocyte % 12.0 " %      Monocyte % 2.0 %      Eosinophil % 1.0 %      Bands %  7.0 %      Metamyelocyte % 2.0 %      Neutrophils Absolute 3.74 10*3/mm3      Lymphocytes Absolute 0.54 10*3/mm3      Monocytes Absolute 0.09 10*3/mm3      Eosinophils Absolute 0.05 10*3/mm3      Anisocytosis Slight/1+     Hypochromia Slight/1+     Microcytes Slight/1+     Poikilocytes Slight/1+     Target Cells Slight/1+     WBC Morphology Normal     Platelet Estimate Decreased     Large Platelets Slight/1+    CBC Auto Differential [[id number]]  (Abnormal) Collected: 07/06/22 0030    Specimen: Blood Updated: 07/06/22 0141     WBC 4.50 10*3/mm3      RBC 2.10 10*6/mm3      Hemoglobin 6.6 g/dL      Hematocrit 18.5 %      MCV 88.2 fL      MCH 31.3 pg      MCHC 35.4 g/dL      RDW 20.0 %      RDW-SD 60.4 fl      MPV 8.2 fL      Platelets 80 10*3/mm3     Scan Slide [[id number]] Collected: 07/06/22 0030    Specimen: Blood Updated: 07/06/22 0141     Scan Slide --     Comment: See Manual Differential Results       Basic Metabolic Panel [[id number]]  (Abnormal) Collected: 07/06/22 0030    Specimen: Blood Updated: 07/06/22 0117     Glucose 106 mg/dL      BUN 12 mg/dL      Creatinine 0.69 mg/dL      Sodium 139 mmol/L      Potassium 3.4 mmol/L      Chloride 103 mmol/L      CO2 23.0 mmol/L      Calcium 8.3 mg/dL      BUN/Creatinine Ratio 17.4     Anion Gap 13.0 mmol/L      eGFR 103.3 mL/min/1.73      Comment: National Kidney Foundation and American Society of Nephrology (ASN) Task Force recommended calculation based on the Chronic Kidney Disease Epidemiology Collaboration (CKD-EPI) equation refit without adjustment for race.       Narrative:      GFR Normal >60  Chronic Kidney Disease <60  Kidney Failure <15      Extra Tubes [[id number]] Collected: 07/05/22 1630    Specimen: Blood, Venous Line Updated: 07/05/22 7507    Narrative:      The following orders were created for panel order Extra Tubes.  Procedure                               Abnormality         Status                      ---------                               -----------         ------                     Gold Top - SST[[id number]]                                   Final result                 Please view results for these tests on the individual orders.    Gold Top - SST [[id number]] Collected: 07/05/22 1630    Specimen: Blood Updated: 07/05/22 1734     Extra Tube Hold for add-ons.     Comment: Auto resulted.       COVID-19,CEPHEID/ADITI,COR/SARITHA/PAD/DEANNE IN-HOUSE(OR EMERGENT/ADD-ON),NP SWAB IN TRANSPORT MEDIA 3-4 HR TAT, RT-PCR - Swab, Nasopharynx [[id number]]  (Normal) Collected: 07/05/22 1632    Specimen: Swab from Nasopharynx Updated: 07/05/22 1714     COVID19 Not Detected    Narrative:      Fact sheet for providers: https://www.fda.gov/media/819509/download     Fact sheet for patients: https://www.fda.gov/media/895474/download  Fact sheet for providers: https://www.fda.gov/media/505075/download    Fact sheet for patients: https://www.fda.gov/media/543727/download    Test performed by PCR.    BNP [[id number]]  (Abnormal) Collected: 07/05/22 1630    Specimen: Blood Updated: 07/05/22 1708     proBNP 1,208.0 pg/mL     Narrative:      Among patients with dyspnea, NT-proBNP is highly sensitive for the detection of acute congestive heart failure. In addition NT-proBNP of <300 pg/ml effectively rules out acute congestive heart failure with 99% negative predictive value.    Results may be falsely decreased if patient taking Biotin.      TSH [[id number]]  (Normal) Collected: 07/05/22 1630    Specimen: Blood Updated: 07/05/22 1708     TSH 3.890 uIU/mL     Troponin [[id number]]  (Normal) Collected: 07/05/22 1630    Specimen: Blood Updated: 07/05/22 1708     Troponin T <0.010 ng/mL     Narrative:      Troponin T Reference Range:  <= 0.03 ng/mL-   Negative for AMI  >0.03 ng/mL-     Abnormal for myocardial necrosis.  Clinicians would have to utilize clinical acumen, EKG, Troponin and serial changes to determine if it is an Acute Myocardial  Infarction or myocardial injury due to an underlying chronic condition.       Results may be falsely decreased if patient taking Biotin.      Comprehensive Metabolic Panel [[id number]]  (Abnormal) Collected: 07/05/22 1630    Specimen: Blood Updated: 07/05/22 1703     Glucose 152 mg/dL      BUN 13 mg/dL      Creatinine 0.88 mg/dL      Sodium 138 mmol/L      Potassium 3.5 mmol/L      Chloride 103 mmol/L      CO2 19.0 mmol/L      Calcium 8.8 mg/dL      Total Protein 6.2 g/dL      Albumin 3.50 g/dL      ALT (SGPT) 8 U/L      AST (SGOT) 10 U/L      Alkaline Phosphatase 115 U/L      Total Bilirubin 1.5 mg/dL      Globulin 2.7 gm/dL      A/G Ratio 1.3 g/dL      BUN/Creatinine Ratio 14.8     Anion Gap 16.0 mmol/L      eGFR 78.2 mL/min/1.73      Comment: National Kidney Foundation and American Society of Nephrology (ASN) Task Force recommended calculation based on the Chronic Kidney Disease Epidemiology Collaboration (CKD-EPI) equation refit without adjustment for race.       Narrative:      GFR Normal >60  Chronic Kidney Disease <60  Kidney Failure <15      Magnesium [[id number]]  (Abnormal) Collected: 07/05/22 1630    Specimen: Blood Updated: 07/05/22 1703     Magnesium 1.4 mg/dL     Protime-INR [[id number]]  (Abnormal) Collected: 07/05/22 1630    Specimen: Blood Updated: 07/05/22 1648     Protime 12.1 Seconds      INR 1.19    aPTT [[id number]]  (Abnormal) Collected: 07/05/22 1630    Specimen: Blood Updated: 07/05/22 1648     PTT 26.8 seconds     CBC & Differential [[id number]]  (Abnormal) Collected: 07/05/22 1630    Specimen: Blood Updated: 07/05/22 1644    Narrative:      The following orders were created for panel order CBC & Differential.  Procedure                               Abnormality         Status                     ---------                               -----------         ------                     CBC Auto Differential[[id number]]        Abnormal            Final result                 Please view results for  these tests on the individual orders.    CBC Auto Differential [[id number]]  (Abnormal) Collected: 07/05/22 1630    Specimen: Blood Updated: 07/05/22 1644     WBC 5.50 10*3/mm3      RBC 2.02 10*6/mm3      Hemoglobin 6.2 g/dL      Hematocrit 18.6 %      MCV 92.0 fL      MCH 30.6 pg      MCHC 33.3 g/dL      RDW 21.4 %      RDW-SD 66.9 fl      MPV 8.2 fL      Platelets 102 10*3/mm3      Neutrophil % 86.0 %      Lymphocyte % 8.8 %      Monocyte % 4.7 %      Eosinophil % 0.2 %      Basophil % 0.3 %      Neutrophils, Absolute 4.80 10*3/mm3      Lymphocytes, Absolute 0.50 10*3/mm3      Monocytes, Absolute 0.30 10*3/mm3      Eosinophils, Absolute 0.00 10*3/mm3      Basophils, Absolute 0.00 10*3/mm3      nRBC 0.5 /100 WBC            Pending Results:     Imaging Reviewed:   MRI Brain With & Without Contrast    Result Date: 7/3/2022  1.Postsurgical changes within right frontal lobe. Surrounding FLAIR hyperintensity also involving left cerebral hemisphere overall appears slightly increased and could be secondary to evolving post-treatment changes or tumor progression. 2.No acute intracranial process identified. 3.Mucosal disease within maxillary sinuses.  Electronically Signed By-Naveed Hubbard MD On:7/3/2022 2:48 PM This report was finalized on [id number] by  Naveed Hubbard MD.    XR Chest 1 View    Result Date: 7/5/2022  1. Low lung volumes without acute cardiopulmonary abnormality. 2. Mild cardiomegaly, likely accentuated by low lung volumes and AP portable technique.  Electronically Signed By-Tayo Brumfield MD On:7/5/2022 4:58 PM This report was finalized on [id number] by  Tayo Brumfield MD.           Assessment & Plan   ASSESSMENT    This is a 56-year-old female with history of right frontal lobe mass and craniotomy 5/6/2021 treated with radiation, then chemotherapy with prolonged myelosuppression and possible pneumonitis-chemotherapy stopped, surveillance began. The patient was being evaluated with echo  when she was found to have tachycardia and A fib with RVR and sent to ER where she converted to normal sinus rhythm.  She also was found to have Hgb of 6.2 and given PRBC's.  Oncology was consulted for anemia for an established patient and recommends transfusion for Hgb below 7.0, and evaluation for hemolysis, bone marrow function.      Oligodendroglioma  S/p right frontal lobe mass craniotomy 5/6/2021  Treatment with radiation followed by chemotherapy   Stopped due to pancytopenia/prolonged myelosuppression  On active surveillance with no evidence of disease  Plan to repeat MRI brain in July  Continue Keppra    Symptomatic anemia/anemia of chronic disease/severe myelosuppression with chemotherapy  Hgb 6.2 on admission, Hgb 6.6 today  Administer 1 unit PRBCs for Hgb<7.0  1 unit PRBCs 7/5/2022, 1 unit PRBCs 7/6/2022  Related to chemotherapy, possible underlying myelodysplastic syndrome  Continue to monitor CBC daily  Retic, Haptoglobin, LDH    Thrombocytopenia  Platelet 102 on admission, platelet 80 today  Related to chemotherapy, possible underlying myelodysplastic syndrome    Paroxysmal atrial fibrillation with RVR  EKG in the ER showed A. fib with RVR and heart rate of 190-then spontaneously converted to normal sinus    Hypomagnesemia  Magnesium 1.4 and admission  Replace per protocol    Other chronic conditions: Depression, obesity, essential HTN    Electronically signed by BJ Garcia, 07/06/22, 8:14 AM EDT.    Patient seen and examined agree with assessment plan    Patient is a 56-year-old female with oligodendroglioma status post craniotomy followed by PCV adjuvant chemotherapy which was stopped prematurely with pneumonitis, refractory anemia.  Bone marrow biopsy was done with some dyserythropoiesis could have underlying MDS.  Now admitted with worsening anemia which is symptomatic.  Patient is status post 2 units of PRBC yesterday.  Repeat CBC today work-up for hemolysis.  Patient also in A. fib  RVR on admission now converted to normal sinus rhythm.  Currently on monitor.      Thank you for this consult. We will be happy to follow along with you.       Consulting MD below provided more than 50% of the total visit time for this encounter    Electronically signed by Emilie Workman MD, 07/06/22, 5:19 PM EDT.

## 2022-07-07 ENCOUNTER — APPOINTMENT (OUTPATIENT)
Dept: CARDIOLOGY | Facility: HOSPITAL | Age: 57
End: 2022-07-07

## 2022-07-07 LAB
ANION GAP SERPL CALCULATED.3IONS-SCNC: 11 MMOL/L (ref 5–15)
BASOPHILS # BLD AUTO: 0 10*3/MM3 (ref 0–0.2)
BASOPHILS NFR BLD AUTO: 0.3 % (ref 0–1.5)
BH BB BLOOD EXPIRATION DATE: NORMAL
BH BB BLOOD TYPE BARCODE: 5100
BH BB DISPENSE STATUS: NORMAL
BH BB PRODUCT CODE: NORMAL
BH BB UNIT NUMBER: NORMAL
BUN SERPL-MCNC: 12 MG/DL (ref 6–20)
BUN/CREAT SERPL: 19.4 (ref 7–25)
CALCIUM SPEC-SCNC: 8.2 MG/DL (ref 8.6–10.5)
CHLORIDE SERPL-SCNC: 103 MMOL/L (ref 98–107)
CO2 SERPL-SCNC: 21 MMOL/L (ref 22–29)
CREAT SERPL-MCNC: 0.62 MG/DL (ref 0.57–1)
CROSSMATCH INTERPRETATION: NORMAL
DEPRECATED RDW RBC AUTO: 59.5 FL (ref 37–54)
EGFRCR SERPLBLD CKD-EPI 2021: 106 ML/MIN/1.73
EOSINOPHIL # BLD AUTO: 0 10*3/MM3 (ref 0–0.4)
EOSINOPHIL NFR BLD AUTO: 0.8 % (ref 0.3–6.2)
ERYTHROCYTE [DISTWIDTH] IN BLOOD BY AUTOMATED COUNT: 20.1 % (ref 12.3–15.4)
GLUCOSE SERPL-MCNC: 100 MG/DL (ref 65–99)
HCT VFR BLD AUTO: 21 % (ref 34–46.6)
HCT VFR BLD AUTO: 25 % (ref 34–46.6)
HGB BLD-MCNC: 7.1 G/DL (ref 12–15.9)
HGB BLD-MCNC: 8.8 G/DL (ref 12–15.9)
LYMPHOCYTES # BLD AUTO: 0.6 10*3/MM3 (ref 0.7–3.1)
LYMPHOCYTES NFR BLD AUTO: 12.3 % (ref 19.6–45.3)
MCH RBC QN AUTO: 29 PG (ref 26.6–33)
MCHC RBC AUTO-ENTMCNC: 33.7 G/DL (ref 31.5–35.7)
MCV RBC AUTO: 86.2 FL (ref 79–97)
MONOCYTES # BLD AUTO: 0.3 10*3/MM3 (ref 0.1–0.9)
MONOCYTES NFR BLD AUTO: 6.8 % (ref 5–12)
NEUTROPHILS NFR BLD AUTO: 3.8 10*3/MM3 (ref 1.7–7)
NEUTROPHILS NFR BLD AUTO: 79.8 % (ref 42.7–76)
NRBC BLD AUTO-RTO: 0.4 /100 WBC (ref 0–0.2)
PLATELET # BLD AUTO: 75 10*3/MM3 (ref 140–450)
PMV BLD AUTO: 8.6 FL (ref 6–12)
POTASSIUM SERPL-SCNC: 4.2 MMOL/L (ref 3.5–5.2)
RBC # BLD AUTO: 2.44 10*6/MM3 (ref 3.77–5.28)
SODIUM SERPL-SCNC: 135 MMOL/L (ref 136–145)
UNIT  ABO: NORMAL
UNIT  RH: NORMAL
WBC NRBC COR # BLD: 4.7 10*3/MM3 (ref 3.4–10.8)

## 2022-07-07 PROCEDURE — 86900 BLOOD TYPING SEROLOGIC ABO: CPT

## 2022-07-07 PROCEDURE — 80048 BASIC METABOLIC PNL TOTAL CA: CPT | Performed by: HOSPITALIST

## 2022-07-07 PROCEDURE — G0378 HOSPITAL OBSERVATION PER HR: HCPCS

## 2022-07-07 PROCEDURE — 99214 OFFICE O/P EST MOD 30 MIN: CPT | Performed by: INTERNAL MEDICINE

## 2022-07-07 PROCEDURE — 36430 TRANSFUSION BLD/BLD COMPNT: CPT

## 2022-07-07 PROCEDURE — 99225 PR SBSQ OBSERVATION CARE/DAY 25 MINUTES: CPT | Performed by: HOSPITALIST

## 2022-07-07 PROCEDURE — 93306 TTE W/DOPPLER COMPLETE: CPT

## 2022-07-07 PROCEDURE — 85014 HEMATOCRIT: CPT | Performed by: HOSPITALIST

## 2022-07-07 PROCEDURE — P9016 RBC LEUKOCYTES REDUCED: HCPCS

## 2022-07-07 PROCEDURE — 93306 TTE W/DOPPLER COMPLETE: CPT | Performed by: INTERNAL MEDICINE

## 2022-07-07 PROCEDURE — 85025 COMPLETE CBC W/AUTO DIFF WBC: CPT | Performed by: HOSPITALIST

## 2022-07-07 PROCEDURE — 85018 HEMOGLOBIN: CPT | Performed by: HOSPITALIST

## 2022-07-07 RX ADMIN — Medication 10 ML: at 08:37

## 2022-07-07 RX ADMIN — LEVETIRACETAM 750 MG: 500 TABLET, FILM COATED ORAL at 20:05

## 2022-07-07 RX ADMIN — VENLAFAXINE HYDROCHLORIDE 150 MG: 75 CAPSULE, EXTENDED RELEASE ORAL at 08:36

## 2022-07-07 RX ADMIN — OXYBUTYNIN CHLORIDE 5 MG: 5 TABLET ORAL at 18:14

## 2022-07-07 RX ADMIN — Medication 10 ML: at 20:05

## 2022-07-07 RX ADMIN — LEVETIRACETAM 750 MG: 500 TABLET, FILM COATED ORAL at 08:36

## 2022-07-07 RX ADMIN — OXYBUTYNIN CHLORIDE 5 MG: 5 TABLET ORAL at 13:40

## 2022-07-07 RX ADMIN — FOLIC ACID 1 MG: 1 TABLET ORAL at 08:36

## 2022-07-07 RX ADMIN — OXYBUTYNIN CHLORIDE 5 MG: 5 TABLET ORAL at 08:37

## 2022-07-07 RX ADMIN — FERROUS SULFATE TAB EC 324 MG (65 MG FE EQUIVALENT) 324 MG: 324 (65 FE) TABLET DELAYED RESPONSE at 08:36

## 2022-07-07 RX ADMIN — OXYBUTYNIN CHLORIDE 5 MG: 5 TABLET ORAL at 20:05

## 2022-07-07 NOTE — CASE MANAGEMENT/SOCIAL WORK
Continued Stay Note  OSWALD Simeon     Patient Name: Cindy Cortes  MRN: [id number]  Today's Date: 7/7/2022    Admit Date: 7/5/2022     Discharge Plan     Row Name 07/07/22 1211       Plan    Plan D/C plan: Anticipate home, current with [facility].    Patient/Family in Agreement with Plan yes    Plan Comments Anticipate return home at time of d/c, pt current with [facility].                   Expected Discharge Date and Time     Expected Discharge Date Expected Discharge Time    Jul 7, 2022         Phone communication or documentation only - no physical contact with patient or family.      SHAYLA JEROME RN

## 2022-07-07 NOTE — CONSULTS
Nutrition Services    Patient Name: Cindy Cortes  YOB: 1965  MRN: [id number]  Admission date: 7/5/2022    Comment:  Boost Plus BID (Provides 720 kcals, 28 g protein if consumed)     Moderate chronic disease related malnutrition related to decreased appetite in the context of current cancer diagnosis as evidenced by pt report of po intake providing less than 75% of nutrition needs for at least 1 month PTA and 12.8% unintentional weight loss in 3 months.    See MSA at bottom of note.    PPE Documentation        PPE Worn By Provider mask and eye protection   PPE Worn By Patient  none     CLINICAL NUTRITION ASSESSMENT      Reason for Assessment 7/7: [facility] consult, [facility] 5     H&P      Past Medical History:   Diagnosis Date   • Anemia    • Arthritis    • GERD (gastroesophageal reflux disease)    • Hypertension    • Oligodendroglioma (HCC)    • Seizure (HCC)    • Type 2 diabetes mellitus with hyperglycemia, without long-term current use of insulin (HCC) 05/12/2021       Past Surgical History:   Procedure Laterality Date   • BRONCHOSCOPY N/A 4/6/2022    Procedure: BRONCHOSCOPY with bronchial washing;  Surgeon: Drew Alcantar MD;  Location: [facility] ENDOSCOPY;  Service: Pulmonary;  Laterality: N/A;  pneumonia   • BRONCHOSCOPY N/A 5/31/2022    Procedure: BRONCHOSCOPY AT BEDSIDE with bronchoalveolar lavage right middle lobe;  Surgeon: Den Feldman MD;  Location: [facility] ENDOSCOPY;  Service: Pulmonary;  Laterality: N/A;   • COLONOSCOPY     • CRANIOTOMY FOR TUMOR Right 05/06/2021    Procedure: CRANIOTOMY FOR TUMOR RESECTION WITH STEREOTACTIC;  Surgeon: Jluis Felix MD;  Location: [facility] MAIN OR;  Service: Neurosurgery;  Laterality: Right;        Current Problems   Anemia    Afib    Dyspnea    Hypomagnesemia    GERD    Oligodendroglioma  -s/p completed radiation  -chemo on hold    Depression           Encounter Information        Trending Narrative     7/7: Visited patient in room, reports typically eating one  "meal per day at home, does not have an appetite for more. States she recently bought Boost supplements to have at home, but had not started taking them yet. Denies nausea, just reports she doesn't feel like eating. Discussed using supplements where her other two meals would be and gradually adding more to those times to meet needs. Pt states her sister cooks for her and provides her meals.     Anthropometrics        Current Height, Weight Height: 157.5 cm (62\")  Weight: 118 kg (259 lb 14.4 oz) (07/05/22 1804)       Ideal Body Weight (IBW) 110lb   Usual Body Weight (UBW) Pt unsure       Trending Weight Hx     This admission: 7/7: Unsure of accuracy of admit weight- likely weight is down 2lb from admit             PTA: 7/7: current weight down 12.8% x3 months    Wt Readings from Last 30 Encounters:   07/05/22 1804 118 kg (259 lb 14.4 oz)   07/05/22 1542 118 kg (261 lb)   07/05/22 1433 128 kg (283 lb)   07/05/22 1526 128 kg (283 lb)   06/28/22 1053 128 kg (283 lb)   06/14/22 1103 128 kg (283 lb)   06/04/22 0402 129 kg (284 lb 10.2 oz)   06/03/22 1618 129 kg (283 lb 6.4 oz)   06/03/22 0402 128 kg (281 lb 4.9 oz)   06/01/22 0500 128 kg (282 lb 6.6 oz)   05/31/22 0230 125 kg (276 lb 3.8 oz)   05/30/22 2218 129 kg (285 lb)   05/16/22 1318 129 kg (285 lb 3.2 oz)   04/25/22 1140 132 kg (291 lb)   04/11/22 1205 136 kg (299 lb)   04/06/22 1100 135 kg (297 lb)   04/05/22 0817 135 kg (297 lb)   04/04/22 1349 (!) 138 kg (305 lb)   03/28/22 1258 (!) 138 kg (305 lb)   03/22/22 1124 (!) 138 kg (305 lb)   03/21/22 1106 (!) 139 kg (306 lb 12.8 oz)   03/15/22 1136 (!) 140 kg (307 lb 9.6 oz)   03/01/22 1123 (!) 142 kg (313 lb)   02/21/22 1110 (!) 144 kg (316 lb 9.6 oz)   01/27/22 1236 (!) 148 kg (326 lb)   01/25/22 1126 (!) 148 kg (325 lb 6.4 oz)   01/06/22 1204 (!) 148 kg (326 lb)   01/06/22 1133 121 kg (267 lb)   01/04/22 1338 (!) 153 kg (338 lb)   12/07/21 1101 (!) 153 kg (338 lb)   12/07/21 1025 (!) 153 kg (338 lb)   12/06/21 1000 " (!) 153 kg (338 lb)   11/29/21 1002 (!) 151 kg (333 lb)   11/24/21 1422 (!) 152 kg (334 lb 6.4 oz)   11/22/21 0944 (!) 152 kg (334 lb 11.2 oz)   11/16/21 1500 (!) 157 kg (346 lb)   11/14/21 0247 (!) 157 kg (346 lb 12.5 oz)   11/13/21 1959 (!) 154 kg (339 lb 15.2 oz)   10/28/21 0853 (!) 152 kg (334 lb)      BMI kg/m2 Body mass index is 47.54 kg/m².       Labs        Pertinent Labs    Results from last 7 days   Lab Units 07/07/22  0020 07/06/22  0030 07/05/22  1630   SODIUM mmol/L 135* 139 138   POTASSIUM mmol/L 4.2 3.4* 3.5   CHLORIDE mmol/L 103 103 103   CO2 mmol/L 21.0* 23.0 19.0*   BUN mg/dL 12 12 13   CREATININE mg/dL 0.62 0.69 0.88   CALCIUM mg/dL 8.2* 8.3* 8.8   BILIRUBIN mg/dL  --   --  1.5*   ALK PHOS U/L  --   --  115   ALT (SGPT) U/L  --   --  8   AST (SGOT) U/L  --   --  10   GLUCOSE mg/dL 100* 106* 152*     Results from last 7 days   Lab Units 07/07/22  0020 07/06/22  0030 07/05/22  1630   MAGNESIUM mg/dL  --   --  1.4*   HEMOGLOBIN g/dL 7.1*   < > 6.2*   HEMATOCRIT % 21.0*   < > 18.6*    < > = values in this interval not displayed.     COVID19   Date Value Ref Range Status   07/05/2022 Not Detected Not Detected - Ref. Range Final     Lab Results   Component Value Date    HGBA1C 5.7 (H) 11/01/2021        Medications    Scheduled Medications ferrous sulfate, 324 mg, Oral, Daily With Breakfast  folic acid, 1 mg, Oral, Daily  levETIRAcetam, 750 mg, Oral, BID  oxybutynin, 5 mg, Oral, 4x Daily  sodium chloride, 10 mL, Intravenous, Q12H  venlafaxine XR, 150 mg, Oral, Daily        Infusions      PRN Medications •  acetaminophen **OR** acetaminophen **OR** acetaminophen  •  albuterol  •  aluminum-magnesium hydroxide-simethicone  •  diphenhydrAMINE  •  magnesium sulfate **OR** magnesium sulfate **OR** magnesium sulfate  •  melatonin  •  ondansetron **OR** ondansetron  •  potassium chloride  •  potassium chloride  •  [COMPLETED] Insert peripheral IV **AND** sodium chloride  •  sodium chloride     Physical Findings         Trending Physical   Appearance, NFPE 7/8: Although severe weight loss is noted, pt without physical s/s of malnutrition at this time. Pt meets criteria for malnutrition based on weight loss and po intake.   --  Edema  trace     Bowel Function BM 7/6     Tubes none   Chewing/Swallowing No issues reported   Skin No breakdown   --  Current Nutrition Orders & Evaluation of Intake       Oral Nutrition     Food Allergies NKFA   Current PO Diet Diet Regular   Supplement    PO Evaluation     Trending % PO Intake %   --  Nutritional Risk Screening        NRS-2002 Score          Nutrition Diagnosis         Nutrition Dx Problem 1 Moderate chronic disease related malnutrition related to decreased appetite in the context of current cancer diagnosis as evidenced by pt report of po intake providing less than 75% of nutrition needs for at least 1 month PTA and 12.8% unintentional weight loss in 3 months.      Nutrition Dx Problem 2        Intervention Goal         Intervention Goal(s) Meal intake at least 65% of meals  Consumption of ONS     Nutrition Intervention        RD Action Add Boost Plus BID     Nutrition Prescription          Diet Prescription Regular   Supplement Prescription Boost Plus BID (Provides 720 kcals, 28 g protein if consumed)      --  Monitor/Evaluation        Monitor PO intake, Supplement intake, Pertinent labs, Weight, Skin status, GI status     Malnutrition Severity Assessment      Patient meets criteria for : Moderate (non-severe) Malnutrition  Malnutrition Type (last 8 hours)     Malnutrition Severity Assessment     Row Name 07/08/22 0914       Malnutrition Severity Assessment    Malnutrition Type Chronic Disease - Related Malnutrition    Row Name 07/08/22 0914       Insufficient Energy Intake     Insufficient Energy Intake Findings Moderate    Insufficient Energy Intake  <75% of est. energy requirement for > or equal to 1 month    Row Name 07/08/22 0914       Unintentional Weight Loss      Unintentional Weight Loss Findings Severe    Unintentional Weight Loss  Weight loss greater than 7.5% in three months    Row Name 07/08/22 0914       Muscle Loss    Loss of Muscle Mass Findings None    Row Name 07/08/22 0914       Fat Loss    Subcutaneous Fat Loss Findings None    Row Name 07/08/22 0914       Criteria Met (Must meet criteria for severity in at least 2 of these categories: M Wasting, Fat Loss, Fluid, Secondary Signs, Wt. Status, Intake)    Patient meets criteria for  Moderate (non-severe) Malnutrition                       Electronically signed by:  Destiney Dunbar RD  07/07/22 11:10 EDT

## 2022-07-07 NOTE — PLAN OF CARE
Goal Outcome Evaluation:   Patient rested well throughout the shift. Had one unit of blood transfused today with no reactions. No c/o pain. Patient had echo done today. Hopeful to d/c home tomorrow.

## 2022-07-07 NOTE — PROGRESS NOTES
Hematology/Oncology Inpatient Progress Note    PATIENT NAME: Cindy Cortes  : 1965  MRN: [id number]    Chief complaint: SOB, Atrial Fibrillation with RVR     History of present illness:    Cindy Cortes is a 56 y.o. female who presented to [facility] on 2022 after presenting to cardiovascular area for echocardiogram due to recent history of shortness of breath and dyspnea on exertion for the last few weeks and found to have elevated heart rate and A. fib.  She was sent to the ER for evaluation.  An EKG showed atrial fibrillation, and spontaneously converted to normal sinus rhythm.  Her hemoglobin was noted to be 6.2, and magnesium 1.4.  The patient was given a unit of blood and admitted for further evaluation and treatment.     22  Hematology/Oncology was consulted for anemia with shortness of breath and dyspnea on exertion.  Patient has a history of oligodendroglioma and initially presented with seizures and subsequent CT head, then MRI brain showed 2.1 x 4.4 x 3.3 cm right frontal lobe mass with right to left midline shift.  Patient was started on Keppra, steroids and underwent craniotomy 2021 with pathology revealing oligodendroglioma.  Patient started radiation 2021 completed 2021, then started vincristine, procarbazine 2021 with dose reduction due to pancytopenia.  With continued myelosuppression, chemotherapy was stopped and patient has been on surveillance with plan for MRI brain mid 2022.  Patient has been having shortness of breath and exertional dyspnea and was sent for echocardiogram and found to have atrial fibrillation and elevated heart rate upon arrival to for the test, and sent to ER.    Work-up with hepato-, LDH normal.  Patient was given blood transfusion.    She  has a past medical history of Anemia, Arthritis, GERD (gastroesophageal reflux disease), Hypertension, Oligodendroglioma (HCC), Seizure (HCC), and Type 2 diabetes mellitus with  "hyperglycemia, without long-term current use of insulin ([facility]) (05/12/2021).     PCP: Annamarie Monroy APRN    Subjective      Feeling better since yesterday, currently receiving blood transfusion        MEDICATIONS:    Scheduled Meds:  ferrous sulfate, 324 mg, Oral, Daily With Breakfast  folic acid, 1 mg, Oral, Daily  levETIRAcetam, 750 mg, Oral, BID  oxybutynin, 5 mg, Oral, 4x Daily  sodium chloride, 10 mL, Intravenous, Q12H  venlafaxine XR, 150 mg, Oral, Daily       Continuous Infusions:      PRN Meds:  •  acetaminophen **OR** acetaminophen **OR** acetaminophen  •  albuterol  •  aluminum-magnesium hydroxide-simethicone  •  diphenhydrAMINE  •  magnesium sulfate **OR** magnesium sulfate **OR** magnesium sulfate  •  melatonin  •  ondansetron **OR** ondansetron  •  potassium chloride  •  potassium chloride  •  [COMPLETED] Insert peripheral IV **AND** sodium chloride  •  sodium chloride     ALLERGIES:  No Known Allergies    Objective    VITALS:   /66   Pulse 88   Temp 98.1 °F (36.7 °C) (Oral)   Resp 20   Ht 157.5 cm (62\")   Wt 118 kg (259 lb 14.4 oz)   LMP  (LMP Unknown)   SpO2 97%   BMI 47.54 kg/m²     PHYSICAL EXAM: (performed by MD)  Physical Exam  Constitutional:       Appearance: Normal appearance. She is obese.   HENT:      Head: Normocephalic and atraumatic.   Eyes:      Pupils: Pupils are equal, round, and reactive to light.   Cardiovascular:      Rate and Rhythm: Normal rate and regular rhythm.      Pulses: Normal pulses.      Heart sounds: No murmur heard.  Pulmonary:      Effort: Pulmonary effort is normal.      Breath sounds: Rhonchi present.   Abdominal:      General: There is no distension.      Palpations: Abdomen is soft. There is no mass.      Tenderness: There is no abdominal tenderness.   Musculoskeletal:         General: Normal range of motion.      Cervical back: Normal range of motion.   Skin:     General: Skin is warm.   Neurological:      General: No focal deficit present.      " Mental Status: She is alert.   Psychiatric:         Mood and Affect: Mood normal.           RECENT LABS:  Lab Results (last 24 hours)     Procedure Component Value Units Date/Time    Basic Metabolic Panel [[id number]]  (Abnormal) Collected: 07/07/22 0020    Specimen: Blood Updated: 07/07/22 0236     Glucose 100 mg/dL      BUN 12 mg/dL      Creatinine 0.62 mg/dL      Sodium 135 mmol/L      Potassium 4.2 mmol/L      Chloride 103 mmol/L      CO2 21.0 mmol/L      Calcium 8.2 mg/dL      BUN/Creatinine Ratio 19.4     Anion Gap 11.0 mmol/L      eGFR 106.0 mL/min/1.73      Comment: National Kidney Foundation and American Society of Nephrology (ASN) Task Force recommended calculation based on the Chronic Kidney Disease Epidemiology Collaboration (CKD-EPI) equation refit without adjustment for race.       Narrative:      GFR Normal >60  Chronic Kidney Disease <60  Kidney Failure <15      CBC & Differential [[id number]]  (Abnormal) Collected: 07/07/22 0020    Specimen: Blood Updated: 07/07/22 0222    Narrative:      The following orders were created for panel order CBC & Differential.  Procedure                               Abnormality         Status                     ---------                               -----------         ------                     CBC Auto Differential[[id number]]        Abnormal            Final result                 Please view results for these tests on the individual orders.    CBC Auto Differential [[id number]]  (Abnormal) Collected: 07/07/22 0020    Specimen: Blood Updated: 07/07/22 0222     WBC 4.70 10*3/mm3      RBC 2.44 10*6/mm3      Hemoglobin 7.1 g/dL      Hematocrit 21.0 %      MCV 86.2 fL      MCH 29.0 pg      MCHC 33.7 g/dL      RDW 20.1 %      RDW-SD 59.5 fl      MPV 8.6 fL      Platelets 75 10*3/mm3      Neutrophil % 79.8 %      Lymphocyte % 12.3 %      Monocyte % 6.8 %      Eosinophil % 0.8 %      Basophil % 0.3 %      Neutrophils, Absolute 3.80 10*3/mm3      Lymphocytes, Absolute 0.60  10*3/mm3      Monocytes, Absolute 0.30 10*3/mm3      Eosinophils, Absolute 0.00 10*3/mm3      Basophils, Absolute 0.00 10*3/mm3      nRBC 0.4 /100 WBC     Haptoglobin [[id number]]  (Normal) Collected: 07/06/22 0937    Specimen: Blood Updated: 07/06/22 1755     Haptoglobin 69 mg/dL      Comment: Specimen hemolyzed. Results may be affected.             IMAGING REVIEWED:  XR Chest 1 View    Result Date: 7/5/2022  1. Low lung volumes without acute cardiopulmonary abnormality. 2. Mild cardiomegaly, likely accentuated by low lung volumes and AP portable technique.  Electronically Signed By-Tayo Brumfield MD On:7/5/2022 4:58 PM This report was finalized on [id number] by  Tayo Brumfield MD.      Assessment & Plan     This is a 56-year-old female with history of right frontal lobe mass and craniotomy 5/6/2021 treated with radiation, then chemotherapy with prolonged myelosuppression and possible pneumonitis-chemotherapy stopped, surveillance began. The patient was being evaluated with echo when she was found to have tachycardia and A fib with RVR and sent to ER where she converted to normal sinus rhythm.  She also was found to have Hgb of 6.2 and given PRBC's.  Oncology was consulted for anemia for an established patient and recommends transfusion for Hgb below 7.0, and evaluation for hemolysis, bone marrow function.       Oligodendroglioma  S/p right frontal lobe mass craniotomy 5/6/2021  Treatment with radiation followed by chemotherapy   Stopped due to pancytopenia/prolonged myelosuppression  On active surveillance with no evidence of disease  MRI brain recently with some increased enhancement, could be related to posttreatment changes no neurological symptoms, repeat MRI as planned.     Symptomatic anemia/anemia of chronic disease/severe myelosuppression with chemotherapy  Hgb 6.2 on admission, Hgb 7.1 today  Administer 1 unit PRBCs for Hgb<7.0  1 unit PRBCs 7/5/2022, 1 unit PRBCs 7/6/2022, 1 unit today  Related to  chemotherapy, possible underlying myelodysplastic syndrome  Continue to monitor CBC daily  Retic mildly elevated, Haptoglobin, LDH unremarkable.     Thrombocytopenia  Platelet 102 on admission, platelets 75 today no bleeding.  Related to chemotherapy, possible underlying myelodysplastic syndrome     Paroxysmal atrial fibrillation with RVR  EKG in the ER showed A. fib with RVR and heart rate of 190-then spontaneously converted to normal sinus     Hypomagnesemia  Magnesium 1.4 and admission  Replace per protocol     Other chronic conditions: Depression, obesity, essential HTN

## 2022-07-07 NOTE — PLAN OF CARE
Goal Outcome Evaluation:              Outcome Evaluation: Pt resting throughout shift but awake most of the night.  Pt hgb this am lab draw is 7.1.  DCP is home w/HHC.

## 2022-07-07 NOTE — PROGRESS NOTES
[facility] Medicine Services Daily Progress Note    Patient Name: Cindy Cortes  : 1965  MRN: [id number]  Primary Care Physician:  Annamarie Monroy APRN  Date of admission: 2022      Subjective      Chief Complaint: soa      Patient Reports     22: s/p 2U PRBC transfusion.  22: Ordered 1 unit PRBC.  Admits to FALL.    Review of Systems   All other systems reviewed and are negative.        Objective      Vitals:   Temp:  [97.4 °F (36.3 °C)-98.1 °F (36.7 °C)] 97.7 °F (36.5 °C)  Heart Rate:  [72-92] 87  Resp:  [20-22] 20  BP: (117-162)/(56-84) 117/77    Physical Exam  HENT:      Head: Normocephalic.      Nose: Nose normal.   Eyes:      Extraocular Movements: Extraocular movements intact.      Pupils: Pupils are equal, round, and reactive to light.   Cardiovascular:      Rate and Rhythm: Normal rate and regular rhythm.   Pulmonary:      Effort: Pulmonary effort is normal.   Abdominal:      General: Bowel sounds are normal.      Comments: obese   Musculoskeletal:         General: Normal range of motion.      Cervical back: Normal range of motion.   Skin:     General: Skin is warm.   Neurological:      Mental Status: She is alert. Mental status is at baseline.   Psychiatric:         Mood and Affect: Mood normal.             Result Review    Result Review:  I have personally reviewed the results from the time of this admission to 2022 11:38 EDT and agree with these findings:  [x]  Laboratory  []  Microbiology  [x]  Radiology  []  EKG/Telemetry   []  Cardiology/Vascular   []  Pathology  [x]  Old records  []  Other:            Assessment & Plan      Brief Patient Summary:  56-year-old female with symptomatic anemia      ferrous sulfate, 324 mg, Oral, Daily With Breakfast  folic acid, 1 mg, Oral, Daily  levETIRAcetam, 750 mg, Oral, BID  oxybutynin, 5 mg, Oral, 4x Daily  sodium chloride, 10 mL, Intravenous, Q12H  venlafaxine XR, 150 mg, Oral, Daily             Active Hospital  Problems:  Active Hospital Problems    Diagnosis    • **Anemia      Last Assessment & Plan:   Formatting of this note might be different from the original.  Check CBC     • Paroxysmal atrial fibrillation with rapid ventricular response (HCC)    • Dyspnea on exertion    • Hypomagnesemia    • Gastroesophageal reflux disease with hiatal hernia      Formatting of this note might be different from the original.  S/P EGD (08/19/19) = GASTRITIS, BX = NEG  GI - DR. LE>>>IF STILL SXC, REFER TO GEN SURGERY FOR YEFRI    Last Assessment & Plan:   Formatting of this note might be different from the original.  Stable on pantoprazole     • Oligodendroglioma (HCC)    • Essential hypertension    • Depression          Symptomatic anemia causing FALL:  -hgb 6.2 in the ED  -No evidence of bleeding  -s/p Retacrit injection today 7/5/2022 at [facility]  -S/p CT-guided bone marrow biopsy by IR on 6/2/2022  -s/p 2Ut PRBCs   -cont home ferrous sulfate  -consult Dr. Workman for further recommendations       Paroxysmal atrial fibrillation with RVR: controlled  -initial EKG showed atrial fibrillation with rapid ventricular rate of 190.  She spontaneously converted to normal sinus rhythm     Hypomagnesemia   -Mg 1.4  -replacement protocol      Oligodendroglioma s/p craniotomy/resection of right frontal lobe mass 5/6/2021  -s/p completed radiation  -chemotherapy had to be stopped due to possible pneumonitis found on bronchoscopy April 2022  -cont home keppra     Depression:  -cont home effexor      Morbid obesity BMI 47  -encourage lifestyle modifications    DVT prophylaxis:  Mechanical DVT prophylaxis orders are present.    CODE STATUS:    Level Of Support Discussed With: Patient  Code Status (Patient has no pulse and is not breathing): CPR (Attempt to Resuscitate)  Medical Interventions (Patient has pulse or is breathing): Full Support      Disposition:  I expect patient to be discharged defer.    This patient has been examined  wearing appropriate Personal Protective Equipment and discussed with nursing. 07/07/22      Electronically signed by Ba Ventura DO, 07/07/22, 11:38 EDT.  [facility] Hospitalist Team

## 2022-07-08 ENCOUNTER — READMISSION MANAGEMENT (OUTPATIENT)
Dept: CALL CENTER | Facility: HOSPITAL | Age: 57
End: 2022-07-08

## 2022-07-08 ENCOUNTER — APPOINTMENT (OUTPATIENT)
Dept: LAB | Facility: HOSPITAL | Age: 57
End: 2022-07-08

## 2022-07-08 VITALS
RESPIRATION RATE: 16 BRPM | TEMPERATURE: 97.5 F | DIASTOLIC BLOOD PRESSURE: 81 MMHG | HEART RATE: 97 BPM | BODY MASS INDEX: 47.66 KG/M2 | WEIGHT: 259 LBS | OXYGEN SATURATION: 97 % | SYSTOLIC BLOOD PRESSURE: 150 MMHG | HEIGHT: 62 IN

## 2022-07-08 DIAGNOSIS — C71.9 OLIGODENDROGLIOMA: Primary | ICD-10-CM

## 2022-07-08 DIAGNOSIS — D64.9 TRANSFUSION-DEPENDENT ANEMIA: ICD-10-CM

## 2022-07-08 PROBLEM — E44.0 MODERATE MALNUTRITION (HCC): Status: ACTIVE | Noted: 2022-07-08

## 2022-07-08 LAB
ANION GAP SERPL CALCULATED.3IONS-SCNC: 14 MMOL/L (ref 5–15)
BH BB BLOOD EXPIRATION DATE: NORMAL
BH BB BLOOD EXPIRATION DATE: NORMAL
BH BB BLOOD TYPE BARCODE: 5100
BH BB BLOOD TYPE BARCODE: 5100
BH BB DISPENSE STATUS: NORMAL
BH BB DISPENSE STATUS: NORMAL
BH BB PRODUCT CODE: NORMAL
BH BB PRODUCT CODE: NORMAL
BH BB UNIT NUMBER: NORMAL
BH BB UNIT NUMBER: NORMAL
BH CV ECHO MEAS - ACS: 1.76 CM
BH CV ECHO MEAS - AO MAX PG: 7.5 MMHG
BH CV ECHO MEAS - AO MEAN PG: 3.7 MMHG
BH CV ECHO MEAS - AO ROOT DIAM: 2.6 CM
BH CV ECHO MEAS - AO V2 MAX: 137.1 CM/SEC
BH CV ECHO MEAS - AO V2 VTI: 24.6 CM
BH CV ECHO MEAS - AVA(I,D): 2.6 CM2
BH CV ECHO MEAS - EDV(CUBED): 75.9 ML
BH CV ECHO MEAS - EDV(MOD-SP4): 89.9 ML
BH CV ECHO MEAS - EF(MOD-SP4): 65.7 %
BH CV ECHO MEAS - ESV(CUBED): 22.7 ML
BH CV ECHO MEAS - ESV(MOD-SP4): 30.8 ML
BH CV ECHO MEAS - FS: 33.2 %
BH CV ECHO MEAS - IVS/LVPW: 0.84 CM
BH CV ECHO MEAS - IVSD: 1.15 CM
BH CV ECHO MEAS - LV DIASTOLIC VOL/BSA (35-75): 42.1 CM2
BH CV ECHO MEAS - LV MASS(C)D: 193.7 GRAMS
BH CV ECHO MEAS - LV MAX PG: 5 MMHG
BH CV ECHO MEAS - LV MEAN PG: 2.7 MMHG
BH CV ECHO MEAS - LV SYSTOLIC VOL/BSA (12-30): 14.4 CM2
BH CV ECHO MEAS - LV V1 MAX: 112.2 CM/SEC
BH CV ECHO MEAS - LV V1 VTI: 22 CM
BH CV ECHO MEAS - LVIDD: 4.2 CM
BH CV ECHO MEAS - LVIDS: 2.8 CM
BH CV ECHO MEAS - LVOT AREA: 2.9 CM2
BH CV ECHO MEAS - LVOT DIAM: 1.91 CM
BH CV ECHO MEAS - LVPWD: 1.37 CM
BH CV ECHO MEAS - MR MAX PG: 96.3 MMHG
BH CV ECHO MEAS - MR MAX VEL: 490.7 CM/SEC
BH CV ECHO MEAS - MV A MAX VEL: 91.7 CM/SEC
BH CV ECHO MEAS - MV DEC SLOPE: 366.5 CM/SEC2
BH CV ECHO MEAS - MV DEC TIME: 0.25 MSEC
BH CV ECHO MEAS - MV E MAX VEL: 90.1 CM/SEC
BH CV ECHO MEAS - MV E/A: 0.98
BH CV ECHO MEAS - MV MAX PG: 7.6 MMHG
BH CV ECHO MEAS - MV MEAN PG: 3.3 MMHG
BH CV ECHO MEAS - MV V2 VTI: 26.9 CM
BH CV ECHO MEAS - MVA(VTI): 2.34 CM2
BH CV ECHO MEAS - PA ACC TIME: 0.07 SEC
BH CV ECHO MEAS - PA PR(ACCEL): 47.9 MMHG
BH CV ECHO MEAS - PA V2 MAX: 136.1 CM/SEC
BH CV ECHO MEAS - PI END-D VEL: 157.8 CM/SEC
BH CV ECHO MEAS - PULM A REVS DUR: 0.1 SEC
BH CV ECHO MEAS - PULM A REVS VEL: 43.1 CM/SEC
BH CV ECHO MEAS - PULM DIAS VEL: 48.5 CM/SEC
BH CV ECHO MEAS - PULM S/D: 1.27
BH CV ECHO MEAS - PULM SYS VEL: 61.6 CM/SEC
BH CV ECHO MEAS - RAP SYSTOLE: 3 MMHG
BH CV ECHO MEAS - RV MAX PG: 5.2 MMHG
BH CV ECHO MEAS - RV V1 MAX: 113.6 CM/SEC
BH CV ECHO MEAS - RV V1 VTI: 19.8 CM
BH CV ECHO MEAS - RVDD: 3.2 CM
BH CV ECHO MEAS - RVSP: 68.2 MMHG
BH CV ECHO MEAS - SI(MOD-SP4): 27.7 ML/M2
BH CV ECHO MEAS - SV(LVOT): 62.8 ML
BH CV ECHO MEAS - SV(MOD-SP4): 59.1 ML
BH CV ECHO MEAS - TR MAX PG: 65.2 MMHG
BH CV ECHO MEAS - TR MAX VEL: 403.7 CM/SEC
BUN SERPL-MCNC: 12 MG/DL (ref 6–20)
BUN/CREAT SERPL: 19.4 (ref 7–25)
CALCIUM SPEC-SCNC: 8.4 MG/DL (ref 8.6–10.5)
CHLORIDE SERPL-SCNC: 102 MMOL/L (ref 98–107)
CO2 SERPL-SCNC: 19 MMOL/L (ref 22–29)
CREAT SERPL-MCNC: 0.62 MG/DL (ref 0.57–1)
CROSSMATCH INTERPRETATION: NORMAL
CROSSMATCH INTERPRETATION: NORMAL
DEPRECATED RDW RBC AUTO: 56 FL (ref 37–54)
EGFRCR SERPLBLD CKD-EPI 2021: 106 ML/MIN/1.73
EOSINOPHIL # BLD MANUAL: 0.06 10*3/MM3 (ref 0–0.4)
EOSINOPHIL NFR BLD MANUAL: 1 % (ref 0.3–6.2)
ERYTHROCYTE [DISTWIDTH] IN BLOOD BY AUTOMATED COUNT: 18.6 % (ref 12.3–15.4)
GLUCOSE SERPL-MCNC: 100 MG/DL (ref 65–99)
HCT VFR BLD AUTO: 27.4 % (ref 34–46.6)
HGB BLD-MCNC: 9.2 G/DL (ref 12–15.9)
LYMPHOCYTES # BLD MANUAL: 0.62 10*3/MM3 (ref 0.7–3.1)
LYMPHOCYTES NFR BLD MANUAL: 1 % (ref 5–12)
MAXIMAL PREDICTED HEART RATE: 166 BPM
MCH RBC QN AUTO: 29.2 PG (ref 26.6–33)
MCHC RBC AUTO-ENTMCNC: 33.6 G/DL (ref 31.5–35.7)
MCV RBC AUTO: 86.9 FL (ref 79–97)
MONOCYTES # BLD: 0.06 10*3/MM3 (ref 0.1–0.9)
NEUTROPHILS # BLD AUTO: 4.87 10*3/MM3 (ref 1.7–7)
NEUTROPHILS NFR BLD MANUAL: 79 % (ref 42.7–76)
NEUTS BAND NFR BLD MANUAL: 8 % (ref 0–5)
PLATELET # BLD AUTO: 68 10*3/MM3 (ref 140–450)
PMV BLD AUTO: 8.3 FL (ref 6–12)
POIKILOCYTOSIS BLD QL SMEAR: ABNORMAL
POTASSIUM SERPL-SCNC: 4.4 MMOL/L (ref 3.5–5.2)
QT INTERVAL: 237 MS
QT INTERVAL: 356 MS
RBC # BLD AUTO: 3.15 10*6/MM3 (ref 3.77–5.28)
SCAN SLIDE: NORMAL
SMALL PLATELETS BLD QL SMEAR: ABNORMAL
SODIUM SERPL-SCNC: 135 MMOL/L (ref 136–145)
STRESS TARGET HR: 141 BPM
UNIT  ABO: NORMAL
UNIT  ABO: NORMAL
UNIT  RH: NORMAL
UNIT  RH: NORMAL
VARIANT LYMPHS NFR BLD MANUAL: 11 % (ref 19.6–45.3)
WBC MORPH BLD: NORMAL
WBC NRBC COR # BLD: 5.6 10*3/MM3 (ref 3.4–10.8)

## 2022-07-08 PROCEDURE — 99214 OFFICE O/P EST MOD 30 MIN: CPT | Performed by: INTERNAL MEDICINE

## 2022-07-08 PROCEDURE — 97161 PT EVAL LOW COMPLEX 20 MIN: CPT

## 2022-07-08 PROCEDURE — 94618 PULMONARY STRESS TESTING: CPT

## 2022-07-08 PROCEDURE — 80048 BASIC METABOLIC PNL TOTAL CA: CPT | Performed by: HOSPITALIST

## 2022-07-08 PROCEDURE — G0378 HOSPITAL OBSERVATION PER HR: HCPCS

## 2022-07-08 PROCEDURE — 85025 COMPLETE CBC W/AUTO DIFF WBC: CPT | Performed by: HOSPITALIST

## 2022-07-08 PROCEDURE — 85007 BL SMEAR W/DIFF WBC COUNT: CPT | Performed by: HOSPITALIST

## 2022-07-08 PROCEDURE — 99217 PR OBSERVATION CARE DISCHARGE MANAGEMENT: CPT | Performed by: HOSPITALIST

## 2022-07-08 PROCEDURE — 92610 EVALUATE SWALLOWING FUNCTION: CPT

## 2022-07-08 PROCEDURE — 94760 N-INVAS EAR/PLS OXIMETRY 1: CPT

## 2022-07-08 PROCEDURE — 94799 UNLISTED PULMONARY SVC/PX: CPT

## 2022-07-08 RX ADMIN — LEVETIRACETAM 750 MG: 500 TABLET, FILM COATED ORAL at 09:06

## 2022-07-08 RX ADMIN — FOLIC ACID 1 MG: 1 TABLET ORAL at 09:06

## 2022-07-08 RX ADMIN — FERROUS SULFATE TAB EC 324 MG (65 MG FE EQUIVALENT) 324 MG: 324 (65 FE) TABLET DELAYED RESPONSE at 09:06

## 2022-07-08 RX ADMIN — VENLAFAXINE HYDROCHLORIDE 150 MG: 75 CAPSULE, EXTENDED RELEASE ORAL at 09:06

## 2022-07-08 RX ADMIN — OXYBUTYNIN CHLORIDE 5 MG: 5 TABLET ORAL at 09:06

## 2022-07-08 RX ADMIN — Medication 10 ML: at 09:06

## 2022-07-08 RX ADMIN — OXYBUTYNIN CHLORIDE 5 MG: 5 TABLET ORAL at 14:06

## 2022-07-08 NOTE — PROGRESS NOTES
Hematology/Oncology Inpatient Progress Note    PATIENT NAME: Cindy Cortes  : 1965  MRN: [id number]    Chief complaint: SOB, Atrial Fibrillation with RVR     History of present illness:    Cindy Cortes is a 56 y.o. female who presented to [facility] on 2022 after presenting to cardiovascular area for echocardiogram due to recent history of shortness of breath and dyspnea on exertion for the last few weeks and found to have elevated heart rate and A. fib.  She was sent to the ER for evaluation.  An EKG showed atrial fibrillation, and spontaneously converted to normal sinus rhythm.  Her hemoglobin was noted to be 6.2, and magnesium 1.4.  The patient was given a unit of blood and admitted for further evaluation and treatment.     22  Hematology/Oncology was consulted for anemia with shortness of breath and dyspnea on exertion.  Patient has a history of oligodendroglioma and initially presented with seizures and subsequent CT head, then MRI brain showed 2.1 x 4.4 x 3.3 cm right frontal lobe mass with right to left midline shift.  Patient was started on Keppra, steroids and underwent craniotomy 2021 with pathology revealing oligodendroglioma.  Patient started radiation 2021 completed 2021, then started vincristine, procarbazine 2021 with dose reduction due to pancytopenia.  With continued myelosuppression, chemotherapy was stopped and patient has been on surveillance with plan for MRI brain mid 2022.  Patient has been having shortness of breath and exertional dyspnea and was sent for echocardiogram and found to have atrial fibrillation and elevated heart rate upon arrival to for the test, and sent to ER.    Work-up with hapto, LDH normal.  Patient was given blood transfusion.    She  has a past medical history of Anemia, Arthritis, GERD (gastroesophageal reflux disease), Hypertension, Oligodendroglioma (HCC), Seizure (HCC), and Type 2 diabetes mellitus with  "hyperglycemia, without long-term current use of insulin ([facility]) (05/12/2021).     PCP: Annamarie Monroy APRN    Subjective      Patient improved.  Still has exertional shortness of breath.  No other new symptoms        MEDICATIONS:    Scheduled Meds:  ferrous sulfate, 324 mg, Oral, Daily With Breakfast  folic acid, 1 mg, Oral, Daily  levETIRAcetam, 750 mg, Oral, BID  oxybutynin, 5 mg, Oral, 4x Daily  sodium chloride, 10 mL, Intravenous, Q12H  venlafaxine XR, 150 mg, Oral, Daily       Continuous Infusions:      PRN Meds:  •  acetaminophen **OR** acetaminophen **OR** acetaminophen  •  albuterol  •  aluminum-magnesium hydroxide-simethicone  •  diphenhydrAMINE  •  magnesium sulfate **OR** magnesium sulfate **OR** magnesium sulfate  •  melatonin  •  ondansetron **OR** ondansetron  •  potassium chloride  •  potassium chloride  •  [COMPLETED] Insert peripheral IV **AND** sodium chloride  •  sodium chloride     ALLERGIES:  No Known Allergies    Objective    VITALS:   /81 (BP Location: Right arm, Patient Position: Lying)   Pulse 97   Temp 97.5 °F (36.4 °C) (Oral)   Resp 16   Ht 157.5 cm (62\")   Wt 117 kg (259 lb)   LMP  (LMP Unknown)   SpO2 99%   BMI 47.37 kg/m²     PHYSICAL EXAM: (performed by MD)  Physical Exam  Constitutional:       Appearance: Normal appearance. She is obese.   HENT:      Head: Normocephalic and atraumatic.      Nose: Nose normal.   Eyes:      Pupils: Pupils are equal, round, and reactive to light.   Cardiovascular:      Rate and Rhythm: Normal rate and regular rhythm.      Pulses: Normal pulses.      Heart sounds: Normal heart sounds. No murmur heard.  Pulmonary:      Effort: Pulmonary effort is normal.      Breath sounds: Rhonchi present.   Abdominal:      General: There is no distension.      Palpations: Abdomen is soft. There is no mass.      Tenderness: There is no abdominal tenderness.   Musculoskeletal:         General: Normal range of motion.      Cervical back: Normal range of motion " and neck supple.   Skin:     General: Skin is warm.   Neurological:      General: No focal deficit present.      Mental Status: She is alert.   Psychiatric:         Mood and Affect: Mood normal.           RECENT LABS:  Lab Results (last 24 hours)     Procedure Component Value Units Date/Time    Manual Differential [[id number]]  (Abnormal) Collected: 07/08/22 0220    Specimen: Blood Updated: 07/08/22 0429     Neutrophil % 79.0 %      Lymphocyte % 11.0 %      Monocyte % 1.0 %      Eosinophil % 1.0 %      Bands %  8.0 %      Neutrophils Absolute 4.87 10*3/mm3      Lymphocytes Absolute 0.62 10*3/mm3      Monocytes Absolute 0.06 10*3/mm3      Eosinophils Absolute 0.06 10*3/mm3      Poikilocytes Slight/1+     WBC Morphology Normal     Platelet Estimate Decreased    CBC & Differential [[id number]]  (Abnormal) Collected: 07/08/22 0220    Specimen: Blood Updated: 07/08/22 0429    Narrative:      The following orders were created for panel order CBC & Differential.  Procedure                               Abnormality         Status                     ---------                               -----------         ------                     CBC Auto Differential[[id number]]        Abnormal            Final result               Scan Slide[[id number]]                                       Final result                 Please view results for these tests on the individual orders.    Scan Slide [[id number]] Collected: 07/08/22 0220    Specimen: Blood Updated: 07/08/22 0429     Scan Slide --     Comment: See Manual Differential Results       CBC Auto Differential [[id number]]  (Abnormal) Collected: 07/08/22 0220    Specimen: Blood Updated: 07/08/22 0429     WBC 5.60 10*3/mm3      RBC 3.15 10*6/mm3      Hemoglobin 9.2 g/dL      Hematocrit 27.4 %      MCV 86.9 fL      MCH 29.2 pg      MCHC 33.6 g/dL      RDW 18.6 %      RDW-SD 56.0 fl      MPV 8.3 fL      Platelets 68 10*3/mm3     Narrative:      The previously reported component NRBC is no  longer being reported. Previous result was 0.4 /100 WBC (Reference Range: 0.0-0.2 /100 WBC) on 7/8/2022 at 0322 EDT.    Basic Metabolic Panel [[id number]]  (Abnormal) Collected: 07/08/22 0220    Specimen: Blood Updated: 07/08/22 0345     Glucose 100 mg/dL      BUN 12 mg/dL      Creatinine 0.62 mg/dL      Sodium 135 mmol/L      Potassium 4.4 mmol/L      Comment: Slight hemolysis detected by analyzer. Results may be affected.        Chloride 102 mmol/L      CO2 19.0 mmol/L      Calcium 8.4 mg/dL      BUN/Creatinine Ratio 19.4     Anion Gap 14.0 mmol/L      eGFR 106.0 mL/min/1.73      Comment: National Kidney Foundation and American Society of Nephrology (ASN) Task Force recommended calculation based on the Chronic Kidney Disease Epidemiology Collaboration (CKD-EPI) equation refit without adjustment for race.       Narrative:      GFR Normal >60  Chronic Kidney Disease <60  Kidney Failure <15      Hemoglobin & Hematocrit, Blood [[id number]]  (Abnormal) Collected: 07/07/22 1629    Specimen: Blood Updated: 07/07/22 1724     Hemoglobin 8.8 g/dL      Hematocrit 25.0 %           IMAGING REVIEWED:  No radiology results for the last day    Assessment & Plan     This is a 56-year-old female with history of right frontal lobe mass and craniotomy 5/6/2021 treated with radiation, then chemotherapy with prolonged myelosuppression and possible pneumonitis-chemotherapy stopped, surveillance began. The patient was being evaluated with echo when she was found to have tachycardia and A fib with RVR and sent to ER where she converted to normal sinus rhythm.  She also was found to have Hgb of 6.2 and given PRBC's.  Oncology was consulted for anemia for an established patient and recommends transfusion for Hgb below 7.0, and evaluation for hemolysis, bone marrow function.       Oligodendroglioma  S/p right frontal lobe mass craniotomy 5/6/2021  Treatment with radiation followed by chemotherapy   Stopped due to pancytopenia/prolonged  myelosuppression  On active surveillance with no evidence of disease  MRI brain recently with some increased enhancement, could be related to posttreatment changes no neurological symptoms, repeat MRI as planned.     Symptomatic anemia/anemia of chronic disease/severe myelosuppression with chemotherapy  Hgb 6.2 on admission, Hgb 7.1 today  Administer 1 unit PRBCs for Hgb<7.0  1 unit PRBCs 7/5/2022, 1 unit PRBCs 7/6/2022, 1 unit today  Related to chemotherapy, possible underlying myelodysplastic syndrome  Continue to monitor CBC daily  Retic mildly elevated, Haptoglobin, LDH unremarkable.  Referral to bone marrow transplant.  Remains transfusion dependent repeat CBC next week     Thrombocytopenia  Platelet 102 on admission, platelets 68 today.  Related to chemotherapy, possible underlying myelodysplastic syndrome     Paroxysmal atrial fibrillation with RVR  EKG in the ER showed A. fib with RVR and heart rate of 190-then spontaneously converted to normal sinus     Hypomagnesemia  Replace per protocol     Other chronic conditions: Depression, obesity, essential HTN

## 2022-07-08 NOTE — DISCHARGE SUMMARY
[facility] Medicine Services  DISCHARGE SUMMARY    Patient Name: Cindy Cortes  : 1965  MRN: [id number]    Date of Admission: 2022  Date of Discharge:  2022  Primary Care Physician: Annamarie Monroy APRN      Presenting Problem:   Anemia [D64.9]  Atrial fibrillation with rapid ventricular response (HCC) [I48.91]  Severe anemia [D64.9]    Active and Resolved Hospital Problems:  Active Hospital Problems    Diagnosis POA   • **Anemia [D64.9] Yes   • Moderate malnutrition (HCC) [E44.0] Yes   • Paroxysmal atrial fibrillation with rapid ventricular response (HCC) [I48.0] Yes   • Dyspnea on exertion [R06.00] Yes   • Hypomagnesemia [E83.42] Yes   • Gastroesophageal reflux disease with hiatal hernia [K21.9, K44.9] Yes   • Oligodendroglioma (HCC) [C71.9] Yes   • Essential hypertension [I10] Yes   • Depression [F32.A] Yes      Resolved Hospital Problems   No resolved problems to display.         Hospital Course     Hospital Course:    The patient is a 56-year-old female with history of right frontal oligodendroglioma s/p right frontal mass craniotomy (2021) s/p radiation and chemotherapy which was stopped due to pancytopenia and prolonged myelosuppression.    The patient came to the emergency room of 2022 complaining of several weeks of FALL.  ED work-up showed new atrial fibrillation RVR and hemoglobin was 6.2.    The patient was placed on observation.  The patient converted to sinus rhythm after admission.  She was transfused a total of 3 units of PRBC.  She has been evaluated by her hematologist.  There has not been any evidence of bleeding that was found during the hospitalization.  She had complained of globus sensation on the day of discharge 2022 but passed swallow eval when speech evaluated her.  She claims to have followed up with pulmonology regarding this in the past.  The patient did not qualify for home oxygen.  She has been tolerating diet and is  hemodynamically stable.  She will be discharged home on 7/8/2022 and will follow-up with hematology/oncology as scheduled.      DISCHARGE Follow Up Recommendations for labs and diagnostics:     Reasons For Change In Medications and Indications for New Medications:      Day of Discharge     Vital Signs:  Temp:  [97.5 °F (36.4 °C)-97.6 °F (36.4 °C)] 97.5 °F (36.4 °C)  Heart Rate:  [76-97] 97  Resp:  [16-20] 16  BP: (138-150)/(76-81) 150/81    Physical Exam:  Physical Exam  HENT:      Head: Normocephalic.      Nose: Nose normal.   Eyes:      Extraocular Movements: Extraocular movements intact.      Pupils: Pupils are equal, round, and reactive to light.   Cardiovascular:      Rate and Rhythm: Normal rate and regular rhythm.      Pulses: Normal pulses.   Pulmonary:      Effort: Pulmonary effort is normal.   Abdominal:      General: Bowel sounds are normal.   Musculoskeletal:         General: Normal range of motion.   Skin:     General: Skin is warm.   Neurological:      Mental Status: She is alert. Mental status is at baseline.   Psychiatric:         Mood and Affect: Mood normal.            Pertinent  and/or Most Recent Results     LAB RESULTS:      Lab 07/08/22  0220 07/07/22  1629 07/07/22  0020 07/06/22  0937 07/06/22  0809 07/06/22  0030 07/05/22  1630 07/05/22  1410   WBC 5.60  --  4.70  --   --  4.50 5.50 8.75   HEMOGLOBIN 9.2* 8.8* 7.1*  --  7.8* 6.6* 6.2* 6.9*   HEMATOCRIT 27.4* 25.0* 21.0*  --  22.6* 18.5* 18.6* 21.1*   PLATELETS 68*  --  75*  --   --  80* 102* 132*   NEUTROS ABS 4.87  --  3.80  --   --  3.74 4.80 7.46*   LYMPHS ABS  --   --  0.60*  --   --   --  0.50* 0.71   MONOS ABS  --   --  0.30  --   --   --  0.30 0.54   EOS ABS 0.06  --  0.00  --   --  0.05 0.00 0.02   MCV 86.9  --  86.2  --   --  88.2 92.0 98.1*   LDH  --   --   --  202  --   --   --   --    PROTIME  --   --   --   --   --   --  12.1*  --    APTT  --   --   --   --   --   --  26.8*  --          Lab 07/08/22 0220 07/07/22  0020  07/06/22  0030 07/05/22  1630   SODIUM 135* 135* 139 138   POTASSIUM 4.4 4.2 3.4* 3.5   CHLORIDE 102 103 103 103   CO2 19.0* 21.0* 23.0 19.0*   ANION GAP 14.0 11.0 13.0 16.0*   BUN 12 12 12 13   CREATININE 0.62 0.62 0.69 0.88   EGFR 106.0 106.0 103.3 78.2   GLUCOSE 100* 100* 106* 152*   CALCIUM 8.4* 8.2* 8.3* 8.8   MAGNESIUM  --   --   --  1.4*   TSH  --   --   --  3.890         Lab 07/05/22  1630   TOTAL PROTEIN 6.2   ALBUMIN 3.50   GLOBULIN 2.7   ALT (SGPT) 8   AST (SGOT) 10   BILIRUBIN 1.5*   ALK PHOS 115         Lab 07/05/22  1630   PROBNP 1,208.0*   TROPONIN T <0.010   PROTIME 12.1*   INR 1.19*             Lab 07/05/22  1630   ABO TYPING O   RH TYPING Positive   ANTIBODY SCREEN Positive         Brief Urine Lab Results  (Last result in the past 365 days)      Color   Clarity   Blood   Leuk Est   Nitrite   Protein   CREAT   Urine HCG        05/31/22 0038 Yellow   Turbid   Moderate (2+)   Large (3+)   Positive   100 mg/dL (2+)               Microbiology Results (last 10 days)     Procedure Component Value - Date/Time    COVID-19,CEPHEID/ADITI,COR/SARITHA/PAD/DEANNE IN-HOUSE(OR EMERGENT/ADD-ON),NP SWAB IN TRANSPORT MEDIA 3-4 HR TAT, RT-PCR - Swab, Nasopharynx [[id number]]  (Normal) Collected: 07/05/22 1632    Lab Status: Final result Specimen: Swab from Nasopharynx Updated: 07/05/22 1714     COVID19 Not Detected    Narrative:      Fact sheet for providers: https://www.fda.gov/media/295044/download     Fact sheet for patients: https://www.fda.gov/media/405670/download  Fact sheet for providers: https://www.fda.gov/media/801769/download    Fact sheet for patients: https://www.fda.gov/media/939844/download    Test performed by PCR.          MRI Brain With & Without Contrast    Result Date: 7/3/2022  Impression: 1.Postsurgical changes within right frontal lobe. Surrounding FLAIR hyperintensity also involving left cerebral hemisphere overall appears slightly increased and could be secondary to evolving post-treatment changes or  tumor progression. 2.No acute intracranial process identified. 3.Mucosal disease within maxillary sinuses.  Electronically Signed By-Naveed Hubbard MD On:7/3/2022 2:48 PM This report was finalized on [id number] by  Naveed Hubbard MD.    XR Chest 1 View    Result Date: 7/5/2022  Impression: 1. Low lung volumes without acute cardiopulmonary abnormality. 2. Mild cardiomegaly, likely accentuated by low lung volumes and AP portable technique.  Electronically Signed By-Tayo Brumfield MD On:7/5/2022 4:58 PM This report was finalized on [id number] by  Tayo Brmufield MD.              Results for orders placed during the hospital encounter of 07/05/22    Adult Transthoracic Echo Complete w/ Color, Spectral and Contrast if Necessary Per Protocol    Interpretation Summary  Normal LV size and contractility EF of 60 to 65%  Normal RV size  Normal atrial size  Aortic valve, mitral valve, tricuspid valve appears structurally normal, mild tricuspid regurgitation seen.  Calculated RV systolic pressure 70 mmHg consistent with severe pulmonary hypertension  No pericardial effusion seen.  Proximal aorta appears normal in size.      Labs Pending at Discharge:      Procedures Performed           Consults:   Consults     Date and Time Order Name Status Description    7/5/2022  6:18 PM Hematology & Oncology Inpatient Consult      7/5/2022  5:20 PM Hospitalist (on-call MD unless specified)              Discharge Details        Discharge Medications      Continue These Medications      Instructions Start Date   albuterol sulfate  (90 Base) MCG/ACT inhaler  Commonly known as: PROVENTIL HFA;VENTOLIN HFA;PROAIR HFA   2 puffs, Inhalation, Every 4 Hours PRN      Breztri Aerosphere 160-9-4.8 MCG/ACT aerosol inhaler  Generic drug: Budeson-Glycopyrrol-Formoterol   2 puffs, Inhalation, 2 Times Daily      diphenhydrAMINE 25 MG tablet  Commonly known as: BENADRYL   25 mg, Oral, Daily PRN      ferrous sulfate 325 (65 FE) MG  tablet   1 tablet, Oral, Daily With Breakfast      fluticasone 44 MCG/ACT inhaler  Commonly known as: FLOVENT HFA   2 puffs, Inhalation, Daily PRN      folic acid 1 MG tablet  Commonly known as: FOLVITE   1 mg, Oral, Daily      levETIRAcetam 750 MG tablet  Commonly known as: KEPPRA   750 mg, Oral, 2 Times Daily      ondansetron 8 MG tablet  Commonly known as: Zofran   8 mg, Oral, Every 8 Hours PRN      oxybutynin 5 MG tablet  Commonly known as: DITROPAN   5 mg, Oral, 4 Times Daily      venlafaxine  MG 24 hr capsule  Commonly known as: EFFEXOR-XR   150 mg, Oral, Daily             No Known Allergies      Discharge Disposition:   Home or Self Care    Diet:  Hospital:  Diet Order   Procedures   • Diet Regular         Discharge Activity: as tolerated      Discharge Condition: stable      CODE STATUS:  Code Status and Medical Interventions:   Ordered at: 07/05/22 1819     Level Of Support Discussed With:    Patient     Code Status (Patient has no pulse and is not breathing):    CPR (Attempt to Resuscitate)     Medical Interventions (Patient has pulse or is breathing):    Full Support         Future Appointments   Date Time Provider Department Center   7/9/2022 To Be Determined Trini Marie RN [facility]   7/11/2022 11:45 AM LAB MD [facility]   7/11/2022 11:45 AM [place] Workman [place] [facility] [place] [place] [facility]   7/14/2022 To Be Determined Trini Marie RN [facility]   9/13/2022 11:00 AM Yang Cruz [place] [facility] None       Additional Instructions for the Follow-ups that You Need to Schedule     Discharge Follow-up with PCP   As directed       Currently Documented PCP:    Annamarie Monroy APRN    PCP Phone Number:    [phone]     Follow Up Details: 2 weeks               Time spent on Discharge including face to face service: 20 minutes    This patient has been examined wearing appropriate Personal Protective Equipment and discussed with nursing. 07/08/22      Signature: Electronically signed  by Ba Ventura DO, 07/08/22, 5:14 PM EDT.

## 2022-07-08 NOTE — PLAN OF CARE
"Goal Outcome Evaluation:      Clincial swallow evaluation completed this date due to reports of difficulty swallow. The patient served as her own informant and reports she previoulsy completed chemotherapy and since this time her mouth has been very dry. As a result of this, she states that when her mouth gets dry, she feels as if she has a \"sharp object in her throat, she has to gasp for air, then it will go away\". She does report she has had Pneumonia \"several times\" this year. She was able to sit upright on the side of her bed indepedently, and was assessed seated at a 90 degree angle. She was alert and responsive. She was able to self feed all trials which included the following: thin liquid (water) via straw x 1+ cups, puree (applesauce) x 2 trials, mech soft (1/2 half Fig Raymond) and regular solids (entire peanut butter cracker). Labial seal is adequately. Functional oral transit/bolus control noted. She clears the oral cavity well with no residue. No anterior labial spillage noted. Swallow appears timely. No overt pharyngeal findings noted. Vocal quality is clear across all trials. No cough or throat clearing audible. Pt denied the \"sharp object\" sensation during session. No other difficulties or complaints evident.    RECOMMENDATIONS: It is recommended that the patient continue her current diet of regular consistency, thin liquids. She should continue to be upright at a 90 degree angle for all meals/medications. She was encouraged to drink water when she has a dry mouth or the sharp sensation/odynophagia to aid in alleviating her symptoms. She does report she mainly drinks Diet Coke and did have in her room 2 XL Polar Pop cups (approximately 48 ounces each) and 2 cans of diet soda on her lunch tray. If her symptoms persist or worsen, we are available to perform a video swallow study as an outpatient to further assess her complaints. Lengthy discussion held with patient regarding the above and she verbalized " understanding. Also discussed with nursing staff. ST will sign off at this time as patient has orders to discharge to home.

## 2022-07-08 NOTE — PLAN OF CARE
Goal Outcome Evaluation:  Plan of Care Reviewed With: patient           Outcome Evaluation: 55 y/o female brought to hospital due to SOA (-) DVT. Found to be in afib and anemia. PMH includes brain tumor s/p resection in 2021, radiation and chemo. Patient lives with spouse and normally able to ambulate in home without a.d. nor supplemental O2. Patient does use wc/ for community distance. At time of eval, patient on room air with spO2 94%. Patient needed min A for donning socks, pants and patient became SOA after standing to pull up pants. Ambulated 30 ft with CGA and patient was very SOA with inc work of breathing by end of short distance. spO2 89% but quicky recovered upon sitting to 92%. Recommend 6MWT to determine supplemental O2 needs at discharge. Patient is safe to return home with spouse. Will benefit from HH or OP PT for endurance training.

## 2022-07-08 NOTE — PLAN OF CARE
Goal Outcome Evaluation:      Patient DCP home with spouse. Current with [facility] . Walking ox done and patient does not qualify for oxygen. SLP at bedside done and patient passed. Advised if she is still having problems with throat pain to follow up outpatient. Belongings collected and IV removed.

## 2022-07-08 NOTE — THERAPY EVALUATION
Acute Care - Speech Language Pathology   Swallow Initial Evaluation [facility]     Patient Name: Cindy Cortes  : 1965  MRN: [id number]  Today's Date: 2022               Admit Date: 2022    Visit Dx:     ICD-10-CM ICD-9-CM   1. Atrial fibrillation with rapid ventricular response (HCC)  I48.91 427.31   2. Severe anemia  D64.9 285.9     Patient Active Problem List   Diagnosis   • Depression   • Essential hypertension   • Primary osteoarthritis of both knees   • Seasonal allergic rhinitis   • Seizure (HCC)   • Oligodendroglioma (HCC)   • Brain tumor (HCC)   • Combined forms of age-related cataract, bilateral   • Post-menopausal   • Presbyopia   • Anemia   • Dyslipidemia   • HTN, goal below 130/80   • Gastroesophageal reflux disease with hiatal hernia   • Morbid obesity with BMI of 60.0-69.9, adult (HCC)   • Prediabetes   • Vitamin D deficiency   • Hyperglycemia   • Type 2 diabetes mellitus with hyperglycemia, without long-term current use of insulin (HCC)   • Oligoastrocytoma of frontal lobe (HCC)   • Port-A-Cath in place   • Chemotherapy-induced thrombocytopenia   • Pneumonia   • Mood swings   • HTN, goal below 130/80   • Morbid obesity with BMI of 60.0-69.9, adult (HCC)   • Oligoastrocytoma of frontal lobe (HCC)   • Type 2 diabetes mellitus with hyperglycemia, without long-term current use of insulin (HCC)   • Vitamin D deficiency   • Sepsis with hypotension (HCC)   • UTI (urinary tract infection)   • Acute respiratory failure with hypoxia (HCC)   • Anemia due to chemotherapy   • Acute kidney injury (HCC)   • Weakness   • Transfusion-dependent anemia   • Paroxysmal atrial fibrillation with rapid ventricular response (HCC)   • Dyspnea on exertion   • Hypomagnesemia   • Moderate malnutrition (HCC)     Past Medical History:   Diagnosis Date   • Anemia    • Arthritis    • GERD (gastroesophageal reflux disease)    • Hypertension    • Oligodendroglioma (HCC)    • Seizure (HCC)    • Type 2 diabetes mellitus  with hyperglycemia, without long-term current use of insulin (HCC) 05/12/2021     Past Surgical History:   Procedure Laterality Date   • BRONCHOSCOPY N/A 4/6/2022    Procedure: BRONCHOSCOPY with bronchial washing;  Surgeon: Drew Alcantar MD;  Location: [facility] ENDOSCOPY;  Service: Pulmonary;  Laterality: N/A;  pneumonia   • BRONCHOSCOPY N/A 5/31/2022    Procedure: BRONCHOSCOPY AT BEDSIDE with bronchoalveolar lavage right middle lobe;  Surgeon: Den Feldman MD;  Location: [facility] ENDOSCOPY;  Service: Pulmonary;  Laterality: N/A;   • COLONOSCOPY     • CRANIOTOMY FOR TUMOR Right 05/06/2021    Procedure: CRANIOTOMY FOR TUMOR RESECTION WITH STEREOTACTIC;  Surgeon: Jluis Felix MD;  Location: [facility] MAIN OR;  Service: Neurosurgery;  Laterality: Right;       SLP Recommendation and Plan  SLP Swallowing Diagnosis: functional oral phase, R/O pharyngeal dysphagia (07/08/22 1500)  SLP Diet Recommendation: regular textures, thin liquids (07/08/22 1500)              Recommended Diagnostics: reassess via VFSS ([facility]), other (see comments) (Available for VFSS as an outpatient if further difficulties noted or arise, or if patient has recurrence of pneumonia) (07/08/22 1500)     Anticipated Discharge Disposition (SLP): home (07/08/22 1500)     Therapy Frequency (Swallow): evaluation only (07/08/22 1500)          SWALLOW EVALUATION (last 72 hours)     SLP Adult Swallow Evaluation     Row Name 07/08/22 1500          Document Type evaluation  -SM    Subjective Information no complaints  -SM    Patient Observations alert;cooperative;agree to therapy  -SM    Patient/Family/Caregiver Comments/Observations No family/caregivers present  -SM    Patient Effort excellent  -SM    Comment Clinical swallow evaluation completed this date. Patient was not wearing a face mask during this therapy encounter. Therapist used appropriate personal protective equipment including mask, eye protection and gloves.  Mask used was standard procedure mask.  Appropriate PPE was worn during the entire therapy session. Hand hygiene was completed before and after therapy session. Patient is not in enhanced droplet precautions.       -SM    Symptoms Noted During/After Treatment none  -SM               Patient Profile Reviewed yes  -SM    Pertinent History Of Current Problem The patient is a 56 y.o. female with PMH of Oligodendroglioma s/p craniotomy/resection of right frontal lobe mass 5/6/2021, completed radiation, chemotherapy had to be stopped due to possible pneumonitis found on bronchoscopy April 2022. She has also had pancytopenia and required multiple blood transfusions. She was admitted on 5/30/2022 due to shortness of breath and weakness. She was treated for sepsis, pneumonia, UTI, and E. Coli bacteremia. She completed 2 weeks of IV rocephin since that time. She continues to have shortness of breath upon minimal exertion. Her oncologist ordered a CT chest and 2D echo, which she was having completed today when her heart rate was noted to be in the 190s. She felt some heart burn at that time but no palpitations. She was sent to the ED. patient stated she did receive Retacrit injection at the [facility] center around 2:00 today.     In the ED initial EKG showed atrial fibrillation with rapid ventricular rate of 190.  She spontaneously converted to normal sinus rhythm.  CXR showed low lung volumes without acute cardiopulmonary abnormality.  Lab work showed hemoglobin 6.2, magnesium 1.4, proBNP 1200.  1 unit PRBCs ordered and patient was admitted for further treatment of paroxysmal atrial fib with RVR, and anemia.    Narrative & Impression  EXAMINATION: XR CHEST 1 VW-     DATE OF EXAM: 7/5/2022 4:50 AM     INDICATION: Shortness of air     COMPARISON: Chest radiograph dated 6/1/2022     TECHNIQUE: Portable AP view of the chest was obtained.     FINDINGS:  There is a right-sided chest port tip terminating at the mid the low  SVC. The cardiac silhouette is mildly enlarged,  likely accentuated by AP  portable technique. There is central bronchovascular crowding. There are  bilateral low lung volumes. There are multilevel degenerative changes of  the thoracic spine.     IMPRESSION:  1. Low lung volumes without acute cardiopulmonary abnormality.  2. Mild cardiomegaly, likely accentuated by low lung volumes and AP  portable technique.      -SM    Current Method of Nutrition regular textures;thin liquids  -SM    Precautions/Limitations, Vision WFL;for purposes of eval  -SM    Precautions/Limitations, Hearing WFL;for purposes of eval  -SM    Prior Level of Function-Communication WFL  -SM    Prior Level of Function-Swallowing no diet consistency restrictions;regular textures;thin liquids  -SM    Plans/Goals Discussed with patient  -SM               Pretreatment Pain Rating 0/10 - no pain  -SM    Posttreatment Pain Rating 0/10 - no pain  -SM               Dentition Assessment natural, present and adequate  -SM    Secretion Management WNL/WFL  -SM    Mucosal Quality moist, healthy  -SM               Oral Motor General Assessment WFL  -SM    Oral Motor, Comment Lingual protrusion, lateralization, elevation all WFL. Labial protrusion/retraction WFL bilaterally. No overt weakness or asymmetry noted. She is verbal and presents with excellent intelligibility. No hoarse/wet vocal quality audible  -SM               Respiratory Support Currently in Use room air  -SM    Eating/Swallowing Skills self-fed  -SM    Positioning During Eating upright 90 degree;upright in bed;other (see comments)  Upright on the side of her bed  -SM    Utensils Used spoon;straw  -SM    Consistencies Trialed thin liquids;pureed;soft textures;regular textures  -SM               Clinical Swallow Evaluation Summary Clincial swallow evaluation completed this date due to reports of difficulty swallow. The patient served as her own informant and reports she previoulsy completed chemotherapy and since this time her mouth has been very  "dry. As a result of this, she states that when her mouth gets dry, she feels as if she has a \"sharp object in her throat, she has to gasp for air, then it will go away\". She does report she has had Pneumonia \"several times\" this year. She was able to sit upright on the side of her bed indepedently, and was assessed seated at a 90 degree angle. She was alert and responsive. She was able to self feed all trials which included the following: thin liquid (water) via straw x 1+ cups, puree (applesauce) x 2 trials, mech soft (1/2 half Fig Raymond) and regular solids (entire peanut butter cracker). Labial seal is adequately. Functional oral transit/bolus control noted. She clears the oral cavity well with no residue. No anterior labial spillage noted. Swallow appears timely. No overt pharyngeal findings noted. Vocal quality is clear across all trials. No cough or throat clearing audible. Pt denied the \"sharp object\" sensation during session. No other difficulties or complaints evident. No shortness of air evident throughout the entire session.     RECOMMENDATIONS: It is recommended that the patient continue her current diet of regular consistency, thin liquids. She should continue to be upright at a 90 degree angle for all meals/medications. She was encouraged to drink water when she has a dry mouth or the sharp sensation/odynophagia to aid in alleviating her symptoms. She does report she mainly drinks Diet Coke and did have in her room 2 XL Polar Pop cups (approximately 48 ounces each) and 2 cans of diet soda on her lunch tray. If her symptoms persist or worsen, we are available to perform a video swallow study as an outpatient to further assess her complaints. Lengthy discussion held with patient regarding the above and she verbalized understanding. Also discussed with nursing staff. ST will sign off at this time as patient has orders to discharge to home.   -SM               SLP Swallowing Diagnosis functional oral " phase;R/O pharyngeal dysphagia  -    Functional Impact risk of aspiration/pneumonia  -               Therapy Frequency (Swallow) evaluation only  -    SLP Diet Recommendation regular textures;thin liquids  -    Recommended Diagnostics reassess via VFSS ([facility]);other (see comments)  Available for VFSS as an outpatient if further difficulties noted or arise, or if patient has recurrence of pneumonia  -    Anticipated Discharge Disposition (SLP) home  -          User Key  (r) = Recorded By, (t) = Taken By, (c) = Cosigned By    Initials Name Effective Dates    Carly Silvestre, HELENA 06/16/21 -                 EDUCATION  The patient has been educated in the following areas:   Dysphagia (Swallowing Impairment).              Time Calculation:                HELENA Delgadillo  7/8/2022

## 2022-07-08 NOTE — PROGRESS NOTES
[facility] Daily Progress Note    Patient Name: Cidny Cortes  : 1965  MRN: [id number]  Primary Care Physician:  Annamarie Monroy APRN  Date of admission: 2022      Subjective      Chief Complaint: soa      Patient Reports     22: s/p 2U PRBC transfusion.  22: Ordered 1 unit PRBC.  Admits to FALL.  22: Discharge home    Review of Systems   All other systems reviewed and are negative.        Objective      Vitals:   Temp:  [97.5 °F (36.4 °C)-98.1 °F (36.7 °C)] 97.5 °F (36.4 °C)  Heart Rate:  [76-91] 76  Resp:  [16-20] 16  BP: (100-150)/(64-81) 150/81    Physical Exam  HENT:      Head: Normocephalic.      Nose: Nose normal.   Eyes:      Extraocular Movements: Extraocular movements intact.      Pupils: Pupils are equal, round, and reactive to light.   Cardiovascular:      Rate and Rhythm: Normal rate and regular rhythm.   Pulmonary:      Effort: Pulmonary effort is normal.   Abdominal:      General: Bowel sounds are normal.      Comments: obese   Musculoskeletal:         General: Normal range of motion.      Cervical back: Normal range of motion.   Skin:     General: Skin is warm.   Neurological:      Mental Status: She is alert. Mental status is at baseline.   Psychiatric:         Mood and Affect: Mood normal.             Result Review    Result Review:  I have personally reviewed the results from the time of this admission to 2022 10:45 EDT and agree with these findings:  [x]  Laboratory  []  Microbiology  [x]  Radiology  []  EKG/Telemetry   []  Cardiology/Vascular   []  Pathology  [x]  Old records  []  Other:            Assessment & Plan      Brief Patient Summary:  56-year-old female with symptomatic anemia      ferrous sulfate, 324 mg, Oral, Daily With Breakfast  folic acid, 1 mg, Oral, Daily  levETIRAcetam, 750 mg, Oral, BID  oxybutynin, 5 mg, Oral, 4x Daily  sodium chloride, 10 mL, Intravenous, Q12H  venlafaxine XR, 150 mg, Oral, Daily              Active Hospital Problems:  Active Hospital Problems    Diagnosis    • **Anemia      Last Assessment & Plan:   Formatting of this note might be different from the original.  Check CBC     • Moderate malnutrition (HCC)    • Paroxysmal atrial fibrillation with rapid ventricular response (HCC)    • Dyspnea on exertion    • Hypomagnesemia    • Gastroesophageal reflux disease with hiatal hernia      Formatting of this note might be different from the original.  S/P EGD (08/19/19) = GASTRITIS, BX = NEG  GI - DR. LE>>>IF STILL SXC, REFER TO GEN SURGERY FOR YEFRI    Last Assessment & Plan:   Formatting of this note might be different from the original.  Stable on pantoprazole     • Oligodendroglioma (HCC)    • Essential hypertension    • Depression          Symptomatic anemia causing FALL:  -hgb 6.2 in the ED  -No evidence of bleeding  -s/p Retacrit injection today 7/5/2022 at the [facility]  -S/p CT-guided bone marrow biopsy by IR on 6/2/2022  -s/p 3U PRBCs   -cont home ferrous sulfate  -consult Dr. Workman for further recommendations       Paroxysmal atrial fibrillation with RVR: controlled  -initial EKG showed atrial fibrillation with rapid ventricular rate of 190.  She spontaneously converted to normal sinus rhythm     Hypomagnesemia   -Mg 1.4  -replacement protocol      Oligodendroglioma s/p craniotomy/resection of right frontal lobe mass 5/6/2021  -s/p completed radiation  -chemotherapy had to be stopped due to possible pneumonitis found on bronchoscopy April 2022  -cont home keppra     Depression:  -cont home effexor      Morbid obesity BMI 47  -encourage lifestyle modifications    DVT prophylaxis:  Mechanical DVT prophylaxis orders are present.    CODE STATUS:    Level Of Support Discussed With: Patient  Code Status (Patient has no pulse and is not breathing): CPR (Attempt to Resuscitate)  Medical Interventions (Patient has pulse or is breathing): Full Support      Disposition:  I expect patient to be  discharged defer.    This patient has been examined wearing appropriate Personal Protective Equipment and discussed with nursing. 07/08/22      Electronically signed by Ba Ventura DO, 07/08/22, 10:45 EDT.  [facility]

## 2022-07-08 NOTE — THERAPY EVALUATION
Patient Name: Cindy Cortes  : 1965    MRN: [id number]                              Today's Date: 2022       Admit Date: 2022    Visit Dx:     ICD-10-CM ICD-9-CM   1. Atrial fibrillation with rapid ventricular response (HCC)  I48.91 427.31   2. Severe anemia  D64.9 285.9     Patient Active Problem List   Diagnosis   • Depression   • Essential hypertension   • Primary osteoarthritis of both knees   • Seasonal allergic rhinitis   • Seizure (HCC)   • Oligodendroglioma (HCC)   • Brain tumor (HCC)   • Combined forms of age-related cataract, bilateral   • Post-menopausal   • Presbyopia   • Anemia   • Dyslipidemia   • HTN, goal below 130/80   • Gastroesophageal reflux disease with hiatal hernia   • Morbid obesity with BMI of 60.0-69.9, adult (HCC)   • Prediabetes   • Vitamin D deficiency   • Hyperglycemia   • Type 2 diabetes mellitus with hyperglycemia, without long-term current use of insulin (HCC)   • Oligoastrocytoma of frontal lobe (HCC)   • Port-A-Cath in place   • Chemotherapy-induced thrombocytopenia   • Pneumonia   • Mood swings   • HTN, goal below 130/80   • Morbid obesity with BMI of 60.0-69.9, adult (HCC)   • Oligoastrocytoma of frontal lobe (HCC)   • Type 2 diabetes mellitus with hyperglycemia, without long-term current use of insulin (HCC)   • Vitamin D deficiency   • Sepsis with hypotension (HCC)   • UTI (urinary tract infection)   • Acute respiratory failure with hypoxia (HCC)   • Anemia due to chemotherapy   • Acute kidney injury (HCC)   • Weakness   • Transfusion-dependent anemia   • Paroxysmal atrial fibrillation with rapid ventricular response (HCC)   • Dyspnea on exertion   • Hypomagnesemia     Past Medical History:   Diagnosis Date   • Anemia    • Arthritis    • GERD (gastroesophageal reflux disease)    • Hypertension    • Oligodendroglioma (HCC)    • Seizure (HCC)    • Type 2 diabetes mellitus with hyperglycemia, without long-term current use of insulin (HCC) 2021     Past  Surgical History:   Procedure Laterality Date   • BRONCHOSCOPY N/A 4/6/2022    Procedure: BRONCHOSCOPY with bronchial washing;  Surgeon: Drew Alcantar MD;  Location: [facility] ENDOSCOPY;  Service: Pulmonary;  Laterality: N/A;  pneumonia   • BRONCHOSCOPY N/A 5/31/2022    Procedure: BRONCHOSCOPY AT BEDSIDE with bronchoalveolar lavage right middle lobe;  Surgeon: Den Feldman MD;  Location: [facility] ENDOSCOPY;  Service: Pulmonary;  Laterality: N/A;   • COLONOSCOPY     • CRANIOTOMY FOR TUMOR Right 05/06/2021    Procedure: CRANIOTOMY FOR TUMOR RESECTION WITH STEREOTACTIC;  Surgeon: Jluis Felix MD;  Location: [facility] MAIN OR;  Service: Neurosurgery;  Laterality: Right;      General Information     Row Name 07/08/22 1022          Physical Therapy Time and Intention    Document Type evaluation  -CR     Mode of Treatment physical therapy  -CR     Row Name 07/08/22 1022          General Information    Patient Profile Reviewed yes  -CR     Prior Level of Function independent:;all household mobility  wc for community distance. some assist for adl's  -CR     Barriers to Rehab previous functional deficit  -CR     Row Name 07/08/22 1022          Living Environment    People in Home spouse  -CR     Row Name 07/08/22 1022          Home Main Entrance    Number of Stairs, Main Entrance one  -CR     Row Name 07/08/22 1022          Stairs Within Home, Primary    Number of Stairs, Within Home, Primary none  -CR     Row Name 07/08/22 1022          Cognition    Orientation Status (Cognition) oriented x 3  -CR     Row Name 07/08/22 1022          Safety Issues, Functional Mobility    Impairments Affecting Function (Mobility) endurance/activity tolerance;shortness of breath  -CR           User Key  (r) = Recorded By, (t) = Taken By, (c) = Cosigned By    Initials Name Provider Type    CR Reyes, Carmela, PT Physical Therapist               Mobility     Row Name 07/08/22 1024          Bed Mobility    Comment, (Bed Mobility) up in chair  -CR      Row Name 07/08/22 1024          Bed-Chair Transfer    Bed-Chair Howell (Transfers) contact guard  -CR     Row Name 07/08/22 1024          Sit-Stand Transfer    Sit-Stand Howell (Transfers) contact guard  -CR     [facility] Name 07/08/22 1024          Gait/Stairs (Locomotion)    Howell Level (Gait) contact guard  -CR     Distance in Feet (Gait) 30  -CR     Deviations/Abnormal Patterns (Gait) gait speed decreased  -CR           User Key  (r) = Recorded By, (t) = Taken By, (c) = Cosigned By    Initials Name Provider Type    CR Reyes, Carmela, PT Physical Therapist               Obj/Interventions     Row Name 07/08/22 1024          Range of Motion Comprehensive    General Range of Motion bilateral lower extremity ROM WFL  -CR     [facility] Name 07/08/22 1024          Strength Comprehensive (MMT)    General Manual Muscle Testing (MMT) Assessment no strength deficits identified  -CR     [facility] Name 07/08/22 1024          Balance    Balance Assessment sitting static balance;standing static balance;standing dynamic balance  -CR     Static Sitting Balance independent  -CR     Position, Sitting Balance sitting in chair  -CR     Static Standing Balance standby assist  -CR     Dynamic Standing Balance contact guard  -CR     [facility] Name 07/08/22 1024          Sensory Assessment (Somatosensory)    Sensory Assessment (Somatosensory) sensation intact  -CR           User Key  (r) = Recorded By, (t) = Taken By, (c) = Cosigned By    Initials Name Provider Type    CR Reyes, Carmela, PT Physical Therapist               Goals/Plan     [facility] Name 07/08/22 1031          Gait Training Goal 1 (PT)    Activity/Assistive Device (Gait Training Goal 1, PT) gait (walking locomotion);assistive device use;increase endurance/gait distance;walker, rolling  -CR     Howell Level (Gait Training Goal 1, PT) standby assist  -CR     Distance (Gait Training Goal 1, PT) 50 ft x 2 without desat  -CR     Time Frame (Gait Training Goal 1, PT) 1 week  -CR      Row Name 07/08/22 1031          ROM Goal 1 (PT)    ROM Goal 1 (PT) tolerate pulmo rehab without desat  -CR     Time Frame (ROM Goal 1, PT) 1 week  -CR     Row Name 07/08/22 1031          Therapy Assessment/Plan (PT)    Planned Therapy Interventions (PT) gait training;home exercise program;patient/family education  -CR           User Key  (r) = Recorded By, (t) = Taken By, (c) = Cosigned By    Initials Name Provider Type    CR Reyes, Carmela, PT Physical Therapist               Clinical Impression     Row Name 07/08/22 1024          Pain    Pretreatment Pain Rating 0/10 - no pain  -CR     Posttreatment Pain Rating 0/10 - no pain  -CR     Row Name 07/08/22 1024          Plan of Care Review    Plan of Care Reviewed With patient  -CR     Outcome Evaluation 55 y/o female brought to hospital due to SOA (-) DVT. Found to be in afib and anemia. PMH includes brain tumor s/p resection in 2021, radiation and chemo. Patient lives with spouse and normally able to ambulate in home without a.d. nor supplemental O2. Patient does use wc/ for community distance. At time of eval, patient on room air with spO2 94%. Patient needed min A for donning socks, pants and patient became SOA after standing to pull up pants. Ambulated 30 ft with CGA and patient was very SOA with inc work of breathing by end of short distance. spO2 89% but quicky recovered upon sitting to 92%. Recommend 6MWT to determine supplemental O2 needs at discharge. Patient is safe to return home with spouse. Will benefit from HH or OP PT for endurance training.  -CR     Row Name 07/08/22 1024          Therapy Assessment/Plan (PT)    Patient/Family Therapy Goals Statement (PT) home  -CR     Rehab Potential (PT) good, to achieve stated therapy goals  -CR     Criteria for Skilled Interventions Met (PT) yes;skilled treatment is necessary  -CR     Therapy Frequency (PT) 2 times/wk  -CR     Predicted Duration of Therapy Intervention (PT) dc  -CR           User Key  (r) =  Recorded By, (t) = Taken By, (c) = Cosigned By    Initials Name Provider Type    CR Reyes, Carmela, PT Physical Therapist               Outcome Measures     Row Name 07/08/22 1032 07/08/22 0903       How much help from another person do you currently need...    Turning from your back to your side while in flat bed without using bedrails? 4  -CR 4  -BD    Moving from lying on back to sitting on the side of a flat bed without bedrails? 4  -CR 4  -BD    Moving to and from a bed to a chair (including a wheelchair)? 4  -CR 3  -BD    Standing up from a chair using your arms (e.g., wheelchair, bedside chair)? 4  -CR 4  -BD    Climbing 3-5 steps with a railing? 3  -CR 3  -BD    To walk in hospital room? 3  -CR 3  -BD    AM-PAC 6 Clicks Score (PT) 22  -CR 21  -BD    Highest level of mobility 7 --> Walked 25 feet or more  -CR 6 --> Walked 10 steps or more  -BD          User Key  (r) = Recorded By, (t) = Taken By, (c) = Cosigned By    Initials Name Provider Type    CR Reyes, Carmela, PT Physical Therapist    Abimbola Acuna RN Registered Nurse                             Physical Therapy Education                 Title: PT OT SLP Therapies (In Progress)     Topic: Physical Therapy (In Progress)     Point: Mobility training (Done)     Learning Progress Summary           Patient Acceptance, E, VU by CR at 7/8/2022 1032                   Point: Home exercise program (Not Started)     Learner Progress:  Not documented in this visit.          Point: Body mechanics (Not Started)     Learner Progress:  Not documented in this visit.          Point: Precautions (Done)     Learning Progress Summary           Patient Acceptance, E, VU by CR at 7/8/2022 1032                               User Key     Initials Effective Dates Name Provider Type Discipline     06/16/21 -  Reyes, Carmela, PT Physical Therapist PT              PT Recommendation and Plan  Planned Therapy Interventions (PT): gait training, home exercise program,  patient/family education  Plan of Care Reviewed With: patient  Outcome Evaluation: 55 y/o female brought to hospital due to SOA (-) DVT. Found to be in afib and anemia. PMH includes brain tumor s/p resection in 2021, radiation and chemo. Patient lives with spouse and normally able to ambulate in home without a.d. nor supplemental O2. Patient does use wc/ for community distance. At time of eval, patient on room air with spO2 94%. Patient needed min A for donning socks, pants and patient became SOA after standing to pull up pants. Ambulated 30 ft with CGA and patient was very SOA with inc work of breathing by end of short distance. spO2 89% but quicky recovered upon sitting to 92%. Recommend 6MWT to determine supplemental O2 needs at discharge. Patient is safe to return home with spouse. Will benefit from HH or OP PT for endurance training.     Time Calculation:    PT Charges     Row Name 07/08/22 1032             Time Calculation    Start Time 1000  -CR      Stop Time 1021  -CR      Time Calculation (min) 21 min  -CR      PT Received On 07/08/22  -CR      PT - Next Appointment 07/11/22  -CR      PT Goal Re-Cert Due Date 07/22/22  -CR              Time Calculation- PT    Total Timed Code Minutes- PT 0 minute(s)  -CR            User Key  (r) = Recorded By, (t) = Taken By, (c) = Cosigned By    Initials Name Provider Type    CR Reyes, Carmela, PT Physical Therapist              Therapy Charges for Today     Code Description Service Date Service Provider Modifiers Qty    [id number] HC PT EVAL LOW COMPLEXITY 4 7/8/2022 Reyes, Carmela, PT GP 1          PT G-Codes  AM-PAC 6 Clicks Score (PT): 22    Carmela Reyes, PT  7/8/2022

## 2022-07-08 NOTE — PROGRESS NOTES
Came to do patients home oxygen walk, patient sitting in chair, oxygen saturations 100%.   Patient asked to rest for a little while due to just being up with physical therapy.   Will come back to do walk.    Yola Aguillon, CRT

## 2022-07-08 NOTE — PROGRESS NOTES
Exercise Oximetry    Patient Name:Cindy Cortes   MRN: [id number]   Date: 07/08/22             ROOM AIR BASELINE   SpO2% 97   Heart Rate 97   Blood Pressure      EXERCISE ON ROOM AIR SpO2% EXERCISE ON O2 @  LPM SpO2%   1 MINUTE 97 1 MINUTE    2 MINUTES 98 2 MINUTES    3 MINUTES 100 3 MINUTES    4 MINUTES  4 MINUTES    5 MINUTES  5 MINUTES    6 MINUTES  6 MINUTES               Distance Walked   Distance Walked   Dyspnea (Indigo Scale)   Dyspnea (Indigo Scale)   Fatigue (Indigo Scale)   Fatigue (Indigo Scale)   SpO2% Post Exercise  93 SpO2% Post Exercise   HR Post Exercise  87 HR Post Exercise   Time to Recovery   Time to Recovery     Comments: Patients oxygen saturation 97% while at rest in chair. Patient only walked 3 minutes before having to sit down to take a break due to fatigue. Patients oxygen saturations never dropped below 93% after walk.      Patient does not qualify for home oxygen at this time.    Yola Aguillon, CRT

## 2022-07-08 NOTE — PLAN OF CARE
Goal Outcome Evaluation:              Outcome Evaluation: Patient resting abed, no distress noted. Patient had an Echo on 7/7 and it appears as though the results are pending. Patient is hopeful to be discharged today. She also knows and understands that we are montioring her HGB and awaitng the Echo results. Will continue to monitor

## 2022-07-09 ENCOUNTER — HOME CARE VISIT (OUTPATIENT)
Dept: HOME HEALTH SERVICES | Facility: HOME HEALTHCARE | Age: 57
End: 2022-07-09

## 2022-07-09 NOTE — OUTREACH NOTE
Prep Survey    Flowsheet Row Responses   [facility] facility patient discharged from? [facility]   Is LACE score < 7 ? No   Emergency Room discharge w/ pulse ox? No   Eligibility Readm Mgmt   Discharge diagnosis Anemia   Does the patient have one of the following disease processes/diagnoses(primary or secondary)? Other   Does the patient have Home health ordered? Yes   What is the Home health agency?   [facility]   Is there a DME ordered? No   Prep survey completed? Yes          DIANNE DE LEON - Registered Nurse

## 2022-07-11 ENCOUNTER — LAB (OUTPATIENT)
Dept: LAB | Facility: HOSPITAL | Age: 57
End: 2022-07-11

## 2022-07-11 DIAGNOSIS — C71.9 OLIGODENDROGLIOMA: Primary | ICD-10-CM

## 2022-07-11 LAB
BASOPHILS # BLD AUTO: 0.01 10*3/MM3 (ref 0–0.2)
BASOPHILS NFR BLD AUTO: 0.2 % (ref 0–1.5)
DEPRECATED RDW RBC AUTO: 57.9 FL (ref 37–54)
EOSINOPHIL # BLD AUTO: 0.07 10*3/MM3 (ref 0–0.4)
EOSINOPHIL NFR BLD AUTO: 1.4 % (ref 0.3–6.2)
ERYTHROCYTE [DISTWIDTH] IN BLOOD BY AUTOMATED COUNT: 18.9 % (ref 12.3–15.4)
HCT VFR BLD AUTO: 29.7 % (ref 34–46.6)
HGB BLD-MCNC: 9.5 G/DL (ref 12–15.9)
HOLD SPECIMEN: NORMAL
HOLD SPECIMEN: NORMAL
LYMPHOCYTES # BLD AUTO: 0.45 10*3/MM3 (ref 0.7–3.1)
LYMPHOCYTES NFR BLD AUTO: 8.8 % (ref 19.6–45.3)
MCH RBC QN AUTO: 29.9 PG (ref 26.6–33)
MCHC RBC AUTO-ENTMCNC: 32 G/DL (ref 31.5–35.7)
MCV RBC AUTO: 93.4 FL (ref 79–97)
MONOCYTES # BLD AUTO: 0.3 10*3/MM3 (ref 0.1–0.9)
MONOCYTES NFR BLD AUTO: 5.9 % (ref 5–12)
NEUTROPHILS NFR BLD AUTO: 4.28 10*3/MM3 (ref 1.7–7)
NEUTROPHILS NFR BLD AUTO: 83.7 % (ref 42.7–76)
PLATELET # BLD AUTO: 101 10*3/MM3 (ref 140–450)
PMV BLD AUTO: 10.3 FL (ref 6–12)
RBC # BLD AUTO: 3.18 10*6/MM3 (ref 3.77–5.28)
WBC NRBC COR # BLD: 5.11 10*3/MM3 (ref 3.4–10.8)

## 2022-07-11 PROCEDURE — 85025 COMPLETE CBC W/AUTO DIFF WBC: CPT

## 2022-07-11 PROCEDURE — 36415 COLL VENOUS BLD VENIPUNCTURE: CPT

## 2022-07-12 ENCOUNTER — TELEPHONE (OUTPATIENT)
Dept: ONCOLOGY | Facility: CLINIC | Age: 57
End: 2022-07-12

## 2022-07-12 DIAGNOSIS — C71.9 OLIGODENDROGLIOMA: Primary | ICD-10-CM

## 2022-07-12 LAB
MYCOBACTERIUM SPEC CULT: NORMAL
NIGHT BLUE STAIN TISS: NORMAL

## 2022-07-12 NOTE — TELEPHONE ENCOUNTER
Pt returned my call to schedule her weekly CBC and NP follow up apt while Dr. Workman is out of the office. All apts were made and orders placed. Pt v/u and had no other questions.

## 2022-07-14 ENCOUNTER — READMISSION MANAGEMENT (OUTPATIENT)
Dept: CALL CENTER | Facility: HOSPITAL | Age: 57
End: 2022-07-14

## 2022-07-14 ENCOUNTER — HOME CARE VISIT (OUTPATIENT)
Dept: HOME HEALTH SERVICES | Facility: HOME HEALTHCARE | Age: 57
End: 2022-07-14

## 2022-07-14 NOTE — OUTREACH NOTE
Medical Week 1 Survey    Flowsheet Row Responses   [facility] facility patient discharged from? [facility]   Does the patient have one of the following disease processes/diagnoses(primary or secondary)? Other   Week 1 attempt successful? No   Unsuccessful attempts Attempt 1          DELILAH Webber Registered Nurse

## 2022-07-18 ENCOUNTER — LAB (OUTPATIENT)
Dept: LAB | Facility: HOSPITAL | Age: 57
End: 2022-07-18

## 2022-07-18 ENCOUNTER — HOSPITAL ENCOUNTER (OUTPATIENT)
Dept: ONCOLOGY | Facility: HOSPITAL | Age: 57
Discharge: HOME | End: 2022-07-18

## 2022-07-18 DIAGNOSIS — C71.9 OLIGODENDROGLIOMA: ICD-10-CM

## 2022-07-18 DIAGNOSIS — D64.9 TRANSFUSION-DEPENDENT ANEMIA: Primary | ICD-10-CM

## 2022-07-18 LAB
BASOPHILS # BLD AUTO: 0 10*3/MM3 (ref 0–0.2)
BASOPHILS NFR BLD AUTO: 0 % (ref 0–1.5)
DEPRECATED RDW RBC AUTO: 65.5 FL (ref 37–54)
EOSINOPHIL # BLD AUTO: 0.08 10*3/MM3 (ref 0–0.4)
EOSINOPHIL NFR BLD AUTO: 2 % (ref 0.3–6.2)
ERYTHROCYTE [DISTWIDTH] IN BLOOD BY AUTOMATED COUNT: 20.7 % (ref 12.3–15.4)
HCT VFR BLD AUTO: 27.8 % (ref 34–46.6)
HGB BLD-MCNC: 8.9 G/DL (ref 12–15.9)
LYMPHOCYTES # BLD AUTO: 0.53 10*3/MM3 (ref 0.7–3.1)
LYMPHOCYTES NFR BLD AUTO: 13.4 % (ref 19.6–45.3)
MCH RBC QN AUTO: 30.8 PG (ref 26.6–33)
MCHC RBC AUTO-ENTMCNC: 32 G/DL (ref 31.5–35.7)
MCV RBC AUTO: 96.2 FL (ref 79–97)
MONOCYTES # BLD AUTO: 0.31 10*3/MM3 (ref 0.1–0.9)
MONOCYTES NFR BLD AUTO: 7.8 % (ref 5–12)
NEUTROPHILS NFR BLD AUTO: 3.04 10*3/MM3 (ref 1.7–7)
NEUTROPHILS NFR BLD AUTO: 76.8 % (ref 42.7–76)
PLATELET # BLD AUTO: 129 10*3/MM3 (ref 140–450)
PMV BLD AUTO: 9.2 FL (ref 6–12)
RBC # BLD AUTO: 2.89 10*6/MM3 (ref 3.77–5.28)
WBC NRBC COR # BLD: 3.96 10*3/MM3 (ref 3.4–10.8)

## 2022-07-18 PROCEDURE — 85025 COMPLETE CBC W/AUTO DIFF WBC: CPT

## 2022-07-18 PROCEDURE — 25010000002 EPOETIN ALFA-EPBX 40000 UNIT/ML SOLUTION: Performed by: INTERNAL MEDICINE

## 2022-07-18 PROCEDURE — 96372 THER/PROPH/DIAG INJ SC/IM: CPT

## 2022-07-18 PROCEDURE — 36415 COLL VENOUS BLD VENIPUNCTURE: CPT

## 2022-07-18 RX ADMIN — EPOETIN ALFA-EPBX 40000 UNITS: 40000 INJECTION, SOLUTION INTRAVENOUS; SUBCUTANEOUS at 10:02

## 2022-07-25 ENCOUNTER — LAB (OUTPATIENT)
Dept: LAB | Facility: HOSPITAL | Age: 57
End: 2022-07-25

## 2022-07-25 ENCOUNTER — OFFICE VISIT (OUTPATIENT)
Dept: ONCOLOGY | Facility: CLINIC | Age: 57
End: 2022-07-25

## 2022-07-25 ENCOUNTER — HOSPITAL ENCOUNTER (OUTPATIENT)
Dept: ONCOLOGY | Facility: HOSPITAL | Age: 57
Discharge: HOME | End: 2022-07-25

## 2022-07-25 VITALS
BODY MASS INDEX: 47.66 KG/M2 | OXYGEN SATURATION: 95 % | HEIGHT: 62 IN | SYSTOLIC BLOOD PRESSURE: 111 MMHG | TEMPERATURE: 97 F | WEIGHT: 259 LBS | HEART RATE: 61 BPM | DIASTOLIC BLOOD PRESSURE: 69 MMHG

## 2022-07-25 DIAGNOSIS — C71.9 OLIGODENDROGLIOMA: Primary | ICD-10-CM

## 2022-07-25 DIAGNOSIS — D64.9 ANEMIA, UNSPECIFIED TYPE: Primary | ICD-10-CM

## 2022-07-25 DIAGNOSIS — D64.9 TRANSFUSION-DEPENDENT ANEMIA: Primary | ICD-10-CM

## 2022-07-25 LAB
ALBUMIN SERPL-MCNC: 3.4 G/DL (ref 3.5–5.2)
ALBUMIN/GLOB SERPL: 1.2 G/DL
ALP SERPL-CCNC: 115 U/L (ref 39–117)
ALT SERPL W P-5'-P-CCNC: 11 U/L (ref 1–33)
ANION GAP SERPL CALCULATED.3IONS-SCNC: 12 MMOL/L (ref 5–15)
AST SERPL-CCNC: 15 U/L (ref 1–32)
BASOPHILS # BLD AUTO: 0.01 10*3/MM3 (ref 0–0.2)
BASOPHILS NFR BLD AUTO: 0.2 % (ref 0–1.5)
BILIRUB SERPL-MCNC: 1.1 MG/DL (ref 0–1.2)
BUN SERPL-MCNC: 12 MG/DL (ref 6–20)
BUN/CREAT SERPL: 15 (ref 7–25)
CALCIUM SPEC-SCNC: 8.7 MG/DL (ref 8.6–10.5)
CHLORIDE SERPL-SCNC: 106 MMOL/L (ref 98–107)
CO2 SERPL-SCNC: 24 MMOL/L (ref 22–29)
CREAT SERPL-MCNC: 0.8 MG/DL (ref 0.57–1)
DEPRECATED RDW RBC AUTO: 70.9 FL (ref 37–54)
EGFRCR SERPLBLD CKD-EPI 2021: 87.7 ML/MIN/1.73
EOSINOPHIL # BLD AUTO: 0.03 10*3/MM3 (ref 0–0.4)
EOSINOPHIL NFR BLD AUTO: 0.6 % (ref 0.3–6.2)
ERYTHROCYTE [DISTWIDTH] IN BLOOD BY AUTOMATED COUNT: 21.6 % (ref 12.3–15.4)
GLOBULIN UR ELPH-MCNC: 2.8 GM/DL
GLUCOSE SERPL-MCNC: 122 MG/DL (ref 65–99)
HCT VFR BLD AUTO: 27.3 % (ref 34–46.6)
HGB BLD-MCNC: 8.7 G/DL (ref 12–15.9)
HOLD SPECIMEN: NORMAL
LYMPHOCYTES # BLD AUTO: 0.42 10*3/MM3 (ref 0.7–3.1)
LYMPHOCYTES NFR BLD AUTO: 9.1 % (ref 19.6–45.3)
MCH RBC QN AUTO: 31 PG (ref 26.6–33)
MCHC RBC AUTO-ENTMCNC: 31.9 G/DL (ref 31.5–35.7)
MCV RBC AUTO: 97.2 FL (ref 79–97)
MONOCYTES # BLD AUTO: 0.28 10*3/MM3 (ref 0.1–0.9)
MONOCYTES NFR BLD AUTO: 6 % (ref 5–12)
NEUTROPHILS NFR BLD AUTO: 3.89 10*3/MM3 (ref 1.7–7)
NEUTROPHILS NFR BLD AUTO: 84.1 % (ref 42.7–76)
PLATELET # BLD AUTO: 110 10*3/MM3 (ref 140–450)
PMV BLD AUTO: 9.9 FL (ref 6–12)
POTASSIUM SERPL-SCNC: 3.8 MMOL/L (ref 3.5–5.2)
PROT SERPL-MCNC: 6.2 G/DL (ref 6–8.5)
RBC # BLD AUTO: 2.81 10*6/MM3 (ref 3.77–5.28)
SODIUM SERPL-SCNC: 142 MMOL/L (ref 136–145)
WBC NRBC COR # BLD: 4.63 10*3/MM3 (ref 3.4–10.8)

## 2022-07-25 PROCEDURE — 25010000002 EPOETIN ALFA-EPBX 40000 UNIT/ML SOLUTION: Performed by: INTERNAL MEDICINE

## 2022-07-25 PROCEDURE — 96372 THER/PROPH/DIAG INJ SC/IM: CPT

## 2022-07-25 PROCEDURE — 36415 COLL VENOUS BLD VENIPUNCTURE: CPT | Performed by: NURSE PRACTITIONER

## 2022-07-25 PROCEDURE — 99214 OFFICE O/P EST MOD 30 MIN: CPT | Performed by: NURSE PRACTITIONER

## 2022-07-25 PROCEDURE — 85025 COMPLETE CBC W/AUTO DIFF WBC: CPT

## 2022-07-25 PROCEDURE — 80053 COMPREHEN METABOLIC PANEL: CPT | Performed by: NURSE PRACTITIONER

## 2022-07-25 RX ADMIN — EPOETIN ALFA-EPBX 40000 UNITS: 40000 INJECTION, SOLUTION INTRAVENOUS; SUBCUTANEOUS at 15:37

## 2022-07-25 NOTE — PROGRESS NOTES
After N.P. visit completed I gave patient Procrit 40,000units SQ injection slowly in right arm and patient tolerated injection well. Patient and her spouse aware that her next Procrit injection will be on 08/01/2022 and they verbalized understanding.

## 2022-07-25 NOTE — PROGRESS NOTES
HEMATOLOGY ONCOLOGY OUTPATIENT FOLLOW UP      Patient name: Cindy Cortes  : 1965  MRN: [id number]  Primary Care Physician: Annamarie Monroy APRN  Referring Physician: Annamarie Monroy APRN  Reason For Consult:     Chief Complaint   Patient presents with   • Follow-up     Oligodendroglioma (HCC)     HPI:   History of Present Illness:  Cindy Cortes is 56 y.o. female who presented to our office on 06/10/21 for consultation regarding Oligodendroglioma    Patient is a 56 y.o. female who was referred to us for treatment options regarding recent diagnosis of grade 2 oligodendroglioma.  This was IDH [id number] mutated, 1P 19 Q codeleted.  Patient initially presented with seizures and a CT head was obtained that showed vasogenic edema and a right frontal lobe mass MRI was obtained after this.  2021 -MRI of the brain shows 2.1 x 4.4 x 3.3 cm right frontal lobe mass with eccentric cystic or necrotic component with surrounding vasogenic edema and a focal 3 mm right to left midline shift.  Patient was started on Keppra, steroids plan was made for elective craniotomy and surgical resection.    2021 craniotomy of the right frontal lobe, microscopic resection of tumor mass.  Pathology results oligodendroglioma WHO grade 2, IDH1 R132H mutation by immunohistochemistry, 1p19q codeletion by FISH.    2021 -status post resection of the right frontal lobe mass.  Expected postop blood product. resolution of midline shift.    2021 - Started radiation adjuvantly.    2021 - last day of radiation.    2021 - C1 D8 with vincristin started procarbazine  10/7/2021 - C1 D29 Vincristine    10/21/2021 - C2D1 PCV  10/28/2021 -cycle 2-day 8 with vincristine  Started procarbazine  Held procarbazine for a few days with nausea  2021 -patient in the hospital with significant pancytopenia needing blood transfusion.    2021 -vincristine cycle 2-day 29.  This has been delayed with ongoing  cytopenias and hospitalization.    12/27/2021 - C3 D1 12/30/2021 1/6/2022 - C3D8 vincristine.    2/21/2022 - C4 D1 decrease dose of Lomustine.    Continued myelosuppression afterwards  4/1/2022 -CT imaging with interval development of relatively diffuse groundglass opacity patchy areas of sparing.  Bronchoscopy negative for any concerning findings  Permanently discontinued chemotherapy with possible pneumonitis, prolonged myelosuppression.    5/16/2022 - WBC 5.52 hb 8.2 hct 27.8, plt 128    6/14/2022 -CBC with hemoglobin 6.4, hematocrit 20.2, platelet 94    7/5/2022 - [facility] admission for Afib w/ RVR and Symptomatic Anemia. Received 3 units pRBC.        Subjective:  Patient has no new complaints other than cough that feels like she should bring mucous up, but isn't.  Still with SOA and is using nasal canula O2 most of the time.    The following portions of the patient's history were reviewed and updated as appropriate: allergies, current medications, past family history, past medical history, past social history, past surgical history and problem list.    Past Medical History:   Diagnosis Date   • Anemia    • Arthritis    • GERD (gastroesophageal reflux disease)    • Hypertension    • Oligodendroglioma (HCC)    • Seizure (HCC)    • Type 2 diabetes mellitus with hyperglycemia, without long-term current use of insulin (HCC) 05/12/2021       Past Surgical History:   Procedure Laterality Date   • BRONCHOSCOPY N/A 4/6/2022    Procedure: BRONCHOSCOPY with bronchial washing;  Surgeon: Drew Alcantar MD;  Location: [facility] ENDOSCOPY;  Service: Pulmonary;  Laterality: N/A;  pneumonia   • BRONCHOSCOPY N/A 5/31/2022    Procedure: BRONCHOSCOPY AT BEDSIDE with bronchoalveolar lavage right middle lobe;  Surgeon: Den Feldman MD;  Location: [facility] ENDOSCOPY;  Service: Pulmonary;  Laterality: N/A;   • COLONOSCOPY     • CRANIOTOMY FOR TUMOR Right 05/06/2021    Procedure: CRANIOTOMY FOR TUMOR RESECTION WITH STEREOTACTIC;  Surgeon:  Jluis Felix MD;  Location: [facility];  Service: Neurosurgery;  Laterality: Right;         Current Outpatient Medications:   •  albuterol sulfate  (90 Base) MCG/ACT inhaler, Inhale 2 puffs Every 4 (Four) Hours As Needed for Wheezing., Disp: 18 g, Rfl: 2  •  Budeson-Glycopyrrol-Formoterol (Breztri Aerosphere) 160-9-4.8 MCG/ACT aerosol inhaler, Inhale 2 puffs 2 (Two) Times a Day. Indications: Lot#[id number] Exp: 8/30/23, Disp: 1 each, Rfl: 0  •  diphenhydrAMINE (BENADRYL) 25 MG tablet, Take 25 mg by mouth Daily As Needed for Allergies., Disp: , Rfl:   •  ferrous sulfate 325 (65 FE) MG tablet, Take 1 tablet by mouth Daily With Breakfast., Disp: , Rfl:   •  fluticasone (FLOVENT HFA) 44 MCG/ACT inhaler, Inhale 2 puffs Daily As Needed., Disp: , Rfl:   •  folic acid (FOLVITE) 1 MG tablet, Take 1 mg by mouth Daily., Disp: , Rfl:   •  levETIRAcetam (KEPPRA) 750 MG tablet, Take 1 tablet by mouth 2 (Two) Times a Day., Disp: 60 tablet, Rfl: 2  •  ondansetron (Zofran) 8 MG tablet, Take 1 tablet by mouth Every 8 (Eight) Hours As Needed for Nausea or Vomiting., Disp: 30 tablet, Rfl: 3  •  oxybutynin (DITROPAN) 5 MG tablet, Take 15 mg by mouth Daily., Disp: , Rfl:   •  venlafaxine XR (EFFEXOR-XR) 150 MG 24 hr capsule, Take 150 mg by mouth Daily., Disp: , Rfl: 3  No current facility-administered medications for this visit.    Current outpatient and discharge medications have been reconciled for the patient.  Reviewed by: BJ Davis    No Known Allergies    Family History   Problem Relation Age of Onset   • Kidney disease Mother    • Prostate cancer Brother    • Breast cancer Maternal Aunt    • Colon cancer Maternal Aunt    • Breast cancer Niece    • Breast cancer Cousin        Cancer-related family history includes Breast cancer in her cousin, maternal aunt, and niece; Colon cancer in her maternal aunt; Prostate cancer in her brother.    Social History     Tobacco Use   • Smoking status: Never Smoker  "  • Smokeless tobacco: Never Used   Vaping Use   • Vaping Use: Never used   Substance Use Topics   • Alcohol use: Not Currently     Alcohol/week: 1.0 standard drink     Types: 1 Cans of beer per week     Comment: socially   • Drug use: Never     Social History     Social History Narrative   • Not on file      Objective:    Vitals:    07/25/22 1451   BP: 111/69   Pulse: 61   Temp: 97 °F (36.1 °C)   SpO2: 95%   Weight: 117 kg (259 lb)  Comment: last visit   Height: 157.5 cm (62\")   PainSc: 0-No pain     Body mass index is 47.37 kg/m².  ECOG  2 - Ambulatory and capable of all selfcare but unable to carry out any work activities. Up and about more than 50% of waking hours      Physical Exam:     Physical Exam  Constitutional:       Appearance: Normal appearance. She is obese.      Comments: In wheelchair   HENT:      Head: Normocephalic and atraumatic.   Eyes:      Pupils: Pupils are equal, round, and reactive to light.   Cardiovascular:      Rate and Rhythm: Normal rate and regular rhythm.      Pulses: Normal pulses.      Heart sounds: Normal heart sounds. No murmur heard.  Pulmonary:      Effort: Pulmonary effort is normal. No respiratory distress.      Breath sounds: No wheezing or rhonchi.      Comments: Diminished bases  Abdominal:      General: There is no distension.      Palpations: Abdomen is soft. There is no mass.      Tenderness: There is no abdominal tenderness.      Hernia: No hernia is present.   Musculoskeletal:         General: No tenderness. Normal range of motion.      Cervical back: Normal range of motion and neck supple.   Skin:     General: Skin is warm.   Neurological:      General: No focal deficit present.      Mental Status: She is alert.   Psychiatric:         Mood and Affect: Mood normal.           Lab Results - Last 18 Months   Lab Units 07/25/22  1437 07/18/22  0945 07/11/22  1131   WBC 10*3/mm3 4.63 3.96 5.11   HEMOGLOBIN g/dL 8.7* 8.9* 9.5*   HEMATOCRIT % 27.3* 27.8* 29.7*   PLATELETS " 10*3/mm3 110* 129* 101*   MCV fL 97.2* 96.2 93.4     Lab Results - Last 18 Months   Lab Units 07/08/22  0220 07/07/22  0020 07/06/22  0030 07/05/22  1630 06/04/22  0610 06/03/22  0351   SODIUM mmol/L 135* 135* 139 138 137 133*   POTASSIUM mmol/L 4.4 4.2 3.4* 3.5 3.7 4.1   CHLORIDE mmol/L 102 103 103 103 105 102   CO2 mmol/L 19.0* 21.0* 23.0 19.0* 22.0 20.0*   BUN mg/dL 12 12 12 13 20 23*   CREATININE mg/dL 0.62 0.62 0.69 0.88 0.56* 0.73   CALCIUM mg/dL 8.4* 8.2* 8.3* 8.8 8.7 9.0   BILIRUBIN mg/dL  --   --   --  1.5* 1.0 1.0   ALK PHOS U/L  --   --   --  115 86 94   ALT (SGPT) U/L  --   --   --  8 16 16   AST (SGOT) U/L  --   --   --  10 13 13   GLUCOSE mg/dL 100* 100* 106* 152* 109* 136*       Lab Results   Component Value Date    GLUCOSE 100 (H) 07/08/2022    BUN 12 07/08/2022    CREATININE 0.62 07/08/2022    EGFRIFNONA 77 01/27/2022    BCR 19.4 07/08/2022    K 4.4 07/08/2022    CO2 19.0 (L) 07/08/2022    CALCIUM 8.4 (L) 07/08/2022    PROTENTOTREF 6.4 12/15/2021    ALBUMIN 3.50 07/05/2022    LABIL2 1.1 12/15/2021    AST 10 07/05/2022    ALT 8 07/05/2022       Lab Results - Last 18 Months   Lab Units 07/05/22  1630 08/27/21  0738 05/04/21  0922   INR  1.19* 0.96 0.96   APTT seconds 26.8* 25.6 20.7*       Lab Results   Component Value Date    IRON 190 (H) 06/14/2022    TIBC 249 (L) 06/14/2022    FERRITIN 994.60 (H) 06/14/2022       Lab Results   Component Value Date    PSKPZXXU89 553 06/01/2022       Lab Results   Component Value Date    PTT 26.8 (L) 07/05/2022    INR 1.19 (H) 07/05/2022     MRI Brain With & Without Contrast    Result Date: 7/3/2022  1.Postsurgical changes within right frontal lobe. Surrounding FLAIR hyperintensity also involving left cerebral hemisphere overall appears slightly increased and could be secondary to evolving post-treatment changes or tumor progression. 2.No acute intracranial process identified. 3.Mucosal disease within maxillary sinuses.  Electronically Signed By-Naveed Hubbard MD  On:7/3/2022 2:48 PM This report was finalized on [id number] by  Naveed Hubbard MD.    XR Chest 1 View    Result Date: 7/5/2022  1. Low lung volumes without acute cardiopulmonary abnormality. 2. Mild cardiomegaly, likely accentuated by low lung volumes and AP portable technique.  Electronically Signed By-Tayo Brumfield MD On:7/5/2022 4:58 PM This report was finalized on [id number] by  Tayo Brumfield MD.    XR Chest 1 View    Result Date: 6/1/2022  No acute cardiopulmonary abnormality.  Electronically Signed By-Jon Lynch MD On:6/1/2022 7:11 PM This report was finalized on [id number] by  Jon Lynch MD.    CT Angiogram Chest Pulmonary Embolism    Result Date: 5/31/2022  1. No evidence of pulmonary embolism. 2. No evidence of thoracic aortic aneurysm or dissection. 3. Very subtle groundglass attenuation seen throughout the lungs bilaterally could potentially be inflammatory or infectious. Clinical correlation is recommended. 4. Cholelithiasis. 5. Mild splenomegaly. Electronically signed by:  Robel Aj D.O.  5/30/2022 10:11 PM    CT Abdomen Pelvis Stone Protocol    Result Date: 6/2/2022  1. Small 2 mm nonobstructing stone within the superior left kidney. No evidence of obstructive uropathy. 2. Nonspecific hepatosplenomegaly. Correlate clinically for risk factors of chronic liver disease. 3. Patchy bibasilar groundglass opacities with a bronchovascular distribution. This can be seen with bronchiolitis or mild bronchopneumonia. 4. Cholelithiasis with gallstones clustered within the fundus of the gallbladder likely related to superimposed gallbladder adenomyomatosis.    Electronically Signed By-Tayo Brumfield MD On:6/2/2022 2:09 PM This report was finalized on [id number] by  Tayo Brumfield MD.    CT Bone marrow biopsy and aspiration    Result Date: 6/2/2022  CT-guided bone marrow core biopsy and aspirate.  Electronically Signed By-Vick Draper MD On:6/2/2022 3:41 PM This report was  "finalized on [id number] by  Vick Draper MD.    Pathology results  Outside Report, Addendum   Integrated Diagnosis: Oligodendroglioma WHO Grade II  IDH1 R132H mutation by Immunohistochemistry; 1p19q co-deletion by FISH  See attached report from [facility]   Addendum electronically signed by Cecilia Chaudhary on 5/28/2021 at 0824   Final Diagnosis   Specimen #1 (\"Brain tumor right frontal lobe,\" biopsy):    Diffuse glioma (WHO grade II)    See comment     Specimen #2 (\"Brain tumor right frontal lobe,\" biopsy):    Diffuse glioma (WHO grade II)    See comment     Specimen #3 (Brain tumor right frontal lobe, resection):    Diffuse glioma, IDH-mutant (WHO grade II)    See attached report from [facility]         Assessment & Plan     Patient is a 56-year-old female with oligodendroglioma grade 2 status post gross total resection    Oligodendroglioma  Previously I had an extensive discussion with the patient about treatment options, prognosis.  We had an extensive discussion at that time with several options.  This is summarized here below    I previously discussed with her that there are findings from RTOG 9802 clinical trial which showed survival advantage with radiation followed by chemotherapy after surgical resection of grade 2 oligodendrogliomas.  Chemotherapy with the PCV regimen in this trial conferred a survival advantage over radiotherapy alone with a median overall survival of 13.3 versus 7.8 years.  In subset analysis benefit was more significant  Histologic oligodendroglioma is as compared to other low-grade gliomas.  Molecular analysis post hoc analysis also showed survival advantage in molecularly confirmed IDH mutant, 1p19q codeleted oligodendrogliomas.    PCV can be a toxic regimen however survival advantage is only been shown in randomized control trial using this particular regimen.  The other option would be temozolomide which has " advantages over PCV with ease of administration, better tolerance and efficacy in combination with radiation therapy and other types of CNS tumors and gliomas.    The other option I had discussed with her was clinical trial with a CODEL study which is comparing radiation alone versus radiation with Temodar versus radiation with PCV in this patient population.  Patient however is not very interested in enrolling in a clinical trial.  She is wanting to pursue the most aggressive treatment option for her to reduce the chances of recurrence of her tumor.    Lastly also discussed with her the option of surveillance and observation with serial MRIs however also discussed that in above studies the progression free survival is around 50% for 5 years.     Based on her above discussion patient decided to pursue aggressive treatment with radiation followed by chemotherapy.  We would use the RTOG 9802 regimen with radiation followed by chemotherapy with PCV.  We have evidence that vincristine does not cross the blood-brain barrier significantly and hence if there is toxicity we can choose to omit the drug.  Case was discussed with Dr. Cruz and started sequential radiation and chemo    Patient completed radiation without complication.    Based on above patient decided to proceed with adjuvant PCV.   Now patient has had clinically significant pancytopenia needing blood transfusion hospital admission.  For cycle 3 dose reduced procarbazine and lomustine by 20%.  For C4, decrease Lomustine to 110.  Complete cycle  with prolonged myelosuppression, shortness of breath will stop chemotherapy going forward.  I discussed with her for the risks of continuing chemotherapy versus benefits which at this point she is worsening in terms of myelosuppression, likely pneumonitis with chemotherapy.  Now continued surveillance with no evidence of disease.    Continue to monitor MRI of the brain. Last scan 7/3/2022.  Postsurgical changes with  surrounding FLAIR hyperintensity also involving left cerebral hemisphere overall appears slightly increased and can be secondary to evolving posttreatment changes or tumor progression.    Shortness of breath  CT of the chest with pneumonitis.  Could also be related to anemia.  Given a short course of steroids, continue albuterol, steroid inhaler per pulmonology  Repeat CT with only mild changes.  Her shortness of breath is mostly related to anemia.  Stable.  O2 dependent.     Anemia  There could be possibility of hemolysis nonimmune versus myelosuppression  There have been case reports with her chemotherapy regimen.  She was given 5-day course of prednisone.  Repeat haptoglobin improved  Now with continued drop in hemoglobin patient had a bone marrow biopsy which showed some erythrocyte dyspoiesis.  She could have an underlying myelodysplastic syndrome which got worse with chemotherapy.  Neotype has been ordered this is pending.  We will check erythropoietin and potentially start darbepoetin since patient has been transfusion dependent.  Consider [facility] bone marrow transplant referral once all results are back.  Hgb currently stable on Procrit at 8.7.  Injection due today. Continue weekly CBC and Procrit if indicated.    Follow-up in a month with Dr. Workman with labs and as needed.   Above discussed with her brother who is also present at the visit.             I have reviewed and confirmed the accuracy of the patient's history: Chief complaint, HPI, ROS, Subjective and Past Family Social History as entered by the MA/WILLIAMSN/RN.     Georgette Lucero, BJ 07/25/22

## 2022-07-26 ENCOUNTER — READMISSION MANAGEMENT (OUTPATIENT)
Dept: CALL CENTER | Facility: HOSPITAL | Age: 57
End: 2022-07-26

## 2022-07-26 NOTE — OUTREACH NOTE
Medical Week 3 Survey    Flowsheet Row Responses   [facility] facility patient discharged from? [facility]   Does the patient have one of the following disease processes/diagnoses(primary or secondary)? Other   Week 3 attempt successful? No   Unsuccessful attempts Attempt 1          SOLANGE WALLIS - Licensed Nurse

## 2022-07-28 ENCOUNTER — READMISSION MANAGEMENT (OUTPATIENT)
Dept: CALL CENTER | Facility: HOSPITAL | Age: 57
End: 2022-07-28

## 2022-07-28 NOTE — OUTREACH NOTE
Medical Week 3 Survey    Flowsheet Row Responses   [facility] [place] [facility] discharged from? [facility]   Does the patient have one of the following disease processes/diagnoses(primary or secondary)? Other   Week 3 attempt successful? No   Unsuccessful attempts Attempt 2   Revoke Decline to participate          MARVA DE LEON - Registered Nurse

## 2022-08-01 ENCOUNTER — LAB (OUTPATIENT)
Dept: LAB | Facility: HOSPITAL | Age: 57
End: 2022-08-01

## 2022-08-01 ENCOUNTER — HOSPITAL ENCOUNTER (OUTPATIENT)
Dept: ONCOLOGY | Facility: HOSPITAL | Age: 57
Discharge: HOME | End: 2022-08-01

## 2022-08-01 VITALS
DIASTOLIC BLOOD PRESSURE: 60 MMHG | BODY MASS INDEX: 47.66 KG/M2 | WEIGHT: 259 LBS | SYSTOLIC BLOOD PRESSURE: 110 MMHG | HEART RATE: 75 BPM | HEIGHT: 62 IN | RESPIRATION RATE: 18 BRPM

## 2022-08-01 DIAGNOSIS — D64.9 TRANSFUSION-DEPENDENT ANEMIA: Primary | ICD-10-CM

## 2022-08-01 DIAGNOSIS — C71.9 OLIGODENDROGLIOMA: ICD-10-CM

## 2022-08-01 LAB
BASOPHILS # BLD AUTO: 0.01 10*3/MM3 (ref 0–0.2)
BASOPHILS NFR BLD AUTO: 0.2 % (ref 0–1.5)
DEPRECATED RDW RBC AUTO: 69.1 FL (ref 37–54)
EOSINOPHIL # BLD AUTO: 0.05 10*3/MM3 (ref 0–0.4)
EOSINOPHIL NFR BLD AUTO: 1 % (ref 0.3–6.2)
ERYTHROCYTE [DISTWIDTH] IN BLOOD BY AUTOMATED COUNT: 21.1 % (ref 12.3–15.4)
HCT VFR BLD AUTO: 27.7 % (ref 34–46.6)
HGB BLD-MCNC: 8.8 G/DL (ref 12–15.9)
LYMPHOCYTES # BLD AUTO: 0.49 10*3/MM3 (ref 0.7–3.1)
LYMPHOCYTES NFR BLD AUTO: 10.2 % (ref 19.6–45.3)
MCH RBC QN AUTO: 30.6 PG (ref 26.6–33)
MCHC RBC AUTO-ENTMCNC: 31.8 G/DL (ref 31.5–35.7)
MCV RBC AUTO: 96.2 FL (ref 79–97)
MONOCYTES # BLD AUTO: 0.33 10*3/MM3 (ref 0.1–0.9)
MONOCYTES NFR BLD AUTO: 6.8 % (ref 5–12)
NEUTROPHILS NFR BLD AUTO: 3.94 10*3/MM3 (ref 1.7–7)
NEUTROPHILS NFR BLD AUTO: 81.8 % (ref 42.7–76)
PLATELET # BLD AUTO: 123 10*3/MM3 (ref 140–450)
PMV BLD AUTO: 9.9 FL (ref 6–12)
RBC # BLD AUTO: 2.88 10*6/MM3 (ref 3.77–5.28)
WBC NRBC COR # BLD: 4.82 10*3/MM3 (ref 3.4–10.8)

## 2022-08-01 PROCEDURE — 25010000002 EPOETIN ALFA-EPBX 40000 UNIT/ML SOLUTION: Performed by: INTERNAL MEDICINE

## 2022-08-01 PROCEDURE — 96372 THER/PROPH/DIAG INJ SC/IM: CPT

## 2022-08-01 PROCEDURE — 85025 COMPLETE CBC W/AUTO DIFF WBC: CPT

## 2022-08-01 PROCEDURE — 36415 COLL VENOUS BLD VENIPUNCTURE: CPT

## 2022-08-01 RX ADMIN — EPOETIN ALFA-EPBX 40000 UNITS: 40000 INJECTION, SOLUTION INTRAVENOUS; SUBCUTANEOUS at 10:21

## 2022-08-01 NOTE — PROGRESS NOTES
Patient here for CBC/possible Retacrit injection. Per Retacrit plan patients hgb 8.8 and patient received 76060 units Retacrit SQ slowly in right arm and patient tolerated Retacrit injection well. Patient sent to [facility]  for future appointments.

## 2022-08-08 ENCOUNTER — LAB (OUTPATIENT)
Dept: LAB | Facility: HOSPITAL | Age: 57
End: 2022-08-08

## 2022-08-08 ENCOUNTER — HOSPITAL ENCOUNTER (OUTPATIENT)
Dept: ONCOLOGY | Facility: HOSPITAL | Age: 57
Discharge: HOME | End: 2022-08-08

## 2022-08-08 VITALS
TEMPERATURE: 96.6 F | HEART RATE: 84 BPM | RESPIRATION RATE: 18 BRPM | HEIGHT: 62 IN | WEIGHT: 259 LBS | BODY MASS INDEX: 47.66 KG/M2 | SYSTOLIC BLOOD PRESSURE: 126 MMHG | DIASTOLIC BLOOD PRESSURE: 68 MMHG

## 2022-08-08 DIAGNOSIS — C71.9 OLIGODENDROGLIOMA: ICD-10-CM

## 2022-08-08 DIAGNOSIS — D64.9 TRANSFUSION-DEPENDENT ANEMIA: Primary | ICD-10-CM

## 2022-08-08 LAB
BASOPHILS # BLD AUTO: 0.01 10*3/MM3 (ref 0–0.2)
BASOPHILS NFR BLD AUTO: 0.2 % (ref 0–1.5)
DEPRECATED RDW RBC AUTO: 73.1 FL (ref 37–54)
EOSINOPHIL # BLD AUTO: 0.08 10*3/MM3 (ref 0–0.4)
EOSINOPHIL NFR BLD AUTO: 1.4 % (ref 0.3–6.2)
ERYTHROCYTE [DISTWIDTH] IN BLOOD BY AUTOMATED COUNT: 22 % (ref 12.3–15.4)
HCT VFR BLD AUTO: 27.2 % (ref 34–46.6)
HGB BLD-MCNC: 8.7 G/DL (ref 12–15.9)
LYMPHOCYTES # BLD AUTO: 0.42 10*3/MM3 (ref 0.7–3.1)
LYMPHOCYTES NFR BLD AUTO: 7.3 % (ref 19.6–45.3)
MCH RBC QN AUTO: 31.4 PG (ref 26.6–33)
MCHC RBC AUTO-ENTMCNC: 32 G/DL (ref 31.5–35.7)
MCV RBC AUTO: 98.2 FL (ref 79–97)
MONOCYTES # BLD AUTO: 0.29 10*3/MM3 (ref 0.1–0.9)
MONOCYTES NFR BLD AUTO: 5 % (ref 5–12)
NEUTROPHILS NFR BLD AUTO: 4.97 10*3/MM3 (ref 1.7–7)
NEUTROPHILS NFR BLD AUTO: 86.1 % (ref 42.7–76)
PLATELET # BLD AUTO: 120 10*3/MM3 (ref 140–450)
PMV BLD AUTO: 9.6 FL (ref 6–12)
RBC # BLD AUTO: 2.77 10*6/MM3 (ref 3.77–5.28)
WBC NRBC COR # BLD: 5.77 10*3/MM3 (ref 3.4–10.8)

## 2022-08-08 PROCEDURE — 25010000002 EPOETIN ALFA-EPBX 40000 UNIT/ML SOLUTION: Performed by: INTERNAL MEDICINE

## 2022-08-08 PROCEDURE — 36415 COLL VENOUS BLD VENIPUNCTURE: CPT

## 2022-08-08 PROCEDURE — 96372 THER/PROPH/DIAG INJ SC/IM: CPT

## 2022-08-08 PROCEDURE — 85025 COMPLETE CBC W/AUTO DIFF WBC: CPT

## 2022-08-08 RX ADMIN — EPOETIN ALFA-EPBX 40000 UNITS: 40000 INJECTION, SOLUTION INTRAVENOUS; SUBCUTANEOUS at 10:39

## 2022-08-08 NOTE — PROGRESS NOTES
Patient here for CBC/possible Retacrit injection. Patients hgb 8.7 and patient required Retacrit injection . Patient received Retacrit injection and tolertated Retacrit injection.Patient aware of next CBC/possible Retacrit injection appointment.

## 2022-08-12 NOTE — PROGRESS NOTES
HEMATOLOGY ONCOLOGY OUTPATIENT FOLLOW UP      Patient name: Cindy Cortes  : 1965  MRN: [id number]  Primary Care Physician: Annamarie Monroy APRN  Referring Physician: Annamarie Monroy APRN  Reason For Consult:     Chief Complaint   Patient presents with   • Follow-up     Oligodendroglioma     HPI:   History of Present Illness:  Cindy Cortes is 56 y.o. female who presented to our office on 06/10/21 for consultation regarding Oligodendroglioma    Patient is a 56 y.o. female who was referred to us for treatment options regarding recent diagnosis of grade 2 oligodendroglioma.  This was IDH [id number] mutated, 1P 19 Q codeleted.  Patient initially presented with seizures and a CT head was obtained that showed vasogenic edema and a right frontal lobe mass MRI was obtained after this.  2021 -MRI of the brain shows 2.1 x 4.4 x 3.3 cm right frontal lobe mass with eccentric cystic or necrotic component with surrounding vasogenic edema and a focal 3 mm right to left midline shift.  Patient was started on Keppra, steroids plan was made for elective craniotomy and surgical resection.    2021 craniotomy of the right frontal lobe, microscopic resection of tumor mass.  Pathology results oligodendroglioma WHO grade 2, IDH1 R132H mutation by immunohistochemistry, 1p19q codeletion by FISH.    2021 -status post resection of the right frontal lobe mass.  Expected postop blood product. resolution of midline shift.    2021 - Started radiation adjuvantly.    2021 - last day of radiation.    2021 - C1 D8 with vincristin started procarbazine  10/7/2021 - C1 D29 Vincristine    10/21/2021 - C2D1 PCV  10/28/2021 -cycle 2-day 8 with vincristine  Started procarbazine  Held procarbazine for a few days with nausea  2021 -patient in the hospital with significant pancytopenia needing blood transfusion.    2021 -vincristine cycle 2-day 29.  This has been delayed with ongoing  cytopenias and hospitalization.    12/27/2021 - C3 D1 12/30/2021 1/6/2022 - C3D8 vincristine.    2/21/2022 - C4 D1 decrease dose of Lomustine.    Continued myelosuppression afterwards  4/1/2022 -CT imaging with interval development of relatively diffuse groundglass opacity patchy areas of sparing.  Bronchoscopy negative for any concerning findings  Permanently discontinued chemotherapy with possible pneumonitis, prolonged myelosuppression.    5/16/2022 - WBC 5.52 hb 8.2 hct 27.8, plt 128    6/14/2022 -CBC with hemoglobin 6.4, hematocrit 20.2, platelet 94    7/3/2022 -MRI brain with postsurgical changes within the right frontal lobe surrounding FLAIR hyperintensity appears slightly increased could be related to posttreatment changes.  Versus tumor progression symptomatically no new neurological deficits.    7/2022 -patient started on Retacrit weekly.    Subjective:    Patient continues have shortness of breath.  Has been on Retacrit transfusion needs have decreased.     The following portions of the patient's history were reviewed and updated as appropriate: allergies, current medications, past family history, past medical history, past social history, past surgical history and problem list.    Past Medical History:   Diagnosis Date   • Anemia    • Arthritis    • GERD (gastroesophageal reflux disease)    • Hypertension    • Oligodendroglioma (HCC)    • Seizure (HCC)    • Type 2 diabetes mellitus with hyperglycemia, without long-term current use of insulin (HCC) 05/12/2021       Past Surgical History:   Procedure Laterality Date   • BRONCHOSCOPY N/A 4/6/2022    Procedure: BRONCHOSCOPY with bronchial washing;  Surgeon: Drew Alcantar MD;  Location: [facility] ENDOSCOPY;  Service: Pulmonary;  Laterality: N/A;  pneumonia   • BRONCHOSCOPY N/A 5/31/2022    Procedure: BRONCHOSCOPY AT BEDSIDE with bronchoalveolar lavage right middle lobe;  Surgeon: Den Feldman MD;  Location: [facility] ENDOSCOPY;  Service: Pulmonary;   Laterality: N/A;   • COLONOSCOPY     • CRANIOTOMY FOR TUMOR Right 05/06/2021    Procedure: CRANIOTOMY FOR TUMOR RESECTION WITH STEREOTACTIC;  Surgeon: Jluis Felix MD;  Location: [facility] MAIN OR;  Service: Neurosurgery;  Laterality: Right;         Current Outpatient Medications:   •  albuterol sulfate  (90 Base) MCG/ACT inhaler, Inhale 2 puffs Every 4 (Four) Hours As Needed for Wheezing., Disp: 18 g, Rfl: 2  •  Budeson-Glycopyrrol-Formoterol (Breztri Aerosphere) 160-9-4.8 MCG/ACT aerosol inhaler, Inhale 2 puffs 2 (Two) Times a Day. Indications: Lot#[id number] Exp: 8/30/23, Disp: 1 each, Rfl: 0  •  diphenhydrAMINE (BENADRYL) 25 MG tablet, Take 25 mg by mouth Daily As Needed for Allergies., Disp: , Rfl:   •  ferrous sulfate 325 (65 FE) MG tablet, Take 1 tablet by mouth Daily With Breakfast., Disp: , Rfl:   •  fluticasone (FLOVENT HFA) 44 MCG/ACT inhaler, Inhale 2 puffs Daily As Needed., Disp: , Rfl:   •  folic acid (FOLVITE) 1 MG tablet, Take 1 mg by mouth Daily., Disp: , Rfl:   •  levETIRAcetam (KEPPRA) 750 MG tablet, Take 1 tablet by mouth 2 (Two) Times a Day., Disp: 60 tablet, Rfl: 2  •  metoprolol tartrate (LOPRESSOR) 50 MG tablet, Take 50 mg by mouth 2 (Two) Times a Day., Disp: , Rfl:   •  ondansetron (Zofran) 8 MG tablet, Take 1 tablet by mouth Every 8 (Eight) Hours As Needed for Nausea or Vomiting., Disp: 30 tablet, Rfl: 3  •  oxybutynin (DITROPAN) 5 MG tablet, Take 15 mg by mouth Daily., Disp: , Rfl:   •  venlafaxine XR (EFFEXOR-XR) 150 MG 24 hr capsule, Take 150 mg by mouth Daily., Disp: , Rfl: 3  •  vitamin D (ERGOCALCIFEROL) 1.25 MG (66631 UT) capsule capsule, Take 50,000 Units by mouth Every 7 (Seven) Days.  , Disp: , Rfl:   No current facility-administered medications for this visit.    Current outpatient and discharge medications have been reconciled for the patient.  Reviewed by: Emilie Workman MD    No Known Allergies    Family History   Problem Relation Age of Onset   • Kidney disease Mother   "  • Prostate cancer Brother    • Breast cancer Maternal Aunt    • Colon cancer Maternal Aunt    • Breast cancer Niece    • Breast cancer Cousin        Cancer-related family history includes Breast cancer in her cousin, maternal aunt, and niece; Colon cancer in her maternal aunt; Prostate cancer in her brother.    Social History     Tobacco Use   • Smoking status: Never Smoker   • Smokeless tobacco: Never Used   Vaping Use   • Vaping Use: Never used   Substance Use Topics   • Alcohol use: Not Currently     Alcohol/week: 1.0 standard drink     Types: 1 Cans of beer per week     Comment: socially   • Drug use: Never     Social History     Social History Narrative   • Not on file      Objective:    Vitals:    08/22/22 1147   BP: 131/62   Pulse: 97   Resp: 18   Temp: 96.8 °F (36 °C)   SpO2: 96%   Weight: 117 kg (259 lb)   Height: 157.5 cm (62\")   PainSc: 0-No pain     Body mass index is 47.37 kg/m².  ECOG  (0) Fully active, able to carry on all predisease performance without restriction    Physical Exam:     Physical Exam  Constitutional:       Appearance: Normal appearance. She is obese.   HENT:      Head: Normocephalic and atraumatic.      Nose: Nose normal.   Eyes:      Pupils: Pupils are equal, round, and reactive to light.   Cardiovascular:      Rate and Rhythm: Normal rate and regular rhythm.      Pulses: Normal pulses.      Heart sounds: Normal heart sounds. No murmur heard.  Pulmonary:      Effort: Pulmonary effort is normal.      Breath sounds: Rhonchi present.   Abdominal:      General: There is no distension.      Palpations: Abdomen is soft. There is no mass.      Tenderness: There is no abdominal tenderness.      Hernia: No hernia is present.   Musculoskeletal:         General: No tenderness. Normal range of motion.      Cervical back: Normal range of motion.   Skin:     General: Skin is warm.   Neurological:      General: No focal deficit present.      Mental Status: She is alert.   Psychiatric:         " Mood and Affect: Mood normal.           Lab Results - Last 18 Months   Lab Units 08/22/22  1143 08/15/22  0948 08/08/22  1011   WBC 10*3/mm3 4.87 4.99 5.77   HEMOGLOBIN g/dL 7.6* 7.6* 8.7*   HEMATOCRIT % 25.0* 24.7* 27.2*   PLATELETS 10*3/mm3 122* 108* 120*   MCV fL 105.5* 102.9* 98.2*     Lab Results - Last 18 Months   Lab Units 07/25/22  1437 07/08/22  0220 07/07/22  0020 07/06/22  0030 07/05/22  1630 06/04/22  0610   SODIUM mmol/L 142 135* 135*   < > 138 137   POTASSIUM mmol/L 3.8 4.4 4.2   < > 3.5 3.7   CHLORIDE mmol/L 106 102 103   < > 103 105   CO2 mmol/L 24.0 19.0* 21.0*   < > 19.0* 22.0   BUN mg/dL 12 12 12   < > 13 20   CREATININE mg/dL 0.80 0.62 0.62   < > 0.88 0.56*   CALCIUM mg/dL 8.7 8.4* 8.2*   < > 8.8 8.7   BILIRUBIN mg/dL 1.1  --   --   --  1.5* 1.0   ALK PHOS U/L 115  --   --   --  115 86   ALT (SGPT) U/L 11  --   --   --  8 16   AST (SGOT) U/L 15  --   --   --  10 13   GLUCOSE mg/dL 122* 100* 100*   < > 152* 109*    < > = values in this interval not displayed.       Lab Results   Component Value Date    GLUCOSE 122 (H) 07/25/2022    BUN 12 07/25/2022    CREATININE 0.80 07/25/2022    EGFRIFNONA 77 01/27/2022    BCR 15.0 07/25/2022    K 3.8 07/25/2022    CO2 24.0 07/25/2022    CALCIUM 8.7 07/25/2022    PROTENTOTREF 6.4 12/15/2021    ALBUMIN 3.40 (L) 07/25/2022    LABIL2 1.1 12/15/2021    AST 15 07/25/2022    ALT 11 07/25/2022       Lab Results - Last 18 Months   Lab Units 07/05/22  1630 08/27/21  0738 05/04/21  0922   INR  1.19* 0.96 0.96   APTT seconds 26.8* 25.6 20.7*       Lab Results   Component Value Date    IRON 190 (H) 06/14/2022    TIBC 249 (L) 06/14/2022    FERRITIN 994.60 (H) 06/14/2022       Lab Results   Component Value Date    XDIGVJWI24 553 06/01/2022       Lab Results   Component Value Date    PTT 26.8 (L) 07/05/2022    INR 1.19 (H) 07/05/2022     MRI Brain With & Without Contrast    Result Date: 7/3/2022  1.Postsurgical changes within right frontal lobe. Surrounding FLAIR  hyperintensity also involving left cerebral hemisphere overall appears slightly increased and could be secondary to evolving post-treatment changes or tumor progression. 2.No acute intracranial process identified. 3.Mucosal disease within maxillary sinuses.  Electronically Signed By-Naveed Hubbard MD On:7/3/2022 2:48 PM This report was finalized on [id number] by  Naveed Hubbard MD.    XR Chest 1 View    Result Date: 7/5/2022  1. Low lung volumes without acute cardiopulmonary abnormality. 2. Mild cardiomegaly, likely accentuated by low lung volumes and AP portable technique.  Electronically Signed By-Tayo Brumfield MD On:7/5/2022 4:58 PM This report was finalized on [id number] by  Tayo Brumfield MD.    XR Chest 1 View    Result Date: 6/1/2022  No acute cardiopulmonary abnormality.  Electronically Signed By-Jon Lynch MD On:6/1/2022 7:11 PM This report was finalized on [id number] by  Jon Lynch MD.    CT Angiogram Chest Pulmonary Embolism    Result Date: 5/31/2022  1. No evidence of pulmonary embolism. 2. No evidence of thoracic aortic aneurysm or dissection. 3. Very subtle groundglass attenuation seen throughout the lungs bilaterally could potentially be inflammatory or infectious. Clinical correlation is recommended. 4. Cholelithiasis. 5. Mild splenomegaly. Electronically signed by:  Robel Aj D.O.  5/30/2022 10:11 PM    CT Abdomen Pelvis Stone Protocol    Result Date: 6/2/2022  1. Small 2 mm nonobstructing stone within the superior left kidney. No evidence of obstructive uropathy. 2. Nonspecific hepatosplenomegaly. Correlate clinically for risk factors of chronic liver disease. 3. Patchy bibasilar groundglass opacities with a bronchovascular distribution. This can be seen with bronchiolitis or mild bronchopneumonia. 4. Cholelithiasis with gallstones clustered within the fundus of the gallbladder likely related to superimposed gallbladder adenomyomatosis.    Electronically Signed  "By-Tayo Brumfield MD On:6/2/2022 2:09 PM This report was finalized on [id number] by  Tayo Brumfield MD.    CT Bone marrow biopsy and aspiration    Result Date: 6/2/2022  CT-guided bone marrow core biopsy and aspirate.  Electronically Signed By-Vick Draper MD On:6/2/2022 3:41 PM This report was finalized on [id number] by  Vick Draper MD.    Pathology results  Outside Report, Addendum   Integrated Diagnosis: Oligodendroglioma WHO Grade II  IDH1 R132H mutation by Immunohistochemistry; 1p19q co-deletion by FISH  See attached report from [facility]   Addendum electronically signed by Cecilia Chaudhary on 5/28/2021 at 0824   Final Diagnosis   Specimen #1 (\"Brain tumor right frontal lobe,\" biopsy):    Diffuse glioma (WHO grade II)    See comment     Specimen #2 (\"Brain tumor right frontal lobe,\" biopsy):    Diffuse glioma (WHO grade II)    See comment     Specimen #3 (Brain tumor right frontal lobe, resection):    Diffuse glioma, IDH-mutant (WHO grade II)    See attached report from [facility]         Assessment & Plan     Patient is a 56-year-old female with oligodendroglioma grade 2 status post gross total resection    Oligodendroglioma  Previously I had an extensive discussion with the patient about treatment options, prognosis.  We had an extensive discussion at that time with several options.  This is summarized here below    I previously discussed with her that there are findings from RTOG 9802 clinical trial which showed survival advantage with radiation followed by chemotherapy after surgical resection of grade 2 oligodendrogliomas.  Chemotherapy with the PCV regimen in this trial conferred a survival advantage over radiotherapy alone with a median overall survival of 13.3 versus 7.8 years.  In subset analysis benefit was more significant  Histologic oligodendroglioma is as compared to other low-grade gliomas.  Molecular analysis " post hoc analysis also showed survival advantage in molecularly confirmed IDH mutant, 1p19q codeleted oligodendrogliomas.    PCV can be a toxic regimen however survival advantage is only been shown in randomized control trial using this particular regimen.  The other option would be temozolomide which has advantages over PCV with ease of administration, better tolerance and efficacy in combination with radiation therapy and other types of CNS tumors and gliomas.    The other option I had discussed with her was clinical trial with a CODEL study which is comparing radiation alone versus radiation with Temodar versus radiation with PCV in this patient population.  Patient however is not very interested in enrolling in a clinical trial.  She is wanting to pursue the most aggressive treatment option for her to reduce the chances of recurrence of her tumor.    Lastly also discussed with her the option of surveillance and observation with serial MRIs however also discussed that in above studies the progression free survival is around 50% for 5 years.     Based on her above discussion patient decided to pursue aggressive treatment with radiation followed by chemotherapy.  We would use the RTOG 9802 regimen with radiation followed by chemotherapy with PCV.  We have evidence that vincristine does not cross the blood-brain barrier significantly and hence if there is toxicity we can choose to omit the drug.  Case was discussed with Dr. Cruz and started sequential radiation and chemo    Patient completed radiation without complication.    Based on above patient decided to proceed with adjuvant PCV.   Now patient has had clinically significant pancytopenia needing blood transfusion hospital admission.  For cycle 3 dose reduced procarbazine and lomustine by 20%.  For C4, decrease Lomustine to 110.  Complete cycle  with prolonged myelosuppression, shortness of breath will stop chemotherapy going forward.  I discussed with her for  the risks of continuing chemotherapy versus benefits which at this point she is worsening in terms of myelosuppression, likely pneumonitis with chemotherapy.  MRI brain in July/2022 with slight increase flair intensity.  No signs or symptoms of disease worsening  Repeat imaging prior to follow-up in October    Shortness of breath  CT of the chest with pneumonitis.  Could also be related to anemia.  Given a short course of steroids, continue albuterol, steroid inhaler per pulmonology  Repeat CT with only mild changes.  Her shortness of breath is mostly related to anemia  Echocardiogram with severe pulmonary hypertension.  Awaiting high-resolution CT and follow-up with pulmonary.    Anemia  There could be possibility of hemolysis nonimmune versus myelosuppression  There have been case reports with her chemotherapy regimen.  She was given 5-day course of prednisone.  Repeat haptoglobin improved  Now with continued drop in hemoglobin patient had a bone marrow biopsy which showed some erythrocyte dyspoiesis.  She could have an underlying myelodysplastic syndrome which got worse with chemotherapy.  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  I will refer to [facility] for opinion as well  I will repeat B12 folic acid and iron studies.  We will continue Retacrit erythropoietin is significantly elevated which does point towards myelodysplastic syndrome.    Follow-up after MRI brain.      I have reviewed and confirmed the accuracy of the patient's history: Chief complaint, HPI, ROS, Subjective and Past Family Social History as entered by the MA/TAMY/RN.     Emilie Workman MD 08/22/22

## 2022-08-15 ENCOUNTER — LAB (OUTPATIENT)
Dept: LAB | Facility: HOSPITAL | Age: 57
End: 2022-08-15

## 2022-08-15 ENCOUNTER — HOSPITAL ENCOUNTER (OUTPATIENT)
Dept: ONCOLOGY | Facility: HOSPITAL | Age: 57
Discharge: HOME | End: 2022-08-15

## 2022-08-15 VITALS
TEMPERATURE: 96.8 F | BODY MASS INDEX: 47.66 KG/M2 | HEART RATE: 92 BPM | RESPIRATION RATE: 18 BRPM | DIASTOLIC BLOOD PRESSURE: 68 MMHG | HEIGHT: 62 IN | WEIGHT: 259 LBS | SYSTOLIC BLOOD PRESSURE: 144 MMHG

## 2022-08-15 DIAGNOSIS — C71.9 OLIGODENDROGLIOMA: ICD-10-CM

## 2022-08-15 DIAGNOSIS — D64.9 TRANSFUSION-DEPENDENT ANEMIA: Primary | ICD-10-CM

## 2022-08-15 LAB
BASOPHILS # BLD AUTO: 0.01 10*3/MM3 (ref 0–0.2)
BASOPHILS NFR BLD AUTO: 0.2 % (ref 0–1.5)
DEPRECATED RDW RBC AUTO: 82.3 FL (ref 37–54)
EOSINOPHIL # BLD AUTO: 0.06 10*3/MM3 (ref 0–0.4)
EOSINOPHIL NFR BLD AUTO: 1.2 % (ref 0.3–6.2)
ERYTHROCYTE [DISTWIDTH] IN BLOOD BY AUTOMATED COUNT: 23.7 % (ref 12.3–15.4)
HCT VFR BLD AUTO: 24.7 % (ref 34–46.6)
HGB BLD-MCNC: 7.6 G/DL (ref 12–15.9)
LYMPHOCYTES # BLD AUTO: 0.39 10*3/MM3 (ref 0.7–3.1)
LYMPHOCYTES NFR BLD AUTO: 7.8 % (ref 19.6–45.3)
MCH RBC QN AUTO: 31.7 PG (ref 26.6–33)
MCHC RBC AUTO-ENTMCNC: 30.8 G/DL (ref 31.5–35.7)
MCV RBC AUTO: 102.9 FL (ref 79–97)
MONOCYTES # BLD AUTO: 0.26 10*3/MM3 (ref 0.1–0.9)
MONOCYTES NFR BLD AUTO: 5.2 % (ref 5–12)
NEUTROPHILS NFR BLD AUTO: 4.27 10*3/MM3 (ref 1.7–7)
NEUTROPHILS NFR BLD AUTO: 85.6 % (ref 42.7–76)
PLATELET # BLD AUTO: 108 10*3/MM3 (ref 140–450)
PMV BLD AUTO: 9.8 FL (ref 6–12)
RBC # BLD AUTO: 2.4 10*6/MM3 (ref 3.77–5.28)
WBC NRBC COR # BLD: 4.99 10*3/MM3 (ref 3.4–10.8)

## 2022-08-15 PROCEDURE — 96372 THER/PROPH/DIAG INJ SC/IM: CPT

## 2022-08-15 PROCEDURE — 25010000002 EPOETIN ALFA-EPBX 40000 UNIT/ML SOLUTION: Performed by: INTERNAL MEDICINE

## 2022-08-15 PROCEDURE — 85025 COMPLETE CBC W/AUTO DIFF WBC: CPT

## 2022-08-15 PROCEDURE — 36415 COLL VENOUS BLD VENIPUNCTURE: CPT

## 2022-08-15 RX ADMIN — EPOETIN ALFA-EPBX 40000 UNITS: 40000 INJECTION, SOLUTION INTRAVENOUS; SUBCUTANEOUS at 10:47

## 2022-08-15 NOTE — PROGRESS NOTES
Patient here for CBC/Retacrit injection if needed. Notified  of patients CBC results and that patient would be receiving Retacrit injection due to Hgb 7.6.  gave no further orders. Patient received Retacrit injection and tolerated Retacrit injection well. Patient and spouse aware of next CBC/Retacrit injection appointment.

## 2022-08-22 ENCOUNTER — LAB (OUTPATIENT)
Dept: LAB | Facility: HOSPITAL | Age: 57
End: 2022-08-22

## 2022-08-22 ENCOUNTER — HOSPITAL ENCOUNTER (OUTPATIENT)
Dept: ONCOLOGY | Facility: HOSPITAL | Age: 57
Discharge: HOME | End: 2022-08-22

## 2022-08-22 ENCOUNTER — OFFICE VISIT (OUTPATIENT)
Dept: ONCOLOGY | Facility: CLINIC | Age: 57
End: 2022-08-22

## 2022-08-22 VITALS
TEMPERATURE: 96.8 F | BODY MASS INDEX: 47.66 KG/M2 | HEART RATE: 97 BPM | RESPIRATION RATE: 18 BRPM | SYSTOLIC BLOOD PRESSURE: 131 MMHG | HEIGHT: 62 IN | OXYGEN SATURATION: 96 % | WEIGHT: 259 LBS | DIASTOLIC BLOOD PRESSURE: 62 MMHG

## 2022-08-22 DIAGNOSIS — D46.9 MDS (MYELODYSPLASTIC SYNDROME): Primary | ICD-10-CM

## 2022-08-22 DIAGNOSIS — D64.9 ANEMIA, UNSPECIFIED TYPE: ICD-10-CM

## 2022-08-22 DIAGNOSIS — C71.9 OLIGODENDROGLIOMA: ICD-10-CM

## 2022-08-22 DIAGNOSIS — C71.9 OLIGODENDROGLIOMA: Primary | ICD-10-CM

## 2022-08-22 DIAGNOSIS — D64.9 TRANSFUSION-DEPENDENT ANEMIA: ICD-10-CM

## 2022-08-22 DIAGNOSIS — D64.9 TRANSFUSION-DEPENDENT ANEMIA: Primary | ICD-10-CM

## 2022-08-22 LAB
BASOPHILS # BLD AUTO: 0.01 10*3/MM3 (ref 0–0.2)
BASOPHILS NFR BLD AUTO: 0.2 % (ref 0–1.5)
DEPRECATED RDW RBC AUTO: 83.5 FL (ref 37–54)
EOSINOPHIL # BLD AUTO: 0.09 10*3/MM3 (ref 0–0.4)
EOSINOPHIL NFR BLD AUTO: 1.8 % (ref 0.3–6.2)
ERYTHROCYTE [DISTWIDTH] IN BLOOD BY AUTOMATED COUNT: 23.3 % (ref 12.3–15.4)
FERRITIN SERPL-MCNC: 815.5 NG/ML (ref 13–150)
FOLATE SERPL-MCNC: 8.89 NG/ML (ref 4.78–24.2)
HCT VFR BLD AUTO: 25 % (ref 34–46.6)
HGB BLD-MCNC: 7.6 G/DL (ref 12–15.9)
HOLD SPECIMEN: NORMAL
HOLD SPECIMEN: NORMAL
IRON 24H UR-MRATE: 80 MCG/DL (ref 37–145)
IRON SATN MFR SERPL: 34 % (ref 20–50)
LYMPHOCYTES # BLD AUTO: 0.52 10*3/MM3 (ref 0.7–3.1)
LYMPHOCYTES NFR BLD AUTO: 10.7 % (ref 19.6–45.3)
MCH RBC QN AUTO: 32.1 PG (ref 26.6–33)
MCHC RBC AUTO-ENTMCNC: 30.4 G/DL (ref 31.5–35.7)
MCV RBC AUTO: 105.5 FL (ref 79–97)
MONOCYTES # BLD AUTO: 0.3 10*3/MM3 (ref 0.1–0.9)
MONOCYTES NFR BLD AUTO: 6.2 % (ref 5–12)
NEUTROPHILS NFR BLD AUTO: 3.95 10*3/MM3 (ref 1.7–7)
NEUTROPHILS NFR BLD AUTO: 81.1 % (ref 42.7–76)
PLATELET # BLD AUTO: 122 10*3/MM3 (ref 140–450)
PMV BLD AUTO: 9.7 FL (ref 6–12)
RBC # BLD AUTO: 2.37 10*6/MM3 (ref 3.77–5.28)
TIBC SERPL-MCNC: 232 MCG/DL (ref 298–536)
TRANSFERRIN SERPL-MCNC: 156 MG/DL (ref 200–360)
VIT B12 BLD-MCNC: 327 PG/ML (ref 211–946)
WBC NRBC COR # BLD: 4.87 10*3/MM3 (ref 3.4–10.8)

## 2022-08-22 PROCEDURE — 36415 COLL VENOUS BLD VENIPUNCTURE: CPT

## 2022-08-22 PROCEDURE — 82607 VITAMIN B-12: CPT | Performed by: INTERNAL MEDICINE

## 2022-08-22 PROCEDURE — 25010000002 EPOETIN ALFA-EPBX 40000 UNIT/ML SOLUTION: Performed by: INTERNAL MEDICINE

## 2022-08-22 PROCEDURE — 96372 THER/PROPH/DIAG INJ SC/IM: CPT

## 2022-08-22 PROCEDURE — 85025 COMPLETE CBC W/AUTO DIFF WBC: CPT

## 2022-08-22 PROCEDURE — 82728 ASSAY OF FERRITIN: CPT | Performed by: INTERNAL MEDICINE

## 2022-08-22 PROCEDURE — 99214 OFFICE O/P EST MOD 30 MIN: CPT | Performed by: INTERNAL MEDICINE

## 2022-08-22 PROCEDURE — 84466 ASSAY OF TRANSFERRIN: CPT | Performed by: INTERNAL MEDICINE

## 2022-08-22 PROCEDURE — 83540 ASSAY OF IRON: CPT | Performed by: INTERNAL MEDICINE

## 2022-08-22 PROCEDURE — 82746 ASSAY OF FOLIC ACID SERUM: CPT | Performed by: INTERNAL MEDICINE

## 2022-08-22 RX ORDER — METOPROLOL TARTRATE 50 MG/1
50 TABLET, FILM COATED ORAL 2 TIMES DAILY
COMMUNITY
Start: 2022-06-23

## 2022-08-22 RX ORDER — ERGOCALCIFEROL 1.25 MG/1
50000 CAPSULE ORAL
COMMUNITY
Start: 2022-08-10

## 2022-08-22 RX ADMIN — EPOETIN ALFA-EPBX 40000 UNITS: 40000 INJECTION, SOLUTION INTRAVENOUS; SUBCUTANEOUS at 12:26

## 2022-08-22 NOTE — PROGRESS NOTES
Pt here for retacrit injection. Hgb is 7.6 today, so retacrit administered sub-q in right arm per MD orders. Pt tolerated well.

## 2022-08-29 ENCOUNTER — HOSPITAL ENCOUNTER (OUTPATIENT)
Dept: ONCOLOGY | Facility: HOSPITAL | Age: 57
Discharge: HOME | End: 2022-08-29

## 2022-08-29 ENCOUNTER — LAB (OUTPATIENT)
Dept: LAB | Facility: HOSPITAL | Age: 57
End: 2022-08-29

## 2022-08-29 DIAGNOSIS — D64.9 TRANSFUSION-DEPENDENT ANEMIA: Primary | ICD-10-CM

## 2022-08-29 DIAGNOSIS — D64.9 TRANSFUSION-DEPENDENT ANEMIA: ICD-10-CM

## 2022-08-29 DIAGNOSIS — C71.9 OLIGODENDROGLIOMA: ICD-10-CM

## 2022-08-29 LAB
BASOPHILS # BLD AUTO: 0.01 10*3/MM3 (ref 0–0.2)
BASOPHILS NFR BLD AUTO: 0.2 % (ref 0–1.5)
DEPRECATED RDW RBC AUTO: 86.5 FL (ref 37–54)
EOSINOPHIL # BLD AUTO: 0.08 10*3/MM3 (ref 0–0.4)
EOSINOPHIL NFR BLD AUTO: 1.2 % (ref 0.3–6.2)
ERYTHROCYTE [DISTWIDTH] IN BLOOD BY AUTOMATED COUNT: 23.1 % (ref 12.3–15.4)
HCT VFR BLD AUTO: 26.9 % (ref 34–46.6)
HGB BLD-MCNC: 8.1 G/DL (ref 12–15.9)
LYMPHOCYTES # BLD AUTO: 0.39 10*3/MM3 (ref 0.7–3.1)
LYMPHOCYTES NFR BLD AUTO: 6 % (ref 19.6–45.3)
MCH RBC QN AUTO: 33.1 PG (ref 26.6–33)
MCHC RBC AUTO-ENTMCNC: 30.1 G/DL (ref 31.5–35.7)
MCV RBC AUTO: 109.8 FL (ref 79–97)
MONOCYTES # BLD AUTO: 0.31 10*3/MM3 (ref 0.1–0.9)
MONOCYTES NFR BLD AUTO: 4.8 % (ref 5–12)
NEUTROPHILS NFR BLD AUTO: 5.67 10*3/MM3 (ref 1.7–7)
NEUTROPHILS NFR BLD AUTO: 87.8 % (ref 42.7–76)
PLATELET # BLD AUTO: 110 10*3/MM3 (ref 140–450)
PMV BLD AUTO: 9.3 FL (ref 6–12)
RBC # BLD AUTO: 2.45 10*6/MM3 (ref 3.77–5.28)
WBC NRBC COR # BLD: 6.46 10*3/MM3 (ref 3.4–10.8)

## 2022-08-29 PROCEDURE — 36415 COLL VENOUS BLD VENIPUNCTURE: CPT

## 2022-08-29 PROCEDURE — 25010000002 EPOETIN ALFA-EPBX 40000 UNIT/ML SOLUTION: Performed by: INTERNAL MEDICINE

## 2022-08-29 PROCEDURE — 85025 COMPLETE CBC W/AUTO DIFF WBC: CPT

## 2022-08-29 PROCEDURE — 96372 THER/PROPH/DIAG INJ SC/IM: CPT

## 2022-08-29 RX ADMIN — EPOETIN ALFA-EPBX 40000 UNITS: 40000 INJECTION, SOLUTION INTRAVENOUS; SUBCUTANEOUS at 10:17

## 2022-09-06 ENCOUNTER — LAB (OUTPATIENT)
Dept: LAB | Facility: HOSPITAL | Age: 57
End: 2022-09-06

## 2022-09-06 ENCOUNTER — HOSPITAL ENCOUNTER (OUTPATIENT)
Dept: ONCOLOGY | Facility: HOSPITAL | Age: 57
Discharge: HOME | End: 2022-09-06

## 2022-09-06 DIAGNOSIS — C71.9 OLIGODENDROGLIOMA: Primary | ICD-10-CM

## 2022-09-06 DIAGNOSIS — D69.6 ANEMIA WITH LOW PLATELET COUNT: Primary | ICD-10-CM

## 2022-09-06 DIAGNOSIS — D64.9 TRANSFUSION-DEPENDENT ANEMIA: ICD-10-CM

## 2022-09-06 LAB
BASOPHILS # BLD AUTO: 0.01 10*3/MM3 (ref 0–0.2)
BASOPHILS NFR BLD AUTO: 0.2 % (ref 0–1.5)
DEPRECATED RDW RBC AUTO: 82.3 FL (ref 37–54)
EOSINOPHIL # BLD AUTO: 0.11 10*3/MM3 (ref 0–0.4)
EOSINOPHIL NFR BLD AUTO: 2.6 % (ref 0.3–6.2)
ERYTHROCYTE [DISTWIDTH] IN BLOOD BY AUTOMATED COUNT: 21.5 % (ref 12.3–15.4)
HCT VFR BLD AUTO: 26.4 % (ref 34–46.6)
HGB BLD-MCNC: 8 G/DL (ref 12–15.9)
LYMPHOCYTES # BLD AUTO: 0.35 10*3/MM3 (ref 0.7–3.1)
LYMPHOCYTES NFR BLD AUTO: 8.4 % (ref 19.6–45.3)
MCH RBC QN AUTO: 33.9 PG (ref 26.6–33)
MCHC RBC AUTO-ENTMCNC: 30.3 G/DL (ref 31.5–35.7)
MCV RBC AUTO: 111.9 FL (ref 79–97)
MONOCYTES # BLD AUTO: 0.32 10*3/MM3 (ref 0.1–0.9)
MONOCYTES NFR BLD AUTO: 7.7 % (ref 5–12)
NEUTROPHILS NFR BLD AUTO: 3.39 10*3/MM3 (ref 1.7–7)
NEUTROPHILS NFR BLD AUTO: 81.1 % (ref 42.7–76)
PLATELET # BLD AUTO: 109 10*3/MM3 (ref 140–450)
PMV BLD AUTO: 9 FL (ref 6–12)
RBC # BLD AUTO: 2.36 10*6/MM3 (ref 3.77–5.28)
WBC NRBC COR # BLD: 4.18 10*3/MM3 (ref 3.4–10.8)

## 2022-09-06 PROCEDURE — 85025 COMPLETE CBC W/AUTO DIFF WBC: CPT

## 2022-09-06 PROCEDURE — 36415 COLL VENOUS BLD VENIPUNCTURE: CPT

## 2022-09-06 NOTE — PROGRESS NOTES
Patient made aware that awaiting insurance approval for her injection and she want to go home and not wait for insurance approval and wants to  return for injection tomorrow Patient seferino wants appointment changed for Oct. Injection and patient informed that a  will contact with new appointments.

## 2022-09-09 ENCOUNTER — HOSPITAL ENCOUNTER (OUTPATIENT)
Dept: RADIATION ONCOLOGY | Facility: HOSPITAL | Age: 57
Setting detail: RADIATION/ONCOLOGY SERIES
End: 2022-09-09

## 2022-09-12 NOTE — PROGRESS NOTES
RADIATION ONCOLOGY FOLLOW-UP NOTE    NAME: Cindy Cortes  YOB: 1965  MRN #: [id number]  DATE OF SERVICE: 9/13/2022  REFERRING PROVIDER: Annamarie Monroy APRN  [facility] [place] [address]  PRIMARY CARE PROVIDER: Annamarie Monroy APRN    DIAGNOSIS:    1. Oligodendroglioma (HCC)      REASON FOR VISIT:  6M F/U    RADIATION TREATMENT COURSE:  5400 cGy in 30 fractions to right frontal lobe, completed 08/09/2021.    HISTORY OF PRESENT ILLNESS: The patient is a 57 y.o. year old female who was last seen in our office on 03/15/2022. The plan was to follow up here in 6 months.    She is followed by med onc, Dr. Workman, and was last seen on 08/22/2022. Their plan is to follow up MRI brain on 09/21/2022. They also discussed the risks of continuing chemotherapy verus benefits which is worsening in terms of myelosupression, likely anemia related SOB but some concern pneumonitis with chemotherapy.  She is off chemo.  She has had to start O2 for her lungs over the interval and does f/u with Dr. Alcantar as well.    Going to  for possible hemolysis nonimmune versus myelosuppression--Referred to  BMT.    MRI BRAIN W/ & W/O CONTRAST  DATE OF EXAM: 07/03/2022  FACILITY: [facility]  FINDINGS:  There are changes from right frontal craniotomy with a stable defect within the right frontal lobe. FLAIR hyperintensity surrounding the resection cavity that also involves the left cerebral hemisphere appears slightly increased, particularly along the posterior right frontal and right parietal lobes on image 16 of series 7, and within the left centrum semiovale posteriorly and left periventricular left frontal lobe. No significant enhancement is identified along the resection cavity.  No acute infarction or acute intracranial hemorrhage is identified. The ventricles are stable in caliber, with no midline shift. The basal cisterns appear patent. The midline structures appear intact. The globes  and orbits appear intact. The intracranial vascular flow voids appear patent. There is mucosal disease within the maxillary sinuses.  IMPRESSION:  1. Postsurgical changes within right frontal lobe. Surrounding FLAIR hyperintensity also involving left cerebral hemisphere overall appears slightly increased and could be secondary to evolving post treatment changes or tumor progression.  2. No acute intracranial process identified.  3. Mucosal diseases within maxillary sinuses.    She has no new CNS issues.    The following portions of the patient's history were reviewed and updated as appropriate: allergies, current medications, past family history, past medical history, past social history, past surgical history and problem list. Reviewed with the patient and remain unchanged.    PAST MEDICAL HISTORY:  she has a past medical history of Anemia, Arthritis, GERD (gastroesophageal reflux disease), Hypertension, Oligodendroglioma ([facility]), Seizure ([facility]), and Type 2 diabetes mellitus with hyperglycemia, without long-term current use of insulin ([facility]) (05/12/2021).    MEDICATIONS:    Current Outpatient Medications:   •  albuterol sulfate  (90 Base) MCG/ACT inhaler, Inhale 2 puffs Every 4 (Four) Hours As Needed for Wheezing., Disp: 18 g, Rfl: 2  •  Budeson-Glycopyrrol-Formoterol (Breztri Aerosphere) 160-9-4.8 MCG/ACT aerosol inhaler, Inhale 2 puffs 2 (Two) Times a Day. Indications: Lot#[id number] Exp: 8/30/23, Disp: 1 each, Rfl: 0  •  diphenhydrAMINE (BENADRYL) 25 MG tablet, Take 25 mg by mouth Daily As Needed for Allergies., Disp: , Rfl:   •  ferrous sulfate 325 (65 FE) MG tablet, Take 1 tablet by mouth Daily With Breakfast., Disp: , Rfl:   •  folic acid (FOLVITE) 1 MG tablet, Take 1 mg by mouth Daily., Disp: , Rfl:   •  levETIRAcetam (KEPPRA) 750 MG tablet, Take 1 tablet by mouth 2 (Two) Times a Day., Disp: 60 tablet, Rfl: 2  •  metoprolol tartrate (LOPRESSOR) 50 MG tablet, Take 50 mg by mouth 2 (Two) Times a Day., Disp:  ", Rfl:   •  ondansetron (Zofran) 8 MG tablet, Take 1 tablet by mouth Every 8 (Eight) Hours As Needed for Nausea or Vomiting., Disp: 30 tablet, Rfl: 3  •  oxybutynin (DITROPAN) 5 MG tablet, Take 15 mg by mouth Daily., Disp: , Rfl:   •  venlafaxine XR (EFFEXOR-XR) 150 MG 24 hr capsule, Take 150 mg by mouth Daily., Disp: , Rfl: 3  •  vitamin D (ERGOCALCIFEROL) 1.25 MG (39504 UT) capsule capsule, Take 50,000 Units by mouth Every 7 (Seven) Days.  , Disp: , Rfl:   •  fluticasone (FLOVENT HFA) 44 MCG/ACT inhaler, Inhale 2 puffs Daily As Needed., Disp: , Rfl:   No current facility-administered medications for this visit.    ALLERGIES:  No Known Allergies    PAST SURGICAL HISTORY:  she has a past surgical history that includes Craniotomy for Tumor (Right, 05/06/2021); Colonoscopy; Bronchoscopy (N/A, 4/6/2022); and Bronchoscopy (N/A, 5/31/2022).    PREVIOUS RADIOTHERAPY OR CHEMOTHERAPY:  yes    FAMILY HISTORY:  herfamily history includes Breast cancer in her cousin, maternal aunt, and niece; Colon cancer in her maternal aunt; Kidney disease in her mother; Prostate cancer in her brother.    SOCIAL HISTORY:  she reports that she has never smoked. She has never used smokeless tobacco. She reports previous alcohol use of about 1.0 standard drink of alcohol per week. She reports that she does not use drugs.    PAIN AND PAIN MANAGEMENT:  No pain.  Vitals:    09/13/22 1110   BP: 130/62   Pulse: 67   Resp: 16   Temp: 96.8 °F (36 °C)   TempSrc: Temporal   SpO2: 100%   Weight: 120 kg (264 lb 6.4 oz)   Height: 157.5 cm (62\")   PainSc: 0-No pain       NUTRITIONAL STATUS:   no issues    KPS:  70      REVIEW OF SYSTEMS:   General: No fevers, chills, weight change, or drenching night sweats. Skin: No rashes or jaundice.  HEENT: No change in vision or hearing, no headaches.  Neck: No dysphagia or masses.  Heme/Lymph: No easy bruising or bleeding.  Respiratory System: as noted above.  Cardiovascular: No chest pain, palpitations, or dyspnea on " "exertion.  - Pacemaker. GI: No nausea, vomiting, diarrhea, melena, or hematochezia.  : No dysuria or hematuria.  Endocrine: No heat or cold intolerance. Musculoskeletal: No myalgias or arthralgias.  Neuro: As noted above. No weakness, numbness, syncope, or seizures. Psych: No mood changes or depression. Ext: Denies swelling.        Objective   VITAL SIGNS:  Vitals:    09/13/22 1110   BP: 130/62   Pulse: 67   Resp: 16   Temp: 96.8 °F (36 °C)   TempSrc: Temporal   SpO2: 100%   Weight: 120 kg (264 lb 6.4 oz)   Height: 157.5 cm (62\")   PainSc: 0-No pain       PHYSICAL EXAM:  GENERAL: In no apparent distress, sitting comfortably in room.    HEENT: Normocephalic, atraumatic. Pupils are equal, round, reactive to light. Sclera anicteric. Conjunctiva not injected. Oropharynx without erythema, ulcerations or thrush.   NECK: Supple with no masses.  LYMPHATIC: No cervical, supraclavicular or axillary adenopathy appreciated bilaterally.   CARDIOVASCULAR: S1 & S2 detected; no murmurs, rubs or gallops.  CHEST: Clear to auscultation bilaterally; no wheezes, crackles or rubs. Work of breathing normal.  ABDOMEN: Bowel sounds present. Abdomen is soft, nontender, nondistended.   MUSCULOSKELETAL: No tenderness to palpation along the spine or scapulae. Normal range of motion.  EXTREMITIES: No clubbing, cyanosis, edema.  SKIN: No erythema, rashes, ulcerations noted.   NEUROLOGIC: Cranial nerves II-XII grossly intact bilaterally. No focal neurologic deficits.  PSYCHIATRIC:  Alert, aware, and appropriate.      PERTINENT IMAGING/PATHOLOGY/LABS (Medical Decision Making):     COORDINATION OF CARE: A copy of this note is sent to the referring provider.    PATHOLOGY (Reviewed):     IMAGING (Reviewed): MRI brain due next week.    LABS (Reviewed):  HEMATOLOGY:  WBC   Date Value Ref Range Status   09/13/2022 6.22 3.40 - 10.80 10*3/mm3 Final   03/31/2020 5.63 4.5 - 11.0 10*3/uL Final     RBC   Date Value Ref Range Status   09/13/2022 2.75 (L) 3.77 " - 5.28 10*6/mm3 Final   03/31/2020 3.86 (L) 4.0 - 5.2 10*6/uL Final     Hemoglobin   Date Value Ref Range Status   09/13/2022 9.4 (L) 12.0 - 15.9 g/dL Final   03/31/2020 11.5 (L) 12.0 - 16.0 g/dL Final     Hematocrit   Date Value Ref Range Status   09/13/2022 30.8 (L) 34.0 - 46.6 % Final   03/31/2020 36.8 36.0 - 46.0 % Final     Platelets   Date Value Ref Range Status   09/13/2022 150 140 - 450 10*3/mm3 Final   03/31/2020 266 140 - 440 10*3/uL Final     CHEMISTRY:  Lab Results   Component Value Date    GLUCOSE 122 (H) 07/25/2022    BUN 12 07/25/2022    CREATININE 0.80 07/25/2022    EGFRIFNONA 77 01/27/2022    BCR 15.0 07/25/2022    K 3.8 07/25/2022    CO2 24.0 07/25/2022    CALCIUM 8.7 07/25/2022    PROTENTOTREF 6.4 12/15/2021    ALBUMIN 3.40 (L) 07/25/2022    LABIL2 1.1 12/15/2021    AST 15 07/25/2022    ALT 11 07/25/2022       Assessment & Plan   ASSESSMENT AND PLAN:    1. Oligodendroglioma (HCC)     Grade 2 oligodendroglioma  -s/p GTR, XRT and adjuvant chemotherapy now completed.  -Multiple toxicities from chemo.  Med/onc following closely.    This assessment comes from my review of the imaging, pathology, physician notes and other pertinent information as mentioned.    DISPOSITION: PRN, follows with Dr. Workman and now  BMT clinic.      TIME SPENT WITH PATIENT:   I spent 20 minutes caring for Cindy on this date of service. This time includes time spent by me in the following activities: preparing for the visit, reviewing tests, obtaining and/or reviewing a separately obtained history, performing a medically appropriate examination and/or evaluation, documenting information in the medical record and care coordination      CC: MD Annamarie Sahu MD John A Cox, MD  9/13/2022  9:12 PM EDT

## 2022-09-13 ENCOUNTER — OFFICE VISIT (OUTPATIENT)
Dept: RADIATION ONCOLOGY | Facility: HOSPITAL | Age: 57
End: 2022-09-13

## 2022-09-13 ENCOUNTER — HOSPITAL ENCOUNTER (OUTPATIENT)
Dept: ONCOLOGY | Facility: HOSPITAL | Age: 57
Discharge: HOME | End: 2022-09-13

## 2022-09-13 ENCOUNTER — LAB (OUTPATIENT)
Dept: LAB | Facility: HOSPITAL | Age: 57
End: 2022-09-13

## 2022-09-13 VITALS
HEIGHT: 62 IN | RESPIRATION RATE: 16 BRPM | WEIGHT: 264.4 LBS | DIASTOLIC BLOOD PRESSURE: 62 MMHG | HEART RATE: 67 BPM | BODY MASS INDEX: 48.66 KG/M2 | OXYGEN SATURATION: 100 % | SYSTOLIC BLOOD PRESSURE: 130 MMHG | TEMPERATURE: 96.8 F

## 2022-09-13 VITALS
DIASTOLIC BLOOD PRESSURE: 62 MMHG | TEMPERATURE: 96.8 F | HEART RATE: 67 BPM | SYSTOLIC BLOOD PRESSURE: 130 MMHG | RESPIRATION RATE: 18 BRPM

## 2022-09-13 DIAGNOSIS — D64.9 TRANSFUSION-DEPENDENT ANEMIA: Primary | ICD-10-CM

## 2022-09-13 DIAGNOSIS — C71.9 OLIGODENDROGLIOMA: ICD-10-CM

## 2022-09-13 DIAGNOSIS — C71.9 OLIGODENDROGLIOMA: Primary | ICD-10-CM

## 2022-09-13 LAB
BASOPHILS # BLD AUTO: 0.02 10*3/MM3 (ref 0–0.2)
BASOPHILS NFR BLD AUTO: 0.3 % (ref 0–1.5)
DEPRECATED RDW RBC AUTO: 79 FL (ref 37–54)
EOSINOPHIL # BLD AUTO: 0.1 10*3/MM3 (ref 0–0.4)
EOSINOPHIL NFR BLD AUTO: 1.6 % (ref 0.3–6.2)
ERYTHROCYTE [DISTWIDTH] IN BLOOD BY AUTOMATED COUNT: 20.5 % (ref 12.3–15.4)
HCT VFR BLD AUTO: 30.8 % (ref 34–46.6)
HGB BLD-MCNC: 9.4 G/DL (ref 12–15.9)
LYMPHOCYTES # BLD AUTO: 0.46 10*3/MM3 (ref 0.7–3.1)
LYMPHOCYTES NFR BLD AUTO: 7.4 % (ref 19.6–45.3)
MCH RBC QN AUTO: 34.2 PG (ref 26.6–33)
MCHC RBC AUTO-ENTMCNC: 30.5 G/DL (ref 31.5–35.7)
MCV RBC AUTO: 112 FL (ref 79–97)
MONOCYTES # BLD AUTO: 0.36 10*3/MM3 (ref 0.1–0.9)
MONOCYTES NFR BLD AUTO: 5.8 % (ref 5–12)
NEUTROPHILS NFR BLD AUTO: 5.28 10*3/MM3 (ref 1.7–7)
NEUTROPHILS NFR BLD AUTO: 84.9 % (ref 42.7–76)
PLATELET # BLD AUTO: 150 10*3/MM3 (ref 140–450)
PMV BLD AUTO: 9.4 FL (ref 6–12)
RBC # BLD AUTO: 2.75 10*6/MM3 (ref 3.77–5.28)
WBC NRBC COR # BLD: 6.22 10*3/MM3 (ref 3.4–10.8)

## 2022-09-13 PROCEDURE — G0463 HOSPITAL OUTPT CLINIC VISIT: HCPCS | Performed by: RADIOLOGY

## 2022-09-13 PROCEDURE — 85025 COMPLETE CBC W/AUTO DIFF WBC: CPT

## 2022-09-13 PROCEDURE — 25010000002 EPOETIN ALFA PER 1000 UNITS: Performed by: INTERNAL MEDICINE

## 2022-09-13 PROCEDURE — 99213 OFFICE O/P EST LOW 20 MIN: CPT | Performed by: RADIOLOGY

## 2022-09-13 PROCEDURE — 36415 COLL VENOUS BLD VENIPUNCTURE: CPT

## 2022-09-13 PROCEDURE — 96372 THER/PROPH/DIAG INJ SC/IM: CPT

## 2022-09-13 RX ADMIN — ERYTHROPOIETIN 40000 UNITS: 40000 INJECTION, SOLUTION INTRAVENOUS; SUBCUTANEOUS at 10:57

## 2022-09-13 NOTE — PROGRESS NOTES
Pt to the clinic for Procrit injection.  CBC within treatment parameters. Injection given and tolerated well.  AVS given.

## 2022-09-20 ENCOUNTER — LAB (OUTPATIENT)
Dept: LAB | Facility: HOSPITAL | Age: 57
End: 2022-09-20

## 2022-09-20 ENCOUNTER — HOSPITAL ENCOUNTER (OUTPATIENT)
Dept: ONCOLOGY | Facility: HOSPITAL | Age: 57
Discharge: HOME | End: 2022-09-20

## 2022-09-20 DIAGNOSIS — D64.9 TRANSFUSION-DEPENDENT ANEMIA: Primary | ICD-10-CM

## 2022-09-20 DIAGNOSIS — C71.9 OLIGODENDROGLIOMA: ICD-10-CM

## 2022-09-20 LAB
BASOPHILS # BLD AUTO: 0.01 10*3/MM3 (ref 0–0.2)
BASOPHILS NFR BLD AUTO: 0.2 % (ref 0–1.5)
DEPRECATED RDW RBC AUTO: 73.7 FL (ref 37–54)
EOSINOPHIL # BLD AUTO: 0.09 10*3/MM3 (ref 0–0.4)
EOSINOPHIL NFR BLD AUTO: 2.2 % (ref 0.3–6.2)
ERYTHROCYTE [DISTWIDTH] IN BLOOD BY AUTOMATED COUNT: 18.7 % (ref 12.3–15.4)
HCT VFR BLD AUTO: 30.6 % (ref 34–46.6)
HGB BLD-MCNC: 9.3 G/DL (ref 12–15.9)
LYMPHOCYTES # BLD AUTO: 0.52 10*3/MM3 (ref 0.7–3.1)
LYMPHOCYTES NFR BLD AUTO: 12.5 % (ref 19.6–45.3)
MCH RBC QN AUTO: 33.9 PG (ref 26.6–33)
MCHC RBC AUTO-ENTMCNC: 30.4 G/DL (ref 31.5–35.7)
MCV RBC AUTO: 111.7 FL (ref 79–97)
MONOCYTES # BLD AUTO: 0.36 10*3/MM3 (ref 0.1–0.9)
MONOCYTES NFR BLD AUTO: 8.6 % (ref 5–12)
NEUTROPHILS NFR BLD AUTO: 3.19 10*3/MM3 (ref 1.7–7)
NEUTROPHILS NFR BLD AUTO: 76.5 % (ref 42.7–76)
PLATELET # BLD AUTO: 99 10*3/MM3 (ref 140–450)
PMV BLD AUTO: 9.9 FL (ref 6–12)
RBC # BLD AUTO: 2.74 10*6/MM3 (ref 3.77–5.28)
WBC NRBC COR # BLD: 4.17 10*3/MM3 (ref 3.4–10.8)

## 2022-09-20 PROCEDURE — 25010000002 EPOETIN ALFA PER 1000 UNITS: Performed by: INTERNAL MEDICINE

## 2022-09-20 PROCEDURE — 96372 THER/PROPH/DIAG INJ SC/IM: CPT

## 2022-09-20 PROCEDURE — 85025 COMPLETE CBC W/AUTO DIFF WBC: CPT

## 2022-09-20 PROCEDURE — 36415 COLL VENOUS BLD VENIPUNCTURE: CPT

## 2022-09-20 RX ADMIN — ERYTHROPOIETIN 40000 UNITS: 40000 INJECTION, SOLUTION INTRAVENOUS; SUBCUTANEOUS at 10:32

## 2022-09-21 ENCOUNTER — HOSPITAL ENCOUNTER (OUTPATIENT)
Dept: MRI IMAGING | Facility: HOSPITAL | Age: 57
End: 2022-09-21

## 2022-09-23 ENCOUNTER — LAB (OUTPATIENT)
Dept: LAB | Facility: HOSPITAL | Age: 57
End: 2022-09-23

## 2022-09-23 ENCOUNTER — TRANSCRIBE ORDERS (OUTPATIENT)
Dept: ADMINISTRATIVE | Facility: HOSPITAL | Age: 57
End: 2022-09-23

## 2022-09-23 DIAGNOSIS — K76.6 PORTOPULMONARY HYPERTENSION: Primary | ICD-10-CM

## 2022-09-23 DIAGNOSIS — I27.20 PORTOPULMONARY HYPERTENSION: Primary | ICD-10-CM

## 2022-09-23 DIAGNOSIS — K76.6 PORTOPULMONARY HYPERTENSION: ICD-10-CM

## 2022-09-23 DIAGNOSIS — I27.20 PORTOPULMONARY HYPERTENSION: ICD-10-CM

## 2022-09-23 PROCEDURE — 86235 NUCLEAR ANTIGEN ANTIBODY: CPT

## 2022-09-23 PROCEDURE — 85652 RBC SED RATE AUTOMATED: CPT

## 2022-09-23 PROCEDURE — 86200 CCP ANTIBODY: CPT

## 2022-09-23 PROCEDURE — 86038 ANTINUCLEAR ANTIBODIES: CPT

## 2022-09-23 PROCEDURE — 86140 C-REACTIVE PROTEIN: CPT

## 2022-09-23 PROCEDURE — 36415 COLL VENOUS BLD VENIPUNCTURE: CPT

## 2022-09-24 LAB
CRP SERPL-MCNC: 0.66 MG/DL (ref 0–0.5)
ERYTHROCYTE [SEDIMENTATION RATE] IN BLOOD: 14 MM/HR (ref 0–30)

## 2022-09-26 LAB
ANA SER QL: NEGATIVE
ENA RNP AB SER-ACNC: <0.2 AI (ref 0–0.9)
ENA SM AB SER-ACNC: <0.2 AI (ref 0–0.9)
ENA SS-A AB SER-ACNC: <0.2 AI (ref 0–0.9)
ENA SS-B AB SER-ACNC: 0.2 AI (ref 0–0.9)

## 2022-09-27 ENCOUNTER — HOSPITAL ENCOUNTER (OUTPATIENT)
Dept: ONCOLOGY | Facility: HOSPITAL | Age: 57
Discharge: HOME | End: 2022-09-27

## 2022-09-27 ENCOUNTER — LAB (OUTPATIENT)
Dept: LAB | Facility: HOSPITAL | Age: 57
End: 2022-09-27

## 2022-09-27 DIAGNOSIS — C71.9 OLIGODENDROGLIOMA: ICD-10-CM

## 2022-09-27 DIAGNOSIS — Z95.828 PORT-A-CATH IN PLACE: Primary | ICD-10-CM

## 2022-09-27 LAB
BASOPHILS # BLD AUTO: 0.01 10*3/MM3 (ref 0–0.2)
BASOPHILS NFR BLD AUTO: 0.2 % (ref 0–1.5)
CCP IGA+IGG SERPL IA-ACNC: 5 UNITS (ref 0–19)
DEPRECATED RDW RBC AUTO: 69.7 FL (ref 37–54)
EOSINOPHIL # BLD AUTO: 0.09 10*3/MM3 (ref 0–0.4)
EOSINOPHIL NFR BLD AUTO: 1.6 % (ref 0.3–6.2)
ERYTHROCYTE [DISTWIDTH] IN BLOOD BY AUTOMATED COUNT: 18.1 % (ref 12.3–15.4)
HCT VFR BLD AUTO: 32.8 % (ref 34–46.6)
HGB BLD-MCNC: 10.7 G/DL (ref 12–15.9)
LYMPHOCYTES # BLD AUTO: 0.61 10*3/MM3 (ref 0.7–3.1)
LYMPHOCYTES NFR BLD AUTO: 10.6 % (ref 19.6–45.3)
MCH RBC QN AUTO: 35.2 PG (ref 26.6–33)
MCHC RBC AUTO-ENTMCNC: 32.6 G/DL (ref 31.5–35.7)
MCV RBC AUTO: 107.9 FL (ref 79–97)
MONOCYTES # BLD AUTO: 0.41 10*3/MM3 (ref 0.1–0.9)
MONOCYTES NFR BLD AUTO: 7.1 % (ref 5–12)
NEUTROPHILS NFR BLD AUTO: 4.63 10*3/MM3 (ref 1.7–7)
NEUTROPHILS NFR BLD AUTO: 80.5 % (ref 42.7–76)
PLATELET # BLD AUTO: 116 10*3/MM3 (ref 140–450)
PMV BLD AUTO: 10 FL (ref 6–12)
RBC # BLD AUTO: 3.04 10*6/MM3 (ref 3.77–5.28)
WBC NRBC COR # BLD: 5.75 10*3/MM3 (ref 3.4–10.8)

## 2022-09-27 PROCEDURE — G0463 HOSPITAL OUTPT CLINIC VISIT: HCPCS

## 2022-09-27 PROCEDURE — 25010000002 HEPARIN LOCK FLUSH PER 10 UNITS: Performed by: INTERNAL MEDICINE

## 2022-09-27 PROCEDURE — 85025 COMPLETE CBC W/AUTO DIFF WBC: CPT

## 2022-09-27 RX ORDER — SODIUM CHLORIDE 0.9 % (FLUSH) 0.9 %
10 SYRINGE (ML) INJECTION AS NEEDED
Status: DISCONTINUED | OUTPATIENT
Start: 2022-09-27 | End: 2022-09-28 | Stop reason: HOSPADM

## 2022-09-27 RX ORDER — HEPARIN SODIUM (PORCINE) LOCK FLUSH IV SOLN 100 UNIT/ML 100 UNIT/ML
500 SOLUTION INTRAVENOUS AS NEEDED
Status: DISCONTINUED | OUTPATIENT
Start: 2022-09-27 | End: 2022-09-28 | Stop reason: HOSPADM

## 2022-09-27 RX ORDER — SODIUM CHLORIDE 0.9 % (FLUSH) 0.9 %
10 SYRINGE (ML) INJECTION AS NEEDED
Status: CANCELLED | OUTPATIENT
Start: 2022-09-27

## 2022-09-27 RX ORDER — HEPARIN SODIUM (PORCINE) LOCK FLUSH IV SOLN 100 UNIT/ML 100 UNIT/ML
500 SOLUTION INTRAVENOUS AS NEEDED
Status: CANCELLED | OUTPATIENT
Start: 2022-09-27

## 2022-09-27 RX ADMIN — HEPARIN 500 UNITS: 100 SYRINGE at 10:10

## 2022-09-27 RX ADMIN — Medication 30 ML: at 10:08

## 2022-09-27 NOTE — PROGRESS NOTES
Pt to the clinic for Procrit injection.  CBC not within treatment parameters. Injection not given.  Patient reports port not flushed since in hospital in early July.Accessed patients port and with 10cc empty syringe was able to obtain 10cc blood return,then proceeded to flush port without difficulty. AVS given.

## 2022-10-04 ENCOUNTER — APPOINTMENT (OUTPATIENT)
Dept: LAB | Facility: HOSPITAL | Age: 57
End: 2022-10-04

## 2022-10-04 ENCOUNTER — APPOINTMENT (OUTPATIENT)
Dept: ONCOLOGY | Facility: HOSPITAL | Age: 57
End: 2022-10-04

## 2022-10-05 ENCOUNTER — TELEPHONE (OUTPATIENT)
Dept: ONCOLOGY | Facility: CLINIC | Age: 57
End: 2022-10-05

## 2022-10-05 NOTE — TELEPHONE ENCOUNTER
CALLED PATIENT TO RESCHEDULE 10-4-2022 LAB/PROCRIT APPT.  SHE STATES SHE WANTS TO KEEP THE Tuesday APPTS SO SHE WILL COME IN ON 10-.

## 2022-10-11 ENCOUNTER — HOSPITAL ENCOUNTER (OUTPATIENT)
Dept: ONCOLOGY | Facility: HOSPITAL | Age: 57
Discharge: HOME | End: 2022-10-11

## 2022-10-11 ENCOUNTER — LAB (OUTPATIENT)
Dept: LAB | Facility: HOSPITAL | Age: 57
End: 2022-10-11

## 2022-10-11 VITALS — DIASTOLIC BLOOD PRESSURE: 70 MMHG | HEART RATE: 60 BPM | SYSTOLIC BLOOD PRESSURE: 152 MMHG

## 2022-10-11 DIAGNOSIS — D64.9 TRANSFUSION-DEPENDENT ANEMIA: Primary | ICD-10-CM

## 2022-10-11 DIAGNOSIS — C71.9 OLIGODENDROGLIOMA: ICD-10-CM

## 2022-10-11 LAB
BASOPHILS # BLD AUTO: 0.01 10*3/MM3 (ref 0–0.2)
BASOPHILS NFR BLD AUTO: 0.2 % (ref 0–1.5)
DEPRECATED RDW RBC AUTO: 65.7 FL (ref 37–54)
EOSINOPHIL # BLD AUTO: 0.11 10*3/MM3 (ref 0–0.4)
EOSINOPHIL NFR BLD AUTO: 2 % (ref 0.3–6.2)
ERYTHROCYTE [DISTWIDTH] IN BLOOD BY AUTOMATED COUNT: 17 % (ref 12.3–15.4)
HCT VFR BLD AUTO: 29.8 % (ref 34–46.6)
HGB BLD-MCNC: 9.5 G/DL (ref 12–15.9)
LYMPHOCYTES # BLD AUTO: 0.53 10*3/MM3 (ref 0.7–3.1)
LYMPHOCYTES NFR BLD AUTO: 9.6 % (ref 19.6–45.3)
MCH RBC QN AUTO: 35.3 PG (ref 26.6–33)
MCHC RBC AUTO-ENTMCNC: 31.9 G/DL (ref 31.5–35.7)
MCV RBC AUTO: 110.8 FL (ref 79–97)
MONOCYTES # BLD AUTO: 0.29 10*3/MM3 (ref 0.1–0.9)
MONOCYTES NFR BLD AUTO: 5.2 % (ref 5–12)
NEUTROPHILS NFR BLD AUTO: 4.6 10*3/MM3 (ref 1.7–7)
NEUTROPHILS NFR BLD AUTO: 83 % (ref 42.7–76)
PLATELET # BLD AUTO: 127 10*3/MM3 (ref 140–450)
PMV BLD AUTO: 8.6 FL (ref 6–12)
RBC # BLD AUTO: 2.69 10*6/MM3 (ref 3.77–5.28)
WBC NRBC COR # BLD: 5.54 10*3/MM3 (ref 3.4–10.8)

## 2022-10-11 PROCEDURE — 25010000002 EPOETIN ALFA PER 1000 UNITS: Performed by: INTERNAL MEDICINE

## 2022-10-11 PROCEDURE — 85025 COMPLETE CBC W/AUTO DIFF WBC: CPT

## 2022-10-11 PROCEDURE — 96372 THER/PROPH/DIAG INJ SC/IM: CPT

## 2022-10-11 PROCEDURE — 36415 COLL VENOUS BLD VENIPUNCTURE: CPT

## 2022-10-11 RX ADMIN — ERYTHROPOIETIN 40000 UNITS: 40000 INJECTION, SOLUTION INTRAVENOUS; SUBCUTANEOUS at 10:44

## 2022-10-12 ENCOUNTER — APPOINTMENT (OUTPATIENT)
Dept: MRI IMAGING | Facility: HOSPITAL | Age: 57
End: 2022-10-12

## 2022-10-18 ENCOUNTER — APPOINTMENT (OUTPATIENT)
Dept: ONCOLOGY | Facility: HOSPITAL | Age: 57
End: 2022-10-18

## 2022-10-18 ENCOUNTER — APPOINTMENT (OUTPATIENT)
Dept: LAB | Facility: HOSPITAL | Age: 57
End: 2022-10-18

## 2022-10-18 RX ORDER — SODIUM CHLORIDE 0.9 % (FLUSH) 0.9 %
20 SYRINGE (ML) INJECTION AS NEEDED
Status: CANCELLED | OUTPATIENT
Start: 2022-10-18

## 2022-10-18 RX ORDER — HEPARIN SODIUM (PORCINE) LOCK FLUSH IV SOLN 100 UNIT/ML 100 UNIT/ML
500 SOLUTION INTRAVENOUS AS NEEDED
Status: CANCELLED | OUTPATIENT
Start: 2022-10-18

## 2022-10-18 NOTE — PROGRESS NOTES
HEMATOLOGY ONCOLOGY OUTPATIENT FOLLOW UP      Patient name: Cindy Cortes  : 1965  MRN: [id number]  Primary Care Physician: Annamarie Monroy APRN  Referring Physician: Annamarie Monroy APRN  Reason For Consult:     Chief Complaint   Patient presents with   • Follow-up     Oligodendroglioma (HCC)     HPI:   History of Present Illness:  Cindy Cortes is 57 y.o. female who presented to our office on 06/10/21 for consultation regarding Oligodendroglioma    Patient is a 57 y.o. female who was referred to us for treatment options regarding recent diagnosis of grade 2 oligodendroglioma.  This was IDH [id number] mutated, 1P 19 Q codeleted.  Patient initially presented with seizures and a CT head was obtained that showed vasogenic edema and a right frontal lobe mass MRI was obtained after this.  2021 -MRI of the brain shows 2.1 x 4.4 x 3.3 cm right frontal lobe mass with eccentric cystic or necrotic component with surrounding vasogenic edema and a focal 3 mm right to left midline shift.  Patient was started on Keppra, steroids plan was made for elective craniotomy and surgical resection.    2021 craniotomy of the right frontal lobe, microscopic resection of tumor mass.  Pathology results oligodendroglioma WHO grade 2, IDH1 R132H mutation by immunohistochemistry, 1p19q codeletion by FISH.    2021 -status post resection of the right frontal lobe mass.  Expected postop blood product. resolution of midline shift.    2021 - Started radiation adjuvantly.    2021 - last day of radiation.    2021 - C1 D8 with vincristin started procarbazine  10/7/2021 - C1 D29 Vincristine    10/21/2021 - C2D1 PCV  10/28/2021 -cycle 2-day 8 with vincristine  Started procarbazine  Held procarbazine for a few days with nausea  2021 -patient in the hospital with significant pancytopenia needing blood transfusion.    2021 -vincristine cycle 2-day 29.  This has been delayed with ongoing  cytopenias and hospitalization.    12/27/2021 - C3 D1 12/30/2021 1/6/2022 - C3D8 vincristine.    2/21/2022 - C4 D1 decrease dose of Lomustine.    Continued myelosuppression afterwards  4/1/2022 -CT imaging with interval development of relatively diffuse groundglass opacity patchy areas of sparing.  Bronchoscopy negative for any concerning findings  Permanently discontinued chemotherapy with possible pneumonitis, prolonged myelosuppression.    5/16/2022 - WBC 5.52 hb 8.2 hct 27.8, plt 128    6/14/2022 -CBC with hemoglobin 6.4, hematocrit 20.2, platelet 94    7/3/2022 -MRI brain with postsurgical changes within the right frontal lobe surrounding FLAIR hyperintensity appears slightly increased could be related to posttreatment changes.  Versus tumor progression symptomatically no new neurological deficits.    7/2022 -patient started on Retacrit weekly.  10/19/2022 WBC 5.78, hemoglobin 9.8, platelet 127    Subjective:  Patient has not needed transfusion for the last several weeks.  Shortness of breath is slowly improving.     Past Medical History:   Diagnosis Date   • Anemia    • Arthritis    • GERD (gastroesophageal reflux disease)    • Hypertension    • Oligodendroglioma (HCC)    • Seizure (HCC)    • Type 2 diabetes mellitus with hyperglycemia, without long-term current use of insulin (HCC) 05/12/2021       Past Surgical History:   Procedure Laterality Date   • BRONCHOSCOPY N/A 4/6/2022    Procedure: BRONCHOSCOPY with bronchial washing;  Surgeon: Drew Alcantar MD;  Location: [facility] ENDOSCOPY;  Service: Pulmonary;  Laterality: N/A;  pneumonia   • BRONCHOSCOPY N/A 5/31/2022    Procedure: BRONCHOSCOPY AT BEDSIDE with bronchoalveolar lavage right middle lobe;  Surgeon: Den Feldman MD;  Location: [facility] ENDOSCOPY;  Service: Pulmonary;  Laterality: N/A;   • COLONOSCOPY     • CRANIOTOMY FOR TUMOR Right 05/06/2021    Procedure: CRANIOTOMY FOR TUMOR RESECTION WITH STEREOTACTIC;  Surgeon: Jluis Felix MD;  Location:  [facility] MAIN OR;  Service: Neurosurgery;  Laterality: Right;         Current Outpatient Medications:   •  albuterol sulfate  (90 Base) MCG/ACT inhaler, Inhale 2 puffs Every 4 (Four) Hours As Needed for Wheezing., Disp: 18 g, Rfl: 2  •  Budeson-Glycopyrrol-Formoterol (Breztri Aerosphere) 160-9-4.8 MCG/ACT aerosol inhaler, Inhale 2 puffs 2 (Two) Times a Day. Indications: Lot#[id number] Exp: 8/30/23, Disp: 1 each, Rfl: 0  •  diphenhydrAMINE (BENADRYL) 25 MG tablet, Take 25 mg by mouth Daily As Needed for Allergies., Disp: , Rfl:   •  ferrous sulfate 325 (65 FE) MG tablet, Take 1 tablet by mouth Daily With Breakfast., Disp: , Rfl:   •  fluticasone (FLOVENT HFA) 44 MCG/ACT inhaler, Inhale 2 puffs Daily As Needed., Disp: , Rfl:   •  folic acid (FOLVITE) 1 MG tablet, Take 1 mg by mouth Daily., Disp: , Rfl:   •  levETIRAcetam (KEPPRA) 750 MG tablet, Take 1 tablet by mouth 2 (Two) Times a Day., Disp: 60 tablet, Rfl: 2  •  metoprolol tartrate (LOPRESSOR) 50 MG tablet, Take 50 mg by mouth 2 (Two) Times a Day., Disp: , Rfl:   •  ondansetron (Zofran) 8 MG tablet, Take 1 tablet by mouth Every 8 (Eight) Hours As Needed for Nausea or Vomiting., Disp: 30 tablet, Rfl: 3  •  oxybutynin (DITROPAN) 5 MG tablet, Take 15 mg by mouth Daily., Disp: , Rfl:   •  venlafaxine XR (EFFEXOR-XR) 150 MG 24 hr capsule, Take 150 mg by mouth Daily., Disp: , Rfl: 3  •  vitamin D (ERGOCALCIFEROL) 1.25 MG (89791 UT) capsule capsule, Take 50,000 Units by mouth Every 7 (Seven) Days.  , Disp: , Rfl:   No current facility-administered medications for this visit.    Current outpatient and discharge medications have been reconciled for the patient.  Reviewed by: Emilie Workman MD    No Known Allergies    Family History   Problem Relation Age of Onset   • Kidney disease Mother    • Prostate cancer Brother    • Breast cancer Maternal Aunt    • Colon cancer Maternal Aunt    • Breast cancer Niece    • Breast cancer Cousin        Cancer-related family history  "includes Breast cancer in her cousin, maternal aunt, and niece; Colon cancer in her maternal aunt; Prostate cancer in her brother.    Social History     Tobacco Use   • Smoking status: Never   • Smokeless tobacco: Never   Vaping Use   • Vaping Use: Never used   Substance Use Topics   • Alcohol use: Not Currently     Alcohol/week: 1.0 standard drink     Types: 1 Cans of beer per week     Comment: socially   • Drug use: Never     Social History     Social History Narrative   • Not on file      Objective:    Vitals:    10/19/22 1006   BP: 138/63   Pulse: 58   Resp: 18   Temp: 96.6 °F (35.9 °C)   SpO2: 94%   Weight: 112 kg (248 lb)   Height: 157.5 cm (62\")   PainSc: 0-No pain     Body mass index is 45.36 kg/m².  ECOG  (0) Fully active, able to carry on all predisease performance without restriction    Physical Exam:     Physical Exam  Constitutional:       Appearance: Normal appearance. She is obese.   HENT:      Head: Normocephalic and atraumatic.      Nose: Nose normal.      Mouth/Throat:      Mouth: Mucous membranes are moist.   Eyes:      Pupils: Pupils are equal, round, and reactive to light.   Cardiovascular:      Rate and Rhythm: Normal rate and regular rhythm.      Pulses: Normal pulses.      Heart sounds: Normal heart sounds. No murmur heard.  Pulmonary:      Effort: Pulmonary effort is normal.      Breath sounds: Rhonchi present.   Abdominal:      General: There is no distension.      Palpations: Abdomen is soft. There is no mass.      Tenderness: There is no abdominal tenderness.      Hernia: No hernia is present.   Musculoskeletal:         General: No tenderness. Normal range of motion.      Cervical back: Normal range of motion and neck supple.   Skin:     General: Skin is warm.   Neurological:      General: No focal deficit present.      Mental Status: She is alert.   Psychiatric:         Mood and Affect: Mood normal.       Lab Results - Last 18 Months   Lab Units 10/19/22  0944 10/11/22  1018 " 09/27/22  0957   WBC 10*3/mm3 5.78 5.54 5.75   HEMOGLOBIN g/dL 9.8* 9.5* 10.7*   HEMATOCRIT % 30.2* 29.8* 32.8*   PLATELETS 10*3/mm3 127* 127* 116*   MCV fL 110.2* 110.8* 107.9*     Lab Results - Last 18 Months   Lab Units 07/25/22  1437 07/08/22  0220 07/07/22  0020 07/06/22  0030 07/05/22  1630 06/04/22  0610   SODIUM mmol/L 142 135* 135*   < > 138 137   POTASSIUM mmol/L 3.8 4.4 4.2   < > 3.5 3.7   CHLORIDE mmol/L 106 102 103   < > 103 105   CO2 mmol/L 24.0 19.0* 21.0*   < > 19.0* 22.0   BUN mg/dL 12 12 12   < > 13 20   CREATININE mg/dL 0.80 0.62 0.62   < > 0.88 0.56*   CALCIUM mg/dL 8.7 8.4* 8.2*   < > 8.8 8.7   BILIRUBIN mg/dL 1.1  --   --   --  1.5* 1.0   ALK PHOS U/L 115  --   --   --  115 86   ALT (SGPT) U/L 11  --   --   --  8 16   AST (SGOT) U/L 15  --   --   --  10 13   GLUCOSE mg/dL 122* 100* 100*   < > 152* 109*    < > = values in this interval not displayed.       Lab Results   Component Value Date    GLUCOSE 122 (H) 07/25/2022    BUN 12 07/25/2022    CREATININE 0.80 07/25/2022    EGFRIFNONA 77 01/27/2022    BCR 15.0 07/25/2022    K 3.8 07/25/2022    CO2 24.0 07/25/2022    CALCIUM 8.7 07/25/2022    PROTENTOTREF 6.4 12/15/2021    ALBUMIN 3.40 (L) 07/25/2022    LABIL2 1.1 12/15/2021    AST 15 07/25/2022    ALT 11 07/25/2022       Lab Results - Last 18 Months   Lab Units 07/05/22  1630 08/27/21  0738 05/04/21  0922   INR  1.19* 0.96 0.96   APTT seconds 26.8* 25.6 20.7*       Lab Results   Component Value Date    IRON 80 08/22/2022    TIBC 232 (L) 08/22/2022    FERRITIN 815.50 (H) 08/22/2022       Lab Results   Component Value Date    OIHPFFXZ44 327 08/22/2022       Lab Results   Component Value Date    PTT 26.8 (L) 07/05/2022    INR 1.19 (H) 07/05/2022     No radiology results for the last 90 days.  Pathology results  Outside Report, Addendum   Integrated Diagnosis: Oligodendroglioma WHO Grade II  IDH1 R132H mutation by Immunohistochemistry; 1p19q co-deletion by FISH  See attached report from [place]  "[facility]   Addendum electronically signed by Cecilia Chaudhary on 5/28/2021 at 0824   Final Diagnosis   Specimen #1 (\"Brain tumor right frontal lobe,\" biopsy):    Diffuse glioma (WHO grade II)    See comment     Specimen #2 (\"Brain tumor right frontal lobe,\" biopsy):    Diffuse glioma (WHO grade II)    See comment     Specimen #3 (Brain tumor right frontal lobe, resection):    Diffuse glioma, IDH-mutant (WHO grade II)    See attached report from [place] [facility]         Assessment & Plan     Patient is a 56-year-old female with oligodendroglioma grade 2 status post gross total resection    Oligodendroglioma  Previously I had an extensive discussion with the patient about treatment options, prognosis.  We had an extensive discussion at that time with several options.  This is summarized here below    I previously discussed with her that there are findings from RTOG 9802 clinical trial which showed survival advantage with radiation followed by chemotherapy after surgical resection of grade 2 oligodendrogliomas.  Chemotherapy with the PCV regimen in this trial conferred a survival advantage over radiotherapy alone with a median overall survival of 13.3 versus 7.8 years.  In subset analysis benefit was more significant  Histologic oligodendroglioma is as compared to other low-grade gliomas.  Molecular analysis post hoc analysis also showed survival advantage in molecularly confirmed IDH mutant, 1p19q codeleted oligodendrogliomas.    PCV can be a toxic regimen however survival advantage is only been shown in randomized control trial using this particular regimen.  The other option would be temozolomide which has advantages over PCV with ease of administration, better tolerance and efficacy in combination with radiation therapy and other types of CNS tumors and gliomas.    The other option I had discussed with her was clinical trial with a CODEL study which is " comparing radiation alone versus radiation with Temodar versus radiation with PCV in this patient population.  Patient however is not very interested in enrolling in a clinical trial.  She is wanting to pursue the most aggressive treatment option for her to reduce the chances of recurrence of her tumor.    Lastly also discussed with her the option of surveillance and observation with serial MRIs however also discussed that in above studies the progression free survival is around 50% for 5 years.     Based on her above discussion patient decided to pursue aggressive treatment with radiation followed by chemotherapy.  We would use the RTOG 9802 regimen with radiation followed by chemotherapy with PCV.  We have evidence that vincristine does not cross the blood-brain barrier significantly and hence if there is toxicity we can choose to omit the drug.  Case was discussed with Dr. Cruz and started sequential radiation and chemo    Patient completed radiation without complication.    Based on above patient decided to proceed with adjuvant PCV.   Now patient has had clinically significant pancytopenia needing blood transfusion hospital admission.  For cycle 3 dose reduced procarbazine and lomustine by 20%.  For C4, decrease Lomustine to 110.  Complete cycle  with prolonged myelosuppression, shortness of breath stopped chemotherapy going forward.  I discussed with her for the risks of continuing chemotherapy versus benefits which at this point she is worsening in terms of myelosuppression, likely pneumonitis with chemotherapy.  MRI brain in July/2022 with slight increase flair intensity.    Repeat imaging has been scheduled I will review the results.  No further chemotherapy at this point.  No signs and symptoms of progressive disease    Shortness of breath  CT of the chest with pneumonitis.  Could also be related to anemia.  Given a short course of steroids, continue albuterol, steroid inhaler per pulmonology  Repeat CT  with only mild changes.  Her shortness of breath is mostly related to anemia  Echocardiogram with severe pulmonary hypertension.    Patient is seeing cardiology for cath and treatment for pulmonary hypertension.  I think majority of her shortness of breath is secondary to this.    Anemia  There could be possibility of hemolysis nonimmune versus myelosuppression  There have been case reports with her chemotherapy regimen.  She was given 5-day course of prednisone.  Repeat haptoglobin improved  Now with continued drop in hemoglobin patient had a bone marrow biopsy which showed some erythrocyte dyspoiesis.  She could have an underlying myelodysplastic syndrome which got worse with chemotherapy.  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  She however is not a good candidate for allogeneic transplant at this point with severe pulmonary hypertension.  We will monitor her CBC for now  I will hold her Retacrit for the next 2 weeks to see if her hemoglobin stays at this level.    Follow-up in 3 months with CBC

## 2022-10-19 ENCOUNTER — HOSPITAL ENCOUNTER (OUTPATIENT)
Dept: ONCOLOGY | Facility: HOSPITAL | Age: 57
Discharge: HOME | End: 2022-10-19

## 2022-10-19 ENCOUNTER — LAB (OUTPATIENT)
Dept: LAB | Facility: HOSPITAL | Age: 57
End: 2022-10-19

## 2022-10-19 ENCOUNTER — OFFICE VISIT (OUTPATIENT)
Dept: ONCOLOGY | Facility: CLINIC | Age: 57
End: 2022-10-19

## 2022-10-19 VITALS
SYSTOLIC BLOOD PRESSURE: 138 MMHG | WEIGHT: 248 LBS | DIASTOLIC BLOOD PRESSURE: 63 MMHG | HEIGHT: 62 IN | TEMPERATURE: 96.6 F | BODY MASS INDEX: 45.64 KG/M2 | RESPIRATION RATE: 18 BRPM | HEART RATE: 58 BPM | OXYGEN SATURATION: 94 %

## 2022-10-19 VITALS — WEIGHT: 248 LBS | HEIGHT: 62 IN | BODY MASS INDEX: 45.64 KG/M2

## 2022-10-19 DIAGNOSIS — D64.9 TRANSFUSION-DEPENDENT ANEMIA: ICD-10-CM

## 2022-10-19 DIAGNOSIS — C71.9 OLIGODENDROGLIOMA: Primary | ICD-10-CM

## 2022-10-19 DIAGNOSIS — D69.6 ANEMIA WITH LOW PLATELET COUNT: Primary | ICD-10-CM

## 2022-10-19 DIAGNOSIS — D64.9 TRANSFUSION-DEPENDENT ANEMIA: Primary | ICD-10-CM

## 2022-10-19 LAB
BASOPHILS # BLD AUTO: 0.01 10*3/MM3 (ref 0–0.2)
BASOPHILS NFR BLD AUTO: 0.2 % (ref 0–1.5)
DEPRECATED RDW RBC AUTO: 64.6 FL (ref 37–54)
EOSINOPHIL # BLD AUTO: 0.13 10*3/MM3 (ref 0–0.4)
EOSINOPHIL NFR BLD AUTO: 2.2 % (ref 0.3–6.2)
ERYTHROCYTE [DISTWIDTH] IN BLOOD BY AUTOMATED COUNT: 16.6 % (ref 12.3–15.4)
HCT VFR BLD AUTO: 30.2 % (ref 34–46.6)
HGB BLD-MCNC: 9.8 G/DL (ref 12–15.9)
HOLD SPECIMEN: NORMAL
HOLD SPECIMEN: NORMAL
LYMPHOCYTES # BLD AUTO: 0.61 10*3/MM3 (ref 0.7–3.1)
LYMPHOCYTES NFR BLD AUTO: 10.6 % (ref 19.6–45.3)
MCH RBC QN AUTO: 35.8 PG (ref 26.6–33)
MCHC RBC AUTO-ENTMCNC: 32.5 G/DL (ref 31.5–35.7)
MCV RBC AUTO: 110.2 FL (ref 79–97)
MONOCYTES # BLD AUTO: 0.35 10*3/MM3 (ref 0.1–0.9)
MONOCYTES NFR BLD AUTO: 6.1 % (ref 5–12)
NEUTROPHILS NFR BLD AUTO: 4.68 10*3/MM3 (ref 1.7–7)
NEUTROPHILS NFR BLD AUTO: 80.9 % (ref 42.7–76)
PLATELET # BLD AUTO: 127 10*3/MM3 (ref 140–450)
PMV BLD AUTO: 9.3 FL (ref 6–12)
RBC # BLD AUTO: 2.74 10*6/MM3 (ref 3.77–5.28)
WBC NRBC COR # BLD: 5.78 10*3/MM3 (ref 3.4–10.8)

## 2022-10-19 PROCEDURE — 36415 COLL VENOUS BLD VENIPUNCTURE: CPT

## 2022-10-19 PROCEDURE — 99214 OFFICE O/P EST MOD 30 MIN: CPT | Performed by: INTERNAL MEDICINE

## 2022-10-19 PROCEDURE — 85025 COMPLETE CBC W/AUTO DIFF WBC: CPT

## 2022-10-19 PROCEDURE — 25010000002 EPOETIN ALFA PER 1000 UNITS: Performed by: INTERNAL MEDICINE

## 2022-10-19 PROCEDURE — 96372 THER/PROPH/DIAG INJ SC/IM: CPT

## 2022-10-19 RX ADMIN — ERYTHROPOIETIN 40000 UNITS: 40000 INJECTION, SOLUTION INTRAVENOUS; SUBCUTANEOUS at 10:02

## 2022-10-20 ENCOUNTER — TRANSCRIBE ORDERS (OUTPATIENT)
Dept: ADMINISTRATIVE | Facility: HOSPITAL | Age: 57
End: 2022-10-20

## 2022-10-20 DIAGNOSIS — D50.0 IRON DEFICIENCY ANEMIA SECONDARY TO BLOOD LOSS (CHRONIC): Primary | ICD-10-CM

## 2022-10-25 ENCOUNTER — APPOINTMENT (OUTPATIENT)
Dept: ONCOLOGY | Facility: HOSPITAL | Age: 57
End: 2022-10-25

## 2022-10-25 ENCOUNTER — APPOINTMENT (OUTPATIENT)
Dept: LAB | Facility: HOSPITAL | Age: 57
End: 2022-10-25

## 2022-10-27 ENCOUNTER — OFFICE VISIT (OUTPATIENT)
Dept: CARDIOLOGY | Facility: CLINIC | Age: 57
End: 2022-10-27

## 2022-10-27 VITALS
OXYGEN SATURATION: 95 % | BODY MASS INDEX: 45.64 KG/M2 | SYSTOLIC BLOOD PRESSURE: 140 MMHG | HEIGHT: 62 IN | RESPIRATION RATE: 18 BRPM | DIASTOLIC BLOOD PRESSURE: 102 MMHG | HEART RATE: 60 BPM | WEIGHT: 248 LBS

## 2022-10-27 DIAGNOSIS — R06.09 DYSPNEA ON EXERTION: Primary | ICD-10-CM

## 2022-10-27 DIAGNOSIS — R07.9 CHEST PAIN, UNSPECIFIED TYPE: ICD-10-CM

## 2022-10-27 PROCEDURE — 93000 ELECTROCARDIOGRAM COMPLETE: CPT | Performed by: INTERNAL MEDICINE

## 2022-10-27 PROCEDURE — 99204 OFFICE O/P NEW MOD 45 MIN: CPT | Performed by: INTERNAL MEDICINE

## 2022-10-27 NOTE — PROGRESS NOTES
Cardiology Clinic Note  Josue Nix MD, PhD    Subjective:     Encounter Date:10/27/2022      Patient ID: Cindy Cortes is a 57 y.o. female.    Chief Complaint:  Chief Complaint   Patient presents with   • Heart Problem     New patient referred by Dr Alcantar       HPI:    I the pleasure to see this 57-year-old female referred from pulmonology for evaluation of severe pulmonary hypertension.  She had a 2D echo which revealed normal LV systolic function at 60%, moderate to severe RV enlargement with mild RV hypokinesis, RVSP was estimated 60 to 70 mmHg severely increased.  She is morbidly obese with BMI greater than 45 with known obstructive sleep apnea.  CT PE protocol back in May of this year revealed no PE or filling defect, no history of chronic thromboembolic disease and she is not on anticoagulation.  She is recently undergone multiple rounds of radiation chemotherapy secondary to brain tumor and she says she always felt fine with no shortness of breath until she received chemotherapy and was diagnosed with brain cancer and had subsequent treatments.  We did discuss that her pulmonary hypertension may be multifactorial with chronic untreated obstructive sleep apnea, morbid obesity hypoventilation, chemotherapy agents in addition to perhaps some diastolic dysfunction however parameters were not more than grade 1 diastolic dysfunction during her 2D echo which would likely not be contributory to this severity.  We did discuss right left heart catheterization to assess for reversibility with vasodilator challenge which she is amenable for.  No other complaints, no angina no unexplained syncope, mobility is significant limited she is wheelchair-bound at this point.    Notably she had a hospitalization in July of this year with A. fib RVR dyspnea on exertion also in the setting of pancytopenia, myelosuppression from chemotherapy and radiation status post frontal mass craniotomy in May 2021, she had transfusion of 3  "units packed red cells with very symptomatic anemia at that time and ultimately she converted to sinus rhythm.  Given her underlying significant anemia and risk of recurrent transfusion she has not been on systemic anticoagulation, EF normal at that time 60 to 65%    Review of systems otherwise negative x14 point review of systems except was mentioned above    Historical data copied forward from previous encounters in EMR including the history, exam, and assessment/plan has been reviewed and is unchanged unless noted otherwise.    Cardiac medicines reviewed with risk, benefits, and necessity of each discussed.    Risk and benefit of cardiac testing reviewed including death heart attack stroke pain bleeding infection need for vascular /cardiovascular surgery were discussed and the patient     Objective:         BP (!) 140/102 (BP Location: Left arm, Patient Position: Sitting, Cuff Size: Large Adult)   Pulse 60   Resp 18   Ht 157.5 cm (62\")   Wt 112 kg (248 lb)   LMP  (LMP Unknown)   SpO2 95%   BMI 45.36 kg/m²     Physical Exam  Regular rate and rhythm, no rubs gallops heave or lift, no audible murmurs, distant heart sounds given habitus  No carotid bruits or JVD  Morbidly obese, wheelchair-bound, soft nontender nondistended  No clubbing cyanosis with trace lower extremity edema  Radial pulses 1+ equal bilaterally with normal cap refill  Mild pallor present  Neurologic intact grossly  No bony abnormalities  Normocephalic/atraumatic pupils equal round  Assessment:         Brain tumor, 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, radiation chemotherapy  Pancytopenia and myelosuppression chemotherapy-induced  Atrial fibrillation, single event this year May 2022  Dyspnea exertion  Severe pulmonary hypertension  Obstructive sleep apnea  Morbid obesity BMI greater than 45  Essential hypertension    Get right and left heart cath for pressure assessment as well as vasodilator challenge  Will " consider repeating 2D echo and findings  Will optimize medicines with calcium channel blockers, sildenafil and change metoprolol to Coreg if indicated, if refractory to this would refer to advanced pulmonary hypertension specialist for additional targeted therapies based on physiology    Josue Nix MD, PhD      The pleasure to be involved in this patient's cardiovascular care.  Please call with any questions or concerns  Josue Nix MD, PhD    Most recent EKG as reviewed and interpreted by me:    ECG 12 Lead    Date/Time: 10/27/2022 2:09 PM  Performed by: Josue Nix MD  Authorized by: Josue Nix MD   Rhythm: sinus rhythm  Rate: normal  QRS axis: normal  Other findings: non-specific ST-T wave changes    Clinical impression: abnormal EKG  Comments:   Cannot rule out old inferolateral infarct             Most recent echo as reviewed and interpreted by me:  Results for orders placed during the hospital encounter of 07/05/22    Adult Transthoracic Echo Complete w/ Color, Spectral and Contrast if Necessary Per Protocol    Interpretation Summary  Normal LV size and contractility EF of 60 to 65%  Normal RV size  Normal atrial size  Aortic valve, mitral valve, tricuspid valve appears structurally normal, mild tricuspid regurgitation seen.  Calculated RV systolic pressure 70 mmHg consistent with severe pulmonary hypertension  No pericardial effusion seen.  Proximal aorta appears normal in size.      Most recent stress test as reviewed and interpreted by me:      Most recent cardiac catheterization as reviewed interpreted by me:  No results found for this or any previous visit.    The following portions of the patient's history were reviewed and updated as appropriate: allergies, current medications, past family history, past medical history, past social history, past surgical history and problem list.      ROS:  14 point review of systems negative except as mentioned above    Current  Outpatient Medications:   •  albuterol sulfate  (90 Base) MCG/ACT inhaler, Inhale 2 puffs Every 4 (Four) Hours As Needed for Wheezing., Disp: 18 g, Rfl: 2  •  Budeson-Glycopyrrol-Formoterol (Breztri Aerosphere) 160-9-4.8 MCG/ACT aerosol inhaler, Inhale 2 puffs 2 (Two) Times a Day. Indications: Lot#[id number] Exp: 8/30/23, Disp: 1 each, Rfl: 0  •  diphenhydrAMINE (BENADRYL) 25 MG tablet, Take 25 mg by mouth Daily As Needed for Allergies., Disp: , Rfl:   •  ferrous sulfate 325 (65 FE) MG tablet, Take 1 tablet by mouth Daily With Breakfast., Disp: , Rfl:   •  fluticasone (FLOVENT HFA) 44 MCG/ACT inhaler, Inhale 2 puffs Daily As Needed., Disp: , Rfl:   •  folic acid (FOLVITE) 1 MG tablet, Take 1 mg by mouth Daily., Disp: , Rfl:   •  levETIRAcetam (KEPPRA) 750 MG tablet, Take 1 tablet by mouth 2 (Two) Times a Day., Disp: 60 tablet, Rfl: 2  •  metoprolol tartrate (LOPRESSOR) 50 MG tablet, Take 50 mg by mouth 2 (Two) Times a Day., Disp: , Rfl:   •  ondansetron (Zofran) 8 MG tablet, Take 1 tablet by mouth Every 8 (Eight) Hours As Needed for Nausea or Vomiting., Disp: 30 tablet, Rfl: 3  •  oxybutynin (DITROPAN) 5 MG tablet, Take 15 mg by mouth Daily., Disp: , Rfl:   •  venlafaxine XR (EFFEXOR-XR) 150 MG 24 hr capsule, Take 150 mg by mouth Daily., Disp: , Rfl: 3  •  vitamin D (ERGOCALCIFEROL) 1.25 MG (18164 UT) capsule capsule, Take 50,000 Units by mouth Every 7 (Seven) Days.  , Disp: , Rfl:     Problem List:  Patient Active Problem List   Diagnosis   • Depression   • Essential hypertension   • Primary osteoarthritis of both knees   • Seasonal allergic rhinitis   • Seizure (HCC)   • Oligodendroglioma (HCC)   • Brain tumor (HCC)   • Combined forms of age-related cataract, bilateral   • Post-menopausal   • Presbyopia   • Anemia   • Dyslipidemia   • Gastroesophageal reflux disease with hiatal hernia   • Morbid obesity with BMI of 60.0-69.9, adult (HCC)   • Prediabetes   • Vitamin D deficiency   • Hyperglycemia   • Type 2  diabetes mellitus with hyperglycemia, without long-term current use of insulin (HCC)   • Oligoastrocytoma of frontal lobe (HCC)   • Port-A-Cath in place   • Chemotherapy-induced thrombocytopenia   • Pneumonia   • Mood swings   • HTN, goal below 130/80   • Morbid obesity with BMI of 60.0-69.9, adult (HCC)   • Oligoastrocytoma of frontal lobe (HCC)   • Type 2 diabetes mellitus with hyperglycemia, without long-term current use of insulin (HCC)   • Vitamin D deficiency   • Sepsis with hypotension (HCC)   • UTI (urinary tract infection)   • Acute respiratory failure with hypoxia (HCC)   • Anemia due to chemotherapy   • Acute kidney injury (HCC)   • Weakness   • Transfusion-dependent anemia   • Paroxysmal atrial fibrillation with rapid ventricular response (HCC)   • Dyspnea on exertion   • Hypomagnesemia   • Moderate malnutrition (HCC)   • Pulmonary hypertension (HCC)     Past Medical History:  Past Medical History:   Diagnosis Date   • Anemia    • Arthritis    • Atrial fibrillation (HCC)    • GERD (gastroesophageal reflux disease)    • Hypertension    • Oligodendroglioma (HCC)    • Pulmonary hypertension (HCC) 07/21/2022   • Seizure (HCC)    • Type 2 diabetes mellitus with hyperglycemia, without long-term current use of insulin (HCC) 05/12/2021     Past Surgical History:  Past Surgical History:   Procedure Laterality Date   • BRONCHOSCOPY N/A 4/6/2022    Procedure: BRONCHOSCOPY with bronchial washing;  Surgeon: Drew Alcantar MD;  Location: [facility] ENDOSCOPY;  Service: Pulmonary;  Laterality: N/A;  pneumonia   • BRONCHOSCOPY N/A 5/31/2022    Procedure: BRONCHOSCOPY AT BEDSIDE with bronchoalveolar lavage right middle lobe;  Surgeon: Den Feldman MD;  Location: [facility] ENDOSCOPY;  Service: Pulmonary;  Laterality: N/A;   • COLONOSCOPY     • CRANIOTOMY FOR TUMOR Right 05/06/2021    Procedure: CRANIOTOMY FOR TUMOR RESECTION WITH STEREOTACTIC;  Surgeon: Jluis Felix MD;  Location: [facility] MAIN OR;  Service: Neurosurgery;   Laterality: Right;     Social History:  Social History     Socioeconomic History   • Marital status:    Tobacco Use   • Smoking status: Never   • Smokeless tobacco: Never   Vaping Use   • Vaping Use: Never used   Substance and Sexual Activity   • Alcohol use: Not Currently     Alcohol/week: 1.0 standard drink     Types: 1 Cans of beer per week     Comment: socially   • Drug use: Never   • Sexual activity: Yes     Partners: Male     Allergies:  No Known Allergies  Immunizations:  Immunization History   Administered Date(s) Administered   • COVID-19 (GENARO) 03/12/2021   • Flu Vaccine Quad PF 6-35MO 09/27/2014, 10/16/2015, 10/29/2016, 11/16/2018, 10/25/2019   • FluLaval/Fluzone >6mos 10/25/2019   • Hep A, 2 Dose 04/27/2018, 11/16/2018   • Hepatitis A 04/27/2018, 11/16/2018   • Influenza Split Preservative Free ID 10/16/2015, 10/30/2017, 11/16/2018   • Influenza TIV (IM) 04/10/2011, 10/01/2012, 10/25/2013, 09/27/2014   • Influenza, Unspecified 10/25/2019   • Pneumococcal Polysaccharide (PPSV23) 11/01/2021   • Shingrix 02/11/2019, 08/13/2019   • Tdap 04/24/2012            In-Office Procedure(s):  No orders to display        ASCVD RIsk Score::  The 10-year ASCVD risk score (Chari DK, et al., 2019) is: 6.7%    Values used to calculate the score:      Age: 57 years      Sex: Female      Is Non- : No      Diabetic: Yes      Tobacco smoker: No      Systolic Blood Pressure: 140 mmHg      Is BP treated: Yes      HDL Cholesterol: 37 mg/dL      Total Cholesterol: 154 mg/dL    Imaging:    Results for orders placed during the hospital encounter of 07/05/22    XR Chest 1 View    Narrative  EXAMINATION: XR CHEST 1 VW-    DATE OF EXAM: 7/5/2022 4:50 AM    INDICATION: Shortness of air    COMPARISON: Chest radiograph dated 6/1/2022    TECHNIQUE: Portable AP view of the chest was obtained.    FINDINGS:  There is a right-sided chest port tip terminating at the mid the low  SVC. The cardiac silhouette is  mildly enlarged, likely accentuated by AP  portable technique. There is central bronchovascular crowding. There are  bilateral low lung volumes. There are multilevel degenerative changes of  the thoracic spine.    Impression  1. Low lung volumes without acute cardiopulmonary abnormality.  2. Mild cardiomegaly, likely accentuated by low lung volumes and AP  portable technique.    Electronically Signed By-Tayo Brumfield MD On:7/5/2022 4:58 PM  This report was finalized on [id number] by  Tayo Brumfield MD.       Results for orders placed during the hospital encounter of 05/30/22    CT Abdomen Pelvis Stone Protocol    Narrative  EXAM: CT ABDOMEN PELVIS STONE PROTOCOL-    DATE OF EXAM: 6/2/2022 12:54 PM    INDICATION: bacteremia to r/o obstructive uropathy; A41.9-Sepsis,  unspecified organism; I95.9-Hypotension, unspecified; J18.9-Pneumonia,  unspecified organism; D64.9-Anemia, unspecified; E86.0-Dehydration;  N17.9-Acute kidney failure, unspecified; C71.9-Malignant neoplasm of  brain, unspecified; J18.9-Pneumonia, unspecified organism.    COMPARISON: CT abdomen and pelvis dated 12/16/2021    TECHNIQUE: Contiguous axial CT images were obtained from the lung bases  to the pubic symphysis without contrast. Sagittal and coronal  reconstructions were performed.  Automated exposure control and  iterative reconstruction methods were used.    FINDINGS:  The lack of intravenous contrast limits the evaluation of visceral and  vascular structures.    The heart size is normal. There is no pericardial effusion. There are  areas of nonspecific groundglass opacity within a peribronchovascular  distribution which could reflect infectious or inflammatory  bronchiolitis/bronchopneumonia.    The liver is enlarged measuring 23 cm in craniocaudal dimension. There  is no focal liver lesion identified by noncontrast technique.    There are multiple gallstones within the fundal portion of the  gallbladder. There appears to be some  focal narrowing of the mid aspect  of the gallbladder which could relate to adenomyomatosis causing  immobility of the gallstones. There is no abnormal gallbladder wall  thickening. The spleen is enlarged measuring 15 cm in AP dimension. The  adrenal glands and pancreas appear within normal limits.    The kidneys are symmetric in size. There is a 2 mm nonobstructing stone  within the superior left kidney. There is no hydronephrosis or  hydroureter. Urinary bladder is fluid-filled without wall thickening.  The uterus is present and anteverted. The ovaries appear symmetric in  size. There is trace free fluid within the pelvis.    The stomach and duodenum is normal in caliber and configuration. There  are no abnormally dilated loops of small bowel to suggest small bowel  obstruction. The appendix is visualized and normal within the right  lower quadrant. There is no evidence of acute colitis or diverticulitis.    The aorta is normal in caliber without evidence of aneurysm formation.  There is no mesenteric, retroperitoneal, or pelvic lymphadenopathy.  There is multilevel degenerative disc disease of the lumbar spine. There  is a fat-containing umbilical hernia.    Impression  1. Small 2 mm nonobstructing stone within the superior left kidney. No  evidence of obstructive uropathy.  2. Nonspecific hepatosplenomegaly. Correlate clinically for risk factors  of chronic liver disease.  3. Patchy bibasilar groundglass opacities with a bronchovascular  distribution. This can be seen with bronchiolitis or mild  bronchopneumonia.  4. Cholelithiasis with gallstones clustered within the fundus of the  gallbladder likely related to superimposed gallbladder adenomyomatosis.        Electronically Signed By-Tayo Brumfield MD On:6/2/2022 2:09 PM  This report was finalized on [id number] by  Tayo Brumfield MD.      Results for orders placed during the hospital encounter of 05/30/22    CT Abdomen Pelvis Stone  Protocol    Narrative  EXAM: CT ABDOMEN PELVIS STONE PROTOCOL-    DATE OF EXAM: 6/2/2022 12:54 PM    INDICATION: bacteremia to r/o obstructive uropathy; A41.9-Sepsis,  unspecified organism; I95.9-Hypotension, unspecified; J18.9-Pneumonia,  unspecified organism; D64.9-Anemia, unspecified; E86.0-Dehydration;  N17.9-Acute kidney failure, unspecified; C71.9-Malignant neoplasm of  brain, unspecified; J18.9-Pneumonia, unspecified organism.    COMPARISON: CT abdomen and pelvis dated 12/16/2021    TECHNIQUE: Contiguous axial CT images were obtained from the lung bases  to the pubic symphysis without contrast. Sagittal and coronal  reconstructions were performed.  Automated exposure control and  iterative reconstruction methods were used.    FINDINGS:  The lack of intravenous contrast limits the evaluation of visceral and  vascular structures.    The heart size is normal. There is no pericardial effusion. There are  areas of nonspecific groundglass opacity within a peribronchovascular  distribution which could reflect infectious or inflammatory  bronchiolitis/bronchopneumonia.    The liver is enlarged measuring 23 cm in craniocaudal dimension. There  is no focal liver lesion identified by noncontrast technique.    There are multiple gallstones within the fundal portion of the  gallbladder. There appears to be some focal narrowing of the mid aspect  of the gallbladder which could relate to adenomyomatosis causing  immobility of the gallstones. There is no abnormal gallbladder wall  thickening. The spleen is enlarged measuring 15 cm in AP dimension. The  adrenal glands and pancreas appear within normal limits.    The kidneys are symmetric in size. There is a 2 mm nonobstructing stone  within the superior left kidney. There is no hydronephrosis or  hydroureter. Urinary bladder is fluid-filled without wall thickening.  The uterus is present and anteverted. The ovaries appear symmetric in  size. There is trace free fluid within  the pelvis.    The stomach and duodenum is normal in caliber and configuration. There  are no abnormally dilated loops of small bowel to suggest small bowel  obstruction. The appendix is visualized and normal within the right  lower quadrant. There is no evidence of acute colitis or diverticulitis.    The aorta is normal in caliber without evidence of aneurysm formation.  There is no mesenteric, retroperitoneal, or pelvic lymphadenopathy.  There is multilevel degenerative disc disease of the lumbar spine. There  is a fat-containing umbilical hernia.    Impression  1. Small 2 mm nonobstructing stone within the superior left kidney. No  evidence of obstructive uropathy.  2. Nonspecific hepatosplenomegaly. Correlate clinically for risk factors  of chronic liver disease.  3. Patchy bibasilar groundglass opacities with a bronchovascular  distribution. This can be seen with bronchiolitis or mild  bronchopneumonia.  4. Cholelithiasis with gallstones clustered within the fundus of the  gallbladder likely related to superimposed gallbladder adenomyomatosis.        Electronically Signed By-Tayo Brumfield MD On:6/2/2022 2:09 PM  This report was finalized on [id number] by  Tayo Brumfiedl MD.      Lab Review:   Lab on 10/19/2022   Component Date Value   • WBC 10/19/2022 5.78    • RBC 10/19/2022 2.74 (L)    • Hemoglobin 10/19/2022 9.8 (L)    • Hematocrit 10/19/2022 30.2 (L)    • MCV 10/19/2022 110.2 (H)    • MCH 10/19/2022 35.8 (H)    • MCHC 10/19/2022 32.5    • RDW 10/19/2022 16.6 (H)    • RDW-SD 10/19/2022 64.6 (H)    • MPV 10/19/2022 9.3    • Platelets 10/19/2022 127 (L)    • Neutrophil % 10/19/2022 80.9 (H)    • Lymphocyte % 10/19/2022 10.6 (L)    • Monocyte % 10/19/2022 6.1    • Eosinophil % 10/19/2022 2.2    • Basophil % 10/19/2022 0.2    • Neutrophils, Absolute 10/19/2022 4.68    • Lymphocytes, Absolute 10/19/2022 0.61 (L)    • Monocytes, Absolute 10/19/2022 0.35    • Eosinophils, Absolute 10/19/2022 0.13    •  Basophils, Absolute 10/19/2022 0.01    • Extra Tube 10/19/2022 Hold for add-ons.    • Extra Tube 10/19/2022 Hold for add-ons.    Lab on 10/11/2022   Component Date Value   • WBC 10/11/2022 5.54    • RBC 10/11/2022 2.69 (L)    • Hemoglobin 10/11/2022 9.5 (L)    • Hematocrit 10/11/2022 29.8 (L)    • MCV 10/11/2022 110.8 (H)    • MCH 10/11/2022 35.3 (H)    • MCHC 10/11/2022 31.9    • RDW 10/11/2022 17.0 (H)    • RDW-SD 10/11/2022 65.7 (H)    • MPV 10/11/2022 8.6    • Platelets 10/11/2022 127 (L)    • Neutrophil % 10/11/2022 83.0 (H)    • Lymphocyte % 10/11/2022 9.6 (L)    • Monocyte % 10/11/2022 5.2    • Eosinophil % 10/11/2022 2.0    • Basophil % 10/11/2022 0.2    • Neutrophils, Absolute 10/11/2022 4.60    • Lymphocytes, Absolute 10/11/2022 0.53 (L)    • Monocytes, Absolute 10/11/2022 0.29    • Eosinophils, Absolute 10/11/2022 0.11    • Basophils, Absolute 10/11/2022 0.01    Lab on 09/27/2022   Component Date Value   • WBC 09/27/2022 5.75    • RBC 09/27/2022 3.04 (L)    • Hemoglobin 09/27/2022 10.7 (L)    • Hematocrit 09/27/2022 32.8 (L)    • MCV 09/27/2022 107.9 (H)    • MCH 09/27/2022 35.2 (H)    • MCHC 09/27/2022 32.6    • RDW 09/27/2022 18.1 (H)    • RDW-SD 09/27/2022 69.7 (H)    • MPV 09/27/2022 10.0    • Platelets 09/27/2022 116 (L)    • Neutrophil % 09/27/2022 80.5 (H)    • Lymphocyte % 09/27/2022 10.6 (L)    • Monocyte % 09/27/2022 7.1    • Eosinophil % 09/27/2022 1.6    • Basophil % 09/27/2022 0.2    • Neutrophils, Absolute 09/27/2022 4.63    • Lymphocytes, Absolute 09/27/2022 0.61 (L)    • Monocytes, Absolute 09/27/2022 0.41    • Eosinophils, Absolute 09/27/2022 0.09    • Basophils, Absolute 09/27/2022 0.01    Lab on 09/23/2022   Component Date Value   • Antinuclear Antibodies (* 09/23/2022 Negative    • Sed Rate 09/23/2022 14    • C-Reactive Protein 09/23/2022 0.66 (H)    • CCP Antibodies IgG/IgA 09/23/2022 5    • RNP Antibodies 09/23/2022 <0.2    • Valdez Antibodies 09/23/2022 <0.2    • JAMES SSA (RO) Ab  09/23/2022 <0.2    • JAMES SSB (LA) Ab 09/23/2022 0.2    Lab on 09/20/2022   Component Date Value   • WBC 09/20/2022 4.17    • RBC 09/20/2022 2.74 (L)    • Hemoglobin 09/20/2022 9.3 (L)    • Hematocrit 09/20/2022 30.6 (L)    • MCV 09/20/2022 111.7 (H)    • MCH 09/20/2022 33.9 (H)    • MCHC 09/20/2022 30.4 (L)    • RDW 09/20/2022 18.7 (H)    • RDW-SD 09/20/2022 73.7 (H)    • MPV 09/20/2022 9.9    • Platelets 09/20/2022 99 (L)    • Neutrophil % 09/20/2022 76.5 (H)    • Lymphocyte % 09/20/2022 12.5 (L)    • Monocyte % 09/20/2022 8.6    • Eosinophil % 09/20/2022 2.2    • Basophil % 09/20/2022 0.2    • Neutrophils, Absolute 09/20/2022 3.19    • Lymphocytes, Absolute 09/20/2022 0.52 (L)    • Monocytes, Absolute 09/20/2022 0.36    • Eosinophils, Absolute 09/20/2022 0.09    • Basophils, Absolute 09/20/2022 0.01    Lab on 09/13/2022   Component Date Value   • WBC 09/13/2022 6.22    • RBC 09/13/2022 2.75 (L)    • Hemoglobin 09/13/2022 9.4 (L)    • Hematocrit 09/13/2022 30.8 (L)    • MCV 09/13/2022 112.0 (H)    • MCH 09/13/2022 34.2 (H)    • MCHC 09/13/2022 30.5 (L)    • RDW 09/13/2022 20.5 (H)    • RDW-SD 09/13/2022 79.0 (H)    • MPV 09/13/2022 9.4    • Platelets 09/13/2022 150    • Neutrophil % 09/13/2022 84.9 (H)    • Lymphocyte % 09/13/2022 7.4 (L)    • Monocyte % 09/13/2022 5.8    • Eosinophil % 09/13/2022 1.6    • Basophil % 09/13/2022 0.3    • Neutrophils, Absolute 09/13/2022 5.28    • Lymphocytes, Absolute 09/13/2022 0.46 (L)    • Monocytes, Absolute 09/13/2022 0.36    • Eosinophils, Absolute 09/13/2022 0.10    • Basophils, Absolute 09/13/2022 0.02    Lab on 09/06/2022   Component Date Value   • WBC 09/06/2022 4.18    • RBC 09/06/2022 2.36 (L)    • Hemoglobin 09/06/2022 8.0 (L)    • Hematocrit 09/06/2022 26.4 (L)    • MCV 09/06/2022 111.9 (H)    • MCH 09/06/2022 33.9 (H)    • MCHC 09/06/2022 30.3 (L)    • RDW 09/06/2022 21.5 (H)    • RDW-SD 09/06/2022 82.3 (H)    • MPV 09/06/2022 9.0    • Platelets 09/06/2022 109 (L)     • Neutrophil % 09/06/2022 81.1 (H)    • Lymphocyte % 09/06/2022 8.4 (L)    • Monocyte % 09/06/2022 7.7    • Eosinophil % 09/06/2022 2.6    • Basophil % 09/06/2022 0.2    • Neutrophils, Absolute 09/06/2022 3.39    • Lymphocytes, Absolute 09/06/2022 0.35 (L)    • Monocytes, Absolute 09/06/2022 0.32    • Eosinophils, Absolute 09/06/2022 0.11    • Basophils, Absolute 09/06/2022 0.01    Lab on 08/29/2022   Component Date Value   • WBC 08/29/2022 6.46    • RBC 08/29/2022 2.45 (L)    • Hemoglobin 08/29/2022 8.1 (L)    • Hematocrit 08/29/2022 26.9 (L)    • MCV 08/29/2022 109.8 (H)    • MCH 08/29/2022 33.1 (H)    • MCHC 08/29/2022 30.1 (L)    • RDW 08/29/2022 23.1 (H)    • RDW-SD 08/29/2022 86.5 (H)    • MPV 08/29/2022 9.3    • Platelets 08/29/2022 110 (L)    • Neutrophil % 08/29/2022 87.8 (H)    • Lymphocyte % 08/29/2022 6.0 (L)    • Monocyte % 08/29/2022 4.8 (L)    • Eosinophil % 08/29/2022 1.2    • Basophil % 08/29/2022 0.2    • Neutrophils, Absolute 08/29/2022 5.67    • Lymphocytes, Absolute 08/29/2022 0.39 (L)    • Monocytes, Absolute 08/29/2022 0.31    • Eosinophils, Absolute 08/29/2022 0.08    • Basophils, Absolute 08/29/2022 0.01    Lab on 08/22/2022   Component Date Value   • WBC 08/22/2022 4.87    • RBC 08/22/2022 2.37 (L)    • Hemoglobin 08/22/2022 7.6 (C)    • Hematocrit 08/22/2022 25.0 (L)    • MCV 08/22/2022 105.5 (H)    • MCH 08/22/2022 32.1    • MCHC 08/22/2022 30.4 (L)    • RDW 08/22/2022 23.3 (H)    • RDW-SD 08/22/2022 83.5 (H)    • MPV 08/22/2022 9.7    • Platelets 08/22/2022 122 (L)    • Neutrophil % 08/22/2022 81.1 (H)    • Lymphocyte % 08/22/2022 10.7 (L)    • Monocyte % 08/22/2022 6.2    • Eosinophil % 08/22/2022 1.8    • Basophil % 08/22/2022 0.2    • Neutrophils, Absolute 08/22/2022 3.95    • Lymphocytes, Absolute 08/22/2022 0.52 (L)    • Monocytes, Absolute 08/22/2022 0.30    • Eosinophils, Absolute 08/22/2022 0.09    • Basophils, Absolute 08/22/2022 0.01    • Extra Tube 08/22/2022 Hold for  add-ons.    • Extra Tube 08/22/2022 Hold for add-ons.    Office Visit on 08/22/2022   Component Date Value   • Ferritin 08/22/2022 815.50 (H)    • Iron 08/22/2022 80    • Iron Saturation 08/22/2022 34    • Transferrin 08/22/2022 156 (L)    • TIBC 08/22/2022 232 (L)    • Vitamin B-12 08/22/2022 327    • Folate 08/22/2022 8.89    There may be more visits with results that are not included.     Recent labs reviewed and interpreted for clinical significance and application            Level of Care:           Josue Nix MD  10/27/22  .

## 2022-10-28 PROBLEM — R07.9 CHEST PAIN: Status: ACTIVE | Noted: 2022-10-28

## 2022-10-31 ENCOUNTER — HOSPITAL ENCOUNTER (OUTPATIENT)
Dept: MRI IMAGING | Facility: HOSPITAL | Age: 57
Discharge: HOME | End: 2022-10-31

## 2022-10-31 ENCOUNTER — LAB (OUTPATIENT)
Dept: LAB | Facility: HOSPITAL | Age: 57
End: 2022-10-31

## 2022-10-31 DIAGNOSIS — R06.09 DYSPNEA ON EXERTION: ICD-10-CM

## 2022-10-31 DIAGNOSIS — D64.9 ANEMIA, UNSPECIFIED TYPE: ICD-10-CM

## 2022-10-31 DIAGNOSIS — C71.9 OLIGODENDROGLIOMA: ICD-10-CM

## 2022-10-31 DIAGNOSIS — R07.9 CHEST PAIN, UNSPECIFIED TYPE: ICD-10-CM

## 2022-10-31 LAB
ANION GAP SERPL CALCULATED.3IONS-SCNC: 9 MMOL/L (ref 5–15)
APTT PPP: 25.9 SECONDS (ref 24–31)
BASOPHILS # BLD AUTO: 0.02 10*3/MM3 (ref 0–0.2)
BASOPHILS NFR BLD AUTO: 0.4 % (ref 0–1.5)
BUN SERPL-MCNC: 16 MG/DL (ref 6–20)
BUN/CREAT SERPL: 20 (ref 7–25)
CALCIUM SPEC-SCNC: 9.3 MG/DL (ref 8.6–10.5)
CHLORIDE SERPL-SCNC: 104 MMOL/L (ref 98–107)
CO2 SERPL-SCNC: 25 MMOL/L (ref 22–29)
CREAT BLDA-MCNC: 0.8 MG/DL (ref 0.6–1.3)
CREAT SERPL-MCNC: 0.8 MG/DL (ref 0.57–1)
DEPRECATED RDW RBC AUTO: 52.4 FL (ref 37–54)
EGFRCR SERPLBLD CKD-EPI 2021: 87.1 ML/MIN/1.73
EGFRCR SERPLBLD CKD-EPI 2021: 87.1 ML/MIN/1.73
EOSINOPHIL # BLD AUTO: 0.13 10*3/MM3 (ref 0–0.4)
EOSINOPHIL NFR BLD AUTO: 2.5 % (ref 0.3–6.2)
ERYTHROCYTE [DISTWIDTH] IN BLOOD BY AUTOMATED COUNT: 14.3 % (ref 12.3–15.4)
GLUCOSE SERPL-MCNC: 89 MG/DL (ref 65–99)
HCT VFR BLD AUTO: 29.3 % (ref 34–46.6)
HGB BLD-MCNC: 10 G/DL (ref 12–15.9)
INR PPP: 1.07 (ref 0.93–1.1)
LYMPHOCYTES # BLD AUTO: 0.49 10*3/MM3 (ref 0.7–3.1)
LYMPHOCYTES NFR BLD AUTO: 9.6 % (ref 19.6–45.3)
MCH RBC QN AUTO: 34.1 PG (ref 26.6–33)
MCHC RBC AUTO-ENTMCNC: 34.1 G/DL (ref 31.5–35.7)
MCV RBC AUTO: 100 FL (ref 79–97)
MONOCYTES # BLD AUTO: 0.26 10*3/MM3 (ref 0.1–0.9)
MONOCYTES NFR BLD AUTO: 5.1 % (ref 5–12)
NEUTROPHILS NFR BLD AUTO: 4.18 10*3/MM3 (ref 1.7–7)
NEUTROPHILS NFR BLD AUTO: 82 % (ref 42.7–76)
PLATELET # BLD AUTO: 153 10*3/MM3 (ref 140–450)
PMV BLD AUTO: 9.3 FL (ref 6–12)
POTASSIUM SERPL-SCNC: 4 MMOL/L (ref 3.5–5.2)
PROTHROMBIN TIME: 11 SECONDS (ref 9.6–11.7)
RBC # BLD AUTO: 2.93 10*6/MM3 (ref 3.77–5.28)
SARS-COV-2 RNA PNL SPEC NAA+PROBE: NOT DETECTED
SODIUM SERPL-SCNC: 138 MMOL/L (ref 136–145)
WBC NRBC COR # BLD: 5.1 10*3/MM3 (ref 3.4–10.8)

## 2022-10-31 PROCEDURE — A9579 GAD-BASE MR CONTRAST NOS,1ML: HCPCS | Performed by: INTERNAL MEDICINE

## 2022-10-31 PROCEDURE — 25010000002 GADOTERIDOL PER 1 ML: Performed by: INTERNAL MEDICINE

## 2022-10-31 PROCEDURE — 85610 PROTHROMBIN TIME: CPT

## 2022-10-31 PROCEDURE — 85025 COMPLETE CBC W/AUTO DIFF WBC: CPT

## 2022-10-31 PROCEDURE — C9803 HOPD COVID-19 SPEC COLLECT: HCPCS

## 2022-10-31 PROCEDURE — 36415 COLL VENOUS BLD VENIPUNCTURE: CPT

## 2022-10-31 PROCEDURE — 70553 MRI BRAIN STEM W/O & W/DYE: CPT

## 2022-10-31 PROCEDURE — 80048 BASIC METABOLIC PNL TOTAL CA: CPT

## 2022-10-31 PROCEDURE — 87635 SARS-COV-2 COVID-19 AMP PRB: CPT

## 2022-10-31 PROCEDURE — 82565 ASSAY OF CREATININE: CPT

## 2022-10-31 PROCEDURE — 85730 THROMBOPLASTIN TIME PARTIAL: CPT

## 2022-10-31 RX ADMIN — GADOTERIDOL 20 ML: 279.3 INJECTION, SOLUTION INTRAVENOUS at 12:42

## 2022-11-01 ENCOUNTER — HOSPITAL ENCOUNTER (OUTPATIENT)
Dept: ULTRASOUND IMAGING | Facility: HOSPITAL | Age: 57
End: 2022-11-01

## 2022-11-02 ENCOUNTER — HOSPITAL ENCOUNTER (OUTPATIENT)
Facility: HOSPITAL | Age: 57
Setting detail: HOSPITAL OUTPATIENT SURGERY
Discharge: HOME | End: 2022-11-02
Attending: INTERNAL MEDICINE | Admitting: INTERNAL MEDICINE

## 2022-11-02 VITALS
OXYGEN SATURATION: 93 % | DIASTOLIC BLOOD PRESSURE: 50 MMHG | BODY MASS INDEX: 43.17 KG/M2 | RESPIRATION RATE: 18 BRPM | SYSTOLIC BLOOD PRESSURE: 110 MMHG | HEART RATE: 53 BPM | WEIGHT: 234.57 LBS | TEMPERATURE: 97.6 F | HEIGHT: 62 IN

## 2022-11-02 DIAGNOSIS — R06.09 DYSPNEA ON EXERTION: ICD-10-CM

## 2022-11-02 DIAGNOSIS — R07.9 CHEST PAIN, UNSPECIFIED TYPE: ICD-10-CM

## 2022-11-02 LAB
ARTERIAL PATENCY WRIST A: ABNORMAL
ARTERIAL PATENCY WRIST A: ABNORMAL
ATMOSPHERIC PRESS: ABNORMAL MM[HG]
ATMOSPHERIC PRESS: ABNORMAL MM[HG]
BASE EXCESS BLDA CALC-SCNC: -1.8 MMOL/L (ref 0–3)
BASE EXCESS BLDA CALC-SCNC: -1.9 MMOL/L (ref 0–3)
BDY SITE: ABNORMAL
BDY SITE: ABNORMAL
CO2 BLDA-SCNC: 26.2 MMOL/L (ref 22–29)
CO2 BLDA-SCNC: 27.2 MMOL/L (ref 22–29)
HCO3 BLDA-SCNC: 24.6 MMOL/L (ref 21–28)
HCO3 BLDA-SCNC: 25.5 MMOL/L (ref 21–28)
HEMODILUTION: NO
HEMODILUTION: NO
INHALED O2 CONCENTRATION: 21 %
INHALED O2 CONCENTRATION: 21 %
MODALITY: ABNORMAL
MODALITY: ABNORMAL
PCO2 BLDA: 49.3 MM HG (ref 35–48)
PCO2 BLDA: 55.5 MM HG (ref 35–48)
PH BLDA: 7.27 PH UNITS (ref 7.35–7.45)
PH BLDA: 7.31 PH UNITS (ref 7.35–7.45)
PO2 BLDA: 115.2 MM HG (ref 83–108)
PO2 BLDA: 40.4 MM HG (ref 83–108)
SAO2 % BLDCOA: 67.3 % (ref 94–98)
SAO2 % BLDCOA: 98 % (ref 94–98)

## 2022-11-02 PROCEDURE — 36600 WITHDRAWAL OF ARTERIAL BLOOD: CPT

## 2022-11-02 PROCEDURE — 0 IOPAMIDOL PER 1 ML: Performed by: INTERNAL MEDICINE

## 2022-11-02 PROCEDURE — 93463 DRUG ADMIN & HEMODYNMIC MEAS: CPT | Performed by: INTERNAL MEDICINE

## 2022-11-02 PROCEDURE — C1894 INTRO/SHEATH, NON-LASER: HCPCS | Performed by: INTERNAL MEDICINE

## 2022-11-02 PROCEDURE — 25010000002 HEPARIN (PORCINE) PER 1000 UNITS: Performed by: INTERNAL MEDICINE

## 2022-11-02 PROCEDURE — 99152 MOD SED SAME PHYS/QHP 5/>YRS: CPT | Performed by: INTERNAL MEDICINE

## 2022-11-02 PROCEDURE — 82803 BLOOD GASES ANY COMBINATION: CPT

## 2022-11-02 PROCEDURE — 25010000002 MIDAZOLAM PER 1 MG: Performed by: INTERNAL MEDICINE

## 2022-11-02 PROCEDURE — C1769 GUIDE WIRE: HCPCS | Performed by: INTERNAL MEDICINE

## 2022-11-02 PROCEDURE — 99153 MOD SED SAME PHYS/QHP EA: CPT | Performed by: INTERNAL MEDICINE

## 2022-11-02 PROCEDURE — 93460 R&L HRT ART/VENTRICLE ANGIO: CPT | Performed by: INTERNAL MEDICINE

## 2022-11-02 PROCEDURE — 25010000002 ATROPINE SULFATE 1 MG/10ML SOLUTION PREFILLED SYRINGE: Performed by: INTERNAL MEDICINE

## 2022-11-02 PROCEDURE — 25010000002 FENTANYL CITRATE (PF) 100 MCG/2ML SOLUTION: Performed by: INTERNAL MEDICINE

## 2022-11-02 RX ORDER — HEPARIN SODIUM 1000 [USP'U]/ML
INJECTION, SOLUTION INTRAVENOUS; SUBCUTANEOUS
Status: DISCONTINUED | OUTPATIENT
Start: 2022-11-02 | End: 2022-11-02 | Stop reason: HOSPADM

## 2022-11-02 RX ORDER — ATROPINE SULFATE 0.1 MG/ML
INJECTION INTRAVENOUS
Status: DISCONTINUED | OUTPATIENT
Start: 2022-11-02 | End: 2022-11-02 | Stop reason: HOSPADM

## 2022-11-02 RX ORDER — LIDOCAINE HYDROCHLORIDE 20 MG/ML
INJECTION, SOLUTION INFILTRATION; PERINEURAL
Status: DISCONTINUED | OUTPATIENT
Start: 2022-11-02 | End: 2022-11-02 | Stop reason: HOSPADM

## 2022-11-02 RX ORDER — SODIUM CHLORIDE 9 MG/ML
30 INJECTION, SOLUTION INTRAVENOUS CONTINUOUS
Status: DISCONTINUED | OUTPATIENT
Start: 2022-11-02 | End: 2022-11-02 | Stop reason: HOSPADM

## 2022-11-02 RX ORDER — NICARDIPINE HYDROCHLORIDE 2.5 MG/ML
INJECTION INTRAVENOUS
Status: DISCONTINUED | OUTPATIENT
Start: 2022-11-02 | End: 2022-11-02 | Stop reason: HOSPADM

## 2022-11-02 RX ORDER — FENTANYL CITRATE 50 UG/ML
INJECTION, SOLUTION INTRAMUSCULAR; INTRAVENOUS
Status: DISCONTINUED | OUTPATIENT
Start: 2022-11-02 | End: 2022-11-02 | Stop reason: HOSPADM

## 2022-11-02 RX ORDER — MIDAZOLAM HYDROCHLORIDE 1 MG/ML
INJECTION INTRAMUSCULAR; INTRAVENOUS
Status: DISCONTINUED | OUTPATIENT
Start: 2022-11-02 | End: 2022-11-02 | Stop reason: HOSPADM

## 2022-11-02 RX ORDER — NITROGLYCERIN 5 MG/ML
INJECTION, SOLUTION INTRAVENOUS
Status: DISCONTINUED | OUTPATIENT
Start: 2022-11-02 | End: 2022-11-02 | Stop reason: HOSPADM

## 2022-11-02 NOTE — INTERVAL H&P NOTE
H&P reviewed. The patient was examined and there are no changes to the H&P.      Right left heart cath pending today  Indication was severe pulmonary hypertension  Estimate reversibility with vasodilator challenge if indicated    Risk benefits discussed including pain bleeding infection need for vascular cardiovascular surgery death heart attack and stroke and she wished to proceed today    No contraindications to right left heart cath    Next Josue Nix MD, PhD

## 2022-11-02 NOTE — DISCHARGE INSTRUCTIONS
[facility]  [address] [place] [address]    Coronary Angiogram (Radial/Ulnar Approach) After Care    Refer to this sheet in the next few weeks. These instructions provide you with information on caring for yourself after your procedure. Your caregiver may also give you more specific instructions. Your treatment has been planned according to current medical practices, but problems sometimes occur. Call your caregiver if you have any problems or questions after your procedure.    Home Care Instructions:  You may shower the day after the procedure. Remove the bandage (dressing) and gently wash the site with plain soap and water. Gently pat the site dry. You may apply a band aid daily for 2 days if desired.    Do not apply powder or lotion to the site.  Do not submerge the affected site in water for 3 to 5 days or until the site is completely healed.   Do not lift, push or pull anything over 5 pounds for 5 days after your procedure or as directed by your physician.  As a reference, a gallon of milk weighs 8 pounds.   Inspect the site at least twice daily. You may notice some bruising at the site and it may be tender for 1 to 2 weeks.     Increase your fluid intake for the next 2 days.    Keep arm elevated for 24 hours. For the remainder of the day, keep your arm in “Pledge of Allegiance” position when up and about.     You may drive 24 hours after the procedure unless otherwise instructed by your caregiver.  Do not operate machinery or power tools for 24 hours.  A responsible adult should be with you for the first 24 hours after you arrive home. Do not make any important legal decisions or sign legal papers for 24 hours.  Do not drink alcohol for 24 hours.    Metformin or any medications containing Metformin should not be taken for 48 hours after your procedure.      Call Your Doctor if:   You have unusual pain at the radial/ulnar (wrist) site.  You have redness, warmth, swelling, or pain at the  radial/ulnar (wrist) site.  You have drainage (other than a small amount of blood on the dressing).  You have chills or a fever > 101.  Your arm becomes pale or dark, cool, tingly, or numb.  You develop chest pain, shortness of breath, feel faint or pass out.    You have heavy bleeding from the site, hold pressure on the site for 20 minutes.  If the bleeding stops, apply a fresh bandage and call your cardiologist.  However, if you        continue to have bleeding, call 911 and continue to apply pressure to the site.   You have any symptoms of a stroke.  Remember BE FAST  B-balance. Sudden trouble walking or loss of balance.  E-eyes.  Sudden changes in how you see or a sudden onset of a very bad headache.   F-face. Sudden weakness or loss of feeling of the face or facial droop on one side.   A-arms Sudden weakness or numbness in one arm.  One arm drifts down if they are both held out in front of you. This happens suddenly and usually on one side of the body.   S-speech.  Sudden trouble speaking, slurred speech or trouble understanding what are saying.   T-time  Time to call emergency services.  Write down the symptoms and the time they started.

## 2022-11-08 ENCOUNTER — TELEPHONE (OUTPATIENT)
Dept: ONCOLOGY | Facility: CLINIC | Age: 57
End: 2022-11-08

## 2022-11-08 ENCOUNTER — APPOINTMENT (OUTPATIENT)
Dept: LAB | Facility: HOSPITAL | Age: 57
End: 2022-11-08

## 2022-11-18 ENCOUNTER — HOSPITAL ENCOUNTER (OUTPATIENT)
Dept: ONCOLOGY | Facility: HOSPITAL | Age: 57
Setting detail: INFUSION SERIES
Discharge: HOME | End: 2022-11-18

## 2022-11-18 VITALS — WEIGHT: 237.6 LBS | HEIGHT: 62 IN | BODY MASS INDEX: 43.72 KG/M2

## 2022-11-18 DIAGNOSIS — D64.9 TRANSFUSION-DEPENDENT ANEMIA: Primary | ICD-10-CM

## 2022-11-18 DIAGNOSIS — Z95.828 PORT-A-CATH IN PLACE: ICD-10-CM

## 2022-11-18 LAB
BASOPHILS # BLD AUTO: 0 10*3/MM3 (ref 0–0.2)
BASOPHILS NFR BLD AUTO: 0 % (ref 0–1.5)
DEPRECATED RDW RBC AUTO: 56.6 FL (ref 37–54)
EOSINOPHIL # BLD AUTO: 0.11 10*3/MM3 (ref 0–0.4)
EOSINOPHIL NFR BLD AUTO: 2.4 % (ref 0.3–6.2)
ERYTHROCYTE [DISTWIDTH] IN BLOOD BY AUTOMATED COUNT: 15.1 % (ref 12.3–15.4)
FERRITIN SERPL-MCNC: 1190 NG/ML (ref 13–150)
HCT VFR BLD AUTO: 30.3 % (ref 34–46.6)
HGB BLD-MCNC: 10.1 G/DL (ref 12–15.9)
IRON 24H UR-MRATE: 111 MCG/DL (ref 37–145)
IRON SATN MFR SERPL: 40 % (ref 20–50)
LYMPHOCYTES # BLD AUTO: 0.49 10*3/MM3 (ref 0.7–3.1)
LYMPHOCYTES NFR BLD AUTO: 10.7 % (ref 19.6–45.3)
MCH RBC QN AUTO: 34.9 PG (ref 26.6–33)
MCHC RBC AUTO-ENTMCNC: 33.3 G/DL (ref 31.5–35.7)
MCV RBC AUTO: 104.8 FL (ref 79–97)
MONOCYTES # BLD AUTO: 0.25 10*3/MM3 (ref 0.1–0.9)
MONOCYTES NFR BLD AUTO: 5.5 % (ref 5–12)
NEUTROPHILS NFR BLD AUTO: 3.72 10*3/MM3 (ref 1.7–7)
NEUTROPHILS NFR BLD AUTO: 81.4 % (ref 42.7–76)
PLATELET # BLD AUTO: 124 10*3/MM3 (ref 140–450)
PMV BLD AUTO: 8.7 FL (ref 6–12)
RBC # BLD AUTO: 2.89 10*6/MM3 (ref 3.77–5.28)
TIBC SERPL-MCNC: 277 MCG/DL (ref 298–536)
TRANSFERRIN SERPL-MCNC: 186 MG/DL (ref 200–360)
WBC NRBC COR # BLD: 4.57 10*3/MM3 (ref 3.4–10.8)

## 2022-11-18 PROCEDURE — 83540 ASSAY OF IRON: CPT | Performed by: INTERNAL MEDICINE

## 2022-11-18 PROCEDURE — 85025 COMPLETE CBC W/AUTO DIFF WBC: CPT | Performed by: INTERNAL MEDICINE

## 2022-11-18 PROCEDURE — 36591 DRAW BLOOD OFF VENOUS DEVICE: CPT

## 2022-11-18 PROCEDURE — 84466 ASSAY OF TRANSFERRIN: CPT | Performed by: INTERNAL MEDICINE

## 2022-11-18 PROCEDURE — 25010000002 HEPARIN LOCK FLUSH PER 10 UNITS: Performed by: INTERNAL MEDICINE

## 2022-11-18 PROCEDURE — 82728 ASSAY OF FERRITIN: CPT | Performed by: INTERNAL MEDICINE

## 2022-11-18 RX ORDER — HEPARIN SODIUM (PORCINE) LOCK FLUSH IV SOLN 100 UNIT/ML 100 UNIT/ML
500 SOLUTION INTRAVENOUS AS NEEDED
Status: DISCONTINUED | OUTPATIENT
Start: 2022-11-18 | End: 2022-11-19 | Stop reason: HOSPADM

## 2022-11-18 RX ORDER — HEPARIN SODIUM (PORCINE) LOCK FLUSH IV SOLN 100 UNIT/ML 100 UNIT/ML
500 SOLUTION INTRAVENOUS AS NEEDED
OUTPATIENT
Start: 2022-11-18

## 2022-11-18 RX ORDER — SODIUM CHLORIDE 0.9 % (FLUSH) 0.9 %
20 SYRINGE (ML) INJECTION AS NEEDED
Status: DISCONTINUED | OUTPATIENT
Start: 2022-11-18 | End: 2022-11-19 | Stop reason: HOSPADM

## 2022-11-18 RX ORDER — SODIUM CHLORIDE 0.9 % (FLUSH) 0.9 %
20 SYRINGE (ML) INJECTION AS NEEDED
OUTPATIENT
Start: 2022-11-18

## 2022-11-18 RX ADMIN — Medication 20 ML: at 10:50

## 2022-11-18 RX ADMIN — HEPARIN 500 UNITS: 100 SYRINGE at 10:50

## 2022-12-15 ENCOUNTER — TELEPHONE (OUTPATIENT)
Dept: ONCOLOGY | Facility: CLINIC | Age: 57
End: 2022-12-15

## 2022-12-15 DIAGNOSIS — Z95.828 PORT-A-CATH IN PLACE: ICD-10-CM

## 2022-12-15 DIAGNOSIS — D64.9 TRANSFUSION-DEPENDENT ANEMIA: Primary | ICD-10-CM

## 2022-12-15 DIAGNOSIS — D69.6 ANEMIA WITH LOW PLATELET COUNT: ICD-10-CM

## 2022-12-15 RX ORDER — ALBUTEROL SULFATE 90 UG/1
AEROSOL, METERED RESPIRATORY (INHALATION)
Qty: 8 G | Refills: 1 | Status: SHIPPED | OUTPATIENT
Start: 2022-12-15

## 2022-12-15 NOTE — TELEPHONE ENCOUNTER
Called the pt and made her aware Dr. Workman was okay with getting her port out. Pt v/u. Order for port removal was placed.

## 2022-12-15 NOTE — TELEPHONE ENCOUNTER
Caller: Cindy Cortes    Relationship: Self    Best call back number: [phone]    Caller requesting test results: CINDY    What test was performed: MRI    When was the test performed: 10/31/2022    Where was the test performed:               What is the medical concern/diagnosis: TO HAVE PORT REMOVED    What specialty or service is being requested: GENERAL SURGERY    What is the provider, practice or medical service name:N/A    Any additional details: NOT SURE IF IT IS THE SAME SURGEON WHO TAKES THE PORT OUT, THAT PUT IT IN.

## 2022-12-21 RX ORDER — FOLIC ACID 1 MG/1
1 TABLET ORAL DAILY
Qty: 30 TABLET | Refills: 2 | Status: SHIPPED | OUTPATIENT
Start: 2022-12-21

## 2022-12-21 NOTE — TELEPHONE ENCOUNTER
Caller: Cindy Cortes    Relationship: Self    Best call back number: [phone]    Requested Prescriptions:   Requested Prescriptions     Pending Prescriptions Disp Refills   • folic acid (FOLVITE) 1 MG tablet       Sig: Take 1 tablet by mouth Daily.        Pharmacy where request should be sent:  [facility]    Additional details provided by patient: N/A    Does the patient have less than a 3 day supply:  [] Yes  [x] No    Would you like a call back once the refill request has been completed: [] Yes [x] No    If the office needs to give you a call back, can they leave a voicemail: [] Yes [x] No    Rohit Miller Rep   12/21/22 10:07 EST

## 2022-12-23 ENCOUNTER — HOSPITAL ENCOUNTER (OUTPATIENT)
Dept: INTERVENTIONAL RADIOLOGY/VASCULAR | Facility: HOSPITAL | Age: 57
End: 2022-12-23

## 2023-01-17 LAB
BASOPHILS # BLD AUTO: 0.01 10*3/MM3 (ref 0–0.2)
BASOPHILS NFR BLD AUTO: 0.2 % (ref 0–1.5)
DEPRECATED RDW RBC AUTO: 59.8 FL (ref 37–54)
EOSINOPHIL # BLD AUTO: 0.14 10*3/MM3 (ref 0–0.4)
EOSINOPHIL NFR BLD AUTO: 3 % (ref 0.3–6.2)
ERYTHROCYTE [DISTWIDTH] IN BLOOD BY AUTOMATED COUNT: 15.6 % (ref 12.3–15.4)
HCT VFR BLD AUTO: 31.3 % (ref 34–46.6)
HGB BLD-MCNC: 10.3 G/DL (ref 12–15.9)
LYMPHOCYTES # BLD AUTO: 0.65 10*3/MM3 (ref 0.7–3.1)
LYMPHOCYTES NFR BLD AUTO: 13.9 % (ref 19.6–45.3)
MCH RBC QN AUTO: 35.4 PG (ref 26.6–33)
MCHC RBC AUTO-ENTMCNC: 32.9 G/DL (ref 31.5–35.7)
MCV RBC AUTO: 107.6 FL (ref 79–97)
MONOCYTES # BLD AUTO: 0.29 10*3/MM3 (ref 0.1–0.9)
MONOCYTES NFR BLD AUTO: 6.2 % (ref 5–12)
NEUTROPHILS NFR BLD AUTO: 3.6 10*3/MM3 (ref 1.7–7)
NEUTROPHILS NFR BLD AUTO: 76.7 % (ref 42.7–76)
PLATELET # BLD AUTO: 141 10*3/MM3 (ref 140–450)
PMV BLD AUTO: 8.4 FL (ref 6–12)
RBC # BLD AUTO: 2.91 10*6/MM3 (ref 3.77–5.28)
WBC NRBC COR # BLD: 4.69 10*3/MM3 (ref 3.4–10.8)

## 2023-01-17 PROCEDURE — 36415 COLL VENOUS BLD VENIPUNCTURE: CPT | Performed by: INTERNAL MEDICINE

## 2023-01-17 PROCEDURE — 85025 COMPLETE CBC W/AUTO DIFF WBC: CPT | Performed by: INTERNAL MEDICINE

## 2023-10-07 NOTE — PROGRESS NOTES
"Patient Name: Cindy Cortes Date: 2021       : 1965        MRN #: [id number] Diagnosis:   Encounter Diagnosis   Name Primary?   • Oligodendroglioma (CMS/HCC) Yes                     RADIATION ON TREATMENT VISIT NOTE - BRAIN    Treatment Summary      Treatment Site Ref. ID Energy Dose/Fx (cGy) #Fx Dose Correction (cGy) Total Dose (cGy) Start Date End Date Elapsed Days   RtFrontalLobe RtFrontalLobe 6X 180  0 3,060 2021 23     WHO grade II, oligodendroglioma, IDH2 mutant, 1p19Q codeleted CNS malignancy     Plan: Adjuvant radiation followed by PCV; adjuvant therapy as part of curative intent    General:           Review of Systems      [x] No new complaints [] Headache   []AM [] PM [] Seizure activity  [] Memory loss [] Gait disturbance [] Nausea  [] Vomiting [] Speech changes [] Visual changes  [] Weakness [] Skin itching [] Hair loss  [] Skin soreness with pain  [] Fatigue,  severity: ----------------  [] Pain,  severity: ----------------, location:   Steroid regimen:   Anti-seizure regimen:   Pain regimen:    Skin regimen: Aquaphor     Comments/Notes:  No new issues; tolerating therapy  No significant headaches.    KPS: 80%       Vital Signs: /84   Pulse 106   Temp 98.3 °F (36.8 °C) (Oral)   Resp 18   Ht 157.5 cm (62\")   Wt (!) 154 kg (339 lb)   LMP  (LMP Unknown)   SpO2 99%   BMI 62.00 kg/m²     Weight:   Wt Readings from Last 3 Encounters:   21 (!) 154 kg (339 lb)   21 (!) 155 kg (341 lb 3.2 oz)   21 (!) 154 kg (340 lb)       Medication:   Current Outpatient Medications:   •  acetaZOLAMIDE (DIAMOX) 125 MG tablet, Take 1 tablet by mouth 3 (Three) Times a Day., Disp: 90 tablet, Rfl: 2  •  Alcohol Swabs (Alcohol Wipes) 70 % pads, Apply 1 each topically to the appropriate area as directed 4 (Four) Times a Day., Disp: , Rfl:   •  amLODIPine (NORVASC) 10 MG tablet, Take 1 tablet by mouth Daily., Disp: 30 tablet, Rfl: 2  •  folic acid (FOLVITE) 1 MG " Patient resting in stretcher at this time, patient did not tolerate IV insertion well, kicking during insertion, IV obtained in hand at this time. Parents updated with plan of care and verbalized understanding. Patient safety maintained. tablet, Take two tablets po Qam, then two tablets po Qpm. Four tablets daily., Disp: 120 tablet, Rfl: 0  •  glucose blood (OneTouch Verio) test strip, 1 each by Other route 4 (Four) Times a Day Before Meals & at Bedtime. Use as instructed, Disp: 200 each, Rfl: 1  •  insulin aspart (NovoLOG FlexPen) 100 UNIT/ML solution pen-injector sc pen, Inject 5 Units under the skin into the appropriate area as directed 3 (Three) Times a Day With Meals. Inject 1u lispro SC for every 50 over 150, Disp: 2 pen, Rfl: 2  •  insulin detemir (Levemir FlexTouch) 100 UNIT/ML injection, Inject 15 Units under the skin into the appropriate area as directed 2 (Two) Times a Day., Disp: 3 pen, Rfl: 2  •  Insulin Pen Needle 32G X 4 MM misc, 1 each 5 (Five) Times a Day., Disp: 200 each, Rfl: 1  •  Isopropyl Alcohol (Alcohol Wipes) 70 % misc, Apply 1 each topically 4 (Four) Times a Day Before Meals & at Bedtime., Disp: 200 each, Rfl: 1  •  Lancets (OneTouch Delica Plus [id number]) misc, 1 each 4 (Four) Times a Day Before Meals & at Bedtime., Disp: 200 each, Rfl: 1  •  levETIRAcetam (KEPPRA) 750 MG tablet, Take 1 tablet by mouth 2 (Two) Times a Day., Disp: 60 tablet, Rfl: 2  •  metoprolol tartrate (LOPRESSOR) 25 MG tablet, Take 0.5 tablets by mouth Every 8 (Eight) Hours., Disp: 45 tablet, Rfl: 2  •  ondansetron (Zofran) 8 MG tablet, Take 1 tablet by mouth Every 8 (Eight) Hours As Needed for Nausea or Vomiting., Disp: 30 tablet, Rfl: 3  •  oxybutynin XL (DITROPAN-XL) 5 MG 24 hr tablet, Take 5 mg by mouth Daily., Disp: , Rfl:   •  pantoprazole (PROTONIX) 40 MG EC tablet, TAKE 1 TABLET BY MOUTH EVERY DAY (Patient taking differently: Take 40 mg by mouth Daily. Take DOS), Disp: 30 tablet, Rfl: 1  •  venlafaxine XR (EFFEXOR-XR) 75 MG 24 hr capsule, Take 75 mg by mouth Daily. Take DOS, Disp: , Rfl: 3       LABS (Reviewed):  Hematology WBC   Date Value Ref Range Status   07/16/2021 6.25 3.40 - 10.80 10*3/mm3 Final   03/31/2020 5.63 4.5 - 11.0 10*3/uL Final      RBC   Date Value Ref Range Status   07/16/2021 3.32 (L) 3.77 - 5.28 10*6/mm3 Final   03/31/2020 3.86 (L) 4.0 - 5.2 10*6/uL Final     Hemoglobin   Date Value Ref Range Status   07/16/2021 9.6 (L) 12.0 - 15.9 g/dL Final   03/31/2020 11.5 (L) 12.0 - 16.0 g/dL Final     Hematocrit   Date Value Ref Range Status   07/16/2021 31.2 (L) 34.0 - 46.6 % Final   03/31/2020 36.8 36.0 - 46.0 % Final     Platelets   Date Value Ref Range Status   07/16/2021 271 140 - 450 10*3/mm3 Final   03/31/2020 266 140 - 440 10*3/uL Final      Chemistry   Lab Results   Component Value Date    GLUCOSE 250 (H) 05/13/2021    BUN 34 (H) 05/13/2021    CREATININE 0.82 05/13/2021    EGFRIFNONA 73 05/13/2021    BCR 41.5 (H) 05/13/2021    K 4.6 05/13/2021    CO2 19.0 (L) 05/13/2021    CALCIUM 9.4 05/13/2021    ALBUMIN 3.00 (L) 05/13/2021    LABIL2 1.4 03/30/2021    AST 22 05/13/2021    ALT 42 (H) 05/13/2021         Physical Exam:         Neurology: [x] CNII-XII grossly intact    [x] Strength: intake  [] Reflexes:     [] Encephalopathy:     [] Memory impairment:     [] Neuropathy: motor/sensory:   Auditory/Ear: [] Otitis, external ear (non-infectious):   Ocular/Visual: [] PERRLA/EOMI:   Skin:  ndGndrndanddndend:nd nd2nd Comments/Notes: ctab  RRR      [x] Review of labs, images, dosimetry, dose delivered, & treatment parameters.    Comments:     [x] Patient treatment setup reviewed.    Comments:     Recommendations: Continue XRT and supportive measures  Doing well with no unexpected toxicities    [x] Continue RT  [] Change RT Plan [] Hold RT, length:        Approved Electronically By:  Yang Cruz MD, 7/21/2021, 15:39 EDT          Confidentiality of this medical record shall be maintained except when use or disclosure is required or permitted by law, regulation or written authorization by the patient.

## (undated) DEVICE — DECANTER: Brand: UNBRANDED

## (undated) DEVICE — SPNG GZ WOVN 4X4IN 12PLY 10/BX STRL

## (undated) DEVICE — BATTERY PACK FOR VARISPEED: Brand: STRYKER VARISPEED

## (undated) DEVICE — 3M™ STERI-DRAPE™ INSTRUMENT POUCH 9097: Brand: 3M™ STERI-DRAPE™

## (undated) DEVICE — PK TRY HEART CATH 50

## (undated) DEVICE — CVR HNDL LT CAM LB54

## (undated) DEVICE — C-ARM: Brand: UNBRANDED

## (undated) DEVICE — STRAIGHT TIP, UNIVERSAL

## (undated) DEVICE — GLIDESHEATH SLENDER ACCESS KIT: Brand: GLIDESHEATH SLENDER

## (undated) DEVICE — MARKR SKIN 2TP W/RULR

## (undated) DEVICE — PREP SOL DYNA-HEX CHG LIQ 4% BT 4OZ

## (undated) DEVICE — MAYFIELD® DISPOSABLE ADULT SKULL PIN (PLASTIC BASE): Brand: MAYFIELD®

## (undated) DEVICE — SOL IRR SALINE 0.9% 100ML STRL

## (undated) DEVICE — SUT MNCRYL 3/0 Y936H

## (undated) DEVICE — DRAPE SHEET ULTRAGARD: Brand: MEDLINE

## (undated) DEVICE — 2.3MM TAPERED ROUTER

## (undated) DEVICE — SMOKE EVACUATION TUBING WITH 7/8 IN TO 1/4 IN REDUCER: Brand: BUFFALO FILTER

## (undated) DEVICE — BIT DRILL TWIST 2.30MM

## (undated) DEVICE — MICRO TIP WIPE: Brand: DEVON

## (undated) DEVICE — DRN WND EVAC BULB 100CC

## (undated) DEVICE — SPONGE,LAP,12"X12",XR,ST,5/PK,40PK/CS: Brand: MEDLINE

## (undated) DEVICE — VIOLET BRAIDED (POLYGLACTIN 910), SYNTHETIC ABSORBABLE SUTURE: Brand: COATED VICRYL

## (undated) DEVICE — 3.0MM PRECISION NEURO (MATCH HEAD)

## (undated) DEVICE — FRCP SPEC BIP 203X76X1.5MM DISP

## (undated) DEVICE — DRSNG TELFA PAD NONADH STR 1S 3X8IN

## (undated) DEVICE — CODMAN® SURGICAL PATTIES 1/2" X 3" (1.27CM X 7.62CM): Brand: CODMAN®

## (undated) DEVICE — SEAL DURL ADHERUS/AUTOSPRAY HYDROGEL DS

## (undated) DEVICE — SUT VICYL 2/0 CR8 18IN DYED J726D

## (undated) DEVICE — BIPOLAR SEALER 23-112-1 AQM 6.0: Brand: AQUAMANTYS™

## (undated) DEVICE — ELECTRD DEFIB M/FUNC PROPADZ RADIOL 2PK

## (undated) DEVICE — CLIP RANEY

## (undated) DEVICE — ROUND DIAMOND

## (undated) DEVICE — TOWEL,OR,DSP,ST,NATURAL,DLX,4/PK,20PK/CS: Brand: MEDLINE

## (undated) DEVICE — DRN WND RND END PERF W/TROC 10IN HL 3/16IN

## (undated) DEVICE — CONTAINER,SPECIMEN,OR STERILE,4OZ: Brand: MEDLINE

## (undated) DEVICE — CVR HNDL LT SURG ACCSSRY BLU STRL

## (undated) DEVICE — ST ACC MICROPUNCTURE STFF/CANN PLAT/TP 4F 21G 40CM

## (undated) DEVICE — GAUZE,SPONGE,4"X4",16PLY,XRAY,STRL,LF: Brand: MEDLINE

## (undated) DEVICE — GAUZE,SPONGE,FLUFF,6"X6.75",STRL,5/TRAY: Brand: MEDLINE

## (undated) DEVICE — GW EMR FIX EXCHG J STD .035 3MM 260CM

## (undated) DEVICE — STRAIGHT MICRO DIAMETER TIP, UNIVERSAL

## (undated) DEVICE — SUT VIC 0 CT2 CR8 18IN DYED J727D

## (undated) DEVICE — STAPLER, SKIN, 35W, A: Brand: MEDLINE INDUSTRIES, INC.

## (undated) DEVICE — BAPTIST FLOYD BRONCHOSCOPY: Brand: MEDLINE INDUSTRIES, INC.

## (undated) DEVICE — SPHR MARKR STEALTH STATION

## (undated) DEVICE — 6.0MM PRECISION ROUND

## (undated) DEVICE — CATH IV INSYTE AUTOGARD 14G 1 1/2IN ORNG

## (undated) DEVICE — CARBIDE BUR,ROUND CUTTING, 8 FLUTES, MED., 4MM DIA.: Brand: MICROAIRE®

## (undated) DEVICE — BALN PRESS WEDGE 6F 110CM

## (undated) DEVICE — COUNT NDL FOAM STRIP W/MAG 60CT

## (undated) DEVICE — CODMAN® SURGICAL PATTIES 1/4" X 1/4" (0.64CM X 0.64CM): Brand: CODMAN®

## (undated) DEVICE — DRAPE, RADIAL, STERILE: Brand: MEDLINE

## (undated) DEVICE — DISPOSABLE TUBING SET AND EXTENDER FILTER TUBING

## (undated) DEVICE — PK CRANIOTOMY 50

## (undated) DEVICE — TUBING, SUCTION, 1/4" X 12', STRAIGHT: Brand: MEDLINE

## (undated) DEVICE — NDL HYPO PRECISIONGLIDE REG 25G 1 1/2

## (undated) DEVICE — KT SURG TURNOVER 050

## (undated) DEVICE — DRN WND RND END PERF W/TROC 10IN HL 1/8IN

## (undated) DEVICE — GLV SURG SENSICARE PI MIC PF SZ7.5 LF STRL

## (undated) DEVICE — BND COMPR ZEPHYR VASC LG

## (undated) DEVICE — RADIFOCUS OPTITORQUE ANGIOGRAPHIC CATHETER: Brand: OPTITORQUE

## (undated) DEVICE — FRCP SPEC BIP BAYO NONSTK W/CORD 8IN 1MM DISP

## (undated) DEVICE — ST EXT MAXPLUS PRESS RATED 7IN

## (undated) DEVICE — MARKR SKIN W/RULR

## (undated) DEVICE — TR BAND RADIAL ARTERY COMPRESSION DEVICE: Brand: TR BAND

## (undated) DEVICE — GLIDESHEATH SLENDER STAINLESS STEEL KIT: Brand: GLIDESHEATH SLENDER

## (undated) DEVICE — CODMAN® SURGICAL PATTIES 1" X 3" (2.54CM X 7.62CM): Brand: CODMAN®

## (undated) DEVICE — SUT NUROLON 4/0 TF18 CR8 I8IN C584D